# Patient Record
Sex: FEMALE | Race: WHITE | NOT HISPANIC OR LATINO | Employment: OTHER | URBAN - METROPOLITAN AREA
[De-identification: names, ages, dates, MRNs, and addresses within clinical notes are randomized per-mention and may not be internally consistent; named-entity substitution may affect disease eponyms.]

---

## 2017-01-13 DIAGNOSIS — M25.562 ARTHRALGIA OF BOTH KNEES: ICD-10-CM

## 2017-01-13 DIAGNOSIS — E54 SCURVY: ICD-10-CM

## 2017-01-13 DIAGNOSIS — M19.91 PRIMARY OSTEOARTHRITIS, UNSPECIFIED SITE: ICD-10-CM

## 2017-01-13 DIAGNOSIS — E55.9 VITAMIN D DEFICIENCY: ICD-10-CM

## 2017-01-13 DIAGNOSIS — E53.8 VITAMIN B12 DEFICIENCY (NON ANEMIC): ICD-10-CM

## 2017-01-13 DIAGNOSIS — M25.561 ARTHRALGIA OF BOTH KNEES: ICD-10-CM

## 2017-01-13 DIAGNOSIS — I10 BENIGN ESSENTIAL HYPERTENSION: ICD-10-CM

## 2017-01-13 LAB
DEPRECATED CALCIDIOL+CALCIFEROL SERPL-MC: 63 UG/L (ref 20–75)
ERYTHROCYTE [DISTWIDTH] IN BLOOD BY AUTOMATED COUNT: 13.5 % (ref 10–15)
FERRITIN SERPL-MCNC: 96 NG/ML (ref 8–252)
FOLATE SERPL-MCNC: 79.1 NG/ML
HCT VFR BLD AUTO: 45.9 % (ref 35–47)
HGB BLD-MCNC: 14.7 G/DL (ref 11.7–15.7)
IRON SATN MFR SERPL: 17 % (ref 15–46)
IRON SERPL-MCNC: 70 UG/DL (ref 35–180)
MCH RBC QN AUTO: 30.2 PG (ref 26.5–33)
MCHC RBC AUTO-ENTMCNC: 32 G/DL (ref 31.5–36.5)
MCV RBC AUTO: 94 FL (ref 78–100)
PLATELET # BLD AUTO: 299 10E9/L (ref 150–450)
RBC # BLD AUTO: 4.87 10E12/L (ref 3.8–5.2)
T4 FREE SERPL-MCNC: 1.23 NG/DL (ref 0.76–1.46)
TIBC SERPL-MCNC: 402 UG/DL (ref 240–430)
TSH SERPL DL<=0.05 MIU/L-ACNC: 0.59 MU/L (ref 0.4–4)
VIT B12 SERPL-MCNC: 3351 PG/ML (ref 193–986)
WBC # BLD AUTO: 6.9 10E9/L (ref 4–11)

## 2017-01-13 PROCEDURE — 82728 ASSAY OF FERRITIN: CPT | Performed by: INTERNAL MEDICINE

## 2017-01-13 PROCEDURE — 83550 IRON BINDING TEST: CPT | Performed by: INTERNAL MEDICINE

## 2017-01-13 PROCEDURE — 99000 SPECIMEN HANDLING OFFICE-LAB: CPT | Performed by: INTERNAL MEDICINE

## 2017-01-13 PROCEDURE — 82306 VITAMIN D 25 HYDROXY: CPT | Performed by: INTERNAL MEDICINE

## 2017-01-13 PROCEDURE — 82607 VITAMIN B-12: CPT | Performed by: INTERNAL MEDICINE

## 2017-01-13 PROCEDURE — 85027 COMPLETE CBC AUTOMATED: CPT | Performed by: INTERNAL MEDICINE

## 2017-01-13 PROCEDURE — 82746 ASSAY OF FOLIC ACID SERUM: CPT | Performed by: INTERNAL MEDICINE

## 2017-01-13 PROCEDURE — 84439 ASSAY OF FREE THYROXINE: CPT | Performed by: INTERNAL MEDICINE

## 2017-01-13 PROCEDURE — 82180 ASSAY OF ASCORBIC ACID: CPT | Mod: 90 | Performed by: INTERNAL MEDICINE

## 2017-01-13 PROCEDURE — 84443 ASSAY THYROID STIM HORMONE: CPT | Performed by: INTERNAL MEDICINE

## 2017-01-13 PROCEDURE — 36415 COLL VENOUS BLD VENIPUNCTURE: CPT | Performed by: INTERNAL MEDICINE

## 2017-01-13 PROCEDURE — 83540 ASSAY OF IRON: CPT | Performed by: INTERNAL MEDICINE

## 2017-01-15 LAB — VIT C SERPL-MCNC: 133 MG/DL

## 2017-01-18 ENCOUNTER — PRE VISIT (OUTPATIENT)
Dept: CARDIOLOGY | Facility: CLINIC | Age: 67
End: 2017-01-18

## 2017-01-20 ENCOUNTER — OFFICE VISIT (OUTPATIENT)
Dept: INTERNAL MEDICINE | Facility: CLINIC | Age: 67
End: 2017-01-20
Payer: COMMERCIAL

## 2017-01-20 VITALS
TEMPERATURE: 98.8 F | OXYGEN SATURATION: 97 % | DIASTOLIC BLOOD PRESSURE: 76 MMHG | HEIGHT: 65 IN | WEIGHT: 168.9 LBS | BODY MASS INDEX: 28.14 KG/M2 | HEART RATE: 59 BPM | SYSTOLIC BLOOD PRESSURE: 126 MMHG

## 2017-01-20 DIAGNOSIS — L40.50 PSORIASIS WITH ARTHROPATHY (H): ICD-10-CM

## 2017-01-20 DIAGNOSIS — E54 SCURVY: ICD-10-CM

## 2017-01-20 DIAGNOSIS — M85.80 OSTEOPENIA: ICD-10-CM

## 2017-01-20 DIAGNOSIS — M25.50 PAIN IN JOINT, MULTIPLE SITES: ICD-10-CM

## 2017-01-20 DIAGNOSIS — E55.9 VITAMIN D DEFICIENCY: ICD-10-CM

## 2017-01-20 DIAGNOSIS — I10 BENIGN ESSENTIAL HYPERTENSION: Primary | ICD-10-CM

## 2017-01-20 DIAGNOSIS — E53.8 VITAMIN B12 DEFICIENCY (NON ANEMIC): ICD-10-CM

## 2017-01-20 PROCEDURE — 99214 OFFICE O/P EST MOD 30 MIN: CPT | Performed by: INTERNAL MEDICINE

## 2017-01-20 RX ORDER — FOLIC ACID 1 MG/1
1 TABLET ORAL DAILY
Qty: 100 TABLET | Refills: 3 | Status: SHIPPED | OUTPATIENT
Start: 2017-01-20 | End: 2017-10-30

## 2017-01-20 RX ORDER — SPIRONOLACTONE AND HYDROCHLOROTHIAZIDE 25; 25 MG/1; MG/1
1 TABLET ORAL EVERY MORNING
Qty: 90 TABLET | Refills: 3 | Status: SHIPPED | OUTPATIENT
Start: 2017-01-20 | End: 2017-08-10

## 2017-01-20 NOTE — PATIENT INSTRUCTIONS
** FOLLOW UP PLAN**:    OFFICE VISIT SCHEDULED FOR 03/21/17 FOR PREOP, OTHER MEDICAL ISSUES, AND TO REVIEW TEST RESULTS     BE SURE TO SCHEDULE YOUR LAB DRAW APPOINTMENT FOR AT LEAST 1 WEEK BEFORE YOUR NEXT VISIT      YOU MAY CONTACT THE CLINIC IF ANY QUESTIONS OR CONCERNS -[phone] OR VIA [employer]

## 2017-01-20 NOTE — MR AVS SNAPSHOT
After Visit Summary   1/20/2017    Marva Angela    MRN: [id number]           Patient Information     Date Of Birth          1950        Visit Information        Provider Department      1/20/2017 11:00 AM Leif Fair MD [facility]        Today's Diagnoses     Benign essential hypertension    -  1     Psoriasis with arthropathy (H)         Vitamin B12 deficiency (non anemic)         Scurvy         Vitamin D deficiency         Pain in joint, multiple sites         Osteopenia           Care Instructions    ** FOLLOW UP PLAN**:    OFFICE VISIT SCHEDULED FOR 03/21/17 FOR PREOP, OTHER MEDICAL ISSUES, AND TO REVIEW TEST RESULTS     BE SURE TO SCHEDULE YOUR LAB DRAW APPOINTMENT FOR AT LEAST 1 WEEK BEFORE YOUR NEXT VISIT      YOU MAY CONTACT THE CLINIC IF ANY QUESTIONS OR CONCERNS -[phone] OR VIA [employer]                   Follow-ups after your visit        Your next 10 appointments already scheduled     Feb 01, 2017  8:10 AM   LAB with [facility]   [facility] PHYSICIANS HEART AT [place] ([facility])    [address] [place] [address] [address]  [facility] [id number] [phone]           Patient must bring picture ID.  Patient should be prepared to give a urine specimen  Please do not eat 10-12 hours before your appointment if you are coming in fasting for labs on lipids, cholesterol, or glucose (sugar).  Pregnant women should follow their Care Team instructions. Water with medications is okay. Do not drink coffee or other fluids.   If you have concerns about taking  your medications, please ask at office or if scheduling via [employer], send a message by clicking on Secure Messaging, Message Your Care Team.            Feb 01, 2017  8:30 AM   Ech Complete with [id number]   [facility] CV Echocardiography (Cardiovascular Imaging at [facility])    [address] [place] [address]  [address]  [facility] [id number] [phone]           1.   Please bring or wear a comfortable two-piece outfit. 2.  You may eat, drink and take your normal medicines. 3.  For any questions that cannot be answered, please contact the ordering physician            Feb 07, 2017  8:45 AM   Return Visit with Aren Fountain MD   [facility] PHYSICIANS [facility] AT [place] ([facility])    [address] [place], [place] [address]  [facility] [address]   [phone]            Mar 21, 2017  8:30 AM   Pre-Op physical with Leif Fair MD   [facility] ([facility]    [address] [address] [phone]              Future tests that were ordered for you today     Open Future Orders        Priority Expected Expires Ordered    Vitamin D Deficiency Routine 3/20/2017 7/20/2017 1/20/2017    Vitamin B12 Routine 3/20/2017 7/20/2017 1/20/2017    Comprehensive metabolic panel Routine 3/20/2017 7/20/2017 1/20/2017    Vitamin C Routine 3/20/2017 7/20/2017 1/20/2017            Who to contact     If you have questions or need follow up information about today's clinic visit or your schedule please contact [facility] directly at [phone].  Normal or non-critical lab and imaging results will be communicated to you by [employer], letter or phone within 4 business days after the clinic has received the results. If you do not hear from us within 7 days, please contact the clinic through MyChart or phone. If you have a critical or abnormal lab result, we will notify you by phone as soon as possible.  Submit refill requests through [employer] or call your pharmacy and they will forward the refill request to us. Please allow 3 business days for your refill to be completed.          Additional Information About Your Visit        [employer] Information     [employer] gives you secure access to your electronic health record. If you see a primary care provider, you can also send messages to  "your care team and make appointments. If you have questions, please call your primary care clinic.  If you do not have a primary care provider, please call [phone] and they will assist you.        Care EveryWhere ID     This is your Care EveryWhere ID. This could be used by other organizations to access your [place] medical records  [id number]        Your Vitals Were     Pulse Temperature Height BMI (Body Mass Index) Pulse Oximetry       59 98.8  F (37.1  C) (Oral) 5' 5\" (1.651 m) 28.11 kg/m2 97%        Blood Pressure from Last 3 Encounters:   01/20/17 126/76   12/19/16 142/84   12/05/16 154/72    Weight from Last 3 Encounters:   01/20/17 168 lb 14.4 oz (76.613 kg)   12/05/16 174 lb (78.926 kg)   10/14/16 169 lb 11.2 oz (76.975 kg)                 Today's Medication Changes          These changes are accurate as of: 1/20/17 12:23 PM.  If you have any questions, ask your nurse or doctor.               These medicines have changed or have updated prescriptions.        Dose/Directions    cholecalciferol 51525 UNITS capsule   Commonly known as:  VITAMIN D3   This may have changed:  additional instructions   Used for:  Osteopenia, Psoriasis with arthropathy (H)   Changed by:  Leif Fair MD        Dose:  1 capsule   Take 1 capsule (50,000 Units) by mouth every 14 days SIG: TAKE ONE CAP EVERY OTHER WEEK, INDICATION: TO TREAT VITAMIN D DEFICIENCY (1ST AND 15TH OF EACH MONTH), MUST TAKE WITH FAT CONTAINING MEAL, TAKE 1 EXTRA CAPSULE THIS WEEK ONLY   Quantity:  40 capsule   Refills:  0            Where to get your medicines      These [id number] - [place], [place] - [address] [place] AT [facility] [address]  [address] [facility] [address], [place] [address]     Phone:  [phone]    - Calcium Carb-Cholecalciferol 1000-800 MG-UNIT Tabs  - Cyanocobalamin 5000 MCG/ML Liqd  - folic acid 1 MG tablet  - spironolactone-hydrochlorothiazide 25-25 MG per " tablet  - vitamin C-electrolytes 1000mg vitamin C super orange drink mix      Some of these will need a paper prescription and others can be bought over the counter.  Ask your nurse if you have questions.     You don't need a prescription for these medications    - cholecalciferol 68188 UNITS capsule             Primary Care Provider Office Phone # Fax #    Ivonnelam LAM Fair [phone]       [facility] [address]  [facility] [address]        Thank you!     Thank you for choosing [facility]  for your care. Our goal is always to provide you with excellent care. Hearing back from our patients is one way we can continue to improve our services. Please take a few minutes to complete the written survey that you may receive in the mail after your visit with us. Thank you!             Your Updated Medication List - Protect others around you: Learn how to safely use, store and throw away your medicines at www.disposemymeds.org.          This list is accurate as of: 1/20/17 12:23 PM.  Always use your most recent med list.                   Brand Name Dispense Instructions for use    acetaminophen 500 MG tablet    TYLENOL    100 tablet    Take 2 tablets (1,000 mg) by mouth every 8 hours as needed for mild pain       albuterol 108 (90 BASE) MCG/ACT Inhaler    PROAIR HFA/PROVENTIL HFA/VENTOLIN HFA    3 Inhaler    Inhale 2 puffs into the lungs every 6 hours as needed for shortness of breath / dyspnea or wheezing       aspirin EC 81 MG EC tablet     30 tablet    Take 1 tablet (81 mg) by mouth daily       atorvastatin 10 MG tablet    LIPITOR    90 tablet    Take 1 tablet (10 mg) by mouth daily       betamethasone dipropionate 0.05 % cream    DIPROSONE    45 g    Apply topically 2 times daily       Calcium Carb-Cholecalciferol 1000-800 MG-UNIT Tabs    CALCIUM 1000 + D    100 tablet    Take 1 tablet by mouth daily TAKE WITH FOOD, FOR BONE HEALTH AND FOR VITAMIN D  SUPPLEMENTATION       celecoxib 200 MG capsule    celeBREX    60 capsule    Take 1 capsule (200 mg) by mouth 2 times daily as needed for moderate pain       cholecalciferol 45881 UNITS capsule    VITAMIN D3    40 capsule    Take 1 capsule (50,000 Units) by mouth every 14 days SIG: TAKE ONE CAP EVERY OTHER WEEK, INDICATION: TO TREAT VITAMIN D DEFICIENCY (1ST AND 15TH OF EACH MONTH), MUST TAKE WITH FAT CONTAINING MEAL, TAKE 1 EXTRA CAPSULE THIS WEEK ONLY       Cyanocobalamin 5000 MCG/ML Liqd    B-12 SUPER STRENGTH    2 Bottle    Place 1 mL under the tongue every morning FOR VITAMIN B12 SUPPLEMENTATION, PLEASE PLACE UNDER THE TONGUE       folic acid 1 MG tablet    FOLVITE    100 tablet    Take 1 tablet (1 mg) by mouth daily INDICATION: FOLIC ACID SUPPLEMENT       order for [facility]     1 Device    Equipment being ordered: blood pressure cuff       ranitidine 150 MG tablet    ZANTAC     Take 150 mg by mouth as needed for heartburn       spironolactone-hydrochlorothiazide 25-25 MG per tablet    ALDACTAZIDE    90 tablet    Take 1 tablet by mouth every morning INDICATION:TO LOWER BLOOD PRESSURE       triamcinolone 0.1 % ointment    KENALOG    30 g    Apply topically 2 times daily       vitamin C-electrolytes 1000mg vitamin C super orange drink mix    EMERGEN-C    90 packet    Mix 1 packet in 4-6oz water and take once daily, INDICATION: VITAMIN C SUPPLEMENTION

## 2017-01-20 NOTE — NURSING NOTE
"Chief Complaint   Patient presents with     Hypertension       Initial /76 mmHg  Pulse 59  Temp(Src) 98.8  F (37.1  C) (Oral)  Ht 5' 5\" (1.651 m)  Wt 168 lb 14.4 oz (76.613 kg)  BMI 28.11 kg/m2  SpO2 97% Estimated body mass index is 28.11 kg/(m^2) as calculated from the following:    Height as of this encounter: 5' 5\" (1.651 m).    Weight as of this encounter: 168 lb 14.4 oz (76.613 kg).  BP completed using cuff size: regular    "

## 2017-01-20 NOTE — PROGRESS NOTES
"  SUBJECTIVE:                                                    Marva Angela is a 66 year old female who presents to clinic today for the following health issues:      Hypertension Follow-up      Outpatient blood pressures are being checked at home.  Results are 125-135/70's.    Low Salt Diet: low salt       Amount of exercise or physical activity: None    Problems taking medications regularly: No    Medication side effects: none    Diet: regular (no restrictions)    Hyperlipidemia Follow-Up      Rate your low fat/cholesterol diet?: good    Taking statin?  Yes, no muscle aches from statin    Other lipid medications/supplements?:  none     Other significant issues as outlined and addressed in the plan section of this note.      Marva reports feeling really good. She reports feeling better since she started taking vitamin supplements and has noted significant improvement with regards to fatigue and muscle aches and pains. Psoriatic arthritis sxs have improved significantly.    She is also scheduled for R ROBLES in April 2017. She also needs bilateral TKAs.  Problem list and histories reviewed & adjusted, as indicated.  Additional history: as documented    Labs reviewed in EPIC  Problem list, Medication list, Allergies, and Medical/Social/Surgical histories reviewed in [facility] and updated as appropriate.    ROS:  14 point ROS reviewed in detail and negative     OBJECTIVE:                                                    /76 mmHg  Pulse 59  Temp(Src) 98.8  F (37.1  C) (Oral)  Ht 5' 5\" (1.651 m)  Wt 168 lb 14.4 oz (76.613 kg)  BMI 28.11 kg/m2  SpO2 97%  Body mass index is 28.11 kg/(m^2).  GENERAL: healthy, alert and no distress  EYES: Eyes grossly normal to inspection, PERRL and conjunctivae and sclerae normal  NECK: no adenopathy, no asymmetry, masses, or scars and thyroid normal to palpation  RESP: lungs clear to auscultation - no rales, rhonchi or wheezes  CV: regular rate and rhythm, normal S1 " S2, no S3 or S4, no murmur, click or rub, no peripheral edema and peripheral pulses strong  ABDOMEN: soft, nontender, no hepatosplenomegaly, no masses and bowel sounds normal  MS: no gross musculoskeletal defects noted, no edema    Diagnostic Test Results:  Results for orders placed or performed in visit on 01/13/17   Vitamin D Deficiency   Result Value Ref Range    Vitamin D Deficiency screening 63 20 - 75 ug/L   CBC with platelets   Result Value Ref Range    WBC 6.9 4.0 - 11.0 10e9/L    RBC Count 4.87 3.8 - 5.2 10e12/L    Hemoglobin 14.7 11.7 - 15.7 g/dL    Hematocrit 45.9 35.0 - 47.0 %    MCV 94 78 - 100 fl    MCH 30.2 26.5 - 33.0 pg    MCHC 32.0 31.5 - 36.5 g/dL    RDW 13.5 10.0 - 15.0 %    Platelet Count 299 150 - 450 10e9/L   Ferritin   Result Value Ref Range    Ferritin 96 8 - 252 ng/mL   Vitamin B12   Result Value Ref Range    Vitamin B12 3351 (H) 193 - 986 pg/mL   Vitamin C   Result Value Ref Range    Vitamin C 133 (H)    TSH   Result Value Ref Range    TSH 0.59 0.40 - 4.00 mU/L   T4 free   Result Value Ref Range    T4 Free 1.23 0.76 - 1.46 ng/dL   Folate   Result Value Ref Range    Folate 79.1 >5.4 ng/mL   Iron and iron binding capacity   Result Value Ref Range    Iron 70 35 - 180 ug/dL    Iron Binding Cap 402 240 - 430 ug/dL    Iron Saturation Index 17 15 - 46 %        ASSESSMENT/PLAN:                                                    Hyperlipidemia; controlled   Plan:  No changes in the patient's current treatment plan    Hypertension; controlled   Associated with the following complications:    Heart Disease   Plan:  No changes in the patient's current treatment plan      DIAGNOSIS/PLAN:     ICD-10-CM    1. Benign essential hypertension I10 spironolactone-hydrochlorothiazide (ALDACTAZIDE) 25-25 MG per tablet     Comprehensive metabolic panel   2. Psoriasis with arthropathy (H) L40.50 cholecalciferol (VITAMIN D3) 58425 UNITS capsule   3. Vitamin B12 deficiency (non anemic) E53.8 Cyanocobalamin (B-12  SUPER STRENGTH) 5000 MCG/ML LIQD     folic acid (FOLVITE) 1 MG tablet     Vitamin B12    SEVERE   4. Scurvy E54 vitamin C-electrolytes (EMERGEN-C) 1000mg vitamin C super orange drink mix     Vitamin C    SEVERE   5. Vitamin D deficiency E55.9 Vitamin D Deficiency   6. Pain in joint, multiple sites M25.50 Calcium Carb-Cholecalciferol (CALCIUM 1000 + D) 1000-800 MG-UNIT TABS   7. Osteopenia M85.80 cholecalciferol (VITAMIN D3) 52550 UNITS capsule     Calcium Carb-Cholecalciferol (CALCIUM 1000 + D) 1000-800 MG-UNIT TABS       SIGNIFICANT ISSUES RE The primary encounter diagnosis was Benign essential hypertension. Diagnoses of Psoriasis with arthropathy (H), Vitamin B12 deficiency (non anemic), Scurvy, Vitamin D deficiency, Pain in joint, multiple sites, and Osteopenia were also pertinent to this visit. AS NOTED AND ADDRESSED ABOVE   MEDS AND AND LABS AS ORDERED TO ADDRESS PREVIOUS AND CURRENT ABNORMAL INDICES    SEE PATIENT INSTRUCTION SECTION FOR FOLLOW UP PLAN    Marva IS TO CONTINUE OTHER TREATMENT REGIMEN/PLANS EXCEPT AS INDICATED    COMPLIANCE WITH MEDICATIONS DIET AND EXERCISE PLANS ENCOURAGED    DISCONTINUED MEDS:  Medications Discontinued During This Encounter   Medication Reason     cholecalciferol (VITAMIN D3) 76931 UNITS capsule Reorder     Cyanocobalamin (B-12 SUPER STRENGTH) 5000 MCG/ML LIQD Reorder     spironolactone-hydrochlorothiazide (ALDACTAZIDE) 25-25 MG per tablet Reorder     folic acid (FOLVITE) 1 MG tablet Reorder     vitamin C-electrolytes (EMERGEN-C) 1000mg vitamin C super orange drink mix Reorder     Calcium Carb-Cholecalciferol (CALCIUM 1000 + D) 1000-800 MG-UNIT TABS Reorder       CURRENT MED LIST WITH CHANGES AS NOTED BELOW:  Current Outpatient Prescriptions   Medication Sig Dispense Refill     cholecalciferol (VITAMIN D3) 85882 UNITS capsule Take 1 capsule (50,000 Units) by mouth every 14 days SIG: TAKE ONE CAP EVERY OTHER WEEK, INDICATION: TO TREAT VITAMIN D DEFICIENCY (1ST AND 15TH OF  EACH MONTH), MUST TAKE WITH FAT CONTAINING MEAL, TAKE 1 EXTRA CAPSULE THIS WEEK ONLY 40 capsule 0     Cyanocobalamin (B-12 SUPER STRENGTH) 5000 MCG/ML LIQD Place 1 mL under the tongue every morning FOR VITAMIN B12 SUPPLEMENTATION, PLEASE PLACE UNDER THE TONGUE 2 Bottle PRN     spironolactone-hydrochlorothiazide (ALDACTAZIDE) 25-25 MG per tablet Take 1 tablet by mouth every morning INDICATION:TO LOWER BLOOD PRESSURE 90 tablet 3     folic acid (FOLVITE) 1 MG tablet Take 1 tablet (1 mg) by mouth daily INDICATION: FOLIC ACID SUPPLEMENT 100 tablet 3     vitamin C-electrolytes (EMERGEN-C) 1000mg vitamin C super orange drink mix Mix 1 packet in 4-6oz water and take once daily, INDICATION: VITAMIN C SUPPLEMENTION 90 packet PRN     Calcium Carb-Cholecalciferol (CALCIUM 1000 + D) 1000-800 MG-UNIT TABS Take 1 tablet by mouth daily TAKE WITH FOOD, FOR BONE HEALTH AND FOR VITAMIN D SUPPLEMENTATION 100 tablet PRN     celecoxib (CELEBREX) 200 MG capsule Take 1 capsule (200 mg) by mouth 2 times daily as needed for moderate pain 60 capsule 3     acetaminophen (TYLENOL) 500 MG tablet Take 2 tablets (1,000 mg) by mouth every 8 hours as needed for mild pain 100 tablet 0     betamethasone dipropionate (DIPROSONE) 0.05 % cream Apply topically 2 times daily 45 g 1     atorvastatin (LIPITOR) 10 MG tablet Take 1 tablet (10 mg) by mouth daily 90 tablet 3     ranitidine (ZANTAC) 150 MG tablet Take 150 mg by mouth as needed for heartburn       albuterol (PROAIR HFA, PROVENTIL HFA, VENTOLIN HFA) 108 (90 BASE) MCG/ACT inhaler Inhale 2 puffs into the lungs every 6 hours as needed for shortness of breath / dyspnea or wheezing 3 Inhaler 1     aspirin EC 81 MG tablet Take 1 tablet (81 mg) by mouth daily 30 tablet 11     order for DME Equipment being ordered: blood pressure cuff 1 Device 0     triamcinolone (KENALOG) 0.1 % ointment Apply topically 2 times daily 30 g 1     [DISCONTINUED] spironolactone-hydrochlorothiazide (ALDACTAZIDE) 25-25 MG per  tablet Take 1 tablet by mouth every morning INDICATION:TO LOWER BLOOD PRESSURE 30 tablet 3         Patient Instructions   ** FOLLOW UP PLAN**:    OFFICE VISIT SCHEDULED FOR 03/21/17 FOR PREOP, OTHER MEDICAL ISSUES, AND TO REVIEW TEST RESULTS     BE SURE TO SCHEDULE YOUR LAB DRAW APPOINTMENT FOR AT LEAST 1 WEEK BEFORE YOUR NEXT VISIT      YOU MAY CONTACT THE CLINIC IF ANY QUESTIONS OR CONCERNS -[phone] OR VIA WALTER Fair MD  [facility]

## 2017-02-01 ENCOUNTER — HOSPITAL ENCOUNTER (OUTPATIENT)
Dept: CARDIOLOGY | Facility: CLINIC | Age: 67
Discharge: HOME | End: 2017-02-01
Attending: INTERNAL MEDICINE | Admitting: INTERNAL MEDICINE
Payer: MEDICARE

## 2017-02-01 DIAGNOSIS — I10 BENIGN ESSENTIAL HYPERTENSION: ICD-10-CM

## 2017-02-01 DIAGNOSIS — I25.10 CORONARY ARTERY DISEASE INVOLVING NATIVE CORONARY ARTERY OF NATIVE HEART WITHOUT ANGINA PECTORIS: ICD-10-CM

## 2017-02-01 DIAGNOSIS — J44.9 CHRONIC OBSTRUCTIVE PULMONARY DISEASE, UNSPECIFIED COPD TYPE (H): ICD-10-CM

## 2017-02-01 DIAGNOSIS — M81.0 OSTEOPOROSIS: ICD-10-CM

## 2017-02-01 DIAGNOSIS — I25.10 CORONARY ARTERY DISEASE INVOLVING NATIVE HEART WITHOUT ANGINA PECTORIS, UNSPECIFIED VESSEL OR LESION TYPE: ICD-10-CM

## 2017-02-01 DIAGNOSIS — E78.5 HYPERLIPIDEMIA LDL GOAL <130: ICD-10-CM

## 2017-02-01 LAB
ALT SERPL W P-5'-P-CCNC: <5 U/L (ref 5–30)
ANION GAP SERPL CALCULATED.3IONS-SCNC: 12 MMOL/L (ref 6–17)
BUN SERPL-MCNC: 21 MG/DL (ref 7–30)
CALCIUM SERPL-MCNC: 9.8 MG/DL (ref 8.5–10.5)
CHLORIDE SERPL-SCNC: 99 MMOL/L (ref 98–107)
CHOLEST SERPL-MCNC: 177 MG/DL
CO2 SERPL-SCNC: 32 MMOL/L (ref 23–29)
CREAT SERPL-MCNC: 0.83 MG/DL (ref 0.7–1.3)
GFR SERPL CREATININE-BSD FRML MDRD: 68 ML/MIN/1.7M2
GLUCOSE SERPL-MCNC: 117 MG/DL (ref 70–105)
HDLC SERPL-MCNC: 56 MG/DL
LDLC SERPL CALC-MCNC: 103 MG/DL
NONHDLC SERPL-MCNC: 121 MG/DL
POTASSIUM SERPL-SCNC: 4 MMOL/L (ref 3.5–5.1)
SODIUM SERPL-SCNC: 139 MMOL/L (ref 136–145)
TRIGL SERPL-MCNC: 88 MG/DL

## 2017-02-01 PROCEDURE — 80048 BASIC METABOLIC PNL TOTAL CA: CPT | Performed by: INTERNAL MEDICINE

## 2017-02-01 PROCEDURE — 84460 ALANINE AMINO (ALT) (SGPT): CPT | Performed by: INTERNAL MEDICINE

## 2017-02-01 PROCEDURE — 36415 COLL VENOUS BLD VENIPUNCTURE: CPT | Performed by: INTERNAL MEDICINE

## 2017-02-01 PROCEDURE — 93306 TTE W/DOPPLER COMPLETE: CPT | Mod: 26 | Performed by: INTERNAL MEDICINE

## 2017-02-01 PROCEDURE — 80061 LIPID PANEL: CPT | Performed by: INTERNAL MEDICINE

## 2017-02-01 PROCEDURE — 93306 TTE W/DOPPLER COMPLETE: CPT

## 2017-02-07 ENCOUNTER — OFFICE VISIT (OUTPATIENT)
Dept: CARDIOLOGY | Facility: CLINIC | Age: 67
End: 2017-02-07
Attending: INTERNAL MEDICINE
Payer: COMMERCIAL

## 2017-02-07 VITALS
HEART RATE: 72 BPM | BODY MASS INDEX: 27 KG/M2 | SYSTOLIC BLOOD PRESSURE: 136 MMHG | DIASTOLIC BLOOD PRESSURE: 82 MMHG | WEIGHT: 168 LBS | HEIGHT: 66 IN

## 2017-02-07 DIAGNOSIS — J44.9 CHRONIC OBSTRUCTIVE PULMONARY DISEASE, UNSPECIFIED COPD TYPE (H): ICD-10-CM

## 2017-02-07 DIAGNOSIS — I10 BENIGN ESSENTIAL HYPERTENSION: ICD-10-CM

## 2017-02-07 DIAGNOSIS — I25.10 CORONARY ARTERY DISEASE INVOLVING NATIVE HEART WITHOUT ANGINA PECTORIS, UNSPECIFIED VESSEL OR LESION TYPE: ICD-10-CM

## 2017-02-07 DIAGNOSIS — I25.10 CORONARY ARTERY DISEASE INVOLVING NATIVE CORONARY ARTERY OF NATIVE HEART WITHOUT ANGINA PECTORIS: ICD-10-CM

## 2017-02-07 DIAGNOSIS — E78.5 HYPERLIPIDEMIA LDL GOAL <130: ICD-10-CM

## 2017-02-07 DIAGNOSIS — M81.0 OSTEOPOROSIS: ICD-10-CM

## 2017-02-07 PROCEDURE — 99214 OFFICE O/P EST MOD 30 MIN: CPT | Performed by: INTERNAL MEDICINE

## 2017-02-07 RX ORDER — AMLODIPINE BESYLATE 2.5 MG/1
2.5 TABLET ORAL DAILY
Qty: 30 TABLET | Refills: 11 | Status: SHIPPED | OUTPATIENT
Start: 2017-02-07 | End: 2017-07-14

## 2017-02-07 NOTE — MR AVS SNAPSHOT
After Visit Summary   2/7/2017    Marva Angela    MRN: [id number]           Patient Information     Date Of Birth          1950        Visit Information        Provider Department      2/7/2017 8:45 AM Aren Fountain MD [facility] [place]        Today's Diagnoses     Hyperlipidemia LDL goal <130         Benign essential hypertension         Osteoporosis         Coronary artery disease involving native coronary artery of native heart without angina pectoris         Chronic obstructive pulmonary disease, unspecified COPD type (H)         Coronary artery disease involving native heart without angina pectoris, unspecified vessel or lesion type            Follow-ups after your visit        Additional Services     Follow-Up with Cardiac Advanced Practice Provider           Follow-Up with Cardiologist                 Your next 10 appointments already scheduled     Mar 14, 2017  9:30 AM   LAB with [facility]   [facility] ([facility]    [address] [address] [phone]           Patient must bring picture ID.  Patient should be prepared to give a urine specimen  Please do not eat 10-12 hours before your appointment if you are coming in fasting for labs on lipids, cholesterol, or glucose (sugar).  Pregnant women should follow their Care Team instructions. Water with medications is okay. Do not drink coffee or other fluids.   If you have concerns about taking  your medications, please ask at office or if scheduling via [employer], send a message by clicking on Secure Messaging, Message Your Care Team.            Mar 21, 2017  8:30 AM   Pre-Op physical with Leif Fair MD   [facility]    [address] [address] [phone]              Future tests that were ordered for you today      "Open Future Orders        Priority Expected Expires Ordered    NM Lexiscan stress test (nuc card) Routine 3/9/2017 2/7/2018 2/7/2017    Basic metabolic panel Routine 6/7/2017 2/7/2018 2/7/2017    Lipid Profile Routine 6/7/2017 2/7/2018 2/7/2017    ALT Routine 6/7/2017 2/7/2018 2/7/2017    Follow-Up with Cardiologist Routine 6/7/2017 2/7/2018 2/7/2017    Basic metabolic panel Routine 3/9/2017 2/7/2018 2/7/2017    Follow-Up with Cardiac Advanced Practice Provider Routine 3/9/2017 2/7/2018 2/7/2017            Who to contact     If you have questions or need follow up information about today's clinic visit or your schedule please contact [facility] AT [place] directly at [phone].  Normal or non-critical lab and imaging results will be communicated to you by MyChart, letter or phone within 4 business days after the clinic has received the results. If you do not hear from us within 7 days, please contact the clinic through [facility] [employer] or phone. If you have a critical or abnormal lab result, we will notify you by phone as soon as possible.  Submit refill requests through [employer] or call your pharmacy and they will forward the refill request to us. Please allow 3 business days for your refill to be completed.          Additional Information About Your Visit        [facility] [employer] Information     [employer] gives you secure access to your electronic health record. If you see a primary care provider, you can also send messages to your care team and make appointments. If you have questions, please call your primary care clinic.  If you do not have a primary care provider, please call [phone] and they will assist you.        Care EveryWhere ID     This is your Care EveryWhere ID. This could be used by other organizations to access your [place] medical records  [id number]        Your Vitals Were     Pulse Height BMI (Body Mass Index)             72 1.664 m (5' 5.5\") 27.52 kg/m2          Blood " Pressure from Last 3 Encounters:   02/07/17 136/82   01/20/17 126/76   12/19/16 142/84    Weight from Last 3 Encounters:   02/07/17 76.204 kg (168 lb)   01/20/17 76.613 kg (168 lb 14.4 oz)   12/05/16 78.926 kg (174 lb)              We Performed the Following     Follow-Up with Cardiologist          Today's Medication Changes          These changes are accurate as of: 2/7/17  9:59 AM.  If you have any questions, ask your nurse or doctor.               Start taking these medicines.        Dose/Directions    amLODIPine 2.5 MG tablet   Commonly known as:  NORVASC   Used for:  Benign essential hypertension   Started by:  Aren Fountain MD        Dose:  2.5 mg   Take 1 tablet (2.5 mg) by mouth daily   Quantity:  30 tablet   Refills:  11            Where to get your medicines      These medications were sent to [id number] - [facility] [address] AT [facility] [address] & [facility]  [address] [facility], [facility] [address]     Phone:  [phone]    - amLODIPine 2.5 MG tablet             Primary Care Provider Office Phone # Fax #    Leif Fair [phone]       [facility] [address] [facility] [address]        Thank you!     Thank you for choosing [facility] AT [place]  for your care. Our goal is always to provide you with excellent care. Hearing back from our patients is one way we can continue to improve our services. Please take a few minutes to complete the written survey that you may receive in the mail after your visit with us. Thank you!             Your Updated Medication List - Protect others around you: Learn how to safely use, store and throw away your medicines at www.disposemymeds.org.          This list is accurate as of: 2/7/17  9:59 AM.  Always use your most recent med list.                   Brand Name Dispense Instructions for use    acetaminophen 500 MG tablet    TYLENOL    100  tablet    Take 2 tablets (1,000 mg) by mouth every 8 hours as needed for mild pain       albuterol 108 (90 BASE) MCG/ACT Inhaler    PROAIR HFA/PROVENTIL HFA/VENTOLIN HFA    3 Inhaler    Inhale 2 puffs into the lungs every 6 hours as needed for shortness of breath / dyspnea or wheezing       amLODIPine 2.5 MG tablet    NORVASC    30 tablet    Take 1 tablet (2.5 mg) by mouth daily       aspirin EC 81 MG EC tablet     30 tablet    Take 1 tablet (81 mg) by mouth daily       atorvastatin 10 MG tablet    LIPITOR    90 tablet    Take 1 tablet (10 mg) by mouth daily       betamethasone dipropionate 0.05 % cream    DIPROSONE    45 g    Apply topically 2 times daily       Calcium Carb-Cholecalciferol 1000-800 MG-UNIT Tabs    CALCIUM 1000 + D    100 tablet    Take 1 tablet by mouth daily TAKE WITH FOOD, FOR BONE HEALTH AND FOR VITAMIN D SUPPLEMENTATION       celecoxib 200 MG capsule    celeBREX    60 capsule    Take 1 capsule (200 mg) by mouth 2 times daily as needed for moderate pain       cholecalciferol 47120 UNITS capsule    VITAMIN D3    40 capsule    Take 1 capsule (50,000 Units) by mouth every 14 days SIG: TAKE ONE CAP EVERY OTHER WEEK, INDICATION: TO TREAT VITAMIN D DEFICIENCY (1ST AND 15TH OF EACH MONTH), MUST TAKE WITH FAT CONTAINING MEAL, TAKE 1 EXTRA CAPSULE THIS WEEK ONLY       Cyanocobalamin 5000 MCG/ML Liqd    B-12 SUPER STRENGTH    2 Bottle    Place 1 mL under the tongue every morning FOR VITAMIN B12 SUPPLEMENTATION, PLEASE PLACE UNDER THE TONGUE       folic acid 1 MG tablet    FOLVITE    100 tablet    Take 1 tablet (1 mg) by mouth daily INDICATION: FOLIC ACID SUPPLEMENT       order for DME     1 Device    Equipment being ordered: blood pressure cuff       ranitidine 150 MG tablet    ZANTAC     Take 150 mg by mouth as needed for heartburn       spironolactone-hydrochlorothiazide 25-25 MG per tablet    ALDACTAZIDE    90 tablet    Take 1 tablet by mouth every morning INDICATION:TO LOWER BLOOD PRESSURE        triamcinolone 0.1 % ointment    KENALOG    30 g    Apply topically 2 times daily       vitamin C-electrolytes 1000mg vitamin C super orange drink mix    EMERGEN-C    90 packet    Mix 1 packet in 4-6oz water and take once daily, INDICATION: VITAMIN C SUPPLEMENTION

## 2017-02-07 NOTE — LETTER
2/7/2017    Leif Fair MD  [facility]   [address] [facility] [address]    RE: Marva Angela       Dear Colleague,    I had the pleasure of seeing Marva Angela in the [facility].    The patient is a 66-year-old slender white female who was referred to Cardiology Clinic for further assessment of CT scan of the chest for cancer evaluation showing calcification of the ascending thoracic aorta as well as coronary arteries.  The patient related no significant symptoms of chest discomfort, shortness of breath at rest, dizziness, palpitations, nausea, vomiting, diaphoresis or syncope.  There is no history of PND, orthopnea, fevers, chills or sweats.  There is no documented history of myocardial infarction, congestive heart failure, rheumatic heart disease, congenital heart disease or heart murmur.        History for coronary disease risk factors are positive for cigarette smoking, discontinued in 2015 after 50 pack years, positive for hypertension treated for 2015 with somewhat problematic control, negative for diabetes mellitus, positive for hypercholesterolemia with attempted treatments in 2016 and a positive family history of myocardial infarction with stroke and 58.  She has a rare alcoholic beverage and approximately 1 caffeinated beverage a day.  She does have some sleeping difficulties.        She had a CT scan of her chest that described emphysema with underlying symptoms of COPD.  She has been attempted on metoprolol and did not tolerate the medication has also not tolerated lisinopril or losartan for diarrhea primarily.  She denies any symptoms of chest discomfort, shortness of breath, dizziness, palpitations or syncope.      MEDICATIONS:   1.  Vitamin D3 50,000 units every 14 days.   2.  Cyanocobalamin B12 every morning.   3.  Spironolactone/hydrochlorothiazide 25/25 mg once a day.   4.  Folic acid 1 mg a day.   5.  Vitamin C 1000 mg a day.    6.  Calcium carbonate vitamin D 1 tablet a day.   7.  Celebrex 200 mg twice daily as needed.   8.  Acetaminophen 1000 mg every 8 hours as needed.   9.  Atorvastatin 10 mg a day.   10.  Ranitidine 150 mg a day.   11.  Albuterol inhaler as needed.   12.  Aspirin 81 mg a day.      LABORATORY DATA:  Demonstrated cholesterol 177, HDL 56, , triglycerides 88.  Sodium 139, potassium 4.0, BUN 21, creatinine 0.83.  Hemoglobin 14.7, platelet count 299,000.  ALT less than 5.      The patient had an echocardiogram this month to follow left ventricular function that showed normal-sized left ventricle with intact systolic function with ejection fraction 60%-65% with evidence of pseudonormalization with no significant valvular insufficiency or stenosis.      The patient presents to Cardiology Clinic for follow up of her cardiovascular and vascular disease and also in preparation for possible hip replacement and knee replacement surgery during the course of this year.  In fact, she is scheduled for hip replacement in early April.      PHYSICAL EXAMINATION:   VITAL SIGNS:  Blood pressure 136/82 with a heart rate of 72 and regular.  Weight was 168 pounds, which is stable.   NECK:  Without jugular venous distention, carotid bruit or palpable thyroid.   CHEST:  Essentially clear to percussion and auscultation with prolonged expiratory phase and decreased breath sounds at the bases.   CARDIAC:  Regular rhythm, soft S4 gallop, 1/6 systolic murmur at the left sternal border with minimal radiation.  No diastolic murmur, rub or S3.   EXTREMITIES:  Without cyanosis or edema.      CLINICAL IMPRESSION:   1.  Relatively stable cardiac condition.   2.  History of ischemic cardiovascular disease with calcification of the ascending thoracic aorta and coronary arteries.   3.  Hypertension with slight elevation of systolic blood pressure.   4.  Chronic obstructive pulmonary disease from a longstanding history of cigarette use, which was  discontinued in 2015.   5.  Hyperlipidemia, not at goal.   6.  Positive family history of coronary disease.   7.  Osteoarthritis affecting hips and knees scheduled for hip and knee surgery replacement in 2017.      DISCUSSION:  The patient has done reasonably well, but still has slight elevation of systolic blood pressure and serum lipids.  Her serum lipids were controlled when she is stricter with her diet and she will try this before considering increasing the atorvastatin to 20 mg a day.  She has not tolerated lisinopril, losartan, or metoprolol, but it might be reasonable to try a small dose of amlodipine together with her diuretic for blood pressure control in treatment of her atherosclerotic heart disease.  She does not show evidence of significant ischemia in 2016 and she will have a followup Lexiscan Cardiolite stress test this coming month as a prelude to her having hip replacement surgery.        Her primary risk for hip replacement surgery may be respiratory considering longstanding history of cigarette smoking and the documentation of COPD/emphysema.  She will need close followup of her blood pressure, heart rate, rhythm and fluid administration in the perioperative period.        RECOMMENDATIONS:   1.  Continue present medications except to add amlodipine 2.5 mg a day.   2.  Close followup of serum lipids, basic metabolic panel and blood pressure with followup with Rufina Palacios in 1 month.   3.  Lexiscan Cardiolite stress test to evaluate ischemic status of the myocardium in 1 month.   4.  Continue to avoid cigarettes.   5.  Aggressive pulmonary therapy.   6.  Orthopedic followup.   7.  Routine medical followup.   8.  Cardiology followup in 4-6 months.     Again, thank you for allowing me to participate in the care of your patient.      Sincerely,    Aren Fountain MD     [facility]

## 2017-02-08 NOTE — PROGRESS NOTES
HISTORY OF PRESENT ILLNESS:  The patient is a 66-year-old slender white female who was referred to Cardiology Clinic for further assessment of CT scan of the chest for cancer evaluation showing calcification of the ascending thoracic aorta as well as coronary arteries.  The patient related no significant symptoms of chest discomfort, shortness of breath at rest, dizziness, palpitations, nausea, vomiting, diaphoresis or syncope.  There is no history of PND, orthopnea, fevers, chills or sweats.  There is no documented history of myocardial infarction, congestive heart failure, rheumatic heart disease, congenital heart disease or heart murmur.        History for coronary disease risk factors are positive for cigarette smoking, discontinued in 2015 after 50 pack years, positive for hypertension treated for 2015 with somewhat problematic control, negative for diabetes mellitus, positive for hypercholesterolemia with attempted treatments in 2016 and a positive family history of myocardial infarction with stroke and 60.  She has a rare alcoholic beverage and approximately 1 caffeinated beverage a day.  She does have some sleeping difficulties.        She had a CT scan of her chest that described emphysema with underlying symptoms of COPD.  She has been attempted on metoprolol and did not tolerate the medication has also not tolerated lisinopril or losartan for diarrhea primarily.  She denies any symptoms of chest discomfort, shortness of breath, dizziness, palpitations or syncope.      MEDICATIONS:   1.  Vitamin D3 50,000 units every 14 days.   2.  Cyanocobalamin B12 every morning.   3.  Spironolactone/hydrochlorothiazide 25/25 mg once a day.   4.  Folic acid 1 mg a day.   5.  Vitamin C 1000 mg a day.   6.  Calcium carbonate vitamin D 1 tablet a day.   7.  Celebrex 200 mg twice daily as needed.   8.  Acetaminophen 1000 mg every 8 hours as needed.   9.  Atorvastatin 10 mg a day.   10.  Ranitidine 150 mg a day.   11.   Albuterol inhaler as needed.   12.  Aspirin 81 mg a day.      LABORATORY DATA:  Demonstrated cholesterol 177, HDL 56, , triglycerides 88.  Sodium 139, potassium 4.0, BUN 21, creatinine 0.83.  Hemoglobin 14.7, platelet count 299,000.  ALT less than 5.      The patient had an echocardiogram this month to follow left ventricular function that showed normal-sized left ventricle with intact systolic function with ejection fraction 60%-65% with evidence of pseudonormalization with no significant valvular insufficiency or stenosis.      The patient presents to Cardiology Clinic for follow up of her cardiovascular and vascular disease and also in preparation for possible hip replacement and knee replacement surgery during the course of this year.  In fact, she is scheduled for hip replacement in early April.      PHYSICAL EXAMINATION:   VITAL SIGNS:  Blood pressure 136/82 with a heart rate of 72 and regular.  Weight was 168 pounds, which is stable.   NECK:  Without jugular venous distention, carotid bruit or palpable thyroid.   CHEST:  Essentially clear to percussion and auscultation with prolonged expiratory phase and decreased breath sounds at the bases.   CARDIAC:  Regular rhythm, soft S4 gallop, 1/6 systolic murmur at the left sternal border with minimal radiation.  No diastolic murmur, rub or S3.   EXTREMITIES:  Without cyanosis or edema.      CLINICAL IMPRESSION:   1.  Relatively stable cardiac condition.   2.  History of ischemic cardiovascular disease with calcification of the ascending thoracic aorta and coronary arteries.   3.  Hypertension with slight elevation of systolic blood pressure.   4.  Chronic obstructive pulmonary disease from a longstanding history of cigarette use, which was discontinued in 2015.   5.  Hyperlipidemia, not at goal.   6.  Positive family history of coronary disease.   7.  Osteoarthritis affecting hips and knees scheduled for hip and knee surgery replacement in 2017.       DISCUSSION:  The patient has done reasonably well, but still has slight elevation of systolic blood pressure and serum lipids.  Her serum lipids were controlled when she is stricter with her diet and she will try this before considering increasing the atorvastatin to 20 mg a day.  She has not tolerated lisinopril, losartan, or metoprolol, but it might be reasonable to try a small dose of amlodipine together with her diuretic for blood pressure control in treatment of her atherosclerotic heart disease.  She does not show evidence of significant ischemia in 2016 and she will have a followup Lexiscan Cardiolite stress test this coming month as a prelude to her having hip replacement surgery.        Her primary risk for hip replacement surgery may be respiratory considering longstanding history of cigarette smoking and the documentation of COPD/emphysema.  She will need close followup of her blood pressure, heart rate, rhythm and fluid administration in the perioperative period.      RECOMMENDATIONS:   1.  Continue present medications except to add amlodipine 2.5 mg a day.   2.  Close followup of serum lipids, basic metabolic panel and blood pressure with followup with Rufina Palacios in 1 month.   3.  Lexiscan Cardiolite stress test to evaluate ischemic status of the myocardium in 1 month.   4.  Continue to avoid cigarettes.   5.  Aggressive pulmonary therapy.   6.  Orthopedic followup.   7.  Routine medical followup.   8.  Cardiology followup in 4-6 months.      cc:      Leif Fair MD    [facility]   [address] [place] [address]         SUSANA POWELL MD, [facility]             D: 2017 17:54   T: 2017 18:50   MT: DIANA      Name:     NORBERTO IBRAHIM   MRN:      [id number]        Account:      [id number]   :      1950           Service Date: 2017      Document: [id number]

## 2017-02-27 ENCOUNTER — HOSPITAL ENCOUNTER (OUTPATIENT)
Dept: CARDIOLOGY | Facility: CLINIC | Age: 67
Discharge: HOME | End: 2017-02-27
Attending: INTERNAL MEDICINE | Admitting: INTERNAL MEDICINE
Payer: MEDICARE

## 2017-02-27 VITALS — DIASTOLIC BLOOD PRESSURE: 66 MMHG | HEART RATE: 90 BPM | SYSTOLIC BLOOD PRESSURE: 120 MMHG

## 2017-02-27 DIAGNOSIS — M81.0 OSTEOPOROSIS: ICD-10-CM

## 2017-02-27 DIAGNOSIS — I10 BENIGN ESSENTIAL HYPERTENSION: ICD-10-CM

## 2017-02-27 DIAGNOSIS — J44.9 CHRONIC OBSTRUCTIVE PULMONARY DISEASE, UNSPECIFIED COPD TYPE (H): ICD-10-CM

## 2017-02-27 DIAGNOSIS — E78.5 HYPERLIPIDEMIA LDL GOAL <130: ICD-10-CM

## 2017-02-27 DIAGNOSIS — I25.10 CORONARY ARTERY DISEASE INVOLVING NATIVE CORONARY ARTERY OF NATIVE HEART WITHOUT ANGINA PECTORIS: ICD-10-CM

## 2017-02-27 DIAGNOSIS — I25.10 CORONARY ARTERY DISEASE INVOLVING NATIVE HEART WITHOUT ANGINA PECTORIS, UNSPECIFIED VESSEL OR LESION TYPE: ICD-10-CM

## 2017-02-27 PROCEDURE — A9502 TC99M TETROFOSMIN: HCPCS | Performed by: INTERNAL MEDICINE

## 2017-02-27 PROCEDURE — 25000128 H RX IP 250 OP 636: Performed by: INTERNAL MEDICINE

## 2017-02-27 PROCEDURE — 78452 HT MUSCLE IMAGE SPECT MULT: CPT | Mod: 26 | Performed by: INTERNAL MEDICINE

## 2017-02-27 PROCEDURE — 93017 CV STRESS TEST TRACING ONLY: CPT

## 2017-02-27 PROCEDURE — 34300033 ZZH RX 343: Performed by: INTERNAL MEDICINE

## 2017-02-27 PROCEDURE — 93018 CV STRESS TEST I&R ONLY: CPT | Performed by: INTERNAL MEDICINE

## 2017-02-27 PROCEDURE — 93016 CV STRESS TEST SUPVJ ONLY: CPT | Performed by: INTERNAL MEDICINE

## 2017-02-27 RX ORDER — AMINOPHYLLINE 25 MG/ML
50-100 INJECTION, SOLUTION INTRAVENOUS
Status: DISCONTINUED | OUTPATIENT
Start: 2017-02-27 | End: 2017-02-28 | Stop reason: HOSPADM

## 2017-02-27 RX ORDER — ALBUTEROL SULFATE 90 UG/1
2 AEROSOL, METERED RESPIRATORY (INHALATION) EVERY 5 MIN PRN
Status: DISCONTINUED | OUTPATIENT
Start: 2017-02-27 | End: 2017-02-28 | Stop reason: HOSPADM

## 2017-02-27 RX ORDER — ACYCLOVIR 200 MG/1
0-1 CAPSULE ORAL
Status: DISCONTINUED | OUTPATIENT
Start: 2017-02-27 | End: 2017-02-28 | Stop reason: HOSPADM

## 2017-02-27 RX ORDER — REGADENOSON 0.08 MG/ML
0.4 INJECTION, SOLUTION INTRAVENOUS ONCE
Status: COMPLETED | OUTPATIENT
Start: 2017-02-27 | End: 2017-02-27

## 2017-02-27 RX ADMIN — REGADENOSON 0.4 MG: 0.08 INJECTION, SOLUTION INTRAVENOUS at 09:53

## 2017-02-27 RX ADMIN — TETROFOSMIN 3.6 MCI.: 0.23 INJECTION, POWDER, LYOPHILIZED, FOR SOLUTION INTRAVENOUS at 08:19

## 2017-02-27 RX ADMIN — TETROFOSMIN 10.9 MCI.: 0.23 INJECTION, POWDER, LYOPHILIZED, FOR SOLUTION INTRAVENOUS at 10:01

## 2017-03-08 ENCOUNTER — OFFICE VISIT (OUTPATIENT)
Dept: CARDIOLOGY | Facility: CLINIC | Age: 67
End: 2017-03-08
Attending: INTERNAL MEDICINE
Payer: COMMERCIAL

## 2017-03-08 VITALS
DIASTOLIC BLOOD PRESSURE: 70 MMHG | WEIGHT: 167 LBS | HEART RATE: 64 BPM | BODY MASS INDEX: 26.84 KG/M2 | SYSTOLIC BLOOD PRESSURE: 118 MMHG | HEIGHT: 66 IN

## 2017-03-08 DIAGNOSIS — I25.10 CORONARY ARTERY DISEASE INVOLVING NATIVE HEART WITHOUT ANGINA PECTORIS, UNSPECIFIED VESSEL OR LESION TYPE: ICD-10-CM

## 2017-03-08 DIAGNOSIS — I25.10 CORONARY ARTERY DISEASE INVOLVING NATIVE CORONARY ARTERY OF NATIVE HEART WITHOUT ANGINA PECTORIS: ICD-10-CM

## 2017-03-08 DIAGNOSIS — M81.0 OSTEOPOROSIS: ICD-10-CM

## 2017-03-08 DIAGNOSIS — E78.5 HYPERLIPIDEMIA LDL GOAL <130: ICD-10-CM

## 2017-03-08 DIAGNOSIS — I10 BENIGN ESSENTIAL HYPERTENSION: ICD-10-CM

## 2017-03-08 DIAGNOSIS — J44.9 CHRONIC OBSTRUCTIVE PULMONARY DISEASE, UNSPECIFIED COPD TYPE (H): ICD-10-CM

## 2017-03-08 LAB
ALT SERPL W P-5'-P-CCNC: <5 U/L (ref 5–30)
ANION GAP SERPL CALCULATED.3IONS-SCNC: 11 MMOL/L (ref 6–17)
BUN SERPL-MCNC: 22 MG/DL (ref 7–30)
CALCIUM SERPL-MCNC: 10.1 MG/DL (ref 8.5–10.5)
CHLORIDE SERPL-SCNC: 97 MMOL/L (ref 98–107)
CHOLEST SERPL-MCNC: 173 MG/DL
CO2 SERPL-SCNC: 35 MMOL/L (ref 23–29)
CREAT SERPL-MCNC: 0.96 MG/DL (ref 0.7–1.3)
GFR SERPL CREATININE-BSD FRML MDRD: 58 ML/MIN/1.7M2
GLUCOSE SERPL-MCNC: 128 MG/DL (ref 70–105)
HDLC SERPL-MCNC: 53 MG/DL
LDLC SERPL CALC-MCNC: 104 MG/DL
NONHDLC SERPL-MCNC: 120 MG/DL
POTASSIUM SERPL-SCNC: 4 MMOL/L (ref 3.5–5.1)
SODIUM SERPL-SCNC: 139 MMOL/L (ref 136–145)
TRIGL SERPL-MCNC: 82 MG/DL

## 2017-03-08 PROCEDURE — 99214 OFFICE O/P EST MOD 30 MIN: CPT | Performed by: PHYSICIAN ASSISTANT

## 2017-03-08 PROCEDURE — 80048 BASIC METABOLIC PNL TOTAL CA: CPT | Performed by: INTERNAL MEDICINE

## 2017-03-08 PROCEDURE — 84460 ALANINE AMINO (ALT) (SGPT): CPT | Performed by: PHYSICIAN ASSISTANT

## 2017-03-08 PROCEDURE — 36415 COLL VENOUS BLD VENIPUNCTURE: CPT | Performed by: INTERNAL MEDICINE

## 2017-03-08 PROCEDURE — 80061 LIPID PANEL: CPT | Performed by: PHYSICIAN ASSISTANT

## 2017-03-08 RX ORDER — ATORVASTATIN CALCIUM 20 MG/1
20 TABLET, FILM COATED ORAL DAILY
Qty: 90 TABLET | Refills: 3 | Status: SHIPPED | OUTPATIENT
Start: 2017-03-08 | End: 2017-03-21

## 2017-03-08 NOTE — PROGRESS NOTES
[id number]  HPI and Plan:   See dictation    Orders this Visit:  Orders Placed This Encounter   Procedures     Lipid Profile     ALT     Lipid Profile     ALT     Basic metabolic panel     Follow-Up with Cardiologist     No orders of the defined types were placed in this encounter.    There are no discontinued medications.      Encounter Diagnoses   Name Primary?     Hyperlipidemia LDL goal <130      Benign essential hypertension      Osteoporosis      Coronary artery disease involving native coronary artery of native heart without angina pectoris      Chronic obstructive pulmonary disease, unspecified COPD type (H)      Coronary artery disease involving native heart without angina pectoris, unspecified vessel or lesion type        CURRENT MEDICATIONS:  Current Outpatient Prescriptions   Medication Sig Dispense Refill     amLODIPine (NORVASC) 2.5 MG tablet Take 1 tablet (2.5 mg) by mouth daily 30 tablet 11     cholecalciferol (VITAMIN D3) 36656 UNITS capsule Take 1 capsule (50,000 Units) by mouth every 14 days SIG: TAKE ONE CAP EVERY OTHER WEEK, INDICATION: TO TREAT VITAMIN D DEFICIENCY (1ST AND 15TH OF EACH MONTH), MUST TAKE WITH FAT CONTAINING MEAL, TAKE 1 EXTRA CAPSULE THIS WEEK ONLY 40 capsule 0     Cyanocobalamin (B-12 SUPER STRENGTH) 5000 MCG/ML LIQD Place 1 mL under the tongue every morning FOR VITAMIN B12 SUPPLEMENTATION, PLEASE PLACE UNDER THE TONGUE 2 Bottle PRN     spironolactone-hydrochlorothiazide (ALDACTAZIDE) 25-25 MG per tablet Take 1 tablet by mouth every morning INDICATION:TO LOWER BLOOD PRESSURE 90 tablet 3     folic acid (FOLVITE) 1 MG tablet Take 1 tablet (1 mg) by mouth daily INDICATION: FOLIC ACID SUPPLEMENT 100 tablet 3     vitamin C-electrolytes (EMERGEN-C) 1000mg vitamin C super orange drink mix Mix 1 packet in 4-6oz water and take once daily, INDICATION: VITAMIN C SUPPLEMENTION 90 packet PRN     Calcium Carb-Cholecalciferol (CALCIUM 1000 + D) 1000-800 MG-UNIT TABS Take 1 tablet by mouth daily  "TAKE WITH FOOD, FOR BONE HEALTH AND FOR VITAMIN D SUPPLEMENTATION 100 tablet PRN     celecoxib (CELEBREX) 200 MG capsule Take 1 capsule (200 mg) by mouth 2 times daily as needed for moderate pain 60 capsule 3     acetaminophen (TYLENOL) 500 MG tablet Take 2 tablets (1,000 mg) by mouth every 8 hours as needed for mild pain 100 tablet 0     betamethasone dipropionate (DIPROSONE) 0.05 % cream Apply topically 2 times daily 45 g 1     atorvastatin (LIPITOR) 10 MG tablet Take 1 tablet (10 mg) by mouth daily 90 tablet 3     ranitidine (ZANTAC) 150 MG tablet Take 150 mg by mouth as needed for heartburn       albuterol (PROAIR HFA, PROVENTIL HFA, VENTOLIN HFA) 108 (90 BASE) MCG/ACT inhaler Inhale 2 puffs into the lungs every 6 hours as needed for shortness of breath / dyspnea or wheezing 3 Inhaler 1     aspirin EC 81 MG tablet Take 1 tablet (81 mg) by mouth daily 30 tablet 11     triamcinolone (KENALOG) 0.1 % ointment Apply topically 2 times daily 30 g 1     order for DME Equipment being ordered: blood pressure cuff 1 Device 0       ALLERGIES     Allergies   Allergen Reactions     Penicillins      rash     Spiriva Handihaler Other (See Comments)     Felt like the med \"paralyzed\" her diaphragm.  Had increased difficulty taking deep breath     Sulfa Drugs      rash       PAST MEDICAL HISTORY:  Past Medical History   Diagnosis Date     Benign essential hypertension      was on amlodipine/ then metoprolol / add lisinopril      Contact dermatitis and other eczema, due to unspecified cause      Hyperlipidemia LDL goal <130 11/7/2012     Nephritis       as a child (post strep)      Osteoporosis, unspecified      Phlebitis and thrombophlebitis of other deep vessels of lower extremities 1972, 1975     Both with pregnancies     Sinusitis      Tobacco use disorder        PAST SURGICAL HISTORY:  Past Surgical History   Procedure Laterality Date     C vaginal hysterectomy  1978     Hysterectomy, Vaginal     Hc aspiration &/or injection " ganglion cyst, any  1994     Hysterectomy, pap no longer indicated       C dexa, bone density, axial skel  6/2008     T score lumbar -0.6, femoral neck -2.4/-2.0(all stable)     Herniorrhaphy inguinal Right 3/24/2015     Procedure: HERNIORRHAPHY INGUINAL;  Surgeon: Sam Erazo MD;  Location: [facility]     Biopsy of skin lesion         FAMILY HISTORY:  Family History   Problem Relation Age of Onset     C.A.D. Father      DIABETES Father      type 2     Myocardial Infarction Father      Heart Surgery Father      CABGx4     Hyperlipidemia Father      Breast Cancer Mother 70     OSTEOPOROSIS Mother      Thyroid Disease Mother      Hyperlipidemia Mother      CANCER Brother      Bladder     Hyperlipidemia Brother      DIABETES Brother      type 2     Colon Cancer Brother      Neurologic Disorder Paternal Grandfather      OSTEOPOROSIS Sister      Arrhythmia Sister      Heart Surgery Sister      cardioversion, ablation     OSTEOPOROSIS Maternal Grandmother      OSTEOPOROSIS Paternal Grandmother      Other - See Comments Maternal Grandfather      pneumonia     Family History Negative Son      Thyroid Disease Daughter      NODULES     Family History Negative Daughter      Cancer - colorectal No family hx of      Prostate Cancer No family hx of      Alzheimer Disease No family hx of      Anesthesia Reaction No family hx of      Blood Disease No family hx of      Eye Disorder No family hx of        SOCIAL HISTORY:  Social History     Social History     Marital status: Single     Spouse name: N/A     Number of children: 2     Years of education: 18     Occupational History     Mental health therapist [employer]     Social History Main Topics     Smoking status: Former Smoker     Packs/day: 1.00     Years: 10.00     Types: Cigarettes     Start date: 2/7/1964     Quit date: 9/7/2015     Smokeless tobacco: Never Used      Comment: 5-10 daily     Alcohol use 0.0 oz/week     0 Standard drinks or equivalent per  "week      Comment: 1 a month     Drug use: No     Sexual activity: No     Other Topics Concern      Service No     Blood Transfusions No     Caffeine Concern No     1 cup coffee a day, 1-2 can's of pop a wk in the summer      Occupational Exposure No     Hobby Hazards No     Sleep Concern No     Stress Concern No     Weight Concern No     Special Diet No     Back Care No     Exercise No     not due to knee's and hip     Bike Helmet No     n/a     Seat Belt Yes     Self-Exams Yes     sometimes     Parent/Sibling W/ Cabg, Mi Or Angioplasty Before 65f 55m? No     Social History Narrative    Eats fruits and vegetables every day. She takes a calcium supplement twice daily.    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            Review of Systems:  Skin:  Positive for hair changes;itching   Eyes:  Positive for glasses  ENT:  Negative    Respiratory:  Negative    Cardiovascular:  Negative  (1 episode while walking pt felt a squeezing feeling in chest)  Gastroenterology: Negative    Genitourinary:  Positive for urinary frequency  Musculoskeletal:  Positive for joint pain  Neurologic:  Negative    Psychiatric:  Negative    Heme/Lymph/Imm:  Negative    Endocrine:  Negative      Physical Exam:  Vitals: /70 (BP Location: Left arm, Patient Position: Chair, Cuff Size: Adult Regular)  Pulse 64  Ht 1.664 m (5' 5.51\")  Wt 75.8 kg (167 lb)  BMI 27.36 kg/m2   Please refer to dictation for physical exam    Recent Lab Results:  LIPID RESULTS:  Lab Results   Component Value Date    CHOL 177 02/01/2017    HDL 56 02/01/2017     (H) 02/01/2017    TRIG 88 02/01/2017    CHOLHDLRATIO 4.2 09/18/2015       LIVER ENZYME RESULTS:  Lab Results   Component Value Date    AST 19 10/14/2016    ALT <5 (L) 02/01/2017       CBC RESULTS:  Lab Results   Component Value Date    WBC 6.9 01/13/2017    RBC 4.87 01/13/2017    HGB 14.7 01/13/2017    HCT 45.9 01/13/2017    MCV 94 01/13/2017    MCH 30.2 01/13/2017    MCHC 32.0 01/13/2017 "    RDW 13.5 01/13/2017     01/13/2017       BMP RESULTS:  Lab Results   Component Value Date     03/08/2017    POTASSIUM 4.0 03/08/2017    CHLORIDE 97 (L) 03/08/2017    CO2 35 (H) 03/08/2017    ANIONGAP 11 03/08/2017     (H) 03/08/2017    BUN 22 03/08/2017    CR 0.96 03/08/2017    GFRESTIMATED 58 (L) 03/08/2017    GFRESTBLACK 70 03/08/2017    SANGEETA 10.1 03/08/2017        A1C RESULTS:  No results found for: A1C    INR RESULTS:  No results found for: INR        CC  Aren Fountain MD   [facility]  [address]  [place] [id number]

## 2017-03-08 NOTE — MR AVS SNAPSHOT
After Visit Summary   3/8/2017    Marva Angela    MRN: [id number]           Patient Information     Date Of Birth          1950        Visit Information        Provider Department      3/8/2017 1:10 PM More, Rufina Gr PA-C [facility] [place]        Today's Diagnoses     Hyperlipidemia LDL goal <130        Benign essential hypertension        Osteoporosis        Coronary artery disease involving native coronary artery of native heart without angina pectoris        Chronic obstructive pulmonary disease, unspecified COPD type (H)        Coronary artery disease involving native heart without angina pectoris, unspecified vessel or lesion type          Care Instructions    Thanks for coming into [facility] clinic today.    We discussed: stress test and echocardiogram are normal!!  Good news!    Medication changes:  Continue current medications.     Results: We'll call with your cholesterol results.  Kidney labs are fine, talk to Dr. Fair about a diabetes test.    Component      Latest Ref Rng & Units 3/8/2017   Sodium      136 - 145 mmol/L 139   Potassium      3.5 - 5.1 mmol/L 4.0   Chloride      98 - 107 mmol/L 97 (L)   Carbon Dioxide      23 - 29 mmol/L 35 (H)   Anion Gap      6 - 17 mmol/L 11   Glucose      70 - 105 mg/dL 128 (H)   Urea Nitrogen      7 - 30 mg/dL 22   Creatinine      0.70 - 1.30 mg/dL 0.96   GFR Estimate      >60 mL/min/1.7m2 58 (L)   GFR Estimate If Black      >60 mL/min/1.7m2 70   Calcium      8.5 - 10.5 mg/dL 10.1     Follow up: with Dr. Fountain 4 months.        Please call my nurse at [phone] with any questions or concerns.    Scheduling phone number: [phone]  Reminder: Please bring in all current medications, over the counter supplements and vitamin bottles to your next appointment.        Follow-ups after your visit        Additional Services     Follow-Up with Cardiologist                 Your  next 10 appointments already scheduled     Mar 14, 2017  9:30 AM CDT   [facility]    [address] [id number] [phone]           Patient must bring picture ID.  Patient should be prepared to give a urine specimen  Please do not eat 10-12 hours before your appointment if you are coming in fasting for labs on lipids, cholesterol, or glucose (sugar).  Pregnant women should follow their Care Team instructions. Water with medications is okay. Do not drink coffee or other fluids.   If you have concerns about taking  your medications, please ask at office or if scheduling via [employer], send a message by clicking on Secure Messaging, Message Your Care Team.            Mar 21, 2017  8:30 AM CDT   [facility]    [address] [id number] [phone]              Future tests that were ordered for you today     Open Future Orders        Priority Expected Expires Ordered    Lipid Profile Routine 7/6/2017 3/8/2018 3/8/2017    ALT Routine 7/6/2017 3/8/2018 3/8/2017    Basic metabolic panel Routine 7/6/2017 3/8/2018 3/8/2017    Follow-Up with Cardiologist Routine 7/6/2017 3/8/2018 3/8/2017            Who to contact     If you have questions or need follow up information about today's clinic visit or your schedule please contact [facility] [place] directly at [phone].  Normal or non-critical lab and imaging results will be communicated to you by MyChart, letter or phone within 4 business days after the clinic has received the results. If you do not hear from us within 7 days, please contact the clinic through MyChart or phone. If you have a critical or abnormal lab result, we will notify you by phone as soon as possible.  Submit refill requests through  "MyChart or call your pharmacy and they will forward the refill request to us. Please allow 3 business days for your refill to be completed.          Additional Information About Your Visit        MyChart Information     "[employer] gives you secure access to your electronic health record. If you see a primary care provider, you can also send messages to your care team and make appointments. If you have questions, please call your primary care clinic.  If you do not have a primary care provider, please call [phone] and they will assist you.        Care EveryWhere ID     This is your Care EveryWhere ID. This could be used by other organizations to access your [place] medical records  [id number]        Your Vitals Were     Pulse Height BMI (Body Mass Index)             64 1.664 m (5' 5.51\") 27.36 kg/m2          Blood Pressure from Last 3 Encounters:   03/08/17 118/70   02/27/17 120/66   02/07/17 136/82    Weight from Last 3 Encounters:   03/08/17 75.8 kg (167 lb)   02/07/17 76.2 kg (168 lb)   01/20/17 76.6 kg (168 lb 14.4 oz)              We Performed the Following     Follow-Up with Cardiac Advanced Practice Provider        Primary Care Provider Office Phone # Fax #    Leif Fair [phone]       [facility] [address] [facility] [address]        Thank you!     Thank you for choosing [facility] [place]  for your care. Our goal is always to provide you with excellent care. Hearing back from our patients is one way we can continue to improve our services. Please take a few minutes to complete the written survey that you may receive in the mail after your visit with us. Thank you!             Your Updated Medication List - Protect others around you: Learn how to safely use, store and throw away your medicines at www.disposemymeds.org.          This list is accurate as of: 3/8/17  1:35 PM.  Always use your most recent med list.                "    Brand Name Dispense Instructions for use    acetaminophen 500 MG tablet    TYLENOL    100 tablet    Take 2 tablets (1,000 mg) by mouth every 8 hours as needed for mild pain       albuterol 108 (90 BASE) MCG/ACT Inhaler    PROAIR HFA/PROVENTIL HFA/VENTOLIN HFA    3 Inhaler    Inhale 2 puffs into the lungs every 6 hours as needed for shortness of breath / dyspnea or wheezing       amLODIPine 2.5 MG tablet    NORVASC    30 tablet    Take 1 tablet (2.5 mg) by mouth daily       aspirin EC 81 MG EC tablet     30 tablet    Take 1 tablet (81 mg) by mouth daily       atorvastatin 10 MG tablet    LIPITOR    90 tablet    Take 1 tablet (10 mg) by mouth daily       betamethasone dipropionate 0.05 % cream    DIPROSONE    45 g    Apply topically 2 times daily       Calcium Carb-Cholecalciferol 1000-800 MG-UNIT Tabs    CALCIUM 1000 + D    100 tablet    Take 1 tablet by mouth daily TAKE WITH FOOD, FOR BONE HEALTH AND FOR VITAMIN D SUPPLEMENTATION       celecoxib 200 MG capsule    celeBREX    60 capsule    Take 1 capsule (200 mg) by mouth 2 times daily as needed for moderate pain       cholecalciferol 80551 UNITS capsule    VITAMIN D3    40 capsule    Take 1 capsule (50,000 Units) by mouth every 14 days SIG: TAKE ONE CAP EVERY OTHER WEEK, INDICATION: TO TREAT VITAMIN D DEFICIENCY (1ST AND 15TH OF EACH MONTH), MUST TAKE WITH FAT CONTAINING MEAL, TAKE 1 EXTRA CAPSULE THIS WEEK ONLY       Cyanocobalamin 5000 MCG/ML Liqd    B-12 SUPER STRENGTH    2 Bottle    Place 1 mL under the tongue every morning FOR VITAMIN B12 SUPPLEMENTATION, PLEASE PLACE UNDER THE TONGUE       folic acid 1 MG tablet    FOLVITE    100 tablet    Take 1 tablet (1 mg) by mouth daily INDICATION: FOLIC ACID SUPPLEMENT       [place] [facility]     [place]    Equipment being ordered: blood pressure cuff       ranitidine 150 MG tablet    ZANTAC     Take 150 mg by mouth as needed for heartburn       spironolactone-hydrochlorothiazide 25-25 MG per tablet    ALDACTAZIDE    90  tablet    Take 1 tablet by mouth every morning INDICATION:TO LOWER BLOOD PRESSURE       triamcinolone 0.1 % ointment    KENALOG    30 g    Apply topically 2 times daily       vitamin C-electrolytes 1000mg vitamin C super orange drink mix    EMERGEN-C    90 packet    Mix 1 packet in 4-6oz water and take once daily, INDICATION: VITAMIN C SUPPLEMENTION

## 2017-03-08 NOTE — LETTER
3/8/2017    Leif Fair MD  [facility]   [address]  [facility] [address]    RE: Marva Angela       Dear Colleague,    I had the pleasure of seeing Marva Angela in the [facility].    PRIMARY CARDIOLOGIST:  Dr. Fountain.      REASON FOR VISIT:  Hypertension and stress test, echocardiogram followup.      Ms. Angela is a delightful 66-year-old woman with past cardiac history significant for the followin.  Hypertension with intolerance to several medications.   2.  History of tobacco abuse, quit.   3.  Dyslipidemia.   4.  Calcification of the coronary arteries and thoracic aorta noted on CT of the chest.  We have been managing her dyslipidemia and hypertension.  She is planning on getting a right hip replaced on  and saw Dr. Fountain in preop clearance, and he sent her for a stress test and echocardiogram.  Stress test was completely normal with no evidence of ischemia or infarct.  Her EF was normal at 60%.  Her echocardiogram showed again normal EF with some degree of diastolic dysfunction and mild tricuspid regurgitation but no significant issues.      She comes in today for followup.  She was restarted on amlodipine 2.5 mg daily, and this has made her blood pressure lower at home.  It previously was maintained in the 125s, and now it is mostly in the 110s.  She denies chest pain, shortness of breath, orthopnea or PND.  She is more limited by her hip and knee pain than anything else.  She overall feels well.  She has been trying to eat healthier.      SOCIAL HISTORY:  She is a retired , former smoker, about half pack a day, quit about .  Occasional alcohol use.  Previously a big pickle ball player.  This is limited by her joint pain.  Walks several times a week.      PHYSICAL EXAMINATION:   GENERAL:  Well-developed, well-nourished woman in no acute distress.   HEENT:  Normocephalic, atraumatic.   HEART:  Regular in rate and  rhythm, no murmur, rub or gallop.   LUNGS:  Clear bilaterally.  Carotids are without bruits.   EXTREMITIES:  Without peripheral edema.   SKIN:  Warm and dry.     Outpatient Encounter Prescriptions as of 3/8/2017   Medication Sig Dispense Refill     amLODIPine (NORVASC) 2.5 MG tablet Take 1 tablet (2.5 mg) by mouth daily 30 tablet 11     cholecalciferol (VITAMIN D3) 83209 UNITS capsule Take 1 capsule (50,000 Units) by mouth every 14 days SIG: TAKE ONE CAP EVERY OTHER WEEK, INDICATION: TO TREAT VITAMIN D DEFICIENCY (1ST AND 15TH OF EACH MONTH), MUST TAKE WITH FAT CONTAINING MEAL, TAKE 1 EXTRA CAPSULE THIS WEEK ONLY 40 capsule 0     Cyanocobalamin (B-12 SUPER STRENGTH) 5000 MCG/ML LIQD Place 1 mL under the tongue every morning FOR VITAMIN B12 SUPPLEMENTATION, PLEASE PLACE UNDER THE TONGUE 2 Bottle PRN     spironolactone-hydrochlorothiazide (ALDACTAZIDE) 25-25 MG per tablet Take 1 tablet by mouth every morning INDICATION:TO LOWER BLOOD PRESSURE 90 tablet 3     folic acid (FOLVITE) 1 MG tablet Take 1 tablet (1 mg) by mouth daily INDICATION: FOLIC ACID SUPPLEMENT 100 tablet 3     Calcium Carb-Cholecalciferol (CALCIUM 1000 + D) 1000-800 MG-UNIT TABS Take 1 tablet by mouth daily TAKE WITH FOOD, FOR BONE HEALTH AND FOR VITAMIN D SUPPLEMENTATION 100 tablet PRN     [DISCONTINUED] vitamin C-electrolytes (EMERGEN-C) 1000mg vitamin C super orange drink mix Mix 1 packet in 4-6oz water and take once daily, INDICATION: VITAMIN C SUPPLEMENTION 90 packet PRN     [DISCONTINUED] celecoxib (CELEBREX) 200 MG capsule Take 1 capsule (200 mg) by mouth 2 times daily as needed for moderate pain 60 capsule 3     [DISCONTINUED] acetaminophen (TYLENOL) 500 MG tablet Take 2 tablets (1,000 mg) by mouth every 8 hours as needed for mild pain 100 tablet 0     [DISCONTINUED] betamethasone dipropionate (DIPROSONE) 0.05 % cream Apply topically 2 times daily 45 g 1     [DISCONTINUED] atorvastatin (LIPITOR) 10 MG tablet Take 1 tablet (10 mg) by mouth daily  90 tablet 3     ranitidine (ZANTAC) 150 MG tablet Take 150 mg by mouth daily as needed for heartburn        albuterol (PROAIR HFA, PROVENTIL HFA, VENTOLIN HFA) 108 (90 BASE) MCG/ACT inhaler Inhale 2 puffs into the lungs every 6 hours as needed for shortness of breath / dyspnea or wheezing 3 Inhaler 1     aspirin EC 81 MG tablet Take 1 tablet (81 mg) by mouth daily 30 tablet 11     [DISCONTINUED] triamcinolone (KENALOG) 0.1 % ointment Apply topically 2 times daily 30 g 1     order for DME Equipment being ordered: blood pressure cuff 1 Device 0     No facility-administered encounter medications on file as of 3/8/2017.       ASSESSMENT AND PLAN:   1.  Hypertension, well-controlled.  Appropriately on 2.5 of amlodipine and spironolactone/hydrochlorothiazide combined 25/25 daily.  Her blood pressure has excellent control.   2.  Dyslipidemia, had been elevated previously to 103, where we would like her LDL below 100 for sure.  Repeat lipid panel is pending.  If it remains elevated, will increase her Lipitor to 20, otherwise will continue at 10.   3.  Calcification of the coronary arteries and thoracic aorta on CT, appropriately on statin and 81 mg aspirin.  She did have a completely normal nuclear stress test as well as echocardiogram.  No other workup required for preoperative clearance.      This patient is overall doing well.  We will have her follow up in about 4 months with Dr. Fountain as previously discussed.     Again, thank you for allowing me to participate in the care of your patient.      Sincerely,    Rufina Palacios PA-C     [facility]

## 2017-03-08 NOTE — PATIENT INSTRUCTIONS
Thanks for coming into [facility] today.    We discussed: stress test and echocardiogram are normal!!  Good news!    Medication changes:  Continue current medications.     Results: We'll call with your cholesterol results.  Kidney labs are fine, talk to Dr. Fair about a diabetes test.    Component      Latest Ref Rng & Units 3/8/2017   Sodium      136 - 145 mmol/L 139   Potassium      3.5 - 5.1 mmol/L 4.0   Chloride      98 - 107 mmol/L 97 (L)   Carbon Dioxide      23 - 29 mmol/L 35 (H)   Anion Gap      6 - 17 mmol/L 11   Glucose      70 - 105 mg/dL 128 (H)   Urea Nitrogen      7 - 30 mg/dL 22   Creatinine      0.70 - 1.30 mg/dL 0.96   GFR Estimate      >60 mL/min/1.7m2 58 (L)   GFR Estimate If Black      >60 mL/min/1.7m2 70   Calcium      8.5 - 10.5 mg/dL 10.1     Follow up: with Dr. Fountain 4 months.        Please call my nurse at [phone] with any questions or concerns.    Scheduling phone number: [phone]  Reminder: Please bring in all current medications, over the counter supplements and vitamin bottles to your next appointment.

## 2017-03-09 NOTE — PROGRESS NOTES
PRIMARY CARDIOLOGIST:  Dr. Fountain.      REASON FOR VISIT:  Hypertension and stress test, echocardiogram followup.      HISTORY OF PRESENT ILLNESS:  Ms. Angela is a delightful 66-year-old woman with past cardiac history significant for the followin.  Hypertension with intolerance to several medications.   2.  History of tobacco abuse, quit.   3.  Dyslipidemia.   4.  Calcification of the coronary arteries and thoracic aorta noted on CT of the chest.  We have been managing her dyslipidemia and hypertension.  She is planning on getting a right hip replaced on  and saw Dr. Fountain in preop clearance, and he sent her for a stress test and echocardiogram.  Stress test was completely normal with no evidence of ischemia or infarct.  Her EF was normal at 60%.  Her echocardiogram showed again normal EF with some degree of diastolic dysfunction and mild tricuspid regurgitation but no significant issues.      She comes in today for followup.  She was restarted on amlodipine 2.5 mg daily, and this has made her blood pressure lower at home.  It previously was maintained in the 125s, and now it is mostly in the 110s.  She denies chest pain, shortness of breath, orthopnea or PND.  She is more limited by her hip and knee pain than anything else.  She overall feels well.  She has been trying to eat healthier.      SOCIAL HISTORY:  She is a retired , former smoker, about half pack a day, quit about .  Occasional alcohol use.  Previously a big pickle ball player.  This is limited by her joint pain.  Walks several times a week.      PHYSICAL EXAMINATION:   GENERAL:  Well-developed, well-nourished woman in no acute distress.   HEENT:  Normocephalic, atraumatic.   HEART:  Regular in rate and rhythm, no murmur, rub or gallop.   LUNGS:  Clear bilaterally.  Carotids are without bruits.   EXTREMITIES:  Without peripheral edema.   SKIN:  Warm and dry.      ASSESSMENT AND PLAN:   1.  Hypertension, well-controlled.   Appropriately on 2.5 of amlodipine and spironolactone/hydrochlorothiazide combined 25/25 daily.  Her blood pressure has excellent control.   2.  Dyslipidemia, had been elevated previously to 103, where we would like her LDL below 100 for sure.  Repeat lipid panel is pending.  If it remains elevated, will increase her Lipitor to 20, otherwise will continue at 10.   3.  Calcification of the coronary arteries and thoracic aorta on CT, appropriately on statin and 81 mg aspirin.  She did have a completely normal nuclear stress test as well as echocardiogram.  No other workup required for preoperative clearance.      This patient is overall doing well.  We will have her follow up in about 4 months with Dr. Fountain as previously discussed.      Rufina Brown PA-C       cc:   [place] [address]         RUFINA BROWN PA-C             D: 2017 13:50   T: 2017 22:22   MT: JHONNY      Name:     NORBERTO IBRAHIM   MRN:      [id number]        Account:      [id number]   :      1950           Service Date: 2017      Document: [id number]

## 2017-03-14 DIAGNOSIS — E54 SCURVY: ICD-10-CM

## 2017-03-14 DIAGNOSIS — E53.8 VITAMIN B12 DEFICIENCY (NON ANEMIC): ICD-10-CM

## 2017-03-14 DIAGNOSIS — E55.9 VITAMIN D DEFICIENCY: ICD-10-CM

## 2017-03-14 DIAGNOSIS — I10 BENIGN ESSENTIAL HYPERTENSION: ICD-10-CM

## 2017-03-14 LAB
ALBUMIN SERPL-MCNC: 4.3 G/DL (ref 3.4–5)
ALP SERPL-CCNC: 91 U/L (ref 40–150)
ALT SERPL W P-5'-P-CCNC: 29 U/L (ref 0–50)
ANION GAP SERPL CALCULATED.3IONS-SCNC: 6 MMOL/L (ref 3–14)
AST SERPL W P-5'-P-CCNC: 21 U/L (ref 0–45)
BILIRUB SERPL-MCNC: 0.7 MG/DL (ref 0.2–1.3)
BUN SERPL-MCNC: 23 MG/DL (ref 7–30)
CALCIUM SERPL-MCNC: 10.3 MG/DL (ref 8.5–10.1)
CHLORIDE SERPL-SCNC: 99 MMOL/L (ref 94–109)
CO2 SERPL-SCNC: 34 MMOL/L (ref 20–32)
CREAT SERPL-MCNC: 0.77 MG/DL (ref 0.52–1.04)
DEPRECATED CALCIDIOL+CALCIFEROL SERPL-MC: 75 UG/L (ref 20–75)
GFR SERPL CREATININE-BSD FRML MDRD: 74 ML/MIN/1.7M2
GLUCOSE SERPL-MCNC: 113 MG/DL (ref 70–99)
POTASSIUM SERPL-SCNC: 4.2 MMOL/L (ref 3.4–5.3)
PROT SERPL-MCNC: 7.6 G/DL (ref 6.8–8.8)
SODIUM SERPL-SCNC: 139 MMOL/L (ref 133–144)
VIT B12 SERPL-MCNC: 3152 PG/ML (ref 193–986)

## 2017-03-14 PROCEDURE — 36415 COLL VENOUS BLD VENIPUNCTURE: CPT | Performed by: INTERNAL MEDICINE

## 2017-03-14 PROCEDURE — 82306 VITAMIN D 25 HYDROXY: CPT | Performed by: INTERNAL MEDICINE

## 2017-03-14 PROCEDURE — 80053 COMPREHEN METABOLIC PANEL: CPT | Performed by: INTERNAL MEDICINE

## 2017-03-14 PROCEDURE — 82180 ASSAY OF ASCORBIC ACID: CPT | Mod: 90 | Performed by: INTERNAL MEDICINE

## 2017-03-14 PROCEDURE — 82607 VITAMIN B-12: CPT | Performed by: INTERNAL MEDICINE

## 2017-03-14 PROCEDURE — 99000 SPECIMEN HANDLING OFFICE-LAB: CPT | Performed by: INTERNAL MEDICINE

## 2017-03-16 LAB — VIT C SERPL-MCNC: 79 MG/DL

## 2017-03-20 ENCOUNTER — MEDICAL CORRESPONDENCE (OUTPATIENT)
Dept: HEALTH INFORMATION MANAGEMENT | Facility: CLINIC | Age: 67
End: 2017-03-20

## 2017-03-20 DIAGNOSIS — Z13.0 SCREENING FOR DISORDER OF BLOOD AND BLOOD-FORMING ORGANS: Primary | ICD-10-CM

## 2017-03-21 ENCOUNTER — OFFICE VISIT (OUTPATIENT)
Dept: INTERNAL MEDICINE | Facility: CLINIC | Age: 67
End: 2017-03-21
Payer: COMMERCIAL

## 2017-03-21 VITALS
RESPIRATION RATE: 16 BRPM | OXYGEN SATURATION: 95 % | HEART RATE: 72 BPM | BODY MASS INDEX: 27.36 KG/M2 | TEMPERATURE: 98.2 F | SYSTOLIC BLOOD PRESSURE: 100 MMHG | DIASTOLIC BLOOD PRESSURE: 62 MMHG | WEIGHT: 167 LBS

## 2017-03-21 DIAGNOSIS — Z12.11 SPECIAL SCREENING FOR MALIGNANT NEOPLASMS, COLON: ICD-10-CM

## 2017-03-21 DIAGNOSIS — E83.52 HYPERCALCEMIA: ICD-10-CM

## 2017-03-21 DIAGNOSIS — I10 BENIGN ESSENTIAL HYPERTENSION: ICD-10-CM

## 2017-03-21 DIAGNOSIS — B35.3 TINEA PEDIS OF LEFT FOOT: ICD-10-CM

## 2017-03-21 DIAGNOSIS — E55.9 VITAMIN D DEFICIENCY: ICD-10-CM

## 2017-03-21 DIAGNOSIS — E78.5 HYPERLIPIDEMIA LDL GOAL <130: ICD-10-CM

## 2017-03-21 DIAGNOSIS — Z01.818 PREOP GENERAL PHYSICAL EXAM: Primary | ICD-10-CM

## 2017-03-21 DIAGNOSIS — R73.01 IMPAIRED FASTING GLUCOSE: ICD-10-CM

## 2017-03-21 LAB
ANION GAP SERPL CALCULATED.3IONS-SCNC: 2 MMOL/L (ref 3–14)
BUN SERPL-MCNC: 15 MG/DL (ref 7–30)
CALCIUM SERPL-MCNC: 10.2 MG/DL (ref 8.5–10.1)
CHLORIDE SERPL-SCNC: 100 MMOL/L (ref 94–109)
CO2 SERPL-SCNC: 36 MMOL/L (ref 20–32)
CREAT SERPL-MCNC: 0.77 MG/DL (ref 0.52–1.04)
ERYTHROCYTE [DISTWIDTH] IN BLOOD BY AUTOMATED COUNT: 13.1 % (ref 10–15)
GFR SERPL CREATININE-BSD FRML MDRD: 74 ML/MIN/1.7M2
GLUCOSE SERPL-MCNC: 120 MG/DL (ref 70–99)
HBA1C MFR BLD: 6.4 % (ref 4.3–6)
HCT VFR BLD AUTO: 46.2 % (ref 35–47)
HGB BLD-MCNC: 15.2 G/DL (ref 11.7–15.7)
MAGNESIUM SERPL-MCNC: 2.1 MG/DL (ref 1.6–2.3)
MCH RBC QN AUTO: 30.4 PG (ref 26.5–33)
MCHC RBC AUTO-ENTMCNC: 32.9 G/DL (ref 31.5–36.5)
MCV RBC AUTO: 92 FL (ref 78–100)
PHOSPHATE SERPL-MCNC: 3.1 MG/DL (ref 2.5–4.5)
PLATELET # BLD AUTO: 273 10E9/L (ref 150–450)
POTASSIUM SERPL-SCNC: 4.4 MMOL/L (ref 3.4–5.3)
RBC # BLD AUTO: 5 10E12/L (ref 3.8–5.2)
SODIUM SERPL-SCNC: 138 MMOL/L (ref 133–144)
WBC # BLD AUTO: 8.1 10E9/L (ref 4–11)

## 2017-03-21 PROCEDURE — 84100 ASSAY OF PHOSPHORUS: CPT | Performed by: INTERNAL MEDICINE

## 2017-03-21 PROCEDURE — 99214 OFFICE O/P EST MOD 30 MIN: CPT | Performed by: INTERNAL MEDICINE

## 2017-03-21 PROCEDURE — 83036 HEMOGLOBIN GLYCOSYLATED A1C: CPT | Performed by: INTERNAL MEDICINE

## 2017-03-21 PROCEDURE — 80048 BASIC METABOLIC PNL TOTAL CA: CPT | Performed by: INTERNAL MEDICINE

## 2017-03-21 PROCEDURE — 83735 ASSAY OF MAGNESIUM: CPT | Performed by: INTERNAL MEDICINE

## 2017-03-21 PROCEDURE — 85027 COMPLETE CBC AUTOMATED: CPT | Performed by: INTERNAL MEDICINE

## 2017-03-21 PROCEDURE — 36415 COLL VENOUS BLD VENIPUNCTURE: CPT | Performed by: INTERNAL MEDICINE

## 2017-03-21 RX ORDER — CLOTRIMAZOLE AND BETAMETHASONE DIPROPIONATE 10; .64 MG/G; MG/G
CREAM TOPICAL 2 TIMES DAILY
Qty: 45 G | Refills: 1 | Status: SHIPPED | OUTPATIENT
Start: 2017-03-21 | End: 2017-03-21

## 2017-03-21 RX ORDER — ATORVASTATIN CALCIUM 20 MG/1
20 TABLET, FILM COATED ORAL DAILY
Qty: 90 TABLET | Refills: 3 | Status: ON HOLD | OUTPATIENT
Start: 2017-03-21 | End: 2017-04-03

## 2017-03-21 RX ORDER — CLOTRIMAZOLE AND BETAMETHASONE DIPROPIONATE 10; .64 MG/G; MG/G
CREAM TOPICAL 2 TIMES DAILY
Qty: 45 G | Refills: 1 | Status: SHIPPED | OUTPATIENT
Start: 2017-03-21 | End: 2017-06-02

## 2017-03-21 NOTE — NURSING NOTE
"Chief Complaint   Patient presents with     Pre-Op Exam       Initial /62 (BP Location: Left arm, Patient Position: Chair, Cuff Size: Adult Regular)  Pulse 72  Temp 98.2  F (36.8  C) (Oral)  Resp 16  Wt 167 lb (75.8 kg)  SpO2 95%  BMI 27.36 kg/m2 Estimated body mass index is 27.36 kg/(m^2) as calculated from the following:    Height as of 3/8/17: 5' 5.51\" (1.664 m).    Weight as of this encounter: 167 lb (75.8 kg).  Medication Reconciliation: complete   Jennifer Lee MA      "

## 2017-03-21 NOTE — MR AVS SNAPSHOT
After Visit Summary   3/21/2017    Marva Angela    MRN: [id number]           Patient Information     Date Of Birth          1950        Visit Information        Provider Department      3/21/2017 8:30 AM Leif Fair MD [facility]        Today's Diagnoses     Preop general physical exam    -  1    Tinea pedis of left foot        Benign essential hypertension        Hyperlipidemia LDL goal <130        Impaired fasting glucose        Hypercalcemia        Special screening for malignant neoplasms, colon        Vitamin D deficiency          Care Instructions      ** FOLLOW UP PLAN**:    PLEASE SCHEDULE LABS WITH OFFICE VISIT 3 MONTHS FROM TODAY TO FOLLOW UP AFTER HIP SURGERY, FOR PREOP, OTHER MEDICAL ISSUES, AND TO REVIEW TEST RESULTS        BE SURE TO SCHEDULE YOUR LAB DRAW APPOINTMENT FOR AT LEAST 1 WEEK BEFORE YOUR NEXT VISIT      YOU MAY CONTACT THE CLINIC IF ANY QUESTIONS OR CONCERNS -[phone] OR VIA [employer]           Before Your Surgery      Call your surgeon if there is any change in your health. This includes signs of a cold or flu (such as a sore throat, runny nose, cough, rash or fever).    Do not smoke, drink alcohol or take over the counter medicine (unless your surgeon or primary care doctor tells you to) for the 24 hours before and after surgery.    If you take prescribed drugs: Follow your doctor s orders about which medicines to take and which to stop until after surgery.    Eating and drinking prior to surgery: follow the instructions from your surgeon    Take a shower or bath the night before surgery. Use the soap your surgeon gave you to gently clean your skin. If you do not have soap from your surgeon, use your regular soap. Do not shave or scrub the surgery site.  Wear clean pajamas and have clean sheets on your bed.         Follow-ups after your visit        Your next 10 appointments already scheduled     Apr 03, 2017   Procedure with  Francisco J Alston MD   [facility] (--)    [address]  [place] [address] [phone]              Future tests that were ordered for you today     Open Future Orders        Priority Expected Expires Ordered    CBC with platelets Routine 3/21/2017 9/18/2017 3/21/2017    Basic metabolic panel Routine 3/21/2017 9/18/2017 3/21/2017    Comprehensive metabolic panel Routine 3/21/2017 9/18/2017 3/21/2017    Vitamin D Deficiency Routine 3/21/2017 9/18/2017 3/21/2017    CBC with platelets Routine 3/21/2017 9/18/2017 3/21/2017    Fecal colorectal cancer screen FIT Routine 4/11/2017 6/13/2017 3/21/2017    Hemoglobin Routine  3/20/2018 3/20/2017            Who to contact     If you have questions or need follow up information about today's clinic visit or your schedule please contact [facility] directly at [phone].  Normal or non-critical lab and imaging results will be communicated to you by [employer], letter or phone within 4 business days after the clinic has received the results. If you do not hear from us within 7 days, please contact the clinic through [employer] or phone. If you have a critical or abnormal lab result, we will notify you by phone as soon as possible.  Submit refill requests through [employer] or call your pharmacy and they will forward the refill request to us. Please allow 3 business days for your refill to be completed.          Additional Information About Your Visit        [employer]art Information     [employer] gives you secure access to your electronic health record. If you see a primary care provider, you can also send messages to your care team and make appointments. If you have questions, please call your primary care clinic.  If you do not have a primary care provider, please call [phone] and they will assist you.        Care EveryWhere ID     This is your Care EveryWhere ID. This could be used by other organizations to  access your [facility] medical records  [id number]        Your Vitals Were     Pulse Temperature Respirations Pulse Oximetry BMI (Body Mass Index)       72 98.2  F (36.8  C) (Oral) 16 95% 27.36 kg/m2        Blood Pressure from Last 3 Encounters:   03/21/17 100/62   03/08/17 118/70   02/27/17 120/66    Weight from Last 3 Encounters:   03/21/17 167 lb (75.8 kg)   03/08/17 167 lb (75.8 kg)   02/07/17 168 lb (76.2 kg)              We Performed the Following     Basic metabolic panel     CBC with platelets     Hemoglobin A1c     Magnesium     Phosphorus          Today's Medication Changes          These changes are accurate as of: 3/21/17  9:33 AM.  If you have any questions, ask your nurse or doctor.               Start taking these medicines.        Dose/Directions    clotrimazole-betamethasone cream   Commonly known as:  LOTRISONE   Used for:  Tinea pedis of left foot   Started by:  Leif Fair MD        Apply topically 2 times daily   Quantity:  45 g   Refills:  1         These medicines have changed or have updated prescriptions.        Dose/Directions    atorvastatin 20 MG tablet   Commonly known as:  LIPITOR   This may have changed:  additional instructions   Used for:  Benign essential hypertension, Hyperlipidemia LDL goal <130   Changed by:  Leif Fair MD        Dose:  20 mg   Take 1 tablet (20 mg) by mouth daily INDICATION: TO LOWER CHOLESTEROL AND TO HELP REDUCE RISK OF HEART ATTACK AND STROKE   Quantity:  90 tablet   Refills:  3            Where to get your medicines      [id number] - [place], [place] - [address] AT [facility] OF  [address]  [address] [facility] [address]     Phone:  [phone]     atorvastatin 20 MG tablet    clotrimazole-betamethasone cream                Primary Care Provider Office Phone # Fax #    Leif Fair [phone]       [facility] [address]  [facility] [address]        Thank you!     Thank you for choosing [facility]  for your care. Our goal is always to provide you with excellent care. Hearing back from our patients is one way we can continue to improve our services. Please take a few minutes to complete the written survey that you may receive in the mail after your visit with us. Thank you!             Your Updated Medication List - Protect others around you: Learn how to safely use, store and throw away your medicines at www.disposemymeds.org.          This list is accurate as of: 3/21/17  9:33 AM.  Always use your most recent med list.                   Brand Name Dispense Instructions for use    acetaminophen 500 MG tablet    TYLENOL    100 tablet    Take 2 tablets (1,000 mg) by mouth every 8 hours as needed for mild pain       albuterol 108 (90 BASE) MCG/ACT Inhaler    PROAIR HFA/PROVENTIL HFA/VENTOLIN HFA    3 Inhaler    Inhale 2 puffs into the lungs every 6 hours as needed for shortness of breath / dyspnea or wheezing       amLODIPine 2.5 MG tablet    NORVASC    30 tablet    Take 1 tablet (2.5 mg) by mouth daily       aspirin EC 81 MG EC tablet     30 tablet    Take 1 tablet (81 mg) by mouth daily       atorvastatin 20 MG tablet    LIPITOR    90 tablet    Take 1 tablet (20 mg) by mouth daily INDICATION: TO LOWER CHOLESTEROL AND TO HELP REDUCE RISK OF HEART ATTACK AND STROKE       betamethasone dipropionate 0.05 % cream    DIPROSONE    45 g    Apply topically 2 times daily       Calcium Carb-Cholecalciferol 1000-800 MG-UNIT Tabs    CALCIUM 1000 + D    100 tablet    Take 1 tablet by mouth daily TAKE WITH FOOD, FOR BONE HEALTH AND FOR VITAMIN D SUPPLEMENTATION       celecoxib 200 MG capsule    celeBREX    60 capsule    Take 1 capsule (200 mg) by mouth 2 times daily as needed for moderate pain       cholecalciferol 29806 UNITS capsule    VITAMIN D3    40 capsule    Take 1 capsule (50,000 Units) by mouth every 14 days  SIG: TAKE ONE CAP EVERY OTHER WEEK, INDICATION: TO TREAT VITAMIN D DEFICIENCY (1ST AND 15TH OF EACH MONTH), MUST TAKE WITH FAT CONTAINING MEAL, TAKE 1 EXTRA CAPSULE THIS WEEK ONLY       clotrimazole-betamethasone cream    LOTRISONE    45 g    Apply topically 2 times daily       Cyanocobalamin 5000 MCG/ML Liqd    B-12 SUPER STRENGTH    2 Bottle    Place 1 mL under the tongue every morning FOR VITAMIN B12 SUPPLEMENTATION, PLEASE PLACE UNDER THE TONGUE       folic acid 1 MG tablet    FOLVITE    100 tablet    Take 1 tablet (1 mg) by mouth daily INDICATION: FOLIC ACID SUPPLEMENT       order for [facility]     1 Device    Equipment being ordered: blood pressure cuff       ranitidine 150 MG tablet    ZANTAC     Take 150 mg by mouth as needed for heartburn       spironolactone-hydrochlorothiazide 25-25 MG per tablet    ALDACTAZIDE    90 tablet    Take 1 tablet by mouth every morning INDICATION:TO LOWER BLOOD PRESSURE       triamcinolone 0.1 % ointment    KENALOG    30 g    Apply topically 2 times daily       vitamin C-electrolytes 1000mg vitamin C super orange drink mix    EMERGEN-C    90 packet    Mix 1 packet in 4-6oz water and take once daily, INDICATION: VITAMIN C SUPPLEMENTION

## 2017-03-21 NOTE — PATIENT INSTRUCTIONS
** FOLLOW UP PLAN**:    PLEASE SCHEDULE LABS WITH OFFICE VISIT 3 MONTHS FROM TODAY TO FOLLOW UP AFTER HIP SURGERY, FOR PREOP, OTHER MEDICAL ISSUES, AND TO REVIEW TEST RESULTS        BE SURE TO SCHEDULE YOUR LAB DRAW APPOINTMENT FOR AT LEAST 1 WEEK BEFORE YOUR NEXT VISIT      YOU MAY CONTACT THE CLINIC IF ANY QUESTIONS OR CONCERNS -[phone] OR VIA [employer]           Before Your Surgery      Call your surgeon if there is any change in your health. This includes signs of a cold or flu (such as a sore throat, runny nose, cough, rash or fever).    Do not smoke, drink alcohol or take over the counter medicine (unless your surgeon or primary care doctor tells you to) for the 24 hours before and after surgery.    If you take prescribed drugs: Follow your doctor s orders about which medicines to take and which to stop until after surgery.    Eating and drinking prior to surgery: follow the instructions from your surgeon    Take a shower or bath the night before surgery. Use the soap your surgeon gave you to gently clean your skin. If you do not have soap from your surgeon, use your regular soap. Do not shave or scrub the surgery site.  Wear clean pajamas and have clean sheets on your bed.

## 2017-03-21 NOTE — PROGRESS NOTES
[address] [address]  [phone]  Dept: [phone]    PRE-OP EVALUATION:  Today's date: 3/21/2017    Marva Angela (: 1950) presents for pre-operative evaluation assessment as requested by Dr. Alston.  She requires evaluation and anesthesia risk assessment prior to undergoing surgery/procedure for treatment of right hip replacement .  Proposed procedure: Right hip total arthroplasty    Date of Surgery/ Procedure: 4/3/2017  Time of Surgery/ Procedure: 7:30  Hospital/Surgical Facility: [facility]  Primary Physician: Leif Fair  Type of Anesthesia Anticipated: General    Patient has a Health Care Directive or Living Will:  YES     1. NO - Do you have a history of heart attack, stroke, stent, bypass or surgery on an artery in the head, neck, heart or legs?  2. NO - Do you ever have any pain or discomfort in your chest?  3. NO - Do you have a history of  Heart Failure?  4. NO - Are you troubled by shortness of breath when: walking on the level, up a slight hill or at night?  5. NO - Do you currently have a cold, bronchitis or other respiratory infection?  6. NO - Do you have a cough, shortness of breath or wheezing?  7. NO - Do you sometimes get pains in the calves of your legs when you walk?  8. YES - Do you or anyone in your family have previous history of blood clots?  9. NO - Do you or does anyone in your family have a serious bleeding problem such as prolonged bleeding following surgeries or cuts?  10. NO - Have you ever had problems with anemia or been told to take iron pills?  11. NO - Have you had any abnormal blood loss such as black, tarry or bloody stools, or abnormal vaginal bleeding?  12. NO - Have you ever had a blood transfusion?  13. NO - Have you or any of your relatives ever had problems with anesthesia?  14. NO - Do you have sleep apnea, excessive snoring or daytime drowsiness?  15. NO - Do you have any  prosthetic heart valves?  16. NO - Do you have prosthetic joints?  17. NO - Is there any chance that you may be pregnant?      HPI:                                                      Brief HPI related to upcoming procedure: R hip arthroplasty, due to longstanding arthritis ? Psoriatic arthritis.    Calcification of the coronary arteries and thoracic aorta noted on CT of the chest. She is being managed for dyslipidemia and hypertension by cardiology. She is scheduled for right hip replacement on 04/03. She saw Dr. Fountain in preop clearance, and he sent her for a stress test and echocardiogram. Stress test was completely normal with no evidence of ischemia or infarct. Her EF was normal at 60%. Her echocardiogram showed again normal EF with some degree of diastolic dysfunction and mild tricuspid regurgitation but no significant issues.           HYPERTENSION - Patient has longstanding history of mod-severe HTN , currently denies any symptoms referable to elevated blood pressure. Specifically denies chest pain, palpitations, dyspnea, orthopnea, PND or peripheral edema. Blood pressure readings have been in normal range. Current medication regimen is as listed below. Patient denies any side effects of medication.                                                                                                                                                                                          .  HYPERLIPIDEMIA - Patient has a long history of significant Hyperlipidemia requiring medication for treatment with recent good control. Patient reports no problems or side effects with the medication.                                                                                                                                                       .    MEDICAL HISTORY:                                                      Patient Active Problem List    Diagnosis Date Noted     Hyperlipidemia LDL goal <130 11/07/2012      Priority: High     Osteopenia 11/01/2005     Priority: High     Problem list name updated by automated process. Provider to review       Personal history of tobacco use, presenting hazards to health 11/01/2005     Priority: High     Tinea pedis of left foot 03/21/2017     Priority: Medium     Impaired fasting glucose 03/21/2017     Priority: Medium     Hypercalcemia 03/21/2017     Priority: Medium     Vitamin B12 deficiency (non anemic) 12/05/2016     Priority: Medium     SEVERE       Scurvy 12/05/2016     Priority: Medium     SEVERE       Vitamin D deficiency 12/05/2016     Priority: Medium     Hematuria 12/05/2016     Priority: Medium     Primary osteoarthritis, unspecified site 12/05/2016     Priority: Medium     Arthralgia of both knees 12/05/2016     Priority: Medium     Hip pain, right 10/14/2016     Priority: Medium     Family history of thyroid disease 10/14/2016     Priority: Medium     Fatigue, unspecified type 10/14/2016     Priority: Medium     Hair loss 10/14/2016     Priority: Medium     Pain in joint, multiple sites 10/14/2016     Priority: Medium     Seborrheic keratosis 10/14/2016     Priority: Medium     Psoriasis with arthropathy (H) 10/14/2016     Priority: Medium     Urine abnormality 10/14/2016     Priority: Medium     BROWNISH STAIN ON UNDERWEAR       Benign essential hypertension      Priority: Medium     Eczema 08/31/2009     Priority: Medium     Advance Care Planning 03/12/2012     Priority: Low     Advance Care Planning 3/31/2016: Receipt of ACP document:  Received: Health Care Directive which was witnessed or notarized on 8-3-08.  Document previously scanned on 3-29-16.  Validation form completed and sent to be scanned.  Code Status needs to be updated to reflect choices in most recent ACP document.  Confirmed/documented designated decision maker(s).  Added by Rosa Maria Gutierrez RN, System Director ACP-[facility] Choices   Advance Care Planning 3/12/12 Discussed advance care planning with  patient; information given to patient to review. Mary Doherty        Past Medical History   Diagnosis Date     Benign essential hypertension      was on amlodipine/ then metoprolol / add lisinopril      Contact dermatitis and other eczema, due to unspecified cause      Hyperlipidemia LDL goal <130 11/7/2012     Nephritis       as a child (post strep)      Osteoporosis, unspecified      Phlebitis and thrombophlebitis of other deep vessels of lower extremities 1972, 1975     Both with pregnancies     Sinusitis      Tobacco use disorder      Past Surgical History   Procedure Laterality Date     C vaginal hysterectomy  1978     Hysterectomy, Vaginal     Hc aspiration &/or injection ganglion cyst, any  1994     Hysterectomy, pap no longer indicated       C dexa, bone density, axial skel  6/2008     T score lumbar -0.6, femoral neck -2.4/-2.0(all stable)     Herniorrhaphy inguinal Right 3/24/2015     Procedure: HERNIORRHAPHY INGUINAL;  Surgeon: Sam Erazo MD;  Location:  [facility]     Biopsy of skin lesion       Current Outpatient Prescriptions   Medication Sig Dispense Refill     clotrimazole-betamethasone (LOTRISONE) cream Apply topically 2 times daily 45 g 1     atorvastatin (LIPITOR) 20 MG tablet Take 1 tablet (20 mg) by mouth daily INDICATION: TO LOWER CHOLESTEROL AND TO HELP REDUCE RISK OF HEART ATTACK AND STROKE 90 tablet 3     amLODIPine (NORVASC) 2.5 MG tablet Take 1 tablet (2.5 mg) by mouth daily 30 tablet 11     cholecalciferol (VITAMIN D3) 22932 UNITS capsule Take 1 capsule (50,000 Units) by mouth every 14 days SIG: TAKE ONE CAP EVERY OTHER WEEK, INDICATION: TO TREAT VITAMIN D DEFICIENCY (1ST AND 15TH OF EACH MONTH), MUST TAKE WITH FAT CONTAINING MEAL, TAKE 1 EXTRA CAPSULE THIS WEEK ONLY 40 capsule 0     Cyanocobalamin (B-12 SUPER STRENGTH) 5000 MCG/ML LIQD Place 1 mL under the tongue every morning FOR VITAMIN B12 SUPPLEMENTATION, PLEASE PLACE UNDER THE TONGUE 2 Bottle PRN      "spironolactone-hydrochlorothiazide (ALDACTAZIDE) 25-25 MG per tablet Take 1 tablet by mouth every morning INDICATION:TO LOWER BLOOD PRESSURE 90 tablet 3     folic acid (FOLVITE) 1 MG tablet Take 1 tablet (1 mg) by mouth daily INDICATION: FOLIC ACID SUPPLEMENT 100 tablet 3     vitamin C-electrolytes (EMERGEN-C) 1000mg vitamin C super orange drink mix Mix 1 packet in 4-6oz water and take once daily, INDICATION: VITAMIN C SUPPLEMENTION 90 packet PRN     Calcium Carb-Cholecalciferol (CALCIUM 1000 + D) 1000-800 MG-UNIT TABS Take 1 tablet by mouth daily TAKE WITH FOOD, FOR BONE HEALTH AND FOR VITAMIN D SUPPLEMENTATION 100 tablet PRN     celecoxib (CELEBREX) 200 MG capsule Take 1 capsule (200 mg) by mouth 2 times daily as needed for moderate pain 60 capsule 3     acetaminophen (TYLENOL) 500 MG tablet Take 2 tablets (1,000 mg) by mouth every 8 hours as needed for mild pain 100 tablet 0     betamethasone dipropionate (DIPROSONE) 0.05 % cream Apply topically 2 times daily 45 g 1     ranitidine (ZANTAC) 150 MG tablet Take 150 mg by mouth as needed for heartburn       albuterol (PROAIR HFA, PROVENTIL HFA, VENTOLIN HFA) 108 (90 BASE) MCG/ACT inhaler Inhale 2 puffs into the lungs every 6 hours as needed for shortness of breath / dyspnea or wheezing 3 Inhaler 1     aspirin EC 81 MG tablet Take 1 tablet (81 mg) by mouth daily 30 tablet 11     order for DME Equipment being ordered: blood pressure cuff 1 Device 0     triamcinolone (KENALOG) 0.1 % ointment Apply topically 2 times daily 30 g 1     [DISCONTINUED] atorvastatin (LIPITOR) 20 MG tablet Take 1 tablet (20 mg) by mouth daily 90 tablet 3     OTC products: None, except as noted above    Allergies   Allergen Reactions     Penicillins      rash     Spiriva Handihaler Other (See Comments)     Felt like the med \"paralyzed\" her diaphragm.  Had increased difficulty taking deep breath     Sulfa Drugs      rash      Latex Allergy: NO    Social History   Substance Use Topics     Smoking " status: Former Smoker     Packs/day: 1.00     Years: 10.00     Types: Cigarettes     Start date: 2/7/1964     Quit date: 9/7/2015     Smokeless tobacco: Never Used      Comment: 5-10 daily     Alcohol use 0.0 oz/week     0 Standard drinks or equivalent per week      Comment: 1 a month     History   Drug Use No       REVIEW OF SYSTEMS:                                                    14 point ROS reviewed in detail and negative     EXAM:                                                    /62 (BP Location: Left arm, Patient Position: Chair, Cuff Size: Adult Regular)  Pulse 72  Temp 98.2  F (36.8  C) (Oral)  Resp 16  Wt 167 lb (75.8 kg)  SpO2 95%  BMI 27.36 kg/m2    GENERAL APPEARANCE: healthy, alert and no distress     EYES: EOMI, PERRL     HENT: ear canals and TM's normal and nose and mouth without ulcers or lesions     NECK: no adenopathy, no asymmetry, masses, or scars and thyroid normal to palpation     RESP: lungs clear to auscultation - no rales, rhonchi or wheezes     CV: regular rates and rhythm, normal S1 S2, no S3 or S4 and no murmur, click or rub     ABDOMEN:  soft, nontender, no HSM or masses and bowel sounds normal     MS: extremities normal- no gross deformities noted, no evidence of inflammation in joints, FROM in all extremities.     SKIN: no suspicious lesions or rashes, ? Tinea Pedis L foot     NEURO: Normal strength and tone, sensory exam grossly normal, mentation intact and speech normal     PSYCH: mentation appears normal. and affect normal/bright     LYMPHATICS: No axillary, cervical, or supraclavicular nodes    DIAGNOSTICS:                                                    EKG: Not indicated due to non-vascular surgery and last ekg on - recent stress test and echo WNL as noted (within 30 days for CAD history or last year for cardiac risk factors)    Recent Labs   Lab Test  03/14/17   0937  03/08/17   1210   01/13/17   0938   10/14/16   1501   HGB   --    --    --   14.7   --   13.7    PLT   --    --    --   299   --   241   NA  139  139   < >   --    < >  140   POTASSIUM  4.2  4.0   < >   --    < >  3.8   CR  0.77  0.96   < >   --    < >  0.76    < > = values in this interval not displayed.        IMPRESSION:                                                    Reason for surgery/procedure: L HIP ARTHROPLASTY  Diagnosis/reason for consult: OBTAIN PREOPERATIVE MEDICAL CLEARANCE AND TO ASSESS CARDIAC RISK    The proposed surgical procedure is considered INTERMEDIATE risk.    REVISED CARDIAC RISK INDEX  The patient has the following serious cardiovascular risks for perioperative complications such as (MI, PE, VFib and 3  AV Block):  No serious cardiac risks  INTERPRETATION: 1 risks: Class II (low risk - 0.9% complication rate)    The patient has the following additional risks for perioperative complications:  No identified additional risks      ICD-10-CM    1. Preop general physical exam Z01.818 CBC with platelets     Basic metabolic panel   2. Tinea pedis of left foot B35.3 clotrimazole-betamethasone (LOTRISONE) cream   3. Benign essential hypertension I10 atorvastatin (LIPITOR) 20 MG tablet   4. Hyperlipidemia LDL goal <130 E78.5 atorvastatin (LIPITOR) 20 MG tablet   5. Impaired fasting glucose R73.01 Hemoglobin A1c   6. Hypercalcemia E83.52 Basic metabolic panel     Phosphorus     Magnesium       RECOMMENDATIONS:                                                      New diagnosis of Type 2 diabetes, no deterrent to surgery but will need to be seen for this.      Cardiovascular Risk  Performs 4 METs exercise without symptoms (Light housework (dusting, washing dishes) and Climb a flight of stairs) .       --Patient is to take all scheduled medications on the day of surgery EXCEPT for modifications listed below.  Take BP meds only    APPROVAL GIVEN to proceed with proposed procedure, without further diagnostic evaluation       Signed Electronically by: Leif Fair MD    Copy of this  evaluation report is provided to requesting physician.    [place] Preop Guidelines

## 2017-03-22 ENCOUNTER — TRANSFERRED RECORDS (OUTPATIENT)
Dept: HEALTH INFORMATION MANAGEMENT | Facility: CLINIC | Age: 67
End: 2017-03-22

## 2017-03-22 RX ORDER — BETAMETHASONE DIPROPIONATE 0.5 MG/G
CREAM TOPICAL 2 TIMES DAILY PRN
COMMUNITY
End: 2017-07-17

## 2017-03-30 NOTE — H&P (VIEW-ONLY)
[address] [address]  [phone]  Dept: [phone]    PRE-OP EVALUATION:  Today's date: 3/21/2017    Marva Angela (: 1950) presents for pre-operative evaluation assessment as requested by Dr. Alston.  She requires evaluation and anesthesia risk assessment prior to undergoing surgery/procedure for treatment of right hip replacement .  Proposed procedure: Right hip total arthroplasty    Date of Surgery/ Procedure: 4/3/2017  Time of Surgery/ Procedure: 7:30  Hospital/Surgical Facility: [facility]  Primary Physician: Leif Fair  Type of Anesthesia Anticipated: General    Patient has a Health Care Directive or Living Will:  YES     1. NO - Do you have a history of heart attack, stroke, stent, bypass or surgery on an artery in the head, neck, heart or legs?  2. NO - Do you ever have any pain or discomfort in your chest?  3. NO - Do you have a history of  Heart Failure?  4. NO - Are you troubled by shortness of breath when: walking on the level, up a slight hill or at night?  5. NO - Do you currently have a cold, bronchitis or other respiratory infection?  6. NO - Do you have a cough, shortness of breath or wheezing?  7. NO - Do you sometimes get pains in the calves of your legs when you walk?  8. YES - Do you or anyone in your family have previous history of blood clots?  9. NO - Do you or does anyone in your family have a serious bleeding problem such as prolonged bleeding following surgeries or cuts?  10. NO - Have you ever had problems with anemia or been told to take iron pills?  11. NO - Have you had any abnormal blood loss such as black, tarry or bloody stools, or abnormal vaginal bleeding?  12. NO - Have you ever had a blood transfusion?  13. NO - Have you or any of your relatives ever had problems with anesthesia?  14. NO - Do you have sleep apnea, excessive snoring or daytime drowsiness?  15. NO - Do you have any  prosthetic heart valves?  16. NO - Do you have prosthetic joints?  17. NO - Is there any chance that you may be pregnant?      HPI:                                                      Brief HPI related to upcoming procedure: R hip arthroplasty, due to longstanding arthritis ? Psoriatic arthritis.    Calcification of the coronary arteries and thoracic aorta noted on CT of the chest. She is being managed for dyslipidemia and hypertension by cardiology. She is scheduled for right hip replacement on 04/03. She saw Dr. Fountain in preop clearance, and he sent her for a stress test and echocardiogram. Stress test was completely normal with no evidence of ischemia or infarct. Her EF was normal at 60%. Her echocardiogram showed again normal EF with some degree of diastolic dysfunction and mild tricuspid regurgitation but no significant issues.           HYPERTENSION - Patient has longstanding history of mod-severe HTN , currently denies any symptoms referable to elevated blood pressure. Specifically denies chest pain, palpitations, dyspnea, orthopnea, PND or peripheral edema. Blood pressure readings have been in normal range. Current medication regimen is as listed below. Patient denies any side effects of medication.                                                                                                                                                                                          .  HYPERLIPIDEMIA - Patient has a long history of significant Hyperlipidemia requiring medication for treatment with recent good control. Patient reports no problems or side effects with the medication.                                                                                                                                                       .    MEDICAL HISTORY:                                                      Patient Active Problem List    Diagnosis Date Noted     Hyperlipidemia LDL goal <130 11/07/2012      Priority: High     Osteopenia 11/01/2005     Priority: High     Problem list name updated by automated process. Provider to review       Personal history of tobacco use, presenting hazards to health 11/01/2005     Priority: High     Tinea pedis of left foot 03/21/2017     Priority: Medium     Impaired fasting glucose 03/21/2017     Priority: Medium     Hypercalcemia 03/21/2017     Priority: Medium     Vitamin B12 deficiency (non anemic) 12/05/2016     Priority: Medium     SEVERE       Scurvy 12/05/2016     Priority: Medium     SEVERE       Vitamin D deficiency 12/05/2016     Priority: Medium     Hematuria 12/05/2016     Priority: Medium     Primary osteoarthritis, unspecified site 12/05/2016     Priority: Medium     Arthralgia of both knees 12/05/2016     Priority: Medium     Hip pain, right 10/14/2016     Priority: Medium     Family history of thyroid disease 10/14/2016     Priority: Medium     Fatigue, unspecified type 10/14/2016     Priority: Medium     Hair loss 10/14/2016     Priority: Medium     Pain in joint, multiple sites 10/14/2016     Priority: Medium     Seborrheic keratosis 10/14/2016     Priority: Medium     Psoriasis with arthropathy (H) 10/14/2016     Priority: Medium     Urine abnormality 10/14/2016     Priority: Medium     BROWNISH STAIN ON UNDERWEAR       Benign essential hypertension      Priority: Medium     Eczema 08/31/2009     Priority: Medium     Advance Care Planning 03/12/2012     Priority: Low     Advance Care Planning 3/31/2016: Receipt of ACP document:  Received: Health Care Directive which was witnessed or notarized on 8-3-08.  Document previously scanned on 3-29-16.  Validation form completed and sent to be scanned.  Code Status needs to be updated to reflect choices in most recent ACP document.  Confirmed/documented designated decision maker(s).  Added by Rosa Maria Gutierrez RN, System Director ACP-Honoring Choices   Advance Care Planning 3/12/12 Discussed advance care planning with  patient; information given to patient to review. Mary Doherty        Past Medical History   Diagnosis Date     Benign essential hypertension      was on amlodipine/ then metoprolol / add lisinopril      Contact dermatitis and other eczema, due to unspecified cause      Hyperlipidemia LDL goal <130 11/7/2012     Nephritis       as a child (post strep)      Osteoporosis, unspecified      Phlebitis and thrombophlebitis of other deep vessels of lower extremities 1972, 1975     Both with pregnancies     Sinusitis      Tobacco use disorder      Past Surgical History   Procedure Laterality Date     C vaginal hysterectomy  1978     Hysterectomy, Vaginal     Hc aspiration &/or injection ganglion cyst, any  1994     Hysterectomy, pap no longer indicated       C dexa, bone density, axial skel  6/2008     T score lumbar -0.6, femoral neck -2.4/-2.0(all stable)     Herniorrhaphy inguinal Right 3/24/2015     Procedure: HERNIORRHAPHY INGUINAL;  Surgeon: Sam Erazo MD;  Location:  [facility]     Biopsy of skin lesion       Current Outpatient Prescriptions   Medication Sig Dispense Refill     clotrimazole-betamethasone (LOTRISONE) cream Apply topically 2 times daily 45 g 1     atorvastatin (LIPITOR) 20 MG tablet Take 1 tablet (20 mg) by mouth daily INDICATION: TO LOWER CHOLESTEROL AND TO HELP REDUCE RISK OF HEART ATTACK AND STROKE 90 tablet 3     amLODIPine (NORVASC) 2.5 MG tablet Take 1 tablet (2.5 mg) by mouth daily 30 tablet 11     cholecalciferol (VITAMIN D3) 65445 UNITS capsule Take 1 capsule (50,000 Units) by mouth every 14 days SIG: TAKE ONE CAP EVERY OTHER WEEK, INDICATION: TO TREAT VITAMIN D DEFICIENCY (1ST AND 15TH OF EACH MONTH), MUST TAKE WITH FAT CONTAINING MEAL, TAKE 1 EXTRA CAPSULE THIS WEEK ONLY 40 capsule 0     Cyanocobalamin (B-12 SUPER STRENGTH) 5000 MCG/ML LIQD Place 1 mL under the tongue every morning FOR VITAMIN B12 SUPPLEMENTATION, PLEASE PLACE UNDER THE TONGUE 2 Bottle PRN      "spironolactone-hydrochlorothiazide (ALDACTAZIDE) 25-25 MG per tablet Take 1 tablet by mouth every morning INDICATION:TO LOWER BLOOD PRESSURE 90 tablet 3     folic acid (FOLVITE) 1 MG tablet Take 1 tablet (1 mg) by mouth daily INDICATION: FOLIC ACID SUPPLEMENT 100 tablet 3     vitamin C-electrolytes (EMERGEN-C) 1000mg vitamin C super orange drink mix Mix 1 packet in 4-6oz water and take once daily, INDICATION: VITAMIN C SUPPLEMENTION 90 packet PRN     Calcium Carb-Cholecalciferol (CALCIUM 1000 + D) 1000-800 MG-UNIT TABS Take 1 tablet by mouth daily TAKE WITH FOOD, FOR BONE HEALTH AND FOR VITAMIN D SUPPLEMENTATION 100 tablet PRN     celecoxib (CELEBREX) 200 MG capsule Take 1 capsule (200 mg) by mouth 2 times daily as needed for moderate pain 60 capsule 3     acetaminophen (TYLENOL) 500 MG tablet Take 2 tablets (1,000 mg) by mouth every 8 hours as needed for mild pain 100 tablet 0     betamethasone dipropionate (DIPROSONE) 0.05 % cream Apply topically 2 times daily 45 g 1     ranitidine (ZANTAC) 150 MG tablet Take 150 mg by mouth as needed for heartburn       albuterol (PROAIR HFA, PROVENTIL HFA, VENTOLIN HFA) 108 (90 BASE) MCG/ACT inhaler Inhale 2 puffs into the lungs every 6 hours as needed for shortness of breath / dyspnea or wheezing 3 Inhaler 1     aspirin EC 81 MG tablet Take 1 tablet (81 mg) by mouth daily 30 tablet 11     order for DME Equipment being ordered: blood pressure cuff 1 Device 0     triamcinolone (KENALOG) 0.1 % ointment Apply topically 2 times daily 30 g 1     [DISCONTINUED] atorvastatin (LIPITOR) 20 MG tablet Take 1 tablet (20 mg) by mouth daily 90 tablet 3     OTC products: None, except as noted above    Allergies   Allergen Reactions     Penicillins      rash     Spiriva Handihaler Other (See Comments)     Felt like the med \"paralyzed\" her diaphragm.  Had increased difficulty taking deep breath     Sulfa Drugs      rash      Latex Allergy: NO    Social History   Substance Use Topics     Smoking " status: Former Smoker     Packs/day: 1.00     Years: 10.00     Types: Cigarettes     Start date: 2/7/1964     Quit date: 9/7/2015     Smokeless tobacco: Never Used      Comment: 5-10 daily     Alcohol use 0.0 oz/week     0 Standard drinks or equivalent per week      Comment: 1 a month     History   Drug Use No       REVIEW OF SYSTEMS:                                                    14 point ROS reviewed in detail and negative     EXAM:                                                    /62 (BP Location: Left arm, Patient Position: Chair, Cuff Size: Adult Regular)  Pulse 72  Temp 98.2  F (36.8  C) (Oral)  Resp 16  Wt 167 lb (75.8 kg)  SpO2 95%  BMI 27.36 kg/m2    GENERAL APPEARANCE: healthy, alert and no distress     EYES: EOMI, PERRL     HENT: ear canals and TM's normal and nose and mouth without ulcers or lesions     NECK: no adenopathy, no asymmetry, masses, or scars and thyroid normal to palpation     RESP: lungs clear to auscultation - no rales, rhonchi or wheezes     CV: regular rates and rhythm, normal S1 S2, no S3 or S4 and no murmur, click or rub     ABDOMEN:  soft, nontender, no HSM or masses and bowel sounds normal     MS: extremities normal- no gross deformities noted, no evidence of inflammation in joints, FROM in all extremities.     SKIN: no suspicious lesions or rashes, ? Tinea Pedis L foot     NEURO: Normal strength and tone, sensory exam grossly normal, mentation intact and speech normal     PSYCH: mentation appears normal. and affect normal/bright     LYMPHATICS: No axillary, cervical, or supraclavicular nodes    DIAGNOSTICS:                                                    EKG: Not indicated due to non-vascular surgery and last ekg on - recent stress test and echo WNL as noted (within 30 days for CAD history or last year for cardiac risk factors)    Recent Labs   Lab Test  03/14/17   0937  03/08/17   1210   01/13/17   0938   10/14/16   1501   HGB   --    --    --   14.7   --   13.7    PLT   --    --    --   299   --   241   NA  139  139   < >   --    < >  140   POTASSIUM  4.2  4.0   < >   --    < >  3.8   CR  0.77  0.96   < >   --    < >  0.76    < > = values in this interval not displayed.        IMPRESSION:                                                    Reason for surgery/procedure: L HIP ARTHROPLASTY  Diagnosis/reason for consult: OBTAIN PREOPERATIVE MEDICAL CLEARANCE AND TO ASSESS CARDIAC RISK    The proposed surgical procedure is considered INTERMEDIATE risk.    REVISED CARDIAC RISK INDEX  The patient has the following serious cardiovascular risks for perioperative complications such as (MI, PE, VFib and 3  AV Block):  No serious cardiac risks  INTERPRETATION: 1 risks: Class II (low risk - 0.9% complication rate)    The patient has the following additional risks for perioperative complications:  No identified additional risks      ICD-10-CM    1. Preop general physical exam Z01.818 CBC with platelets     Basic metabolic panel   2. Tinea pedis of left foot B35.3 clotrimazole-betamethasone (LOTRISONE) cream   3. Benign essential hypertension I10 atorvastatin (LIPITOR) 20 MG tablet   4. Hyperlipidemia LDL goal <130 E78.5 atorvastatin (LIPITOR) 20 MG tablet   5. Impaired fasting glucose R73.01 Hemoglobin A1c   6. Hypercalcemia E83.52 Basic metabolic panel     Phosphorus     Magnesium       RECOMMENDATIONS:                                                      New diagnosis of Type 2 diabetes, no deterrent to surgery but will need to be seen for this.      Cardiovascular Risk  Performs 4 METs exercise without symptoms (Light housework (dusting, washing dishes) and Climb a flight of stairs) .       --Patient is to take all scheduled medications on the day of surgery EXCEPT for modifications listed below.  Take BP meds only    APPROVAL GIVEN to proceed with proposed procedure, without further diagnostic evaluation       Signed Electronically by: Leif Fair MD    Copy of this  evaluation report is provided to requesting physician.    [place] Preop Guidelines

## 2017-03-31 ENCOUNTER — MYC MEDICAL ADVICE (OUTPATIENT)
Dept: CARDIOLOGY | Facility: CLINIC | Age: 67
End: 2017-03-31

## 2017-04-03 ENCOUNTER — SURGERY (OUTPATIENT)
Age: 67
End: 2017-04-03

## 2017-04-03 ENCOUNTER — ANESTHESIA EVENT (OUTPATIENT)
Dept: SURGERY | Facility: CLINIC | Age: 67
DRG: 470 | End: 2017-04-03
Payer: MEDICARE

## 2017-04-03 ENCOUNTER — ANESTHESIA (OUTPATIENT)
Dept: SURGERY | Facility: CLINIC | Age: 67
DRG: 470 | End: 2017-04-03
Payer: MEDICARE

## 2017-04-03 ENCOUNTER — APPOINTMENT (OUTPATIENT)
Dept: GENERAL RADIOLOGY | Facility: CLINIC | Age: 67
DRG: 470 | End: 2017-04-03
Attending: ORTHOPAEDIC SURGERY
Payer: MEDICARE

## 2017-04-03 ENCOUNTER — HOSPITAL ENCOUNTER (INPATIENT)
Facility: CLINIC | Age: 67
LOS: 2 days | Discharge: HOME | DRG: 470 | End: 2017-04-05
Attending: ORTHOPAEDIC SURGERY | Admitting: ORTHOPAEDIC SURGERY
Payer: MEDICARE

## 2017-04-03 ENCOUNTER — APPOINTMENT (OUTPATIENT)
Dept: PHYSICAL THERAPY | Facility: CLINIC | Age: 67
DRG: 470 | End: 2017-04-03
Attending: ORTHOPAEDIC SURGERY
Payer: MEDICARE

## 2017-04-03 DIAGNOSIS — M85.80 OSTEOPENIA: Primary | ICD-10-CM

## 2017-04-03 DIAGNOSIS — Z96.649 HIP JOINT REPLACEMENT STATUS: ICD-10-CM

## 2017-04-03 LAB
ABO + RH BLD: NORMAL
ABO + RH BLD: NORMAL
BLD GP AB SCN SERPL QL: NORMAL
BLOOD BANK CMNT PATIENT-IMP: NORMAL
CREAT SERPL-MCNC: 0.8 MG/DL (ref 0.52–1.04)
GFR SERPL CREATININE-BSD FRML MDRD: 72 ML/MIN/1.7M2
GLUCOSE BLDC GLUCOMTR-MCNC: 148 MG/DL (ref 70–99)
GLUCOSE BLDC GLUCOMTR-MCNC: 154 MG/DL (ref 70–99)
GLUCOSE BLDC GLUCOMTR-MCNC: 213 MG/DL (ref 70–99)
INR PPP: 1.03 (ref 0.86–1.14)
PLATELET # BLD AUTO: 258 10E9/L (ref 150–450)
POTASSIUM SERPL-SCNC: 3.4 MMOL/L (ref 3.4–5.3)
SPECIMEN EXP DATE BLD: NORMAL

## 2017-04-03 PROCEDURE — 25800025 ZZH RX 258: Performed by: ANESTHESIOLOGY

## 2017-04-03 PROCEDURE — 85049 AUTOMATED PLATELET COUNT: CPT | Performed by: ORTHOPAEDIC SURGERY

## 2017-04-03 PROCEDURE — 25000128 H RX IP 250 OP 636: Performed by: ORTHOPAEDIC SURGERY

## 2017-04-03 PROCEDURE — 00000146 ZZHCL STATISTIC GLUCOSE BY METER IP

## 2017-04-03 PROCEDURE — 71000012 ZZH RECOVERY PHASE 1 LEVEL 1 FIRST HR: Performed by: ORTHOPAEDIC SURGERY

## 2017-04-03 PROCEDURE — 40000169 ZZH STATISTIC PRE-PROCEDURE ASSESSMENT I: Performed by: ORTHOPAEDIC SURGERY

## 2017-04-03 PROCEDURE — S0077 INJECTION, CLINDAMYCIN PHOSP: HCPCS | Performed by: ORTHOPAEDIC SURGERY

## 2017-04-03 PROCEDURE — 97116 GAIT TRAINING THERAPY: CPT | Mod: GP | Performed by: PHYSICAL THERAPIST

## 2017-04-03 PROCEDURE — 27210995 ZZH RX 272: Performed by: ORTHOPAEDIC SURGERY

## 2017-04-03 PROCEDURE — 25000128 H RX IP 250 OP 636: Performed by: ANESTHESIOLOGY

## 2017-04-03 PROCEDURE — 82565 ASSAY OF CREATININE: CPT | Performed by: ORTHOPAEDIC SURGERY

## 2017-04-03 PROCEDURE — 36415 COLL VENOUS BLD VENIPUNCTURE: CPT | Performed by: ANESTHESIOLOGY

## 2017-04-03 PROCEDURE — 25800025 ZZH RX 258: Performed by: ORTHOPAEDIC SURGERY

## 2017-04-03 PROCEDURE — 97530 THERAPEUTIC ACTIVITIES: CPT | Mod: GP | Performed by: PHYSICAL THERAPIST

## 2017-04-03 PROCEDURE — 97161 PT EVAL LOW COMPLEX 20 MIN: CPT | Mod: GP | Performed by: PHYSICAL THERAPIST

## 2017-04-03 PROCEDURE — 25000125 ZZHC RX 250: Performed by: ANESTHESIOLOGY

## 2017-04-03 PROCEDURE — 25000128 H RX IP 250 OP 636: Performed by: PHYSICIAN ASSISTANT

## 2017-04-03 PROCEDURE — C1776 JOINT DEVICE (IMPLANTABLE): HCPCS | Performed by: ORTHOPAEDIC SURGERY

## 2017-04-03 PROCEDURE — 97110 THERAPEUTIC EXERCISES: CPT | Mod: GP | Performed by: PHYSICAL THERAPIST

## 2017-04-03 PROCEDURE — A9270 NON-COVERED ITEM OR SERVICE: HCPCS | Mod: GY | Performed by: ORTHOPAEDIC SURGERY

## 2017-04-03 PROCEDURE — 36000093 ZZH SURGERY LEVEL 4 1ST 30 MIN: Performed by: ORTHOPAEDIC SURGERY

## 2017-04-03 PROCEDURE — 86850 RBC ANTIBODY SCREEN: CPT | Performed by: ANESTHESIOLOGY

## 2017-04-03 PROCEDURE — 25000125 ZZHC RX 250: Performed by: ORTHOPAEDIC SURGERY

## 2017-04-03 PROCEDURE — 25000128 H RX IP 250 OP 636: Performed by: NURSE ANESTHETIST, CERTIFIED REGISTERED

## 2017-04-03 PROCEDURE — 37000008 ZZH ANESTHESIA TECHNICAL FEE, 1ST 30 MIN: Performed by: ORTHOPAEDIC SURGERY

## 2017-04-03 PROCEDURE — 85610 PROTHROMBIN TIME: CPT | Performed by: ORTHOPAEDIC SURGERY

## 2017-04-03 PROCEDURE — 25000125 ZZHC RX 250: Performed by: NURSE ANESTHETIST, CERTIFIED REGISTERED

## 2017-04-03 PROCEDURE — 86900 BLOOD TYPING SEROLOGIC ABO: CPT | Performed by: ANESTHESIOLOGY

## 2017-04-03 PROCEDURE — 40000940 XR PELVIS AD HIP PORTABLE RIGHT 1 VIEW

## 2017-04-03 PROCEDURE — 37000009 ZZH ANESTHESIA TECHNICAL FEE, EACH ADDTL 15 MIN: Performed by: ORTHOPAEDIC SURGERY

## 2017-04-03 PROCEDURE — 27210794 ZZH OR GENERAL SUPPLY STERILE: Performed by: ORTHOPAEDIC SURGERY

## 2017-04-03 PROCEDURE — 0SR901A REPLACEMENT OF RIGHT HIP JOINT WITH METAL SYNTHETIC SUBSTITUTE, UNCEMENTED, OPEN APPROACH: ICD-10-PCS | Performed by: ORTHOPAEDIC SURGERY

## 2017-04-03 PROCEDURE — 36000063 ZZH SURGERY LEVEL 4 EA 15 ADDTL MIN: Performed by: ORTHOPAEDIC SURGERY

## 2017-04-03 PROCEDURE — 12000007 ZZH R&B INTERMEDIATE

## 2017-04-03 PROCEDURE — 84132 ASSAY OF SERUM POTASSIUM: CPT | Performed by: ANESTHESIOLOGY

## 2017-04-03 PROCEDURE — 99207 ZZC CONSULT E&M CHANGED TO INITIAL LEVEL: CPT | Performed by: PHYSICIAN ASSISTANT

## 2017-04-03 PROCEDURE — 99223 1ST HOSP IP/OBS HIGH 75: CPT | Performed by: PHYSICIAN ASSISTANT

## 2017-04-03 PROCEDURE — 40000193 ZZH STATISTIC PT WARD VISIT: Performed by: PHYSICAL THERAPIST

## 2017-04-03 PROCEDURE — 25000566 ZZH SEVOFLURANE, EA 15 MIN: Performed by: ORTHOPAEDIC SURGERY

## 2017-04-03 PROCEDURE — 36415 COLL VENOUS BLD VENIPUNCTURE: CPT | Performed by: ORTHOPAEDIC SURGERY

## 2017-04-03 PROCEDURE — C1713 ANCHOR/SCREW BN/BN,TIS/BN: HCPCS | Performed by: ORTHOPAEDIC SURGERY

## 2017-04-03 PROCEDURE — 25000132 ZZH RX MED GY IP 250 OP 250 PS 637: Mod: GY | Performed by: ORTHOPAEDIC SURGERY

## 2017-04-03 PROCEDURE — 86901 BLOOD TYPING SEROLOGIC RH(D): CPT | Performed by: ANESTHESIOLOGY

## 2017-04-03 DEVICE — IMPLANTABLE DEVICE: Type: IMPLANTABLE DEVICE | Site: HIP | Status: FUNCTIONAL

## 2017-04-03 DEVICE — IMP SCR BIOM G7 ACETABULAR DOME 6.5X25MM LOW PRO 010000998: Type: IMPLANTABLE DEVICE | Site: HIP | Status: FUNCTIONAL

## 2017-04-03 DEVICE — IMP SCR BIOM G7 ACETABULAR DOME 6.5X30MM LOW PRO 010000999: Type: IMPLANTABLE DEVICE | Site: HIP | Status: FUNCTIONAL

## 2017-04-03 DEVICE — IMP HEAD FEM MOD BIOMET TYPE 1 TPR 38MM+6MM NECK 11-363664: Type: IMPLANTABLE DEVICE | Site: HIP | Status: FUNCTIONAL

## 2017-04-03 RX ORDER — PROPOFOL 10 MG/ML
INJECTION, EMULSION INTRAVENOUS PRN
Status: DISCONTINUED | OUTPATIENT
Start: 2017-04-03 | End: 2017-04-03

## 2017-04-03 RX ORDER — DEXAMETHASONE SODIUM PHOSPHATE 4 MG/ML
INJECTION, SOLUTION INTRA-ARTICULAR; INTRALESIONAL; INTRAMUSCULAR; INTRAVENOUS; SOFT TISSUE PRN
Status: DISCONTINUED | OUTPATIENT
Start: 2017-04-03 | End: 2017-04-03

## 2017-04-03 RX ORDER — SPIRONOLACTONE AND HYDROCHLOROTHIAZIDE 25; 25 MG/1; MG/1
1 TABLET ORAL EVERY MORNING
Status: DISCONTINUED | OUTPATIENT
Start: 2017-04-04 | End: 2017-04-03

## 2017-04-03 RX ORDER — FENTANYL CITRATE 50 UG/ML
INJECTION, SOLUTION INTRAMUSCULAR; INTRAVENOUS PRN
Status: DISCONTINUED | OUTPATIENT
Start: 2017-04-03 | End: 2017-04-03

## 2017-04-03 RX ORDER — HYDROMORPHONE HYDROCHLORIDE 1 MG/ML
.3-.5 INJECTION, SOLUTION INTRAMUSCULAR; INTRAVENOUS; SUBCUTANEOUS
Status: DISCONTINUED | OUTPATIENT
Start: 2017-04-03 | End: 2017-04-05 | Stop reason: HOSPADM

## 2017-04-03 RX ORDER — ACETAMINOPHEN 500 MG
1000 TABLET ORAL ONCE
Status: COMPLETED | OUTPATIENT
Start: 2017-04-03 | End: 2017-04-03

## 2017-04-03 RX ORDER — ONDANSETRON 4 MG/1
4 TABLET, ORALLY DISINTEGRATING ORAL EVERY 6 HOURS PRN
Status: DISCONTINUED | OUTPATIENT
Start: 2017-04-03 | End: 2017-04-05 | Stop reason: HOSPADM

## 2017-04-03 RX ORDER — ONDANSETRON 4 MG/1
4 TABLET, ORALLY DISINTEGRATING ORAL EVERY 30 MIN PRN
Status: DISCONTINUED | OUTPATIENT
Start: 2017-04-03 | End: 2017-04-03 | Stop reason: HOSPADM

## 2017-04-03 RX ORDER — ALBUTEROL SULFATE 90 UG/1
2 AEROSOL, METERED RESPIRATORY (INHALATION) EVERY 6 HOURS PRN
Status: DISCONTINUED | OUTPATIENT
Start: 2017-04-03 | End: 2017-04-05 | Stop reason: HOSPADM

## 2017-04-03 RX ORDER — SODIUM CHLORIDE, SODIUM LACTATE, POTASSIUM CHLORIDE, CALCIUM CHLORIDE 600; 310; 30; 20 MG/100ML; MG/100ML; MG/100ML; MG/100ML
INJECTION, SOLUTION INTRAVENOUS CONTINUOUS
Status: DISCONTINUED | OUTPATIENT
Start: 2017-04-03 | End: 2017-04-03 | Stop reason: HOSPADM

## 2017-04-03 RX ORDER — PROCHLORPERAZINE MALEATE 5 MG
5 TABLET ORAL EVERY 6 HOURS PRN
Status: DISCONTINUED | OUTPATIENT
Start: 2017-04-03 | End: 2017-04-05 | Stop reason: HOSPADM

## 2017-04-03 RX ORDER — METOCLOPRAMIDE HYDROCHLORIDE 5 MG/ML
5 INJECTION INTRAMUSCULAR; INTRAVENOUS EVERY 6 HOURS PRN
Status: DISCONTINUED | OUTPATIENT
Start: 2017-04-03 | End: 2017-04-05 | Stop reason: HOSPADM

## 2017-04-03 RX ORDER — OXYCODONE HCL 10 MG/1
10 TABLET, FILM COATED, EXTENDED RELEASE ORAL ONCE
Status: COMPLETED | OUTPATIENT
Start: 2017-04-03 | End: 2017-04-03

## 2017-04-03 RX ORDER — NEOSTIGMINE METHYLSULFATE 1 MG/ML
VIAL (ML) INJECTION PRN
Status: DISCONTINUED | OUTPATIENT
Start: 2017-04-03 | End: 2017-04-03

## 2017-04-03 RX ORDER — LIDOCAINE 40 MG/G
CREAM TOPICAL
Status: DISCONTINUED | OUTPATIENT
Start: 2017-04-03 | End: 2017-04-05 | Stop reason: HOSPADM

## 2017-04-03 RX ORDER — SODIUM CHLORIDE 9 MG/ML
INJECTION, SOLUTION INTRAVENOUS CONTINUOUS
Status: DISCONTINUED | OUTPATIENT
Start: 2017-04-03 | End: 2017-04-04

## 2017-04-03 RX ORDER — LIDOCAINE HYDROCHLORIDE 20 MG/ML
INJECTION, SOLUTION INFILTRATION; PERINEURAL PRN
Status: DISCONTINUED | OUTPATIENT
Start: 2017-04-03 | End: 2017-04-03

## 2017-04-03 RX ORDER — METOCLOPRAMIDE 5 MG/1
5 TABLET ORAL EVERY 6 HOURS PRN
Status: DISCONTINUED | OUTPATIENT
Start: 2017-04-03 | End: 2017-04-05 | Stop reason: HOSPADM

## 2017-04-03 RX ORDER — ONDANSETRON 2 MG/ML
4 INJECTION INTRAMUSCULAR; INTRAVENOUS EVERY 30 MIN PRN
Status: DISCONTINUED | OUTPATIENT
Start: 2017-04-03 | End: 2017-04-03 | Stop reason: HOSPADM

## 2017-04-03 RX ORDER — DIPHENHYDRAMINE HCL 12.5MG/5ML
12.5 LIQUID (ML) ORAL EVERY 6 HOURS PRN
Status: DISCONTINUED | OUTPATIENT
Start: 2017-04-03 | End: 2017-04-05 | Stop reason: HOSPADM

## 2017-04-03 RX ORDER — ACETAMINOPHEN 325 MG/1
975 TABLET ORAL EVERY 8 HOURS
Status: DISCONTINUED | OUTPATIENT
Start: 2017-04-03 | End: 2017-04-05 | Stop reason: HOSPADM

## 2017-04-03 RX ORDER — WARFARIN SODIUM 5 MG/1
5 TABLET ORAL
Status: COMPLETED | OUTPATIENT
Start: 2017-04-03 | End: 2017-04-03

## 2017-04-03 RX ORDER — ATORVASTATIN CALCIUM 10 MG/1
10 TABLET, FILM COATED ORAL EVERY MORNING
Status: DISCONTINUED | OUTPATIENT
Start: 2017-04-03 | End: 2017-04-05 | Stop reason: HOSPADM

## 2017-04-03 RX ORDER — OXYCODONE HYDROCHLORIDE 5 MG/1
5-10 TABLET ORAL
Status: DISCONTINUED | OUTPATIENT
Start: 2017-04-03 | End: 2017-04-05 | Stop reason: HOSPADM

## 2017-04-03 RX ORDER — DEXTROSE MONOHYDRATE, SODIUM CHLORIDE, AND POTASSIUM CHLORIDE 50; 1.49; 4.5 G/1000ML; G/1000ML; G/1000ML
INJECTION, SOLUTION INTRAVENOUS CONTINUOUS
Status: DISCONTINUED | OUTPATIENT
Start: 2017-04-03 | End: 2017-04-03

## 2017-04-03 RX ORDER — CLOTRIMAZOLE AND BETAMETHASONE DIPROPIONATE 10; .64 MG/G; MG/G
CREAM TOPICAL 2 TIMES DAILY
Status: DISCONTINUED | OUTPATIENT
Start: 2017-04-03 | End: 2017-04-05 | Stop reason: HOSPADM

## 2017-04-03 RX ORDER — DEXTROSE MONOHYDRATE 25 G/50ML
25-50 INJECTION, SOLUTION INTRAVENOUS
Status: DISCONTINUED | OUTPATIENT
Start: 2017-04-03 | End: 2017-04-05 | Stop reason: HOSPADM

## 2017-04-03 RX ORDER — CLINDAMYCIN PHOSPHATE 900 MG/50ML
900 INJECTION, SOLUTION INTRAVENOUS EVERY 8 HOURS
Status: COMPLETED | OUTPATIENT
Start: 2017-04-03 | End: 2017-04-03

## 2017-04-03 RX ORDER — ONDANSETRON 2 MG/ML
INJECTION INTRAMUSCULAR; INTRAVENOUS PRN
Status: DISCONTINUED | OUTPATIENT
Start: 2017-04-03 | End: 2017-04-03

## 2017-04-03 RX ORDER — AMLODIPINE BESYLATE 2.5 MG/1
2.5 TABLET ORAL DAILY
Status: DISCONTINUED | OUTPATIENT
Start: 2017-04-04 | End: 2017-04-05 | Stop reason: HOSPADM

## 2017-04-03 RX ORDER — ACETAMINOPHEN 325 MG/1
650 TABLET ORAL EVERY 4 HOURS PRN
Status: DISCONTINUED | OUTPATIENT
Start: 2017-04-06 | End: 2017-04-05 | Stop reason: HOSPADM

## 2017-04-03 RX ORDER — ASPIRIN 81 MG/1
81 TABLET ORAL DAILY
Status: DISCONTINUED | OUTPATIENT
Start: 2017-04-03 | End: 2017-04-05 | Stop reason: HOSPADM

## 2017-04-03 RX ORDER — EPHEDRINE SULFATE 50 MG/ML
INJECTION, SOLUTION INTRAMUSCULAR; INTRAVENOUS; SUBCUTANEOUS PRN
Status: DISCONTINUED | OUTPATIENT
Start: 2017-04-03 | End: 2017-04-03

## 2017-04-03 RX ORDER — GLYCOPYRROLATE 0.2 MG/ML
INJECTION, SOLUTION INTRAMUSCULAR; INTRAVENOUS PRN
Status: DISCONTINUED | OUTPATIENT
Start: 2017-04-03 | End: 2017-04-03

## 2017-04-03 RX ORDER — AMOXICILLIN 250 MG
1-2 CAPSULE ORAL 2 TIMES DAILY
Status: DISCONTINUED | OUTPATIENT
Start: 2017-04-03 | End: 2017-04-05 | Stop reason: HOSPADM

## 2017-04-03 RX ORDER — HYDROCHLOROTHIAZIDE 25 MG/1
25 TABLET ORAL EVERY MORNING
Status: DISCONTINUED | OUTPATIENT
Start: 2017-04-04 | End: 2017-04-03

## 2017-04-03 RX ORDER — SPIRONOLACTONE 25 MG/1
25 TABLET ORAL EVERY MORNING
Status: DISCONTINUED | OUTPATIENT
Start: 2017-04-04 | End: 2017-04-03

## 2017-04-03 RX ORDER — CLINDAMYCIN PHOSPHATE 900 MG/50ML
900 INJECTION, SOLUTION INTRAVENOUS SEE ADMIN INSTRUCTIONS
Status: DISCONTINUED | OUTPATIENT
Start: 2017-04-03 | End: 2017-04-03 | Stop reason: HOSPADM

## 2017-04-03 RX ORDER — CLINDAMYCIN PHOSPHATE 900 MG/50ML
900 INJECTION, SOLUTION INTRAVENOUS
Status: COMPLETED | OUTPATIENT
Start: 2017-04-03 | End: 2017-04-03

## 2017-04-03 RX ORDER — FENTANYL CITRATE 0.05 MG/ML
25-50 INJECTION, SOLUTION INTRAMUSCULAR; INTRAVENOUS
Status: DISCONTINUED | OUTPATIENT
Start: 2017-04-03 | End: 2017-04-03 | Stop reason: HOSPADM

## 2017-04-03 RX ORDER — TRIAMCINOLONE ACETONIDE 1 MG/G
CREAM TOPICAL 2 TIMES DAILY PRN
COMMUNITY

## 2017-04-03 RX ORDER — NALOXONE HYDROCHLORIDE 0.4 MG/ML
.1-.4 INJECTION, SOLUTION INTRAMUSCULAR; INTRAVENOUS; SUBCUTANEOUS
Status: DISCONTINUED | OUTPATIENT
Start: 2017-04-03 | End: 2017-04-05 | Stop reason: HOSPADM

## 2017-04-03 RX ORDER — ONDANSETRON 2 MG/ML
4 INJECTION INTRAMUSCULAR; INTRAVENOUS EVERY 6 HOURS PRN
Status: DISCONTINUED | OUTPATIENT
Start: 2017-04-03 | End: 2017-04-05 | Stop reason: HOSPADM

## 2017-04-03 RX ORDER — HYDRALAZINE HYDROCHLORIDE 20 MG/ML
10 INJECTION INTRAMUSCULAR; INTRAVENOUS EVERY 4 HOURS PRN
Status: DISCONTINUED | OUTPATIENT
Start: 2017-04-03 | End: 2017-04-05 | Stop reason: HOSPADM

## 2017-04-03 RX ORDER — DIPHENHYDRAMINE HYDROCHLORIDE 50 MG/ML
12.5 INJECTION INTRAMUSCULAR; INTRAVENOUS EVERY 6 HOURS PRN
Status: DISCONTINUED | OUTPATIENT
Start: 2017-04-03 | End: 2017-04-05 | Stop reason: HOSPADM

## 2017-04-03 RX ORDER — NICOTINE POLACRILEX 4 MG
15-30 LOZENGE BUCCAL
Status: DISCONTINUED | OUTPATIENT
Start: 2017-04-03 | End: 2017-04-05 | Stop reason: HOSPADM

## 2017-04-03 RX ORDER — BETAMETHASONE DIPROPIONATE 0.5 MG/G
CREAM TOPICAL 2 TIMES DAILY PRN
Status: DISCONTINUED | OUTPATIENT
Start: 2017-04-03 | End: 2017-04-05 | Stop reason: HOSPADM

## 2017-04-03 RX ADMIN — LIDOCAINE HYDROCHLORIDE 1 ML: 10 INJECTION, SOLUTION EPIDURAL; INFILTRATION; INTRACAUDAL; PERINEURAL at 06:39

## 2017-04-03 RX ADMIN — ACETAMINOPHEN 1000 MG: 500 TABLET ORAL at 06:26

## 2017-04-03 RX ADMIN — DEXAMETHASONE SODIUM PHOSPHATE 4 MG: 4 INJECTION, SOLUTION INTRAMUSCULAR; INTRAVENOUS at 07:46

## 2017-04-03 RX ADMIN — ROCURONIUM BROMIDE 50 MG: 10 INJECTION INTRAVENOUS at 07:25

## 2017-04-03 RX ADMIN — FENTANYL CITRATE 50 MCG: 50 INJECTION, SOLUTION INTRAMUSCULAR; INTRAVENOUS at 09:04

## 2017-04-03 RX ADMIN — Medication 5 MG: at 08:02

## 2017-04-03 RX ADMIN — CLINDAMYCIN PHOSPHATE 900 MG: 18 INJECTION, SOLUTION INTRAVENOUS at 14:12

## 2017-04-03 RX ADMIN — WARFARIN SODIUM 5 MG: 5 TABLET ORAL at 17:31

## 2017-04-03 RX ADMIN — SODIUM CHLORIDE, POTASSIUM CHLORIDE, SODIUM LACTATE AND CALCIUM CHLORIDE: 600; 310; 30; 20 INJECTION, SOLUTION INTRAVENOUS at 06:26

## 2017-04-03 RX ADMIN — CLINDAMYCIN PHOSPHATE 900 MG: 18 INJECTION, SOLUTION INTRAVENOUS at 22:47

## 2017-04-03 RX ADMIN — GLYCOPYRROLATE 0.4 MG: 0.2 INJECTION, SOLUTION INTRAMUSCULAR; INTRAVENOUS at 08:22

## 2017-04-03 RX ADMIN — PROPOFOL 200 MG: 10 INJECTION, EMULSION INTRAVENOUS at 07:25

## 2017-04-03 RX ADMIN — SENNOSIDES AND DOCUSATE SODIUM 1 TABLET: 8.6; 5 TABLET ORAL at 22:47

## 2017-04-03 RX ADMIN — ACETAMINOPHEN 975 MG: 325 TABLET, FILM COATED ORAL at 14:11

## 2017-04-03 RX ADMIN — HYDROMORPHONE HYDROCHLORIDE 0.5 MG: 1 INJECTION, SOLUTION INTRAMUSCULAR; INTRAVENOUS; SUBCUTANEOUS at 08:27

## 2017-04-03 RX ADMIN — FENTANYL CITRATE 50 MCG: 50 INJECTION, SOLUTION INTRAMUSCULAR; INTRAVENOUS at 08:59

## 2017-04-03 RX ADMIN — FENTANYL CITRATE 50 MCG: 50 INJECTION, SOLUTION INTRAMUSCULAR; INTRAVENOUS at 08:23

## 2017-04-03 RX ADMIN — CLINDAMYCIN PHOSPHATE 900 MG: 18 INJECTION, SOLUTION INTRAVENOUS at 07:00

## 2017-04-03 RX ADMIN — SODIUM CHLORIDE: 9 INJECTION, SOLUTION INTRAVENOUS at 19:56

## 2017-04-03 RX ADMIN — POTASSIUM CHLORIDE, DEXTROSE MONOHYDRATE AND SODIUM CHLORIDE: 150; 5; 450 INJECTION, SOLUTION INTRAVENOUS at 10:21

## 2017-04-03 RX ADMIN — HYDROMORPHONE HYDROCHLORIDE 0.5 MG: 1 INJECTION, SOLUTION INTRAMUSCULAR; INTRAVENOUS; SUBCUTANEOUS at 15:02

## 2017-04-03 RX ADMIN — HYDROMORPHONE HYDROCHLORIDE 0.5 MG: 1 INJECTION, SOLUTION INTRAMUSCULAR; INTRAVENOUS; SUBCUTANEOUS at 09:20

## 2017-04-03 RX ADMIN — OXYCODONE HYDROCHLORIDE 10 MG: 5 TABLET ORAL at 20:44

## 2017-04-03 RX ADMIN — ASPIRIN 81 MG: 81 TABLET, COATED ORAL at 17:33

## 2017-04-03 RX ADMIN — FENTANYL CITRATE 50 MCG: 50 INJECTION, SOLUTION INTRAMUSCULAR; INTRAVENOUS at 07:25

## 2017-04-03 RX ADMIN — OXYCODONE HYDROCHLORIDE 10 MG: 10 TABLET, FILM COATED, EXTENDED RELEASE ORAL at 06:26

## 2017-04-03 RX ADMIN — ONDANSETRON 4 MG: 2 INJECTION INTRAMUSCULAR; INTRAVENOUS at 08:23

## 2017-04-03 RX ADMIN — HYDROMORPHONE HYDROCHLORIDE 0.5 MG: 1 INJECTION, SOLUTION INTRAMUSCULAR; INTRAVENOUS; SUBCUTANEOUS at 07:40

## 2017-04-03 RX ADMIN — SODIUM CHLORIDE, POTASSIUM CHLORIDE, SODIUM LACTATE AND CALCIUM CHLORIDE: 600; 310; 30; 20 INJECTION, SOLUTION INTRAVENOUS at 08:05

## 2017-04-03 RX ADMIN — MIDAZOLAM HYDROCHLORIDE 2 MG: 1 INJECTION, SOLUTION INTRAMUSCULAR; INTRAVENOUS at 07:25

## 2017-04-03 RX ADMIN — HYDROMORPHONE HYDROCHLORIDE 0.5 MG: 1 INJECTION, SOLUTION INTRAMUSCULAR; INTRAVENOUS; SUBCUTANEOUS at 12:43

## 2017-04-03 RX ADMIN — GENTAMICIN SULFATE 1000 ML: 40 INJECTION, SOLUTION INTRAMUSCULAR; INTRAVENOUS at 07:58

## 2017-04-03 RX ADMIN — NEOSTIGMINE METHYLSULFATE 3 MG: 1 INJECTION INTRAMUSCULAR; INTRAVENOUS; SUBCUTANEOUS at 08:22

## 2017-04-03 RX ADMIN — ACETAMINOPHEN 975 MG: 325 TABLET, FILM COATED ORAL at 22:47

## 2017-04-03 RX ADMIN — LIDOCAINE HYDROCHLORIDE 80 MG: 20 INJECTION, SOLUTION INFILTRATION; PERINEURAL at 07:25

## 2017-04-03 ASSESSMENT — LIFESTYLE VARIABLES: TOBACCO_USE: 1

## 2017-04-03 NOTE — PROGRESS NOTES
Admission medication history interview status for the 4/3/2017  admission is complete. See EPIC admission navigator for prior to admission medications     Medication history source reliability:Good    Medication history interview source(s):Patient    Medication history resources (including written lists, pill bottles, clinic record):Patient mailed in a med list prior to day of procedure.    Primary pharmacy.[facility], [place] ([address])    Additional medication history information not noted on PTA med list :None    Time spent in this activity: 30 minutes    Prior to Admission medications    Medication Sig Last Dose Taking? Auth Provider   Atorvastatin Calcium (LIPITOR PO) Take 10 mg by mouth every morning 4/1/2017 at am Yes Reported, Patient   triamcinolone (KENALOG) 0.1 % cream Apply topically 2 times daily as needed for irritation (sores on scalp.) Ove 1 year ago at prn Yes Reported, Patient   vitamin C-electrolytes (EMERGEN-C) 1000mg vitamin C super orange drink mix Take 1 packet by mouth daily Mix 1 packet in 4-6oz water and take once or twice daily or as directed. 3/29/2017 at am Yes Reported, Patient   Acetaminophen (TYLENOL PO) Take 1,000 mg by mouth 2 times daily as needed for mild pain or fever 3/30/2017 at prn Yes Reported, Patient   betamethasone dipropionate (DIPROSONE) 0.05 % cream Apply topically 2 times daily as needed (For foot)  4/1/2017 at prn Yes Reported, Patient   clotrimazole-betamethasone (LOTRISONE) cream Apply topically 2 times daily INDICATION: FUNGAL FOOT INFECTION 4/1/2017 at pm Yes Leif Fair MD   amLODIPine (NORVASC) 2.5 MG tablet Take 1 tablet (2.5 mg) by mouth daily 4/3/2017 at 0330 Yes Aren Fountain MD   cholecalciferol (VITAMIN D3) 48736 UNITS capsule Take 1 capsule (50,000 Units) by mouth every 14 days SIG: TAKE ONE CAP EVERY OTHER WEEK, INDICATION: TO TREAT VITAMIN D DEFICIENCY (1ST AND 15TH OF EACH MONTH), MUST TAKE WITH FAT CONTAINING MEAL, TAKE  1 EXTRA CAPSULE THIS WEEK ONLY 3/28/2017 at am Yes Leif Fair MD   Cyanocobalamin (B-12 SUPER STRENGTH) 5000 MCG/ML LIQD Place 1 mL under the tongue every morning FOR VITAMIN B12 SUPPLEMENTATION, PLEASE PLACE UNDER THE TONGUE 3/29/2017 at am Yes Leif Fair MD   spironolactone-hydrochlorothiazide (ALDACTAZIDE) 25-25 MG per tablet Take 1 tablet by mouth every morning INDICATION:TO LOWER BLOOD PRESSURE 4/3/2017 at 0330 Yes Leif Fair MD   folic acid (FOLVITE) 1 MG tablet Take 1 tablet (1 mg) by mouth daily INDICATION: FOLIC ACID SUPPLEMENT 3/29/2017 at am Yes Leif Fair MD   Calcium Carb-Cholecalciferol (CALCIUM 1000 + D) 1000-800 MG-UNIT TABS Take 1 tablet by mouth daily TAKE WITH FOOD, FOR BONE HEALTH AND FOR VITAMIN D SUPPLEMENTATION 3/29/2017 at qd Yes Leif Fair MD   celecoxib (CELEBREX) 200 MG capsule Take 1 capsule (200 mg) by mouth 2 times daily as needed for moderate pain Over 2 weeks ago at prn Yes Leif Fair MD   ranitidine (ZANTAC) 150 MG tablet Take 150 mg by mouth daily as needed for heartburn  3/29/2017 at am Yes Reported, Patient   albuterol (PROAIR HFA, PROVENTIL HFA, VENTOLIN HFA) 108 (90 BASE) MCG/ACT inhaler Inhale 2 puffs into the lungs every 6 hours as needed for shortness of breath / dyspnea or wheezing )carol 6 months ago at prn Yes Sarah Melgar MD   aspirin EC 81 MG tablet Take 1 tablet (81 mg) by mouth daily 3/25/2017 at am Yes Aren Fountain MD   order for DME Equipment being ordered: blood pressure cuff   Sarah Melgar MD

## 2017-04-03 NOTE — ANESTHESIA PREPROCEDURE EVALUATION
Anesthesia Evaluation     . Pt has had prior anesthetic.     No history of anesthetic complications          ROS/MED HX    ENT/Pulmonary:     (+)tobacco use, Past use , . .   (-) sleep apnea   Neurologic:       Cardiovascular:     (+) Dyslipidemia, hypertension----. : . . . :. . Previous cardiac testing date:results:Stress Testdate: results:EF 60%, no ischemia date: results: date: results:          METS/Exercise Tolerance:     Hematologic:         Musculoskeletal:         GI/Hepatic:        (-) GERD   Renal/Genitourinary:         Endo:         Psychiatric:     (+) psychiatric history anxiety and depression      Infectious Disease:         Malignancy:         Other:                     Physical Exam  Normal systems: cardiovascular and pulmonary    Airway   Mallampati: II  TM distance: >3 FB  Neck ROM: full    Dental   Comment: native    Cardiovascular       Pulmonary                     Anesthesia Plan      History & Physical Review  History and physical reviewed and following examination; no interval change.    ASA Status:  2 .    NPO Status:  > 8 hours    Plan for General with Propofol induction. Maintenance will be Balanced.    PONV prophylaxis:  Ondansetron (or other 5HT-3) and Dexamethasone or Solumedrol       Postoperative Care  Postoperative pain management:  IV analgesics.      Consents  Anesthetic plan, risks, benefits and alternatives discussed with:  Patient..                          .

## 2017-04-03 NOTE — PROGRESS NOTES
04/03/17 1615   Quick Adds   Type of Visit Initial PT Evaluation       Present no   Living Environment   Lives With alone   Living Arrangements house   Home Accessibility stairs within home;stairs to enter home;tub/shower is not walk in;other (see comments)  (also has a walk-in shower, bed is not on main level)   Number of Stairs to Enter Home 1   Number of Stairs Within Home 10   Stair Railings at Home inside, present on left side;inside, present on right side   Transportation Available car;family or friend will provide   Living Environment Comment walk-in shower and the tub/shower combo as well as the bedroom are not on the main level. Pt. states she usually sleeps in her recliner chair.    Self-Care   Dominant Hand right   Usual Activity Tolerance good   Current Activity Tolerance fair   Regular Exercise yes   Activity/Exercise Type walking   Exercise Amount/Frequency (shovels, uses snowblower, rakes, walks a mile daily weather )   Equipment Currently Used at Home bath bench  (pt. owns crutches)   Activity/Exercise/Self-Care Comment Indep. with all ADLS and IADLs   Functional Level Prior   Ambulation 0-->independent   Transferring 0-->independent   Toileting 0-->independent   Bathing 0-->independent   Dressing 0-->independent   Eating 0-->independent   Communication 0-->understands/communicates without difficulty   Swallowing 0-->swallows foods/liquids without difficulty   Cognition 0 - no cognition issues reported   Fall history within last six months no   Which of the above functional risks had a recent onset or change? ambulation;transferring;toileting;dressing;bathing   Prior Functional Level Comment Able to amb. up to a mile daily til Oct. 2016 weather permitting. 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    General Information   Onset of Illness/Injury or Date of Surgery - Date 04/03/17   Referring Physician Francisco J Alston   Patient/Family Goals Statement Return home with assist  of daughter and sister who will be with pt. tiffanie Brar.   Pertinent History of Current Problem (include personal factors and/or comorbidities that impact the POC) Pt. admitted for elective right ROBLES. PMHX: HPTN, HLC, hypercalemia, tinea pedis left.   Precautions/Limitations right hip precautions;fall precautions  (post. to post./lat. approach)   Weight-Bearing Status - RLE weight-bearing as tolerated   Heart Disease Risk Factors High blood pressure;Dislipidemia   General Observations Pt. sitting in bed with HOB fully elevated, very awake and talking with NA.   General Info Comments Amb. with assist   Cognitive Status Examination   Orientation orientation to person, place and time   Level of Consciousness alert   Follows Commands and Answers Questions 100% of the time   Personal Safety and Judgment intact   Memory intact   Pain Assessment   Patient Currently in Pain Yes, see Vital Sign flowsheet   Integumentary/Edema   Integumentary/Edema no deficits were identifed   Integumentary/Edema Comments right hip incision covered with gauze and tape   Posture    Posture Forward head position;Protracted shoulders;Kyphosis   Range of Motion (ROM)   ROM Comment Left LE WFLs. Right LE: hip flex. to approx. 30 degrees, remainder of knee and ankle motions WFLs.   Strength   Strength Comments Left LE WFLs. Right LE: assist for heelslides flex. and indep. SAQs   Bed Mobility   Bed Mobility Comments Supine>sit with MIN A 1. Sit<>supine with MIN A 1. Pt. indep. to scoot up in bed.   Transfer Skills   Transfer Comments Sit<>stand with MIN A 2, FWW, WBAT right.   Gait   Gait Comments Pt. amb. 3 steps away from and back to her bed using FWW, WBAT right, JAIMIE 1 and SBA of another for equipment.   Balance   Balance no deficits were identified   Sensory Examination   Sensory Perception no deficits were identified   Coordination   Coordination no deficits were identified   Muscle Tone   Muscle Tone no deficits were identified  "  Modality Interventions   Planned Modality Interventions Cryotherapy   General Therapy Interventions   Planned Therapy Interventions bed mobility training;ROM;strengthening;home program guidelines;gait training;transfer training   Clinical Impression   Criteria for Skilled Therapeutic Intervention yes, treatment indicated   PT Diagnosis impaired mobility, painful mobility, decreased strength and ROM right hip   Influenced by the following impairments ROBLES this date, pt. well motivated   Functional limitations due to impairments Assist for all mobility, assist for ROBLES ex., falls risk   Clinical Presentation Stable/Uncomplicated   Clinical Presentation Rationale Pt. indep. with mobility, ADLs, and IADLs prior to surgery. Pt. lives alone at baseline. Pt. has steps to enter her home and within her home. Family will assist pt. for 2 weeks once home. MIN A 1-2 for mobility day of surgery.   Clinical Decision Making (Complexity) Low complexity   Therapy Frequency` 2 times/day   Predicted Duration of Therapy Intervention (days/wks) 3 days   Anticipated Equipment Needs at Discharge front wheeled walker   Anticipated Discharge Disposition Home;Home with Assist   Risk & Benefits of therapy have been explained Yes   Patient, Family & other staff in agreement with plan of care Yes   Clinical Impression Comments Pt. will benefit from skilled PT to promote indep. with moblity.    [facility] [employer] TM \"6 Clicks\"   2016, Trustees of [facility], under license to [employer].  All rights reserved.   6 Clicks Short Forms Basic Mobility Inpatient Short Form   [facility] [employer]  \"6 Clicks\" V.2 Basic Mobility Inpatient Short Form   1. Turning from your back to your side while in a flat bed without using bedrails? 3 - A Little   2. Moving from lying on your back to sitting on the side of a flat bed without using bedrails? 2 - A Lot   3. Moving to and from a bed to a chair (including a wheelchair)? 3 - A Little   4. " Standing up from a chair using your arms (e.g., wheelchair, or bedside chair)? 3 - A Little   5. To walk in hospital room? 3 - A Little   6. Climbing 3-5 steps with a railing? 2 - A Lot   Basic Mobility Raw Score (Score out of 24.Lower scores equate to lower levels of function) 16   Total Evaluation Time   Total Evaluation Time (Minutes) 15

## 2017-04-03 NOTE — BRIEF OP NOTE
[facility] Brief Operative Note    Pre-operative diagnosis: DJD RIGHT HIP   Post-operative diagnosis osteoarthritis right hip     Procedure: Procedure(s):  RIGHT TOTAL HIP ARTHROPLASTY  - Wound Class: I-Clean   Surgeon(s): Surgeon(s) and Role:     * Francisco J Alston MD - Primary     * Santhosh Mendieta Jr. - Assisting   Estimated blood loss: 200 mL    Specimens: * No specimens in log *   Findings: Please see the full operative report.

## 2017-04-03 NOTE — ANESTHESIA POSTPROCEDURE EVALUATION
Patient: Marva Angela    Procedure(s):  RIGHT TOTAL HIP ARTHROPLASTY  - Wound Class: I-Clean    Diagnosis:DJD RIGHT HIP  Diagnosis Additional Information: No value filed.    Anesthesia Type:  General    Note:  Anesthesia Post Evaluation    Patient location during evaluation: PACU  Patient participation: Able to fully participate in evaluation  Level of consciousness: awake and alert  Pain management: adequate  Cardiovascular status: acceptable  Respiratory status: acceptable  Hydration status: acceptable  PONV: none     Anesthetic complications: None          Last vitals:  Vitals:    04/03/17 1215 04/03/17 1315 04/03/17 1610   BP: 120/65 126/73 118/71   Resp: 12 12 14   Temp:   36.6  C (97.9  F)   SpO2: 98% 98% 95%         Electronically Signed By: MARYAN GAYTAN  April 3, 2017  4:40 PM

## 2017-04-03 NOTE — IP AVS SNAPSHOT
[address] [facility]    [address] [place] [address]    Phone:  [phone]                                       After Visit Summary   4/3/2017    Marva Angela    MRN: [id number]           After Visit Summary Signature Page     I have received my discharge instructions, and my questions have been answered. I have discussed any challenges I see with this plan with the nurse or doctor.    ..........................................................................................................................................  Patient/Patient Representative Signature      ..........................................................................................................................................  Patient Representative Print Name and Relationship to Patient    ..................................................               ................................................  Date                                            Time    ..........................................................................................................................................  Reviewed by Signature/Title    ...................................................              ..............................................  Date                                                            Time

## 2017-04-03 NOTE — PLAN OF CARE
Problem: Goal Outcome Summary  Goal: Goal Outcome Summary  Outcome: No Change  Pt on moderate carb diet, IV infusing and VSS on 2L O2. Hemovac is patent, dressing is C/D/I, pulses +1. Mcclain is patent. . IV dilaudid and scheduled tylenol for pain. First therapy at 1530, pt has not dangled yet. Pt is progressing per plan of care.

## 2017-04-03 NOTE — IP AVS SNAPSHOT
MRN:[id number]                      After Visit Summary   4/3/2017    Marva Angela    MRN: [id number]           Thank you!     Thank you for choosing [facility] for your care. Our goal is always to provide you with excellent care. Hearing back from our patients is one way we can continue to improve our services. Please take a few minutes to complete the written survey that you may receive in the mail after you visit with us. Thank you!        Patient Information     Date Of Birth          1950        About your hospital stay     You were admitted on:  April 3, 2017 You last received care in the:  [facility] [address] [facility]    You were discharged on:  April 5, 2017        Reason for your hospital stay       Hip replacement                  Who to Call     For medical emergencies, please call 911.  For non-urgent questions about your medical care, please call your primary care provider or clinic, [phone]  For questions related to your surgery, please call your surgery clinic        Attending Provider     Provider Specialty    Francisco J Alston MD Orthopedics       Primary Care Provider Office Phone # Fax #    Leif Fair [phone]       [facility] [address] [place] [id number]        After Care Instructions     Activity       Your activity upon discharge: activity as tolerated--use a walker            Diet       Follow this diet upon discharge: Orders Placed This Encounter      Moderate Consistent CHO Diet            Wound care and dressings       Instructions to care for your wound at home: daily dressing changes.                  Follow-up Appointments     Follow-up and recommended labs and tests        Follow up with me,  Francisco J Alston, within 2 weeks. to evaluate after surgery.  The following labs/tests are recommended: INR draws per [facility] anticoagulation clinic.                  Your next 10  appointments already scheduled     Apr 07, 2017 11:45 AM CDT   [facility]    [address] [id number] [phone]            Jul 10, 2017  9:30 AM CDT   LAB with [facility]   [facility]    [address] [address]   [phone]           Patient must bring picture ID.  Patient should be prepared to give a urine specimen  Please do not eat 10-12 hours before your appointment if you are coming in fasting for labs on lipids, cholesterol, or glucose (sugar).  Pregnant women should follow their Care Team instructions. Water with medications is okay. Do not drink coffee or other fluids.   If you have concerns about taking  your medications, please ask at office or if scheduling via [employer], send a message by clicking on Secure Messaging, Message Your Care Team.            Jul 17, 2017  8:30 AM CDT   Pre-Op physical with Leif Fair MD   [facility]    [address] [place] [id number] [phone]              Warfarin Instruction     You have started taking a medicine called warfarin. This is a blood-thinning medicine (anticoagulant). It helps prevent and treat blood clots.      Before leaving the hospital, make sure you know how much to take and how long to take it.      You will need regular blood tests to make sure your blood is clotting safely. It is very important to see your doctor for regular blood tests.    Talk to your doctor before taking any new medicine (this includes over-the-counter drugs and herbal products). Many medicines can interact with warfarin. This may cause more bleeding or too much clotting.     Eating a lot of vitamin K--found in green, leafy vegetables--can change the way warfarin  "works in your body. Do NOT avoid these foods. Instead, try to eat the same amount each day.     Bleeding is the most common side-effect of warfarin. You may notice bleeding gums, a bloody nose, bruises and bleeding longer when you cut yourself. See a doctor at once if:   o You cough up blood  o You find blood in your stool (poop)  o You have a deep cut, or a cut that bleeds longer than 10 minutes   o You have a bad cut, hard fall, accident or hit your head (go to urgent care or the emergency room).    For women who can get pregnant: This medicine can harm an unborn baby. Be very careful not to get pregnant while taking this medicine. If you think you might be pregnant, call your doctor right away.    For more information, read \"Guide to Warfarin Therapy,  the booklet you received in the hospital.        Pending Results     No orders found from 4/1/2017 to 4/4/2017.            Statement of Approval     Ordered          04/05/17 1003  I have reviewed and agree with all the recommendations and orders detailed in this document.  EFFECTIVE NOW     Approved and electronically signed by:  Francisco J Alston MD             Admission Information     Date & Time Provider Department Dept. Phone    4/3/2017 Francisco J Alston MD [place] [address] [facility] [phone]      Your Vitals Were     Blood Pressure Pulse Temperature Respirations Height Weight    118/57 (BP Location: Right arm) 77 99.3  F (37.4  C) (Oral) 16 1.651 m (5' 5\") 71.7 kg (158 lb)    Pulse Oximetry BMI (Body Mass Index)                94% 26.29 kg/m2          [employer] Information     [employer] gives you secure access to your electronic health record. If you see a primary care provider, you can also send messages to your care team and make appointments. If you have questions, please call your primary care clinic.  If you do not have a primary care provider, please call [phone] and they will assist you.        Care EveryWhere " ID     This is your Care EveryWhere ID. This could be used by other organizations to access your [facility] medical records  [id number]           Review of your medicines      START taking        Dose / Directions    enoxaparin 40 MG/0.4ML injection   Commonly known as:  LOVENOX        Dose:  40 mg   Inject 0.4 mLs (40 mg) Subcutaneous every 24 hours for 3 days   Quantity:  1.2 mL   Refills:  0       order for DME   Used for:  Osteopenia        Equipment being ordered: Walker Wheels () and Walker () Treatment Diagnosis: impaired gait   Quantity:  1 each   Refills:  0       oxyCODONE 5 MG IR tablet   Commonly known as:  ROXICODONE        Dose:  5-10 mg   Take 1-2 tablets (5-10 mg) by mouth every 3 hours as needed for moderate to severe pain   Quantity:  50 tablet   Refills:  0       warfarin 7.5 MG tablet   Commonly known as:  COUMADIN        Dose:  7.5 mg   Take 1 tablet (7.5 mg) by mouth daily   Quantity:  30 tablet   Refills:  1         CONTINUE these medicines which have NOT CHANGED        Dose / Directions    albuterol 108 (90 BASE) MCG/ACT Inhaler   Commonly known as:  PROAIR HFA/PROVENTIL HFA/VENTOLIN HFA   Used for:  Chronic obstructive pulmonary disease, unspecified COPD type (H)        Dose:  2 puff   Inhale 2 puffs into the lungs every 6 hours as needed for shortness of breath / dyspnea or wheezing   Quantity:  3 Inhaler   Refills:  1       amLODIPine 2.5 MG tablet   Commonly known as:  NORVASC   Used for:  Benign essential hypertension        Dose:  2.5 mg   Take 1 tablet (2.5 mg) by mouth daily   Quantity:  30 tablet   Refills:  11       aspirin EC 81 MG EC tablet   Used for:  Coronary artery disease involving native coronary artery of native heart without angina pectoris        Dose:  81 mg   Take 1 tablet (81 mg) by mouth daily   Quantity:  30 tablet   Refills:  11       betamethasone dipropionate 0.05 % cream   Commonly known as:  DIPROSONE        Apply topically 2 times daily as needed (For  foot)   Refills:  0       Calcium Carb-Cholecalciferol 1000-800 MG-UNIT Tabs   Commonly known as:  CALCIUM 1000 + D   Used for:  Osteopenia, Pain in joint, multiple sites        Dose:  1 tablet   Take 1 tablet by mouth daily TAKE WITH FOOD, FOR BONE HEALTH AND FOR VITAMIN D SUPPLEMENTATION   Quantity:  100 tablet   Refills:  PRN       cholecalciferol 42952 UNITS capsule   Commonly known as:  VITAMIN D3   Used for:  Osteopenia, Psoriasis with arthropathy (H)        Dose:  1 capsule   Take 1 capsule (50,000 Units) by mouth every 14 days SIG: TAKE ONE CAP EVERY OTHER WEEK, INDICATION: TO TREAT VITAMIN D DEFICIENCY (1ST AND 15TH OF EACH MONTH), MUST TAKE WITH FAT CONTAINING MEAL, TAKE 1 EXTRA CAPSULE THIS WEEK ONLY   Quantity:  40 capsule   Refills:  0       clotrimazole-betamethasone cream   Commonly known as:  LOTRISONE   Used for:  Tinea pedis of left foot        Apply topically 2 times daily INDICATION: FUNGAL FOOT INFECTION   Quantity:  45 g   Refills:  1       Cyanocobalamin 5000 MCG/ML Liqd   Commonly known as:  B-12 SUPER STRENGTH   Used for:  Vitamin B12 deficiency (non anemic)        Dose:  1 mL   Place 1 mL under the tongue every morning FOR VITAMIN B12 SUPPLEMENTATION, PLEASE PLACE UNDER THE TONGUE   Quantity:  2 Bottle   Refills:  PRN       folic acid 1 MG tablet   Commonly known as:  FOLVITE   Used for:  Vitamin B12 deficiency (non anemic)        Dose:  1 mg   Take 1 tablet (1 mg) by mouth daily INDICATION: FOLIC ACID SUPPLEMENT   Quantity:  100 tablet   Refills:  3       LIPITOR PO        Dose:  10 mg   Take 10 mg by mouth every morning   Refills:  0       order for DME   Used for:  Elevated blood pressure        Equipment being ordered: blood pressure cuff   Quantity:  1 Device   Refills:  0       ranitidine 150 MG tablet   Commonly known as:  ZANTAC        Dose:  150 mg   Take 150 mg by mouth daily as needed for heartburn   Refills:  0       spironolactone-hydrochlorothiazide 25-25 MG per tablet    Commonly known as:  ALDACTAZIDE   Used for:  Benign essential hypertension        Dose:  1 tablet   Take 1 tablet by mouth every morning INDICATION:TO LOWER BLOOD PRESSURE   Quantity:  90 tablet   Refills:  3       triamcinolone 0.1 % cream   Commonly known as:  KENALOG        Apply topically 2 times daily as needed for irritation (sores on scalp.)   Refills:  0       TYLENOL PO        Dose:  1000 mg   Take 1,000 mg by mouth 2 times daily as needed for mild pain or fever   Refills:  0       vitamin C-electrolytes 1000mg vitamin C super orange drink mix   Commonly known as:  EMERGEN-C        Dose:  1 packet   Take 1 packet by mouth daily Mix 1 packet in 4-6oz water and take once or twice daily or as directed.   Refills:  0         STOP taking     celecoxib 200 MG capsule   Commonly known as:  celeBREX                Where to get your medicines      These medications were sent to [facility] [place], [place] - [address]  [address], [place] [address]     Phone:  [phone]     enoxaparin 40 MG/0.4ML injection    warfarin 7.5 MG tablet         Some of these will need a paper prescription and others can be bought over the counter. Ask your nurse if you have questions.     Bring a paper prescription for each of these medications     order for DME    oxyCODONE 5 MG IR tablet                Protect others around you: Learn how to safely use, store and throw away your medicines at www.disposemymeds.org.             Medication List: This is a list of all your medications and when to take them. Check marks below indicate your daily home schedule. Keep this list as a reference.      Medications           Morning Afternoon Evening Bedtime As Needed    albuterol 108 (90 BASE) MCG/ACT Inhaler   Commonly known as:  PROAIR HFA/PROVENTIL HFA/VENTOLIN HFA   Inhale 2 puffs into the lungs every 6 hours as needed for shortness of breath / dyspnea or wheezing   Next Dose Due:  Resume as directed                                    amLODIPine 2.5 MG tablet   Commonly known as:  NORVASC   Take 1 tablet (2.5 mg) by mouth daily   Last time this was given:  2.5 mg on 4/5/2017  8:46 AM   Next Dose Due:  4/6/17                                   aspirin EC 81 MG EC tablet   Take 1 tablet (81 mg) by mouth daily   Last time this was given:  81 mg on 4/5/2017  8:46 AM   Next Dose Due:  4/6/17                                   betamethasone dipropionate 0.05 % cream   Commonly known as:  DIPROSONE   Apply topically 2 times daily as needed (For foot)   Next Dose Due:  Resume as directed                                   Calcium Carb-Cholecalciferol 1000-800 MG-UNIT Tabs   Commonly known as:  CALCIUM 1000 + D   Take 1 tablet by mouth daily TAKE WITH FOOD, FOR BONE HEALTH AND FOR VITAMIN D SUPPLEMENTATION   Next Dose Due:  Resume as directed                                cholecalciferol 86470 UNITS capsule   Commonly known as:  VITAMIN D3   Take 1 capsule (50,000 Units) by mouth every 14 days SIG: TAKE ONE CAP EVERY OTHER WEEK, INDICATION: TO TREAT VITAMIN D DEFICIENCY (1ST AND 15TH OF EACH MONTH), MUST TAKE WITH FAT CONTAINING MEAL, TAKE 1 EXTRA CAPSULE THIS WEEK ONLY   Next Dose Due:  Resume as directed                                clotrimazole-betamethasone cream   Commonly known as:  LOTRISONE   Apply topically 2 times daily INDICATION: FUNGAL FOOT INFECTION   Next Dose Due:  Resume as directed                                Cyanocobalamin 5000 MCG/ML Liqd   Commonly known as:  B-12 SUPER STRENGTH   Place 1 mL under the tongue every morning FOR VITAMIN B12 SUPPLEMENTATION, PLEASE PLACE UNDER THE TONGUE   Next Dose Due:  Resume as directed                                enoxaparin 40 MG/0.4ML injection   Commonly known as:  LOVENOX   Inject 0.4 mLs (40 mg) Subcutaneous every 24 hours for 3 days   Last time this was given:  40 mg on 4/5/2017  6:25 AM   Next Dose Due:  4/6/17                                   folic acid 1 MG  tablet   Commonly known as:  FOLVITE   Take 1 tablet (1 mg) by mouth daily INDICATION: FOLIC ACID SUPPLEMENT   Next Dose Due:  Resume as directed                                LIPITOR PO   Take 10 mg by mouth every morning   Last time this was given:  10 mg on 4/5/2017  8:46 AM   Next Dose Due:  4/6/17                                   order for DME   Equipment being ordered: blood pressure cuff                                order for DME   Equipment being ordered: Walker Wheels () and Walker () Treatment Diagnosis: impaired gait                                oxyCODONE 5 MG IR tablet   Commonly known as:  ROXICODONE   Take 1-2 tablets (5-10 mg) by mouth every 3 hours as needed for moderate to severe pain   Last time this was given:  5 mg on 4/5/2017  9:32 AM   Next Dose Due:  Resume as directed                                   ranitidine 150 MG tablet   Commonly known as:  ZANTAC   Take 150 mg by mouth daily as needed for heartburn   Next Dose Due:  Resume as directed                                spironolactone-hydrochlorothiazide 25-25 MG per tablet   Commonly known as:  ALDACTAZIDE   Take 1 tablet by mouth every morning INDICATION:TO LOWER BLOOD PRESSURE   Next Dose Due:  Resume as directed                                triamcinolone 0.1 % cream   Commonly known as:  KENALOG   Apply topically 2 times daily as needed for irritation (sores on scalp.)   Next Dose Due:  Resume as directed                                TYLENOL PO   Take 1,000 mg by mouth 2 times daily as needed for mild pain or fever   Last time this was given:  975 mg on 4/5/2017  6:24 AM   Next Dose Due:  Resume as directed                                   vitamin C-electrolytes 1000mg vitamin C super orange drink mix   Commonly known as:  EMERGEN-C   Take 1 packet by mouth daily Mix 1 packet in 4-6oz water and take once or twice daily or as directed.   Next Dose Due:  Resume as directed                                warfarin 7.5  MG tablet   Commonly known as:  COUMADIN   Take 1 tablet (7.5 mg) by mouth daily   Last time this was given:  5 mg on 4/4/2017  6:34 PM   Next Dose Due:  4/5/17

## 2017-04-03 NOTE — PHARMACY-ANTICOAGULATION SERVICE
Clinical Pharmacy - Warfarin Dosing Consult     Pharmacy has been consulted to manage this patient s warfarin therapy.  Indication: DVT/ PE Treatment  Therapy Goal: INR 2-3  Warfarin Prior to Admission: No  Recent documented change in oral intake/nutrition: Unknown    No results found for: INR, [id number], F2    INR pending. Pharmacy will monitor Marva A Julio César daily and order warfarin doses to achieve specified goal.      Please contact pharmacy as soon as possible if the warfarin needs to be held for a procedure or if the warfarin goals change.

## 2017-04-03 NOTE — PLAN OF CARE
Problem: Goal Outcome Summary  Goal: Goal Outcome Summary  Outcome: Therapy, progress toward functional goals is gradual  Surgeon Discharge Plan: None noted in chart     Current Functional Status: Performs bed mobility with ROCIO 1-2, transfers with MIN A 1 using FWW, WBAT, amb. 3 steps forward and back to bed twice using FWW, WBAT right. MIN A 1 for ROBLES ex. Pt. Is very motivated.     Barriers to Plan/Home: Pt. Lives alone. Pt. Has 1 step into her house, full flight to bedroom and bathing facilities.

## 2017-04-03 NOTE — OP NOTE
Marva NDIAYE [facility]    4/3/2017    PRE-OP DIAGNOSIS: end stage arthritis right hip    POST-OP DIAGNOSIS:  Same    PROCEDURE:  right total hip replacement    ANESTHESIA:  General    SURGEON: Karel Alston Jr., M.D.    ASSISTANT: HORTENCIA Mendieta    DDESCRIPTION OF PROCEDURE:  The patient was brought to the operating room. Prophylactic IV antibiotics were started. General anesthesia was induced. A bro catheter was placed. The patient was placed in the left lateral decubitus position. A pelvic positioner and axillary roll were utilized. The down extremities were carefully padded. The right lower extremity, buttock, groin, and flank were prepped and draped in customary fashion.    A posterior approach to the hip was chosen, and the standard skin incision was made. Dissection was carried down to the fascia, which was divided in line with the skin incision. The gluteus carole was divided in line with its fibers. The bursa was divided. The Charnley retractor was placed. The short external rotators were divided from bone, and the posterior capsule was excised. The femoral head was dislocated and amputated at an appropriate level and inclination. Anterior capsulotomy was performed, and an anterior cobra retractor was placed. The acetabular notch and labrum were debrided.    Serial spherical concentric reaming was performed up to 53 mm. A size 54 mm Sera Biomet G7 acetabular shell was impacted into place in appropriate orientation. Two 25 mm by 6.5mm screws was placed. A trial liner was placed.     Attention was turned to the femur. The superior lateral femoral neck was removed with the box chisel. Serial reaming with the Biomet echo-bimetric reamers was performed up to size 11. Broaching were performed up to size 11. An excellent fit was obtained. Based on trial reductions, the standard offset, full profile, size 11 echo-bimetric stem was impacted into place.    Based on trial reductions, a +6 femoral 36 mm diameter head  and standard size F liner with a 36 mm internal diameter were chosen.  After antibiotic irrigation, these were impacted  into place. Final reduction was performed. At 90 degrees flexion, the hip would internally rotate 85 degrees. It was stable in extension and external rotation. Leg lengths appeared equal.    The wound was copiously irrigated with antibiotic solution. Two medium hemovacs were brought out via separate stab incisions. The fascia was closed with #0 vicryl. The subcutaneous tissues were closed with #2-0 vicryl. The skin was re-approximated with skin clips. A bulky sterile dressing and abduction pillow were applied. The patient was taken to the PACU in satisfactory position. Final counts were correct.    All CMS PQRI guidelines will be followed. Antibiotics will be discontinued within 24 hours. Lovenox transitioning to warfarin will be utilized for anticoagulation.

## 2017-04-04 ENCOUNTER — APPOINTMENT (OUTPATIENT)
Dept: PHYSICAL THERAPY | Facility: CLINIC | Age: 67
DRG: 470 | End: 2017-04-04
Attending: ORTHOPAEDIC SURGERY
Payer: MEDICARE

## 2017-04-04 LAB
GLUCOSE BLDC GLUCOMTR-MCNC: 118 MG/DL (ref 70–99)
GLUCOSE BLDC GLUCOMTR-MCNC: 124 MG/DL (ref 70–99)
GLUCOSE BLDC GLUCOMTR-MCNC: 131 MG/DL (ref 70–99)
GLUCOSE BLDC GLUCOMTR-MCNC: 133 MG/DL (ref 70–99)
GLUCOSE SERPL-MCNC: 118 MG/DL (ref 70–99)
HGB BLD-MCNC: 11.3 G/DL (ref 11.7–15.7)
INR PPP: 1.16 (ref 0.86–1.14)

## 2017-04-04 PROCEDURE — 99207 ZZC CDG-MDM COMPONENT: MEETS HIGH - UP CODED: CPT | Performed by: PHYSICIAN ASSISTANT

## 2017-04-04 PROCEDURE — 12000007 ZZH R&B INTERMEDIATE

## 2017-04-04 PROCEDURE — 99233 SBSQ HOSP IP/OBS HIGH 50: CPT | Performed by: PHYSICIAN ASSISTANT

## 2017-04-04 PROCEDURE — 85610 PROTHROMBIN TIME: CPT | Performed by: ORTHOPAEDIC SURGERY

## 2017-04-04 PROCEDURE — 25000128 H RX IP 250 OP 636: Performed by: ORTHOPAEDIC SURGERY

## 2017-04-04 PROCEDURE — 97110 THERAPEUTIC EXERCISES: CPT | Mod: GP | Performed by: PHYSICAL THERAPIST

## 2017-04-04 PROCEDURE — 25000128 H RX IP 250 OP 636: Performed by: PHYSICIAN ASSISTANT

## 2017-04-04 PROCEDURE — 97530 THERAPEUTIC ACTIVITIES: CPT | Mod: GP | Performed by: PHYSICAL THERAPIST

## 2017-04-04 PROCEDURE — 00000146 ZZHCL STATISTIC GLUCOSE BY METER IP

## 2017-04-04 PROCEDURE — 25000132 ZZH RX MED GY IP 250 OP 250 PS 637: Mod: GY | Performed by: ORTHOPAEDIC SURGERY

## 2017-04-04 PROCEDURE — 97116 GAIT TRAINING THERAPY: CPT | Mod: GP

## 2017-04-04 PROCEDURE — 82947 ASSAY GLUCOSE BLOOD QUANT: CPT | Performed by: ORTHOPAEDIC SURGERY

## 2017-04-04 PROCEDURE — 85018 HEMOGLOBIN: CPT | Performed by: ORTHOPAEDIC SURGERY

## 2017-04-04 PROCEDURE — A9270 NON-COVERED ITEM OR SERVICE: HCPCS | Mod: GY | Performed by: ORTHOPAEDIC SURGERY

## 2017-04-04 PROCEDURE — A9270 NON-COVERED ITEM OR SERVICE: HCPCS | Mod: GY | Performed by: PHYSICIAN ASSISTANT

## 2017-04-04 PROCEDURE — 36415 COLL VENOUS BLD VENIPUNCTURE: CPT | Performed by: ORTHOPAEDIC SURGERY

## 2017-04-04 PROCEDURE — 97116 GAIT TRAINING THERAPY: CPT | Mod: GP | Performed by: PHYSICAL THERAPIST

## 2017-04-04 PROCEDURE — 97530 THERAPEUTIC ACTIVITIES: CPT | Mod: GP

## 2017-04-04 PROCEDURE — 40000193 ZZH STATISTIC PT WARD VISIT

## 2017-04-04 PROCEDURE — 40000193 ZZH STATISTIC PT WARD VISIT: Performed by: PHYSICAL THERAPIST

## 2017-04-04 PROCEDURE — 97110 THERAPEUTIC EXERCISES: CPT | Mod: GP

## 2017-04-04 PROCEDURE — 25000132 ZZH RX MED GY IP 250 OP 250 PS 637: Mod: GY | Performed by: PHYSICIAN ASSISTANT

## 2017-04-04 RX ORDER — WARFARIN SODIUM 5 MG/1
5 TABLET ORAL
Status: COMPLETED | OUTPATIENT
Start: 2017-04-04 | End: 2017-04-04

## 2017-04-04 RX ADMIN — OXYCODONE HYDROCHLORIDE 10 MG: 5 TABLET ORAL at 10:36

## 2017-04-04 RX ADMIN — ACETAMINOPHEN 975 MG: 325 TABLET, FILM COATED ORAL at 14:39

## 2017-04-04 RX ADMIN — WARFARIN SODIUM 5 MG: 5 TABLET ORAL at 18:34

## 2017-04-04 RX ADMIN — ATORVASTATIN CALCIUM 10 MG: 10 TABLET, FILM COATED ORAL at 08:53

## 2017-04-04 RX ADMIN — OXYCODONE HYDROCHLORIDE 10 MG: 5 TABLET ORAL at 14:35

## 2017-04-04 RX ADMIN — AMLODIPINE BESYLATE 2.5 MG: 2.5 TABLET ORAL at 08:53

## 2017-04-04 RX ADMIN — ASPIRIN 81 MG: 81 TABLET, COATED ORAL at 08:53

## 2017-04-04 RX ADMIN — ENOXAPARIN SODIUM 40 MG: 40 INJECTION SUBCUTANEOUS at 07:30

## 2017-04-04 RX ADMIN — SENNOSIDES AND DOCUSATE SODIUM 2 TABLET: 8.6; 5 TABLET ORAL at 21:17

## 2017-04-04 RX ADMIN — OXYCODONE HYDROCHLORIDE 10 MG: 5 TABLET ORAL at 04:21

## 2017-04-04 RX ADMIN — SENNOSIDES AND DOCUSATE SODIUM 1 TABLET: 8.6; 5 TABLET ORAL at 08:53

## 2017-04-04 RX ADMIN — ACETAMINOPHEN 975 MG: 325 TABLET, FILM COATED ORAL at 05:31

## 2017-04-04 RX ADMIN — SODIUM CHLORIDE: 9 INJECTION, SOLUTION INTRAVENOUS at 04:26

## 2017-04-04 RX ADMIN — ACETAMINOPHEN 975 MG: 325 TABLET, FILM COATED ORAL at 21:17

## 2017-04-04 NOTE — PLAN OF CARE
Problem: Hip Replacement, Total (Adult)  Goal: Signs and Symptoms of Listed Potential Problems Will be Absent or Manageable (Hip Replacement, Total)  Signs and symptoms of listed potential problems will be absent or manageable by discharge/transition of care (reference Hip Replacement, Total (Adult) CPG).   Outcome: No Change  A&O x 4, VSS on 1L:, pain controlled with oral analgesics, drsg CDI, CMS intact, up with assist of 1 walker, voiding adequately in patent bro, IV infusing, continue to monitor.

## 2017-04-04 NOTE — CONSULTS
"DATE OF SERVICE:  04/03/2017.        REQUESTING PHYSICIAN:  Dr. Gamaliel MD.      PRIMARY CARE PROVIDER:  Leif Fair MD.      REASON FOR CONSULTATION:  Newly diagnosed type 2 diabetes mellitus.      HISTORY OF PRESENT ILLNESS:  Marva Angela is a 66-year-old female with a past medical history significant for tobacco use, osteoporosis, hypertension, hyperlipidemia, prior DVT and impaired fasting glucose who is postoperative day 0 status post right total hip arthroplasty for osteoarthritis of the right hip.  The procedure was performed under general anesthesia with an estimated blood loss of 200 mL by Dr. Alston of Orthopedics.  The patient tolerated the procedure well without complication.  Hospitalist Service was requested for assistance with blood sugar management in light of recently diagnosed diabetes.  The patient is presently evaluated in her room on the orthopedic floor.  She reports she is feeling very well after surgery.  Her pain is currently well controlled.  She denies any chest pain, shortness of breath, nausea, vomiting, visual changes.  She has a Mcclain catheter in place and is tolerating a clear liquid diet.  The patient is quite concerned about blood sugar management as at her recent preoperative physical she had an elevated blood sugar of 120, prompting an A1c, which was found to be elevated at 6.4.  She states that this \"all happened so fast.\"  There is not a plan in place for elevated blood sugars quite yet which is worrying her.  In addition, the patient has a history of hypertension and dyslipidemia for which she follows with Cardiology, specifically Dr. Fountain.  Patient was evaluated preoperatively by Rufina Grant of Cardiology.  Stress test and echocardiogram were performed prior to her surgery, which were within normal limits with an ejection fraction of 60%.  There was some degree of diastolic dysfunction noted.  She has a history of calcified coronary arteries and thoracic " "aorta seen on prior CT.  She denies any chest pain or shortness of breath and is able to perform daily activities without dyspnea or chest pain.  She reports that her medication was changed in December and she was started on spironolactone and hydrochlorothiazide as she has not tolerated a number of antihypertensives in the past due to GI disturbance.  She is worried that this new medication may have caused her elevated blood sugars, as she read that this is a side effect of the medication.  She otherwise denies any specific complaints at this time.      REVIEW OF SYSTEMS:  A 10-point review of systems was conducted and is negative aside from the information in the HPI.      PAST MEDICAL HISTORY:   1.  Tobacco use.   2.  Osteoporosis.   3.  Hypertension.   4.  Hyperlipidemia.   5.  History of DVT, patient reports that she developed a DVT 24 hours following the delivery of both of her 2 children.  During both of those episodes she was on warfarin for a brief period of time.  She has not had any recurrence.   6.  Impaired fasting glucose, hemoglobin A1c in her preop Clinic visit on 03/21/2017, was found to be 6.4.   7.  Possible COPD?  I do not see formal diagnosis of this on her chart; however, patient states she was started on albuterol at some point for a \"maybe a little COPD.\"  She uses her inhaler very rarely and denies any respiratory issues.      PAST SURGICAL HISTORY:   1.  Hysterectomy.   2.  Inguinal hernia repair.   3.  Ganglion cyst aspiration.   4.  DEXA scan.   5.  Biopsy of a skin lesion.      ALLERGIES:   1.  Penicillin.   2.  Spiriva.   3.  Sulfa drugs.      PRIOR TO ADMISSION MEDICATIONS:   1.  Acetaminophen 1000 mg by mouth b.i.d. p.r.n.   2.  Albuterol 2 puffs into lungs q.6 h. p.r.n.   3.  Amlodipine 2.5 mg by mouth daily.   4.  Aspirin 81 mg daily.   5.  Atorvastatin 10 mg by mouth every morning.   6.  Diprosone 0.05% cream, apply topically 2 times daily as needed.   7.  Calcium carbonate 1 tab " by mouth daily.   8.  Celebrex 200 mg by mouth b.i.d. for moderate pain.   9.  Vitamin D3 50,000 units by mouth q.14 days.   10.  Lotrisone cream, apply topically 2 times daily.   11.  Cyanocobalamin 5000 mcg per mL, 1 mL at a time in the morning.   12.  Folic acid 1 tablet by mouth daily.   13.  Zantac 150 mg by mouth daily p.r.n.   14.  Aldactazide 25/25 mg 1 tablet by mouth every morning.   15.  Kenalog 0.1% cream applied topically b.i.d. p.r.n.   16.  Emergen-C 1 packet by mouth daily.      SOCIAL HISTORY:  The patient reports rare alcohol use, denies drug use.  She is a former smoker who quit 3 years ago after smoking at least a pack a day for 50 years.      FAMILY HISTORY:  Father has history of heart disease.  Her father, sister and brother are all type 2 diabetics.      LABORATORY DATA:  Blood sugar today has ranged from 148-213, platelets 258,000.  INR 1.03.  Preoperative creatinine 0.80.  Preoperative hemoglobin was 15.2.      PHYSICAL EXAMINATION:   VITAL SIGNS:  Temperature 97.9, heart rate 68, blood pressure 118/71, respiratory rate 14, oxygen saturation is 95% on 2 liters nasal cannula.   GENERAL:  Alert and oriented female sitting up in bed, appears comfortable and is very pleasantly conversant.   EYES:  Pupils are equal and reactive to light, EOMI.   ENT:  Mucous membranes are moist.   CARDIOVASCULAR:  Regular rate and rhythm, no murmurs appreciated.   RESPIRATORY:  Lungs are clear to auscultation bilaterally, no increased work of breathing or wheezing.   GASTROINTESTINAL:  Positive bowel sounds.  Abdomen is soft, nontender to palpation.   SKIN:  Warm and dry.   MUSCULOSKELETAL:  The patient moves all 4 extremities.   NEURO:  Cranial nerves II-XII are grossly intact.  No focal deficits.      ASSESSMENT AND PLAN:  Marva Angela is a 66-year-old female with a past medical history significant for tobacco use, osteoporosis, hypertension, hyperlipidemia, prior DVT and impaired fasting glucose, who is  postoperative day 0 status post right total hip arthroplasty.  Hospitalist Service is consulted for postoperative medical comanagement of newly diagnosed diabetes.   1.  Postoperative day 0 status post right total hip arthroplasty:  The patient tolerated the procedure well and continues to do well postoperatively.  Estimated blood loss was 200 mL.  General anesthesia was used in the procedure.   -- Primary management per Orthopedic Service including DVT prophylaxis and pain control.   -- Aspirin and warfarin initiated by primary service on 04/03/2017.   -- Clindamycin IV perioperatively.   -- Will change D5 1/2 normal saline plus KCl to normal saline at 125 an hour as patient has impaired fasting glucose.   -- Hemoglobin in the a.m.   2.  Impaired fasting glucose:  The patient's recent hemoglobin A1c of 6.4 puts her just on the border of being a type 2 diabetic.  She has not been on any medications for this, and is quite concerned about her blood sugars.  We discussed that typically we would start by trying diet modification and general lifestyle changes.  It would not be unreasonable to initiate metformin; however, the patient is very hesitant as she has just undergone joint replacement surgery and may not tolerate this medication well at this time.  I recommended to her that she follow up with Dr. Fair seen after discharging to discuss the plan for following her blood sugars.  It is probably a good idea for her to meet with the diabetic educator to discuss how she can modify her diet and lifestyle.     -- While in the hospital, we will manage her blood sugars with sliding scale insulin and regular blood sugar checks.   -- Follow up with PCP after discharge.   3.  Hypertension:  The patient has been on a variety of antihypertensives in the past.  As she has trouble tolerating many medications due to GI disturbance, her current regimen has been effective for her.  This includes spironolactone, hydrochlorothiazide  and amlodipine.  Blood pressure is well controlled postoperatively.   -- Continue prior to admission amlodipine with hold parameters.   -- Hold prior to admission hydrochlorothiazide and spironolactone while on IV fluids.   -- PRN hydralazine is available for systolic blood pressures greater than 180.   4.  Hyperlipidemia:  We will continue her prior to admission atorvastatin.      Hospitalist Service will continue to follow along.  We appreciate the consultation.  The patient was seen and examined with Dr. Rama Antony who agreed with the above plan.         RAMA ANTONY DO       As dictated by ISSAC RAMIREZ PA-C            D: 2017 20:29   T: 2017 22:05   MT:       Name:     NORBERTO IBRAHIM   MRN:      [id number]        Account:       [id number]   :      1950           Consult Date:  2017      Document: [id number]       cc: Leif Alston Jr, MD

## 2017-04-04 NOTE — PLAN OF CARE
Problem: Goal Outcome Summary  Goal: Goal Outcome Summary  Surgeon Discharge Plan: Home Tomorrow     Current Functional Status: SBA supine > sit with HOB slightly elevated; SBA for sit <> Stand with FWW, amb with FWW x160' with CGA.  Reports tolerable pain     Barriers to Plan/Home: Stairs- will initiate in PM session if able

## 2017-04-04 NOTE — PROGRESS NOTES
Marva NDIAYE Julio César  2017  POD #1    Doing well.  Pain well-controlled.  Tolerating physical therapy and rehabilitation well.  Temperatures:  Current - Temp: 97.8  F (36.6  C); Max - Temp  Av.7  F (36.5  C)  Min: 96.7  F (35.9  C)  Max: 98.3  F (36.8  C)  Pulse range: No Data Recorded  Blood pressure range: Systolic (24hrs), Av , Min:108 , Max:146   ; Diastolic (24hrs), Av, Min:50, Max:105    CMS: intact  Labs:  Hemoglobin   Date Value Ref Range Status   2017 11.3 (L) 11.7 - 15.7 g/dL Final   ]  X-rays look great.    PLAN:  Continue physical therapy  Discharge plan: Home tomorrow

## 2017-04-04 NOTE — PLAN OF CARE
Problem: Goal Outcome Summary  Goal: Goal Outcome Summary  Outcome: No Change  A&O, oxycodone for pain. HV patent.  DTV.

## 2017-04-04 NOTE — PLAN OF CARE
Problem: Goal Outcome Summary  Goal: Goal Outcome Summary  Outcome: Improving  VSS, R/A, Lung sounds clear. Bowel sounds active, passing gas. Right hip dressing shadowed- unchanged from this am. Denies numbness or tingling. Pain controlled with PRN Oxycodone and scheduled tylenol. Ambulated the hallway with assist of 1- walker and gait belt. Sat up in the chair. Tolerating diet. Hemovac 100ml drainage. Adequate urine output since bro was removed.

## 2017-04-04 NOTE — PROGRESS NOTES
[facility]    Hospitalist Progress Note      Assessment & Plan   Marva Angela is a 66-year-old female with a past medical history significant for tobacco use, osteoporosis, hypertension, hyperlipidemia, prior DVT and impaired fasting glucose status post right total hip arthroplasty. Hospitalist Service is consulted for postoperative medical comanagement of newly diagnosed diabetes.     Status post right total hip arthroplasty:  - POD 1  - Routine post op cares and pain control per Ortho \      ABL Anemia  - Post op Hgb 11.3  - Monitor    Impaired fasting glucose, A1C 6.4:     Recent Labs  Lab 04/04/17  0630 04/04/17  0244 04/03/17  2154 04/03/17  1748 04/03/17  0917   *  --   --   --   --    BGM  --  124* 154* 213* 148*     - SSI  - Routine follow up with PCP      Essential Hypertension:  - PTA regimen includes Norvasc 2.5 mg/d and spironolactone-HCTZ 25-25mg/d  - Continue PTA Norvasc  - Will plan to resume diuretic tomorrow  - BP well controlled.       Hyperlipidemia:   -Continue PTA statin     DVT Prophylaxis: Defer to primary service  Code Status: Full Code    Disposition: Per Ortho, patient reports she is hoping to d/c home tomorrow    Gale Sol    Interval History   Doing well today. BG improved after d/c of dextrose IVF. Denies CP or SOB. Working on IS. Tolerating diet. Would prefer to wait to start PTA diuretic until d/c due to urinary frequency.    -Data reviewed today: I reviewed all new labs and imaging results over the last 24 hours. I personally reviewed no images or EKG's today.    Physical Exam   Temp: 98  F (36.7  C) Temp src: Oral BP: 128/63   Heart Rate: 70 Resp: 16 SpO2: 100 % O2 Device: Nasal cannula Oxygen Delivery: 2 LPM  Vitals:    04/03/17 0900   Weight: 71.7 kg (158 lb)     Vital Signs with Ranges  Temp:  [97.3  F (36.3  C)-98.3  F (36.8  C)] 98  F (36.7  C)  Heart Rate:  [37-79] 70  Resp:  [12-16] 16  BP: (108-128)/(50-73) 128/63  SpO2:  [95 %-100 %]  100 %  I/O last 3 completed shifts:  In: 3353.33 [P.O.:370; I.V.:2983.33]  Out: 2595 [Urine:1970; Drains:425; Blood:200]    Constitutional: Alert, resting comfortably in NAD  Respiratory: Normal effort, symmetric expansion, no crackles or wheezing. Diminished at bases  Cardiovascular: RRR no murmurs   GI: Non distended, normal bowels sounds, no tenderness or guarding  MSK: LE without edema. Dorsalis pedis pulse palpated bilaterally.   Skin/Integumen: Clear  Neuro: CN II-XII grossly intact  Psych:  Alert and oriented x 3. Normal affect      Medications     Warfarin Therapy Reminder       NaCl 125 mL/hr at 04/04/17 0426       amLODIPine  2.5 mg Oral Daily     aspirin EC  81 mg Oral Daily     atorvastatin (LIPITOR) tablet 10 mg  10 mg Oral QAM     clotrimazole-betamethasone   Topical BID     sodium chloride (PF)  3 mL Intracatheter Q8H     enoxaparin  40 mg Subcutaneous Q24H     acetaminophen  975 mg Oral Q8H     senna-docusate  1-2 tablet Oral BID     insulin aspart  1-3 Units Subcutaneous TID AC     insulin aspart  1-3 Units Subcutaneous At Bedtime       Data     Recent Labs  Lab 04/04/17  0630 04/03/17  1438 04/03/17  1236 04/03/17  0645   HGB 11.3*  --   --   --    PLT  --   --  258  --    INR 1.16* 1.03  --   --    POTASSIUM  --   --   --  3.4   CR  --   --  0.80  --    *  --   --   --        Recent Results (from the past 24 hour(s))   XR Pelvis w Hip Port Right 1 View    Narrative    XR PELVIS AD HIP PORTABLE RIGHT 1 VIEW 4/3/2017 9:43 AM    HISTORY: Postop.    COMPARISON: None.      Impression    IMPRESSION: Status post right total hip arthroplasty. Hardware is  intact. Alignment is anatomic. There is a surgical drain within the  hip joint.    GRAYSON FONTENOT MD

## 2017-04-05 ENCOUNTER — APPOINTMENT (OUTPATIENT)
Dept: PHYSICAL THERAPY | Facility: CLINIC | Age: 67
DRG: 470 | End: 2017-04-05
Attending: ORTHOPAEDIC SURGERY
Payer: MEDICARE

## 2017-04-05 ENCOUNTER — APPOINTMENT (OUTPATIENT)
Dept: OCCUPATIONAL THERAPY | Facility: CLINIC | Age: 67
DRG: 470 | End: 2017-04-05
Attending: ORTHOPAEDIC SURGERY
Payer: MEDICARE

## 2017-04-05 VITALS
DIASTOLIC BLOOD PRESSURE: 57 MMHG | HEIGHT: 65 IN | BODY MASS INDEX: 26.33 KG/M2 | TEMPERATURE: 99.3 F | RESPIRATION RATE: 16 BRPM | WEIGHT: 158 LBS | OXYGEN SATURATION: 94 % | HEART RATE: 77 BPM | SYSTOLIC BLOOD PRESSURE: 118 MMHG

## 2017-04-05 LAB
ANION GAP SERPL CALCULATED.3IONS-SCNC: 5 MMOL/L (ref 3–14)
BUN SERPL-MCNC: 12 MG/DL (ref 7–30)
CALCIUM SERPL-MCNC: 8.5 MG/DL (ref 8.5–10.1)
CHLORIDE SERPL-SCNC: 101 MMOL/L (ref 94–109)
CO2 SERPL-SCNC: 32 MMOL/L (ref 20–32)
CREAT SERPL-MCNC: 0.62 MG/DL (ref 0.52–1.04)
GFR SERPL CREATININE-BSD FRML MDRD: ABNORMAL ML/MIN/1.7M2
GLUCOSE SERPL-MCNC: 103 MG/DL (ref 70–99)
HGB BLD-MCNC: 10.6 G/DL (ref 11.7–15.7)
INR PPP: 1.2 (ref 0.86–1.14)
POTASSIUM SERPL-SCNC: 3.3 MMOL/L (ref 3.4–5.3)
SODIUM SERPL-SCNC: 138 MMOL/L (ref 133–144)

## 2017-04-05 PROCEDURE — 97535 SELF CARE MNGMENT TRAINING: CPT | Mod: GO

## 2017-04-05 PROCEDURE — 40000133 ZZH STATISTIC OT WARD VISIT

## 2017-04-05 PROCEDURE — 97110 THERAPEUTIC EXERCISES: CPT | Mod: GP

## 2017-04-05 PROCEDURE — A9270 NON-COVERED ITEM OR SERVICE: HCPCS | Mod: GY | Performed by: PHYSICIAN ASSISTANT

## 2017-04-05 PROCEDURE — A9270 NON-COVERED ITEM OR SERVICE: HCPCS | Mod: GY | Performed by: ORTHOPAEDIC SURGERY

## 2017-04-05 PROCEDURE — 97116 GAIT TRAINING THERAPY: CPT | Mod: GP

## 2017-04-05 PROCEDURE — 40000193 ZZH STATISTIC PT WARD VISIT

## 2017-04-05 PROCEDURE — 80048 BASIC METABOLIC PNL TOTAL CA: CPT | Performed by: ORTHOPAEDIC SURGERY

## 2017-04-05 PROCEDURE — 97165 OT EVAL LOW COMPLEX 30 MIN: CPT | Mod: GO

## 2017-04-05 PROCEDURE — 25000132 ZZH RX MED GY IP 250 OP 250 PS 637: Mod: GY | Performed by: PHYSICIAN ASSISTANT

## 2017-04-05 PROCEDURE — 25000128 H RX IP 250 OP 636: Performed by: ORTHOPAEDIC SURGERY

## 2017-04-05 PROCEDURE — 36415 COLL VENOUS BLD VENIPUNCTURE: CPT | Performed by: ORTHOPAEDIC SURGERY

## 2017-04-05 PROCEDURE — A9270 NON-COVERED ITEM OR SERVICE: HCPCS | Mod: GY | Performed by: INTERNAL MEDICINE

## 2017-04-05 PROCEDURE — 99232 SBSQ HOSP IP/OBS MODERATE 35: CPT | Performed by: PHYSICIAN ASSISTANT

## 2017-04-05 PROCEDURE — 25000132 ZZH RX MED GY IP 250 OP 250 PS 637: Mod: GY | Performed by: INTERNAL MEDICINE

## 2017-04-05 PROCEDURE — 85610 PROTHROMBIN TIME: CPT | Performed by: ORTHOPAEDIC SURGERY

## 2017-04-05 PROCEDURE — 25000132 ZZH RX MED GY IP 250 OP 250 PS 637: Mod: GY | Performed by: ORTHOPAEDIC SURGERY

## 2017-04-05 PROCEDURE — 85018 HEMOGLOBIN: CPT | Performed by: ORTHOPAEDIC SURGERY

## 2017-04-05 RX ORDER — POTASSIUM CHLORIDE 1500 MG/1
40 TABLET, EXTENDED RELEASE ORAL ONCE
Status: DISCONTINUED | OUTPATIENT
Start: 2017-04-05 | End: 2017-04-05

## 2017-04-05 RX ORDER — WARFARIN SODIUM 7.5 MG/1
7.5 TABLET ORAL DAILY
Qty: 30 TABLET | Refills: 1 | Status: SHIPPED | OUTPATIENT
Start: 2017-04-05 | End: 2017-04-27

## 2017-04-05 RX ORDER — WARFARIN SODIUM 7.5 MG/1
7.5 TABLET ORAL
Status: DISCONTINUED | OUTPATIENT
Start: 2017-04-05 | End: 2017-04-05 | Stop reason: HOSPADM

## 2017-04-05 RX ORDER — POTASSIUM CHLORIDE 1500 MG/1
60 TABLET, EXTENDED RELEASE ORAL ONCE
Status: COMPLETED | OUTPATIENT
Start: 2017-04-05 | End: 2017-04-05

## 2017-04-05 RX ORDER — OXYCODONE HYDROCHLORIDE 5 MG/1
5-10 TABLET ORAL
Qty: 50 TABLET | Refills: 0 | Status: SHIPPED | OUTPATIENT
Start: 2017-04-05 | End: 2017-06-02

## 2017-04-05 RX ADMIN — ASPIRIN 81 MG: 81 TABLET, COATED ORAL at 08:46

## 2017-04-05 RX ADMIN — ENOXAPARIN SODIUM 40 MG: 40 INJECTION SUBCUTANEOUS at 06:25

## 2017-04-05 RX ADMIN — ACETAMINOPHEN 975 MG: 325 TABLET, FILM COATED ORAL at 06:24

## 2017-04-05 RX ADMIN — POTASSIUM CHLORIDE 60 MEQ: 1500 TABLET, EXTENDED RELEASE ORAL at 09:32

## 2017-04-05 RX ADMIN — SENNOSIDES AND DOCUSATE SODIUM 2 TABLET: 8.6; 5 TABLET ORAL at 09:32

## 2017-04-05 RX ADMIN — OXYCODONE HYDROCHLORIDE 10 MG: 5 TABLET ORAL at 01:01

## 2017-04-05 RX ADMIN — ATORVASTATIN CALCIUM 10 MG: 10 TABLET, FILM COATED ORAL at 08:46

## 2017-04-05 RX ADMIN — OXYCODONE HYDROCHLORIDE 5 MG: 5 TABLET ORAL at 09:32

## 2017-04-05 RX ADMIN — OXYCODONE HYDROCHLORIDE 10 MG: 5 TABLET ORAL at 12:25

## 2017-04-05 RX ADMIN — AMLODIPINE BESYLATE 2.5 MG: 2.5 TABLET ORAL at 08:46

## 2017-04-05 NOTE — PLAN OF CARE
Problem: Goal Outcome Summary  Goal: Goal Outcome Summary  Outcome: Therapy, progress toward functional goals as expected  Surgeon Discharge Plan: Home Today     Current Functional Status: CGA/SBA sit to/from supine. Sit to/from stand with FWW and SBA. Amb 10 ft x 2 with FWW and SBA. Up/down 3 steps x 4 with use of 1 rail and SEC or crutch. Tolerates ROBLES exs well. Pt returned to room via w/c.     Barriers to Plan/Home: None noted at this time    Pt discharged home today.    PT goals not met.

## 2017-04-05 NOTE — PLAN OF CARE
Problem: Goal Outcome Summary  Goal: Goal Outcome Summary  Outcome: Improving  Progressing toward plan of care - [place] home today.

## 2017-04-05 NOTE — PLAN OF CARE
Problem: Goal Outcome Summary  Goal: Goal Outcome Summary  Outcome: Improving  Patient up with assist of 1 and walker.  Pain managed with Tylenol.  VSS.  A/Ox4.  Dressing CDI.  CMS intact.  Blood sugars 118 and 131, no sliding scale coverage needed.

## 2017-04-05 NOTE — PLAN OF CARE
Problem: Goal Outcome Summary  Goal: Goal Outcome Summary  Outcome: Completed Date Met:  04/05/17  Discussed w/ pt discharge instructions. All goals met. Pt's sister to transport her to home.

## 2017-04-05 NOTE — DISCHARGE SUMMARY
Discharge Summary    Marva Angela MRN# [id number]   YOB: 1950 Age: 66 year old     Date of Admission:  4/3/2017  Date of Discharge:  4/5/2017  Admitting Physician:  Francisco J Alston MD  Discharge Physician:  Francisco J Alston MD     Primary Provider: Leif Fair O11          Admission Diagnoses:   Osteoarthritis right hip          Discharge Diagnosis:   Same           Surgical Procedure:   right hip replacement           Secondary Diagnosis:     Patient Active Problem List   Diagnosis     Osteopenia     Personal history of tobacco use, presenting hazards to health     Eczema     Advance Care Planning     Hyperlipidemia LDL goal <130     Benign essential hypertension     Hip pain, right     Family history of thyroid disease     Fatigue, unspecified type     Hair loss     Pain in joint, multiple sites     Seborrheic keratosis     Psoriasis with arthropathy (H)     Urine abnormality     Vitamin B12 deficiency (non anemic)     Scurvy     Vitamin D deficiency     Hematuria     Primary osteoarthritis, unspecified site     Arthralgia of both knees     Tinea pedis of left foot     Impaired fasting glucose     Hypercalcemia     Hip joint replacement status              Discharge Disposition:   Discharged to home           Medications Prior to Admission:     Prescriptions Prior to Admission   Medication Sig Dispense Refill Last Dose     Atorvastatin Calcium (LIPITOR PO) Take 10 mg by mouth every morning   4/1/2017 at am     triamcinolone (KENALOG) 0.1 % cream Apply topically 2 times daily as needed for irritation (sores on scalp.)   Ove 1 year ago at prn     vitamin C-electrolytes (EMERGEN-C) 1000mg vitamin C super orange drink mix Take 1 packet by mouth daily Mix 1 packet in 4-6oz water and take once or twice daily or as directed.   3/29/2017 at am     Acetaminophen (TYLENOL PO) Take 1,000 mg by mouth 2 times daily as needed for mild pain or fever   3/30/2017 at prn      betamethasone dipropionate (DIPROSONE) 0.05 % cream Apply topically 2 times daily as needed (For foot)    4/1/2017 at prn     clotrimazole-betamethasone (LOTRISONE) cream Apply topically 2 times daily INDICATION: FUNGAL FOOT INFECTION 45 g 1 4/1/2017 at pm     amLODIPine (NORVASC) 2.5 MG tablet Take 1 tablet (2.5 mg) by mouth daily 30 tablet 11 4/3/2017 at 0330     cholecalciferol (VITAMIN D3) 13278 UNITS capsule Take 1 capsule (50,000 Units) by mouth every 14 days SIG: TAKE ONE CAP EVERY OTHER WEEK, INDICATION: TO TREAT VITAMIN D DEFICIENCY (1ST AND 15TH OF EACH MONTH), MUST TAKE WITH FAT CONTAINING MEAL, TAKE 1 EXTRA CAPSULE THIS WEEK ONLY 40 capsule 0 3/28/2017 at am     Cyanocobalamin (B-12 SUPER STRENGTH) 5000 MCG/ML LIQD Place 1 mL under the tongue every morning FOR VITAMIN B12 SUPPLEMENTATION, PLEASE PLACE UNDER THE TONGUE 2 Bottle PRN 3/29/2017 at am     spironolactone-hydrochlorothiazide (ALDACTAZIDE) 25-25 MG per tablet Take 1 tablet by mouth every morning INDICATION:TO LOWER BLOOD PRESSURE 90 tablet 3 4/3/2017 at 0330     folic acid (FOLVITE) 1 MG tablet Take 1 tablet (1 mg) by mouth daily INDICATION: FOLIC ACID SUPPLEMENT 100 tablet 3 3/29/2017 at am     Calcium Carb-Cholecalciferol (CALCIUM 1000 + D) 1000-800 MG-UNIT TABS Take 1 tablet by mouth daily TAKE WITH FOOD, FOR BONE HEALTH AND FOR VITAMIN D SUPPLEMENTATION 100 tablet PRN 3/29/2017 at qd     ranitidine (ZANTAC) 150 MG tablet Take 150 mg by mouth daily as needed for heartburn    3/29/2017 at am     albuterol (PROAIR HFA, PROVENTIL HFA, VENTOLIN HFA) 108 (90 BASE) MCG/ACT inhaler Inhale 2 puffs into the lungs every 6 hours as needed for shortness of breath / dyspnea or wheezing 3 Inhaler 1 )carol 6 months ago at prn     aspirin EC 81 MG tablet Take 1 tablet (81 mg) by mouth daily 30 tablet 11 3/25/2017 at am     [DISCONTINUED] celecoxib (CELEBREX) 200 MG capsule Take 1 capsule (200 mg) by mouth 2 times daily as needed for moderate pain 60 capsule 3  Over 2 weeks ago at [facility]     order for DME Equipment being ordered: blood pressure cuff 1 Device 0 Taking             Discharge Medications:     Current Discharge Medication List      START taking these medications    Details   oxyCODONE (ROXICODONE) 5 MG IR tablet Take 1-2 tablets (5-10 mg) by mouth every 3 hours as needed for moderate to severe pain  Qty: 50 tablet, Refills: 0    Associated Diagnoses: Hip joint replacement status      enoxaparin (LOVENOX) 40 MG/0.4ML injection Inject 0.4 mLs (40 mg) Subcutaneous every 24 hours for 3 days  Qty: 1.2 mL, Refills: 0    Associated Diagnoses: Hip joint replacement status      warfarin (COUMADIN) 7.5 MG tablet Take 1 tablet (7.5 mg) by mouth daily  Qty: 30 tablet, Refills: 1    Comments: Dose to be regulated by anticoagulation clinic at [facility].  Associated Diagnoses: Hip joint replacement status      !! order for DME Equipment being ordered: Walker Wheels () and Walker ()  Treatment Diagnosis: impaired gait  Qty: 1 each, Refills: 0    Associated Diagnoses: Osteopenia       !! - Potential duplicate medications found. Please discuss with provider.      CONTINUE these medications which have NOT CHANGED    Details   Atorvastatin Calcium (LIPITOR PO) Take 10 mg by mouth every morning      triamcinolone (KENALOG) 0.1 % cream Apply topically 2 times daily as needed for irritation (sores on scalp.)      vitamin C-electrolytes (EMERGEN-C) 1000mg vitamin C super orange drink mix Take 1 packet by mouth daily Mix 1 packet in 4-6oz water and take once or twice daily or as directed.      Acetaminophen (TYLENOL PO) Take 1,000 mg by mouth 2 times daily as needed for mild pain or fever      betamethasone dipropionate (DIPROSONE) 0.05 % cream Apply topically 2 times daily as needed (For foot)       clotrimazole-betamethasone (LOTRISONE) cream Apply topically 2 times daily INDICATION: FUNGAL FOOT INFECTION  Qty: 45 g, Refills: 1    Associated Diagnoses: Tinea pedis of left foot       amLODIPine (NORVASC) 2.5 MG tablet Take 1 tablet (2.5 mg) by mouth daily  Qty: 30 tablet, Refills: 11    Associated Diagnoses: Benign essential hypertension      cholecalciferol (VITAMIN D3) 95513 UNITS capsule Take 1 capsule (50,000 Units) by mouth every 14 days SIG: TAKE ONE CAP EVERY OTHER WEEK, INDICATION: TO TREAT VITAMIN D DEFICIENCY (1ST AND 15TH OF EACH MONTH), MUST TAKE WITH FAT CONTAINING MEAL, TAKE 1 EXTRA CAPSULE THIS WEEK ONLY  Qty: 40 capsule, Refills: 0    Associated Diagnoses: Osteopenia; Psoriasis with arthropathy (H)      Cyanocobalamin (B-12 SUPER STRENGTH) 5000 MCG/ML LIQD Place 1 mL under the tongue every morning FOR VITAMIN B12 SUPPLEMENTATION, PLEASE PLACE UNDER THE TONGUE  Qty: 2 Bottle, Refills: PRN    Associated Diagnoses: Vitamin B12 deficiency (non anemic)      spironolactone-hydrochlorothiazide (ALDACTAZIDE) 25-25 MG per tablet Take 1 tablet by mouth every morning INDICATION:TO LOWER BLOOD PRESSURE  Qty: 90 tablet, Refills: 3    Associated Diagnoses: Benign essential hypertension      folic acid (FOLVITE) 1 MG tablet Take 1 tablet (1 mg) by mouth daily INDICATION: FOLIC ACID SUPPLEMENT  Qty: 100 tablet, Refills: 3    Associated Diagnoses: Vitamin B12 deficiency (non anemic)      Calcium Carb-Cholecalciferol (CALCIUM 1000 + D) 1000-800 MG-UNIT TABS Take 1 tablet by mouth daily TAKE WITH FOOD, FOR BONE HEALTH AND FOR VITAMIN D SUPPLEMENTATION  Qty: 100 tablet, Refills: PRN    Associated Diagnoses: Osteopenia; Pain in joint, multiple sites      ranitidine (ZANTAC) 150 MG tablet Take 150 mg by mouth daily as needed for heartburn       albuterol (PROAIR HFA, PROVENTIL HFA, VENTOLIN HFA) 108 (90 BASE) MCG/ACT inhaler Inhale 2 puffs into the lungs every 6 hours as needed for shortness of breath / dyspnea or wheezing  Qty: 3 Inhaler, Refills: 1    Associated Diagnoses: Chronic obstructive pulmonary disease, unspecified COPD type (H)      aspirin EC 81 MG tablet Take 1 tablet (81 mg) by mouth  daily  Qty: 30 tablet, Refills: 11    Associated Diagnoses: Coronary artery disease involving native coronary artery of native heart without angina pectoris      !! order for DME Equipment being ordered: blood pressure cuff  Qty: 1 Device, Refills: 0    Associated Diagnoses: Elevated blood pressure       !! - Potential duplicate medications found. Please discuss with provider.      STOP taking these medications       celecoxib (CELEBREX) 200 MG capsule Comments:   Reason for Stopping:                     Consultations:   Consultation was obtained from the hospitalist service.           Hospital Course:   The patient was admitted after the surgical procedure. The hospitalist service was consulted to monitor her blood sugars. The patient underwent an uneventfulright hip replacement. Postoperatively, anticoagulation  with Lovenox was started. As she has a remote history of DVT, this is being transitioned to warfarin. No transfusion was required. The patient will be discharged to home. Home medications have been reconciled. oxycodone 5 mg. was prescribed for pain. Coumadin  will be prescribed.             Pending Results:   None           Discharge Instructions and Follow-Up:        Discharge activity: use a walker   Discharge follow-up: Follow up with me in 2 weeks. Follow up with the anticoagulation clinic.   Outpatient therapy: None    Home Care agency: None    Supplies and equipment: walker        Wound care: dry dressing daily and as needed   Other instructions: None

## 2017-04-05 NOTE — PROGRESS NOTES
[facility]    Hospitalist Progress Note      Assessment & Plan   Marva Angela is a 66-year-old female with a past medical history significant for tobacco use, osteoporosis, hypertension, hyperlipidemia, prior DVT and impaired fasting glucose status post right total hip arthroplasty. Hospitalist Service is consulted for postoperative medical comanagement of newly diagnosed diabetes.     Status post right total hip arthroplasty:  - POD 2  - Routine post op cares and pain control per Ortho   - Home today      ABL Anemia  - Post op Hgb 11.3-10.6  - Monitor    Hypokalemia,  - 3.3  - Oral replacement x 1 prior to d/c    Impaired fasting glucose, A1C 6.4:     Recent Labs  Lab 04/05/17  0645 04/04/17  2113 04/04/17  1723 04/04/17  1135 04/04/17  0630 04/04/17  0244 04/03/17  2154 04/03/17  1748   *  --   --   --  118*  --   --   --    BGM  --  131* 118* 133*  --  124* 154* 213*     - SSI  - Routine follow up with PCP      Essential Hypertension:  - PTA regimen includes Norvasc 2.5 mg/d and spironolactone-HCTZ 25-25mg/d  - Continue PTA Norvasc  - BP well controlled  - Can resume diuretic at d/c      Hyperlipidemia:   -Continue PTA statin     DVT Prophylaxis: Defer to primary service  Code Status: Full Code    Disposition: D/c home today per Ortho    Gale Sol    Interval History   No complaints. Good appetite. Pain controlled. Not requiring SSI. D/c home today    -Data reviewed today: I reviewed all new labs and imaging results over the last 24 hours. I personally reviewed no images or EKG's today.    Physical Exam   Temp: 99.3  F (37.4  C) Temp src: Oral BP: 118/57 Pulse: 77 Heart Rate: 80 Resp: 16 SpO2: 94 % O2 Device: None (Room air)    Vitals:    04/03/17 0900   Weight: 71.7 kg (158 lb)     Vital Signs with Ranges  Temp:  [98  F (36.7  C)-100.1  F (37.8  C)] 99.3  F (37.4  C)  Pulse:  [75-88] 77  Heart Rate:  [79-87] 80  Resp:  [16] 16  BP: (113-127)/(50-77) 118/57  SpO2:  [92 %-100  %] 94 %  I/O last 3 completed shifts:  In: 1263 [P.O.:730; I.V.:533]  Out: 225 [Urine:125; Drains:100]    Constitutional: Alert, resting comfortably in NAD  Respiratory: Normal effort, symmetric expansion, no crackles or wheezing. Diminished at bases  Cardiovascular: RRR no murmurs   GI: Non distended, normal bowels sounds, no tenderness or guarding  MSK: LE without edema. Dorsalis pedis pulse palpated bilaterally.   Skin/Integumen: Clear  Neuro: CN II-XII grossly intact  Psych:  Alert and oriented x 3. Normal affect      Medications     Warfarin Therapy Reminder         potassium chloride  60 mEq Oral Once     amLODIPine  2.5 mg Oral Daily     aspirin EC  81 mg Oral Daily     atorvastatin (LIPITOR) tablet 10 mg  10 mg Oral QAM     clotrimazole-betamethasone   Topical BID     enoxaparin  40 mg Subcutaneous Q24H     acetaminophen  975 mg Oral Q8H     senna-docusate  1-2 tablet Oral BID     insulin aspart  1-3 Units Subcutaneous TID AC     insulin aspart  1-3 Units Subcutaneous At Bedtime       Data     Recent Labs  Lab 04/05/17  0645 04/04/17  0630 04/03/17  1438 04/03/17  1236 04/03/17  0645   HGB 10.6* 11.3*  --   --   --    PLT  --   --   --  258  --    INR 1.20* 1.16* 1.03  --   --      --   --   --   --    POTASSIUM 3.3*  --   --   --  3.4   CHLORIDE 101  --   --   --   --    CO2 32  --   --   --   --    BUN 12  --   --   --   --    CR 0.62  --   --  0.80  --    ANIONGAP 5  --   --   --   --    SANGEETA 8.5  --   --   --   --    * 118*  --   --   --        No results found for this or any previous visit (from the past 24 hour(s)).

## 2017-04-05 NOTE — PROGRESS NOTES
Marva Angela  2017  POD #2    Doing well.  Pain well-controlled.  Tolerating physical therapy and rehabilitation well.  Temperatures:  Current - Temp: 99.3  F (37.4  C); Max - Temp  Av.7  F (37.1  C)  Min: 98  F (36.7  C)  Max: 100.1  F (37.8  C)  Pulse range: Pulse  Av.6  Min: 75  Max: 88  Blood pressure range: Systolic (24hrs), Av , Min:113 , Max:127   ; Diastolic (24hrs), Av, Min:50, Max:77    CMS: intact  Labs:  Hemoglobin   Date Value Ref Range Status   2017 10.6 (L) 11.7 - 15.7 g/dL Final   ]      PLAN:  Continue physical therapy  Discharge plan: home today

## 2017-04-05 NOTE — PROGRESS NOTES
Pt. Discharged to home with sister transporting. Pt. Has all discharge instructions and medications.

## 2017-04-05 NOTE — PLAN OF CARE
Problem: Goal Outcome Summary  Goal: Goal Outcome Summary  OT: Eval and treatment complete.      Surgeon Discharge Plan: Home           Current Functional Status: MOD I with lower body dressing, up with walker and MOD I in room.      Barriers to Plan/Home: None.

## 2017-04-05 NOTE — PROGRESS NOTES
04/05/17 1118   Quick Adds   Type of Visit Initial Occupational Therapy Evaluation   Living Environment   Lives With alone   Living Arrangements house   Home Accessibility stairs within home;stairs to enter home;tub/shower is not walk in;other (see comments)  (also has a walk-in shower, bed is not on main level)   Number of Stairs to Enter Home 1   Number of Stairs Within Home 10   Stair Railings at Home inside, present on left side;inside, present on right side   Transportation Available car;family or friend will provide   Self-Care   Dominant Hand right   Usual Activity Tolerance good   Regular Exercise yes   Activity/Exercise Type walking   Exercise Amount/Frequency (shovels, uses snowblower, rakes, walks a mile daily weather )   Equipment Currently Used at Home bath bench  (pt. owns crutches)   Functional Level Prior   Ambulation 0-->independent   Transferring 0-->independent   Toileting 0-->independent   Bathing 0-->independent   Dressing 0-->independent   Eating 0-->independent   Communication 0-->understands/communicates without difficulty   Swallowing 0-->swallows foods/liquids without difficulty   Cognition 0 - no cognition issues reported   Fall history within last six months no   Which of the above functional risks had a recent onset or change? ambulation;transferring;toileting;dressing;bathing   Prior Functional Level Comment Able to amb. up to a mile daily til Oct. 2016 weather permitting. Plays in a pickleball league in the summers.    General Information   Onset of Illness/Injury or Date of Surgery - Date 04/03/17   Referring Physician Gamaliel   Patient/Family Goals Statement Go home.   Additional Occupational Profile Info/Pertinent History of Current Problem Admitted for R ROBLES, posterior approach.    Precautions/Limitations fall precautions;right hip precautions   Weight-Bearing Status - RLE weight-bearing as tolerated   Cognitive Status Examination   Orientation orientation to person, place and  time   Level of Consciousness alert   Able to Follow Commands WNL/WFL   Cognitive Comment No cognitive deficits noted.    Pain Assessment   Patient Currently in Pain No   Range of Motion (ROM)   ROM Quick Adds No deficits were identified   Transfer Skill: Sit to Stand   Level of [place]: Sit/Stand independent   Transfer Skill: Toilet Transfer   Level of [place]: Toilet (declines practice)   Balance   Balance Comments MOD I with walker in room.   Lower Body Dressing   Level of [place]: Dress Lower Body dependent (less than 25% patients effort)   Toileting   Level of [place]: Toilet (decreased knowledge of hip precautions during)   Activities of Daily Living Analysis   Impairments Contributing to Impaired Activities of Daily Living post surgical precautions   General Therapy Interventions   Planned Therapy Interventions ADL retraining   Clinical Impression   Criteria for Skilled Therapeutic Interventions Met yes, treatment indicated   OT Diagnosis Decreased ADL   Influenced by the following impairments hip precautions   Assessment of Occupational Performance 1-3 Performance Deficits   Identified Performance Deficits dressing   Clinical Decision Making (Complexity) Low complexity   Predicted Duration of Therapy Intervention (days/wks) one visit   Anticipated Equipment Needs at Discharge sock aide   Anticipated Discharge Disposition Home   Risks and Benefits of Treatment have been explained. Yes   Patient, Family & other staff in agreement with plan of care Yes   Total Evaluation Time   Total Evaluation Time (Minutes) 10

## 2017-04-06 ENCOUNTER — TELEPHONE (OUTPATIENT)
Dept: INTERNAL MEDICINE | Facility: CLINIC | Age: 67
End: 2017-04-06

## 2017-04-06 NOTE — TELEPHONE ENCOUNTER
"Hospital/TCU/ED for chronic condition Discharge Protocol    \"Hi, my name is Misty Alex, a registered nurse, and I am calling from [facility].  I am calling to follow up and see how things are going for you after your recent emergency visit/hospital/TCU stay.\"    Tell me how you are doing now that you are home?\" Patient stated, \"I'm doing just fine.\"       Discharge Instructions    \"Let's review your discharge instructions.  What is/are the follow-up recommendations?  Pt. Response: Follow up with the surgeon.     \"Has an appointment with your primary care provider been scheduled?\"   Yes. (confirm)  Patient has scheduled appointment with PCP 7/17/17.  Patient stated to be following up with surgeon in 2 weeks.      \"When you see the provider, I would recommend that you bring your medications with you.\"    Medications    \"Tell me what changed about your medicines when you discharged?\"    Changes to chronic meds?    No     \"What questions do you have about your medications?\"    None     New diagnoses of heart failure, COPD, diabetes, or MI?    No           On warfarin: \"Were you given any recommendations for follow-up with the anticoagulation clinic?\" Yes - Anticoagulation clinic appointment is already scheduled at appropriate interval  Appointment on 4/7/17    Medication reconciliation completed? Yes  Was MTM referral placed (*Make sure to put transitions as reason for referral)?   No    Call Summary    \"What questions or concerns do you have about your recent visit and your follow-up care?\"     none    \"If you have questions or things don't continue to improve, we encourage you contact us through the main clinic number (give number).  Even if the clinic is not open, triage nurses are available 24/7 to help you.     We would like you to know that our clinic has extended hours (provide information).  We also have urgent care (provide details on closest location and hours/contact info)\"      \"Thank you for " "your time and take care!\"             "

## 2017-04-07 ENCOUNTER — ANTICOAGULATION THERAPY VISIT (OUTPATIENT)
Dept: ANTICOAGULATION | Facility: CLINIC | Age: 67
End: 2017-04-07
Payer: COMMERCIAL

## 2017-04-07 DIAGNOSIS — I82.409 DVT (DEEP VENOUS THROMBOSIS) (H): Primary | ICD-10-CM

## 2017-04-07 PROBLEM — Z79.01 LONG-TERM (CURRENT) USE OF ANTICOAGULANTS: Status: ACTIVE | Noted: 2017-04-07

## 2017-04-07 LAB — INR POINT OF CARE: 2.2 (ref 0.86–1.14)

## 2017-04-07 PROCEDURE — 85610 PROTHROMBIN TIME: CPT | Mod: QW

## 2017-04-07 PROCEDURE — 36416 COLLJ CAPILLARY BLOOD SPEC: CPT

## 2017-04-07 NOTE — PROGRESS NOTES
ANTICOAGULATION FOLLOW-UP CLINIC VISIT    Patient Name:  Marva Angela  Date:  4/7/2017  Contact Type:  Face to Face    SUBJECTIVE:     Patient Findings     Positives Hospital admission (total hip relacement 4/3/17, out of hospital 2 days. Warfarin therapy for 6 weeks through 5/16/17)           OBJECTIVE    INR Protime   Date Value Ref Range Status   04/07/2017 2.2 (A) 0.86 - 1.14 Final       ASSESSMENT / PLAN  INR assessment THER    Recheck INR In: 3 DAYS Pt refusing anticoagulation education, she has booklet and does not plan to be on med long term. Pt does not have greens regularily in her diet.   INR Location Clinic      Anticoagulation Summary as of 4/7/2017     INR goal 2.0-3.0   Today's INR 2.2   Maintenance plan No maintenance plan   Full instructions 4/7: 3.75 mg; 4/8: 3.75 mg; 4/9: 7.5 mg; Otherwise No maintenance plan   Next INR check 4/10/2017   Target end date     Indications   Long-term (current) use of anticoagulants [Z79.01] [Z79.01]  Deep vein thrombosis (DVT) (H) [I82.409] [I82.409]         Anticoagulation Episode Summary     INR check location [facility]    Preferred lab     Send INR reminders to  [facility]    Comments             See the Encounter Report to view Anticoagulation Flowsheet and Dosing Calendar (Go to Encounters tab in chart review, and find the Anticoagulation Therapy Visit)        Shasha Ramos RN

## 2017-04-07 NOTE — MR AVS SNAPSHOT
Marva NDIAYE Julio César   4/7/2017 11:45 AM   Anticoagulation Therapy Visit    Description:  66 year old female   Provider:   ANTICOAGULATION CLINIC   Department:   Anti Coagulation           INR as of 4/7/2017     Today's INR 2.2      Anticoagulation Summary as of 4/7/2017     INR goal 2.0-3.0   Today's INR 2.2   Full instructions 4/7: 3.75 mg; 4/8: 3.75 mg; 4/9: 7.5 mg; Otherwise No maintenance plan   Next INR check 4/10/2017    Indications   Long-term (current) use of anticoagulants [Z79.01] [Z79.01]  Deep vein thrombosis (DVT) (H) [I82.409] [I82.409]         Your next Anticoagulation Clinic appointment(s)     Apr 10, 2017 11:15 AM CDT   [facility]    [address] [id number] [phone]            Apr 13, 2017  9:45 AM CDT   Anticoagulation Visit with  [facility] CLINIC   [facility]    [address] [phone]              Contact Numbers     [facility]  Please call  [phone] to cancel and/or reschedule your appointment   Please call  [phone] with any problems or questions regarding your therapy.        April 2017 Details    Sun Mon Tue Wed Thu Fri Sat           1                 2               3               4               5               6               7      3.75 mg   See details      8      3.75 mg           9      7.5 mg         10            11               12               13               14               15                 16               17               18               19               20               21               22                 23               24               25               26               27               28               29                 30                      Date Details   04/07 This INR check       Date of  next INR:  4/10/2017         How to take your warfarin dose     To take:  3.75 mg Take 0.5 of a 7.5 mg tablet.    To take:  7.5 mg Take 1 of the 7.5 mg tablets.

## 2017-04-10 ENCOUNTER — ANTICOAGULATION THERAPY VISIT (OUTPATIENT)
Dept: ANTICOAGULATION | Facility: CLINIC | Age: 67
End: 2017-04-10
Payer: COMMERCIAL

## 2017-04-10 DIAGNOSIS — Z79.01 LONG-TERM (CURRENT) USE OF ANTICOAGULANTS: ICD-10-CM

## 2017-04-10 LAB — INR POINT OF CARE: 2.2 (ref 0.86–1.14)

## 2017-04-10 PROCEDURE — 99207 ZZC NO CHARGE NURSE ONLY: CPT

## 2017-04-10 PROCEDURE — 85610 PROTHROMBIN TIME: CPT | Mod: QW

## 2017-04-10 PROCEDURE — 36416 COLLJ CAPILLARY BLOOD SPEC: CPT

## 2017-04-10 NOTE — MR AVS SNAPSHOT
Marva NDIAYE Julio César   4/10/2017 11:15 AM   Anticoagulation Therapy Visit    Description:  66 year old female   Provider:   ANTICOAGULATION CLINIC   Department:   Anti Coagulation           INR as of 4/10/2017     Today's INR 2.2      Anticoagulation Summary as of 4/10/2017     INR goal 2.0-3.0   Today's INR 2.2   Full instructions 4/10: 7.5 mg; 4/11: 3.75 mg; 4/12: 7.5 mg; Otherwise No maintenance plan   Next INR check 4/13/2017    Indications   Long-term (current) use of anticoagulants [Z79.01] [Z79.01]  Deep vein thrombosis (DVT) (H) [I82.409] [I82.409]         Your next Anticoagulation Clinic appointment(s)     Apr 13, 2017  9:45 AM CDT   Anticoagulation Visit with  ANTICOAGULATION CLINIC   [facility] ([facility])    [address] [address] [phone]            Apr 17, 2017  9:45 AM CDT   Anticoagulation Visit with  ANTICOAGULATION CLINIC   [facility] ([facility])    [address] [id number] [phone]              Contact Numbers     [facility]  Please call  [phone] to cancel and/or reschedule your appointment   Please call  [phone] with any problems or questions regarding your therapy.        April 2017 Details    Sun Mon Tue Wed Thu Fri Sat           1                 2               3               4               5               6               7               8                 9               10      7.5 mg   See details      11      3.75 mg         12      7.5 mg         13            14               15                 16               17               18               19               20               21               22                 23               24               25               26               27               28               29                 30                      Date Details   04/10 This INR check       Date  of next INR:  4/13/2017         How to take your warfarin dose     To take:  3.75 mg Take 0.5 of a 7.5 mg tablet.    To take:  7.5 mg Take 1 of the 7.5 mg tablets.

## 2017-04-10 NOTE — PROGRESS NOTES
ANTICOAGULATION FOLLOW-UP CLINIC VISIT    Patient Name:  Marva Angela  Date:  4/10/2017  Contact Type:  Face to Face    SUBJECTIVE:     Patient Findings     Positives Initiation of therapy           OBJECTIVE    INR Protime   Date Value Ref Range Status   04/10/2017 2.2 (A) 0.86 - 1.14 Final       ASSESSMENT / PLAN  INR assessment THER    Recheck INR In: 3 DAYS    INR Location Clinic      Anticoagulation Summary as of 4/10/2017     INR goal 2.0-3.0   Today's INR 2.2   Maintenance plan No maintenance plan   Full instructions 4/10: 7.5 mg; 4/11: 3.75 mg; 4/12: 7.5 mg; Otherwise No maintenance plan   Next INR check 4/13/2017   Target end date     Indications   Long-term (current) use of anticoagulants [Z79.01] [Z79.01]  Deep vein thrombosis (DVT) (H) [I82.409] [I82.409]         Anticoagulation Episode Summary     [facility]    Preferred lab     Send INR reminders to  [facility]    Comments             See the Encounter Report to view Anticoagulation Flowsheet and Dosing Calendar (Go to Encounters tab in chart review, and find the Anticoagulation Therapy Visit)    Dosage adjustment made based on physician directed care plan.    Gale Jones RN

## 2017-04-13 ENCOUNTER — ANTICOAGULATION THERAPY VISIT (OUTPATIENT)
Dept: ANTICOAGULATION | Facility: CLINIC | Age: 67
End: 2017-04-13
Payer: COMMERCIAL

## 2017-04-13 DIAGNOSIS — Z79.01 LONG-TERM (CURRENT) USE OF ANTICOAGULANTS: ICD-10-CM

## 2017-04-13 LAB — INR POINT OF CARE: 2.4 (ref 0.86–1.14)

## 2017-04-13 PROCEDURE — 85610 PROTHROMBIN TIME: CPT | Mod: QW

## 2017-04-13 PROCEDURE — 36416 COLLJ CAPILLARY BLOOD SPEC: CPT

## 2017-04-13 PROCEDURE — 99207 ZZC NO CHARGE NURSE ONLY: CPT

## 2017-04-13 NOTE — PROGRESS NOTES
ANTICOAGULATION FOLLOW-UP CLINIC VISIT    Patient Name:  Marva Angela  Date:  4/13/2017  Contact Type:  Face to Face    SUBJECTIVE:        OBJECTIVE    INR Protime   Date Value Ref Range Status   04/13/2017 2.4 (A) 0.86 - 1.14 Final       ASSESSMENT / PLAN  INR assessment THER    Recheck INR In: 1 WEEK    INR Location Clinic      Anticoagulation Summary as of 4/13/2017     INR goal 2.0-3.0   Today's INR 2.4   Maintenance plan 3.75 mg (7.5 mg x 0.5) on Tue, Thu, Sat; 7.5 mg (7.5 mg x 1) all other days   Full instructions 3.75 mg on Tue, Thu, Sat; 7.5 mg all other days   Weekly total 41.25 mg   Plan last modified Gale Jones RN (4/13/2017)   Next INR check 4/21/2017   Target end date     Indications   Long-term (current) use of anticoagulants [Z79.01] [Z79.01]  Deep vein thrombosis (DVT) (H) [I82.409] [I82.409]         Anticoagulation Episode Summary     [facility]    Preferred lab     Send INR reminders to [facility]    Comments             See the Encounter Report to view Anticoagulation Flowsheet and Dosing Calendar (Go to Encounters tab in chart review, and find the Anticoagulation Therapy Visit)    Dosage adjustment made based on physician directed care plan.    Gale Jones RN

## 2017-04-13 NOTE — MR AVS SNAPSHOT
Marva Angela   4/13/2017 9:45 AM   Anticoagulation Therapy Visit    Description:  66 year old female   Provider:   ANTICOAGULATION CLINIC   Department:   Anti Coagulation           INR as of 4/13/2017     Today's INR 2.4      Anticoagulation Summary as of 4/13/2017     INR goal 2.0-3.0   Today's INR 2.4   Full instructions 3.75 mg on Tue, Thu, Sat; 7.5 mg all other days   Next INR check 4/21/2017    Indications   Long-term (current) use of anticoagulants [Z79.01] [Z79.01]  Deep vein thrombosis (DVT) (H) [I82.409] [I82.409]         Your next Anticoagulation Clinic appointment(s)     Apr 20, 2017 11:15 AM CDT   Anticoagulation Visit with  ANTICOAGULATION CLINIC   [facility] ([facility])    [address] [address] [phone]              Contact Numbers     [facility]  Please call  [phone] to cancel and/or reschedule your appointment   Please call  [phone] with any problems or questions regarding your therapy.        April 2017 Details    Sun Mon Tue Wed Thu Fri Sat           1                 2               3               4               5               6               7               8                 9               10               11               12               13      3.75 mg   See details      14      7.5 mg         15      3.75 mg           16      7.5 mg         17      7.5 mg         18      3.75 mg         19      7.5 mg         20      3.75 mg         21            22                 23               24               25               26               27               28               29                 30                      Date Details   04/13 This INR check       Date of next INR:  4/21/2017         How to take your warfarin dose     To take:  3.75 mg Take 0.5 of a 7.5 mg tablet.    To take:  7.5 mg Take 1 of the 7.5 mg tablets.

## 2017-04-17 ENCOUNTER — MYC MEDICAL ADVICE (OUTPATIENT)
Dept: CARDIOLOGY | Facility: CLINIC | Age: 67
End: 2017-04-17

## 2017-04-17 DIAGNOSIS — E78.5 DYSLIPIDEMIA, GOAL LDL BELOW 70: Primary | ICD-10-CM

## 2017-04-17 RX ORDER — ATORVASTATIN CALCIUM 10 MG/1
15 TABLET, FILM COATED ORAL DAILY
Qty: 45 TABLET | Refills: 3 | Status: SHIPPED | OUTPATIENT
Start: 2017-04-17 | End: 2017-07-14

## 2017-04-20 ENCOUNTER — ANTICOAGULATION THERAPY VISIT (OUTPATIENT)
Dept: ANTICOAGULATION | Facility: CLINIC | Age: 67
End: 2017-04-20
Payer: COMMERCIAL

## 2017-04-20 DIAGNOSIS — Z79.01 LONG-TERM (CURRENT) USE OF ANTICOAGULANTS: ICD-10-CM

## 2017-04-20 LAB — INR POINT OF CARE: 1.7 (ref 0.86–1.14)

## 2017-04-20 PROCEDURE — 85610 PROTHROMBIN TIME: CPT | Mod: QW

## 2017-04-20 PROCEDURE — 99207 ZZC NO CHARGE NURSE ONLY: CPT

## 2017-04-20 PROCEDURE — 36416 COLLJ CAPILLARY BLOOD SPEC: CPT

## 2017-04-20 NOTE — MR AVS SNAPSHOT
Marva Angela   4/20/2017 11:15 AM   Anticoagulation Therapy Visit    Description:  66 year old female   Provider:   ANTICOAGULATION CLINIC   Department:   Anti Coagulation           INR as of 4/20/2017     Today's INR 1.7!      Anticoagulation Summary as of 4/20/2017     INR goal 2.0-3.0   Today's INR 1.7!   Full instructions 4/20: 7.5 mg; 4/25: 7.5 mg; Otherwise 3.75 mg on Tue, Thu, Sat; 7.5 mg all other days   Next INR check 4/27/2017    Indications   Long-term (current) use of anticoagulants [Z79.01] [Z79.01]  Deep vein thrombosis (DVT) (H) [I82.409] [I82.409]         Your next Anticoagulation Clinic appointment(s)     Apr 27, 2017  9:00 AM CDT   Anticoagulation Visit with  ANTICOAGULATION CLINIC   [facility] ([facility])    [address] [address] [phone]              Contact Numbers     [facility]  Please call  [phone] to cancel and/or reschedule your appointment   Please call  [phone] with any problems or questions regarding your therapy.        April 2017 Details    Sun Mon Tue Wed Thu Fri Sat           1                 2               3               4               5               6               7               8                 9               10               11               12               13               14               15                 16               17               18               19               20      7.5 mg   See details      21      7.5 mg         22      3.75 mg           23      7.5 mg         24      7.5 mg         25      7.5 mg         26      7.5 mg         27            28               29                 30                      Date Details   04/20 This INR check       Date of next INR:  4/27/2017         How to take your warfarin dose     To take:  3.75 mg Take 0.5 of a 7.5 mg tablet.    To take:  7.5 mg Take 1 of the 7.5 mg tablets.

## 2017-04-20 NOTE — PROGRESS NOTES
ANTICOAGULATION FOLLOW-UP CLINIC VISIT    Patient Name:  Marva Angela  Date:  4/20/2017  Contact Type:  Face to Face    SUBJECTIVE:     Patient Findings     Positives Activity level change           OBJECTIVE    INR Protime   Date Value Ref Range Status   04/20/2017 1.7 (A) 0.86 - 1.14 Final       ASSESSMENT / PLAN  INR assessment THER    Recheck INR In: 1 WEEK    INR Location Clinic      Anticoagulation Summary as of 4/20/2017     INR goal 2.0-3.0   Today's INR 1.7!   Maintenance plan 3.75 mg (7.5 mg x 0.5) on Tue, Thu, Sat; 7.5 mg (7.5 mg x 1) all other days   Full instructions 4/20: 7.5 mg; 4/25: 7.5 mg; Otherwise 3.75 mg on Tue, Thu, Sat; 7.5 mg all other days   Weekly total 41.25 mg   Plan last modified Gale Jones RN (4/13/2017)   Next INR check 4/27/2017   Target end date     Indications   Long-term (current) use of anticoagulants [Z79.01] [Z79.01]  Deep vein thrombosis (DVT) (H) [I82.409] [I82.409]         Anticoagulation Episode Summary     [facility]    Preferred lab     Send INR reminders to [facility]    Comments             See the Encounter Report to view Anticoagulation Flowsheet and Dosing Calendar (Go to Encounters tab in chart review, and find the Anticoagulation Therapy Visit)    Dosage adjustment made based on physician directed care plan.    Gale Jones RN

## 2017-04-26 PROCEDURE — 82274 ASSAY TEST FOR BLOOD FECAL: CPT | Performed by: INTERNAL MEDICINE

## 2017-04-27 ENCOUNTER — ANTICOAGULATION THERAPY VISIT (OUTPATIENT)
Dept: ANTICOAGULATION | Facility: CLINIC | Age: 67
End: 2017-04-27
Payer: COMMERCIAL

## 2017-04-27 DIAGNOSIS — I82.409 DEEP VEIN THROMBOSIS (DVT) (H): ICD-10-CM

## 2017-04-27 DIAGNOSIS — Z96.649 HIP JOINT REPLACEMENT STATUS: ICD-10-CM

## 2017-04-27 DIAGNOSIS — Z79.01 LONG-TERM (CURRENT) USE OF ANTICOAGULANTS: ICD-10-CM

## 2017-04-27 LAB — INR POINT OF CARE: 2 (ref 0.86–1.14)

## 2017-04-27 PROCEDURE — 99207 ZZC NO CHARGE NURSE ONLY: CPT

## 2017-04-27 PROCEDURE — 36416 COLLJ CAPILLARY BLOOD SPEC: CPT

## 2017-04-27 PROCEDURE — 85610 PROTHROMBIN TIME: CPT | Mod: QW

## 2017-04-27 RX ORDER — WARFARIN SODIUM 7.5 MG/1
7.5 TABLET ORAL DAILY
Qty: 10 TABLET | Refills: 0 | Status: SHIPPED | OUTPATIENT
Start: 2017-04-27 | End: 2017-06-02

## 2017-04-27 NOTE — PROGRESS NOTES
ANTICOAGULATION FOLLOW-UP CLINIC VISIT    Patient Name:  Marva Angela  Date:  4/27/2017  Contact Type:  Face to Face    SUBJECTIVE:        OBJECTIVE    INR Protime   Date Value Ref Range Status   04/27/2017 2.0 (A) 0.86 - 1.14 Final       ASSESSMENT / PLAN  INR assessment THER    Recheck INR In: 2 WEEKS    INR Location Clinic      Anticoagulation Summary as of 4/27/2017     INR goal 2.0-3.0   Today's INR 2.0   Maintenance plan 7.5 mg (7.5 mg x 1) every day   Full instructions 7.5 mg every day   Weekly total 52.5 mg   Plan last modified Gale Jones RN (4/27/2017)   Next INR check 5/11/2017   Target end date     Indications   Long-term (current) use of anticoagulants [Z79.01] [Z79.01]  Deep vein thrombosis (DVT) (H) [I82.409] [I82.409]         Anticoagulation Episode Summary     [facility]    Preferred lab     Send INR reminders to  [facility]    Comments             See the Encounter Report to view Anticoagulation Flowsheet and Dosing Calendar (Go to Encounters tab in chart review, and find the Anticoagulation Therapy Visit)      Pt was instructed by Ortho that can stop warfarin at 6 week riley.  Pt to stop on May 15th.   Dosage adjustment made based on physician directed care plan.    Gale Jones, MICHEL

## 2017-04-27 NOTE — MR AVS SNAPSHOT
Marva NDIAYE Julio César   4/27/2017 9:00 AM   Anticoagulation Therapy Visit    Description:  66 year old female   Provider:   ANTICOAGULATION CLINIC   Department:   Anti Coagulation           INR as of 4/27/2017     Today's INR 2.0      Anticoagulation Summary as of 4/27/2017     INR goal 2.0-3.0   Today's INR 2.0   Full instructions 7.5 mg every day   Next INR check 5/11/2017    Indications   Long-term (current) use of anticoagulants [Z79.01] [Z79.01]  Deep vein thrombosis (DVT) (H) [I82.409] [I82.409]         Your next Anticoagulation Clinic appointment(s)     May 11, 2017  9:30 AM CDT   Anticoagulation Visit with  ANTICOAGULATION CLINIC   [facility]    [address] [place] [address] [phone]              Contact Numbers     [facility]  Please call  [phone] to cancel and/or reschedule your appointment   Please call  [phone] with any problems or questions regarding your therapy.        April 2017 Details    Sun Mon Tue Wed Thu Fri Sat           1                 2               3               4               5               6               7               8                 9               10               11               12               13               14               15                 16               17               18               19               20               21               22                 23               24               25               26               27      7.5 mg   See details      28      7.5 mg         29      7.5 mg           30      7.5 mg                Date Details   04/27 This INR check               How to take your warfarin dose     To take:  7.5 mg Take 1 of the 7.5 mg tablets.           May 2017 Details    Sun Mon Tue Wed Thu Fri Sat      1      7.5 mg         2      7.5 mg         3      7.5 mg         4      7.5 mg         5      7.5 mg         6      7.5 mg            7      7.5 mg         8      7.5 mg         9      7.5 mg         10      7.5 mg         11            12               13                 14               15               16               17               18               19               20                 21               22               23               24               25               26               27                 28               29               30               31                   Date Details   No additional details    Date of next INR:  5/11/2017         How to take your warfarin dose     To take:  7.5 mg Take 1 of the 7.5 mg tablets.

## 2017-04-28 DIAGNOSIS — Z12.11 SPECIAL SCREENING FOR MALIGNANT NEOPLASMS, COLON: ICD-10-CM

## 2017-04-28 LAB — HEMOCCULT STL QL IA: NEGATIVE

## 2017-05-02 ENCOUNTER — TRANSFERRED RECORDS (OUTPATIENT)
Dept: HEALTH INFORMATION MANAGEMENT | Facility: CLINIC | Age: 67
End: 2017-05-02

## 2017-05-07 ENCOUNTER — MYC MEDICAL ADVICE (OUTPATIENT)
Dept: INTERNAL MEDICINE | Facility: CLINIC | Age: 67
End: 2017-05-07

## 2017-05-08 NOTE — TELEPHONE ENCOUNTER
Please initiate PA as requested, and also check for alternatives if medication is not covered thanks.

## 2017-05-10 NOTE — PROGRESS NOTES
Dear Marva,   Your recent test results (stool FIT test), which screens for presence of blood in the stool and colon cancer, were reviewed and are within acceptable limits. Please contact the office with any questions or concerns.      Stay healthy!  Dr. JAIMIE Sevilla [facility]  Internal Medicine

## 2017-05-11 ENCOUNTER — ANTICOAGULATION THERAPY VISIT (OUTPATIENT)
Dept: ANTICOAGULATION | Facility: CLINIC | Age: 67
End: 2017-05-11
Payer: COMMERCIAL

## 2017-05-11 DIAGNOSIS — I82.409 DEEP VEIN THROMBOSIS (DVT) (H): ICD-10-CM

## 2017-05-11 DIAGNOSIS — Z79.01 LONG-TERM (CURRENT) USE OF ANTICOAGULANTS: ICD-10-CM

## 2017-05-11 LAB — INR POINT OF CARE: 2.3 (ref 0.86–1.14)

## 2017-05-11 PROCEDURE — 85610 PROTHROMBIN TIME: CPT | Mod: QW

## 2017-05-11 PROCEDURE — 36416 COLLJ CAPILLARY BLOOD SPEC: CPT

## 2017-05-11 PROCEDURE — 99207 ZZC NO CHARGE NURSE ONLY: CPT

## 2017-05-11 NOTE — MR AVS SNAPSHOT
Marva NDIAYE Julio César   5/11/2017 9:30 AM   Anticoagulation Therapy Visit    Description:  66 year old female   Provider:   ANTICOAGULATION CLINIC   Department:   Anti Coagulation           INR as of 5/11/2017     Today's INR 2.3      Anticoagulation Summary as of 5/11/2017     INR goal 2.0-3.0   Today's INR 2.3   Full instructions 7.5 mg every day   Next INR check 5/25/2017    Indications   Long-term (current) use of anticoagulants [Z79.01] [Z79.01]  Deep vein thrombosis (DVT) (H) [I82.409] [I82.409]         Description     Pt was told could stop warfarin after 6 weeks per ortho.  Pt plans to stop it in 7 days when done with RX.        Contact Numbers     [facility]  Please call  [phone] to cancel and/or reschedule your appointment   Please call  [phone] with any problems or questions regarding your therapy.        May 2017 Details    Sun Mon Tue Wed Thu Fri Sat      1               2               3               4               5               6                 7               8               9               10               11      7.5 mg   See details      12      7.5 mg         13      7.5 mg           14      7.5 mg         15      7.5 mg         16      7.5 mg         17      7.5 mg         18      7.5 mg         19      7.5 mg         20      7.5 mg           21      7.5 mg         22      7.5 mg         23      7.5 mg         24      7.5 mg         25            26               27                 28               29               30               31                   Date Details   05/11 This INR check       Date of next INR:  5/25/2017         How to take your warfarin dose     To take:  7.5 mg Take 1 of the 7.5 mg tablets.

## 2017-05-11 NOTE — PROGRESS NOTES
ANTICOAGULATION FOLLOW-UP CLINIC VISIT    Patient Name:  Marva Angela  Date:  5/11/2017  Contact Type:  Face to Face    SUBJECTIVE:        OBJECTIVE    INR Protime   Date Value Ref Range Status   05/11/2017 2.3 (A) 0.86 - 1.14 Final       ASSESSMENT / PLAN  INR assessment THER    Recheck INR In: 2 WEEKS    INR Location Clinic      Anticoagulation Summary as of 5/11/2017     INR goal 2.0-3.0   Today's INR 2.3   Maintenance plan 7.5 mg (7.5 mg x 1) every day   Full instructions 7.5 mg every day   Weekly total 52.5 mg   No change documented Gale Jones RN   Plan last modified Gale Jones RN (4/27/2017)   Next INR check 5/25/2017   Target end date     Indications   Long-term (current) use of anticoagulants [Z79.01] [Z79.01]  Deep vein thrombosis (DVT) (H) [I82.409] [I82.409]         Anticoagulation Episode Summary     [facility]    Preferred lab     Send INR reminders to [facility]    Comments             See the Encounter Report to view Anticoagulation Flowsheet and Dosing Calendar (Go to Encounters tab in chart review, and find the Anticoagulation Therapy Visit)    Dosage adjustment made based on physician directed care plan.    Gale Jones RN

## 2017-05-15 ENCOUNTER — MYC MEDICAL ADVICE (OUTPATIENT)
Dept: INTERNAL MEDICINE | Facility: CLINIC | Age: 67
End: 2017-05-15

## 2017-05-25 NOTE — TELEPHONE ENCOUNTER
Prior authorization    Medication name aldactazide  Insurance med[employer]  Insurance ID number [id number]  Prior authorization faxed through cover my meds

## 2017-05-31 ENCOUNTER — MYC MEDICAL ADVICE (OUTPATIENT)
Dept: INTERNAL MEDICINE | Facility: CLINIC | Age: 67
End: 2017-05-31

## 2017-06-02 ENCOUNTER — OFFICE VISIT (OUTPATIENT)
Dept: INTERNAL MEDICINE | Facility: CLINIC | Age: 67
End: 2017-06-02
Payer: COMMERCIAL

## 2017-06-02 ENCOUNTER — MYC MEDICAL ADVICE (OUTPATIENT)
Dept: INTERNAL MEDICINE | Facility: CLINIC | Age: 67
End: 2017-06-02

## 2017-06-02 VITALS
WEIGHT: 164.7 LBS | HEART RATE: 70 BPM | BODY MASS INDEX: 26.47 KG/M2 | TEMPERATURE: 98.4 F | HEIGHT: 66 IN | SYSTOLIC BLOOD PRESSURE: 134 MMHG | DIASTOLIC BLOOD PRESSURE: 82 MMHG | OXYGEN SATURATION: 98 %

## 2017-06-02 DIAGNOSIS — Z96.649 HIP JOINT REPLACEMENT STATUS: ICD-10-CM

## 2017-06-02 DIAGNOSIS — J01.90 ACUTE SINUSITIS WITH COEXISTING CONDITION REQUIRING PROPHYLACTIC TREATMENT: Primary | ICD-10-CM

## 2017-06-02 PROBLEM — I82.409 DEEP VEIN THROMBOSIS (DVT) (H): Status: RESOLVED | Noted: 2017-04-07 | Resolved: 2017-06-02

## 2017-06-02 PROBLEM — Z79.01 LONG-TERM (CURRENT) USE OF ANTICOAGULANTS: Status: RESOLVED | Noted: 2017-04-07 | Resolved: 2017-06-02

## 2017-06-02 PROCEDURE — 99213 OFFICE O/P EST LOW 20 MIN: CPT | Performed by: PHYSICIAN ASSISTANT

## 2017-06-02 RX ORDER — AZITHROMYCIN 250 MG/1
TABLET, FILM COATED ORAL
Qty: 6 TABLET | Refills: 0 | Status: SHIPPED | OUTPATIENT
Start: 2017-06-02 | End: 2017-07-14

## 2017-06-02 NOTE — PROGRESS NOTES
"  SUBJECTIVE:                                                    Marva Angela is a 66 year old female who presents to clinic today for the following health issues:      Acute Illness   Acute illness concerns: Cough/Headache  Onset: x1 wk     Fever: no     Chills/Sweats: YES- both     Headache (location?): YES    Sinus Pressure:YES    Conjunctivitis:  no    Ear Pain: YES- minor     Rhinorrhea: no     Congestion: YES- in chest a little    Sore Throat: no      Cough: YES with yellow green phlegm    Wheeze: no     Decreased Appetite: YES- a little     Nausea: no     Vomiting: no     Diarrhea:  no     Dysuria/Freq.: no     Fatigue/Achiness: YES- fatigue     Sick/Strep Exposure: no      Therapies Tried and outcome: nyquil cold and flu pill   Sinus irrigation.   Has been over 4 years since last sinus infection per patient.   Recently with hip replacment.   States started with cold symptoms last week and has not improved. Sinus headache and cough with yellow green phlegm in the am.     -------------------------------------    Problem list and histories reviewed & adjusted, as indicated.  Additional history: as documented    Labs reviewed in EPIC    Reviewed and updated as needed this visit by clinical staff  Tobacco  Allergies       Reviewed and updated as needed this visit by Provider         ROS:  Constitutional, HEENT, cardiovascular, pulmonary, gi and gu systems are negative, except as otherwise noted.    OBJECTIVE:                                                    /82 (BP Location: Left arm, Patient Position: Chair, Cuff Size: Adult Regular)  Pulse 70  Temp 98.4  F (36.9  C) (Oral)  Ht 5' 5.51\" (1.664 m)  Wt 164 lb 11.2 oz (74.7 kg)  SpO2 98%  BMI 26.98 kg/m2  Body mass index is 26.98 kg/(m^2).  GENERAL: healthy, alert and no distress  HENT: normal cephalic/atraumatic, both ears: clear effusion, nose and mouth without ulcers or lesions, nasal mucosa edematous , rhinorrhea yellow, green and thick, " oropharynx clear, oral mucous membranes moist and sinuses: maxillary, frontal tenderness on bilateral  NECK: no adenopathy, no asymmetry, masses, or scars and thyroid normal to palpation  RESP: lungs clear to auscultation - no rales, rhonchi or wheezes  CV: regular rates and rhythm and normal S1 S2, no S3 or S4  MS: no gross musculoskeletal defects noted, no edema  SKIN: no suspicious lesions or rashes    Diagnostic Test Results:  none      ASSESSMENT/PLAN:                                                            1. Acute sinusitis with coexisting condition requiring prophylactic treatment      2. Hip joint replacement status        Continue with saline irrigation,   Steaming, warm compresses.  Recheck prn not improving.         Radha Martin PA-C  [facility]

## 2017-06-02 NOTE — NURSING NOTE
"No chief complaint on file.      Initial /82 (BP Location: Left arm, Patient Position: Chair, Cuff Size: Adult Regular)  Pulse 70  Temp 98.4  F (36.9  C) (Oral)  Ht 5' 5.51\" (1.664 m)  Wt 164 lb 11.2 oz (74.7 kg)  SpO2 98%  BMI 26.98 kg/m2 Estimated body mass index is 26.98 kg/(m^2) as calculated from the following:    Height as of this encounter: 5' 5.51\" (1.664 m).    Weight as of this encounter: 164 lb 11.2 oz (74.7 kg).  Medication Reconciliation: complete    "

## 2017-06-02 NOTE — MR AVS SNAPSHOT
After Visit Summary   6/2/2017    Marva Angela    MRN: [id number]           Patient Information     Date Of Birth          1950        Visit Information        Provider Department      6/2/2017 9:40 AM Radha Martin PA-C [facility]        Today's Diagnoses     Acute sinusitis with coexisting condition requiring prophylactic treatment    -  1    Hip joint replacement status           Follow-ups after your visit        Your next 10 appointments already scheduled     Jul 10, 2017  9:30 AM CDT   LAB with [facility]   [facility] ([facility]    [address] [address]   [phone]           Patient must bring picture ID.  Patient should be prepared to give a urine specimen  Please do not eat 10-12 hours before your appointment if you are coming in fasting for labs on lipids, cholesterol, or glucose (sugar).  Pregnant women should follow their Care Team instructions. Water with medications is okay. Do not drink coffee or other fluids.   If you have concerns about taking  your medications, please ask at office or if scheduling via Gl[employer], send a message by clicking on Secure Messaging, Message Your Care Team.            Jul 14, 2017 10:00 AM CDT   LAB with [facility]   [facility] [place] ([facility])    [address] [place] [id number] [phone]           Patient must bring picture ID.  Patient should be prepared to give a urine specimen  Please do not eat 10-12 hours before your appointment if you are coming in fasting for labs on lipids, cholesterol, or glucose (sugar).  Pregnant women should follow their Care Team instructions. Water with medications is okay. Do not drink coffee or other fluids.   If you have concerns about taking  your medications, please ask at office or if scheduling via [employer], send a message by  "clicking on Secure Messaging, Message Your Care Team.            Jul 14, 2017 10:45 AM CDT   Return Visit with Aren Fountain MD   [facility] [place] ([facility])    [address] [place] [id number] [phone]            Jul 17, 2017  8:30 AM CDT   Pre-Op physical with Leif Fair MD   [facility] ([facility])    [address] [address] [phone]              Who to contact     If you have questions or need follow up information about today's clinic visit or your schedule please contact [facility] directly at [phone].  Normal or non-critical lab and imaging results will be communicated to you by [employer], letter or phone within 4 business days after the clinic has received the results. If you do not hear from us within 7 days, please contact the clinic through [employer] or phone. If you have a critical or abnormal lab result, we will notify you by phone as soon as possible.  Submit refill requests through [employer] or call your pharmacy and they will forward the refill request to us. Please allow 3 business days for your refill to be completed.          Additional Information About Your Visit        MyChart Information     [employer] gives you secure access to your electronic health record. If you see a primary care provider, you can also send messages to your care team and make appointments. If you have questions, please call your primary care clinic.  If you do not have a primary care provider, please call [phone] and they will assist you.        Care EveryWhere ID     This is your Care EveryWhere ID. This could be used by other organizations to access your [place] medical records  [id number]        Your Vitals Were     Pulse Temperature Height Pulse Oximetry BMI (Body Mass Index)       70 98.4  F (36.9  C) (Oral) 5' 5.51\" " (1.664 m) 98% 26.98 kg/m2        Blood Pressure from Last 3 Encounters:   06/02/17 134/82   04/05/17 118/57   03/21/17 100/62    Weight from Last 3 Encounters:   06/02/17 164 lb 11.2 oz (74.7 kg)   04/03/17 158 lb (71.7 kg)   03/21/17 167 lb (75.8 kg)              Today, you had the following     No orders found for display       Primary Care Provider Office Phone # Fax #    Leif Fair [phone]       [facility] [address]  [facility] [address]        Thank you!     Thank you for choosing [facility]  for your care. Our goal is always to provide you with excellent care. Hearing back from our patients is one way we can continue to improve our services. Please take a few minutes to complete the written survey that you may receive in the mail after your visit with us. Thank you!             Your Updated Medication List - Protect others around you: Learn how to safely use, store and throw away your medicines at www.disposemymeds.org.          This list is accurate as of: 6/2/17  9:50 AM.  Always use your most recent med list.                   Brand Name Dispense Instructions for use    albuterol 108 (90 BASE) MCG/ACT Inhaler    PROAIR HFA/PROVENTIL HFA/VENTOLIN HFA    3 Inhaler    Inhale 2 puffs into the lungs every 6 hours as needed for shortness of breath / dyspnea or wheezing       amLODIPine 2.5 MG tablet    NORVASC    30 tablet    Take 1 tablet (2.5 mg) by mouth daily       aspirin EC 81 MG EC tablet     30 tablet    Take 1 tablet (81 mg) by mouth daily       atorvastatin 10 MG tablet    LIPITOR    45 tablet    Take 1.5 tablets (15 mg) by mouth daily       betamethasone dipropionate 0.05 % cream    DIPROSONE     Apply topically 2 times daily as needed (For foot)       Calcium Carb-Cholecalciferol 1000-800 MG-UNIT Tabs    CALCIUM 1000 + D    100 tablet    Take 1 tablet by mouth daily TAKE WITH FOOD, FOR BONE HEALTH AND FOR VITAMIN D SUPPLEMENTATION        cholecalciferol 46402 UNITS capsule    VITAMIN D3    40 capsule    Take 1 capsule (50,000 Units) by mouth every 14 days SIG: TAKE ONE CAP EVERY OTHER WEEK, INDICATION: TO TREAT VITAMIN D DEFICIENCY (1ST AND 15TH OF EACH MONTH), MUST TAKE WITH FAT CONTAINING MEAL, TAKE 1 EXTRA CAPSULE THIS WEEK ONLY       Cyanocobalamin 5000 MCG/ML Liqd    B-12 SUPER STRENGTH    2 Bottle    Place 1 mL under the tongue every morning FOR VITAMIN B12 SUPPLEMENTATION, PLEASE PLACE UNDER THE TONGUE       folic acid 1 MG tablet    FOLVITE    100 tablet    Take 1 tablet (1 mg) by mouth daily INDICATION: FOLIC ACID SUPPLEMENT       ranitidine 150 MG tablet    ZANTAC     Take 150 mg by mouth daily as needed for heartburn       spironolactone-hydrochlorothiazide 25-25 MG per tablet    ALDACTAZIDE    90 tablet    Take 1 tablet by mouth every morning INDICATION:TO LOWER BLOOD PRESSURE       triamcinolone 0.1 % cream    KENALOG     Apply topically 2 times daily as needed for irritation (sores on scalp.)       TYLENOL PO      Take 1,000 mg by mouth 2 times daily as needed for mild pain or fever       vitamin C-electrolytes 1000mg vitamin C super orange drink mix    EMERGEN-C     Take 1 packet by mouth daily Mix 1 packet in 4-6oz water and take once or twice daily or as directed.

## 2017-06-14 ENCOUNTER — PRE VISIT (OUTPATIENT)
Dept: CARDIOLOGY | Facility: CLINIC | Age: 67
End: 2017-06-14

## 2017-06-29 DIAGNOSIS — Z13.0 SCREENING FOR DISORDER OF BLOOD AND BLOOD-FORMING ORGANS: Primary | ICD-10-CM

## 2017-07-10 DIAGNOSIS — E55.9 VITAMIN D DEFICIENCY: ICD-10-CM

## 2017-07-10 DIAGNOSIS — M81.0 OSTEOPOROSIS: ICD-10-CM

## 2017-07-10 DIAGNOSIS — I25.10 CORONARY ARTERY DISEASE INVOLVING NATIVE CORONARY ARTERY OF NATIVE HEART WITHOUT ANGINA PECTORIS: ICD-10-CM

## 2017-07-10 DIAGNOSIS — Z01.818 PREOP GENERAL PHYSICAL EXAM: ICD-10-CM

## 2017-07-10 DIAGNOSIS — I10 BENIGN ESSENTIAL HYPERTENSION: ICD-10-CM

## 2017-07-10 DIAGNOSIS — I25.10 CORONARY ARTERY DISEASE INVOLVING NATIVE HEART WITHOUT ANGINA PECTORIS, UNSPECIFIED VESSEL OR LESION TYPE: ICD-10-CM

## 2017-07-10 DIAGNOSIS — Z13.0 SCREENING FOR DISORDER OF BLOOD AND BLOOD-FORMING ORGANS: ICD-10-CM

## 2017-07-10 DIAGNOSIS — E78.5 HYPERLIPIDEMIA LDL GOAL <130: ICD-10-CM

## 2017-07-10 DIAGNOSIS — J44.9 CHRONIC OBSTRUCTIVE PULMONARY DISEASE, UNSPECIFIED COPD TYPE (H): ICD-10-CM

## 2017-07-10 DIAGNOSIS — E83.52 HYPERCALCEMIA: ICD-10-CM

## 2017-07-10 LAB
ERYTHROCYTE [DISTWIDTH] IN BLOOD BY AUTOMATED COUNT: 14 % (ref 10–15)
HCT VFR BLD AUTO: 45.5 % (ref 35–47)
HGB BLD-MCNC: 14.5 G/DL (ref 11.7–15.7)
MCH RBC QN AUTO: 28.9 PG (ref 26.5–33)
MCHC RBC AUTO-ENTMCNC: 31.9 G/DL (ref 31.5–36.5)
MCV RBC AUTO: 91 FL (ref 78–100)
PLATELET # BLD AUTO: 262 10E9/L (ref 150–450)
RBC # BLD AUTO: 5.01 10E12/L (ref 3.8–5.2)
WBC # BLD AUTO: 7.8 10E9/L (ref 4–11)

## 2017-07-10 PROCEDURE — 80053 COMPREHEN METABOLIC PANEL: CPT | Performed by: INTERNAL MEDICINE

## 2017-07-10 PROCEDURE — 82306 VITAMIN D 25 HYDROXY: CPT | Performed by: INTERNAL MEDICINE

## 2017-07-10 PROCEDURE — 80061 LIPID PANEL: CPT | Performed by: INTERNAL MEDICINE

## 2017-07-10 PROCEDURE — 85027 COMPLETE CBC AUTOMATED: CPT | Performed by: INTERNAL MEDICINE

## 2017-07-10 PROCEDURE — 36415 COLL VENOUS BLD VENIPUNCTURE: CPT | Performed by: INTERNAL MEDICINE

## 2017-07-11 ENCOUNTER — TRANSFERRED RECORDS (OUTPATIENT)
Dept: HEALTH INFORMATION MANAGEMENT | Facility: CLINIC | Age: 67
End: 2017-07-11

## 2017-07-11 LAB
ALBUMIN SERPL-MCNC: 4.6 G/DL (ref 3.4–5)
ALP SERPL-CCNC: 96 U/L (ref 40–150)
ALT SERPL W P-5'-P-CCNC: 25 U/L (ref 0–50)
ANION GAP SERPL CALCULATED.3IONS-SCNC: 9 MMOL/L (ref 3–14)
AST SERPL W P-5'-P-CCNC: 19 U/L (ref 0–45)
BILIRUB SERPL-MCNC: 0.6 MG/DL (ref 0.2–1.3)
BUN SERPL-MCNC: 18 MG/DL (ref 7–30)
CALCIUM SERPL-MCNC: 9.6 MG/DL (ref 8.5–10.1)
CHLORIDE SERPL-SCNC: 102 MMOL/L (ref 94–109)
CHOLEST SERPL-MCNC: 180 MG/DL
CO2 SERPL-SCNC: 30 MMOL/L (ref 20–32)
CREAT SERPL-MCNC: 0.79 MG/DL (ref 0.52–1.04)
DEPRECATED CALCIDIOL+CALCIFEROL SERPL-MC: 65 UG/L (ref 20–75)
GFR SERPL CREATININE-BSD FRML MDRD: 73 ML/MIN/1.7M2
GLUCOSE SERPL-MCNC: 99 MG/DL (ref 70–99)
HDLC SERPL-MCNC: 64 MG/DL
LDLC SERPL CALC-MCNC: 97 MG/DL
NONHDLC SERPL-MCNC: 116 MG/DL
POTASSIUM SERPL-SCNC: 4.7 MMOL/L (ref 3.4–5.3)
PROT SERPL-MCNC: 7.7 G/DL (ref 6.8–8.8)
SODIUM SERPL-SCNC: 141 MMOL/L (ref 133–144)
TRIGL SERPL-MCNC: 93 MG/DL

## 2017-07-14 ENCOUNTER — OFFICE VISIT (OUTPATIENT)
Dept: CARDIOLOGY | Facility: CLINIC | Age: 67
End: 2017-07-14
Attending: PHYSICIAN ASSISTANT
Payer: COMMERCIAL

## 2017-07-14 VITALS
SYSTOLIC BLOOD PRESSURE: 128 MMHG | DIASTOLIC BLOOD PRESSURE: 66 MMHG | HEART RATE: 60 BPM | BODY MASS INDEX: 26.33 KG/M2 | WEIGHT: 163.8 LBS | HEIGHT: 66 IN

## 2017-07-14 DIAGNOSIS — I10 BENIGN ESSENTIAL HYPERTENSION: Primary | ICD-10-CM

## 2017-07-14 DIAGNOSIS — E78.5 DYSLIPIDEMIA, GOAL LDL BELOW 70: ICD-10-CM

## 2017-07-14 DIAGNOSIS — I25.10 CORONARY ARTERY DISEASE INVOLVING NATIVE CORONARY ARTERY OF NATIVE HEART WITHOUT ANGINA PECTORIS: ICD-10-CM

## 2017-07-14 PROCEDURE — 99214 OFFICE O/P EST MOD 30 MIN: CPT | Performed by: INTERNAL MEDICINE

## 2017-07-14 RX ORDER — UREA 200 MG/G
CREAM TOPICAL PRN
COMMUNITY

## 2017-07-14 RX ORDER — ERYTHROMYCIN 500 MG/1
500 TABLET, COATED ORAL PRN
COMMUNITY
End: 2017-07-17

## 2017-07-14 RX ORDER — ATORVASTATIN CALCIUM 10 MG/1
20 TABLET, FILM COATED ORAL DAILY
Qty: 180 TABLET | Refills: 3 | Status: SHIPPED | OUTPATIENT
Start: 2017-07-14 | End: 2017-07-26

## 2017-07-14 RX ORDER — AMLODIPINE BESYLATE 2.5 MG/1
2.5 TABLET ORAL DAILY
Qty: 90 TABLET | Refills: 3 | Status: SHIPPED | OUTPATIENT
Start: 2017-07-14 | End: 2017-10-30

## 2017-07-14 NOTE — MR AVS SNAPSHOT
After Visit Summary   7/14/2017    Marva Angela    MRN: [id number]           Patient Information     Date Of Birth          1950        Visit Information        Provider Department      7/14/2017 10:45 AM Aren Fountain MD [facility] [place]        Today's Diagnoses     Benign essential hypertension    -  1    Coronary artery disease involving native coronary artery of native heart without angina pectoris        Dyslipidemia, goal LDL below 70           Follow-ups after your visit        Additional Services     Follow-Up with Cardiac Advanced Practice Provider           Follow-Up with Cardiologist                 Your next 10 appointments already scheduled     Jul 17, 2017  8:30 AM CDT   Pre-Op physical with Leif Fair MD   [facility] ([facility])    [address] [address]   [phone]            Jul 31, 2017   Procedure with Francisco J Alston MD   [facility] (--)    [address]  [facility] [address]   [phone]            Aug 04, 2017  9:15 AM CDT   Anticoagulation Visit with [facility]   [facility] ([facility])    [address] [address]   [phone]              Future tests that were ordered for you today     Open Future Orders        Priority Expected Expires Ordered    Basic metabolic panel Routine 1/10/2018 7/14/2018 7/14/2017    Lipid Profile Routine 1/10/2018 7/14/2018 7/14/2017    ALT Routine 1/10/2018 7/14/2018 7/14/2017    Echocardiogram Routine 1/10/2018 7/14/2018 7/14/2017    Follow-Up with Cardiologist Routine 1/10/2018 7/14/2018 7/14/2017    Basic metabolic panel Routine 10/12/2017 7/14/2018 7/14/2017    Lipid Profile Routine 10/12/2017 7/14/2018 7/14/2017    ALT Routine 10/12/2017 7/14/2018 7/14/2017     "Follow-Up with Cardiac Advanced Practice Provider Routine 10/12/2017 7/14/2018 7/14/2017            Who to contact     If you have questions or need follow up information about today's clinic visit or your schedule please contact [facility] [place] directly at [phone].  Normal or non-critical lab and imaging results will be communicated to you by MyChart, letter or phone within 4 business days after the clinic has received the results. If you do not hear from us within 7 days, please contact the clinic through [employer] or phone. If you have a critical or abnormal lab result, we will notify you by phone as soon as possible.  Submit refill requests through [employer] or call your pharmacy and they will forward the refill request to us. Please allow 3 business days for your refill to be completed.          Additional Information About Your Visit        MyChart Information     [employer] gives you secure access to your electronic health record. If you see a primary care provider, you can also send messages to your care team and make appointments. If you have questions, please call your primary care clinic.  If you do not have a primary care provider, please call [phone] and they will assist you.        Care EveryWhere ID     This is your Care EveryWhere ID. This could be used by other organizations to access your [place] medical records  [id number]        Your Vitals Were     Pulse Height BMI (Body Mass Index)             60 1.664 m (5' 5.51\") 26.83 kg/m2          Blood Pressure from Last 3 Encounters:   07/14/17 128/66   06/02/17 134/82   04/05/17 118/57    Weight from Last 3 Encounters:   07/14/17 74.3 kg (163 lb 12.8 oz)   06/02/17 74.7 kg (164 lb 11.2 oz)   04/03/17 71.7 kg (158 lb)              We Performed the Following     Follow-Up with Cardiologist          Today's Medication Changes          These changes are accurate as of: 7/14/17 11:47 AM.  If you have any questions, " ask your nurse or doctor.               These medicines have changed or have updated prescriptions.        Dose/Directions    atorvastatin 10 MG tablet   Commonly known as:  LIPITOR   This may have changed:  how much to take   Used for:  Dyslipidemia, goal LDL below 70   Changed by:  Aren Fountain MD        Dose:  20 mg   Take 2 tablets (20 mg) by mouth daily   Quantity:  180 tablet   Refills:  3            Where to get your medicines      These medications were sent to [id number] - [place], [place] - [address] AT [facility] [address]  [address] [facility] [address]     Phone:  [phone]     amLODIPine 2.5 MG tablet    atorvastatin 10 MG tablet                Primary Care Provider Office Phone # Fax #    Leif Fair [phone]       [facility] [address] [facility] [address]        Equal Access to Services     KHUSHBU FRIAS AH: Hadii aad ku hadasho Soomaali, waaxda luqadaha, qaybta kaalmada adeegyada, waxay idiin hayaan salvadoreg kharash elina . So [place] [phone].    ATENCIÓN: Si cooperla español, tiene a [place]field disposición servicios gratuitos de asistencia lingüística. [facility] [phone].    We comply with applicable federal civil rights laws and [place] laws. We do not discriminate on the basis of race, color, national origin, age, disability sex, sexual orientation or gender identity.            Thank you!     Thank you for choosing [facility] [place]  for your care. Our goal is always to provide you with excellent care. Hearing back from our patients is one way we can continue to improve our services. Please take a few minutes to complete the written survey that you may receive in the mail after your visit with us. Thank you!             Your Updated Medication List - Protect others around you: Learn how to safely use, store and throw away your medicines at  www.disposemymeds.org.          This list is accurate as of: 7/14/17 11:47 AM.  Always use your most recent med list.                   Brand Name Dispense Instructions for use Diagnosis    albuterol 108 (90 BASE) MCG/ACT Inhaler    PROAIR HFA/PROVENTIL HFA/VENTOLIN HFA    3 Inhaler    Inhale 2 puffs into the lungs every 6 hours as needed for shortness of breath / dyspnea or wheezing    Chronic obstructive pulmonary disease, unspecified COPD type (H)       amLODIPine 2.5 MG tablet    NORVASC    90 tablet    Take 1 tablet (2.5 mg) by mouth daily    Benign essential hypertension       aspirin EC 81 MG EC tablet     30 tablet    Take 1 tablet (81 mg) by mouth daily    Coronary artery disease involving native coronary artery of native heart without angina pectoris       atorvastatin 10 MG tablet    LIPITOR    180 tablet    Take 2 tablets (20 mg) by mouth daily    Dyslipidemia, goal LDL below 70       betamethasone dipropionate 0.05 % cream    DIPROSONE     Apply topically 2 times daily as needed (For foot)        CALCIUM 500+D3 500-400 MG-UNIT Tabs   Generic drug:  Calcium Carb-Cholecalciferol           cholecalciferol 98700 UNITS capsule    VITAMIN D3    40 capsule    Take 1 capsule (50,000 Units) by mouth every 14 days SIG: TAKE ONE CAP EVERY OTHER WEEK, INDICATION: TO TREAT VITAMIN D DEFICIENCY (1ST AND 15TH OF EACH MONTH), MUST TAKE WITH FAT CONTAINING MEAL, TAKE 1 EXTRA CAPSULE THIS WEEK ONLY    Osteopenia, Psoriasis with arthropathy (H)       Cyanocobalamin 5000 MCG/ML Liqd    B-12 SUPER STRENGTH    2 Bottle    Place 1 mL under the tongue every morning FOR VITAMIN B12 SUPPLEMENTATION, PLEASE PLACE UNDER THE TONGUE    Vitamin B12 deficiency (non anemic)       erythromycin base 500 MG Tabs      Take 500 mg by mouth as needed        folic acid 1 MG tablet    FOLVITE    100 tablet    Take 1 tablet (1 mg) by mouth daily INDICATION: FOLIC ACID SUPPLEMENT    Vitamin B12 deficiency (non anemic)       ranitidine 150 MG  tablet    ZANTAC     Take 150 mg by mouth daily as needed for heartburn        spironolactone-hydrochlorothiazide 25-25 MG per tablet    ALDACTAZIDE    90 tablet    Take 1 tablet by mouth every morning INDICATION:TO LOWER BLOOD PRESSURE    Benign essential hypertension       triamcinolone 0.1 % cream    KENALOG     Apply topically 2 times daily as needed for irritation (sores on scalp.)        TYLENOL PO      Take 1,000 mg by mouth 2 times daily as needed for mild pain or fever        Urea 20 % Crea cream      Apply topically as needed        vitamin C-electrolytes 1000mg vitamin C super orange drink mix    EMERGEN-C     Take 1 packet by mouth daily Mix 1 packet in 4-6oz water and take once or twice daily or as directed.

## 2017-07-14 NOTE — PROGRESS NOTES
Office Visit     7/14/2017  [facility] [place]    Aren Fountain MD   Cardiology    Hyperlipidemia LDL goal <130 +5 more   Dx    Heart Problem , FU Cardiac testing , Results ; Referred by Aren Fountain MD   Reason for visit    Progress Notes      HISTORY OF PRESENT ILLNESS:  The patient is a 66-year-old slender white female who was referred to Cardiology Clinic for further assessment of CT scan of the chest for cancer evaluation showing calcification of the ascending thoracic aorta as well as coronary arteries.  The patient related no significant symptoms of chest discomfort, shortness of breath at rest, dizziness, palpitations, nausea, vomiting, diaphoresis or syncope.  There is no history of PND, orthopnea, fevers, chills or sweats.  There is no documented history of myocardial infarction, congestive heart failure, rheumatic heart disease, congenital heart disease or heart murmur.         History for coronary disease risk factors are positive for cigarette smoking, discontinued in 2015 after 50 pack years, positive for hypertension treated for 2015 with somewhat problematic control, negative for diabetes mellitus, positive for hypercholesterolemia with attempted treatments in 2016 and a positive family history of myocardial infarction with stroke and 58.  She has a rare alcoholic beverage and approximately 1 caffeinated beverage a day.  She does have some sleeping difficulties.         She had a CT scan of her chest that described emphysema with underlying symptoms of COPD.  She has been attempted on metoprolol and did not tolerate the medication has also not tolerated lisinopril or losartan for diarrhea primarily.  She denies any symptoms of chest discomfort, shortness of breath, dizziness, palpitations or syncope.       MEDICATIONS:   1.  Vitamin D3 50,000 units every 14 days.   2.  Cyanocobalamin B12 every morning.   3.  Spironolactone/hydrochlorothiazide  25/25 mg once a day.   4.  Folic acid 1 mg a day.   5.  Vitamin C 1000 mg a day.   6.  Calcium carbonate vitamin D 1 tablet a day.   7.  Celebrex 200 mg twice daily as needed.   8.  Acetaminophen 1000 mg every 8 hours as needed.   9.  Atorvastatin 15 mg a day.   10.  Ranitidine 150 mg a day.   11.  Albuterol inhaler as needed.   12.  Aspirin 81 mg a day.       LABORATORY DATA:  Demonstrated cholesterol 180, HDL 64, LDL 97, triglycerides 93.  Sodium 141, potassium 4.7, BUN 18, creatinine 0.79.  Hemoglobin 14.7, platelet count 299,000.  ALT 25.       The patient had an echocardiogram this month to follow left ventricular function that showed normal-sized left ventricle with intact systolic function with ejection fraction 60%-65% with evidence of pseudonormalization with no significant valvular insufficiency or stenosis.       The patient presents to Cardiology Clinic for follow up of her cardiovascular and vascular disease and also in preparation for possible hip replacement and knee replacement surgery during the course of this year.  In fact, she is scheduled for hip replacement in early April. Lexiscan cardiolite stress test was performed with no evidence of significant ischemia or infarct.    Patient has done well since her hip surgery with good recovery and no chest discomfort, SOB, or dizziness, tolerating her medications well.      PHYSICAL EXAMINATION:   VITAL SIGNS:  Blood pressure 128/66 with a heart rate of 60 and regular.  Weight was 163 pounds, which is stable.   NECK:  Without jugular venous distention, carotid bruit or palpable thyroid.   CHEST:  Essentially clear to percussion and auscultation with prolonged expiratory phase and decreased breath sounds at the bases.   CARDIAC:  Regular rhythm, soft S4 gallop, 1/6 systolic murmur at the left sternal border with minimal radiation.  No diastolic murmur, rub or S3.   EXTREMITIES:  Without cyanosis or edema.       CLINICAL IMPRESSION:   1.  Relatively  stable cardiac condition.   2.  History of ischemic cardiovascular disease with calcification of the ascending thoracic aorta and coronary arteries.   3.  Hypertension improved.   4.  Chronic obstructive pulmonary disease from a longstanding history of cigarette use, which was discontinued in 2015.   5.  Hyperlipidemia, not at goal.   6.  Positive family history of coronary disease.   7.  Osteoarthritis affecting hips and knees scheduled for hip and knee surgery replacement in 2017.       DISCUSSION:  The patient has done reasonably well with improved BP control, nut lipids not quite at goal..  Her serum lipids were controlled when she is stricter with her diet and she will try this but will consider increasing the atorvastatin to 20 mg a day.  She has not tolerated lisinopril, losartan, or metoprolol, but will continue small dose of amlodipine together with her diuretic for blood pressure control in treatment of her atherosclerotic heart disease.  She does not show evidence of significant ischemia in 2017.       Her primary risk for her upcoming knee replacement surgery may be respiratory considering longstanding history of cigarette smoking and the documentation of COPD/emphysema.  She will need close followup of her blood pressure, heart rate, rhythm and fluid administration in the perioperative period.       RECOMMENDATIONS:   1.  Continue present medications except to increase atorvastatin to 20 mg q day..   2.  Close followup of serum lipids, basic metabolic panel and blood pressure with followup with Rufina Palacios in 3 month.   3.  ECHO in 3 months to check LV function.   4.  Continue to avoid cigarettes.   5.  Aggressive pulmonary therapy.   6.  Orthopedic followup.   7.  Routine medical followup.   8.  Cardiology followup in 6 months.       cc:      Leif Fair MD    [facility]   [address] [place] [address]           SUSANA POWELL MD, [facility]

## 2017-07-14 NOTE — LETTER
7/14/2017    Leif Fair MD  [facility]   [address]  [facility] [address]    RE: Marva Angela       Dear Colleague,    I had the pleasure of seeing Marva Angela in the [facility].    Office Visit     7/14/2017  [facility] [place]    Aren Fountain MD   Cardiology    Hyperlipidemia LDL goal <130 +5 more   Dx    Heart Problem , FU Cardiac testing , Results ; Referred by Aren Fountain MD   Reason for visit    Progress Notes      HISTORY OF PRESENT ILLNESS:  The patient is a 66-year-old slender white female who was referred to Cardiology Clinic for further assessment of CT scan of the chest for cancer evaluation showing calcification of the ascending thoracic aorta as well as coronary arteries.  The patient related no significant symptoms of chest discomfort, shortness of breath at rest, dizziness, palpitations, nausea, vomiting, diaphoresis or syncope.  There is no history of PND, orthopnea, fevers, chills or sweats.  There is no documented history of myocardial infarction, congestive heart failure, rheumatic heart disease, congenital heart disease or heart murmur.         History for coronary disease risk factors are positive for cigarette smoking, discontinued in 2015 after 50 pack years, positive for hypertension treated for 2015 with somewhat problematic control, negative for diabetes mellitus, positive for hypercholesterolemia with attempted treatments in 2016 and a positive family history of myocardial infarction with stroke and 58.  She has a rare alcoholic beverage and approximately 1 caffeinated beverage a day.  She does have some sleeping difficulties.         She had a CT scan of her chest that described emphysema with underlying symptoms of COPD.  She has been attempted on metoprolol and did not tolerate the medication has also not tolerated lisinopril or losartan for diarrhea  primarily.  She denies any symptoms of chest discomfort, shortness of breath, dizziness, palpitations or syncope.       MEDICATIONS:   1.  Vitamin D3 50,000 units every 14 days.   2.  Cyanocobalamin B12 every morning.   3.  Spironolactone/hydrochlorothiazide 25/25 mg once a day.   4.  Folic acid 1 mg a day.   5.  Vitamin C 1000 mg a day.   6.  Calcium carbonate vitamin D 1 tablet a day.   7.  Celebrex 200 mg twice daily as needed.   8.  Acetaminophen 1000 mg every 8 hours as needed.   9.  Atorvastatin 15 mg a day.   10.  Ranitidine 150 mg a day.   11.  Albuterol inhaler as needed.   12.  Aspirin 81 mg a day.       LABORATORY DATA:  Demonstrated cholesterol 180, HDL 64, LDL 97, triglycerides 93.  Sodium 141, potassium 4.7, BUN 18, creatinine 0.79.  Hemoglobin 14.7, platelet count 299,000.  ALT  25.       The patient had an echocardiogram this month to follow left ventricular function that showed normal-sized left ventricle with intact systolic function with ejection fraction 60%-65% with evidence of pseudonormalization with no significant valvular insufficiency or stenosis.       The patient presents to Cardiology Clinic for follow up of her cardiovascular and vascular disease and also in preparation for possible hip replacement and knee replacement surgery during the course of this year.  In fact, she is scheduled for hip replacement in early April. Lexiscan cardiolite stress test was performed with no evidence of significant ischemia or infarct.    Patient has done well since her hip surgery with good recovery and no chest discomfort, SOB, or dizziness, tolerating her medications well.      PHYSICAL EXAMINATION:   VITAL SIGNS:  Blood pressure 128/66 with a heart rate of 60 and regular.  Weight was 163 pounds, which is stable.   NECK:  Without jugular venous distention, carotid bruit or palpable thyroid.   CHEST:  Essentially clear to percussion and auscultation with prolonged expiratory phase and decreased breath  sounds at the bases.   CARDIAC:  Regular rhythm, soft S4 gallop, 1/6 systolic murmur at the left sternal border with minimal radiation.  No diastolic murmur, rub or S3.   EXTREMITIES:  Without cyanosis or edema.       CLINICAL IMPRESSION:   1.  Relatively stable cardiac condition.   2.  History of ischemic cardiovascular disease with calcification of the ascending thoracic aorta and coronary arteries.   3.  Hypertension  improved.   4.  Chronic obstructive pulmonary disease from a longstanding history of cigarette use, which was discontinued in 2015.   5.  Hyperlipidemia, not at goal.   6.  Positive family history of coronary disease.   7.  Osteoarthritis affecting hips and knees scheduled for hip and knee surgery replacement in 2017.       DISCUSSION:  The patient has done reasonably well with improved BP control, nut lipids not quite at goal..  Her serum lipids were controlled when she is stricter with her diet and she will try this but will consider increasing the atorvastatin to 20 mg a day.  She has not tolerated lisinopril, losartan, or metoprolol, but will continue small dose of amlodipine together with her diuretic for blood pressure control in treatment of her atherosclerotic heart disease.  She does not show evidence of significant ischemia in 2017.       Her primary risk for her upcoming knee replacement surgery may be respiratory considering longstanding history of cigarette smoking and the documentation of COPD/emphysema.  She will need close followup of her blood pressure, heart rate, rhythm and fluid administration in the perioperative period.       RECOMMENDATIONS:   1.  Continue present medications except to increase atorvastatin to 20 mg q day..   2.  Close followup of serum lipids, basic metabolic panel and blood pressure with followup with Rufina Palacios in 3 month.   3.   ECHO in 3 months to check LV function.   4.  Continue to avoid cigarettes.   5.  Aggressive pulmonary therapy.   6.  Orthopedic  followup.   7.  Routine medical followup.   8.  Cardiology followup in 6 months.       cc:      Leif Fair MD    [facility]   [address] [place] [address]           AREN FOUNTAIN MD, FACC          Thank you for allowing me to participate in the care of your patient.    Sincerely,     Aren Fountain MD     [facility]

## 2017-07-17 ENCOUNTER — OFFICE VISIT (OUTPATIENT)
Dept: INTERNAL MEDICINE | Facility: CLINIC | Age: 67
End: 2017-07-17
Payer: COMMERCIAL

## 2017-07-17 VITALS
WEIGHT: 162 LBS | HEART RATE: 58 BPM | HEIGHT: 65 IN | SYSTOLIC BLOOD PRESSURE: 120 MMHG | RESPIRATION RATE: 16 BRPM | TEMPERATURE: 98.3 F | DIASTOLIC BLOOD PRESSURE: 60 MMHG | BODY MASS INDEX: 26.99 KG/M2

## 2017-07-17 DIAGNOSIS — Z01.818 PREOP GENERAL PHYSICAL EXAM: Primary | ICD-10-CM

## 2017-07-17 DIAGNOSIS — Z23 NEED FOR VACCINATION: ICD-10-CM

## 2017-07-17 DIAGNOSIS — R73.03 PREDIABETES: ICD-10-CM

## 2017-07-17 DIAGNOSIS — Z86.718 PERSONAL HISTORY OF DVT (DEEP VEIN THROMBOSIS): ICD-10-CM

## 2017-07-17 DIAGNOSIS — L40.9 PSORIASIS: ICD-10-CM

## 2017-07-17 PROCEDURE — 99215 OFFICE O/P EST HI 40 MIN: CPT | Mod: 25 | Performed by: INTERNAL MEDICINE

## 2017-07-17 PROCEDURE — 90732 PPSV23 VACC 2 YRS+ SUBQ/IM: CPT | Performed by: INTERNAL MEDICINE

## 2017-07-17 PROCEDURE — G0009 ADMIN PNEUMOCOCCAL VACCINE: HCPCS | Performed by: INTERNAL MEDICINE

## 2017-07-17 RX ORDER — BETAMETHASONE DIPROPIONATE 0.5 MG/G
CREAM TOPICAL 2 TIMES DAILY PRN
Qty: 45 G | Status: SHIPPED | OUTPATIENT
Start: 2017-07-17 | End: 2019-12-20

## 2017-07-17 NOTE — PROGRESS NOTES
[address] [address]  [phone]  Dept: [phone]    PRE-OP EVALUATION:  Today's date: 2017    Marva Angela (: 1950) presents for pre-operative evaluation assessment as requested by Gamal  She requires evaluation and anesthesia risk assessment prior to undergoing surgery/procedure for treatment of Osteoarthritis .  Proposed procedure: Lt total Knee    Date of Surgery/ Procedure: 17  Time of Surgery/ Procedure: 7:30 am  Hospital/Surgical Facility: [facility]  Fax number for surgical facility:   Primary Physician: Leif Fair  Type of Anesthesia Anticipated: General    Patient has a Health Care Directive or Living Will:  YES     1. NO - Do you have a history of heart attack, stroke, stent, bypass or surgery on an artery in the head, neck, heart or legs?  2. NO - Do you ever have any pain or discomfort in your chest?  3. NO - Do you have a history of  Heart Failure?  4. NO - Are you troubled by shortness of breath when: walking on the level, up a slight hill or at night?  5. NO - Do you currently have a cold, bronchitis or other respiratory infection?  6. NO - Do you have a cough, shortness of breath or wheezing?  7. NO - Do you sometimes get pains in the calves of your legs when you walk?  8. Yes - Do you or anyone in your family have previous history of blood clots?  9. NO - Do you or does anyone in your family have a serious bleeding problem such as prolonged bleeding following surgeries or cuts?  10. NO - Have you ever had problems with anemia or been told to take iron pills?  11. NO - Have you had any abnormal blood loss such as black, tarry or bloody stools, or abnormal vaginal bleeding?  12. NO - Have you ever had a blood transfusion?  13. Yes - Have you or any of your relatives ever had problems with anesthesia?  14. NO - Do you have sleep apnea, excessive snoring or daytime drowsiness?  15. NO - Do you have  any prosthetic heart valves?  16. Yes - Do you have prosthetic joints?  17. NO - Is there any chance that you may be pregnant?      HPI:                                                      Brief HPI related to upcoming procedure: Marva has a long-standing history of generalized arthritis now requiring left knee replacement. She has had right hip replacement in the past.      HYPERTENSION - Patient has longstanding history of mod-severe HTN , currently denies any symptoms referable to elevated blood pressure. Specifically denies chest pain, palpitations, dyspnea, orthopnea, PND or peripheral edema. Blood pressure readings have been in normal range. Current medication regimen is as listed below. Patient denies any side effects of medication.                                                                                                                                                                                          .    MEDICAL HISTORY:                                                      Patient Active Problem List    Diagnosis Date Noted     Mixed hyperlipidemia 11/07/2012     Priority: High     Osteopenia 11/01/2005     Priority: High     Problem list name updated by automated process. Provider to review       Personal history of tobacco use, presenting hazards to health 11/01/2005     Priority: High     Hip joint replacement status 04/03/2017     Priority: Medium     Tinea pedis of left foot 03/21/2017     Priority: Medium     Impaired fasting glucose 03/21/2017     Priority: Medium     Hypercalcemia 03/21/2017     Priority: Medium     Vitamin B12 deficiency (non anemic) 12/05/2016     Priority: Medium     SEVERE       Scurvy 12/05/2016     Priority: Medium     SEVERE       Vitamin D deficiency 12/05/2016     Priority: Medium     Hematuria 12/05/2016     Priority: Medium     Primary osteoarthritis, unspecified site 12/05/2016     Priority: Medium     Arthralgia of both knees 12/05/2016     Priority:  Medium     Hip pain, right 10/14/2016     Priority: Medium     Family history of thyroid disease 10/14/2016     Priority: Medium     Fatigue, unspecified type 10/14/2016     Priority: Medium     Hair loss 10/14/2016     Priority: Medium     Pain in joint, multiple sites 10/14/2016     Priority: Medium     Seborrheic keratosis 10/14/2016     Priority: Medium     Psoriasis with arthropathy (H) 10/14/2016     Priority: Medium     Urine abnormality 10/14/2016     Priority: Medium     BROWNISH STAIN ON UNDERWEAR       Benign essential hypertension      Priority: Medium     Eczema 08/31/2009     Priority: Medium     Advance Care Planning 03/12/2012     Priority: Low     Advance Care Planning 3/31/2016: Receipt of ACP document:  Received: Health Care Directive which was witnessed or notarized on 8-3-08.  Document previously scanned on 3-29-16.  Validation form completed and sent to be scanned.  Code Status needs to be updated to reflect choices in most recent ACP document.  Confirmed/documented designated decision maker(s).  Added by Rosa Maria Gutierrez RN, System Director ACP-Honoring Choices   Advance Care Planning 3/12/12 Discussed advance care planning with patient; information given to patient to review. Mary Doherty        Past Medical History:   Diagnosis Date     Benign essential hypertension     was on amlodipine/ then metoprolol / add lisinopril      Contact dermatitis and other eczema, due to unspecified cause      Hyperlipidemia LDL goal <130 11/7/2012     Nephritis      as a child (post strep)      Osteoporosis, unspecified      Phlebitis and thrombophlebitis of other deep vessels of lower extremities 1972, 1975    Both with pregnancies     Sinusitis      Tobacco use disorder      Past Surgical History:   Procedure Laterality Date     ARTHROPLASTY HIP Right 4/3/2017    Procedure: ARTHROPLASTY HIP;  Surgeon: Francisco J Alston MD;  Location: [facility] OR     BIOPSY OF SKIN LESION       C DEXA, BONE DENSITY, AXIAL SKEL   6/2008    T score lumbar -0.6, femoral neck -2.4/-2.0(all stable)     C VAGINAL HYSTERECTOMY  1978    Hysterectomy, Vaginal     HC ASPIRATION &/OR INJECTION GANGLION CYST, ANY  1994     HERNIORRHAPHY INGUINAL Right 3/24/2015    Procedure: HERNIORRHAPHY INGUINAL;  Surgeon: Sam Erazo MD;  Location: [facility]     HYSTERECTOMY, PAP NO LONGER INDICATED       Current Outpatient Prescriptions   Medication Sig Dispense Refill     Urea 20 % CREA cream Apply topically as needed       Calcium Carb-Cholecalciferol (CALCIUM 500+D3) 500-400 MG-UNIT TABS        amLODIPine (NORVASC) 2.5 MG tablet Take 1 tablet (2.5 mg) by mouth daily 90 tablet 3     atorvastatin (LIPITOR) 10 MG tablet Take 2 tablets (20 mg) by mouth daily 180 tablet 3     triamcinolone (KENALOG) 0.1 % cream Apply topically 2 times daily as needed for irritation (sores on scalp.)       vitamin C-electrolytes (EMERGEN-C) 1000mg vitamin C super orange drink mix Take 1 packet by mouth daily Mix 1 packet in 4-6oz water and take once or twice daily or as directed.       Acetaminophen (TYLENOL PO) Take 1,000 mg by mouth 2 times daily as needed for mild pain or fever       betamethasone dipropionate (DIPROSONE) 0.05 % cream Apply topically 2 times daily as needed (For foot)        cholecalciferol (VITAMIN D3) 53257 UNITS capsule Take 1 capsule (50,000 Units) by mouth every 14 days SIG: TAKE ONE CAP EVERY OTHER WEEK, INDICATION: TO TREAT VITAMIN D DEFICIENCY (1ST AND 15TH OF EACH MONTH), MUST TAKE WITH FAT CONTAINING MEAL, TAKE 1 EXTRA CAPSULE THIS WEEK ONLY 40 capsule 0     Cyanocobalamin (B-12 SUPER STRENGTH) 5000 MCG/ML LIQD Place 1 mL under the tongue every morning FOR VITAMIN B12 SUPPLEMENTATION, PLEASE PLACE UNDER THE TONGUE 2 Bottle PRN     spironolactone-hydrochlorothiazide (ALDACTAZIDE) 25-25 MG per tablet Take 1 tablet by mouth every morning INDICATION:TO LOWER BLOOD PRESSURE 90 tablet 3     folic acid (FOLVITE) 1 MG tablet Take 1 tablet (1 mg) by mouth  "daily INDICATION: FOLIC ACID SUPPLEMENT 100 tablet 3     ranitidine (ZANTAC) 150 MG tablet Take 150 mg by mouth daily as needed for heartburn        albuterol (PROAIR HFA, PROVENTIL HFA, VENTOLIN HFA) 108 (90 BASE) MCG/ACT inhaler Inhale 2 puffs into the lungs every 6 hours as needed for shortness of breath / dyspnea or wheezing 3 Inhaler 1     aspirin EC 81 MG tablet Take 1 tablet (81 mg) by mouth daily 30 tablet 11     erythromycin base 500 MG TABS Take 500 mg by mouth as needed       OTC products: None, except as noted above    Allergies   Allergen Reactions     Penicillins      rash     Spiriva Handihaler Other (See Comments)     Felt like the med \"paralyzed\" her diaphragm.  Had increased difficulty taking deep breath     Sulfa Drugs      rash      Latex Allergy: NO    Social History   Substance Use Topics     Smoking status: Former Smoker     Packs/day: 1.00     Years: 10.00     Types: Cigarettes     Start date: 2/7/1964     Quit date: 9/7/2015     Smokeless tobacco: Never Used      Comment: 5-10 daily     Alcohol use 0.0 oz/week     0 Standard drinks or equivalent per week      Comment: 1 a month     History   Drug Use No       REVIEW OF SYSTEMS:                                                    C: NEGATIVE for fever, chills, change in weight  I: NEGATIVE for worrisome rashes, moles or lesions  E: NEGATIVE for vision changes or irritation  E/M: NEGATIVE for ear, mouth and throat problems  R: NEGATIVE for significant cough or SOB  B: NEGATIVE for masses, tenderness or discharge  CV: NEGATIVE for chest pain, palpitations or peripheral edema  GI: NEGATIVE for nausea, abdominal pain, heartburn, or change in bowel habits  : NEGATIVE for frequency, dysuria, or hematuria  M: NEGATIVE for significant arthralgias or myalgia  N: NEGATIVE for weakness, dizziness or paresthesias  E: NEGATIVE for temperature intolerance, skin/hair changes  H: NEGATIVE for bleeding problems  P: NEGATIVE for changes in mood or " "affect    EXAM:                                                    /60  Pulse 58  Temp 98.3  F (36.8  C) (Oral)  Resp 16  Ht 5' 5\" (1.651 m)  Wt 162 lb (73.5 kg)  PF 95 L/min  BMI 26.96 kg/m2    GENERAL APPEARANCE: healthy, alert and no distress     EYES: EOMI, PERRL     NECK: no adenopathy, no asymmetry, masses, or scars and thyroid normal to palpation     RESP: lungs clear to auscultation - no rales, rhonchi or wheezes     CV: regular rates and rhythm, normal S1 S2, no S3 or S4 and no murmur, click or rub     ABDOMEN:  soft, nontender, no HSM or masses and bowel sounds normal     MS: extremities normal- no gross deformities noted, no evidence of inflammation in joints, FROM in all extremities.     SKIN: bilateral varicose veins     NEURO: Normal strength and tone, sensory exam grossly normal, mentation intact and speech normal     PSYCH: mentation appears normal. and affect normal/bright     LYMPHATICS: No axillary, cervical, or supraclavicular nodes    DIAGNOSTICS:                                                    Marva had recent EKG, echocardiogram and stress done in February 2017 with good results and no evidence of ischemia. EKG tracing results as noted below:    EKG Findings  The resting EKG demonstrated sinus rhythm. The stress EKG demonstrated  no EKG changes suggestive ischemia. Occasional premature atrial  complexes were noted.      Recent Labs   Lab Test  07/10/17   0921 05/11/17 04/27/17 04/05/17   0645   04/03/17   1236   03/21/17   0944   HGB  14.5   --    --    --   10.6*   < >   --    --   15.2   PLT  262   --    --    --    --    --   258   --   273   INR   --   2.3*  2.0*   < >  1.20*   < >   --    --    --    NA  141   --    --    --   138   --    --    --   138   POTASSIUM  4.7   --    --    --   3.3*   --    --    < >  4.4   CR  0.79   --    --    --   0.62   --   0.80   --   0.77   A1C   --    --    --    --    --    --    --    --   6.4*    < > = values in this interval " not displayed.        IMPRESSION:                                                    Reason for surgery/procedure: Left knee replacement  Diagnosis/reason for consult: OBTAIN PREOPERATIVE MEDICAL CLEARANCE AND TO ASSESS CARDIAC RISK      The proposed surgical procedure is considered INTERMEDIATE risk.    REVISED CARDIAC RISK INDEX  The patient has the following serious cardiovascular risks for perioperative complications such as (MI, PE, VFib and 3  AV Block):  No serious cardiac risks  INTERPRETATION: 1 risks: Class II (low risk - 0.9% complication rate)    The patient has the following additional risks for perioperative complications:  No identified additional risks    No diagnosis found.    RECOMMENDATIONS:                                                      --Because of DVT or PE history, patient should be on low molecular weight heparin and wear compression hose before & after surgery    --Patient is to take all scheduled medications on the day of surgery EXCEPT for modifications listed below.    ACE Inhibitor or Angiotensin Receptor Blocker (ARB) Use  Ace inhibitor or Angiotensin Receptor Blocker (ARB) and should HOLD this medication for the 24 hours prior to surgery.      APPROVAL GIVEN to proceed with proposed procedure, without further diagnostic evaluation       Signed Electronically by: Leif Fair MD    Copy of this evaluation report is provided to requesting physician.    [facility] Preop Guidelines

## 2017-07-17 NOTE — MR AVS SNAPSHOT
After Visit Summary   7/17/2017    Marva Angela    MRN: [id number]           Patient Information     Date Of Birth          1950        Visit Information        Provider Department      7/17/2017 8:30 AM Leif Fair MD [facility]        Today's Diagnoses     Preop general physical exam    -  1    Psoriasis        Prediabetes        Personal history of DVT (deep vein thrombosis)          Care Instructions      i WISH YOU ALL THE BEST WITH SURGERY, PLEASE MAKE AN APPOINTMENT TO FOLLOW UP WITH ME POSTOPERATIVELY    Before Your Surgery      Call your surgeon if there is any change in your health. This includes signs of a cold or flu (such as a sore throat, runny nose, cough, rash or fever).    Do not smoke, drink alcohol or take over the counter medicine (unless your surgeon or primary care doctor tells you to) for the 24 hours before and after surgery.    If you take prescribed drugs: Follow your doctor s orders about which medicines to take and which to stop until after surgery.    Eating and drinking prior to surgery: follow the instructions from your surgeon    Take a shower or bath the night before surgery. Use the soap your surgeon gave you to gently clean your skin. If you do not have soap from your surgeon, use your regular soap. Do not shave or scrub the surgery site.  Wear clean pajamas and have clean sheets on your bed.           Follow-ups after your visit        Your next 10 appointments already scheduled     Jul 31, 2017   Procedure with Francisco J Alston MD   [facility] (--)    [address]  [facility] [address]   [phone]            Aug 04, 2017 11:45 AM CDT   Anticoagulation Visit with [facility]   [facility] ([facility])    [address] [id number] [phone]              Who to contact     If you have  "questions or need follow up information about today's clinic visit or your schedule please contact [facility] directly at [phone].  Normal or non-critical lab and imaging results will be communicated to you by MyChart, letter or phone within 4 business days after the clinic has received the results. If you do not hear from us within 7 days, please contact the clinic through [email or web address] or phone. If you have a critical or abnormal lab result, we will notify you by phone as soon as possible.  Submit refill requests through [email or web address] or call your pharmacy and they will forward the refill request to us. Please allow 3 business days for your refill to be completed.          Additional Information About Your Visit        [email or web address] Information     [email or web address] gives you secure access to your electronic health record. If you see a primary care provider, you can also send messages to your care team and make appointments. If you have questions, please call your primary care clinic.  If you do not have a primary care provider, please call [phone] and they will assist you.        Care EveryWhere ID     This is your Care EveryWhere ID. This could be used by other organizations to access your [place] medical records  [id number]        Your Vitals Were     Pulse Temperature Respirations Height Peak Flow BMI (Body Mass Index)    58 98.3  F (36.8  C) (Oral) 16 5' 5\" (1.651 m) 95 L/min 26.96 kg/m2       Blood Pressure from Last 3 Encounters:   07/17/17 120/60   07/14/17 128/66   06/02/17 134/82    Weight from Last 3 Encounters:   07/17/17 162 lb (73.5 kg)   07/14/17 163 lb 12.8 oz (74.3 kg)   06/02/17 164 lb 11.2 oz (74.7 kg)              Today, you had the following     No orders found for display         Today's Medication Changes          These changes are accurate as of: 7/17/17  9:24 AM.  If you have any questions, ask your nurse or doctor.               Stop taking these medicines if you haven't " already. Please contact your care team if you have questions.     erythromycin base 500 MG Tabs   Stopped by:  Leif Fair MD                Where to get your medicines      These medications were sent to [id number] - [place], [place] - [address] AT [facility] [address]  [address]  [address], [place] [address]     Phone:  [phone]     betamethasone dipropionate 0.05 % cream                Primary Care Provider Office Phone # Fax #    Leif Fair [phone]       [facility] [address] [facility] [address]        Equal Access to Services     KHUSHBU FRIAS : Hadii jaya ku hadasho Soomaali, waaxda luqadaha, qaybta kaalmada adeegyada, waxay luciain haysonja campos. So [facility] [phone].    ATENCIÓN: Si habla español, tiene a barfield disposición servicios gratuitos de asistencia lingüística. [facility] [phone].    We comply with applicable federal civil rights laws and [place] laws. We do not discriminate on the basis of race, color, national origin, age, disability sex, sexual orientation or gender identity.            Thank you!     Thank you for choosing [facility]  for your care. Our goal is always to provide you with excellent care. Hearing back from our patients is one way we can continue to improve our services. Please take a few minutes to complete the written survey that you may receive in the mail after your visit with us. Thank you!             Your Updated Medication List - Protect others around you: Learn how to safely use, store and throw away your medicines at www.disposemymeds.org.          This list is accurate as of: 7/17/17  9:24 AM.  Always use your most recent med list.                   Brand Name Dispense Instructions for use Diagnosis    albuterol 108 (90 BASE) MCG/ACT Inhaler    PROAIR HFA/PROVENTIL HFA/VENTOLIN HFA    3 Inhaler    Inhale 2 puffs into the lungs  every 6 hours as needed for shortness of breath / dyspnea or wheezing    Chronic obstructive pulmonary disease, unspecified COPD type (H)       amLODIPine 2.5 MG tablet    NORVASC    90 tablet    Take 1 tablet (2.5 mg) by mouth daily    Benign essential hypertension       aspirin EC 81 MG EC tablet     30 tablet    Take 1 tablet (81 mg) by mouth daily    Coronary artery disease involving native coronary artery of native heart without angina pectoris       atorvastatin 10 MG tablet    LIPITOR    180 tablet    Take 2 tablets (20 mg) by mouth daily    Dyslipidemia, goal LDL below 70       betamethasone dipropionate 0.05 % cream    DIPROSONE    45 g    Apply topically 2 times daily as needed (For foot)    Psoriasis       CALCIUM 500+D3 500-400 MG-UNIT Tabs   Generic drug:  Calcium Carb-Cholecalciferol           cholecalciferol 16167 UNITS capsule    VITAMIN D3    40 capsule    Take 1 capsule (50,000 Units) by mouth every 14 days SIG: TAKE ONE CAP EVERY OTHER WEEK, INDICATION: TO TREAT VITAMIN D DEFICIENCY (1ST AND 15TH OF EACH MONTH), MUST TAKE WITH FAT CONTAINING MEAL, TAKE 1 EXTRA CAPSULE THIS WEEK ONLY    Osteopenia, Psoriasis with arthropathy (H)       Cyanocobalamin 5000 MCG/ML Liqd    B-12 SUPER STRENGTH    2 Bottle    Place 1 mL under the tongue every morning FOR VITAMIN B12 SUPPLEMENTATION, PLEASE PLACE UNDER THE TONGUE    Vitamin B12 deficiency (non anemic)       folic acid 1 MG tablet    FOLVITE    100 tablet    Take 1 tablet (1 mg) by mouth daily INDICATION: FOLIC ACID SUPPLEMENT    Vitamin B12 deficiency (non anemic)       ranitidine 150 MG tablet    ZANTAC     Take 150 mg by mouth daily as needed for heartburn        spironolactone-hydrochlorothiazide 25-25 MG per tablet    ALDACTAZIDE    90 tablet    Take 1 tablet by mouth every morning INDICATION:TO LOWER BLOOD PRESSURE    Benign essential hypertension       triamcinolone 0.1 % cream    KENALOG     Apply topically 2 times daily as needed for irritation  (sores on scalp.)        TYLENOL PO      Take 1,000 mg by mouth 2 times daily as needed for mild pain or fever        Urea 20 % Crea cream      Apply topically as needed        vitamin C-electrolytes 1000mg vitamin C super orange drink mix    EMERGEN-C     Take 1 packet by mouth daily Mix 1 packet in 4-6oz water and take once or twice daily or as directed.

## 2017-07-17 NOTE — NURSING NOTE
Screening Questionnaire for Adult Immunization    Are you sick today?   No   Do you have allergies to medications, food, a vaccine component or latex?   Yes   Have you ever had a serious reaction after receiving a vaccination?   No   Do you have a long-term health problem with heart disease, lung disease, asthma, kidney disease, metabolic disease (e.g. diabetes), anemia, or other blood disorder?   No   Do you have cancer, leukemia, HIV/AIDS, or any other immune system problem?   No   In the past 3 months, have you taken medications that affect  your immune system, such as prednisone, other steroids, or anticancer drugs; drugs for the treatment of rheumatoid arthritis, Crohn s disease, or psoriasis; or have you had radiation treatments?   No   Have you had a seizure, or a brain or other nervous system problem?   No   During the past year, have you received a transfusion of blood or blood     products, or been given immune (gamma) globulin or antiviral drug?   No   For women: Are you pregnant or is there a chance you could become        pregnant during the next month?   No   Have you received any vaccinations in the past 4 weeks?   No     Immunization questionnaire answers  One positive MD notified  [facility]    Per orders of Dr. Fair , injection of Pneumovax 23  given by Talisha Hopson. Patient instructed to remain in clinic for 15 minutes afterwards, and to report any adverse reaction to me immediately.       Screening performed by Talisha Hopson on 7/17/2017 at 9:32 AM.

## 2017-07-17 NOTE — PATIENT INSTRUCTIONS
i WISH YOU ALL THE BEST WITH SURGERY, PLEASE MAKE AN APPOINTMENT TO FOLLOW UP WITH ME POSTOPERATIVELY    Before Your Surgery      Call your surgeon if there is any change in your health. This includes signs of a cold or flu (such as a sore throat, runny nose, cough, rash or fever).    Do not smoke, drink alcohol or take over the counter medicine (unless your surgeon or primary care doctor tells you to) for the 24 hours before and after surgery.    If you take prescribed drugs: Follow your doctor s orders about which medicines to take and which to stop until after surgery.    Eating and drinking prior to surgery: follow the instructions from your surgeon    Take a shower or bath the night before surgery. Use the soap your surgeon gave you to gently clean your skin. If you do not have soap from your surgeon, use your regular soap. Do not shave or scrub the surgery site.  Wear clean pajamas and have clean sheets on your bed.

## 2017-07-17 NOTE — NURSING NOTE
"Chief Complaint   Patient presents with     Pre-Op Exam     07/31/17 [facility]  TCO       Initial /60  Pulse 58  Temp 98.3  F (36.8  C) (Oral)  Resp 16  Ht 5' 5\" (1.651 m)  Wt 162 lb (73.5 kg)  PF 95 L/min  BMI 26.96 kg/m2 Estimated body mass index is 26.96 kg/(m^2) as calculated from the following:    Height as of this encounter: 5' 5\" (1.651 m).    Weight as of this encounter: 162 lb (73.5 kg).  Blood pressure completed using cuff size: regular    "

## 2017-07-27 ENCOUNTER — MYC MEDICAL ADVICE (OUTPATIENT)
Dept: INTERNAL MEDICINE | Facility: CLINIC | Age: 67
End: 2017-07-27

## 2017-07-31 ENCOUNTER — APPOINTMENT (OUTPATIENT)
Dept: PHYSICAL THERAPY | Facility: CLINIC | Age: 67
DRG: 470 | End: 2017-07-31
Attending: ORTHOPAEDIC SURGERY
Payer: MEDICARE

## 2017-07-31 ENCOUNTER — ANESTHESIA (OUTPATIENT)
Dept: SURGERY | Facility: CLINIC | Age: 67
DRG: 470 | End: 2017-07-31
Payer: MEDICARE

## 2017-07-31 ENCOUNTER — HOSPITAL ENCOUNTER (INPATIENT)
Facility: CLINIC | Age: 67
LOS: 3 days | Discharge: HOME | DRG: 470 | End: 2017-08-03
Attending: ORTHOPAEDIC SURGERY | Admitting: ORTHOPAEDIC SURGERY
Payer: MEDICARE

## 2017-07-31 ENCOUNTER — ANESTHESIA EVENT (OUTPATIENT)
Dept: SURGERY | Facility: CLINIC | Age: 67
DRG: 470 | End: 2017-07-31
Payer: MEDICARE

## 2017-07-31 ENCOUNTER — APPOINTMENT (OUTPATIENT)
Dept: GENERAL RADIOLOGY | Facility: CLINIC | Age: 67
DRG: 470 | End: 2017-07-31
Attending: ORTHOPAEDIC SURGERY
Payer: MEDICARE

## 2017-07-31 DIAGNOSIS — Z96.652 KNEE JOINT REPLACEMENT STATUS, LEFT: Primary | ICD-10-CM

## 2017-07-31 LAB
CREAT SERPL-MCNC: 0.81 MG/DL (ref 0.52–1.04)
GFR SERPL CREATININE-BSD FRML MDRD: 70 ML/MIN/1.7M2
GLUCOSE BLDC GLUCOMTR-MCNC: 119 MG/DL (ref 70–99)
PLATELET # BLD AUTO: 245 10E9/L (ref 150–450)
POTASSIUM SERPL-SCNC: 3.4 MMOL/L (ref 3.4–5.3)

## 2017-07-31 PROCEDURE — 25000132 ZZH RX MED GY IP 250 OP 250 PS 637: Mod: GY | Performed by: ORTHOPAEDIC SURGERY

## 2017-07-31 PROCEDURE — 40000986 XR KNEE PORT LT 1/2 VW: Mod: LT

## 2017-07-31 PROCEDURE — 27810169 ZZH OR IMPLANT GENERAL: Performed by: ORTHOPAEDIC SURGERY

## 2017-07-31 PROCEDURE — 27210995 ZZH RX 272: Performed by: ORTHOPAEDIC SURGERY

## 2017-07-31 PROCEDURE — 36000063 ZZH SURGERY LEVEL 4 EA 15 ADDTL MIN: Performed by: ORTHOPAEDIC SURGERY

## 2017-07-31 PROCEDURE — C1776 JOINT DEVICE (IMPLANTABLE): HCPCS | Performed by: ORTHOPAEDIC SURGERY

## 2017-07-31 PROCEDURE — 27110028 ZZH OR GENERAL SUPPLY NON-STERILE: Performed by: ORTHOPAEDIC SURGERY

## 2017-07-31 PROCEDURE — 97161 PT EVAL LOW COMPLEX 20 MIN: CPT | Mod: GP

## 2017-07-31 PROCEDURE — 25000125 ZZHC RX 250: Performed by: NURSE ANESTHETIST, CERTIFIED REGISTERED

## 2017-07-31 PROCEDURE — 82565 ASSAY OF CREATININE: CPT | Performed by: ANESTHESIOLOGY

## 2017-07-31 PROCEDURE — S0077 INJECTION, CLINDAMYCIN PHOSP: HCPCS | Performed by: ORTHOPAEDIC SURGERY

## 2017-07-31 PROCEDURE — 82565 ASSAY OF CREATININE: CPT | Performed by: ORTHOPAEDIC SURGERY

## 2017-07-31 PROCEDURE — 36415 COLL VENOUS BLD VENIPUNCTURE: CPT | Performed by: ANESTHESIOLOGY

## 2017-07-31 PROCEDURE — 25000125 ZZHC RX 250: Performed by: ANESTHESIOLOGY

## 2017-07-31 PROCEDURE — 93005 ELECTROCARDIOGRAM TRACING: CPT

## 2017-07-31 PROCEDURE — 25000128 H RX IP 250 OP 636: Performed by: ANESTHESIOLOGY

## 2017-07-31 PROCEDURE — 25800025 ZZH RX 258: Performed by: ORTHOPAEDIC SURGERY

## 2017-07-31 PROCEDURE — 85049 AUTOMATED PLATELET COUNT: CPT | Performed by: ORTHOPAEDIC SURGERY

## 2017-07-31 PROCEDURE — 71000012 ZZH RECOVERY PHASE 1 LEVEL 1 FIRST HR: Performed by: ORTHOPAEDIC SURGERY

## 2017-07-31 PROCEDURE — 0SRD0J9 REPLACEMENT OF LEFT KNEE JOINT WITH SYNTHETIC SUBSTITUTE, CEMENTED, OPEN APPROACH: ICD-10-PCS | Performed by: ORTHOPAEDIC SURGERY

## 2017-07-31 PROCEDURE — 36415 COLL VENOUS BLD VENIPUNCTURE: CPT | Performed by: ORTHOPAEDIC SURGERY

## 2017-07-31 PROCEDURE — 25000128 H RX IP 250 OP 636: Performed by: SURGERY

## 2017-07-31 PROCEDURE — 12000007 ZZH R&B INTERMEDIATE

## 2017-07-31 PROCEDURE — A9270 NON-COVERED ITEM OR SERVICE: HCPCS | Mod: GY | Performed by: ORTHOPAEDIC SURGERY

## 2017-07-31 PROCEDURE — 37000009 ZZH ANESTHESIA TECHNICAL FEE, EACH ADDTL 15 MIN: Performed by: ORTHOPAEDIC SURGERY

## 2017-07-31 PROCEDURE — 27110038 ZZH RX 271: Performed by: SURGERY

## 2017-07-31 PROCEDURE — 36000093 ZZH SURGERY LEVEL 4 1ST 30 MIN: Performed by: ORTHOPAEDIC SURGERY

## 2017-07-31 PROCEDURE — 27210794 ZZH OR GENERAL SUPPLY STERILE: Performed by: ORTHOPAEDIC SURGERY

## 2017-07-31 PROCEDURE — 25000128 H RX IP 250 OP 636: Performed by: ORTHOPAEDIC SURGERY

## 2017-07-31 PROCEDURE — 00000146 ZZHCL STATISTIC GLUCOSE BY METER IP

## 2017-07-31 PROCEDURE — 37000008 ZZH ANESTHESIA TECHNICAL FEE, 1ST 30 MIN: Performed by: ORTHOPAEDIC SURGERY

## 2017-07-31 PROCEDURE — 25000128 H RX IP 250 OP 636: Performed by: NURSE ANESTHETIST, CERTIFIED REGISTERED

## 2017-07-31 PROCEDURE — 25000125 ZZHC RX 250: Performed by: ORTHOPAEDIC SURGERY

## 2017-07-31 PROCEDURE — 40000171 ZZH STATISTIC PRE-PROCEDURE ASSESSMENT III: Performed by: ORTHOPAEDIC SURGERY

## 2017-07-31 PROCEDURE — 84132 ASSAY OF SERUM POTASSIUM: CPT | Performed by: ANESTHESIOLOGY

## 2017-07-31 PROCEDURE — 97110 THERAPEUTIC EXERCISES: CPT | Mod: GP

## 2017-07-31 PROCEDURE — 93010 ELECTROCARDIOGRAM REPORT: CPT | Performed by: INTERNAL MEDICINE

## 2017-07-31 PROCEDURE — 97116 GAIT TRAINING THERAPY: CPT | Mod: GP

## 2017-07-31 PROCEDURE — 40000193 ZZH STATISTIC PT WARD VISIT

## 2017-07-31 PROCEDURE — 25000566 ZZH SEVOFLURANE, EA 15 MIN: Performed by: ORTHOPAEDIC SURGERY

## 2017-07-31 PROCEDURE — 25000125 ZZHC RX 250: Performed by: SURGERY

## 2017-07-31 DEVICE — IMP COMP TIBIAL KNEE ASCENT 71MM 141223: Type: IMPLANTABLE DEVICE | Site: KNEE | Status: FUNCTIONAL

## 2017-07-31 DEVICE — IMPLANTABLE DEVICE: Type: IMPLANTABLE DEVICE | Site: KNEE | Status: FUNCTIONAL

## 2017-07-31 DEVICE — BONE CEMENT RADIOPAQUE SIMPLEX HV FULL DOSE 6194-1-001: Type: IMPLANTABLE DEVICE | Site: KNEE | Status: FUNCTIONAL

## 2017-07-31 DEVICE — IMP COMP PATELLA BIOM ARCM 34MM: Type: IMPLANTABLE DEVICE | Site: KNEE | Status: FUNCTIONAL

## 2017-07-31 RX ORDER — FENTANYL CITRATE 50 UG/ML
INJECTION, SOLUTION INTRAMUSCULAR; INTRAVENOUS PRN
Status: DISCONTINUED | OUTPATIENT
Start: 2017-07-31 | End: 2017-07-31

## 2017-07-31 RX ORDER — ALBUTEROL SULFATE 90 UG/1
2 AEROSOL, METERED RESPIRATORY (INHALATION) EVERY 6 HOURS PRN
Status: DISCONTINUED | OUTPATIENT
Start: 2017-07-31 | End: 2017-08-03 | Stop reason: HOSPADM

## 2017-07-31 RX ORDER — SPIRONOLACTONE AND HYDROCHLOROTHIAZIDE 25; 25 MG/1; MG/1
1 TABLET ORAL EVERY MORNING
Status: DISCONTINUED | OUTPATIENT
Start: 2017-07-31 | End: 2017-07-31

## 2017-07-31 RX ORDER — HYDROMORPHONE HYDROCHLORIDE 1 MG/ML
.3-.5 INJECTION, SOLUTION INTRAMUSCULAR; INTRAVENOUS; SUBCUTANEOUS EVERY 5 MIN PRN
Status: DISCONTINUED | OUTPATIENT
Start: 2017-07-31 | End: 2017-07-31 | Stop reason: HOSPADM

## 2017-07-31 RX ORDER — SODIUM CHLORIDE AND POTASSIUM CHLORIDE 150; 450 MG/100ML; MG/100ML
INJECTION, SOLUTION INTRAVENOUS CONTINUOUS
Status: DISCONTINUED | OUTPATIENT
Start: 2017-07-31 | End: 2017-07-31

## 2017-07-31 RX ORDER — ONDANSETRON 2 MG/ML
4 INJECTION INTRAMUSCULAR; INTRAVENOUS EVERY 6 HOURS PRN
Status: DISCONTINUED | OUTPATIENT
Start: 2017-07-31 | End: 2017-08-03 | Stop reason: HOSPADM

## 2017-07-31 RX ORDER — ONDANSETRON 4 MG/1
4 TABLET, ORALLY DISINTEGRATING ORAL EVERY 6 HOURS PRN
Status: DISCONTINUED | OUTPATIENT
Start: 2017-07-31 | End: 2017-08-03 | Stop reason: HOSPADM

## 2017-07-31 RX ORDER — HYDROMORPHONE HYDROCHLORIDE 1 MG/ML
.3-.5 INJECTION, SOLUTION INTRAMUSCULAR; INTRAVENOUS; SUBCUTANEOUS
Status: DISCONTINUED | OUTPATIENT
Start: 2017-07-31 | End: 2017-08-03 | Stop reason: HOSPADM

## 2017-07-31 RX ORDER — ACETAMINOPHEN 500 MG
1000 TABLET ORAL ONCE
Status: COMPLETED | OUTPATIENT
Start: 2017-07-31 | End: 2017-07-31

## 2017-07-31 RX ORDER — BETAMETHASONE DIPROPIONATE 0.5 MG/G
CREAM TOPICAL 2 TIMES DAILY PRN
Status: DISCONTINUED | OUTPATIENT
Start: 2017-07-31 | End: 2017-08-03 | Stop reason: HOSPADM

## 2017-07-31 RX ORDER — MEPERIDINE HYDROCHLORIDE 25 MG/ML
12.5 INJECTION INTRAMUSCULAR; INTRAVENOUS; SUBCUTANEOUS EVERY 5 MIN PRN
Status: DISCONTINUED | OUTPATIENT
Start: 2017-07-31 | End: 2017-07-31 | Stop reason: HOSPADM

## 2017-07-31 RX ORDER — NALOXONE HYDROCHLORIDE 0.4 MG/ML
.1-.4 INJECTION, SOLUTION INTRAMUSCULAR; INTRAVENOUS; SUBCUTANEOUS
Status: DISCONTINUED | OUTPATIENT
Start: 2017-07-31 | End: 2017-07-31

## 2017-07-31 RX ORDER — PROCHLORPERAZINE MALEATE 5 MG
5 TABLET ORAL EVERY 6 HOURS PRN
Status: DISCONTINUED | OUTPATIENT
Start: 2017-07-31 | End: 2017-08-03 | Stop reason: HOSPADM

## 2017-07-31 RX ORDER — LIDOCAINE HYDROCHLORIDE 20 MG/ML
INJECTION, SOLUTION INFILTRATION; PERINEURAL PRN
Status: DISCONTINUED | OUTPATIENT
Start: 2017-07-31 | End: 2017-07-31

## 2017-07-31 RX ORDER — ONDANSETRON 4 MG/1
4 TABLET, ORALLY DISINTEGRATING ORAL EVERY 30 MIN PRN
Status: DISCONTINUED | OUTPATIENT
Start: 2017-07-31 | End: 2017-07-31 | Stop reason: HOSPADM

## 2017-07-31 RX ORDER — OXYCODONE HCL 10 MG/1
10 TABLET, FILM COATED, EXTENDED RELEASE ORAL ONCE
Status: COMPLETED | OUTPATIENT
Start: 2017-07-31 | End: 2017-07-31

## 2017-07-31 RX ORDER — LIDOCAINE 40 MG/G
CREAM TOPICAL
Status: DISCONTINUED | OUTPATIENT
Start: 2017-07-31 | End: 2017-08-03 | Stop reason: HOSPADM

## 2017-07-31 RX ORDER — METOCLOPRAMIDE HYDROCHLORIDE 5 MG/ML
5 INJECTION INTRAMUSCULAR; INTRAVENOUS EVERY 6 HOURS PRN
Status: DISCONTINUED | OUTPATIENT
Start: 2017-07-31 | End: 2017-08-03 | Stop reason: HOSPADM

## 2017-07-31 RX ORDER — ACETAMINOPHEN 325 MG/1
975 TABLET ORAL EVERY 8 HOURS
Status: DISCONTINUED | OUTPATIENT
Start: 2017-07-31 | End: 2017-08-03 | Stop reason: HOSPADM

## 2017-07-31 RX ORDER — PROPOFOL 10 MG/ML
INJECTION, EMULSION INTRAVENOUS PRN
Status: DISCONTINUED | OUTPATIENT
Start: 2017-07-31 | End: 2017-07-31

## 2017-07-31 RX ORDER — ASPIRIN 81 MG/1
81 TABLET ORAL DAILY
Status: DISCONTINUED | OUTPATIENT
Start: 2017-07-31 | End: 2017-08-03 | Stop reason: HOSPADM

## 2017-07-31 RX ORDER — METOCLOPRAMIDE 5 MG/1
5 TABLET ORAL EVERY 6 HOURS PRN
Status: DISCONTINUED | OUTPATIENT
Start: 2017-07-31 | End: 2017-08-03 | Stop reason: HOSPADM

## 2017-07-31 RX ORDER — HYDROCHLOROTHIAZIDE 25 MG/1
25 TABLET ORAL DAILY
Status: DISCONTINUED | OUTPATIENT
Start: 2017-07-31 | End: 2017-08-03 | Stop reason: HOSPADM

## 2017-07-31 RX ORDER — AMLODIPINE BESYLATE 2.5 MG/1
2.5 TABLET ORAL DAILY
Status: DISCONTINUED | OUTPATIENT
Start: 2017-08-01 | End: 2017-08-03 | Stop reason: HOSPADM

## 2017-07-31 RX ORDER — FENTANYL CITRATE 50 UG/ML
25-50 INJECTION, SOLUTION INTRAMUSCULAR; INTRAVENOUS
Status: COMPLETED | OUTPATIENT
Start: 2017-07-31 | End: 2017-07-31

## 2017-07-31 RX ORDER — SODIUM CHLORIDE, SODIUM LACTATE, POTASSIUM CHLORIDE, CALCIUM CHLORIDE 600; 310; 30; 20 MG/100ML; MG/100ML; MG/100ML; MG/100ML
INJECTION, SOLUTION INTRAVENOUS CONTINUOUS
Status: DISCONTINUED | OUTPATIENT
Start: 2017-07-31 | End: 2017-07-31 | Stop reason: HOSPADM

## 2017-07-31 RX ORDER — NALOXONE HYDROCHLORIDE 0.4 MG/ML
.1-.4 INJECTION, SOLUTION INTRAMUSCULAR; INTRAVENOUS; SUBCUTANEOUS
Status: DISCONTINUED | OUTPATIENT
Start: 2017-07-31 | End: 2017-08-03 | Stop reason: HOSPADM

## 2017-07-31 RX ORDER — CLINDAMYCIN PHOSPHATE 900 MG/50ML
900 INJECTION, SOLUTION INTRAVENOUS
Status: COMPLETED | OUTPATIENT
Start: 2017-07-31 | End: 2017-07-31

## 2017-07-31 RX ORDER — AMOXICILLIN 250 MG
1-2 CAPSULE ORAL 2 TIMES DAILY
Status: DISCONTINUED | OUTPATIENT
Start: 2017-07-31 | End: 2017-08-03 | Stop reason: HOSPADM

## 2017-07-31 RX ORDER — FENTANYL CITRATE 50 UG/ML
25-50 INJECTION, SOLUTION INTRAMUSCULAR; INTRAVENOUS
Status: DISCONTINUED | OUTPATIENT
Start: 2017-07-31 | End: 2017-07-31 | Stop reason: HOSPADM

## 2017-07-31 RX ORDER — TRIAMCINOLONE ACETONIDE 1 MG/G
CREAM TOPICAL 2 TIMES DAILY PRN
Status: DISCONTINUED | OUTPATIENT
Start: 2017-07-31 | End: 2017-08-03 | Stop reason: HOSPADM

## 2017-07-31 RX ORDER — ONDANSETRON 2 MG/ML
4 INJECTION INTRAMUSCULAR; INTRAVENOUS EVERY 30 MIN PRN
Status: DISCONTINUED | OUTPATIENT
Start: 2017-07-31 | End: 2017-07-31 | Stop reason: HOSPADM

## 2017-07-31 RX ORDER — DEXAMETHASONE SODIUM PHOSPHATE 4 MG/ML
INJECTION, SOLUTION INTRA-ARTICULAR; INTRALESIONAL; INTRAMUSCULAR; INTRAVENOUS; SOFT TISSUE PRN
Status: DISCONTINUED | OUTPATIENT
Start: 2017-07-31 | End: 2017-07-31

## 2017-07-31 RX ORDER — NEOSTIGMINE METHYLSULFATE 1 MG/ML
VIAL (ML) INJECTION PRN
Status: DISCONTINUED | OUTPATIENT
Start: 2017-07-31 | End: 2017-07-31

## 2017-07-31 RX ORDER — GLYCOPYRROLATE 0.2 MG/ML
INJECTION, SOLUTION INTRAMUSCULAR; INTRAVENOUS PRN
Status: DISCONTINUED | OUTPATIENT
Start: 2017-07-31 | End: 2017-07-31

## 2017-07-31 RX ORDER — OXYCODONE HYDROCHLORIDE 5 MG/1
5-10 TABLET ORAL EVERY 4 HOURS PRN
Status: DISCONTINUED | OUTPATIENT
Start: 2017-07-31 | End: 2017-08-03 | Stop reason: HOSPADM

## 2017-07-31 RX ORDER — CLINDAMYCIN PHOSPHATE 900 MG/50ML
900 INJECTION, SOLUTION INTRAVENOUS EVERY 8 HOURS
Status: COMPLETED | OUTPATIENT
Start: 2017-07-31 | End: 2017-08-01

## 2017-07-31 RX ORDER — ACETAMINOPHEN 325 MG/1
650 TABLET ORAL EVERY 4 HOURS PRN
Status: DISCONTINUED | OUTPATIENT
Start: 2017-08-03 | End: 2017-08-03 | Stop reason: HOSPADM

## 2017-07-31 RX ORDER — SODIUM CHLORIDE 450 MG/100ML
INJECTION, SOLUTION INTRAVENOUS CONTINUOUS
Status: DISCONTINUED | OUTPATIENT
Start: 2017-07-31 | End: 2017-08-03 | Stop reason: HOSPADM

## 2017-07-31 RX ORDER — CLINDAMYCIN PHOSPHATE 900 MG/50ML
900 INJECTION, SOLUTION INTRAVENOUS SEE ADMIN INSTRUCTIONS
Status: DISCONTINUED | OUTPATIENT
Start: 2017-07-31 | End: 2017-07-31 | Stop reason: HOSPADM

## 2017-07-31 RX ORDER — SPIRONOLACTONE 25 MG/1
25 TABLET ORAL DAILY
Status: DISCONTINUED | OUTPATIENT
Start: 2017-07-31 | End: 2017-08-03 | Stop reason: HOSPADM

## 2017-07-31 RX ORDER — ONDANSETRON 2 MG/ML
INJECTION INTRAMUSCULAR; INTRAVENOUS PRN
Status: DISCONTINUED | OUTPATIENT
Start: 2017-07-31 | End: 2017-07-31

## 2017-07-31 RX ORDER — DEXTROSE MONOHYDRATE, SODIUM CHLORIDE, AND POTASSIUM CHLORIDE 50; 1.49; 4.5 G/1000ML; G/1000ML; G/1000ML
INJECTION, SOLUTION INTRAVENOUS CONTINUOUS
Status: DISCONTINUED | OUTPATIENT
Start: 2017-07-31 | End: 2017-07-31

## 2017-07-31 RX ADMIN — CLINDAMYCIN PHOSPHATE 900 MG: 18 INJECTION, SOLUTION INTRAVENOUS at 08:27

## 2017-07-31 RX ADMIN — FENTANYL CITRATE 50 MCG: 50 INJECTION, SOLUTION INTRAMUSCULAR; INTRAVENOUS at 10:04

## 2017-07-31 RX ADMIN — OXYCODONE HYDROCHLORIDE 10 MG: 10 TABLET, FILM COATED, EXTENDED RELEASE ORAL at 07:11

## 2017-07-31 RX ADMIN — FENTANYL CITRATE 50 MCG: 50 INJECTION, SOLUTION INTRAMUSCULAR; INTRAVENOUS at 07:36

## 2017-07-31 RX ADMIN — SODIUM CHLORIDE: 4.5 INJECTION, SOLUTION INTRAVENOUS at 23:33

## 2017-07-31 RX ADMIN — LIDOCAINE HYDROCHLORIDE 1 ML: 10 INJECTION, SOLUTION EPIDURAL; INFILTRATION; INTRACAUDAL; PERINEURAL at 07:13

## 2017-07-31 RX ADMIN — MIDAZOLAM HYDROCHLORIDE 0.5 MG: 1 INJECTION, SOLUTION INTRAMUSCULAR; INTRAVENOUS at 08:11

## 2017-07-31 RX ADMIN — PROPOFOL 150 MG: 10 INJECTION, EMULSION INTRAVENOUS at 08:15

## 2017-07-31 RX ADMIN — DEXAMETHASONE SODIUM PHOSPHATE 4 MG: 4 INJECTION, SOLUTION INTRA-ARTICULAR; INTRALESIONAL; INTRAMUSCULAR; INTRAVENOUS; SOFT TISSUE at 08:38

## 2017-07-31 RX ADMIN — MIDAZOLAM HYDROCHLORIDE 1 MG: 1 INJECTION, SOLUTION INTRAMUSCULAR; INTRAVENOUS at 07:35

## 2017-07-31 RX ADMIN — CLINDAMYCIN PHOSPHATE 900 MG: 18 INJECTION, SOLUTION INTRAVENOUS at 16:37

## 2017-07-31 RX ADMIN — SODIUM CHLORIDE, POTASSIUM CHLORIDE, SODIUM LACTATE AND CALCIUM CHLORIDE: 600; 310; 30; 20 INJECTION, SOLUTION INTRAVENOUS at 08:56

## 2017-07-31 RX ADMIN — ACETAMINOPHEN 975 MG: 325 TABLET, FILM COATED ORAL at 14:48

## 2017-07-31 RX ADMIN — LIDOCAINE HYDROCHLORIDE 80 MG: 20 INJECTION, SOLUTION INFILTRATION; PERINEURAL at 08:15

## 2017-07-31 RX ADMIN — ONDANSETRON 4 MG: 2 INJECTION INTRAMUSCULAR; INTRAVENOUS at 09:14

## 2017-07-31 RX ADMIN — PHENYLEPHRINE HYDROCHLORIDE 100 MCG: 10 INJECTION, SOLUTION INTRAMUSCULAR; INTRAVENOUS; SUBCUTANEOUS at 09:03

## 2017-07-31 RX ADMIN — FENTANYL CITRATE 100 MCG: 50 INJECTION, SOLUTION INTRAMUSCULAR; INTRAVENOUS at 08:15

## 2017-07-31 RX ADMIN — SODIUM CHLORIDE: 4.5 INJECTION, SOLUTION INTRAVENOUS at 14:49

## 2017-07-31 RX ADMIN — HYDROMORPHONE HYDROCHLORIDE 0.5 MG: 1 INJECTION, SOLUTION INTRAMUSCULAR; INTRAVENOUS; SUBCUTANEOUS at 10:15

## 2017-07-31 RX ADMIN — SODIUM CHLORIDE, POTASSIUM CHLORIDE, SODIUM LACTATE AND CALCIUM CHLORIDE: 600; 310; 30; 20 INJECTION, SOLUTION INTRAVENOUS at 07:37

## 2017-07-31 RX ADMIN — FENTANYL CITRATE 50 MCG: 50 INJECTION, SOLUTION INTRAMUSCULAR; INTRAVENOUS at 09:56

## 2017-07-31 RX ADMIN — ASPIRIN 81 MG: 81 TABLET, COATED ORAL at 20:55

## 2017-07-31 RX ADMIN — MIDAZOLAM HYDROCHLORIDE 0.5 MG: 1 INJECTION, SOLUTION INTRAMUSCULAR; INTRAVENOUS at 08:14

## 2017-07-31 RX ADMIN — Medication: at 10:30

## 2017-07-31 RX ADMIN — HYDROMORPHONE HYDROCHLORIDE 0.5 MG: 1 INJECTION, SOLUTION INTRAMUSCULAR; INTRAVENOUS; SUBCUTANEOUS at 08:53

## 2017-07-31 RX ADMIN — ROCURONIUM BROMIDE 50 MG: 10 INJECTION INTRAVENOUS at 08:15

## 2017-07-31 RX ADMIN — SENNOSIDES AND DOCUSATE SODIUM 2 TABLET: 8.6; 5 TABLET ORAL at 20:55

## 2017-07-31 RX ADMIN — GLYCOPYRROLATE 0.5 MG: 0.2 INJECTION, SOLUTION INTRAMUSCULAR; INTRAVENOUS at 09:25

## 2017-07-31 RX ADMIN — ACETAMINOPHEN 975 MG: 325 TABLET, FILM COATED ORAL at 21:57

## 2017-07-31 RX ADMIN — MIDAZOLAM HYDROCHLORIDE 1 MG: 1 INJECTION, SOLUTION INTRAMUSCULAR; INTRAVENOUS at 07:39

## 2017-07-31 RX ADMIN — ROPIVACAINE HYDROCHLORIDE 30 ML GIVEN: 5 INJECTION, SOLUTION EPIDURAL; INFILTRATION; PERINEURAL at 07:47

## 2017-07-31 RX ADMIN — ACETAMINOPHEN 1000 MG: 500 TABLET, FILM COATED ORAL at 07:10

## 2017-07-31 RX ADMIN — HYDROMORPHONE HYDROCHLORIDE 0.5 MG: 1 INJECTION, SOLUTION INTRAMUSCULAR; INTRAVENOUS; SUBCUTANEOUS at 08:42

## 2017-07-31 RX ADMIN — NEOSTIGMINE METHYLSULFATE 3.5 MG: 1 INJECTION INTRAMUSCULAR; INTRAVENOUS; SUBCUTANEOUS at 09:25

## 2017-07-31 ASSESSMENT — LIFESTYLE VARIABLES: TOBACCO_USE: 1

## 2017-07-31 ASSESSMENT — ENCOUNTER SYMPTOMS: DYSRHYTHMIAS: 0

## 2017-07-31 NOTE — ANESTHESIA PROCEDURE NOTES
Peripheral nerve/Neuraxial procedure note : Femoral  Pre-Procedure  Performed by BARBARA VARGAS  Location: pre-op      Pre-Anesthestic Checklist: patient identified, IV checked, site marked, risks and benefits discussed, informed consent, monitors and equipment checked, pre-op evaluation, at physician/surgeon's request and post-op pain management    Timeout  Correct Patient: Yes   Correct Procedure: Yes   Correct Site: Yes   Correct Laterality: Yes   Correct Position: Yes   Site Marked: Yes   .   Procedure Documentation    .    Procedure:    Femoral.  Local skin infiltrated with 3 mL of 1% lidocaine.     Ultrasound used to identify targeted nerve, plexus, or vascular marker and placed a needle adjacent to it., Ultrasound was used to visualize the spread of the anesthetic in close proximity to the above stated nerve. A permanent image is entered into the patient's record.  Patient Prep;mask, sterile gloves, chlorhexidine gluconate and isopropyl alcohol, patient draped.  .  Needle: Touhy needle, insulated Needle Gauge: 18.    Needle Length (Inches) 2  Insertion Method: Single Shot and Continuous Infusion.   Catheter: 20 G . .  Catheter threaded easily  .  .   .    Assessment/Narrative  Paresthesias: No.  .  The placement was negative for: blood aspirated, painful injection and site bleeding.  Bolus given via. No blood aspirated via catheter.   Secured via Tegaderm.   Complications: none. Comments:  Single shot femoral block 30cc of 0.5% Ropivacaine with 1:400K epinephrine injected before catheter threaded, dose given over 5cc incremental doses, no complications.

## 2017-07-31 NOTE — ANESTHESIA CARE TRANSFER NOTE
Patient: Marva Angela    Procedure(s):  LEFT TOTAL KNEE ARTHROPLASTY  - Wound Class: I-Clean    Diagnosis: LEFT KNEE DJD  Diagnosis Additional Information: No value filed.    Anesthesia Type:   Spinal, Periph. Nerve Block for postop pain     Note:  Airway :Face Mask  Patient transferred to:PACU  Comments: Awake, alert, VSS, report to RN, monitors and alarms on, IV patent.[email or web address]      Vitals: (Last set prior to Anesthesia Care Transfer)    CRNA VITALS  7/31/2017 0916 - 7/31/2017 0951      7/31/2017             Resp Rate (observed): (!)  1    Resp Rate (set): 10                Electronically Signed By: ANN Dale CRNA  July 31, 2017  9:51 AM

## 2017-07-31 NOTE — IP AVS SNAPSHOT
MRN:[id number]                      After Visit Summary   7/31/2017    Marva Angela    MRN: [id number]           Thank you!     Thank you for choosing [facility] for your care. Our goal is always to provide you with excellent care. Hearing back from our patients is one way we can continue to improve our services. Please take a few minutes to complete the written survey that you may receive in the mail after you visit with us. Thank you!        Patient Information     Date Of Birth          1950        Designated Caregiver       Most Recent Value    Caregiver    Will someone help with your care after discharge? yes    Name of designated caregiver Emi    Phone number of caregiver     Caregiver address [place]      About your hospital stay     You were admitted on:  July 31, 2017 You last received care in the:  [place] [facility] [address] [facility]    You were discharged on:  August 3, 2017        Reason for your hospital stay       Left knee replacement                  Who to Call     For medical emergencies, please call 911.  For non-urgent questions about your medical care, please call your primary care provider or clinic, [phone]  For questions related to your surgery, please call your surgery clinic        Attending Provider     Provider Francisco J Fernandez MD Orthopedics       Primary Care Provider Office Phone # Fax #    Leif Fair [phone]      After Care Instructions     Activity       Your activity upon discharge: activity as tolerated--use a walker            Diet       Follow this diet upon discharge: Orders Placed This Encounter      Advance Diet as Tolerated: Regular Diet Adult            Wound care and dressings       Instructions to care for your wound at home: daily dressing changes.                  Follow-up Appointments     Follow-up and recommended labs and tests        Follow up with me,   "Francisco J Alston, within 2 weeks. to evaluate after surgery.  The following labs/tests are recommended: INR at anticoagulation clinic Friday.                  Your next 10 appointments already scheduled     Aug 04, 2017 11:45 AM CDT   Anticoagulation Visit with  ANTICOAGULATION CLINIC   [facility]    [address] [address]   [phone]            Oct 30, 2017  8:30 AM CDT   Office Visit with Leif Fair MD   [facility]    [address] [address]   [phone]           Bring a current list of meds and any records pertaining to this visit. For Physicals, please bring immunization records and any forms needing to be filled out. Please arrive 10 minutes early to complete paperwork.              Further instructions from your care team       .TOTAL KNEE REPLACEMENT TAKE HOME INSTRUCTIONS  Your surgeon will answer any questions about your progress. General guidelines for your care are listed below. Your surgeon may give additional instructions for your care at home. Please follow them carefully    Activity Level  1. Physical activity may be resumed gradually according to your comfort level and your surgeon s instructions. Follow your exercise program as instructed by your therapist. Do exercises at least twice a day. you may ice your knee after exercising.  2. Complete exercises two hours before bedtime to minimize the effect pain may have on sleep.  Refer to pages 19-22 of you \"Total Knee Replacement\" booklet for details  3. Do not wear your knee immobilizer unless your doctor has specifically told you to continue it.    Good Health Practices  1. Maintain an adequate fluid intake and eat a well balanced diet.  2. Be sure to include the basic food groups, such as dairy products, meat/fish, vegetables, and fruit. Each of these " foods contribute to your wound healing and increasing your strength.  3. Surgery, decreased activity and pain medication all contribute to a decrease in bowel activity that can result in constipation. It is recommended that you increase your liquid intake, add fiber to your diet, increase activity, and decrease pain medication use. If you have any problems, notify your physician.  4. Wear your anti-embolism stockings day and night until seen by your surgeon if you were issued these at discharge.  (Not all patients will have anti-embolism stockings) Remove twice a day for one hour at a time. You may hand wash and air dry your stockings.  5. Notify your dentist of your total knee surgery and call your dentist one week before a dental appointment for antibiotics, if your dentist will not prescribe antibiotics then call your surgeon to ask for next steps.      Incision/Dressing Care  1. Keep incision clean and dry per surgeons instructions  2. Cover incision if you are still having drainage.  3.  If you have a waterproof dressing __________________________   Shower.    Things to Watch For  1. Check incision daily for increased redness, tenderness, swelling, or drainage along the incision line. If these occur, please notify your doctor. Also, call if you develop a fever above 101 .  2. Please notify your doctor if you experience any calf pain and/or if you have surgical pain not relieved by the pain medication prescribed by your doctor.  Follow up appointment:______________________________  Nurse signature _________________________________ Date ________ Time _____  Patient signature _____________________________ Date _____________________        Revised 05/8/17      Warfarin Instruction     You have started taking a medicine called warfarin. This is a blood-thinning medicine (anticoagulant). It helps prevent and treat blood clots.      Before leaving the hospital, make sure you know how much to take and how long to take  "it.      You will need regular blood tests to make sure your blood is clotting safely. It is very important to see your doctor for regular blood tests.    Talk to your doctor before taking any new medicine (this includes over-the-counter drugs and herbal products). Many medicines can interact with warfarin. This may cause more bleeding or too much clotting.     Eating a lot of vitamin K--found in green, leafy vegetables--can change the way warfarin works in your body. Do NOT avoid these foods. Instead, try to eat the same amount each day.     Bleeding is the most common side-effect of warfarin. You may notice bleeding gums, a bloody nose, bruises and bleeding longer when you cut yourself. See a doctor at once if:   o You cough up blood  o You find blood in your stool (poop)  o You have a deep cut, or a cut that bleeds longer than 10 minutes   o You have a bad cut, hard fall, accident or hit your head (go to urgent care or the emergency room).    For women who can get pregnant: This medicine can harm an unborn baby. Be very careful not to get pregnant while taking this medicine. If you think you might be pregnant, call your doctor right away.    For more information, read \"Guide to Warfarin Therapy,  the booklet you received in the hospital.        Pending Results     No orders found from 7/29/2017 to 8/1/2017.            Statement of Approval     Ordered          08/02/17 0730  I have reviewed and agree with all the recommendations and orders detailed in this document.  EFFECTIVE NOW     Approved and electronically signed by:  Francisco J Alston MD             Admission Information     Date & Time Provider Department Dept. Phone    7/31/2017 Francisco J Alston MD [place] [address] [facility] [phone]      Your Vitals Were     Blood Pressure Pulse Temperature Respirations Height Weight    139/57 (BP Location: Left arm) 67 98.6  F (37  C) (Oral) 16 1.651 m (5' 5\") 72.6 kg (160 lb)    " Pulse Oximetry BMI (Body Mass Index)                94% 26.63 kg/m2          [employer] Information     [employer] gives you secure access to your electronic health record. If you see a primary care provider, you can also send messages to your care team and make appointments. If you have questions, please call your primary care clinic.  If you do not have a primary care provider, please call [phone] and they will assist you.        Care EveryWhere ID     This is your Care EveryWhere ID. This could be used by other organizations to access your [facility] medical records  [id number]        Equal Access to Services     [facility]: Hadmaggy Meier, wayong santacruz, jose laughlin, jenniffer antoine . So [facility] [phone].    ATENCIÓN: Si habla español, tiene a barfield disposición servicios gratuitos de asistencia lingüística. Rojas al [phone].    We comply with applicable federal civil rights laws and [place] laws. We do not discriminate on the basis of race, color, national origin, age, disability sex, sexual orientation or gender identity.               Review of your medicines      START taking        Dose / Directions    oxyCODONE 5 MG IR tablet   Commonly known as:  ROXICODONE        Dose:  5 mg   Take 1 tablet (5 mg) by mouth every 4 hours as needed for moderate to severe pain   Quantity:  40 tablet   Refills:  0       senna-docusate 8.6-50 MG per tablet   Commonly known as:  SENOKOT-S;PERICOLACE        Dose:  1-2 tablet   Take 1-2 tablets by mouth 2 times daily   Quantity:  100 tablet   Refills:  0       * Warfarin Therapy Reminder        5 mg tabs   Quantity:  40 each   Refills:  0       * warfarin 5 MG tablet   Commonly known as:  COUMADIN        Dose:  5 mg   Take 1 tablet (5 mg) by mouth daily   Quantity:  40 tablet   Refills:  0       * Notice:  This list has 2 medication(s) that are the same as other medications prescribed for you. Read the directions  carefully, and ask your doctor or other care provider to review them with you.      CONTINUE these medicines which have NOT CHANGED        Dose / Directions    albuterol 108 (90 BASE) MCG/ACT Inhaler   Commonly known as:  PROAIR HFA/PROVENTIL HFA/VENTOLIN HFA   Used for:  Chronic obstructive pulmonary disease, unspecified COPD type (H)        Dose:  2 puff   Inhale 2 puffs into the lungs every 6 hours as needed for shortness of breath / dyspnea or wheezing   Quantity:  3 Inhaler   Refills:  1       amLODIPine 2.5 MG tablet   Commonly known as:  NORVASC   Used for:  Benign essential hypertension        Dose:  2.5 mg   Take 1 tablet (2.5 mg) by mouth daily   Quantity:  90 tablet   Refills:  3       aspirin EC 81 MG EC tablet   Used for:  Coronary artery disease involving native coronary artery of native heart without angina pectoris        Dose:  81 mg   Take 1 tablet (81 mg) by mouth daily   Quantity:  30 tablet   Refills:  11       betamethasone dipropionate 0.05 % cream   Commonly known as:  DIPROSONE   Used for:  Psoriasis        Apply topically 2 times daily as needed (For foot)   Quantity:  45 g   Refills:  prn       cholecalciferol 41186 UNITS capsule   Commonly known as:  VITAMIN D3   Used for:  Osteopenia, Psoriasis with arthropathy (H)        Dose:  1 capsule   Take 1 capsule (50,000 Units) by mouth every 14 days SIG: TAKE ONE CAP EVERY OTHER WEEK, INDICATION: TO TREAT VITAMIN D DEFICIENCY (1ST AND 15TH OF EACH MONTH), MUST TAKE WITH FAT CONTAINING MEAL, TAKE 1 EXTRA CAPSULE THIS WEEK ONLY   Quantity:  40 capsule   Refills:  0       Cyanocobalamin 5000 MCG/ML Liqd   Commonly known as:  B-12 SUPER STRENGTH   Used for:  Vitamin B12 deficiency (non anemic)        Dose:  1 mL   Place 1 mL under the tongue every morning FOR VITAMIN B12 SUPPLEMENTATION, PLEASE PLACE UNDER THE TONGUE   Quantity:  2 Bottle   Refills:  PRN       ERYTHROMYCIN BASE PO        Dose:  2000 mg   Take 2,000 mg by mouth daily as needed  (prior to dental procedure)   Refills:  0       folic acid 1 MG tablet   Commonly known as:  FOLVITE   Used for:  Vitamin B12 deficiency (non anemic)        Dose:  1 mg   Take 1 tablet (1 mg) by mouth daily INDICATION: FOLIC ACID SUPPLEMENT   Quantity:  100 tablet   Refills:  3       LIPITOR PO        Dose:  15 mg   Take 15 mg by mouth daily (with lunch) (1.5 x 10mg = 15mg)   Refills:  0       OYSTER CALCIUM + D 250-125 MG-UNIT per tablet   Generic drug:  calcium-vitamin D        Dose:  2 tablet   Take 2 tablets by mouth daily   Refills:  0       ranitidine 150 MG tablet   Commonly known as:  ZANTAC        Dose:  150 mg   Take 150 mg by mouth daily as needed for heartburn   Refills:  0       spironolactone-hydrochlorothiazide 25-25 MG per tablet   Commonly known as:  ALDACTAZIDE   Used for:  Benign essential hypertension        Dose:  1 tablet   Take 1 tablet by mouth every morning INDICATION:TO LOWER BLOOD PRESSURE   Quantity:  90 tablet   Refills:  3       triamcinolone 0.1 % cream   Commonly known as:  KENALOG        Apply topically 2 times daily as needed for irritation (sores on scalp.)   Refills:  0       TYLENOL PO        Dose:  1000 mg   Take 1,000 mg by mouth 2 times daily as needed for mild pain or fever   Refills:  0       Urea 20 % Crea cream        Apply topically daily   Refills:  0       vitamin C-electrolytes 1000mg vitamin C super orange drink mix   Commonly known as:  EMERGEN-C        Dose:  1 packet   Take 1 packet by mouth daily Mix 1 packet in 4-6oz water.   Refills:  0            Where to get your medicines      These medications were sent to [facility] - [address]  [address] [place] [address] [address]     Phone:  [phone]     senna-docusate 8.6-50 MG per tablet    warfarin 5 MG tablet         Some of these will need a paper prescription and others can be bought over the counter. Ask your nurse if you have questions.     Bring a paper prescription  for each of these medications     oxyCODONE 5 MG IR tablet    Warfarin Therapy Reminder                Protect others around you: Learn how to safely use, store and throw away your medicines at www.disposemymeds.org.             Medication List: This is a list of all your medications and when to take them. Check marks below indicate your daily home schedule. Keep this list as a reference.      Medications           Morning Afternoon Evening Bedtime As Needed    albuterol 108 (90 BASE) MCG/ACT Inhaler   Commonly known as:  PROAIR HFA/PROVENTIL HFA/VENTOLIN HFA   Inhale 2 puffs into the lungs every 6 hours as needed for shortness of breath / dyspnea or wheezing   Next Dose Due:  Resume as needed                                amLODIPine 2.5 MG tablet   Commonly known as:  NORVASC   Take 1 tablet (2.5 mg) by mouth daily   Last time this was given:  2.5 mg on 8/3/2017  9:28 AM   Next Dose Due:  8/4/17                                   aspirin EC 81 MG EC tablet   Take 1 tablet (81 mg) by mouth daily   Last time this was given:  81 mg on 8/3/2017  9:28 AM   Next Dose Due:  8/4/17                                   betamethasone dipropionate 0.05 % cream   Commonly known as:  DIPROSONE   Apply topically 2 times daily as needed (For foot)   Next Dose Due:  Resume as ordered                                cholecalciferol 09366 UNITS capsule   Commonly known as:  VITAMIN D3   Take 1 capsule (50,000 Units) by mouth every 14 days SIG: TAKE ONE CAP EVERY OTHER WEEK, INDICATION: TO TREAT VITAMIN D DEFICIENCY (1ST AND 15TH OF EACH MONTH), MUST TAKE WITH FAT CONTAINING MEAL, TAKE 1 EXTRA CAPSULE THIS WEEK ONLY   Next Dose Due:  Resume as ordered                                Cyanocobalamin 5000 MCG/ML Liqd   Commonly known as:  B-12 SUPER STRENGTH   Place 1 mL under the tongue every morning FOR VITAMIN B12 SUPPLEMENTATION, PLEASE PLACE UNDER THE TONGUE   Next Dose Due:  Resume as ordered                                ERYTHROMYCIN  BASE PO   Take 2,000 mg by mouth daily as needed (prior to dental procedure)   Next Dose Due:  Resume as ordered                                folic acid 1 MG tablet   Commonly known as:  FOLVITE   Take 1 tablet (1 mg) by mouth daily INDICATION: FOLIC ACID SUPPLEMENT   Next Dose Due:  Resume as ordered                                LIPITOR PO   Take 15 mg by mouth daily (with lunch) (1.5 x 10mg = 15mg)   Last time this was given:  15 mg on 8/2/2017 12:14 PM   Next Dose Due:  8/3/17                                   oxyCODONE 5 MG IR tablet   Commonly known as:  ROXICODONE   Take 1 tablet (5 mg) by mouth every 4 hours as needed for moderate to severe pain   Last time this was given:  5 mg on 8/3/2017  6:43 AM   Next Dose Due:  After 11 am                                   OYSTER CALCIUM + D 250-125 MG-UNIT per tablet   Take 2 tablets by mouth daily   Generic drug:  calcium-vitamin D   Next Dose Due:  Resume as ordered                                ranitidine 150 MG tablet   Commonly known as:  ZANTAC   Take 150 mg by mouth daily as needed for heartburn   Next Dose Due:  Resume as ordered                                senna-docusate 8.6-50 MG per tablet   Commonly known as:  SENOKOT-S;PERICOLACE   Take 1-2 tablets by mouth 2 times daily   Last time this was given:  1 tablet on 8/3/2017  9:28 AM   Next Dose Due:  8/3/17                                   spironolactone-hydrochlorothiazide 25-25 MG per tablet   Commonly known as:  ALDACTAZIDE   Take 1 tablet by mouth every morning INDICATION:TO LOWER BLOOD PRESSURE   Next Dose Due:  8/4/17                                   triamcinolone 0.1 % cream   Commonly known as:  KENALOG   Apply topically 2 times daily as needed for irritation (sores on scalp.)   Next Dose Due:  Resume as ordered                                TYLENOL PO   Take 1,000 mg by mouth 2 times daily as needed for mild pain or fever   Last time this was given:  975 mg on 8/3/2017  5:55 AM                                    Urea 20 % Crea cream   Apply topically daily   Next Dose Due:  Resume as ordered                                vitamin C-electrolytes 1000mg vitamin C super orange drink mix   Commonly known as:  EMERGEN-C   Take 1 packet by mouth daily Mix 1 packet in 4-6oz water.   Next Dose Due:  Resume as ordered                                * Warfarin Therapy Reminder   5 mg tabs   Last time this was given:  30 ml given on 7/31/2017  7:47 AM   Next Dose Due:  8/3/17                                   * warfarin 5 MG tablet   Commonly known as:  COUMADIN   Take 1 tablet (5 mg) by mouth daily   Last time this was given:  5 mg on 8/2/2017  5:15 PM                                * Notice:  This list has 2 medication(s) that are the same as other medications prescribed for you. Read the directions carefully, and ask your doctor or other care provider to review them with you.

## 2017-07-31 NOTE — PLAN OF CARE
Problem: Goal Outcome Summary  Goal: Goal Outcome Summary  Outcome: Improving  Alert and oriented x4 . Denies pain . Tolerating clear liquid diet . Vital signs stable . CMS good . Dressing C/D/I.

## 2017-07-31 NOTE — BRIEF OP NOTE
[facility] Brief Operative Note    Pre-operative diagnosis: LEFT KNEE DJD   Post-operative diagnosis osteoarthritis left knee      Procedure: Procedure(s):  LEFT TOTAL KNEE ARTHROPLASTY  - Wound Class: I-Clean   Surgeon(s): Surgeon(s) and Role:     * Francisco J Alston MD - Primary     * Santhosh Mendieta Jr. - Assisting   Estimated blood loss: 25 mL    Specimens: * No specimens in log *   Findings: Please see the full operative report.

## 2017-07-31 NOTE — IP AVS SNAPSHOT
[address] [facility]    [address] [place] [address]    Phone:  [phone]                                       After Visit Summary   7/31/2017    Marva Angela    MRN: [id number]           After Visit Summary Signature Page     I have received my discharge instructions, and my questions have been answered. I have discussed any challenges I see with this plan with the nurse or doctor.    ..........................................................................................................................................  Patient/Patient Representative Signature      ..........................................................................................................................................  Patient Representative Print Name and Relationship to Patient    ..................................................               ................................................  Date                                            Time    ..........................................................................................................................................  Reviewed by Signature/Title    ...................................................              ..............................................  Date                                                            Time

## 2017-07-31 NOTE — OP NOTE
Marva Angela    7/31/2017    PRE-OP DX:  End stage arthritis left knee    POST-OP DX:  Same    PROCEDURE:  left total knee replacement    SURGEON: Gamaliel               ASSISTANT:  HORTENCIA Mendieta    ANESTHESIA: General, femoral block      DESCRIPTION OF PROCEDURE:  The patient received a left femoral nerve catheter in the pre-op area. IV Clendamycin was started. On arrival to the room General anesthesia was induced. A tourniquet was placed on the left thigh. The limb was prepped and draped in customary fashion. The limb was exsanguinated by elevation, and the tourniquet was inflated to 350 mm Hg pressure.    A midline longitudianal incision was made, and carried down to the extensor mechanism. Full thickness flaps were raised only to the extent necessary. The was entered via a medial parapatellar arthrotomy. The lateral patella femoral ligaments were divided, and the knee was positioned hyperflexed with the patella everted.    There was exposed bone over the medial femoral condyle and medial tibial plateau. There was bone loss at medial tibial plateau. The ACL was Present. The medial meniscus was torn.    Subperiosteal dissection was performed over the proximal medial tibial metaphysis. Meniscectomies were performed. The ACL was divided. The fat pad was excised.    Attention is turned first to the femur. A drill hole was placed above the femoral notch, and the intramedullary guide was placed. This was used to position the distal femoral cutting block, which was pinned in place After medial and lateral condylar drill holes were made, the intramedullary guide was removed. The distal femur was cut. Based on intra-operative measurements, we elected to use the size 70 mm Biomet AGC femoral component. The jig was used to make anterior, posterior, and chamfer cuts. The trial component fit well.    Attention was then turned to the tibia. The extramedullary guide  Was used to position the proximal tibia cutting guide.  As this was a varus knee, the cut was somewhat thicker on the lateral side. Care was taken to preserve the PCL as the proximal tibia was resected. The Biomet maxim size 71 mm trial fit best.  The jigs were used to create the intramedullary hole. The best combination of motion and stability was obtained with the 10 mm tibial insert.     Finally, attention was turned to the patella. The articular surface was resected, and a central drill hole was made. The biomet arcom size 34 mm trial patella fit best. Based on patella tracking, lateral retinacular release was not indicated.    While cement was mixed on the back table, all trial components were removed. The bony interstices were cleansed with poly anti-microbial solution and pulsatile lavage. The bones were dried with suctioning and sponging.    Cementing was begun. First the biomet maxim size 71 mm tibial component was cemented. Next, the Biomet AGC size 70 mm left femoral component was cemented. Excess cement was removed, a trial insert was placed, and the knee was placed in full extension. Lastly, the Biomet arcom size 34 mm patella was cemented and held with a patella clamp. Again excess cement was removed, and the knee was maintained in full extension until the cement was thoroughly hardened.    The trial tibial insert was removed. Final inspection for extraneous cement was performed, and final antibiotic irrigation was performed. The real 10 mm tibial insert was placed and held with the locking clip. Femoral tibial motion, stability and rollback were excellent. Patella femoral tracking was quite satisfactory.        We elected to close. Two medium hemovac drains were brought out via stab incisions. The capsule was closed with #1 vicryl interrupted figure eight sutures, two layers above the patella, and one layer from the patella down. The subcutaneous tissues were closed with 2-0 vicryl, and the skin was re-approximated with skin clips. A bulky sterile  dressing, ace wrap, and knee immobilizer were applied. The patient was taken to the PACU in satisfactory condition. Final counts were correct.    All CMS PQRI guidelines will be followed. Clindamycin will be discontinued in less than 24 hours. Anticoagulation with lovenox will commence.

## 2017-07-31 NOTE — PROGRESS NOTES
07/31/17 1517   Quick Adds   Type of Visit Initial PT Evaluation   Living Environment   Lives With alone   Living Arrangements house   Home Accessibility stairs to enter home;stairs within home   Number of Stairs to Enter Home 1   Number of Stairs Within Home (one flight)   Transportation Available car;family or friend will provide   Living Environment Comment Pt bedroom on 2nd floor, however has one able to stay on main level.  Walk in shower.   Self-Care   Usual Activity Tolerance good   Current Activity Tolerance fair   Equipment Currently Used at Home walker, rolling   Activity/Exercise/Self-Care Comment Previously enjoyed walking 3 miles daily, has been limited due to joint pain   Functional Level Prior   Ambulation 0-->independent   Transferring 0-->independent   Toileting 0-->independent   Bathing 0-->independent   General Information   Onset of Illness/Injury or Date of Surgery - Date 07/31/17   Referring Physician Gamaliel   Patient/Family Goals Statement Home with sister assist first few days (staying with patient) and then daughter flying in from [place] to stay with patient.  Has OP PT appointments set up   Pertinent History of Current Problem (include personal factors and/or comorbidities that impact the POC) Pt is a 66 y/o fe POD 0 s/p L TKA.  Pt planning R TKA within next year, had R ROBLES  April 2017.   Precautions/Limitations fall precautions;oxygen therapy device and L/min  (KI at night, KI with cblock)   Weight-Bearing Status - LLE weight-bearing as tolerated   Cognitive Status Examination   Orientation orientation to person, place and time   Level of Consciousness alert   Follows Commands and Answers Questions 100% of the time;able to follow multistep instructions   Personal Safety and Judgment intact   Pain Assessment   Patient Currently in Pain Yes, see Vital Sign flowsheet  (no at rest, yes with ex)   Range of Motion (ROM)   ROM Comment Decreased L knee ROM into flexion and extension observed with  supine LE ex, consistent s/p TKA.  Noninvolved joints WFL   Strength   Strength Comments Less than antigravity LLE, mod A for SLR.  Good functional strength observed with mobility in noninvolved joints   Bed Mobility Skill: Sit to Supine   Level of [place]: Sit/Supine minimum assist (75% patients effort)   Physical Assist/Nonphysical Assist: Sit/Supine verbal cues;nonverbal cues (demo/gestures);1 person assist   Bed Mobility Skill: Supine to Sit   Level of [place]: Supine/Sit minimum assist (75% patients effort)   Physical Assist/Nonphysical Assist: Supine/Sit verbal cues;nonverbal cues (demo/gestures);1 person assist   Transfer Skill:  Sit to Stand   Level of [place]: Sit/Stand minimum assist (75% patients effort)   Physical Assist/Nonphysical Assist: Sit/Stand verbal cues;nonverbal cues (demo/gestures);1 person assist   Weightbearing Restrictions: Sit/Stand weight-bearing as tolerated   Assistive Device for Transfer: Sit/Stand rolling walker   Transfer Skill: Stand to Sit   Level of [place]: Stand/Sit minimum assist (75% patients effort)   Physical Assist/Nonphysical Assist: Stand/Sit verbal cues;nonverbal cues (demo/gestures);1 person assist   Weight-Bearing Restrictions: Stand/Sit weight-bearing as tolerated   Assistive Device: Stand to Sit rolling walker   Gait Skills   Level of [place]: Gait minimum assist (75% patients effort)   Physical Assist/Nonphysical Assist: Gait verbal cues;nonverbal cues (demo/gestures);1 person assist   Weight-Bearing Restrictions: Gait weight-bearing as tolerated   Assistive Device for Transfer: Gait rolling walker   Gait Distance (5' x 2)   Balance   Balance other (describe)   Balance Comments Good with radha UE support of walker   Muscle Tone   Muscle Tone no deficits were identified   Modality Interventions   Planned Modality Interventions Cryotherapy   General Therapy Interventions   Planned Therapy Interventions bed mobility training;gait  "training;ROM;strengthening;stretching;transfer training;home program guidelines;progressive activity/exercise   Clinical Impression   Criteria for Skilled Therapeutic Intervention yes, treatment indicated   PT Diagnosis difficulty in gait s/p TKA   Influenced by the following impairments decreased L knee ROM, decreased LLE strength   Functional limitations due to impairments decreased I with transfers and gait for home and community mobility   Clinical Presentation Stable/Uncomplicated   Clinical Presentation Rationale progressing as expected POD 0, no significant comorobidities   Clinical Decision Making (Complexity) Low complexity   Therapy Frequency` 2 times/day   Predicted Duration of Therapy Intervention (days/wks) 3 days   Anticipated Discharge Disposition Home with Assist;Home with Outpatient Therapy   Risk & Benefits of therapy have been explained Yes   Patient, Family & other staff in agreement with plan of care Yes   [facility] AM-PAC  \"6 Clicks\" V.2 Basic Mobility Inpatient Short Form   1. Turning from your back to your side while in a flat bed without using bedrails? 4 - None   2. Moving from lying on your back to sitting on the side of a flat bed without using bedrails? 3 - A Little   3. Moving to and from a bed to a chair (including a wheelchair)? 3 - A Little   4. Standing up from a chair using your arms (e.g., wheelchair, or bedside chair)? 3 - A Little   5. To walk in hospital room? 3 - A Little   6. Climbing 3-5 steps with a railing? 2 - A Lot   Basic Mobility Raw Score (Score out of 24.Lower scores equate to lower levels of function) 18   Total Evaluation Time   Total Evaluation Time (Minutes) 20     "

## 2017-07-31 NOTE — ANESTHESIA PREPROCEDURE EVALUATION
"Procedure: Procedure(s):  ARTHROPLASTY KNEE  Preop diagnosis: LEFT KNEE DJD    Allergies   Allergen Reactions     Penicillins      rash     Spiriva Handihaler Other (See Comments)     Felt like the med \"paralyzed\" her diaphragm.  Had increased difficulty taking deep breath     Sulfa Drugs      rash     Past Medical History:   Diagnosis Date     Benign essential hypertension     was on amlodipine/ then metoprolol / add lisinopril      Contact dermatitis and other eczema, due to unspecified cause      Hyperlipidemia LDL goal <130 11/7/2012     Nephritis      as a child (post strep)      Osteoporosis, unspecified      Phlebitis and thrombophlebitis of other deep vessels of lower extremities 1972, 1975    Both with pregnancies     Sinusitis      Tobacco use disorder      Past Surgical History:   Procedure Laterality Date     ARTHROPLASTY HIP Right 4/3/2017    Procedure: ARTHROPLASTY HIP;  Surgeon: Francisco J Alston MD;  Location: [facility] OR     BIOPSY OF SKIN LESION       C DEXA, BONE DENSITY, AXIAL SKEL  6/2008    T score lumbar -0.6, femoral neck -2.4/-2.0(all stable)     C VAGINAL HYSTERECTOMY  1978    Hysterectomy, Vaginal     HC ASPIRATION &/OR INJECTION GANGLION CYST, ANY  1994     HERNIORRHAPHY INGUINAL Right 3/24/2015    Procedure: HERNIORRHAPHY INGUINAL;  Surgeon: Sam Erazo MD;  Location:  [facility]     HYSTERECTOMY, PAP NO LONGER INDICATED       Prior to Admission medications    Medication Sig Start Date End Date Taking? Authorizing Provider   calcium-vitamin D (OYSTER CALCIUM + D) 250-125 MG-UNIT per tablet Take 2 tablets by mouth daily   Yes Reported, Patient   Atorvastatin Calcium (LIPITOR PO) Take 15 mg by mouth daily (with lunch) (1.5 x 10mg = 15mg)   Yes Reported, Patient   ERYTHROMYCIN BASE PO Take 2,000 mg by mouth daily as needed (prior to dental procedure)   Yes Reported, Patient   betamethasone dipropionate (DIPROSONE) 0.05 % cream Apply topically 2 times daily as needed (For foot) " 7/17/17  Yes Leif Fair MD   Urea 20 % CREA cream Apply topically daily    Yes Reported, Patient   amLODIPine (NORVASC) 2.5 MG tablet Take 1 tablet (2.5 mg) by mouth daily 7/14/17  Yes Aren Fountain MD   triamcinolone (KENALOG) 0.1 % cream Apply topically 2 times daily as needed for irritation (sores on scalp.)   Yes Reported, Patient   vitamin C-electrolytes (EMERGEN-C) 1000mg vitamin C super orange drink mix Take 1 packet by mouth daily Mix 1 packet in 4-6oz water.   Yes Reported, Patient   Acetaminophen (TYLENOL PO) Take 1,000 mg by mouth 2 times daily as needed for mild pain or fever   Yes Reported, Patient   cholecalciferol (VITAMIN D3) 89931 UNITS capsule Take 1 capsule (50,000 Units) by mouth every 14 days SIG: TAKE ONE CAP EVERY OTHER WEEK, INDICATION: TO TREAT VITAMIN D DEFICIENCY (1ST AND 15TH OF EACH MONTH), MUST TAKE WITH FAT CONTAINING MEAL, TAKE 1 EXTRA CAPSULE THIS WEEK ONLY 1/20/17  Yes Leif Fair MD   Cyanocobalamin (B-12 SUPER STRENGTH) 5000 MCG/ML LIQD Place 1 mL under the tongue every morning FOR VITAMIN B12 SUPPLEMENTATION, PLEASE PLACE UNDER THE TONGUE 1/20/17  Yes Leif Fair MD   spironolactone-hydrochlorothiazide (ALDACTAZIDE) 25-25 MG per tablet Take 1 tablet by mouth every morning INDICATION:TO LOWER BLOOD PRESSURE 1/20/17  Yes Leif Fair MD   folic acid (FOLVITE) 1 MG tablet Take 1 tablet (1 mg) by mouth daily INDICATION: FOLIC ACID SUPPLEMENT 1/20/17  Yes Leif Fair MD   ranitidine (ZANTAC) 150 MG tablet Take 150 mg by mouth daily as needed for heartburn    Yes Reported, Patient   albuterol (PROAIR HFA, PROVENTIL HFA, VENTOLIN HFA) 108 (90 BASE) MCG/ACT inhaler Inhale 2 puffs into the lungs every 6 hours as needed for shortness of breath / dyspnea or wheezing 4/21/16  Yes Sarah Melgar MD   aspirin EC 81 MG tablet Take 1 tablet (81 mg) by mouth daily 3/29/16  Yes Aren Fountain MD     Current Facility-Administered  Medications Ordered in Epic   Medication Dose Route Frequency Last Rate Last Dose     clindamycin (CLEOCIN) infusion 900 mg  900 mg Intravenous Pre-Op/Pre-procedure x 1 dose         clindamycin (CLEOCIN) infusion 900 mg  900 mg Intravenous See Admin Instructions         acetaminophen (TYLENOL) tablet 1,000 mg  1,000 mg Oral Once         oxyCODONE (OxyCONTIN) 12 hr tablet 10 mg  10 mg Oral Once         lidocaine 1 % 1 mL  1 mL Other Q1H PRN         lactated ringers infusion   Intravenous Continuous         No current [facility]-ordered outpatient prescriptions on file.     Wt Readings from Last 1 Encounters:   07/17/17 73.5 kg (162 lb)     Temp Readings from Last 1 Encounters:   07/17/17 36.8  C (98.3  F) (Oral)     BP Readings from Last 6 Encounters:   07/17/17 120/60   07/14/17 128/66   06/02/17 134/82   04/05/17 118/57   03/21/17 100/62   03/08/17 118/70     Pulse Readings from Last 4 Encounters:   07/17/17 58   07/14/17 60   06/02/17 70   04/05/17 77     Resp Readings from Last 1 Encounters:   07/17/17 16     SpO2 Readings from Last 1 Encounters:   06/02/17 98%     Recent Labs   Lab Test  07/10/17   0921  04/05/17   0645   NA  141  138   POTASSIUM  4.7  3.3*   CHLORIDE  102  101   CO2  30  32   ANIONGAP  9  5   GLC  99  103*   BUN  18  12   CR  0.79  0.62   SANGEETA  9.6  8.5     Recent Labs   Lab Test  07/10/17   0921  04/05/17   0645   04/03/17   1236  03/21/17   0944   WBC  7.8   --    --    --   8.1   HGB  14.5  10.6*   < >   --   15.2   PLT  262   --    --   258  273    < > = values in this interval not displayed.     Recent Labs   Lab Test 05/11/17 04/27/17   INR  2.3*  2.0*      RECENT LABS:   ECG:   ECHO:   CXR:      Anesthesia Evaluation     . Pt has had prior anesthetic.     No history of anesthetic complications          ROS/MED HX    ENT/Pulmonary:     (+)tobacco use, Past use , . .   (-) sleep apnea   Neurologic:  - neg neurologic ROS     Cardiovascular:     (+) Dyslipidemia, hypertension----. : . . . :. .  Previous cardiac testing date:results:Stress Testdate: results:EF 60%, no ischemia date: results: date: results:         (-) CHF and arrhythmias   METS/Exercise Tolerance:     Hematologic:  - neg hematologic  ROS       Musculoskeletal:   (+) arthritis, , , -       GI/Hepatic:        (-) GERD   Renal/Genitourinary:         Endo:         Psychiatric:     (+) psychiatric history anxiety and depression      Infectious Disease:         Malignancy:         Other:                     Physical Exam  Normal systems: cardiovascular, pulmonary and dental    Airway   Mallampati: II  TM distance: >3 FB  Neck ROM: full    Dental   Comment: native    Cardiovascular       Pulmonary                         Anesthesia Plan      History & Physical Review  History and physical reviewed and following examination; no interval change.    ASA Status:  2 .    NPO Status:  > 8 hours    Plan for Periph. Nerve Block for postop pain, General and ETT with Intravenous and Propofol induction. Maintenance will be Balanced.    PONV prophylaxis:  Ondansetron (or other 5HT-3) and Dexamethasone or Solumedrol       Postoperative Care  Postoperative pain management:  IV analgesics and Peripheral nerve block (Continuous).      Consents  Anesthetic plan, risks, benefits and alternatives discussed with:  Patient..                          .

## 2017-07-31 NOTE — ANESTHESIA POSTPROCEDURE EVALUATION
Patient: Marva Angela    Procedure(s):  LEFT TOTAL KNEE ARTHROPLASTY  - Wound Class: I-Clean    Diagnosis:LEFT KNEE DJD  Diagnosis Additional Information: No value filed.    Anesthesia Type:  Spinal, Periph. Nerve Block for postop pain    Note:  Anesthesia Post Evaluation    Patient location during evaluation: PACU  Patient participation: Able to fully participate in evaluation  Level of consciousness: sleepy but conscious and responsive to verbal stimuli  Pain management: adequate  Airway patency: patent  Cardiovascular status: acceptable and hemodynamically stable  Respiratory status: acceptable and unassisted  Hydration status: acceptable  PONV: none     Anesthetic complications: None          Last vitals:  Vitals:    07/31/17 1004 07/31/17 1010 07/31/17 1015   BP:  121/72    Pulse:      Resp:  12    Temp:      SpO2: 99%  99%         Electronically Signed By: Jose Berry MD  July 31, 2017  10:25 AM

## 2017-07-31 NOTE — PROGRESS NOTES
Admission medication history interview status for the 7/31/2017  admission is complete. See EPIC admission navigator for prior to admission medications     Medication history source reliability:Good    Medication history interview source(s):Patient    Medication history resources (including written lists, pill bottles, clinic record):Patient mailed in her medication list prior to surgery    [email or web address]    Additional medication history information not noted on PTA med list :None    Time spent in this activity: 45 minutes    Prior to Admission medications    Medication Sig Last Dose Taking? Auth Provider   calcium-vitamin D (OYSTER CALCIUM + D) 250-125 MG-UNIT per tablet Take 2 tablets by mouth daily 7/26/2017 at AM Yes Reported, Patient   Atorvastatin Calcium (LIPITOR PO) Take 15 mg by mouth daily (with lunch) (1.5 x 10mg = 15mg) 7/30/2017 at Noon Yes Reported, Patient   ERYTHROMYCIN BASE PO Take 2,000 mg by mouth daily as needed (prior to dental procedure) June 2017 at PRN Yes Reported, Patient   betamethasone dipropionate (DIPROSONE) 0.05 % cream Apply topically 2 times daily as needed (For foot) More than a Month at PRN Yes Leif Fair MD   Urea 20 % CREA cream Apply topically daily  7/30/2017 at AM Yes Reported, Patient   amLODIPine (NORVASC) 2.5 MG tablet Take 1 tablet (2.5 mg) by mouth daily 7/31/2017 at 0330 Yes Aren Fountain MD   triamcinolone (KENALOG) 0.1 % cream Apply topically 2 times daily as needed for irritation (sores on scalp.) More than a Month at PRN Yes Reported, Patient   vitamin C-electrolytes (EMERGEN-C) 1000mg vitamin C super orange drink mix Take 1 packet by mouth daily Mix 1 packet in 4-6oz water. 7/26/2017 at AM Yes Reported, Patient   Acetaminophen (TYLENOL PO) Take 1,000 mg by mouth 2 times daily as needed for mild pain or fever 7/30/2017 at Afternoon Yes Reported, Patient   cholecalciferol (VITAMIN D3) 13453 UNITS capsule Take 1 capsule (50,000 Units) by  mouth every 14 days SIG: TAKE ONE CAP EVERY OTHER WEEK, INDICATION: TO TREAT VITAMIN D DEFICIENCY (1ST AND 15TH OF EACH MONTH), MUST TAKE WITH FAT CONTAINING MEAL, TAKE 1 EXTRA CAPSULE THIS WEEK ONLY 7/15/2017 at AM Yes Leif Fair MD   Cyanocobalamin (B-12 SUPER STRENGTH) 5000 MCG/ML LIQD Place 1 mL under the tongue every morning FOR VITAMIN B12 SUPPLEMENTATION, PLEASE PLACE UNDER THE TONGUE 7/26/2017 at AM Yes Leif Fair MD   spironolactone-hydrochlorothiazide (ALDACTAZIDE) 25-25 MG per tablet Take 1 tablet by mouth every morning INDICATION:TO LOWER BLOOD PRESSURE 7/30/2017 at AM Yes Leif Fair MD   folic acid (FOLVITE) 1 MG tablet Take 1 tablet (1 mg) by mouth daily INDICATION: FOLIC ACID SUPPLEMENT 7/26/2017 at AM Yes Leif Fair MD   ranitidine (ZANTAC) 150 MG tablet Take 150 mg by mouth daily as needed for heartburn  More than a Month at PRN Yes Reported, Patient   albuterol (PROAIR HFA, PROVENTIL HFA, VENTOLIN HFA) 108 (90 BASE) MCG/ACT inhaler Inhale 2 puffs into the lungs every 6 hours as needed for shortness of breath / dyspnea or wheezing More than a Month at PRN Yes Sarah Melgar MD   aspirin EC 81 MG tablet Take 1 tablet (81 mg) by mouth daily 7/24/2017 at AM Yes Aren Fountain MD

## 2017-08-01 ENCOUNTER — APPOINTMENT (OUTPATIENT)
Dept: PHYSICAL THERAPY | Facility: CLINIC | Age: 67
DRG: 470 | End: 2017-08-01
Attending: ORTHOPAEDIC SURGERY
Payer: MEDICARE

## 2017-08-01 LAB
GLUCOSE BLDC GLUCOMTR-MCNC: 106 MG/DL (ref 70–99)
GLUCOSE BLDC GLUCOMTR-MCNC: 111 MG/DL (ref 70–99)
HGB BLD-MCNC: 11.8 G/DL (ref 11.7–15.7)

## 2017-08-01 PROCEDURE — 97530 THERAPEUTIC ACTIVITIES: CPT | Mod: GP | Performed by: PHYSICAL THERAPIST

## 2017-08-01 PROCEDURE — 25000125 ZZHC RX 250: Performed by: ORTHOPAEDIC SURGERY

## 2017-08-01 PROCEDURE — S0077 INJECTION, CLINDAMYCIN PHOSP: HCPCS | Performed by: ORTHOPAEDIC SURGERY

## 2017-08-01 PROCEDURE — 00000146 ZZHCL STATISTIC GLUCOSE BY METER IP

## 2017-08-01 PROCEDURE — 97116 GAIT TRAINING THERAPY: CPT | Mod: GP | Performed by: PHYSICAL THERAPIST

## 2017-08-01 PROCEDURE — 85018 HEMOGLOBIN: CPT | Performed by: ORTHOPAEDIC SURGERY

## 2017-08-01 PROCEDURE — 25000128 H RX IP 250 OP 636: Performed by: ORTHOPAEDIC SURGERY

## 2017-08-01 PROCEDURE — 97110 THERAPEUTIC EXERCISES: CPT | Mod: GP | Performed by: PHYSICAL THERAPIST

## 2017-08-01 PROCEDURE — A9270 NON-COVERED ITEM OR SERVICE: HCPCS | Mod: GY | Performed by: ORTHOPAEDIC SURGERY

## 2017-08-01 PROCEDURE — 25000132 ZZH RX MED GY IP 250 OP 250 PS 637: Mod: GY | Performed by: ORTHOPAEDIC SURGERY

## 2017-08-01 PROCEDURE — 36415 COLL VENOUS BLD VENIPUNCTURE: CPT | Performed by: ORTHOPAEDIC SURGERY

## 2017-08-01 PROCEDURE — 40000193 ZZH STATISTIC PT WARD VISIT: Performed by: PHYSICAL THERAPIST

## 2017-08-01 PROCEDURE — 12000007 ZZH R&B INTERMEDIATE

## 2017-08-01 RX ADMIN — OXYCODONE HYDROCHLORIDE 5 MG: 5 TABLET ORAL at 11:21

## 2017-08-01 RX ADMIN — OXYCODONE HYDROCHLORIDE 5 MG: 5 TABLET ORAL at 10:18

## 2017-08-01 RX ADMIN — SENNOSIDES AND DOCUSATE SODIUM 2 TABLET: 8.6; 5 TABLET ORAL at 09:55

## 2017-08-01 RX ADMIN — ACETAMINOPHEN 975 MG: 325 TABLET, FILM COATED ORAL at 21:06

## 2017-08-01 RX ADMIN — ASPIRIN 81 MG: 81 TABLET, COATED ORAL at 09:55

## 2017-08-01 RX ADMIN — ENOXAPARIN SODIUM 40 MG: 40 INJECTION SUBCUTANEOUS at 09:54

## 2017-08-01 RX ADMIN — CLINDAMYCIN PHOSPHATE 900 MG: 18 INJECTION, SOLUTION INTRAVENOUS at 00:44

## 2017-08-01 RX ADMIN — Medication: at 09:55

## 2017-08-01 RX ADMIN — Medication 15 MG: at 13:53

## 2017-08-01 RX ADMIN — AMLODIPINE BESYLATE 2.5 MG: 2.5 TABLET ORAL at 09:55

## 2017-08-01 RX ADMIN — HYDROCHLOROTHIAZIDE 25 MG: 25 TABLET ORAL at 09:57

## 2017-08-01 RX ADMIN — ACETAMINOPHEN 975 MG: 325 TABLET, FILM COATED ORAL at 13:54

## 2017-08-01 RX ADMIN — OXYCODONE HYDROCHLORIDE 5 MG: 5 TABLET ORAL at 21:06

## 2017-08-01 RX ADMIN — ACETAMINOPHEN 975 MG: 325 TABLET, FILM COATED ORAL at 05:58

## 2017-08-01 RX ADMIN — SENNOSIDES AND DOCUSATE SODIUM 1 TABLET: 8.6; 5 TABLET ORAL at 21:06

## 2017-08-01 RX ADMIN — SPIRONOLACTONE 25 MG: 25 TABLET ORAL at 09:57

## 2017-08-01 NOTE — PLAN OF CARE
Problem: Goal Outcome Summary  Goal: Goal Outcome Summary  Outcome: Improving  Orthostatic blood pressure remains wnl . Standing 107/51 , Hr 80 .

## 2017-08-01 NOTE — PLAN OF CARE
Problem: Goal Outcome Summary  Goal: Goal Outcome Summary  Outcome: Improving  Alert and oriented x4 up with assist of 2 and walker , impulsive at times . Patient c/o dizzy ,sweating and pass out during PT this morning . Vital signs stable . MD aware of incident .

## 2017-08-01 NOTE — PROVIDER NOTIFICATION
.MD Notification    Notified Person:  MD    Notified Persons Name: Gamaliel    Notification Date/Time: 8/1/17 1pm    Notification Interaction:  Talked with Physician yes     Purpose of Notification: weakness and pass out during PT    Orders Received: yes     Comments:

## 2017-08-01 NOTE — PLAN OF CARE
Problem: Goal Outcome Summary  Goal: Goal Outcome Summary  Outcome: No Change  A&O.  VSS, 2L O2.  CMS intact.  Dressing CDI.  WBAT.  Hemovac patent and intact.  Mcclain intact, out put of 1250 ml this shift.  C bloc set @ 10.  Denies pain.  IV infusing @ 125 ml/hr.  Knee immobilizer on for night.

## 2017-08-01 NOTE — PLAN OF CARE
Problem: Goal Outcome Summary  Goal: Goal Outcome Summary  Outcome: No Change  Progressing per POC.  CMS intact.  C bloc set @ 10.  Hemovac patent and intact.  Mcclain d/c @ 6:30 am.  Up x 1 and walker.  Dressing CDI.  IV infusing @ 125 ml/hr.  Will continue to monitor.

## 2017-08-01 NOTE — PLAN OF CARE
Problem: Goal Outcome Summary  Goal: Goal Outcome Summary  Discharge Planner PT   Patient plan for discharge: home Thurs per chart  Current status: Pt with episode during ther ex where she felt she was going to pass out, leaned forward in bed, would not lean back to supine, req bed pan, A with pan, pt missed with most of urine and had a wet bed, pt episode appeared to clear fairly quickly, Pt stating she was fine.  RN in to clear for mob, once up pt with 2nd episode within 15 min, again though she would pass out, needing to sit after amb 3', chair brought up behind her, pt did have LOC for possibly 20 secs, staff A button pushed and RN's in to A.  Pt incontinent of urine in chair, despite having just used bed pan and covered bed in urine.  Pt A back to bed with RN and PT and pt again c/o of mild dizziness and sweating, min A x 2 to move to supine, no further episode, left in nursing care.  Pt somewhat impulsive in movements, leans forward during episodes despite instructions to recline.    Barriers to return to prior living situation: not amb safely, fall risk with episodes of light headedness, possibly related to meds per pt and RN, limited functional mob  Recommendations for discharge: defer until POD #2  Rationale for recommendations: defer until POD #2       Entered by: ANDREA AL 08/01/2017 12:53 PM

## 2017-08-01 NOTE — PROGRESS NOTES
Marvaluis felipe Angela  2017  POD #1    Doing well.  No excessive bleeding  Pain well-controlled.  Tolerating physical therapy and rehabilitation well.  Temperatures:  Current - Temp: 98.9  F (37.2  C); Max - Temp  Av.7  F (36.5  C)  Min: 96.6  F (35.9  C)  Max: 98.9  F (37.2  C)  Pulse range: No Data Recorded  Blood pressure range: Systolic (24hrs), Av , Min:103 , Max:137   ; Diastolic (24hrs), Av, Min:54, Max:85    CMS: intact  Labs:  Hemoglobin   Date Value Ref Range Status   2017 11.8 11.7 - 15.7 g/dL Final   ]  X-rays look fine    PLAN:  Continue physical therapy  Discharge plan: Home Thursday

## 2017-08-02 ENCOUNTER — APPOINTMENT (OUTPATIENT)
Dept: PHYSICAL THERAPY | Facility: CLINIC | Age: 67
DRG: 470 | End: 2017-08-02
Attending: ORTHOPAEDIC SURGERY
Payer: MEDICARE

## 2017-08-02 ENCOUNTER — APPOINTMENT (OUTPATIENT)
Dept: OCCUPATIONAL THERAPY | Facility: CLINIC | Age: 67
DRG: 470 | End: 2017-08-02
Attending: ORTHOPAEDIC SURGERY
Payer: MEDICARE

## 2017-08-02 LAB
GLUCOSE BLDC GLUCOMTR-MCNC: 122 MG/DL (ref 70–99)
GLUCOSE BLDC GLUCOMTR-MCNC: 150 MG/DL (ref 70–99)
HGB BLD-MCNC: 11.5 G/DL (ref 11.7–15.7)

## 2017-08-02 PROCEDURE — 97535 SELF CARE MNGMENT TRAINING: CPT | Mod: GO

## 2017-08-02 PROCEDURE — 85018 HEMOGLOBIN: CPT | Performed by: ORTHOPAEDIC SURGERY

## 2017-08-02 PROCEDURE — 97165 OT EVAL LOW COMPLEX 30 MIN: CPT | Mod: GO

## 2017-08-02 PROCEDURE — 25000128 H RX IP 250 OP 636: Performed by: ORTHOPAEDIC SURGERY

## 2017-08-02 PROCEDURE — 12000007 ZZH R&B INTERMEDIATE

## 2017-08-02 PROCEDURE — A9270 NON-COVERED ITEM OR SERVICE: HCPCS | Mod: GY | Performed by: ORTHOPAEDIC SURGERY

## 2017-08-02 PROCEDURE — 40000133 ZZH STATISTIC OT WARD VISIT

## 2017-08-02 PROCEDURE — 97110 THERAPEUTIC EXERCISES: CPT | Mod: GP

## 2017-08-02 PROCEDURE — A9270 NON-COVERED ITEM OR SERVICE: HCPCS | Mod: GY

## 2017-08-02 PROCEDURE — 00000146 ZZHCL STATISTIC GLUCOSE BY METER IP

## 2017-08-02 PROCEDURE — 36415 COLL VENOUS BLD VENIPUNCTURE: CPT | Performed by: ORTHOPAEDIC SURGERY

## 2017-08-02 PROCEDURE — 40000193 ZZH STATISTIC PT WARD VISIT

## 2017-08-02 PROCEDURE — 25000132 ZZH RX MED GY IP 250 OP 250 PS 637: Mod: GY

## 2017-08-02 PROCEDURE — 97116 GAIT TRAINING THERAPY: CPT | Mod: GP

## 2017-08-02 PROCEDURE — 25000132 ZZH RX MED GY IP 250 OP 250 PS 637: Mod: GY | Performed by: ORTHOPAEDIC SURGERY

## 2017-08-02 RX ORDER — WARFARIN SODIUM 5 MG/1
5 TABLET ORAL
Status: COMPLETED | OUTPATIENT
Start: 2017-08-02 | End: 2017-08-02

## 2017-08-02 RX ORDER — OXYCODONE HYDROCHLORIDE 5 MG/1
5 TABLET ORAL EVERY 4 HOURS PRN
Qty: 40 TABLET | Refills: 0 | Status: SHIPPED | OUTPATIENT
Start: 2017-08-02 | End: 2017-10-30

## 2017-08-02 RX ORDER — AMOXICILLIN 250 MG
1-2 CAPSULE ORAL 2 TIMES DAILY
Qty: 100 TABLET | Refills: 0 | Status: SHIPPED | OUTPATIENT
Start: 2017-08-02 | End: 2017-11-01

## 2017-08-02 RX ADMIN — WARFARIN SODIUM 5 MG: 5 TABLET ORAL at 17:15

## 2017-08-02 RX ADMIN — ENOXAPARIN SODIUM 40 MG: 40 INJECTION SUBCUTANEOUS at 09:24

## 2017-08-02 RX ADMIN — HYDROCHLOROTHIAZIDE 25 MG: 25 TABLET ORAL at 09:26

## 2017-08-02 RX ADMIN — AMLODIPINE BESYLATE 2.5 MG: 2.5 TABLET ORAL at 09:24

## 2017-08-02 RX ADMIN — ACETAMINOPHEN 975 MG: 325 TABLET, FILM COATED ORAL at 21:09

## 2017-08-02 RX ADMIN — SENNOSIDES AND DOCUSATE SODIUM 2 TABLET: 8.6; 5 TABLET ORAL at 09:24

## 2017-08-02 RX ADMIN — SENNOSIDES AND DOCUSATE SODIUM 2 TABLET: 8.6; 5 TABLET ORAL at 21:09

## 2017-08-02 RX ADMIN — Medication 15 MG: at 12:14

## 2017-08-02 RX ADMIN — OXYCODONE HYDROCHLORIDE 5 MG: 5 TABLET ORAL at 13:43

## 2017-08-02 RX ADMIN — ACETAMINOPHEN 975 MG: 325 TABLET, FILM COATED ORAL at 05:35

## 2017-08-02 RX ADMIN — ASPIRIN 81 MG: 81 TABLET, COATED ORAL at 09:27

## 2017-08-02 RX ADMIN — SPIRONOLACTONE 25 MG: 25 TABLET ORAL at 09:27

## 2017-08-02 RX ADMIN — OXYCODONE HYDROCHLORIDE 5 MG: 5 TABLET ORAL at 09:24

## 2017-08-02 ASSESSMENT — ACTIVITIES OF DAILY LIVING (ADL): PREVIOUS_RESPONSIBILITIES: MEAL PREP;LAUNDRY;SHOPPING;MEDICATION MANAGEMENT;FINANCES;DRIVING

## 2017-08-02 NOTE — PLAN OF CARE
Problem: Goal Outcome Summary  Goal: Goal Outcome Summary  Outcome: Therapy, progress toward functional goals as expected  Discharge Planner PT   Patient plan for discharge: home with assist and OPPT  Current status: Sit to/from stand with FWW and KI donned with SBA. Sit to/from supine with min assist for L LE. Amb 100 ft x 1 with FWW and KI donned with CGA/SBA, no knee buckling noted. Up/down 3 steps x 1 with B rail and CGA with KI donned. Tolerates TKA exs well with minimal pain. L knee AAROM 12-56 degrees.  Barriers to return to prior living situation: Pt lives alone, but will have assist from sister and daughter, stairs to enter/exit house  Recommendations for discharge: home with assist and OPPT per the plan established by the Physical Therapist   Rationale for recommendations: Pt progressing well, no instances of light headedness. Will have assist as needed. OPPT to progress strengthening, ROM and gait mechanics.       Entered by: Hayley Peterson 08/02/2017 8:58 AM

## 2017-08-02 NOTE — PLAN OF CARE
Problem: Goal Outcome Summary  Goal: Goal Outcome Summary  OT: Evaluation and treatment initiated.   Discharge Planner OT   Patient plan for discharge: Home  Current status: Pt ambulated to the bathroom with SBA/CGA and transferred to the toilet with SBA/CGA. Educated on walker safety and safe functional transfers. Pt transferred to the chair with CGA. While seated/standing pt completed lower body dressing with AE. Pt required minimum assist for shoes. Pt reportred plans to have assist from family for I/ADLs. Discussed further areas to work on during OT including walker safety, lower body dressing. Pt reported wanting to discontinue Occupational therapy reporting confidence in returning home and completing I/ADLs with assistance. Will discharge and  complete orders at this time  Barriers to return to prior living situation: assistance for I/ADLS, walker safety and functional transfers   Recommendations for discharge: Home with assist for I/ADLs  Rationale for recommendations: Pt will have family assist upon discharge      Occupational Therapy Discharge Summary     Reason for therapy discharge:    Patient/family request discontinuation of services.     Progress towards therapy goal(s). See goals on Care Plan [facility] electronic health record for goal details.  Goals not met.  Barriers to achieving goals:   Pt request to complete servcies .     Therapy recommendation(s):    assist as needed for I/ADLs             Entered by: Sher Calles 08/02/2017 4:22 PM

## 2017-08-02 NOTE — DISCHARGE SUMMARY
Discharge Summary    Marva Angela MRN# [id number]   YOB: 1950 Age: 67 year old     Date of Admission:  7/31/2017  Date of Discharge:  8/3/2017  Admitting Physician:  Francisco J Alston MD  Discharge Physician:  Francisco J Alston MD     Primary Provider: Leif Fair O11          Admission Diagnoses:   Osteoarthritis left knee          Discharge Diagnosis:   Same           Surgical Procedure:   left knee replacement           Secondary Diagnosis:     Patient Active Problem List   Diagnosis     Osteopenia     Personal history of tobacco use, presenting hazards to [facility]     Eczema     Advance Care Planning     Mixed hyperlipidemia     Benign essential hypertension     Hip pain, right     Family history of thyroid disease     Fatigue, unspecified type     Hair loss     Pain in joint, multiple sites     Seborrheic keratosis     Psoriasis with arthropathy (H)     Urine abnormality     Vitamin B12 deficiency (non anemic)     Scurvy     Vitamin D deficiency     Hematuria     Primary osteoarthritis, unspecified site     Arthralgia of both knees     Tinea pedis of left foot     Impaired fasting glucose     Hypercalcemia     Hip joint replacement status     Psoriasis     Prediabetes     Personal history of DVT (deep vein thrombosis)     Knee joint replacement status              Discharge Disposition:   Discharged to home           Medications Prior to Admission:     Prescriptions Prior to Admission   Medication Sig Dispense Refill Last Dose     calcium-vitamin D (OYSTER CALCIUM + D) 250-125 MG-UNIT per tablet Take 2 tablets by mouth daily   7/26/2017 at AM     Atorvastatin Calcium (LIPITOR PO) Take 15 mg by mouth daily (with lunch) (1.5 x 10mg = 15mg)   7/30/2017 at Noon     ERYTHROMYCIN BASE PO Take 2,000 mg by mouth daily as needed (prior to dental procedure)   June 2017 at PRN     betamethasone dipropionate (DIPROSONE) 0.05 % cream Apply topically 2 times daily as needed (For  foot) 45 g prn More than a Month at PRN     Urea 20 % CREA cream Apply topically daily    7/30/2017 at AM     amLODIPine (NORVASC) 2.5 MG tablet Take 1 tablet (2.5 mg) by mouth daily 90 tablet 3 7/31/2017 at 0330     triamcinolone (KENALOG) 0.1 % cream Apply topically 2 times daily as needed for irritation (sores on scalp.)   More than a Month at PRN     vitamin C-electrolytes (EMERGEN-C) 1000mg vitamin C super orange drink mix Take 1 packet by mouth daily Mix 1 packet in 4-6oz water.   7/26/2017 at AM     Acetaminophen (TYLENOL PO) Take 1,000 mg by mouth 2 times daily as needed for mild pain or fever   7/30/2017 at Afternoon     cholecalciferol (VITAMIN D3) 77478 UNITS capsule Take 1 capsule (50,000 Units) by mouth every 14 days SIG: TAKE ONE CAP EVERY OTHER WEEK, INDICATION: TO TREAT VITAMIN D DEFICIENCY (1ST AND 15TH OF EACH MONTH), MUST TAKE WITH FAT CONTAINING MEAL, TAKE 1 EXTRA CAPSULE THIS WEEK ONLY 40 capsule 0 7/15/2017 at AM     Cyanocobalamin (B-12 SUPER STRENGTH) 5000 MCG/ML LIQD Place 1 mL under the tongue every morning FOR VITAMIN B12 SUPPLEMENTATION, PLEASE PLACE UNDER THE TONGUE 2 Bottle PRN 7/26/2017 at AM     spironolactone-hydrochlorothiazide (ALDACTAZIDE) 25-25 MG per tablet Take 1 tablet by mouth every morning INDICATION:TO LOWER BLOOD PRESSURE 90 tablet 3 7/30/2017 at AM     folic acid (FOLVITE) 1 MG tablet Take 1 tablet (1 mg) by mouth daily INDICATION: FOLIC ACID SUPPLEMENT 100 tablet 3 7/26/2017 at AM     ranitidine (ZANTAC) 150 MG tablet Take 150 mg by mouth daily as needed for heartburn    More than a Month at PRN     albuterol (PROAIR HFA, PROVENTIL HFA, VENTOLIN HFA) 108 (90 BASE) MCG/ACT inhaler Inhale 2 puffs into the lungs every 6 hours as needed for shortness of breath / dyspnea or wheezing 3 Inhaler 1 More than a Month at PRN     aspirin EC 81 MG tablet Take 1 tablet (81 mg) by mouth daily 30 tablet 11 7/24/2017 at AM             Discharge Medications:     Current Discharge  Medication List      START taking these medications    Details   oxyCODONE (ROXICODONE) 5 MG IR tablet Take 1 tablet (5 mg) by mouth every 4 hours as needed for moderate to severe pain  Qty: 40 tablet, Refills: 0    Associated Diagnoses: Knee joint replacement status, left      senna-docusate (SENOKOT-S;PERICOLACE) 8.6-50 MG per tablet Take 1-2 tablets by mouth 2 times daily  Qty: 100 tablet, Refills: 0    Associated Diagnoses: Knee joint replacement status, left         CONTINUE these medications which have NOT CHANGED    Details   calcium-vitamin D (OYSTER CALCIUM + D) 250-125 MG-UNIT per tablet Take 2 tablets by mouth daily      Atorvastatin Calcium (LIPITOR PO) Take 15 mg by mouth daily (with lunch) (1.5 x 10mg = 15mg)      ERYTHROMYCIN BASE PO Take 2,000 mg by mouth daily as needed (prior to dental procedure)      betamethasone dipropionate (DIPROSONE) 0.05 % cream Apply topically 2 times daily as needed (For foot)  Qty: 45 g, Refills: prn    Associated Diagnoses: Psoriasis      Urea 20 % CREA cream Apply topically daily       amLODIPine (NORVASC) 2.5 MG tablet Take 1 tablet (2.5 mg) by mouth daily  Qty: 90 tablet, Refills: 3    Associated Diagnoses: Benign essential hypertension      triamcinolone (KENALOG) 0.1 % cream Apply topically 2 times daily as needed for irritation (sores on scalp.)      vitamin C-electrolytes (EMERGEN-C) 1000mg vitamin C super orange drink mix Take 1 packet by mouth daily Mix 1 packet in 4-6oz water.      Acetaminophen (TYLENOL PO) Take 1,000 mg by mouth 2 times daily as needed for mild pain or fever      cholecalciferol (VITAMIN D3) 92886 UNITS capsule Take 1 capsule (50,000 Units) by mouth every 14 days SIG: TAKE ONE CAP EVERY OTHER WEEK, INDICATION: TO TREAT VITAMIN D DEFICIENCY (1ST AND 15TH OF EACH MONTH), MUST TAKE WITH FAT CONTAINING MEAL, TAKE 1 EXTRA CAPSULE THIS WEEK ONLY  Qty: 40 capsule, Refills: 0    Associated Diagnoses: Osteopenia; Psoriasis with arthropathy (H)       Cyanocobalamin (B-12 SUPER STRENGTH) 5000 MCG/ML LIQD Place 1 mL under the tongue every morning FOR VITAMIN B12 SUPPLEMENTATION, PLEASE PLACE UNDER THE TONGUE  Qty: 2 Bottle, Refills: PRN    Associated Diagnoses: Vitamin B12 deficiency (non anemic)      spironolactone-hydrochlorothiazide (ALDACTAZIDE) 25-25 MG per tablet Take 1 tablet by mouth every morning INDICATION:TO LOWER BLOOD PRESSURE  Qty: 90 tablet, Refills: 3    Associated Diagnoses: Benign essential hypertension      folic acid (FOLVITE) 1 MG tablet Take 1 tablet (1 mg) by mouth daily INDICATION: FOLIC ACID SUPPLEMENT  Qty: 100 tablet, Refills: 3    Associated Diagnoses: Vitamin B12 deficiency (non anemic)      ranitidine (ZANTAC) 150 MG tablet Take 150 mg by mouth daily as needed for heartburn       albuterol (PROAIR HFA, PROVENTIL HFA, VENTOLIN HFA) 108 (90 BASE) MCG/ACT inhaler Inhale 2 puffs into the lungs every 6 hours as needed for shortness of breath / dyspnea or wheezing  Qty: 3 Inhaler, Refills: 1    Associated Diagnoses: Chronic obstructive pulmonary disease, unspecified COPD type (H)      aspirin EC 81 MG tablet Take 1 tablet (81 mg) by mouth daily  Qty: 30 tablet, Refills: 11    Associated Diagnoses: Coronary artery disease involving native coronary artery of native heart without angina pectoris                   Consultations:   No consultations were requested during this admission           Hospital Course:   The patient was admitted after the surgical procedure. The patient underwent an uneventful left knee replacement. Postoperatively, anticoagulation  with Lovenox was started, and transitioned to warfarin. No transfusion was required. The patient will be discharged to home. Home medications have been reconciled. oxycodone 5 mg. was prescribed for pain. Coumadin  will be prescribed.             Pending Results:   INR           Discharge Instructions and Follow-Up:        Discharge activity: use a walker   Discharge follow-up: Follow up  with me in 2 weeks   Outpatient therapy: [facility]   Home Care agency: None    Supplies and equipment: None        Wound care: dry dressing daily and as needed   Other instructions: Follow up with coagulation clinic at [facility]

## 2017-08-02 NOTE — PLAN OF CARE
Problem: Goal Outcome Summary  Goal: Goal Outcome Summary  Dressing cdi, cms intact. VSS. Up with assist of 1 to bsc. C block at 10. IV removed, tip intact. Takes only one oxycodone.

## 2017-08-02 NOTE — PROGRESS NOTES
Marva Angela  2017  POD #2    Doing well.  Pain well-controlled.10 mg of oxycodone was too much.  Tolerating physical therapy and rehabilitation well.  Temperatures:  Current - Temp: 98.6  F (37  C); Max - Temp  Av.6  F (37  C)  Min: 98.3  F (36.8  C)  Max: 98.9  F (37.2  C)  Pulse range: Pulse  Av  Min: 66  Max: 76  Blood pressure range: Systolic (24hrs), Av , Min:97 , Max:130   ; Diastolic (24hrs), Av, Min:51, Max:59    CMS: intact  Labs:  Hemoglobin   Date Value Ref Range Status   2017 11.5 (L) 11.7 - 15.7 g/dL Final   ]      PLAN:  Continue physical therapy  Discharge plan: Home tomorrow

## 2017-08-02 NOTE — PHARMACY-ANTICOAGULATION SERVICE
Clinical Pharmacy - Warfarin Dosing Consult     Pharmacy has been consulted to manage this patient s warfarin therapy.  Indication: DVT/PE Prophylaxis  Therapy Goal: INR 2-3  Warfarin Prior to Admission: No  Recent documented change in oral intake/nutrition: Unknown    INR Protime   Date Value Ref Range Status   05/11/2017 2.3 (A) 0.86 - 1.14 Final   04/27/2017 2.0 (A) 0.86 - 1.14 Final       Recommend warfarin 5 mg today.  Pharmacy will monitor Marva NDIAYE Julio César daily and order warfarin doses to achieve specified goal.      Please contact pharmacy as soon as possible if the warfarin needs to be held for a procedure or if the warfarin goals change.

## 2017-08-02 NOTE — PHARMACY-ANTICOAGULATION SERVICE
Clinical Pharmacy - Warfarin Dosing Consult     Pharmacy has been consulted to manage this patient s warfarin therapy.  Indication: DVT/PE Prophylaxis  Therapy Goal: INR 2-3  Warfarin Prior to Admission: No  Drug interactions: ASA 81 mg   Recent documented change in oral intake/nutrition: Unknown    INR Protime   Date Value Ref Range Status   05/11/2017 2.3 (A) 0.86 - 1.14 Final   04/27/2017 2.0 (A) 0.86 - 1.14 Final       Recommend warfarin 5 mg today.  Pharmacy will monitor Marva Angela daily and order warfarin doses to achieve specified goal.      Please contact pharmacy as soon as possible if the warfarin needs to be held for a procedure or if the warfarin goals change.

## 2017-08-02 NOTE — PROGRESS NOTES
08/02/17 1530   Quick Adds   Type of Visit Initial Occupational Therapy Evaluation   Living Environment   Lives With alone   Living Arrangements house  (2 story )   Home Accessibility stairs to enter home;stairs within home;tub/shower is not walk in   Number of Stairs to Enter Home 1   Transportation Available car;family or friend will provide   Living Environment Comment Sister and daughter will assist and stay with patient at discharge    Self-Care   Usual Activity Tolerance good   Current Activity Tolerance moderate   Regular Exercise yes   Activity/Exercise Type walking   Exercise Amount/Frequency 3-5 times/wk   Equipment Currently Used at Home (shower chair, reacher, sock aid, shoe horn)   Functional Level Prior   Ambulation 0-->independent   Transferring 0-->independent   Toileting 0-->independent   Bathing 0-->independent   Dressing 0-->independent   Fall history within last six months no   Which of the above functional risks had a recent onset or change? ambulation;transferring;toileting;bathing;dressing   General Information   Onset of Illness/Injury or Date of Surgery - Date 07/31/17   Referring Physician Gamaliel   Patient/Family Goals Statement Home   Additional Occupational Profile Info/Pertinent History of Current Problem s/p L TKA   Precautions/Limitations fall precautions   Weight-Bearing Status - LLE weight-bearing as tolerated   Cognitive Status Examination   Orientation orientation to person, place and time   Level of Consciousness alert   Visual Perception   Visual Perception Wears glasses   Sensory Examination   Sensory Quick Adds No deficits were identified   Pain Assessment   Patient Currently in Pain Yes, see Vital Sign flowsheet   Range of Motion (ROM)   ROM Quick Adds No deficits were identified   Mobility   Bed Mobility Bed mobility skill: Sit to supine;Bed mobility skill: Supine to sit   Bed Mobility Skill: Sit to Supine   Level of [place]: Sit/Supine contact guard   Bed Mobility  "Skill: Supine to Sit   Level of [place]: Supine/Sit contact guard   Transfer Skills   Transfer Transfer Safety Analysis Bed/Chair;Transfer Skill: Stand to Sit;Transfer Safety Analysis Sit/Stand   Transfer Skill: Bed to Chair/Chair to Bed   Level of [place]: Bed to Chair contact guard   Transfer Skill: Sit to Stand   Level of [place]: Sit/Stand contact guard   Toilet Transfer   Toilet Transfer Toilet Transfer Skill;Toilet Transfer Safety Analysis   Transfer Skill: Toilet Transfer   Level of [place]: Toilet contact guard   Lower Body Dressing   Level of [place]: Dress Lower Body minimum assist (75% patients effort)  (for shoes)   Instrumental Activities of Daily Living (IADL)   Previous Responsibilities meal prep;laundry;shopping;medication management;finances;driving   General Therapy Interventions   Planned Therapy Interventions ADL retraining;IADL retraining;transfer training   Clinical Impression   Criteria for Skilled Therapeutic Interventions Met yes, treatment indicated   OT Diagnosis Decreased ADLs and IADLs, functional transfers   Influenced by the following impairments pain   Assessment of Occupational Performance 1-3 Performance Deficits   Identified Performance Deficits Decreased ADLs and IADLs (bathing, dressing, toileting), functional transfers   Clinical Decision Making (Complexity) Low complexity   Therapy Frequency daily   Predicted Duration of Therapy Intervention (days/wks) 3 days   Anticipated Discharge Disposition Home with Assist   Risks and Benefits of Treatment have been explained. Yes   Patient, Family & other staff in agreement with plan of care Yes   [facility] TM \"6 Clicks\"   2016, Trustees of [facility], under license to [employer].  All rights reserved.   6 Clicks Short Forms Daily Activity Inpatient Short Form   [facility]  \"6 Clicks\" Daily Activity Inpatient Short Form   1. Putting on and taking off regular lower body " clothing? 3 - A Little   2. Bathing (including washing, rinsing, drying)? 3 - A Little   3. Toileting, which includes using toilet, bedpan or urinal? 3 - A Little   4. Putting on and taking off regular upper body clothing? 4 - None   5. Taking care of personal grooming such as brushing teeth? 3 - A Little   6. Eating meals? 4 - None   Daily Activity Raw Score (Score out of 24.Lower scores equate to lower levels of function) 20   Total Evaluation Time   Total Evaluation Time (Minutes) 8

## 2017-08-02 NOTE — PLAN OF CARE
Problem: Goal Outcome Summary  Goal: Goal Outcome Summary  Outcome: No Change  A&Ox4. VSS, CMS intact. Dressing CDI. Up with assist of 1, voids using bedside commode. Regular diet. Pain managed with PRN oxycodone and scheduled tylenol.

## 2017-08-03 ENCOUNTER — APPOINTMENT (OUTPATIENT)
Dept: PHYSICAL THERAPY | Facility: CLINIC | Age: 67
DRG: 470 | End: 2017-08-03
Attending: ORTHOPAEDIC SURGERY
Payer: MEDICARE

## 2017-08-03 VITALS
BODY MASS INDEX: 26.66 KG/M2 | RESPIRATION RATE: 16 BRPM | SYSTOLIC BLOOD PRESSURE: 139 MMHG | HEART RATE: 67 BPM | HEIGHT: 65 IN | WEIGHT: 160 LBS | TEMPERATURE: 98.6 F | DIASTOLIC BLOOD PRESSURE: 57 MMHG | OXYGEN SATURATION: 94 %

## 2017-08-03 LAB
CREAT SERPL-MCNC: 0.65 MG/DL (ref 0.52–1.04)
GFR SERPL CREATININE-BSD FRML MDRD: NORMAL ML/MIN/1.7M2
INR PPP: 1.07 (ref 0.86–1.14)
INTERPRETATION ECG - MUSE: NORMAL
PLATELET # BLD AUTO: 245 10E9/L (ref 150–450)

## 2017-08-03 PROCEDURE — 97116 GAIT TRAINING THERAPY: CPT | Mod: GP

## 2017-08-03 PROCEDURE — 82565 ASSAY OF CREATININE: CPT | Performed by: ORTHOPAEDIC SURGERY

## 2017-08-03 PROCEDURE — 85610 PROTHROMBIN TIME: CPT | Performed by: ORTHOPAEDIC SURGERY

## 2017-08-03 PROCEDURE — A9270 NON-COVERED ITEM OR SERVICE: HCPCS | Mod: GY | Performed by: ORTHOPAEDIC SURGERY

## 2017-08-03 PROCEDURE — 25000128 H RX IP 250 OP 636: Performed by: ORTHOPAEDIC SURGERY

## 2017-08-03 PROCEDURE — 85049 AUTOMATED PLATELET COUNT: CPT | Performed by: ORTHOPAEDIC SURGERY

## 2017-08-03 PROCEDURE — 40000193 ZZH STATISTIC PT WARD VISIT

## 2017-08-03 PROCEDURE — 97110 THERAPEUTIC EXERCISES: CPT | Mod: GP

## 2017-08-03 PROCEDURE — 25000132 ZZH RX MED GY IP 250 OP 250 PS 637: Mod: GY | Performed by: ORTHOPAEDIC SURGERY

## 2017-08-03 PROCEDURE — 36415 COLL VENOUS BLD VENIPUNCTURE: CPT | Performed by: ORTHOPAEDIC SURGERY

## 2017-08-03 RX ORDER — WARFARIN SODIUM 5 MG/1
5 TABLET ORAL DAILY
Qty: 40 TABLET | Refills: 0 | Status: SHIPPED | OUTPATIENT
Start: 2017-08-03 | End: 2017-08-22

## 2017-08-03 RX ORDER — WARFARIN SODIUM 5 MG/1
5 TABLET ORAL
Status: DISCONTINUED | OUTPATIENT
Start: 2017-08-03 | End: 2017-08-03 | Stop reason: HOSPADM

## 2017-08-03 RX ADMIN — SENNOSIDES AND DOCUSATE SODIUM 1 TABLET: 8.6; 5 TABLET ORAL at 09:28

## 2017-08-03 RX ADMIN — HYDROCHLOROTHIAZIDE 25 MG: 25 TABLET ORAL at 09:28

## 2017-08-03 RX ADMIN — ACETAMINOPHEN 975 MG: 325 TABLET, FILM COATED ORAL at 05:55

## 2017-08-03 RX ADMIN — SPIRONOLACTONE 25 MG: 25 TABLET ORAL at 09:28

## 2017-08-03 RX ADMIN — ASPIRIN 81 MG: 81 TABLET, COATED ORAL at 09:28

## 2017-08-03 RX ADMIN — ENOXAPARIN SODIUM 40 MG: 40 INJECTION SUBCUTANEOUS at 09:28

## 2017-08-03 RX ADMIN — AMLODIPINE BESYLATE 2.5 MG: 2.5 TABLET ORAL at 09:28

## 2017-08-03 RX ADMIN — OXYCODONE HYDROCHLORIDE 5 MG: 5 TABLET ORAL at 06:43

## 2017-08-03 NOTE — PLAN OF CARE
Problem: Goal Outcome Summary  Goal: Goal Outcome Summary  Outcome: Improving  Aox4, voiding in BR, ambulating well SBA c walker. Scheduled tyl for pain, uses oxy sparingly, refused this shift. Appears comfortable. Dressing dried drainage, intact. Plan DC home 8/3.

## 2017-08-03 NOTE — PROGRESS NOTES
Marva Angela  8/3/2017  POD #3    Doing well.  Pain well-controlled.  Tolerating physical therapy and rehabilitation well.  Temperatures:  Current - Temp: 98.7  F (37.1  C); Max - Temp  Av.7  F (37.1  C)  Min: 97.9  F (36.6  C)  Max: 99.2  F (37.3  C)  Pulse range: Pulse  Av  Min: 75  Max: 75  Blood pressure range: Systolic (24hrs), Av , Min:107 , Max:134   ; Diastolic (24hrs), Av, Min:51, Max:85    CMS: intact  Labs:INR    PLAN:  Continue physical therapy  Discharge plan: home today

## 2017-08-03 NOTE — PLAN OF CARE
Problem: Goal Outcome Summary  Goal: Goal Outcome Summary  Outcome: Improving  VSS, A&Ox4. CMS intact. Up A1 with walker to BR. Denies any pain. Pt plans to discharge home today. Progressing per plan, will continue to monitor.

## 2017-08-03 NOTE — PLAN OF CARE
Problem: Knee Replacement, Total (Adult)  Goal: Signs and Symptoms of Listed Potential Problems Will be Absent or Manageable (Knee Replacement, Total)  Signs and symptoms of listed potential problems will be absent or manageable by discharge/transition of care (reference Knee Replacement, Total (Adult) CPG).   Pt. A&o, vss, up with sba, denied any pain, dressing changed, d/c instructions reviewed, d/c medication given and pt. D/c to home with family 1115.

## 2017-08-03 NOTE — DISCHARGE INSTRUCTIONS
".TOTAL KNEE REPLACEMENT TAKE HOME INSTRUCTIONS  Your surgeon will answer any questions about your progress. General guidelines for your care are listed below. Your surgeon may give additional instructions for your care at home. Please follow them carefully    Activity Level  1. Physical activity may be resumed gradually according to your comfort level and your surgeon s instructions. Follow your exercise program as instructed by your therapist. Do exercises at least twice a day. you may ice your knee after exercising.  2. Complete exercises two hours before bedtime to minimize the effect pain may have on sleep.  Refer to pages 19-22 of you \"Total Knee Replacement\" booklet for details  3. Do not wear your knee immobilizer unless your doctor has specifically told you to continue it.    Good Health Practices  1. Maintain an adequate fluid intake and eat a well balanced diet.  2. Be sure to include the basic food groups, such as dairy products, meat/fish, vegetables, and fruit. Each of these foods contribute to your wound healing and increasing your strength.  3. Surgery, decreased activity and pain medication all contribute to a decrease in bowel activity that can result in constipation. It is recommended that you increase your liquid intake, add fiber to your diet, increase activity, and decrease pain medication use. If you have any problems, notify your physician.  4. Wear your anti-embolism stockings day and night until seen by your surgeon if you were issued these at discharge.  (Not all patients will have anti-embolism stockings) Remove twice a day for one hour at a time. You may hand wash and air dry your stockings.  5. Notify your dentist of your total knee surgery and call your dentist one week before a dental appointment for antibiotics, if your dentist will not prescribe antibiotics then call your surgeon to ask for next steps.      Incision/Dressing Care  1. Keep incision clean and dry per surgeons " instructions  2. Cover incision if you are still having drainage.  3.  If you have a waterproof dressing __________________________   Shower.    Things to Watch For  1. Check incision daily for increased redness, tenderness, swelling, or drainage along the incision line. If these occur, please notify your doctor. Also, call if you develop a fever above 101 .  2. Please notify your doctor if you experience any calf pain and/or if you have surgical pain not relieved by the pain medication prescribed by your doctor.  Follow up appointment:______________________________  Nurse signature _________________________________ Date ________ Time _____  Patient signature _____________________________ Date _____________________        Revised 05/8/17

## 2017-08-03 NOTE — PLAN OF CARE
Problem: Goal Outcome Summary  Goal: Goal Outcome Summary  Discharge Planner PT   Patient plan for discharge: home with assist and OPPT  Current status: Sit to/from stand with FWW and SBA. Sit to/from supine with min assist for L LE. Amb 150 ft x 1 with FWW and SBA, no KI donned and no knee buckling noted. Up/down 3 steps x 1 with 1 rail and SEC with SBA. Tolerates TKA exs moderately due to increased pain; pt hesitant to allow increased flexion. L knee AAROM 7-67 degrees.  Barriers to return to prior living situation: none noted at this time  Recommendations for discharge: home with assist and OPPT per the plan established by the Physical Therapist   Rationale for recommendations: Pt progressing, will have assist from sister and daughter for 2 weeks. OPPT to progress ROM, strengthening and gait mechanics.       Entered by: Hayley Peterson 08/03/2017 9:28 AM      Pt discharging home with OPPT today.     PT goals partially met.

## 2017-08-04 ENCOUNTER — ANTICOAGULATION THERAPY VISIT (OUTPATIENT)
Dept: ANTICOAGULATION | Facility: CLINIC | Age: 67
End: 2017-08-04
Payer: COMMERCIAL

## 2017-08-04 ENCOUNTER — TELEPHONE (OUTPATIENT)
Dept: INTERNAL MEDICINE | Facility: CLINIC | Age: 67
End: 2017-08-04

## 2017-08-04 PROBLEM — Z47.1 AFTERCARE FOLLOWING JOINT REPLACEMENT: Status: ACTIVE | Noted: 2017-08-04

## 2017-08-04 LAB — INR POINT OF CARE: 1.2 (ref 0.86–1.14)

## 2017-08-04 PROCEDURE — 36416 COLLJ CAPILLARY BLOOD SPEC: CPT

## 2017-08-04 PROCEDURE — 85610 PROTHROMBIN TIME: CPT | Mod: QW

## 2017-08-04 NOTE — PROGRESS NOTES
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 Pt lovenox in hospital, took first dose.)           OBJECTIVE    INR Protime   Date Value Ref Range Status   08/04/2017 1.2 (A) 0.86 - 1.14 Final       ASSESSMENT / PLAN  INR assessment SUB    Recheck INR In: 3 DAYS    INR Location Clinic      Anticoagulation Summary as of 8/4/2017     INR goal 2.0-3.0   Today's INR 1.2!   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

## 2017-08-04 NOTE — MR AVS SNAPSHOT
Marva NDIAYE Julio César   8/4/2017 11:45 AM   Anticoagulation Therapy Visit    Description:  67 year old female   Provider:   ANTICOAGULATION CLINIC   Department:   Anti Coagulation           INR as of 8/4/2017     Today's INR 1.2!      Anticoagulation Summary as of 8/4/2017     INR goal 2.0-3.0   Today's INR 1.2!   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] [Z47.1]  Long-term (current) use of anticoagulants [Z79.01] (Resolved) [Z79.01]         Your next Anticoagulation Clinic appointment(s)     Aug 07, 2017 10:15 AM CDT   Anticoagulation Visit with  ANTICOAGULATION CLINIC   [facility]    [address] [address] [phone]            Aug 10, 2017  9:45 AM CDT   Anticoagulation Visit with  ANTICOAGULATION CLINIC   [facility] ([facility])    [address] [address] [phone]              Contact Numbers     [facility]  Please call  [phone] to cancel and/or reschedule your appointment   Please call  [phone] with any problems or questions regarding your therapy.        August 2017 Details    Sun Mon Tue Wed Thu Fri Sat       1               2               3               4      7.5 mg   See details      5      7.5 mg           6      5 mg         7            8               9               10               11               12                 13               14               15               16               17               18               19                 20               21               22               23               24               25               26                 27               28               29               30               31                  Date  Details   08/04 This INR check       Date of next INR:  8/7/2017         How to take your warfarin dose     To take:  5 mg Take 1 of the 5 mg tablets.    To take:  7.5 mg Take 1 of the 7.5 mg tablets.

## 2017-08-04 NOTE — TELEPHONE ENCOUNTER
"Hospital/TCU/ED for chronic condition Discharge Protocol    \"Hi, my name is Misty Alex, a registered nurse, and I am calling from [facility].  I am calling to follow up and see how things are going for you after your recent emergency visit/hospital/TCU stay.\"    Tell me how you are doing now that you are home?\" Patient reported to be feeling OK. No complaints or concerns at this time.       Discharge Instructions    \"Let's review your discharge instructions.  What is/are the follow-up recommendations?  Pt. Response: Follow up with [facility], and anticoagulation clinic    \"Has an appointment with your primary care provider been scheduled?\"   Has appointment scheduled with PCP on 10/30/17.  Offered earlier appointment but patient declined stating she has too many appointments in the next few weeks between PT, [facility], and her surgeon she didnt want to move up her appointment with PCP.      \"When you see the provider, I would recommend that you bring your medications with you.\"    Medications    \"Tell me what changed about your medicines when you discharged?\"    Changes to chronic meds?    0-1    \"What questions do you have about your medications?\"    None     New diagnoses of heart failure, COPD, diabetes, or MI?    No             On warfarin: \"Were you given any recommendations for follow-up with the anticoagulation clinic?\" Yes - Anticoagulation clinic appointment is already scheduled at appropriate interval    Medication reconciliation completed? Yes  Was MTM referral placed (*Make sure to put transitions as reason for referral)?   No    Call Summary    \"What questions or concerns do you have about your recent visit and your follow-up care?\"     none    \"If you have questions or things don't continue to improve, we encourage you contact us through the main clinic number (give number).  Even if the clinic is not open, triage nurses are available 24/7 to help you.     We would like you to know that our " "clinic has extended hours (provide information).  We also have urgent care (provide details on closest location and hours/contact info)\"      \"Thank you for your time and take care!\"           "

## 2017-08-07 ENCOUNTER — ANTICOAGULATION THERAPY VISIT (OUTPATIENT)
Dept: ANTICOAGULATION | Facility: CLINIC | Age: 67
End: 2017-08-07
Payer: COMMERCIAL

## 2017-08-07 LAB — INR POINT OF CARE: 1.6 (ref 0.86–1.14)

## 2017-08-07 PROCEDURE — 85610 PROTHROMBIN TIME: CPT | Mod: QW

## 2017-08-07 PROCEDURE — 36416 COLLJ CAPILLARY BLOOD SPEC: CPT

## 2017-08-07 NOTE — MR AVS SNAPSHOT
Marva NDIAYE Julio César   8/7/2017 10:15 AM   Anticoagulation Therapy Visit    Description:  67 year old female   Provider:   ANTICOAGULATION CLINIC   Department:   Anti Coagulation           INR as of 8/7/2017     Today's INR 1.6!      Anticoagulation Summary as of 8/7/2017     INR goal 2.0-3.0   Today's INR 1.6!   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.1] [Z47.1]  Long-term (current) use of anticoagulants [Z79.01] (Resolved) [Z79.01]         Your next Anticoagulation Clinic appointment(s)     Aug 10, 2017  9:45 AM CDT   Anticoagulation Visit with  ANTICOAGULATION CLINIC   [facility]    [address] [address] [phone]            Aug 14, 2017 11:15 AM CDT   Anticoagulation Visit with  ANTICOAGULATION CLINIC   [facility] ([facility])    [address] [address] [phone]              Contact Numbers     [facility]  Please call  [phone] to cancel and/or reschedule your appointment   Please call  [phone] with any problems or questions regarding your therapy.        August 2017 Details    Sun Mon Tue Wed Thu Fri Sat       1               2               3               4               5                 6               7      7.5 mg   See details      8      7.5 mg         9      5 mg         10            11               12                 13               14               15               16               17               18               19                 20               21               22               23               24               25               26                 27               28               29               30               31                  Date  Details   08/07 This INR check       Date of next INR:  8/10/2017         How to take your warfarin dose     To take:  5 mg Take 1 of the 5 mg tablets.    To take:  7.5 mg Take 1.5 of the 5 mg tablets.

## 2017-08-07 NOTE — PROGRESS NOTES
ANTICOAGULATION FOLLOW-UP CLINIC VISIT    Patient Name:  Marva Angela  Date:  8/7/2017  Contact Type:  Face to Face    SUBJECTIVE:     Patient Findings     Positives No Problem Findings           OBJECTIVE    INR Protime   Date Value Ref Range Status   08/07/2017 1.6 (A) 0.86 - 1.14 Final       ASSESSMENT / PLAN  INR assessment SUB    Recheck INR In: 3 DAYS    INR Location Clinic      Anticoagulation Summary as of 8/7/2017     INR goal 2.0-3.0   Today's INR 1.6!   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

## 2017-08-10 ENCOUNTER — ANTICOAGULATION THERAPY VISIT (OUTPATIENT)
Dept: ANTICOAGULATION | Facility: CLINIC | Age: 67
End: 2017-08-10
Payer: COMMERCIAL

## 2017-08-10 DIAGNOSIS — Z47.1 AFTERCARE FOLLOWING JOINT REPLACEMENT: ICD-10-CM

## 2017-08-10 DIAGNOSIS — Z86.718 PERSONAL HISTORY OF DVT (DEEP VEIN THROMBOSIS): ICD-10-CM

## 2017-08-10 LAB — INR POINT OF CARE: 2.1 (ref 0.86–1.14)

## 2017-08-10 PROCEDURE — 85610 PROTHROMBIN TIME: CPT | Mod: QW

## 2017-08-10 PROCEDURE — 36416 COLLJ CAPILLARY BLOOD SPEC: CPT

## 2017-08-10 PROCEDURE — 99207 ZZC NO CHARGE NURSE ONLY: CPT

## 2017-08-10 NOTE — PROGRESS NOTES
ANTICOAGULATION FOLLOW-UP CLINIC VISIT    Patient Name:  Marva Angela  Date:  8/10/2017  Contact Type:  Face to Face    SUBJECTIVE:     Patient Findings     Positives No Problem Findings           OBJECTIVE    INR Protime   Date Value Ref Range Status   08/10/2017 2.1 (A) 0.86 - 1.14 Final       ASSESSMENT / PLAN  INR assessment THER    Recheck INR In: 4 DAYS    INR Location Clinic      Anticoagulation Summary as of 8/10/2017     INR goal 2.0-3.0   Today's INR 2.1   Maintenance plan 37.5 mg (5 mg x 7.5) on Sat; 5 mg (5 mg x 1) on Mon, Wed, Fri; 7.5 mg (5 mg x 1.5) all other days   Full instructions 8/12: 7.5 mg; Otherwise 37.5 mg on Sat; 5 mg on Mon, Wed, Fri; 7.5 mg all other days   Weekly total 75 mg   Plan last modified Diana Preston RN (8/10/2017)   Next INR check 8/14/2017   Target end date     Indications   Personal history of DVT (deep vein thrombosis) [Z86.718]  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

## 2017-08-10 NOTE — MR AVS SNAPSHOT
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 ([facility])    [address] [place] [address] [phone]              Contact Numbers     [facility]  Please call  [phone] to cancel and/or reschedule your appointment   Please call  [phone] with any problems or questions regarding your therapy.        August 2017 Details    Sun Mon Tue Wed Thu Fri Sat       1               2               3               4               5                 6               7               8               9               10      7.5 mg   See details      11      5 mg         12      7.5 mg           13      7.5 mg         14            15               16               17               18               19                 20               21               22               23               24               25               26                 27               28               29               30               31                  Date Details   08/10 This INR check       Date of next INR:  8/14/2017         How to take your warfarin dose     To take:  5 mg Take 1 of the 5 mg tablets.    To take:  7.5 mg Take 1.5  of the 5 mg tablets.

## 2017-08-14 ENCOUNTER — ANTICOAGULATION THERAPY VISIT (OUTPATIENT)
Dept: ANTICOAGULATION | Facility: CLINIC | Age: 67
End: 2017-08-14
Payer: COMMERCIAL

## 2017-08-14 DIAGNOSIS — Z86.718 PERSONAL HISTORY OF DVT (DEEP VEIN THROMBOSIS): ICD-10-CM

## 2017-08-14 DIAGNOSIS — Z47.1 AFTERCARE FOLLOWING JOINT REPLACEMENT: ICD-10-CM

## 2017-08-14 LAB — INR POINT OF CARE: 2.1 (ref 0.86–1.14)

## 2017-08-14 PROCEDURE — 36416 COLLJ CAPILLARY BLOOD SPEC: CPT

## 2017-08-14 PROCEDURE — 99207 ZZC NO CHARGE NURSE ONLY: CPT

## 2017-08-14 PROCEDURE — 85610 PROTHROMBIN TIME: CPT | Mod: QW

## 2017-08-14 NOTE — MR AVS SNAPSHOT
Marva Angela   8/14/2017 1:45 PM   Anticoagulation Therapy Visit    Description:  67 year old female   Provider:   ANTICOAGULATION CLINIC   Department:   Anti Coagulation           INR as of 8/14/2017     Today's INR 2.1      Anticoagulation Summary as of 8/14/2017     INR goal 2.0-3.0   Today's INR 2.1   Full instructions 5 mg on Mon, Fri; 7.5 mg all other days   Next INR check 8/22/2017    Indications   Personal history of DVT (deep vein thrombosis) [Z86.718]  Aftercare following joint replacement [Z47.1] [Z47.1]  Long-term (current) use of anticoagulants [Z79.01] (Resolved) [Z79.01]         Your next Anticoagulation Clinic appointment(s)     Aug 22, 2017  9:30 AM CDT   Anticoagulation Visit with  ANTICOAGULATION CLINIC   [facility] ([facility])    [address] [address] [phone]              Contact Numbers     [facility]  Please call  [phone] to cancel and/or reschedule your appointment   Please call  [phone] with any problems or questions regarding your therapy.        August 2017 Details    Sun Mon Tue Wed Thu Fri Sat       1               2               3               4               5                 6               7               8               9               10               11               12                 13               14      5 mg   See details      15      7.5 mg         16      7.5 mg         17      7.5 mg         18      5 mg         19      7.5 mg           20      7.5 mg         21      5 mg         22            23               24               25               26                 27               28               29               30               31                  Date Details   08/14 This INR check       Date of next INR:  8/22/2017         How to take your warfarin dose     To take:  5 mg Take 1 of the 5 mg tablets.    To take:  7.5 mg Take 1.5 of the 5 mg tablets.

## 2017-08-14 NOTE — PROGRESS NOTES
ANTICOAGULATION FOLLOW-UP CLINIC VISIT    Patient Name:  Marva Angela  Date:  8/14/2017  Contact Type:  Face to Face    SUBJECTIVE:     Patient Findings     Positives No Problem Findings           OBJECTIVE    INR Protime   Date Value Ref Range Status   08/14/2017 2.1 (A) 0.86 - 1.14 Final       ASSESSMENT / PLAN  INR assessment THER    Recheck INR In: 1 WEEK    INR Location Clinic      Anticoagulation Summary as of 8/14/2017     INR goal 2.0-3.0   Today's INR 2.1   Maintenance plan 5 mg (5 mg x 1) on Mon, Fri; 7.5 mg (5 mg x 1.5) all other days   Full instructions 5 mg on Mon, Fri; 7.5 mg all other days   Weekly total 47.5 mg   Plan last modified Diana Preston RN (8/14/2017)   Next INR check 8/22/2017   Target end date     Indications   Personal history of DVT (deep vein thrombosis) [Z86.718]  Aftercare following joint replacement [Z47.1] [Z47.1]  Long-term (current) use of anticoagulants [Z79.01] (Resolved) [Z79.01]         Anticoagulation Episode Summary     [facility]    Preferred lab     Send INR reminders to  [facility]    Comments             See the Encounter Report to view Anticoagulation Flowsheet and Dosing Calendar (Go to Encounters tab in chart review, and find the Anticoagulation Therapy Visit)        Diana Preston RN

## 2017-08-22 ENCOUNTER — ANTICOAGULATION THERAPY VISIT (OUTPATIENT)
Dept: ANTICOAGULATION | Facility: CLINIC | Age: 67
End: 2017-08-22
Payer: COMMERCIAL

## 2017-08-22 DIAGNOSIS — I82.409 DVT (DEEP VENOUS THROMBOSIS) (H): Primary | ICD-10-CM

## 2017-08-22 LAB — INR POINT OF CARE: 2.8 (ref 0.86–1.14)

## 2017-08-22 PROCEDURE — 36416 COLLJ CAPILLARY BLOOD SPEC: CPT

## 2017-08-22 PROCEDURE — 85610 PROTHROMBIN TIME: CPT | Mod: QW

## 2017-08-22 RX ORDER — WARFARIN SODIUM 5 MG/1
TABLET ORAL
Qty: 25 TABLET | Refills: 0 | Status: SHIPPED | OUTPATIENT
Start: 2017-08-22 | End: 2017-10-30

## 2017-08-22 NOTE — MR AVS SNAPSHOT
Marva Angela   8/22/2017 9:30 AM   Anticoagulation Therapy Visit    Description:  67 year old female   Provider:   ANTICOAGULATION CLINIC   Department:   Anti Coagulation           INR as of 8/22/2017     Today's INR 2.8      Anticoagulation Summary as of 8/22/2017     INR goal 2.0-3.0   Today's INR 2.8   Full instructions 5 mg on Mon, Wed, Fri; 7.5 mg all other days   Next INR check 8/31/2017    Indications   Personal history of DVT (deep vein thrombosis) [Z86.718]  Aftercare following joint replacement [Z47.1] [Z47.1]  Long-term (current) use of anticoagulants [Z79.01] (Resolved) [Z79.01]         Your next Anticoagulation Clinic appointment(s)     Aug 31, 2017  9:45 AM CDT   Anticoagulation Visit with  ANTICOAGULATION CLINIC   [facility] ([facility])    [address] [place] [address] [phone]              Contact Numbers     [facility]  Please call  [phone] to cancel and/or reschedule your appointment   Please call  [phone] with any problems or questions regarding your therapy.        August 2017 Details    Sun Mon Tue Wed Thu Fri Sat       1               2               3               4               5                 6               7               8               9               10               11               12                 13               14               15               16               17               18               19                 20               21               22      7.5 mg   See details      23      5 mg         24      7.5 mg         25      5 mg         26      7.5 mg           27      7.5 mg         28      5 mg         29      7.5 mg         30      5 mg         31               Date Details   08/22 This INR check       Date of next INR:  8/31/2017         How to take your warfarin dose     To take:  5 mg Take 1 of the 5 mg tablets.    To take:  7.5 mg Take 1.5 of the 5 mg  tablets.

## 2017-08-22 NOTE — PROGRESS NOTES
ANTICOAGULATION FOLLOW-UP CLINIC VISIT    Patient Name:  Marva Angela  Date:  8/22/2017  Contact Type:  Face to Face    SUBJECTIVE:     Patient Findings     Positives No Problem Findings (Pt on warfarin prophylactic, had DVT 50 yrs ago)           OBJECTIVE    INR Protime   Date Value Ref Range Status   08/22/2017 2.8 (A) 0.86 - 1.14 Final       ASSESSMENT / PLAN  INR assessment THER    Recheck INR In: 10 DAYS    INR Location Clinic Pt is on warfarin 6 weeks per ortho MD, prophylactic for DVT of many yrs ago, the date pt to stop warfarin will be 9/13/17     Anticoagulation Summary as of 8/22/2017     INR goal 2.0-3.0   Today's INR 2.8   Maintenance plan 5 mg (5 mg x 1) on Mon, Wed, Fri; 7.5 mg (5 mg x 1.5) all other days   Full instructions 5 mg on Mon, Wed, Fri; 7.5 mg all other days   Weekly total 45 mg   Plan last modified Shasha Ramos RN (8/22/2017)   Next INR check 8/31/2017   Target end date     Indications   Personal history of DVT (deep vein thrombosis) [Z86.718]  Aftercare following joint replacement [Z47.1] [Z47.1]  Long-term (current) use of anticoagulants [Z79.01] (Resolved) [Z79.01]         Anticoagulation Episode Summary     [facility]    Preferred lab     Send INR reminders to [facility]    Comments             See the Encounter Report to view Anticoagulation Flowsheet and Dosing Calendar (Go to Encounters tab in chart review, and find the Anticoagulation Therapy Visit)        Shasha Ramos RN

## 2017-08-31 ENCOUNTER — ANTICOAGULATION THERAPY VISIT (OUTPATIENT)
Dept: ANTICOAGULATION | Facility: CLINIC | Age: 67
End: 2017-08-31
Payer: COMMERCIAL

## 2017-08-31 DIAGNOSIS — Z86.718 PERSONAL HISTORY OF DVT (DEEP VEIN THROMBOSIS): ICD-10-CM

## 2017-08-31 DIAGNOSIS — Z47.1 AFTERCARE FOLLOWING JOINT REPLACEMENT: ICD-10-CM

## 2017-08-31 LAB — INR POINT OF CARE: 2.1 (ref 0.86–1.14)

## 2017-08-31 PROCEDURE — 36416 COLLJ CAPILLARY BLOOD SPEC: CPT

## 2017-08-31 PROCEDURE — 99207 ZZC NO CHARGE NURSE ONLY: CPT

## 2017-08-31 PROCEDURE — 85610 PROTHROMBIN TIME: CPT | Mod: QW

## 2017-08-31 NOTE — MR AVS SNAPSHOT
Marva NDIAYE Julio César   8/31/2017 9:45 AM   Anticoagulation Therapy Visit    Description:  67 year old female   Provider:   ANTICOAGULATION CLINIC   Department:   Anti Coagulation           INR as of 8/31/2017     Today's INR 2.1      Anticoagulation Summary as of 8/31/2017     INR goal 2.0-3.0   Today's INR 2.1   Full instructions 5 mg on Mon, Wed, Fri; 7.5 mg all other days   Next INR check 9/14/2017    Indications   Personal history of DVT (deep vein thrombosis) [Z86.718]  Aftercare following joint replacement [Z47.1] [Z47.1]  Long-term (current) use of anticoagulants [Z79.01] (Resolved) [Z79.01]         Your next Anticoagulation Clinic appointment(s)     Aug 31, 2017  9:45 AM CDT   Anticoagulation Visit with  ANTICOAGULATION CLINIC   [facility]    [address] [place] [address] [phone]              Contact Numbers     [facility]  Please call  [phone] to cancel and/or reschedule your appointment   Please call  [phone] with any problems or questions regarding your therapy.        August 2017 Details    Sun Mon Tue Wed Thu Fri Sat       1               2               3               4               5                 6               7               8               9               10               11               12                 13               14               15               16               17               18               19                 20               21               22               23               24               25               26                 27               28               29               30               31      7.5 mg   See details         Date Details   08/31 This INR check               How to take your warfarin dose     To take:  7.5 mg Take 1.5 of the 5 mg tablets.           September 2017 Details    Sun Mon Tue Wed Thu Fri Sat          1      5 mg         2      7.5 mg            3      7.5 mg         4      5 mg         5      7.5 mg         6      5 mg         7      7.5 mg         8      5 mg         9      7.5 mg           10      7.5 mg         11      5 mg         12      7.5 mg         13      5 mg         14            15               16                 17               18               19               20               21               22               23                 24               25               26               27               28               29               30                Date Details   No additional details    Date of next INR:  9/14/2017         How to take your warfarin dose     To take:  5 mg Take 1 of the 5 mg tablets.    To take:  7.5 mg Take 1.5 of the 5 mg tablets.

## 2017-08-31 NOTE — PROGRESS NOTES
ANTICOAGULATION FOLLOW-UP CLINIC VISIT    Patient Name:  Marva Angela  Date:  8/31/2017  Contact Type:  Face to Face    SUBJECTIVE:     Patient Findings     Positives No Problem Findings    Comments Pt done with warfarin in 2 weeks for knee replacement             OBJECTIVE    INR Protime   Date Value Ref Range Status   08/31/2017 2.1 (A) 0.86 - 1.14 Final       ASSESSMENT / PLAN  INR assessment THER    Recheck INR In: 2 WEEKS will be resolved in 2 weeks   INR Location Clinic      Anticoagulation Summary as of 8/31/2017     INR goal 2.0-3.0   Today's INR 2.1   Maintenance plan 5 mg (5 mg x 1) on Mon, Wed, Fri; 7.5 mg (5 mg x 1.5) all other days   Full instructions 5 mg on Mon, Wed, Fri; 7.5 mg all other days   Weekly total 45 mg   No change documented Diana Preston, RN   Plan last modified Shasha Ramos RN (8/22/2017)   Next INR check 9/14/2017   Target end date     Indications   Personal history of DVT (deep vein thrombosis) [Z86.718]  Aftercare following joint replacement [Z47.1] [Z47.1]  Long-term (current) use of anticoagulants [Z79.01] (Resolved) [Z79.01]         Anticoagulation Episode Summary     [facility]    Preferred lab     Send [facility] to [facility]    Comments             See the Encounter Report to view Anticoagulation Flowsheet and Dosing Calendar (Go to Encounters tab in chart review, and find the Anticoagulation Therapy Visit)        Diana Preston RN

## 2017-09-21 ENCOUNTER — ANTICOAGULATION THERAPY VISIT (OUTPATIENT)
Dept: ANTICOAGULATION | Facility: CLINIC | Age: 67
End: 2017-09-21

## 2017-09-21 DIAGNOSIS — Z86.718 PERSONAL HISTORY OF DVT (DEEP VEIN THROMBOSIS): ICD-10-CM

## 2017-09-21 NOTE — MR AVS SNAPSHOT
Marva Angela   9/21/2017   Anticoagulation Therapy Visit    Description:  67 year old female   Provider:  Leif Fair MD   Department:   Anti Coagulation           INR as of 9/21/2017     Today's INR       Anticoagulation Summary as of 9/21/2017     INR goal 2.0-3.0   Today's INR    Full instructions 5 mg on Mon, Wed, Fri; 7.5 mg all other days   Next INR check     Indications   Personal history of DVT (deep vein thrombosis) [Z86.718]  Aftercare following joint replacement [Z47.1] [Z47.1]  Long-term (current) use of anticoagulants [Z79.01] (Resolved) [Z79.01]         Anticoagulation Episode Summary     Resolved date 9/21/2017    Resolved reason Therapy  Complete      Contact Numbers     [facility]  Please call  [phone] to cancel and/or reschedule your appointment   Please call  [phone] with any problems or questions regarding your therapy.

## 2017-10-27 ENCOUNTER — TELEPHONE (OUTPATIENT)
Dept: INTERNAL MEDICINE | Facility: CLINIC | Age: 67
End: 2017-10-27

## 2017-10-27 NOTE — TELEPHONE ENCOUNTER
Patient has an upcoming lab appointment and the orders were placed by an outside provider Dr. Solano, but she patient staed that she normally has the comprehensive metabolic panel done, but the basic was ordered. She wanted to know if you wanted to change the order to the comprehensive or leave it how it is?

## 2017-10-30 ENCOUNTER — RADIANT APPOINTMENT (OUTPATIENT)
Dept: MAMMOGRAPHY | Facility: CLINIC | Age: 67
End: 2017-10-30
Attending: INTERNAL MEDICINE
Payer: COMMERCIAL

## 2017-10-30 ENCOUNTER — OFFICE VISIT (OUTPATIENT)
Dept: INTERNAL MEDICINE | Facility: CLINIC | Age: 67
End: 2017-10-30
Payer: COMMERCIAL

## 2017-10-30 VITALS
DIASTOLIC BLOOD PRESSURE: 64 MMHG | OXYGEN SATURATION: 95 % | BODY MASS INDEX: 28.14 KG/M2 | TEMPERATURE: 97.8 F | HEART RATE: 89 BPM | WEIGHT: 169.1 LBS | RESPIRATION RATE: 18 BRPM | SYSTOLIC BLOOD PRESSURE: 132 MMHG

## 2017-10-30 DIAGNOSIS — I25.10 CORONARY ARTERY DISEASE INVOLVING NATIVE CORONARY ARTERY OF NATIVE HEART WITHOUT ANGINA PECTORIS: ICD-10-CM

## 2017-10-30 DIAGNOSIS — E78.5 DYSLIPIDEMIA, GOAL LDL BELOW 70: ICD-10-CM

## 2017-10-30 DIAGNOSIS — E53.8 VITAMIN B12 DEFICIENCY (NON ANEMIC): ICD-10-CM

## 2017-10-30 DIAGNOSIS — R09.89 OTHER SPECIFIED SYMPTOMS AND SIGNS INVOLVING THE CIRCULATORY AND RESPIRATORY SYSTEMS: ICD-10-CM

## 2017-10-30 DIAGNOSIS — E78.5 HYPERLIPIDEMIA LDL GOAL <130: Primary | ICD-10-CM

## 2017-10-30 DIAGNOSIS — Z82.49 FAMILY HISTORY OF ABDOMINAL AORTIC ANEURYSM: ICD-10-CM

## 2017-10-30 DIAGNOSIS — M19.90 OSTEOARTHRITIS, UNSPECIFIED OSTEOARTHRITIS TYPE, UNSPECIFIED SITE: ICD-10-CM

## 2017-10-30 DIAGNOSIS — L40.50 PSORIASIS WITH ARTHROPATHY (H): ICD-10-CM

## 2017-10-30 DIAGNOSIS — Z12.31 VISIT FOR SCREENING MAMMOGRAM: ICD-10-CM

## 2017-10-30 DIAGNOSIS — I10 BENIGN ESSENTIAL HYPERTENSION: ICD-10-CM

## 2017-10-30 DIAGNOSIS — Z87.891 HISTORY OF TOBACCO USE: ICD-10-CM

## 2017-10-30 DIAGNOSIS — M85.80 OSTEOPENIA, UNSPECIFIED LOCATION: ICD-10-CM

## 2017-10-30 LAB
ALT SERPL W P-5'-P-CCNC: 46 U/L (ref 0–50)
ANION GAP SERPL CALCULATED.3IONS-SCNC: 6 MMOL/L (ref 3–14)
BUN SERPL-MCNC: 25 MG/DL (ref 7–30)
CALCIUM SERPL-MCNC: 9.6 MG/DL (ref 8.5–10.1)
CHLORIDE SERPL-SCNC: 101 MMOL/L (ref 94–109)
CHOLEST SERPL-MCNC: 175 MG/DL
CO2 SERPL-SCNC: 32 MMOL/L (ref 20–32)
CREAT SERPL-MCNC: 0.79 MG/DL (ref 0.52–1.04)
GFR SERPL CREATININE-BSD FRML MDRD: 72 ML/MIN/1.7M2
GLUCOSE SERPL-MCNC: 123 MG/DL (ref 70–99)
HDLC SERPL-MCNC: 69 MG/DL
LDLC SERPL CALC-MCNC: 87 MG/DL
NONHDLC SERPL-MCNC: 106 MG/DL
POTASSIUM SERPL-SCNC: 3.7 MMOL/L (ref 3.4–5.3)
SODIUM SERPL-SCNC: 139 MMOL/L (ref 133–144)
TRIGL SERPL-MCNC: 95 MG/DL

## 2017-10-30 PROCEDURE — G0202 SCR MAMMO BI INCL CAD: HCPCS | Mod: TC

## 2017-10-30 PROCEDURE — 99215 OFFICE O/P EST HI 40 MIN: CPT | Performed by: INTERNAL MEDICINE

## 2017-10-30 PROCEDURE — 36415 COLL VENOUS BLD VENIPUNCTURE: CPT | Performed by: INTERNAL MEDICINE

## 2017-10-30 PROCEDURE — G0296 VISIT TO DETERM LDCT ELIG: HCPCS | Performed by: INTERNAL MEDICINE

## 2017-10-30 PROCEDURE — 84460 ALANINE AMINO (ALT) (SGPT): CPT | Performed by: INTERNAL MEDICINE

## 2017-10-30 PROCEDURE — 80061 LIPID PANEL: CPT | Performed by: INTERNAL MEDICINE

## 2017-10-30 PROCEDURE — 80048 BASIC METABOLIC PNL TOTAL CA: CPT | Performed by: INTERNAL MEDICINE

## 2017-10-30 RX ORDER — SPIRONOLACTONE AND HYDROCHLOROTHIAZIDE 25; 25 MG/1; MG/1
1 TABLET ORAL EVERY MORNING
Qty: 90 TABLET | Refills: 3 | Status: SHIPPED | OUTPATIENT
Start: 2017-10-30 | End: 2018-03-13 | Stop reason: ALTCHOICE

## 2017-10-30 RX ORDER — CELECOXIB 200 MG/1
200 CAPSULE ORAL 2 TIMES DAILY
COMMUNITY
End: 2017-10-30

## 2017-10-30 RX ORDER — AMLODIPINE BESYLATE 2.5 MG/1
2.5 TABLET ORAL DAILY
Qty: 90 TABLET | Refills: 3 | Status: SHIPPED | OUTPATIENT
Start: 2017-10-30 | End: 2018-05-23

## 2017-10-30 RX ORDER — ATORVASTATIN CALCIUM 20 MG/1
20 TABLET, FILM COATED ORAL DAILY
Qty: 90 TABLET | Refills: 3 | Status: SHIPPED | OUTPATIENT
Start: 2017-10-30 | End: 2018-05-23

## 2017-10-30 RX ORDER — FOLIC ACID 1 MG/1
1 TABLET ORAL DAILY
Qty: 100 TABLET | Refills: 3 | Status: SHIPPED | OUTPATIENT
Start: 2017-10-30 | End: 2022-06-25

## 2017-10-30 RX ORDER — CELECOXIB 200 MG/1
200 CAPSULE ORAL 2 TIMES DAILY
Qty: 180 CAPSULE | Refills: 3 | Status: SHIPPED | OUTPATIENT
Start: 2017-10-30 | End: 2018-11-05

## 2017-10-30 NOTE — MR AVS SNAPSHOT
After Visit Summary   10/30/2017    Marva Angela    MRN: [id number]           Patient Information     Date Of Birth          1950        Visit Information        Provider Department      10/30/2017 8:30 AM Leif Fair MD [facility]        Today's Diagnoses     Hyperlipidemia LDL goal <130    -  1    Benign essential hypertension        Osteopenia, unspecified location        Psoriasis with arthropathy (H)        Vitamin B12 deficiency (non anemic)        Osteoarthritis, unspecified osteoarthritis type, unspecified site        History of tobacco use        Family history of abdominal aortic aneurysm        Other specified symptoms and signs involving the circulatory and respiratory systems           Care Instructions    ** FOLLOW UP PLAN**:    PLEASE SCHEDULE FASTING LABS WITH OFFICE VISIT 6 MONTHS FROM TODAY TO FOLLOW UP ON CHOLESTEROL AND BLOOD PRESSURE AND ALSO TO RECHECK YOUR VITAMIN LEVELS ESPECIALLY VITAMIN D       I WILL NO LONGER BE PRACTICING HERE AT THE [facility] AS OF NOV 7TH 2017, SO YOU WILL NEED TO ESTABLISH CARE WITH DR. ESPINOSA    YOU HAVE BEEN REFERRED TO RHEUMATOLOGY, FOR CT SCAN OF THE LUNGS AND FOR AN ULTRASOUND OF THE AORTA TO ADDRESS ISSUES RAISED TODAY   PLEASE  MAKE SURE TO SCHEDULE YOUR APPOINTMENT(S) AT THE   OR BY CALLING THE NUMBER BELOW, THE RADIOLOGY SCHEDULING DEPARTMENT WILL CALL YOU TO SCHEDULE THE ULTRASOUND AND THE CT SCAN OF THE LUNGS    YOU MAY CONTACT THE CLINIC IF ANY QUESTIONS OR CONCERNS -[phone] OR VIA CIRQY             Lung Cancer Screening   Frequently Asked Questions  If you are at high-risk for lung cancer, getting screened with low-dose computed tomography (LDCT) every year can help save your life. This handout offers answers to some of the most common questions about lung cancer screening. If you have other questions, please call [phone] ([phone]).     What is it?  Lung  cancer screening uses special X-ray technology to create an image of your lung tissue. The exam is quick and easy and takes less than 10 seconds. We don t give you any medicine or use any needles. You can eat before and after the exam. You don t need to change your clothes as long as the clothing on your chest doesn t contain metal. But, you do need to be able to hold your breath for at least 6 seconds during the exam.    What is the goal of lung cancer screening?  The goal of lung cancer screening is to save lives. Many times, lung cancer is not found until a person starts having physical symptoms. Lung cancer screening can help detect lung cancer in the earliest stages when it may be easier to treat.    Who should be screened for lung cancer?  We suggest lung cancer screening for anyone who is at high-risk for lung cancer. You are in the high-risk group if you:      are between the ages of 55 and 79, and    have smoked at least 1 pack of cigarettes a day for 30 or more years, and    still smoke or have quit within the past 15 years.    However, if you have a new cough or shortness of breath, you should talk to your doctor before being screened.    Some national lung health advocacy groups also recommend screening for people ages 50 to 79 who have smoked an average of 1 pack of cigarettes a day for 20 years. They must also have at least 1 other risk factor for lung cancer, not including exposure to secondhand smoke. Other risk factors are having had cancer in the past, emphysema, pulmonary fibrosis, COPD, a family history of lung cancer, or exposure to certain materials such as arsenic, asbestos, beryllium, cadmium, chromium, diesel fumes, nickel, radon or silica. Your care team can help you know if you have one of these risk factors.     Why does it matter if I have symptoms?  Certain symptoms can be a sign that you have a condition in your lungs that should be checked and treated by your doctor. These symptoms  include fever, chest pain, a new or changing cough, shortness of breath that you have never felt before, coughing up blood or unexplained weight loss. Having any of these symptoms can greatly affect the results of lung cancer screening.       Should all smokers get an LDCT lung cancer screening exam?  It depends. Lung cancer screening is for a very specific group of men and women who have a history of heavy smoking over a long period of time (see  Who should be screened for lung cancer  above).  I am in the high-risk group, but have been diagnosed with cancer in the past. Is LDCT lung cancer screening right for me?  In some cases, you should not have LDCT lung screening, such as when your doctor is already following your cancer with CT scan studies. Your doctor will help you decide if LDCT lung screening is right for you.  Do I need to have a screening exam every year?  Yes. If you are in the high-risk group described earlier, you should get an LDCT lung cancer screening exam every year until you are 79, or are no longer willing or able to undergo screening and possible procedures to diagnose and treat lung cancer.  How effective is LDCT at preventing death from lung cancer?  Studies have shown that LDCT lung cancer screening can lower the risk of death from lung cancer by 20 percent in people who are at high-risk.  What are the risks?  There are some risks and limitations of LDCT lung cancer screening. We want to make sure you understand the risks and benefits, so please let us know if you have any questions. Your doctor may want to talk with you more about these risks.    Radiation exposure: As with any exam that uses radiation, there is a very small increased risk of cancer. The amount of radiation in LDCT is small--about the same amount a person would get from a mammogram. Your doctor orders the exam when he or she feels the potential benefits outweigh the risks.    False negatives: No test is perfect, including  LDCT. It is possible that you may have a medical condition, including lung cancer, that is not found during your exam. This is called a false negative result.    False positives and more testing: LDCT very often finds something in the lung that could be cancer, but in fact is not. This is called a false positive result. False positive tests often cause anxiety. To make sure these findings are not cancer, you may need to have more tests. These tests will be done only if you give us permission. Sometimes patients need a treatment that can have side effects, such as a biopsy. For more information on false positives, see  What can I expect from the results?     Findings not related to lung cancer: Your LDCT exam also takes pictures of areas of your body next to your lungs. In a very small number of cases, the CT scan will show an abnormal finding in one of these areas, such as your kidneys, adrenal glands, liver or thyroid. This finding may not be serious, but you may need more tests. Your doctor can help you decide what other tests you may need, if any.  What can I expect from the results?  About 1 out of 4 LDCT exams will find something that may need more tests. Most of the time, these findings are lung nodules. Lung nodules are very small collections of tissue in the lung. These nodules are very common, and the vast majority--more than 97 percent--are not cancer (benign). Most are normal lymph nodes or small areas of scarring from past infections.  But, if a small lung nodule is found to be cancer, the cancer can be cured more than 90 percent of the time. To know if the nodule is cancer, we may need to get more images before your next yearly screening exam. If the nodule has suspicious features (for example, it is large, has an odd shape or grows over time), we will refer you to a specialist for further testing.  Will my doctor also get the results?  Yes. Your doctor will get a copy of your results.  Is it okay to  keep smoking now that there s a cancer screening exam?  No. Tobacco is one of the strongest cancer-causing agents. It causes not only lung cancer, but other cancers and cardiovascular (heart) diseases as well. The damage caused by smoking builds over time. This means that the longer you smoke, the higher your risk of disease. While it is never too late to quit, the sooner you quit, the better.  Where can I find help to quit smoking?  The best way to prevent lung cancer is to stop smoking. If you have already quit smoking, congratulations and keep it up! For help on quitting smoking, please call [employer] at 5-865-705-HXNI ([phone]) or the American Cancer Society at 1-927.322.5105 to find local resources near you.  One-on-one health coaching:  If you d prefer to work individually with a health care provider on tobacco cessation, we offer:      Medication Therapy Management:  Our specially trained pharmacists work closely with you and your doctor to help you quit smoking.  Call [phone] or [phone] (toll free).     Can Do: Health coaching offered by [facility].  [email or web address]            Follow-ups after your visit        Additional Services     RHEUMATOLOGY REFERRAL       Your provider has referred you to: [facility]:  [facility]  [phone] [email or web address]      Please be aware that coverage of these services is subject to the terms and limitations of your health insurance plan.  Call member services at your health plan with any benefit or coverage questions.      Please bring the following with you to your appointment:    (1) Any X-Rays, CTs or MRIs which have been performed.  Contact the facility where they were done to arrange for  prior to your scheduled appointment.    (2) List of current medications   (3) This referral request   (4) Any documents/labs given to you for this referral                  Your next 10 appointments  already scheduled     Nov 01, 2017  8:50 AM CDT   Return Visit with Rufina Palacios PA-C   [facility] [place] ([facility])    [address]  [place] [address] [phone]              Future tests that were ordered for you today     Open Future Orders        Priority Expected Expires Ordered    US abdominal aorta limited STAT  10/30/2018 10/30/2017    CT Chest Lung Cancer Scrn Low Dose wo Routine  10/30/2018 10/30/2017            Who to contact     If you have questions or need follow up information about today's clinic visit or your schedule please contact [facility] directly at [phone].  Normal or non-critical lab and imaging results will be communicated to you by [employer], letter or phone within 4 business days after the clinic has received the results. If you do not hear from us within 7 days, please contact the clinic through [employer] or phone. If you have a critical or abnormal lab result, we will notify you by phone as soon as possible.  Submit refill requests through [employer] or call your pharmacy and they will forward the refill request to us. Please allow 3 business days for your refill to be completed.          Additional Information About Your Visit        [employer] Information     [employer] gives you secure access to your electronic health record. If you see a primary care provider, you can also send messages to your care team and make appointments. If you have questions, please call your primary care clinic.  If you do not have a primary care provider, please call [phone] and they will assist you.        Care EveryWhere ID     This is your Care EveryWhere ID. This could be used by other organizations to access your [facility] medical records  [id number]        Your Vitals Were     Pulse Temperature Respirations Pulse Oximetry BMI (Body Mass Index)       89 97.8  F (36.6  C) (Oral) 18 95% 28.14 kg/m2         Blood Pressure from Last 3 Encounters:   10/30/17 132/64   08/03/17 139/57   07/17/17 120/60    Weight from Last 3 Encounters:   10/30/17 169 lb 1.6 oz (76.7 kg)   07/31/17 160 lb (72.6 kg)   07/17/17 162 lb (73.5 kg)              We Performed the Following     Prof fee: Shared Decisionmaking for Lung Cancer Screening     RHEUMATOLOGY REFERRAL          Today's Medication Changes          These changes are accurate as of: 10/30/17  9:54 AM.  If you have any questions, ask your nurse or doctor.               Start taking these medicines.        Dose/Directions    cholecalciferol 20688 UNITS capsule   Commonly known as:  VITAMIN D3   Used for:  Osteopenia, unspecified location, Psoriasis with arthropathy (H)   Started by:  Leif Fair MD        Dose:  1 capsule   Take 1 capsule (50,000 Units) by mouth every 14 days SIG: TAKE INDICATION: 1ST AND 15TH OF EACH MONTH, TO TREAT VITAMIN D DEFICIENCY   Quantity:  40 capsule   Refills:  0       spironolactone-hydrochlorothiazide 25-25 MG per tablet   Commonly known as:  ALDACTAZIDE   Used for:  Benign essential hypertension   Started by:  Leif Fair MD        Dose:  1 tablet   Take 1 tablet by mouth every morning INDICATION:TO LOWER BLOOD PRESSURE   Quantity:  90 tablet   Refills:  3         These medicines have changed or have updated prescriptions.        Dose/Directions    atorvastatin 20 MG tablet   Commonly known as:  LIPITOR   This may have changed:    - medication strength  - when to take this  - additional instructions   Used for:  Hyperlipidemia LDL goal <130   Changed by:  Leif Fair MD        Dose:  20 mg   Take 1 tablet (20 mg) by mouth daily INDICATION: TO LOWER CHOLESTEROL AND TO HELP REDUCE RISK OF HEART ATTACK AND STROKE   Quantity:  90 tablet   Refills:  3       Warfarin Therapy Reminder   This may have changed:  Another medication with the same name was removed. Continue taking this medication, and follow the directions you see  here.   Used for:  Knee joint replacement status, left        5 mg tabs   Quantity:  40 each   Refills:  0            Where to get your medicines      These medications were sent to [facility] [address]  [address] [place] [address]     Phone:  [phone]     cholecalciferol 09724 UNITS capsule         These medications were sent to [facility] - [place], [place] - [address] AT [facility] OF [facility] [address] [place]  [address] [address], [facility] [address]     Phone:  [phone]     amLODIPine 2.5 MG tablet    atorvastatin 20 MG tablet    Cyanocobalamin 5000 MCG/ML Liqd    spironolactone-hydrochlorothiazide 25-25 MG per tablet         Some of these will need a paper prescription and others can be bought over the counter.  Ask your nurse if you have questions.     Bring a paper prescription for each of these medications     celecoxib 200 MG capsule    folic acid 1 MG tablet                Primary Care Provider Office Phone # Fax #    Leif Fair [phone]       [address] [address]        Equal Access to Services     KHUSHBU FRIAS AH: Hadii jaya fallon hadasho Soomaali, waaxda luqadaha, qaybta kaalmada adeegyada, jenniffer campos. So [facility] [phone].    ATENCIÓN: Si habla español, tiene a barfield disposición servicios gratuitos de asistencia lingüística. [place] al [phone].    We comply with applicable federal civil rights laws and [place] laws. We do not discriminate on the basis of race, color, national origin, age, disability, sex, sexual orientation, or gender identity.            Thank you!     Thank you for choosing [facility]  for your care. Our goal is always to provide you with excellent care. Hearing back from our patients is one way we can continue to improve our services. Please take a few minutes to complete the written survey that  you may receive in the mail after your visit with us. Thank you!             Your Updated Medication List - Protect others around you: Learn how to safely use, store and throw away your medicines at [email or web address].          This list is accurate as of: 10/30/17  9:54 AM.  Always use your most recent med list.                   Brand Name Dispense Instructions for use Diagnosis    albuterol 108 (90 BASE) MCG/ACT Inhaler    PROAIR HFA/PROVENTIL HFA/VENTOLIN HFA    3 Inhaler    Inhale 2 puffs into the lungs every 6 hours as needed for shortness of breath / dyspnea or wheezing    Chronic obstructive pulmonary disease, unspecified COPD type (H)       amLODIPine 2.5 MG tablet    NORVASC    90 tablet    Take 1 tablet (2.5 mg) by mouth daily    Benign essential hypertension       aspirin EC 81 MG EC tablet     30 tablet    Take 1 tablet (81 mg) by mouth daily    Coronary artery disease involving native coronary artery of native heart without angina pectoris       atorvastatin 20 MG tablet    LIPITOR    90 tablet    Take 1 tablet (20 mg) by mouth daily INDICATION: TO LOWER CHOLESTEROL AND TO HELP REDUCE RISK OF HEART ATTACK AND STROKE    Hyperlipidemia LDL goal <130       betamethasone dipropionate 0.05 % cream    DIPROSONE    45 g    Apply topically 2 times daily as needed (For foot)    Psoriasis       celecoxib 200 MG capsule    celeBREX    180 capsule    Take 1 capsule (200 mg) by mouth 2 times daily    Osteoarthritis, unspecified osteoarthritis type, unspecified site       cholecalciferol 36103 UNITS capsule    VITAMIN D3    40 capsule    Take 1 capsule (50,000 Units) by mouth every 14 days SIG: TAKE INDICATION: 1ST AND 15TH OF EACH MONTH, TO TREAT VITAMIN D DEFICIENCY    Osteopenia, unspecified location, Psoriasis with arthropathy (H)       Cyanocobalamin 5000 MCG/ML Liqd    B-12 SUPER STRENGTH    2 Bottle    Place 1 mL under the tongue every morning FOR VITAMIN B12 SUPPLEMENTATION, PLEASE PLACE UNDER THE TONGUE     Vitamin B12 deficiency (non anemic)       folic acid 1 MG tablet    FOLVITE    100 tablet    Take 1 tablet (1 mg) by mouth daily INDICATION: FOLIC ACID SUPPLEMENT    Vitamin B12 deficiency (non anemic)       OYSTER CALCIUM + D 250-125 MG-UNIT per tablet   Generic drug:  calcium-vitamin D      Take 2 tablets by mouth daily        ranitidine 150 MG tablet    ZANTAC     Take 150 mg by mouth daily as needed for heartburn        senna-docusate 8.6-50 MG per tablet    SENOKOT-S;PERICOLACE    100 tablet    Take 1-2 tablets by mouth 2 times daily    Knee joint replacement status, left       SPIRONOLACTONE PO           spironolactone-hydrochlorothiazide 25-25 MG per tablet    ALDACTAZIDE    90 tablet    Take 1 tablet by mouth every morning INDICATION:TO LOWER BLOOD PRESSURE    Benign essential hypertension       triamcinolone 0.1 % cream    KENALOG     Apply topically 2 times daily as needed for irritation (sores on scalp.)        TYLENOL PO      Take 1,000 mg by mouth 2 times daily as needed for mild pain or fever        Urea 20 % Crea cream      Apply topically daily        vitamin C-electrolytes 1000mg vitamin C super orange drink mix    EMERGEN-C     Take 1 packet by mouth daily Mix 1 packet in 4-6oz water.        Warfarin Therapy Reminder     40 each    5 mg tabs    Knee joint replacement status, left

## 2017-10-30 NOTE — PROGRESS NOTES
SUBJECTIVE:   Marva Angela is a 67 year old female who presents to clinic today for the following health issues:        Hypertension Follow-up      Outpatient blood pressures are being checked at home.  Results are 130/65-75.    Low Salt Diet: no added salt    Amount of exercise or physical activity: None    Problems taking medications regularly: No    Medication side effects: none    Diet: regular (no restrictions)      Hyperlipidemia Follow-Up      Rate your low fat/cholesterol diet?: good    Taking statin?  Yes, no muscle aches from statin    Other lipid medications/supplements?:  none      She is feeling very well post right hip and left knee replacement. She went through surgery like a charm. She continues to have arthritis? Related to psoriasis but is otherwise doing very well. Blood pressure has improved considerably and she denies any side effects at this time.      Problem list and histories reviewed & adjusted, as indicated.  Additional history: as documented    Labs reviewed in EPIC, results pending    Reviewed and updated as needed this visit by clinical staffTobacco  Allergies  Meds  Med Hx  Surg Hx  Fam Hx  Soc Hx      Reviewed and updated as needed this visit by Provider         ROS:  14 point ROS negative except as above      OBJECTIVE:     /64  Pulse 89  Temp 97.8  F (36.6  C) (Oral)  Resp 18  Wt 169 lb 1.6 oz (76.7 kg)  SpO2 95%  BMI 28.14 kg/m2  Body mass index is 28.14 kg/(m^2).  GENERAL: healthy, alert and no distress  NECK: no adenopathy, no asymmetry, masses, or scars and thyroid normal to palpation  RESP: lungs clear to auscultation - no rales, rhonchi or wheezes  CV: regular rate and rhythm, normal S1 S2, no S3 or S4, no murmur, click or rub, no peripheral edema and peripheral pulses strong  MS: no gross musculoskeletal defects noted, no edema    Diagnostic Test Results:  none     ASSESSMENT/PLAN:     Hyperlipidemia; controlled   Plan:  No changes in the patient's  current treatment plan    Hypertension; controlled   Associated with the following complications:    None   Plan:  No changes in the patient's current treatment plan          DIAGNOSIS/PLAN:     ICD-10-CM    1. Hyperlipidemia LDL goal <130 E78.5 atorvastatin (LIPITOR) 20 MG tablet   2. Benign essential hypertension I10 spironolactone-hydrochlorothiazide (ALDACTAZIDE) 25-25 MG per tablet     amLODIPine (NORVASC) 2.5 MG tablet   3. Osteopenia, unspecified location M85.80 cholecalciferol (VITAMIN D3) 53736 UNITS capsule     DISCONTINUED: cholecalciferol (VITAMIN D3) 34469 UNITS capsule   4. Psoriasis with arthropathy (H) L40.50 cholecalciferol (VITAMIN D3) 91037 UNITS capsule     RHEUMATOLOGY REFERRAL     DISCONTINUED: cholecalciferol (VITAMIN D3) 96452 UNITS capsule   5. Vitamin B12 deficiency (non anemic) E53.8 Cyanocobalamin (B-12 SUPER STRENGTH) 5000 MCG/ML LIQD     folic acid (FOLVITE) 1 MG tablet    SEVERE   6. Osteoarthritis, unspecified osteoarthritis type, unspecified site M19.90 celecoxib (CELEBREX) 200 MG capsule   7. History of tobacco use Z87.891 Prof fee: Shared Decisionmaking for Lung Cancer Screening     CT Chest Lung Cancer Scrn Low Dose wo     US abdominal aorta limited   8. Family history of abdominal aortic aneurysm Z82.49 US abdominal aorta limited    2 FIRST-DEGREE RELATIVES - MOM AND MATERNAL UNCLE, NORBERTO ALSO HAS A HISTORY OF TOBACCO USE WHICH RAISES HER RISK   9. Other specified symptoms and signs involving the circulatory and respiratory systems  R09.89 US abdominal aorta limited       SIGNIFICANT ISSUES RE The primary encounter diagnosis was Hyperlipidemia LDL goal <130. Diagnoses of Benign essential hypertension, Osteopenia, unspecified location, Psoriasis with arthropathy (H), Vitamin B12 deficiency (non anemic), Osteoarthritis, unspecified osteoarthritis type, unspecified site, History of tobacco use, Family history of abdominal aortic aneurysm, and Other specified symptoms and signs  involving the circulatory and respiratory systems  were also pertinent to this visit. AS NOTED AND ADDRESSED ABOVE   MEDS AND AND LABS AS ORDERED TO ADDRESS PREVIOUS AND CURRENT ABNORMAL INDICES    SEE PATIENT INSTRUCTION SECTION FOR FOLLOW UP PLAN    Marva IS TO CONTINUE OTHER TREATMENT REGIMEN/PLANS EXCEPT AS INDICATED    COMPLIANCE WITH MEDICATIONS DIET AND EXERCISE PLANS ENCOURAGED    DISCONTINUED MEDS:  Medications Discontinued During This Encounter   Medication Reason     ERYTHROMYCIN BASE PO Therapy completed     oxyCODONE (ROXICODONE) 5 MG IR tablet Therapy completed     warfarin (COUMADIN) 5 MG tablet      cholecalciferol (VITAMIN D3) 03493 UNITS capsule Reorder     cholecalciferol (VITAMIN D3) 30425 UNITS capsule Reorder     celecoxib (CELEBREX) 200 MG capsule Reorder     Atorvastatin Calcium (LIPITOR PO) Reorder     amLODIPine (NORVASC) 2.5 MG tablet Reorder     Cyanocobalamin (B-12 SUPER STRENGTH) 5000 MCG/ML LIQD Reorder     folic acid (FOLVITE) 1 MG tablet Reorder       CURRENT MED LIST WITH CHANGES AS NOTED BELOW:  Current Outpatient Prescriptions   Medication Sig Dispense Refill     SPIRONOLACTONE PO        cholecalciferol (VITAMIN D3) 92281 UNITS capsule Take 1 capsule (50,000 Units) by mouth every 14 days SIG: TAKE INDICATION: 1ST AND 15TH OF EACH MONTH, TO TREAT VITAMIN D DEFICIENCY 40 capsule 0     spironolactone-hydrochlorothiazide (ALDACTAZIDE) 25-25 MG per tablet Take 1 tablet by mouth every morning INDICATION:TO LOWER BLOOD PRESSURE 90 tablet 3     celecoxib (CELEBREX) 200 MG capsule Take 1 capsule (200 mg) by mouth 2 times daily 180 capsule 3     atorvastatin (LIPITOR) 20 MG tablet Take 1 tablet (20 mg) by mouth daily INDICATION: TO LOWER CHOLESTEROL AND TO HELP REDUCE RISK OF HEART ATTACK AND STROKE 90 tablet 3     amLODIPine (NORVASC) 2.5 MG tablet Take 1 tablet (2.5 mg) by mouth daily 90 tablet 3     Cyanocobalamin (B-12 SUPER STRENGTH) 5000 MCG/ML LIQD Place 1 mL under the tongue  every morning FOR VITAMIN B12 SUPPLEMENTATION, PLEASE PLACE UNDER THE TONGUE 2 Bottle PRN     folic acid (FOLVITE) 1 MG tablet Take 1 tablet (1 mg) by mouth daily INDICATION: FOLIC ACID SUPPLEMENT 100 tablet 3     calcium-vitamin D (OYSTER CALCIUM + D) 250-125 MG-UNIT per tablet Take 2 tablets by mouth daily       betamethasone dipropionate (DIPROSONE) 0.05 % cream Apply topically 2 times daily as needed (For foot) 45 g prn     Urea 20 % CREA cream Apply topically daily        triamcinolone (KENALOG) 0.1 % cream Apply topically 2 times daily as needed for irritation (sores on scalp.)       vitamin C-electrolytes (EMERGEN-C) 1000mg vitamin C super orange drink mix Take 1 packet by mouth daily Mix 1 packet in 4-6oz water.       Acetaminophen (TYLENOL PO) Take 1,000 mg by mouth 2 times daily as needed for mild pain or fever       ranitidine (ZANTAC) 150 MG tablet Take 150 mg by mouth daily as needed for heartburn        albuterol (PROAIR HFA, PROVENTIL HFA, VENTOLIN HFA) 108 (90 BASE) MCG/ACT inhaler Inhale 2 puffs into the lungs every 6 hours as needed for shortness of breath / dyspnea or wheezing 3 Inhaler 1     aspirin EC 81 MG tablet Take 1 tablet (81 mg) by mouth daily 30 tablet 11     [DISCONTINUED] celecoxib (CELEBREX) 200 MG capsule Take 200 mg by mouth 2 times daily       [DISCONTINUED] warfarin (COUMADIN) 5 MG tablet 1 tablet MWF, 1.5 tablets TRISHAT SUN, until 19/14/17 (Patient not taking: Reported on 10/30/2017) 25 tablet 0     Warfarin Therapy Reminder 5 mg tabs (Patient not taking: Reported on 10/30/2017) 40 each 0     senna-docusate (SENOKOT-S;PERICOLACE) 8.6-50 MG per tablet Take 1-2 tablets by mouth 2 times daily (Patient not taking: Reported on 10/30/2017) 100 tablet 0     [DISCONTINUED] Atorvastatin Calcium (LIPITOR PO) Take 20 mg by mouth daily (with lunch) (1.5 x 10mg = 15mg)       [DISCONTINUED] amLODIPine (NORVASC) 2.5 MG tablet Take 1 tablet (2.5 mg) by mouth daily 90 tablet 3         Office  visit time > 40 mins, greater than 50% of which was spent obtaining history, reviewing medications, counseling re compliance with treatment plan, discussion of treatment, follow up plans, and coordination of care.       Patient Instructions   ** FOLLOW UP PLAN**:    PLEASE SCHEDULE FASTING LABS WITH OFFICE VISIT 6 MONTHS FROM TODAY TO FOLLOW UP ON CHOLESTEROL AND BLOOD PRESSURE AND ALSO TO RECHECK YOUR VITAMIN LEVELS ESPECIALLY VITAMIN D       I WILL NO LONGER BE PRACTICING HERE AT THE [facility] AS OF NOV 7TH 2017, SO YOU WILL NEED TO ESTABLISH CARE WITH DR. ESPINOSA    YOU HAVE BEEN REFERRED TO RHEUMATOLOGY, FOR CT SCAN OF THE LUNGS AND FOR AN ULTRASOUND OF THE AORTA TO ADDRESS ISSUES RAISED TODAY   PLEASE  MAKE SURE TO SCHEDULE YOUR APPOINTMENT(S) AT THE   OR BY CALLING THE NUMBER BELOW, THE RADIOLOGY SCHEDULING DEPARTMENT WILL CALL YOU TO SCHEDULE THE ULTRASOUND AND THE CT SCAN OF THE LUNGS    YOU MAY CONTACT THE CLINIC IF ANY QUESTIONS OR CONCERNS -[phone] OR VIA [employer]             Lung Cancer Screening   Frequently Asked Questions  If you are at high-risk for lung cancer, getting screened with low-dose computed tomography (LDCT) every year can help save your life. This handout offers answers to some of the most common questions about lung cancer screening. If you have other questions, please call [phone] ([phone]).     What is it?  Lung cancer screening uses special X-ray technology to create an image of your lung tissue. The exam is quick and easy and takes less than 10 seconds. We don t give you any medicine or use any needles. You can eat before and after the exam. You don t need to change your clothes as long as the clothing on your chest doesn t contain metal. But, you do need to be able to hold your breath for at least 6 seconds during the exam.    What is the goal of lung cancer screening?  The goal of lung cancer screening is to save lives. Many times, lung cancer is  not found until a person starts having physical symptoms. Lung cancer screening can help detect lung cancer in the earliest stages when it may be easier to treat.    Who should be screened for lung cancer?  We suggest lung cancer screening for anyone who is at high-risk for lung cancer. You are in the high-risk group if you:      are between the ages of 55 and 79, and    have smoked at least 1 pack of cigarettes a day for 30 or more years, and    still smoke or have quit within the past 15 years.    However, if you have a new cough or shortness of breath, you should talk to your doctor before being screened.    Some national lung health advocacy groups also recommend screening for people ages 50 to 79 who have smoked an average of 1 pack of cigarettes a day for 20 years. They must also have at least 1 other risk factor for lung cancer, not including exposure to secondhand smoke. Other risk factors are having had cancer in the past, emphysema, pulmonary fibrosis, COPD, a family history of lung cancer, or exposure to certain materials such as arsenic, asbestos, beryllium, cadmium, chromium, diesel fumes, nickel, radon or silica. Your care team can help you know if you have one of these risk factors.     Why does it matter if I have symptoms?  Certain symptoms can be a sign that you have a condition in your lungs that should be checked and treated by your doctor. These symptoms include fever, chest pain, a new or changing cough, shortness of breath that you have never felt before, coughing up blood or unexplained weight loss. Having any of these symptoms can greatly affect the results of lung cancer screening.       Should all smokers get an LDCT lung cancer screening exam?  It depends. Lung cancer screening is for a very specific group of men and women who have a history of heavy smoking over a long period of time (see  Who should be screened for lung cancer  above).  I am in the high-risk group, but have been  diagnosed with cancer in the past. Is LDCT lung cancer screening right for me?  In some cases, you should not have LDCT lung screening, such as when your doctor is already following your cancer with CT scan studies. Your doctor will help you decide if LDCT lung screening is right for you.  Do I need to have a screening exam every year?  Yes. If you are in the high-risk group described earlier, you should get an LDCT lung cancer screening exam every year until you are 79, or are no longer willing or able to undergo screening and possible procedures to diagnose and treat lung cancer.  How effective is LDCT at preventing death from lung cancer?  Studies have shown that LDCT lung cancer screening can lower the risk of death from lung cancer by 20 percent in people who are at high-risk.  What are the risks?  There are some risks and limitations of LDCT lung cancer screening. We want to make sure you understand the risks and benefits, so please let us know if you have any questions. Your doctor may want to talk with you more about these risks.    Radiation exposure: As with any exam that uses radiation, there is a very small increased risk of cancer. The amount of radiation in LDCT is small--about the same amount a person would get from a mammogram. Your doctor orders the exam when he or she feels the potential benefits outweigh the risks.    False negatives: No test is perfect, including LDCT. It is possible that you may have a medical condition, including lung cancer, that is not found during your exam. This is called a false negative result.    False positives and more testing: LDCT very often finds something in the lung that could be cancer, but in fact is not. This is called a false positive result. False positive tests often cause anxiety. To make sure these findings are not cancer, you may need to have more tests. These tests will be done only if you give us permission. Sometimes patients need a treatment that can  have side effects, such as a biopsy. For more information on false positives, see  What can I expect from the results?     Findings not related to lung cancer: Your LDCT exam also takes pictures of areas of your body next to your lungs. In a very small number of cases, the CT scan will show an abnormal finding in one of these areas, such as your kidneys, adrenal glands, liver or thyroid. This finding may not be serious, but you may need more tests. Your doctor can help you decide what other tests you may need, if any.  What can I expect from the results?  About 1 out of 4 LDCT exams will find something that may need more tests. Most of the time, these findings are lung nodules. Lung nodules are very small collections of tissue in the lung. These nodules are very common, and the vast majority--more than 97 percent--are not cancer (benign). Most are normal lymph nodes or small areas of scarring from past infections.  But, if a small lung nodule is found to be cancer, the cancer can be cured more than 90 percent of the time. To know if the nodule is cancer, we may need to get more images before your next yearly screening exam. If the nodule has suspicious features (for example, it is large, has an odd shape or grows over time), we will refer you to a specialist for further testing.  Will my doctor also get the results?  Yes. Your doctor will get a copy of your results.  Is it okay to keep smoking now that there s a cancer screening exam?  No. Tobacco is one of the strongest cancer-causing agents. It causes not only lung cancer, but other cancers and cardiovascular (heart) diseases as well. The damage caused by smoking builds over time. This means that the longer you smoke, the higher your risk of disease. While it is never too late to quit, the sooner you quit, the better.  Where can I find help to quit smoking?  The best way to prevent lung cancer is to stop smoking. If you have already quit smoking, congratulations  and keep it up! For help on quitting smoking, please call [employer] at 8-027-597-RCQH ([phone]) or the [employer] at 1-456.147.4826 to find local resources near you.  One-on-one health coaching:  If you d prefer to work individually with a health care provider on tobacco cessation, we offer:      Medication Therapy Management:  Our specially trained pharmacists work closely with you and your doctor to help you quit smoking.  Call [phone] or [phone] (toll free).     Can Do: Health coaching offered by [facility].  [email or web address]        Leif Fair MD  [facility]      Lung Cancer Screening Shared Decision Making Visit     Marva Angela is eligible for lung cancer screening on the basis of the information provided in my signed lung cancer screening order.     I have discussed with patient the risks and benefits of screening for lung cancer with low-dose CT.     The risks include:   radiation exposure    false positives     over-diagnosis    The benefit of early detection of lung cancer is contingent upon adherence to annual screening or more frequent follow up if indicated.     Furthermore, reaping the benefits of screening requires Marva Angela to be willing and physically able to undergo diagnostic procedures, if indicated. Although no specific guide is available for determining severity of comorbidities, it is reasonable to withhold screening in patients who have greater mortality risk from other diseases.     We did discuss that the only way to prevent lung cancer is to not smoke. Smoking cessation assistance was not offered.    I did not offer risk estimation using a calculator such as this one:    ShouldIScreen

## 2017-10-30 NOTE — PATIENT INSTRUCTIONS
** FOLLOW UP PLAN**:    PLEASE SCHEDULE FASTING LABS WITH OFFICE VISIT 6 MONTHS FROM TODAY TO FOLLOW UP ON CHOLESTEROL AND BLOOD PRESSURE AND ALSO TO RECHECK YOUR VITAMIN LEVELS ESPECIALLY VITAMIN D       I WILL NO LONGER BE PRACTICING HERE AT THE [facility] AS OF NOV 7TH 2017, SO YOU WILL NEED TO ESTABLISH CARE WITH DR. ESPINOSA    YOU HAVE BEEN REFERRED TO RHEUMATOLOGY, FOR CT SCAN OF THE LUNGS AND FOR AN ULTRASOUND OF THE AORTA TO ADDRESS ISSUES RAISED TODAY   PLEASE  MAKE SURE TO SCHEDULE YOUR APPOINTMENT(S) AT THE   OR BY CALLING THE NUMBER BELOW, THE RADIOLOGY SCHEDULING DEPARTMENT WILL CALL YOU TO SCHEDULE THE ULTRASOUND AND THE CT SCAN OF THE LUNGS    YOU MAY CONTACT THE CLINIC IF ANY QUESTIONS OR CONCERNS -[phone] OR VIA [employer]             Lung Cancer Screening   Frequently Asked Questions  If you are at high-risk for lung cancer, getting screened with low-dose computed tomography (LDCT) every year can help save your life. This handout offers answers to some of the most common questions about lung cancer screening. If you have other questions, please call [phone] ([phone]).     What is it?  Lung cancer screening uses special X-ray technology to create an image of your lung tissue. The exam is quick and easy and takes less than 10 seconds. We don t give you any medicine or use any needles. You can eat before and after the exam. You don t need to change your clothes as long as the clothing on your chest doesn t contain metal. But, you do need to be able to hold your breath for at least 6 seconds during the exam.    What is the goal of lung cancer screening?  The goal of lung cancer screening is to save lives. Many times, lung cancer is not found until a person starts having physical symptoms. Lung cancer screening can help detect lung cancer in the earliest stages when it may be easier to treat.    Who should be screened for lung cancer?  We suggest lung cancer screening for  anyone who is at high-risk for lung cancer. You are in the high-risk group if you:      are between the ages of 55 and 79, and    have smoked at least 1 pack of cigarettes a day for 30 or more years, and    still smoke or have quit within the past 15 years.    However, if you have a new cough or shortness of breath, you should talk to your doctor before being screened.    Some national lung health advocacy groups also recommend screening for people ages 50 to 79 who have smoked an average of 1 pack of cigarettes a day for 20 years. They must also have at least 1 other risk factor for lung cancer, not including exposure to secondhand smoke. Other risk factors are having had cancer in the past, emphysema, pulmonary fibrosis, COPD, a family history of lung cancer, or exposure to certain materials such as arsenic, asbestos, beryllium, cadmium, chromium, diesel fumes, nickel, radon or silica. Your care team can help you know if you have one of these risk factors.     Why does it matter if I have symptoms?  Certain symptoms can be a sign that you have a condition in your lungs that should be checked and treated by your doctor. These symptoms include fever, chest pain, a new or changing cough, shortness of breath that you have never felt before, coughing up blood or unexplained weight loss. Having any of these symptoms can greatly affect the results of lung cancer screening.       Should all smokers get an LDCT lung cancer screening exam?  It depends. Lung cancer screening is for a very specific group of men and women who have a history of heavy smoking over a long period of time (see  Who should be screened for lung cancer  above).  I am in the high-risk group, but have been diagnosed with cancer in the past. Is LDCT lung cancer screening right for me?  In some cases, you should not have LDCT lung screening, such as when your doctor is already following your cancer with CT scan studies. Your doctor will help you decide if  LDCT lung screening is right for you.  Do I need to have a screening exam every year?  Yes. If you are in the high-risk group described earlier, you should get an LDCT lung cancer screening exam every year until you are 79, or are no longer willing or able to undergo screening and possible procedures to diagnose and treat lung cancer.  How effective is LDCT at preventing death from lung cancer?  Studies have shown that LDCT lung cancer screening can lower the risk of death from lung cancer by 20 percent in people who are at high-risk.  What are the risks?  There are some risks and limitations of LDCT lung cancer screening. We want to make sure you understand the risks and benefits, so please let us know if you have any questions. Your doctor may want to talk with you more about these risks.    Radiation exposure: As with any exam that uses radiation, there is a very small increased risk of cancer. The amount of radiation in LDCT is small--about the same amount a person would get from a mammogram. Your doctor orders the exam when he or she feels the potential benefits outweigh the risks.    False negatives: No test is perfect, including LDCT. It is possible that you may have a medical condition, including lung cancer, that is not found during your exam. This is called a false negative result.    False positives and more testing: LDCT very often finds something in the lung that could be cancer, but in fact is not. This is called a false positive result. False positive tests often cause anxiety. To make sure these findings are not cancer, you may need to have more tests. These tests will be done only if you give us permission. Sometimes patients need a treatment that can have side effects, such as a biopsy. For more information on false positives, see  What can I expect from the results?     Findings not related to lung cancer: Your LDCT exam also takes pictures of areas of your body next to your lungs. In a very small  number of cases, the CT scan will show an abnormal finding in one of these areas, such as your kidneys, adrenal glands, liver or thyroid. This finding may not be serious, but you may need more tests. Your doctor can help you decide what other tests you may need, if any.  What can I expect from the results?  About 1 out of 4 LDCT exams will find something that may need more tests. Most of the time, these findings are lung nodules. Lung nodules are very small collections of tissue in the lung. These nodules are very common, and the vast majority--more than 97 percent--are not cancer (benign). Most are normal lymph nodes or small areas of scarring from past infections.  But, if a small lung nodule is found to be cancer, the cancer can be cured more than 90 percent of the time. To know if the nodule is cancer, we may need to get more images before your next yearly screening exam. If the nodule has suspicious features (for example, it is large, has an odd shape or grows over time), we will refer you to a specialist for further testing.  Will my doctor also get the results?  Yes. Your doctor will get a copy of your results.  Is it okay to keep smoking now that there s a cancer screening exam?  No. Tobacco is one of the strongest cancer-causing agents. It causes not only lung cancer, but other cancers and cardiovascular (heart) diseases as well. The damage caused by smoking builds over time. This means that the longer you smoke, the higher your risk of disease. While it is never too late to quit, the sooner you quit, the better.  Where can I find help to quit smoking?  The best way to prevent lung cancer is to stop smoking. If you have already quit smoking, congratulations and keep it up! For help on quitting smoking, please call [employer] at 2-028-373-DMVS ([phone]) or the American Cancer Society at 1-293.135.1693 to find local resources near you.  One-on-one health coaching:  If you d prefer to work individually with a  health care provider on tobacco cessation, we offer:      Medication Therapy Management:  Our specially trained pharmacists work closely with you and your doctor to help you quit smoking.  Call [phone] or [phone] (toll free).     Can Do: Health coaching offered by [facility].  [email or web address]

## 2017-10-30 NOTE — NURSING NOTE
"Chief Complaint   Patient presents with     Recheck Medication     Hypertension       Initial /70  Pulse 89  Temp 97.8  F (36.6  C) (Oral)  Resp 18  Wt 169 lb 1.6 oz (76.7 kg)  SpO2 95%  BMI 28.14 kg/m2 Estimated body mass index is 28.14 kg/(m^2) as calculated from the following:    Height as of 7/31/17: 5' 5\" (1.651 m).    Weight as of this encounter: 169 lb 1.6 oz (76.7 kg).  Blood pressure completed using cuff size: regular    "

## 2017-11-01 ENCOUNTER — OFFICE VISIT (OUTPATIENT)
Dept: CARDIOLOGY | Facility: CLINIC | Age: 67
End: 2017-11-01
Attending: INTERNAL MEDICINE
Payer: COMMERCIAL

## 2017-11-01 VITALS
HEART RATE: 75 BPM | SYSTOLIC BLOOD PRESSURE: 129 MMHG | BODY MASS INDEX: 28.02 KG/M2 | WEIGHT: 168.2 LBS | DIASTOLIC BLOOD PRESSURE: 77 MMHG | HEIGHT: 65 IN

## 2017-11-01 DIAGNOSIS — I25.10 CORONARY ARTERY DISEASE INVOLVING NATIVE CORONARY ARTERY OF NATIVE HEART WITHOUT ANGINA PECTORIS: ICD-10-CM

## 2017-11-01 DIAGNOSIS — E78.5 DYSLIPIDEMIA, GOAL LDL BELOW 70: ICD-10-CM

## 2017-11-01 DIAGNOSIS — I10 BENIGN ESSENTIAL HYPERTENSION: ICD-10-CM

## 2017-11-01 PROCEDURE — 99214 OFFICE O/P EST MOD 30 MIN: CPT | Performed by: PHYSICIAN ASSISTANT

## 2017-11-01 NOTE — LETTER
2017    Leif Fair MD  [facility] [address]      RE: Marva Angela       Dear Colleague,    I had the pleasure of seeing Marva Angela in the [facility].    PRIMARY CARDIOLOGIST:  Dr. Fountain.      REASON FOR VISIT:  Hypertension and hyperlipidemia followup.      HISTORY OF PRESENT ILLNESS:  Ms. Angela is a delightful 67-year-old woman with a past medical history significant for the followin.  Hypertension with intolerance to several medications.   2.  History of tobacco abuse, quit.   3.  Dyslipidemia.   4.  Calcification of the coronary arteries and transthoracic aorta noted on CT of chest.      We have been managing her dyslipidemia and hypertension.  She has had a negative stress test and a normal echocardiogram.  This year has undergone a right total hip replacement in April and a left total knee replacement in July.  She continues to recover from her knee replacement.  She tells me she overall feels well.  She denies chest pain, shortness of breath, orthopnea or PND.  She did not initially tolerate the Lipitor 20, but she went up on very slow steps and she has now been able to tolerate it.  She is not anxious, though, to increase it further.      Unfortunately, her sister was recently diagnosed with arrhythmogenic right ventricular dysplasia and apparently she has had 2 cousins diagnosed with IHSS, which I presume is hypertrophic cardiomyopathy.  The patient herself has not had any syncope or near syncope.  No history of palpitations or AFib.  Apparently her sister has had genetic testing for this and is confirmed.      SOCIAL HISTORY:  She is a retired .  Former smoker, half pack a day, quit in .  Occasional alcohol use.  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  She walks several times a week.      PHYSICAL EXAMINATION:   GENERAL:  Well-developed, well-nourished woman in no acute distress.   HEENT:   Normocephalic, atraumatic.   HEART:  Regular in rate and rhythm.  No murmur, rub or gallop.   LUNGS:  Clear bilaterally.   EXTREMITIES:  Left leg with trace edema, right leg without edema.   SKIN:  Warm and dry.     Outpatient Encounter Prescriptions as of 11/1/2017   Medication Sig Dispense Refill     cholecalciferol (VITAMIN D3) 64940 UNITS capsule Take 1 capsule (50,000 Units) by mouth every 14 days SIG: TAKE INDICATION: 1ST AND 15TH OF EACH MONTH, TO TREAT VITAMIN D DEFICIENCY 40 capsule 0     spironolactone-hydrochlorothiazide (ALDACTAZIDE) 25-25 MG per tablet Take 1 tablet by mouth every morning INDICATION:TO LOWER BLOOD PRESSURE 90 tablet 3     celecoxib (CELEBREX) 200 MG capsule Take 1 capsule (200 mg) by mouth 2 times daily 180 capsule 3     atorvastatin (LIPITOR) 20 MG tablet Take 1 tablet (20 mg) by mouth daily INDICATION: TO LOWER CHOLESTEROL AND TO HELP REDUCE RISK OF HEART ATTACK AND STROKE 90 tablet 3     amLODIPine (NORVASC) 2.5 MG tablet Take 1 tablet (2.5 mg) by mouth daily 90 tablet 3     Cyanocobalamin (B-12 SUPER STRENGTH) 5000 MCG/ML LIQD Place 1 mL under the tongue every morning FOR VITAMIN B12 SUPPLEMENTATION, PLEASE PLACE UNDER THE TONGUE 2 Bottle PRN     folic acid (FOLVITE) 1 MG tablet Take 1 tablet (1 mg) by mouth daily INDICATION: FOLIC ACID SUPPLEMENT 100 tablet 3     calcium-vitamin D (OYSTER CALCIUM + D) 250-125 MG-UNIT per tablet Take 2 tablets by mouth daily       betamethasone dipropionate (DIPROSONE) 0.05 % cream Apply topically 2 times daily as needed (For foot) 45 g prn     Urea 20 % CREA cream Apply topically daily        triamcinolone (KENALOG) 0.1 % cream Apply topically 2 times daily as needed for irritation (sores on scalp.)       vitamin C-electrolytes (EMERGEN-C) 1000mg vitamin C super orange drink mix Take 1 packet by mouth daily Mix 1 packet in 4-6oz water.       Acetaminophen (TYLENOL PO) Take 1,000 mg by mouth 2 times daily as needed for mild pain or fever        ranitidine (ZANTAC) 150 MG tablet Take 150 mg by mouth daily as needed for heartburn        albuterol (PROAIR HFA, PROVENTIL HFA, VENTOLIN HFA) 108 (90 BASE) MCG/ACT inhaler Inhale 2 puffs into the lungs every 6 hours as needed for shortness of breath / dyspnea or wheezing 3 Inhaler 1     aspirin EC 81 MG tablet Take 1 tablet (81 mg) by mouth daily 30 tablet 11     [DISCONTINUED] SPIRONOLACTONE PO        [DISCONTINUED] Warfarin Therapy Reminder 5 mg tabs (Patient not taking: Reported on 10/30/2017) 40 each 0     [DISCONTINUED] senna-docusate (SENOKOT-S;PERICOLACE) 8.6-50 MG per tablet Take 1-2 tablets by mouth 2 times daily (Patient not taking: Reported on 10/30/2017) 100 tablet 0     No facility-administered encounter medications on file as of 11/1/2017.       ASSESSMENT AND PLAN:   1.  Hypertension with reasonable control and intolerance to multiple medications.  We will continue her on spironolactone, hydrochlorothiazide 25/25 as well as amlodipine 2.5 mg.  We will continue with current medications.   2.  Dyslipidemia, improving with LDL today 87, HDL 69, total cholesterol 175.  I would prefer her LDL closer to 87, but it has already dropped from over 104.  Control is reasonable.   3.  Calcification of the coronaries and thoracic aorta on CT.  She is appropriately on statin and aspirin.  She is due for a repeat ultrasound and this is arranged.   4.  Recent diagnosis of a sister with arrhythmogenic right ventricular dysplasia and cousins x2 diagnosed with IHSS.  I will discuss this with Electrophysiology to see if an MRI is warranted given this family history.  The patient has absolutely no symptoms concerning for ARVD - no palpitations, syncope or near-syncope.  We did talk about these warning signs.  We will call her with further recommendations.      Thank you for allowing me to participate in this delightful patient's care.     Sincerely,    Rufina Palacios PA-C     [facility]

## 2017-11-01 NOTE — PROGRESS NOTES
[id number]  HPI and Plan:   See dictation    Orders this Visit:  Orders Placed This Encounter   Procedures     Basic metabolic panel     Lipid Profile     ALT     Follow-Up with Cardiologist     No orders of the defined types were placed in this encounter.    Medications Discontinued During This Encounter   Medication Reason     senna-docusate (SENOKOT-S;PERICOLACE) 8.6-50 MG per tablet Stopped by Patient     SPIRONOLACTONE PO Medication Reconciliation Clean Up     Warfarin Therapy Reminder Therapy completed         Encounter Diagnoses   Name Primary?     Coronary artery disease involving native coronary artery of native heart without angina pectoris      Benign essential hypertension      Dyslipidemia, goal LDL below 70        CURRENT MEDICATIONS:  Current Outpatient Prescriptions   Medication Sig Dispense Refill     cholecalciferol (VITAMIN D3) 47945 UNITS capsule Take 1 capsule (50,000 Units) by mouth every 14 days SIG: TAKE INDICATION: 1ST AND 15TH OF EACH MONTH, TO TREAT VITAMIN D DEFICIENCY 40 capsule 0     spironolactone-hydrochlorothiazide (ALDACTAZIDE) 25-25 MG per tablet Take 1 tablet by mouth every morning INDICATION:TO LOWER BLOOD PRESSURE 90 tablet 3     celecoxib (CELEBREX) 200 MG capsule Take 1 capsule (200 mg) by mouth 2 times daily 180 capsule 3     atorvastatin (LIPITOR) 20 MG tablet Take 1 tablet (20 mg) by mouth daily INDICATION: TO LOWER CHOLESTEROL AND TO HELP REDUCE RISK OF HEART ATTACK AND STROKE 90 tablet 3     amLODIPine (NORVASC) 2.5 MG tablet Take 1 tablet (2.5 mg) by mouth daily 90 tablet 3     Cyanocobalamin (B-12 SUPER STRENGTH) 5000 MCG/ML LIQD Place 1 mL under the tongue every morning FOR VITAMIN B12 SUPPLEMENTATION, PLEASE PLACE UNDER THE TONGUE 2 Bottle PRN     folic acid (FOLVITE) 1 MG tablet Take 1 tablet (1 mg) by mouth daily INDICATION: FOLIC ACID SUPPLEMENT 100 tablet 3     calcium-vitamin D (OYSTER CALCIUM + D) 250-125 MG-UNIT per tablet Take 2 tablets by mouth daily        "betamethasone dipropionate (DIPROSONE) 0.05 % cream Apply topically 2 times daily as needed (For foot) 45 g prn     Urea 20 % CREA cream Apply topically daily        triamcinolone (KENALOG) 0.1 % cream Apply topically 2 times daily as needed for irritation (sores on scalp.)       vitamin C-electrolytes (EMERGEN-C) 1000mg vitamin C super orange drink mix Take 1 packet by mouth daily Mix 1 packet in 4-6oz water.       Acetaminophen (TYLENOL PO) Take 1,000 mg by mouth 2 times daily as needed for mild pain or fever       ranitidine (ZANTAC) 150 MG tablet Take 150 mg by mouth daily as needed for heartburn        albuterol (PROAIR HFA, PROVENTIL HFA, VENTOLIN HFA) 108 (90 BASE) MCG/ACT inhaler Inhale 2 puffs into the lungs every 6 hours as needed for shortness of breath / dyspnea or wheezing 3 Inhaler 1     aspirin EC 81 MG tablet Take 1 tablet (81 mg) by mouth daily 30 tablet 11       ALLERGIES     Allergies   Allergen Reactions     Penicillins      rash     Spiriva Handihaler Other (See Comments)     Felt like the med \"paralyzed\" her diaphragm.  Had increased difficulty taking deep breath     Sulfa Drugs      rash       PAST MEDICAL HISTORY:  Past Medical History:   Diagnosis Date     Arthritis     knees and hips     Benign essential hypertension     was on amlodipine/ then metoprolol / add lisinopril      Contact dermatitis and other eczema, due to unspecified cause      COPD (chronic obstructive pulmonary disease) (H)      Diabetes (H)     pre diabetic     Hyperlipidemia LDL goal <130 11/7/2012     Nephritis      as a child (post strep)      Osteoporosis, unspecified      Phlebitis and thrombophlebitis of other deep vessels of lower extremities 1972, 1975    Both with pregnancies     Sinusitis      Tobacco use disorder        PAST SURGICAL HISTORY:  Past Surgical History:   Procedure Laterality Date     ARTHROPLASTY HIP Right 4/3/2017    Procedure: ARTHROPLASTY HIP;  Surgeon: Francisco J Alston MD;  Location: " [facility] OR     ARTHROPLASTY KNEE Left 7/31/2017    Procedure: ARTHROPLASTY KNEE;  LEFT TOTAL KNEE ARTHROPLASTY ;  Surgeon: Francisco J Alston MD;  Location: [facility] OR     BIOPSY OF SKIN LESION       C DEXA, BONE DENSITY, AXIAL SKEL  6/2008    T score lumbar -0.6, femoral neck -2.4/-2.0(all stable)     C VAGINAL HYSTERECTOMY  1978    Hysterectomy, Vaginal     HC ASPIRATION &/OR INJECTION GANGLION CYST, ANY  1994     HERNIORRHAPHY INGUINAL Right 3/24/2015    Procedure: HERNIORRHAPHY INGUINAL;  Surgeon: Sam Erazo MD;  Location: [facility]     HYSTERECTOMY, PAP NO LONGER INDICATED       ORTHOPEDIC SURGERY      bilat meniscus repair       FAMILY HISTORY:  Family History   Problem Relation Age of Onset     C.A.D. Father      DIABETES Father      type 2     Myocardial Infarction Father      Heart Surgery Father      CABGx4     Hyperlipidemia Father      Breast Cancer Mother 70     OSTEOPOROSIS Mother      Thyroid Disease Mother      Hyperlipidemia Mother      CANCER Brother      Bladder     Hyperlipidemia Brother      DIABETES Brother      type 2     Colon Cancer Brother      Neurologic Disorder Paternal Grandfather      OSTEOPOROSIS Sister      Arrhythmia Sister      Heart Surgery Sister      cardioversion, ablation     Other - See Comments Sister 55     arhythmogenic right ventricular dysplasia     OSTEOPOROSIS Maternal Grandmother      OSTEOPOROSIS Paternal Grandmother      Other - See Comments Maternal Grandfather      pneumonia     Family History Negative Son      Thyroid Disease Daughter      NODULES     Family History Negative Daughter      Arrhythmia Cousin      a-fib     Cancer - colorectal No family hx of      Prostate Cancer No family hx of      Alzheimer Disease No family hx of      Anesthesia Reaction No family hx of      Blood Disease No family hx of      Eye Disorder No family hx of        SOCIAL HISTORY:  Social History     Social History     Marital status: Single     Spouse name: N/A     Number of  "children: 2     Years of education: 18     Occupational History     Mental health therapist [employer]     Social History Main Topics     Smoking status: Former Smoker     Packs/day: 1.00     Years: 10.00     Types: Cigarettes     Start date: 2/7/1964     Quit date: 9/7/2015     Smokeless tobacco: Never Used      Comment: 5-10 daily     Alcohol use 0.0 oz/week     0 Standard drinks or equivalent per week      Comment: 1 a month     Drug use: No     Sexual activity: No     Other Topics Concern      Service No     Blood Transfusions No     Caffeine Concern No     1 cup coffee a day, 1-2 can's of pop a wk in the summer      Occupational Exposure No     Hobby Hazards No     Sleep Concern No     Stress Concern No     Weight Concern No     Special Diet No     Back Care No     Exercise No     not due to knee's and hip     Bike Helmet No     n/a     Seat Belt Yes     Self-Exams Yes     sometimes     Parent/Sibling W/ Cabg, Mi Or Angioplasty Before 65f 55m? No     Social History Narrative    Eats fruits and vegetables every day. She takes a calcium supplement twice daily.    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ina            Review of Systems:  Skin:  Negative     Eyes:  Positive for glasses  ENT:  Negative    Respiratory:  Negative    Cardiovascular:  Negative;palpitations;chest pain;lightheadedness;dizziness;exercise intolerance;cyanosis;edema;fatigue    Gastroenterology: Negative    Genitourinary:  Negative    Musculoskeletal:  Positive for    Neurologic:  Negative    Psychiatric:  Positive for sleep disturbances  Heme/Lymph/Imm:  Negative    Endocrine:  Negative      Physical Exam:  Vitals: /77 (BP Location: Right arm, Cuff Size: Adult Regular)  Pulse 75  Ht 1.651 m (5' 5\")  Wt 76.3 kg (168 lb 3.2 oz)  BMI 27.99 kg/m2   Please refer to dictation for physical exam    Recent Lab Results:  LIPID RESULTS:  Lab Results   Component Value Date    CHOL 175 10/30/2017    HDL 69 10/30/2017    " LDL 87 10/30/2017    TRIG 95 10/30/2017    CHOLHDLRATIO 4.2 09/18/2015       LIVER ENZYME RESULTS:  Lab Results   Component Value Date    AST 19 07/10/2017    ALT 46 10/30/2017       CBC RESULTS:  Lab Results   Component Value Date    WBC 7.8 07/10/2017    RBC 5.01 07/10/2017    HGB 11.5 (L) 08/02/2017    HCT 45.5 07/10/2017    MCV 91 07/10/2017    MCH 28.9 07/10/2017    MCHC 31.9 07/10/2017    RDW 14.0 07/10/2017     08/03/2017       BMP RESULTS:  Lab Results   Component Value Date     10/30/2017    POTASSIUM 3.7 10/30/2017    CHLORIDE 101 10/30/2017    CO2 32 10/30/2017    ANIONGAP 6 10/30/2017     (H) 10/30/2017    BUN 25 10/30/2017    CR 0.79 10/30/2017    GFRESTIMATED 72 10/30/2017    GFRESTBLACK 87 10/30/2017    SANGEETA 9.6 10/30/2017        A1C RESULTS:  Lab Results   Component Value Date    A1C 6.4 (H) 03/21/2017       INR RESULTS:  Lab Results   Component Value Date    INR 2.1 (A) 08/31/2017    INR 2.8 (A) 08/22/2017    INR 1.07 08/03/2017    INR 1.20 (H) 04/05/2017           CC  Aren Fountain MD  [id number]  [place] [id number]

## 2017-11-01 NOTE — MR AVS SNAPSHOT
After Visit Summary   11/1/2017    Marva Angela    MRN: [id number]           Patient Information     Date Of Birth          1950        Visit Information        Provider Department      11/1/2017 8:50 AM More, Rufina Gr PA-C [facility]        Today's Diagnoses     Coronary artery disease involving native coronary artery of native heart without angina pectoris        Benign essential hypertension        Dyslipidemia, goal LDL below 70          Care Instructions    Thanks for coming into [facility] today.    We discussed: I'll look into [facility] and if we need to do additional testing.  Please watch for palpitations, heart racing, passing out or almost passing out.  If any of these happen please let me know.      Work hard on exercise.      Medication changes:  Continue current medications.    Results: labs overall look good.  Cholesterol is still a bit high.      Follow up: with Dr. Fountain in 3 months- I'll call sooner with concerns.        Please call my nurse at [phone] with any questions or concerns.    Scheduling phone number: [phone]  Reminder: Please bring in all current medications, over the counter supplements and vitamin bottles to your next appointment.                Follow-ups after your visit        Additional Services     Follow-Up with Cardiologist                 Your next 10 appointments already scheduled     Nov 03, 2017  7:10 AM CDT   US ABDOMINAL AORTIC IMAGING with SHUS1   [facility] ([facility])    [address] [place] [id number] [phone]           Please bring a list of your medicines (including vitamins, minerals and over-the-counter drugs). Also, tell your doctor about any allergies you may have. Wear comfortable clothes and leave your valuables at home.  Adults: No eating or drinking for 8 hours before the exam. You may take  medicine with a small sip of water.  Children: - Children 6+ years: No food or drink for 6 hours before exam. - Children 1-5 years: No food or drink for 4 hours before exam. - Infants, breast-fed: may have breast milk up to 2 hours before exam. - Infants, formula: may have bottle until 4 hours before exam.  Please call the Imaging Department at your exam site with any questions.            Nov 03, 2017  7:45 AM CDT   CT LUNG RESEARCH MARTINES with SHCT1   [facility] CT ([facility])    [address] [facility] [id number] [phone]           Please bring any scans or X-rays taken at other hospitals, if similar tests were done. Also bring a list of your medicines, including vitamins, minerals and over-the-counter drugs. It is safest to leave personal items at home.  Be sure to tell your doctor:   If you have any allergies.   If there s any chance you are pregnant.   If you are breastfeeding.   If you have any special needs.  You do not need to do anything special to prepare.  Please wear loose clothing, such as a sweat suit or jogging clothes. Avoid snaps, zippers and other metal. We may ask you to undress and put on a hospital gown.              Future tests that were ordered for you today     Open Future Orders        Priority Expected Expires Ordered    Basic metabolic panel Routine 3/1/2018 11/1/2018 11/1/2017    Lipid Profile Routine 3/1/2018 11/1/2018 11/1/2017    ALT Routine 3/1/2018 11/1/2018 11/1/2017    Follow-Up with Cardiologist Routine 3/1/2018 11/1/2018 11/1/2017            Who to contact     If you have questions or need follow up information about today's clinic visit or your schedule please contact [facility] directly at [phone].  Normal or non-critical lab and imaging results will be communicated to you by MyChart, letter or phone within 4 business days after the clinic has received the results. If you do not hear from us  "within 7 days, please contact the clinic through [employer] or phone. If you have a critical or abnormal lab result, we will notify you by phone as soon as possible.  Submit refill requests through [employer] or call your pharmacy and they will forward the refill request to us. Please allow 3 business days for your refill to be completed.          Additional Information About Your Visit        [employer] Information     [employer] gives you secure access to your electronic health record. If you see a primary care provider, you can also send messages to your care team and make appointments. If you have questions, please call your primary care clinic.  If you do not have a primary care provider, please call [phone] and they will assist you.        Care EveryWhere ID     This is your Care EveryWhere ID. This could be used by other organizations to access your [place] medical records  [id number]        Your Vitals Were     Pulse Height BMI (Body Mass Index)             75 1.651 m (5' 5\") 27.99 kg/m2          Blood Pressure from Last 3 Encounters:   11/01/17 129/77   10/30/17 132/64   08/03/17 139/57    Weight from Last 3 Encounters:   11/01/17 76.3 kg (168 lb 3.2 oz)   10/30/17 76.7 kg (169 lb 1.6 oz)   07/31/17 72.6 kg (160 lb)              We Performed the Following     Follow-Up with Cardiac Advanced Practice Provider        Primary Care Provider Office Phone # Fax #    Leif Fair [phone]       [address] [facility] [address]        Equal Access to Services     NITIN FRIAS : Hadii aad ku hadasho Soomaali, waaxda luqadaha, qaybta kaalmada adeegyada, jenniffer campos. So [facility] [phone].    ATENCIÓN: Si habla español, tiene a barfield disposición servicios gratuitos de asistencia lingüística. Llame al [phone].    We comply with applicable federal civil rights laws and [place] laws. We do not discriminate on the basis of race, color, national origin, age, " disability, sex, sexual orientation, or gender identity.            Thank you!     Thank you for choosing [facility]  for your care. Our goal is always to provide you with excellent care. Hearing back from our patients is one way we can continue to improve our services. Please take a few minutes to complete the written survey that you may receive in the mail after your visit with us. Thank you!             Your Updated Medication List - Protect others around you: Learn how to safely use, store and throw away your medicines at www.disposemymeds.org.          This list is accurate as of: 11/1/17  9:22 AM.  Always use your most recent med list.                   Brand Name Dispense Instructions for use Diagnosis    albuterol 108 (90 BASE) MCG/ACT Inhaler    PROAIR HFA/PROVENTIL HFA/VENTOLIN HFA    3 Inhaler    Inhale 2 puffs into the lungs every 6 hours as needed for shortness of breath / dyspnea or wheezing    Chronic obstructive pulmonary disease, unspecified COPD type (H)       amLODIPine 2.5 MG tablet    NORVASC    90 tablet    Take 1 tablet (2.5 mg) by mouth daily    Benign essential hypertension       aspirin EC 81 MG EC tablet     30 tablet    Take 1 tablet (81 mg) by mouth daily    Coronary artery disease involving native coronary artery of native heart without angina pectoris       atorvastatin 20 MG tablet    LIPITOR    90 tablet    Take 1 tablet (20 mg) by mouth daily INDICATION: TO LOWER CHOLESTEROL AND TO HELP REDUCE RISK OF HEART ATTACK AND STROKE    Hyperlipidemia LDL goal <130       betamethasone dipropionate 0.05 % cream    DIPROSONE    45 g    Apply topically 2 times daily as needed (For foot)    Psoriasis       celecoxib 200 MG capsule    celeBREX    180 capsule    Take 1 capsule (200 mg) by mouth 2 times daily    Osteoarthritis, unspecified osteoarthritis type, unspecified site       cholecalciferol 28557 UNITS capsule    VITAMIN D3    40 capsule    Take 1  capsule (50,000 Units) by mouth every 14 days SIG: TAKE INDICATION: 1ST AND 15TH OF EACH MONTH, TO TREAT VITAMIN D DEFICIENCY    Osteopenia, unspecified location, Psoriasis with arthropathy (H)       Cyanocobalamin 5000 MCG/ML Liqd    B-12 SUPER STRENGTH    2 Bottle    Place 1 mL under the tongue every morning FOR VITAMIN B12 SUPPLEMENTATION, PLEASE PLACE UNDER THE TONGUE    Vitamin B12 deficiency (non anemic)       folic acid 1 MG tablet    FOLVITE    100 tablet    Take 1 tablet (1 mg) by mouth daily INDICATION: FOLIC ACID SUPPLEMENT    Vitamin B12 deficiency (non anemic)       OYSTER CALCIUM + D 250-125 MG-UNIT per tablet   Generic drug:  calcium-vitamin D      Take 2 tablets by mouth daily        ranitidine 150 MG tablet    ZANTAC     Take 150 mg by mouth daily as needed for heartburn        spironolactone-hydrochlorothiazide 25-25 MG per tablet    ALDACTAZIDE    90 tablet    Take 1 tablet by mouth every morning INDICATION:TO LOWER BLOOD PRESSURE    Benign essential hypertension       triamcinolone 0.1 % cream    KENALOG     Apply topically 2 times daily as needed for irritation (sores on scalp.)        TYLENOL PO      Take 1,000 mg by mouth 2 times daily as needed for mild pain or fever        Urea 20 % Crea cream      Apply topically daily        vitamin C-electrolytes 1000mg vitamin C super orange drink mix    EMERGEN-C     Take 1 packet by mouth daily Mix 1 packet in 4-6oz water.

## 2017-11-01 NOTE — PROGRESS NOTES
PRIMARY CARDIOLOGIST:  Dr. Fountain.      REASON FOR VISIT:  Hypertension and hyperlipidemia followup.      HISTORY OF PRESENT ILLNESS:  Ms. Angela is a delightful 67-year-old woman with a past medical history significant for the followin.  Hypertension with intolerance to several medications.   2.  History of tobacco abuse, quit.   3.  Dyslipidemia.   4.  Calcification of the coronary arteries and transthoracic aorta noted on CT of chest.      We have been managing her dyslipidemia and hypertension.  She has had a negative stress test and a normal echocardiogram.  This year has undergone a right total hip replacement in April and a left total knee replacement in July.  She continues to recover from her knee replacement.  She tells me she overall feels well.  She denies chest pain, shortness of breath, orthopnea or PND.  She did not initially tolerate the Lipitor 20, but she went up on very slow steps and she has now been able to tolerate it.  She is not anxious, though, to increase it further.      Unfortunately, her sister was recently diagnosed with arrhythmogenic right ventricular dysplasia and apparently she has had 2 cousins diagnosed with IHSS, which I presume is hypertrophic cardiomyopathy.  The patient herself has not had any syncope or near syncope.  No history of palpitations or AFib.  Apparently her sister has had genetic testing for this and is confirmed.      SOCIAL HISTORY:  She is a retired .  Former smoker, half pack a day, quit in .  Occasional alcohol use.  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  She walks several times a week.      PHYSICAL EXAMINATION:   GENERAL:  Well-developed, well-nourished woman in no acute distress.   HEENT:  Normocephalic, atraumatic.   HEART:  Regular in rate and rhythm.  No murmur, rub or gallop.   LUNGS:  Clear bilaterally.   EXTREMITIES:  Left leg with trace edema, right leg without edema.   SKIN:  Warm and dry.       ASSESSMENT AND PLAN:   1.  Hypertension with reasonable control and intolerance to multiple medications.  We will continue her on spironolactone, hydrochlorothiazide 25/25 as well as amlodipine 2.5 mg.  We will continue with current medications.   2.  Dyslipidemia, improving with LDL today 87, HDL 69, total cholesterol 175.  I would prefer her LDL closer to 87, but it has already dropped from over 104.  Control is reasonable.   3.  Calcification of the coronaries and thoracic aorta on CT.  She is appropriately on statin and aspirin.  She is due for a repeat ultrasound and this is arranged.   4.  Recent diagnosis of a sister with arrhythmogenic right ventricular dysplasia and cousins x2 diagnosed with IHSS.  I will discuss this with Electrophysiology to see if an MRI is warranted given this family history.  The patient has absolutely no symptoms concerning for ARVD - no palpitations, syncope or near-syncope.  We did talk about these warning signs.  We will call her with further recommendations.      Thank you for allowing me to participate in this delightful patient's care.      ZIGGY Herrera PA-C             D: 2017 09:33   T: 2017 11:53   MT: al      Name:     NORBERTO IBRAHIM   MRN:      [id number]        Account:      [id number]   :      1950           Service Date: 2017      Document: [id number]

## 2017-11-03 ENCOUNTER — HOSPITAL ENCOUNTER (OUTPATIENT)
Dept: CT IMAGING | Facility: CLINIC | Age: 67
End: 2017-11-03
Attending: INTERNAL MEDICINE
Payer: MEDICARE

## 2017-11-03 ENCOUNTER — HOSPITAL ENCOUNTER (OUTPATIENT)
Dept: ULTRASOUND IMAGING | Facility: CLINIC | Age: 67
Discharge: HOME | End: 2017-11-03
Attending: INTERNAL MEDICINE | Admitting: INTERNAL MEDICINE
Payer: MEDICARE

## 2017-11-03 DIAGNOSIS — Z87.891 HISTORY OF TOBACCO USE: ICD-10-CM

## 2017-11-03 DIAGNOSIS — Z82.49 FAMILY HISTORY OF ABDOMINAL AORTIC ANEURYSM: ICD-10-CM

## 2017-11-03 DIAGNOSIS — R09.89 OTHER SPECIFIED SYMPTOMS AND SIGNS INVOLVING THE CIRCULATORY AND RESPIRATORY SYSTEMS: ICD-10-CM

## 2017-11-03 PROCEDURE — G0297 LDCT FOR LUNG CA SCREEN: HCPCS

## 2017-11-03 PROCEDURE — 76775 US EXAM ABDO BACK WALL LIM: CPT

## 2017-11-06 NOTE — PROGRESS NOTES
Dear Marva,   Your recent CT scan results were reviewed and are within acceptable limits. Please continue with other treatment, and follow up plans as discussed and do not hesitate to contact me with any questions or concerns.  Stay healthy!    Regards,  Dr. Marv Cano

## 2017-12-01 ENCOUNTER — MYC MEDICAL ADVICE (OUTPATIENT)
Dept: INTERNAL MEDICINE | Facility: CLINIC | Age: 67
End: 2017-12-01

## 2017-12-01 ENCOUNTER — TELEPHONE (OUTPATIENT)
Dept: INTERNAL MEDICINE | Facility: CLINIC | Age: 67
End: 2017-12-01

## 2017-12-04 ENCOUNTER — OFFICE VISIT (OUTPATIENT)
Dept: INTERNAL MEDICINE | Facility: CLINIC | Age: 67
End: 2017-12-04
Payer: COMMERCIAL

## 2017-12-04 VITALS
WEIGHT: 168.5 LBS | BODY MASS INDEX: 28.04 KG/M2 | DIASTOLIC BLOOD PRESSURE: 80 MMHG | HEART RATE: 75 BPM | OXYGEN SATURATION: 96 % | SYSTOLIC BLOOD PRESSURE: 130 MMHG | TEMPERATURE: 98.1 F

## 2017-12-04 DIAGNOSIS — M85.80 OSTEOPENIA, UNSPECIFIED LOCATION: ICD-10-CM

## 2017-12-04 DIAGNOSIS — M19.90 OSTEOARTHRITIS, UNSPECIFIED OSTEOARTHRITIS TYPE, UNSPECIFIED SITE: ICD-10-CM

## 2017-12-04 DIAGNOSIS — E78.5 HYPERLIPIDEMIA LDL GOAL <130: ICD-10-CM

## 2017-12-04 DIAGNOSIS — R73.03 PREDIABETES: ICD-10-CM

## 2017-12-04 DIAGNOSIS — I10 BENIGN ESSENTIAL HYPERTENSION: Primary | ICD-10-CM

## 2017-12-04 PROCEDURE — 99214 OFFICE O/P EST MOD 30 MIN: CPT | Performed by: INTERNAL MEDICINE

## 2017-12-04 NOTE — NURSING NOTE
"Chief Complaint   Patient presents with     [facility]     Prior Authorization     need new PA for Adalactazide       Initial /80  Pulse 75  Temp 98.1  F (36.7  C) (Oral)  Wt 168 lb 8 oz (76.4 kg)  SpO2 96%  BMI 28.04 kg/m2 Estimated body mass index is 28.04 kg/(m^2) as calculated from the following:    Height as of 11/1/17: 5' 5\" (1.651 m).    Weight as of this encounter: 168 lb 8 oz (76.4 kg).  Medication Reconciliation: complete   Re Jacobo CMA      "

## 2017-12-04 NOTE — MR AVS SNAPSHOT
After Visit Summary   12/4/2017    Marva Angela    MRN: [id number]           Patient Information     Date Of Birth          1950        Visit Information        Provider Department      12/4/2017 8:40 AM Kendall Mendez MD [facility]        Today's Diagnoses     Benign essential hypertension    -  1    Prediabetes        Hyperlipidemia LDL goal <130        Osteoarthritis, unspecified osteoarthritis type, unspecified site        Osteopenia, unspecified location          Care Instructions    Continue all medications at the same doses.  Contact your usual pharmacy if you need refills.           Follow-ups after your visit        Who to contact     If you have questions or need follow up information about today's clinic visit or your schedule please contact [facility] directly at [phone].  Normal or non-critical lab and imaging results will be communicated to you by MyChart, letter or phone within 4 business days after the clinic has received the results. If you do not hear from us within 7 days, please contact the clinic through MyChart or phone. If you have a critical or abnormal lab result, we will notify you by phone as soon as possible.  Submit refill requests through [employer] or call your pharmacy and they will forward the refill request to us. Please allow 3 business days for your refill to be completed.          Additional Information About Your Visit        MyChart Information     [employer] gives you secure access to your electronic health record. If you see a primary care provider, you can also send messages to your care team and make appointments. If you have questions, please call your primary care clinic.  If you do not have a primary care provider, please call [phone] and they will assist you.        Care EveryWhere ID     This is your Care EveryWhere ID. This could be used by other organizations to access your  [place] medical records  [id number]        Your Vitals Were     Pulse Temperature Pulse Oximetry BMI (Body Mass Index)          75 98.1  F (36.7  C) (Oral) 96% 28.04 kg/m2         Blood Pressure from Last 3 Encounters:   12/04/17 130/80   11/01/17 129/77   10/30/17 132/64    Weight from Last 3 Encounters:   12/04/17 168 lb 8 oz (76.4 kg)   11/01/17 168 lb 3.2 oz (76.3 kg)   10/30/17 169 lb 1.6 oz (76.7 kg)              Today, you had the following     No orders found for display       Primary Care Provider Office Phone # Fax #    Leif Fair [phone]       XXX RESIGNED XXX  [facility] [address]        Equal Access to Services     KHUSHBU FRIAS : Hadmaggy Meier, waaxswapna luqadaha, qaybta kaalmada truman, jenniffer antoine . So [facility] [phone].    ATENCIÓN: Si habla español, tiene a barfield disposición servicios gratuitos de asistencia lingüística. [facility] [phone].    We comply with applicable federal civil rights laws and [place] laws. We do not discriminate on the basis of race, color, national origin, age, disability, sex, sexual orientation, or gender identity.            Thank you!     Thank you for choosing [facility]  for your care. Our goal is always to provide you with excellent care. Hearing back from our patients is one way we can continue to improve our services. Please take a few minutes to complete the written survey that you may receive in the mail after your visit with us. Thank you!             Your Updated Medication List - Protect others around you: Learn how to safely use, store and throw away your medicines at www.disposemymeds.org.          This list is accurate as of: 12/4/17 11:59 PM.  Always use your most recent med list.                   Brand Name Dispense Instructions for use Diagnosis    albuterol 108 (90 BASE) MCG/ACT Inhaler    PROAIR HFA/PROVENTIL HFA/VENTOLIN HFA    3 Inhaler    Inhale 2  puffs into the lungs every 6 hours as needed for shortness of breath / dyspnea or wheezing    Chronic obstructive pulmonary disease, unspecified COPD type (H)       amLODIPine 2.5 MG tablet    NORVASC    90 tablet    Take 1 tablet (2.5 mg) by mouth daily    Benign essential hypertension       aspirin EC 81 MG EC tablet     30 tablet    Take 1 tablet (81 mg) by mouth daily    Coronary artery disease involving native coronary artery of native heart without angina pectoris       atorvastatin 20 MG tablet    LIPITOR    90 tablet    Take 1 tablet (20 mg) by mouth daily INDICATION: TO LOWER CHOLESTEROL AND TO HELP REDUCE RISK OF HEART ATTACK AND STROKE    Hyperlipidemia LDL goal <130       betamethasone dipropionate 0.05 % cream    DIPROSONE    45 g    Apply topically 2 times daily as needed (For foot)    Psoriasis       celecoxib 200 MG capsule    celeBREX    180 capsule    Take 1 capsule (200 mg) by mouth 2 times daily    Osteoarthritis, unspecified osteoarthritis type, unspecified site       cholecalciferol 18112 UNITS capsule    VITAMIN D3    40 capsule    Take 1 capsule (50,000 Units) by mouth every 14 days SIG: TAKE INDICATION: 1ST AND 15TH OF EACH MONTH, TO TREAT VITAMIN D DEFICIENCY    Osteopenia, unspecified location, Psoriasis with arthropathy (H)       Cyanocobalamin 5000 MCG/ML Liqd    B-12 SUPER STRENGTH    2 Bottle    Place 1 mL under the tongue every morning FOR VITAMIN B12 SUPPLEMENTATION, PLEASE PLACE UNDER THE TONGUE    Vitamin B12 deficiency (non anemic)       folic acid 1 MG tablet    FOLVITE    100 tablet    Take 1 tablet (1 mg) by mouth daily INDICATION: FOLIC ACID SUPPLEMENT    Vitamin B12 deficiency (non anemic)       OYSTER CALCIUM + D 250-125 MG-UNIT per tablet   Generic drug:  calcium-vitamin D      Take 2 tablets by mouth daily        ranitidine 150 MG tablet    ZANTAC     Take 150 mg by mouth daily as needed for heartburn        spironolactone-hydrochlorothiazide 25-25 MG per tablet     ALDACTAZIDE    90 tablet    Take 1 tablet by mouth every morning INDICATION:TO LOWER BLOOD PRESSURE    Benign essential hypertension       triamcinolone 0.1 % cream    KENALOG     Apply topically 2 times daily as needed for irritation (sores on scalp.)        TYLENOL PO      Take 1,000 mg by mouth 2 times daily as needed for mild pain or fever        Urea 20 % Crea cream      Apply topically daily        vitamin C-electrolytes 1000mg vitamin C super orange drink mix    EMERGEN-C     Take 1 packet by mouth daily Mix 1 packet in 4-6oz water.

## 2017-12-04 NOTE — PROGRESS NOTES
SUBJECTIVE:   Marva Angela is a 67 year old female who presents to clinic today for the following health issues:    Needs PA renewed for Adalactazide    Gets [facility]    New Patient/Transfer of Care  Hypertension Follow-up      Outpatient blood pressures are being checked at home.  Results are 120-130/60-70.    Low Salt Diet: no added salt    Blood presure remains well controlled at home  Readings outside clinic are within normal limits.  Reviewed last 6 BP readings in chart:  BP Readings from Last 6 Encounters:   12/04/17 130/80   11/01/17 129/77   10/30/17 132/64   08/03/17 139/57   07/17/17 120/60   07/14/17 128/66     He has not experienced any significant side effects from medicaiotns for hypertension.    NO active cardiac complaints or symptoms with exercise.       Amount of exercise or physical activity: 6-7 days/week for an average of 15-30 minutes walks one mile per day    Problems taking medications regularly: No    Medication side effects: none    Diet: low salt    2.  Has history of hyperlipidemia.  On statin for this, denies any significant side effects of this medication.      Latest labs reviewed:    Recent Labs   Lab Test  10/30/17   0726  07/10/17   0921   09/18/15   0738  01/18/10   0840   CHOL  175  180   < >  221*  235*   HDL  69  64   < >  53  53   LDL  87  97   < >  144*  160*   TRIG  95  93   < >  122  113   CHOLHDLRATIO   --    --    --   4.2  4.5    < > = values in this interval not displayed.        Lab Results   Component Value Date    AST 19 07/10/2017         3.  Has prior diagnosis of osteopenia.  Reviewed her last DEXA scan and her current therapy.  She is on calcium and Vit D without reported side effects.     4.  GERD stable.  Taking meds as ordered, no reported side effects from medicines.  Eating properly to avoid sx.   No melena, no hematochezia, no diarrhea.  No unintended weigth loss.         Problem list and histories reviewed & adjusted, as  indicated.  Additional history: as documented        Reviewed and updated as needed this visit by clinical staffTobacco  Allergies       Reviewed and updated as needed this visit by Provider           Past Medical History:  ---------------------------  Past Medical History:   Diagnosis Date     Arthritis     knees and hips     Benign essential hypertension     was on amlodipine/ then metoprolol / add lisinopril      Contact dermatitis and other eczema, due to unspecified cause      COPD (chronic obstructive pulmonary disease) (H)      Diabetes (H)     pre diabetic     Hyperlipidemia LDL goal <130 11/7/2012     Nephritis      as a child (post strep)      Osteoporosis, unspecified      Phlebitis and thrombophlebitis of other deep vessels of lower extremities 1972, 1975    Both with pregnancies     Sinusitis      Tobacco use disorder        Past Surgical History:  ---------------------------  Past Surgical History:   Procedure Laterality Date     ARTHROPLASTY HIP Right 4/3/2017    Procedure: ARTHROPLASTY HIP;  Surgeon: Francisco J Alston MD;  Location:  OR     ARTHROPLASTY KNEE Left 7/31/2017    Procedure: ARTHROPLASTY KNEE;  LEFT TOTAL KNEE ARTHROPLASTY ;  Surgeon: Francisco J Alston MD;  Location:  OR     BIOPSY OF SKIN LESION       C DEXA, BONE DENSITY, AXIAL SKEL  6/2008    T score lumbar -0.6, femoral neck -2.4/-2.0(all stable)     C VAGINAL HYSTERECTOMY  1978    Hysterectomy, Vaginal     HC ASPIRATION &/OR INJECTION GANGLION CYST, ANY  1994     HERNIORRHAPHY INGUINAL Right 3/24/2015    Procedure: HERNIORRHAPHY INGUINAL;  Surgeon: Sam Erazo MD;  Location:  [facility]     HYSTERECTOMY, PAP NO LONGER INDICATED       ORTHOPEDIC SURGERY      bilat meniscus repair       Current Medications:  ---------------------------  Current Outpatient Prescriptions   Medication Sig Dispense Refill     cholecalciferol (VITAMIN D3) 45569 UNITS capsule Take 1 capsule (50,000 Units) by mouth every 14 days  SIG: TAKE INDICATION: 1ST AND 15TH OF EACH MONTH, TO TREAT VITAMIN D DEFICIENCY 40 capsule 0     spironolactone-hydrochlorothiazide (ALDACTAZIDE) 25-25 MG per tablet Take 1 tablet by mouth every morning INDICATION:TO LOWER BLOOD PRESSURE 90 tablet 3     celecoxib (CELEBREX) 200 MG capsule Take 1 capsule (200 mg) by mouth 2 times daily 180 capsule 3     atorvastatin (LIPITOR) 20 MG tablet Take 1 tablet (20 mg) by mouth daily INDICATION: TO LOWER CHOLESTEROL AND TO HELP REDUCE RISK OF HEART ATTACK AND STROKE 90 tablet 3     amLODIPine (NORVASC) 2.5 MG tablet Take 1 tablet (2.5 mg) by mouth daily 90 tablet 3     Cyanocobalamin (B-12 SUPER STRENGTH) 5000 MCG/ML LIQD Place 1 mL under the tongue every morning FOR VITAMIN B12 SUPPLEMENTATION, PLEASE PLACE UNDER THE TONGUE 2 Bottle PRN     folic acid (FOLVITE) 1 MG tablet Take 1 tablet (1 mg) by mouth daily INDICATION: FOLIC ACID SUPPLEMENT 100 tablet 3     calcium-vitamin D (OYSTER CALCIUM + D) 250-125 MG-UNIT per tablet Take 2 tablets by mouth daily       betamethasone dipropionate (DIPROSONE) 0.05 % cream Apply topically 2 times daily as needed (For foot) 45 g prn     Urea 20 % CREA cream Apply topically daily        triamcinolone (KENALOG) 0.1 % cream Apply topically 2 times daily as needed for irritation (sores on scalp.)       vitamin C-electrolytes (EMERGEN-C) 1000mg vitamin C super orange drink mix Take 1 packet by mouth daily Mix 1 packet in 4-6oz water.       Acetaminophen (TYLENOL PO) Take 1,000 mg by mouth 2 times daily as needed for mild pain or fever       ranitidine (ZANTAC) 150 MG tablet Take 150 mg by mouth daily as needed for heartburn        albuterol (PROAIR HFA, PROVENTIL HFA, VENTOLIN HFA) 108 (90 BASE) MCG/ACT inhaler Inhale 2 puffs into the lungs every 6 hours as needed for shortness of breath / dyspnea or wheezing 3 Inhaler 1     aspirin EC 81 MG tablet Take 1 tablet (81 mg) by mouth daily 30 tablet 11       Allergies:  -------------  Allergies  "  Allergen Reactions     Penicillins      rash     Spiriva Handihaler Other (See Comments)     Felt like the med \"paralyzed\" her diaphragm.  Had increased difficulty taking deep breath     Sulfa Drugs      rash       Social History:  -------------------  Social History     Social History     Marital status: Single     Spouse name: N/A     Number of children: 2     Years of education: 18     Occupational History     Mental health therapist [employer]     Social History Main Topics     Smoking status: Former Smoker     Packs/day: 1.00     Years: 10.00     Types: Cigarettes     Start date: 2/7/1964     Quit date: 9/7/2015     Smokeless tobacco: Never Used      Comment: 5-10 daily     Alcohol use 0.0 oz/week     0 Standard drinks or equivalent per week      Comment: 1 a month     Drug use: No     Sexual activity: No     Other Topics Concern      Service No     Blood Transfusions No     Caffeine Concern No     1 cup coffee a day, 1-2 can's of pop a wk in the summer      Occupational Exposure No     Hobby Hazards No     Sleep Concern No     Stress Concern No     Weight Concern No     Special Diet No     Back Care No     Exercise No     not due to knee's and hip     Bike Helmet No     n/a     Seat Belt Yes     Self-Exams Yes     sometimes     Parent/Sibling W/ Cabg, Mi Or Angioplasty Before 65f 55m? No     Social History Narrative    Eats fruits and vegetables every day. She takes a calcium supplement twice daily.    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            Family Medical History:  ------------------------------  Family History   Problem Relation Age of Onset     C.A.D. Father      DIABETES Father      type 2     Myocardial Infarction Father      Heart Surgery Father      CABGx4     Hyperlipidemia Father      Breast Cancer Mother 70     OSTEOPOROSIS Mother      Thyroid Disease Mother      Hyperlipidemia Mother      CANCER Brother      Bladder     Hyperlipidemia Brother      DIABETES " Brother      type 2     Colon Cancer Brother      Neurologic Disorder Paternal Grandfather      OSTEOPOROSIS Sister      Arrhythmia Sister      Heart Surgery Sister      cardioversion, ablation     Other - See Comments Sister 55     arhythmogenic right ventricular dysplasia     OSTEOPOROSIS Maternal Grandmother      OSTEOPOROSIS Paternal Grandmother      Other - See Comments Maternal Grandfather      pneumonia     Family History Negative Son      Thyroid Disease Daughter      NODULES     Family History Negative Daughter      Arrhythmia Cousin      a-fib     Cancer - colorectal No family hx of      Prostate Cancer No family hx of      Alzheimer Disease No family hx of      Anesthesia Reaction No family hx of      Blood Disease No family hx of      Eye Disorder No family hx of          ROS:  REVIEW OF SYSTEMS:    RESP: negative for cough, dyspnea, wheezing, hemoptysis  CV: negative for chest pain, palpitations, PND, POSADAS, orthopnea; reports no changes in their ability to perform physical activity (from cardiovascular standpoint)  GI: negative for dysphagia, N/V, pain, melena, diarrhea and constipation  NEURO: negative for numbness/tingling, paralysis, incoordination, or focal weakness     OBJECTIVE:                                                    /80  Pulse 75  Temp 98.1  F (36.7  C) (Oral)  Wt 168 lb 8 oz (76.4 kg)  SpO2 96%  BMI 28.04 kg/m2     GENERAL alert and no distress  EYES:  Normal sclera,conjunctiva, EOMI  HENT: oral and posterior pharynx without lesions or erythema, facies symmetric  NECK: Neck supple. No LAD, without thyroidmegaly or JVD., Carotids without bruits.  RESP: Clear to ausculation bilaterally without wheezes or crackles. Normal BS in all fields.  CV: RRR normal S1S2 without murmurs, rubs or gallops. PMI normal  LYMPH: no cervical lymph adenopathy appreciated  MS: extremities- no gross deformities of the visible extremities noted, no edema  PSYCH: Alert and oriented times 3; speech-  "coherent  SKIN:  No obvious significant skin lesions on visible portions of face          ASSESSMENT/PLAN:                                                       (I10) Benign essential hypertension  (primary encounter diagnosis)  Comment: This condition is currently controlled on the current medical regimen.  Continue current therapy.   Discussed hypertension in detail including JNC VIII guidelines for blood pressure goals.  Discussed indication for treatment and treatment options.  Discussed the importance for aggressive management of HTN to prevent vascular complications later.  Recommended lower fat, lower carbohydrate, and lower sodium (<2000 mg)diet.  Discussed required intervals for follow up on HTN, lab studies, and the need to aggresive management of other cardiac disease risk factors.  Recommened pt. follow their blood pressures outside the clinic to ensure that BPs are remaining within guidelines, and to contact me if the readings are not within guidelines so we can adjust treatment as needed.   Plan:     (R73.03) Prediabetes  Comment: Reviewed the labs showing elevated glucose levels.    Discussed \"pre-diabetes\", impaired glucose tolerance, and its part in the dysmetabolic syndrome.    Discussed the inevitable progression of impaired glucose tolerance toward worsening diabetes mellitus and the need for agressive interventions now to delay and prevent this inevitable progression.  Discussed the overall risks that dysmetabolic syndromes/impaired glucose tolerance/syndrome X pose toward increased risks of vascular disease as the main reason for agressive intervention now.  Will add medications for glucose control (i.e. metformin, glitazones, etc), lipid (e.g. statins), and for blood pressure (preferably ARBs and ACE) as indicated.  Will start these as early as needed based other proven ability to delay and modify these risk factors.   Discussed the need for aggressive diet control as the cornerstone of " "pre-diabetes and diabetes management, emphasizing the reduction of \"simple carbohydrates\" (e.g. Any kind of wheat products (e.g. any bread, any pasta), white rice, noodles, potatoes, snack foods, regular soda, juices (except fresh squeezed), cakes, cookies, candy, etc.) along with regular exercise.       Plan:     (E78.5) Hyperlipidemia LDL goal <130  Comment: Discussed current lipid results, previous results (if available) current guidelines (NCEP) for treatment and goals for lipids.  Discussed lifestyle modification, dietary changes (low fat, low simple carb) and regular aerobic exercise.  Discussed the link between dysmetabolic syndrome and impaired glucose tolerance seen in certain patterns of lipids.  Briefly discussed medication used for lipid lowering, including the statins are their possible side effects of myalgias, rhabdomyolysis, and liver toxicity.   Plan:     (M19.90) Osteoarthritis, unspecified osteoarthritis type, unspecified site  Comment:   Plan:     (M85.80) Osteopenia, unspecified location  Comment: Recommend conservative treatments for osteopenia/osteoporosis including regular exercise and stretching, calcium and Vit D replacement (aiming for at least 5824-3769 mg Ca, 400 IU Vit D each day), and not smoking.  Reviewed medical treatments for osteopenia/osteoporosis including bisphosphanates (Fosamax/Actonel), SERMs (Evista), and HRT.  Check DEXA not more often than every 2-3 years, depending on insurance coverage.   Plan:        See Patient Instructions    YADIRA ESPINOSA M.D., MD  [facility]   "

## 2017-12-07 ENCOUNTER — TELEPHONE (OUTPATIENT)
Dept: CARDIOLOGY | Facility: CLINIC | Age: 67
End: 2017-12-07

## 2017-12-07 NOTE — TELEPHONE ENCOUNTER
Discussed sister's recent diagnosis of ARVD with Dr. Fountain.  We discussed that if pt is concerned we should do a 48 hour holter monitor and look for ectopy.  If this shows significant ectopy would then move on the a cardiac mri.  IF she is very concerned would simply proceed with MRI.      Asked pt to call back team 3 RNs to arrange per her preference.   Rufina Palacios PA-C 12/7/2017 10:16 AM

## 2017-12-07 NOTE — TELEPHONE ENCOUNTER
Patient left a detailed V.M and has decided to not pursue any further testing at this time. She is asymptomatic. Her sister is in her 50's and she does not havy any AF  And her echocardiogram is fine.  She will contact Rufina Palacios PA-C if she changes her mind to pursue further testing.

## 2018-02-28 ENCOUNTER — OFFICE VISIT (OUTPATIENT)
Dept: INTERNAL MEDICINE | Facility: CLINIC | Age: 68
End: 2018-02-28
Payer: COMMERCIAL

## 2018-02-28 VITALS
BODY MASS INDEX: 28.39 KG/M2 | TEMPERATURE: 98.1 F | HEART RATE: 81 BPM | OXYGEN SATURATION: 93 % | SYSTOLIC BLOOD PRESSURE: 134 MMHG | WEIGHT: 170.6 LBS | DIASTOLIC BLOOD PRESSURE: 72 MMHG | RESPIRATION RATE: 20 BRPM

## 2018-02-28 DIAGNOSIS — J01.01 ACUTE RECURRENT MAXILLARY SINUSITIS: Primary | ICD-10-CM

## 2018-02-28 DIAGNOSIS — J43.9 PULMONARY EMPHYSEMA, UNSPECIFIED EMPHYSEMA TYPE (H): ICD-10-CM

## 2018-02-28 DIAGNOSIS — J44.9 CHRONIC OBSTRUCTIVE PULMONARY DISEASE, UNSPECIFIED COPD TYPE (H): ICD-10-CM

## 2018-02-28 PROCEDURE — 99214 OFFICE O/P EST MOD 30 MIN: CPT | Performed by: INTERNAL MEDICINE

## 2018-02-28 RX ORDER — ALBUTEROL SULFATE 90 UG/1
2 AEROSOL, METERED RESPIRATORY (INHALATION) EVERY 4 HOURS PRN
Qty: 1 INHALER | Refills: 5 | Status: SHIPPED | OUTPATIENT
Start: 2018-02-28 | End: 2018-04-04

## 2018-02-28 RX ORDER — AZITHROMYCIN 250 MG/1
TABLET, FILM COATED ORAL
Qty: 6 TABLET | Refills: 0 | Status: SHIPPED | OUTPATIENT
Start: 2018-02-28 | End: 2019-02-18

## 2018-02-28 ASSESSMENT — PAIN SCALES - GENERAL: PAINLEVEL: SEVERE PAIN (6)

## 2018-02-28 NOTE — PATIENT INSTRUCTIONS
"SINUSITIS (SINUS INFECTION):       *  An antibiotic is possibly indicated at this time.        --Azithromycin     Please remember that antibiotics only kill bacteria, they do not make you feel better in any other way.  The antibiotic is only a part of what you need to do to feel better.      *  Be sure to drink lots of fluids.  If the appetite and intake of food is way down, then at leat try to eat soup.  Dehydration is the thing that causes people to end up in the hospital with viral infections and the flu.     *  Try lozenges (Cepostat, Cepacol, hard candies, Zinc lozenges, etc)    *  Mucinex extended release twice per day on a regular basis for the next few days, then twice per day as needed.  OK to take the regular Mucinex, you may just have to take it 2-3 times per day.      *  Saline nasal spray as often as needed.     Examples of over the counter saline nasal sprays:            *  Relieve congestion/pressure by rinsing out the sinus cavities with the Sinus Rinse Kit or Netti Pot.  These are available at most drug stores.  Be sure to use distilled water with either.                *  Decongestants as needed.  Be sure to take the lowest dose needed.  Take decongestants from only ONE source.  It is possible to inadvertantly take more than the recommended amounts if you take decongestants from multiple products (i.e. Do NOT take Mucinex-D and Claritin-D together)    * Beware of decongestants or medications preparations that have a \"D\", these contain pseudoephedrine or phenylephrine which may raise your blood pressure. If your blood pressure is well controlled and you are not on multiple medications, then you may be able to take small amounts of decongestant without a large chance of side effects, but please monitor your blood pressure and if >140/90 while on the decongestants, then stop those products.       *  If you cannot tolerate decongestants or have been told not to take decongestants, you can use " "chlortrimeton (chlorpheniramine) if you react poorly to decongestants.  Always try and use the lowest dose needed.  If you have a low of overnight or early morning allergy symptoms, then try taking you favorite nighttime multi symptom cold reliever medication (such as Nyquil, Vicks Formula 44, etc.)    *  Affrin nasal spray as needed for severe nasal congestion (especially before the airplane trip), but limit the use to no more than 5-7 days out of fear of producing chronic nasal drainage.      *  Tylenol as needed for any headaches of low grade temperatures.     *  Ibuprofen as needed for body aches, fevers, headaches    *  Call the clinic if any changes in the symptoms such as worsening fevers, or the amount and quality of the sputum changes, or if you have any major difficulties breathing, or the symptoms fail to clear for a few weeks.    *  Most upper respiratory infection will clear 1-2 weeks, but it is not uncommon for post viral coughs to last for several weeks, even rarely the entire winter.        *  Use steroid nasal spray (available over the counter)   --typical brands include Flonase (fluticasone) or Nasonex (mometasone) or Nasocort (triamcinolone)    Examples of steroid nasal spray:              --Use 2 sprays into each nostril once per day, every day regardless of how you feel for the next 4-8 weeks, depending on the length of the allergy season.  This type of steroid nasal spray will take at least 10 days to reach full effect, please use it for at least 3 full weeks before deciding if it doesn't work for you.        Post infectious Bronchospasm (wheezing):  ===============================================      *  Bronchospasm is irritation of the airways that causes them to spasm and temporarily \"narrow\" which causes coughing (usually minimal sputum production), shortness of breath, dyspnea on exertion, wheezing.  Your airways will be more reactive to things such as temperature changes (too cold, too " hot, too humid, etc.), activity, pollutants such as dander, smoke, air pollution, etc.  This will get better with time, but will produce lingering symptoms as described above for a couple weeks to a couple of months.     *  Antibiotics not indicated for the lungs (one is being used for the sinus)    *  Albuterol inhaler, use 2 puffs, 3-5 times per day as needed for any coughing, wheezing, tightness in the breathing, or shortness of breath.  This inhaled medication helps reduce the inflammation and spasm of the airways which will help the breathing become easier.  This is a similar medication we use to treat asthma, but you do not have asthma.      Consider using this inhaler before any known triggers for our coughing or wheezing (usually these include cold or hot temperatures, humidity, dust, air pollutants, smoke).      *  Expect that your airways may remain more easily irritated for a few weeks after upper respiratory infections.     If you require the albuterol rescue inhaler on a regular basis more than a few times per week, you may need additional medications to help control the breathing better.    These are the available forms of Albuterol, your insurance formulary will determine which one is preferred.  They all work the same.                 *  Cough suppressant Delsym, follow directions on bottle    *  Mucinex extended release, one or two tablets twice per day for the next 5-7 days.  This helps loosen the secretions to they can be passed more easily out of the body.     *  Be sure to drink lots of fluids.  If the appetite and intake of food is way down, then at leat try to eat soup.  Dehydration is the thing that causes people to end up in the hospital with viral infections and the flu.     *  For sore throat:  Try lozenges (Cepostat, Cepacol, hard candies, Zinc lozenges, etc)    *  If you have thick secretions:  Mucinex extended release twice per day on a regular basis for the next few days, then twice  per day as needed.  OK to take the regular Mucinex, you may just have to take it 2-3 times per day.      *  If you have dry nasal passages or frequent bloody noses during the winter time:  Saline nasal spray as often as needed for dry nose.     *  If you have a lot of congestion and/or runny noses:  OK to try decongestants as needed (e.g. pseudoephedrine or phenylephrine).  Be sure to take the lowest dose needed.  Take decongestants from only ONE source.  It is possible to inadvertantly take more than the recommended amounts if you take decongestants from multiple products (i.e. Do NOT take Mucinex-D and Claritin-D together)    *  If you have been told to NOT take decongestants or if you cannot tolerate decongestants due to effect on blood pressure, sleep or heart rate:  Try Coricidin HBP or Chlortrimeton (chlorpheniramine).  These can have a similar effect as some decongestants but will not affect your heart rate or blood pressure.      *  If you have SEVERE nasal congestion:  Affrin nasal spray as needed for severe nasal congestion (especially before the airplane trip), but do not use for more than one week.      *  Tylenol as needed for any headaches of low grade temperatures.     *  Ibuprofen as needed for body aches, fevers, headaches    *  Call the clinic if any changes in the symptoms such as worsening fevers, or the amount and quality of the sputum changes, or if you have any major difficulties breathing, or the symptoms fail to clear for a few weeks.    *  Most upper respiratory infection will clear 1-2 weeks, but it is not uncommon for post viral coughs to last for several weeks, even rarely the entire winter.          BRONCHOSPASM (AIRWAY SPASM):

## 2018-02-28 NOTE — PROGRESS NOTES
"  SUBJECTIVE:   Marva Angela is a 67 year old female who presents to clinic today for the following health issues:    RESPIRATORY SYMPTOMS      Duration: 1 week    Description  cough and fever, sinus pressure, facial pain, pain in teeth    Severity: severe, 6/10    Accompanying signs and symptoms: chest congestion, dizziness, \"teeth did not feel like they were my own\"    History (predisposing factors):  Knee replacement and hip replacement in the past 6 months, \"mild\" COPD    Precipitating or alleviating factors: None    Therapies tried and outcome:  nasal spray/wash - w/ no relief,  Tylenol to help with fever, tried cool mist humidifier with vicks last night and pt felt very dizzy, and tight in chest.           Problem list and histories reviewed & adjusted, as indicated.  Additional history: as documented        Reviewed and updated as needed this visit by clinical staff  Allergies  Meds       Reviewed and updated as needed this visit by Provider           Past Medical History:  ---------------------------  Past Medical History:   Diagnosis Date     Arthritis     knees and hips     Benign essential hypertension     was on amlodipine/ then metoprolol / add lisinopril      Contact dermatitis and other eczema, due to unspecified cause      COPD (chronic obstructive pulmonary disease) (H)      Diabetes (H)     pre diabetic     Hyperlipidemia LDL goal <130 11/7/2012     Nephritis      as a child (post strep)      Osteoporosis, unspecified      Phlebitis and thrombophlebitis of other deep vessels of lower extremities 1972, 1975    Both with pregnancies     Sinusitis      Tobacco use disorder        Past Surgical History:  ---------------------------  Past Surgical History:   Procedure Laterality Date     ARTHROPLASTY HIP Right 4/3/2017    Procedure: ARTHROPLASTY HIP;  Surgeon: Francisco J Alston MD;  Location: [facility]     ARTHROPLASTY KNEE Left 7/31/2017    Procedure: ARTHROPLASTY KNEE;  LEFT TOTAL KNEE " ARTHROPLASTY ;  Surgeon: Francisco J Alston MD;  Location:  OR     BIOPSY OF SKIN LESION       C DEXA, BONE DENSITY, AXIAL SKEL  6/2008    T score lumbar -0.6, femoral neck -2.4/-2.0(all stable)     C VAGINAL HYSTERECTOMY  1978    Hysterectomy, Vaginal     HC ASPIRATION &/OR INJECTION GANGLION CYST, ANY  1994     HERNIORRHAPHY INGUINAL Right 3/24/2015    Procedure: HERNIORRHAPHY INGUINAL;  Surgeon: Sam Erazo MD;  Location:  [facility]     HYSTERECTOMY, PAP NO LONGER INDICATED       ORTHOPEDIC SURGERY      bilat meniscus repair       Current Medications:  ---------------------------  Current Outpatient Prescriptions   Medication Sig Dispense Refill     cholecalciferol (VITAMIN D3) 87134 UNITS capsule Take 1 capsule (50,000 Units) by mouth every 14 days SIG: TAKE INDICATION: 1ST AND 15TH OF EACH MONTH, TO TREAT VITAMIN D DEFICIENCY 40 capsule 0     spironolactone-hydrochlorothiazide (ALDACTAZIDE) 25-25 MG per tablet Take 1 tablet by mouth every morning INDICATION:TO LOWER BLOOD PRESSURE 90 tablet 3     celecoxib (CELEBREX) 200 MG capsule Take 1 capsule (200 mg) by mouth 2 times daily 180 capsule 3     atorvastatin (LIPITOR) 20 MG tablet Take 1 tablet (20 mg) by mouth daily INDICATION: TO LOWER CHOLESTEROL AND TO HELP REDUCE RISK OF HEART ATTACK AND STROKE 90 tablet 3     amLODIPine (NORVASC) 2.5 MG tablet Take 1 tablet (2.5 mg) by mouth daily 90 tablet 3     Cyanocobalamin (B-12 SUPER STRENGTH) 5000 MCG/ML LIQD Place 1 mL under the tongue every morning FOR VITAMIN B12 SUPPLEMENTATION, PLEASE PLACE UNDER THE TONGUE 2 Bottle PRN     folic acid (FOLVITE) 1 MG tablet Take 1 tablet (1 mg) by mouth daily INDICATION: FOLIC ACID SUPPLEMENT 100 tablet 3     calcium-vitamin D (OYSTER CALCIUM + D) 250-125 MG-UNIT per tablet Take 2 tablets by mouth daily       betamethasone dipropionate (DIPROSONE) 0.05 % cream Apply topically 2 times daily as needed (For foot) 45 g prn     Urea 20 % CREA cream Apply topically  "daily        triamcinolone (KENALOG) 0.1 % cream Apply topically 2 times daily as needed for irritation (sores on scalp.)       vitamin C-electrolytes (EMERGEN-C) 1000mg vitamin C super orange drink mix Take 1 packet by mouth daily Mix 1 packet in 4-6oz water.       Acetaminophen (TYLENOL PO) Take 1,000 mg by mouth 2 times daily as needed for mild pain or fever       ranitidine (ZANTAC) 150 MG tablet Take 150 mg by mouth daily as needed for heartburn        albuterol (PROAIR HFA, PROVENTIL HFA, VENTOLIN HFA) 108 (90 BASE) MCG/ACT inhaler Inhale 2 puffs into the lungs every 6 hours as needed for shortness of breath / dyspnea or wheezing 3 Inhaler 1     aspirin EC 81 MG tablet Take 1 tablet (81 mg) by mouth daily 30 tablet 11       Allergies:  -------------  Allergies   Allergen Reactions     Penicillins      rash     Spiriva Handihaler Other (See Comments)     Felt like the med \"paralyzed\" her diaphragm.  Had increased difficulty taking deep breath     Sulfa Drugs      rash       Social History:  -------------------  Social History     Social History     Marital status: Single     Spouse name: N/A     Number of children: 2     Years of education: 18     Occupational History     Mental health therapist [employer]     Social History Main Topics     Smoking status: Former Smoker     Packs/day: 1.00     Years: 10.00     Types: Cigarettes     Start date: 2/7/1964     Quit date: 9/7/2015     Smokeless tobacco: Never Used      Comment: 5-10 daily     Alcohol use 0.0 oz/week     0 Standard drinks or equivalent per week      Comment: 1 a month     Drug use: No     Sexual activity: No     Other Topics Concern      Service No     Blood Transfusions No     Caffeine Concern No     1 cup coffee a day, 1-2 can's of pop a wk in the summer      Occupational Exposure No     Hobby Hazards No     Sleep Concern No     Stress Concern No     Weight Concern No     Special Diet No     Back Care No     Exercise No     not " due to knee's and hip     Bike Helmet No     n/a     Seat Belt Yes     Self-Exams Yes     sometimes     Parent/Sibling W/ Cabg, Mi Or Angioplasty Before 65f 55m? No     Social History Narrative    Eats fruits and vegetables every day. She takes a calcium supplement twice daily.    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            Family Medical History:  ------------------------------  Family History   Problem Relation Age of Onset     C.A.D. Father      DIABETES Father      type 2     Myocardial Infarction Father      Heart Surgery Father      CABGx4     Hyperlipidemia Father      Breast Cancer Mother 70     OSTEOPOROSIS Mother      Thyroid Disease Mother      Hyperlipidemia Mother      CANCER Brother      Bladder     Hyperlipidemia Brother      DIABETES Brother      type 2     Colon Cancer Brother      Neurologic Disorder Paternal Grandfather      OSTEOPOROSIS Sister      Arrhythmia Sister      Heart Surgery Sister      cardioversion, ablation     Other - See Comments Sister 55     arhythmogenic right ventricular dysplasia     OSTEOPOROSIS Maternal Grandmother      OSTEOPOROSIS Paternal Grandmother      Other - See Comments Maternal Grandfather      pneumonia     Family History Negative Son      Thyroid Disease Daughter      NODULES     Family History Negative Daughter      Arrhythmia Cousin      a-fib     Cancer - colorectal No family hx of      Prostate Cancer No family hx of      Alzheimer Disease No family hx of      Anesthesia Reaction No family hx of      Blood Disease No family hx of      Eye Disorder No family hx of          ROS:  REVIEW OF SYSTEMS:    RESP: positive for cough, dyspnea, wheezing; negative for hemoptysis   CV: negative for chest pain, palpitations, PND, POSADAS, orthopnea; reports no changes in their ability to perform physical activity (from cardiovascular standpoint)  GI: negative for dysphagia, N/V, pain, melena, diarrhea and constipation  NEURO: negative for numbness/tingling,  paralysis, incoordination, or focal weakness     OBJECTIVE:                                                    /72  Pulse 81  Temp 98.1  F (36.7  C) (Oral)  Resp 20  Wt 170 lb 9.6 oz (77.4 kg)  SpO2 93%  BMI 28.39 kg/m2     GENERAL alert and no distress  EYES:  Normal sclera,conjunctiva, EOMI  HENT: oral and posterior pharynx without lesions or erythema, facies symmetric  NECK: Neck supple. No LAD, without thyroidmegaly or JVD., Carotids without bruits.  RESP: breath sounds quiet but clear, diminished respiratory excursion, prolonged expiratory phase,  no rhonci, few faint end expiratory wheezes, no rales .  CV: RRR normal S1S2 without murmurs, rubs or gallops. PMI normal  LYMPH: no cervical lymph adenopathy appreciated  MS: extremities- no gross deformities of the visible extremities noted, no edema  PSYCH: Alert and oriented times 3; speech- coherent  SKIN:  No obvious significant skin lesions on visible portions of face          ASSESSMENT/PLAN:                                                      (J01.01) Acute recurrent maxillary sinusitis  (primary encounter diagnosis)  Comment: Recommend rest, fluids, saline nasal spray, OTC decongestants/expectorants as needed (being careful not to double up on the ingredients from multiple sources), and tylenol or OTC NSAIDs for pain and fever relief.  OTC NSAIDs must be taken with food, and may cause stomach/intestinal upset, or even GI bleeding in worse case.  Call if any changes in symptoms or failure to improve.   Plan: azithromycin (ZITHROMAX) 250 MG tablet            (J43.9) Pulmonary emphysema, unspecified emphysema type (H)  Comment: Discussed general issues of COPD, including pathophysiology, ways it will affect the pt., when to seek help, reviewed the typical medications (how they are taken, how they help)   Plan:     (J44.9) Chronic obstructive pulmonary disease, unspecified COPD type (H)  Comment: NOW OFF CIGARETTES   Plan: albuterol (PROAIR  HFA/PROVENTIL HFA/VENTOLIN         HFA) 108 (90 BASE) MCG/ACT Inhaler               See Patient Instructions    YADIRA ESPINOSA M.D., MD  [facility]

## 2018-02-28 NOTE — MR AVS SNAPSHOT
After Visit Summary   2/28/2018    Marva Angela    MRN: [id number]           Patient Information     Date Of Birth          1950        Visit Information        Provider Department      2/28/2018 10:40 AM Kendall Mendez MD [facility]        Today's Diagnoses     Acute recurrent maxillary sinusitis    -  1    Pulmonary emphysema, unspecified emphysema type (H)        Chronic obstructive pulmonary disease, unspecified COPD type (H)          Care Instructions    SINUSITIS (SINUS INFECTION):       *  An antibiotic is possibly indicated at this time.        --Azithromycin     Please remember that antibiotics only kill bacteria, they do not make you feel better in any other way.  The antibiotic is only a part of what you need to do to feel better.      *  Be sure to drink lots of fluids.  If the appetite and intake of food is way down, then at leat try to eat soup.  Dehydration is the thing that causes people to end up in the hospital with viral infections and the flu.     *  Try lozenges (Cepostat, Cepacol, hard candies, Zinc lozenges, etc)    *  Mucinex extended release twice per day on a regular basis for the next few days, then twice per day as needed.  OK to take the regular Mucinex, you may just have to take it 2-3 times per day.      *  Saline nasal spray as often as needed.     Examples of over the counter saline nasal sprays:            *  Relieve congestion/pressure by rinsing out the sinus cavities with the Sinus Rinse Kit or Netti Pot.  These are available at most drug stores.  Be sure to use distilled water with either.                *  Decongestants as needed.  Be sure to take the lowest dose needed.  Take decongestants from only ONE source.  It is possible to inadvertantly take more than the recommended amounts if you take decongestants from multiple products (i.e. Do NOT take Mucinex-D and Claritin-D together)    * Beware of decongestants or  "medications preparations that have a \"D\", these contain pseudoephedrine or phenylephrine which may raise your blood pressure. If your blood pressure is well controlled and you are not on multiple medications, then you may be able to take small amounts of decongestant without a large chance of side effects, but please monitor your blood pressure and if >140/90 while on the decongestants, then stop those products.       *  If you cannot tolerate decongestants or have been told not to take decongestants, you can use chlortrimeton (chlorpheniramine) if you react poorly to decongestants.  Always try and use the lowest dose needed.  If you have a low of overnight or early morning allergy symptoms, then try taking you favorite nighttime multi symptom cold reliever medication (such as Nyquil, Vicks Formula 44, etc.)    *  Affrin nasal spray as needed for severe nasal congestion (especially before the airplane trip), but limit the use to no more than 5-7 days out of fear of producing chronic nasal drainage.      *  Tylenol as needed for any headaches of low grade temperatures.     *  Ibuprofen as needed for body aches, fevers, headaches    *  Call the clinic if any changes in the symptoms such as worsening fevers, or the amount and quality of the sputum changes, or if you have any major difficulties breathing, or the symptoms fail to clear for a few weeks.    *  Most upper respiratory infection will clear 1-2 weeks, but it is not uncommon for post viral coughs to last for several weeks, even rarely the entire winter.        *  Use steroid nasal spray (available over the counter)   --typical brands include Flonase (fluticasone) or Nasonex (mometasone) or Nasocort (triamcinolone)    Examples of steroid nasal spray:              --Use 2 sprays into each nostril once per day, every day regardless of how you feel for the next 4-8 weeks, depending on the length of the allergy season.  This type of steroid nasal spray will take at " "least 10 days to reach full effect, please use it for at least 3 full weeks before deciding if it doesn't work for you.        Post infectious Bronchospasm (wheezing):  ===============================================      *  Bronchospasm is irritation of the airways that causes them to spasm and temporarily \"narrow\" which causes coughing (usually minimal sputum production), shortness of breath, dyspnea on exertion, wheezing.  Your airways will be more reactive to things such as temperature changes (too cold, too hot, too humid, etc.), activity, pollutants such as dander, smoke, air pollution, etc.  This will get better with time, but will produce lingering symptoms as described above for a couple weeks to a couple of months.     *  Antibiotics not indicated for the lungs (one is being used for the sinus)    *  Albuterol inhaler, use 2 puffs, 3-5 times per day as needed for any coughing, wheezing, tightness in the breathing, or shortness of breath.  This inhaled medication helps reduce the inflammation and spasm of the airways which will help the breathing become easier.  This is a similar medication we use to treat asthma, but you do not have asthma.      Consider using this inhaler before any known triggers for our coughing or wheezing (usually these include cold or hot temperatures, humidity, dust, air pollutants, smoke).      *  Expect that your airways may remain more easily irritated for a few weeks after upper respiratory infections.     If you require the albuterol rescue inhaler on a regular basis more than a few times per week, you may need additional medications to help control the breathing better.    These are the available forms of Albuterol, your insurance formulary will determine which one is preferred.  They all work the same.                 *  Cough suppressant Delsym, follow directions on bottle    *  Mucinex extended release, one or two tablets twice per day for the next 5-7 days.  This helps " loosen the secretions to they can be passed more easily out of the body.     *  Be sure to drink lots of fluids.  If the appetite and intake of food is way down, then at leat try to eat soup.  Dehydration is the thing that causes people to end up in the hospital with viral infections and the flu.     *  For sore throat:  Try lozenges (Cepostat, Cepacol, hard candies, Zinc lozenges, etc)    *  If you have thick secretions:  Mucinex extended release twice per day on a regular basis for the next few days, then twice per day as needed.  OK to take the regular Mucinex, you may just have to take it 2-3 times per day.      *  If you have dry nasal passages or frequent bloody noses during the winter time:  Saline nasal spray as often as needed for dry nose.     *  If you have a lot of congestion and/or runny noses:  OK to try decongestants as needed (e.g. pseudoephedrine or phenylephrine).  Be sure to take the lowest dose needed.  Take decongestants from only ONE source.  It is possible to inadvertantly take more than the recommended amounts if you take decongestants from multiple products (i.e. Do NOT take Mucinex-D and Claritin-D together)    *  If you have been told to NOT take decongestants or if you cannot tolerate decongestants due to effect on blood pressure, sleep or heart rate:  Try Coricidin HBP or Chlortrimeton (chlorpheniramine).  These can have a similar effect as some decongestants but will not affect your heart rate or blood pressure.      *  If you have SEVERE nasal congestion:  Affrin nasal spray as needed for severe nasal congestion (especially before the airplane trip), but do not use for more than one week.      *  Tylenol as needed for any headaches of low grade temperatures.     *  Ibuprofen as needed for body aches, fevers, headaches    *  Call the clinic if any changes in the symptoms such as worsening fevers, or the amount and quality of the sputum changes, or if you have any major difficulties  breathing, or the symptoms fail to clear for a few weeks.    *  Most upper respiratory infection will clear 1-2 weeks, but it is not uncommon for post viral coughs to last for several weeks, even rarely the entire winter.          BRONCHOSPASM (AIRWAY SPASM):                 Follow-ups after your visit        Your next 10 appointments already scheduled     Mar 19, 2018  7:30 AM CDT   [facility]   [facility]    [address] [id number] [phone]           Please do not eat 10-12 hours before your appointment if you are coming in fasting for labs on lipids, cholesterol, or glucose (sugar). This does not apply to pregnant women. Water, hot tea and black coffee (with nothing added) are okay. Do not drink other fluids, diet soda or chew gum.            Apr 04, 2018  1:15 PM CDT   New Visit with Robert Stephen MD   [facility] ([facility])    [address] [place] [id number] [phone]              Who to contact     If you have questions or need follow up information about today's clinic visit or your schedule please contact [facility] directly at [phone].  Normal or non-critical lab and imaging results will be communicated to you by MyChart, letter or phone within 4 business days after the clinic has received the results. If you do not hear from us within 7 days, please contact the clinic through MyChart or phone. If you have a critical or abnormal lab result, we will notify you by phone as soon as possible.  Submit refill requests through [employer] or call your pharmacy and they will forward the refill request to us. Please allow 3 business days for your refill to be completed.          Additional Information About Your Visit        [employer] Information     [employer] gives you secure access to your electronic health  record. If you see a primary care provider, you can also send messages to your care team and make appointments. If you have questions, please call your primary care clinic.  If you do not have a primary care provider, please call [phone] and they will assist you.        Care EveryWhere ID     This is your Care EveryWhere ID. This could be used by other organizations to access your [facility] medical records  [id number]        Your Vitals Were     Pulse Temperature Respirations Pulse Oximetry BMI (Body Mass Index)       81 98.1  F (36.7  C) (Oral) 20 93% 28.39 kg/m2        Blood Pressure from Last 3 Encounters:   02/28/18 134/72   12/04/17 130/80   11/01/17 129/77    Weight from Last 3 Encounters:   02/28/18 170 lb 9.6 oz (77.4 kg)   12/04/17 168 lb 8 oz (76.4 kg)   11/01/17 168 lb 3.2 oz (76.3 kg)              Today, you had the following     No orders found for display         Today's Medication Changes          These changes are accurate as of 2/28/18 11:31 AM.  If you have any questions, ask your nurse or doctor.               Start taking these medicines.        Dose/Directions    azithromycin 250 MG tablet   Commonly known as:  ZITHROMAX   Used for:  Acute recurrent maxillary sinusitis   Started by:  Kendall Mendez MD        Two tablets first day, then one tablet daily for four days.   Quantity:  6 tablet   Refills:  0         These medicines have changed or have updated prescriptions.        Dose/Directions    * albuterol 108 (90 BASE) MCG/ACT Inhaler   Commonly known as:  PROAIR HFA/PROVENTIL HFA/VENTOLIN HFA   This may have changed:  Another medication with the same name was added. Make sure you understand how and when to take each.   Used for:  Chronic obstructive pulmonary disease, unspecified COPD type (H)   Changed by:  Kendall Mendez MD        Dose:  2 puff   Inhale 2 puffs into the lungs every 6 hours as needed for shortness of breath / dyspnea or wheezing   Quantity:  3  Inhaler   Refills:  1       * albuterol 108 (90 BASE) MCG/ACT Inhaler   Commonly known as:  PROAIR HFA/PROVENTIL HFA/VENTOLIN HFA   This may have changed:  You were already taking a medication with the same name, and this prescription was added. Make sure you understand how and when to take each.   Used for:  Chronic obstructive pulmonary disease, unspecified COPD type (H)   Changed by:  Kendall Mendez MD        Dose:  2 puff   Inhale 2 puffs into the lungs every 4 hours as needed for shortness of breath / dyspnea or wheezing   Quantity:  1 Inhaler   Refills:  5       * Notice:  This list has 2 medication(s) that are the same as other medications prescribed for you. Read the directions carefully, and ask your doctor or other care provider to review them with you.         Where to get your medicines      These medications were sent to [facility] [address] [place], [place] - [address] AT [facility] [address] [facility] [place]  [address] [facility], [facility] [address]     Phone:  [phone]     albuterol 108 (90 BASE) MCG/ACT Inhaler    azithromycin 250 MG tablet                Primary Care Provider    None Specified       No primary provider on file.        Equal Access to Services     KHUSHBU FRIAS AH: Ruddy Meier, waaylada lulily, qaybta kaalmada adejeffersonda, jenniffer campos. So [facility] [phone].    ATENCIÓN: Si habla español, tiene a barfield disposición servicios gratuitos de asistencia lingüística. Rojas al [phone].    We comply with applicable federal civil rights laws and [place] laws. We do not discriminate on the basis of race, color, national origin, age, disability, sex, sexual orientation, or gender identity.            Thank you!     Thank you for choosing [facility]  for your care. Our goal is always to provide you with excellent care. Hearing back from our patients is one way we can continue to improve  our services. Please take a few minutes to complete the written survey that you may receive in the mail after your visit with us. Thank you!             Your Updated Medication List - Protect others around you: Learn how to safely use, store and throw away your medicines at www.disposemymeds.org.          This list is accurate as of 2/28/18 11:31 AM.  Always use your most recent med list.                   Brand Name Dispense Instructions for use Diagnosis    * albuterol 108 (90 BASE) MCG/ACT Inhaler    PROAIR HFA/PROVENTIL HFA/VENTOLIN HFA    3 Inhaler    Inhale 2 puffs into the lungs every 6 hours as needed for shortness of breath / dyspnea or wheezing    Chronic obstructive pulmonary disease, unspecified COPD type (H)       * albuterol 108 (90 BASE) MCG/ACT Inhaler    PROAIR HFA/PROVENTIL HFA/VENTOLIN HFA    1 Inhaler    Inhale 2 puffs into the lungs every 4 hours as needed for shortness of breath / dyspnea or wheezing    Chronic obstructive pulmonary disease, unspecified COPD type (H)       amLODIPine 2.5 MG tablet    NORVASC    90 tablet    Take 1 tablet (2.5 mg) by mouth daily    Benign essential hypertension       aspirin EC 81 MG EC tablet     30 tablet    Take 1 tablet (81 mg) by mouth daily    Coronary artery disease involving native coronary artery of native heart without angina pectoris       atorvastatin 20 MG tablet    LIPITOR    90 tablet    Take 1 tablet (20 mg) by mouth daily INDICATION: TO LOWER CHOLESTEROL AND TO HELP REDUCE RISK OF HEART ATTACK AND STROKE    Hyperlipidemia LDL goal <130       azithromycin 250 MG tablet    ZITHROMAX    6 tablet    Two tablets first day, then one tablet daily for four days.    Acute recurrent maxillary sinusitis       betamethasone dipropionate 0.05 % cream    DIPROSONE    45 g    Apply topically 2 times daily as needed (For foot)    Psoriasis       celecoxib 200 MG capsule    celeBREX    180 capsule    Take 1 capsule (200 mg) by mouth 2 times daily     Osteoarthritis, unspecified osteoarthritis type, unspecified site       cholecalciferol 10253 UNITS capsule    VITAMIN D3    40 capsule    Take 1 capsule (50,000 Units) by mouth every 14 days SIG: TAKE INDICATION: 1ST AND 15TH OF EACH MONTH, TO TREAT VITAMIN D DEFICIENCY    Osteopenia, unspecified location, Psoriasis with arthropathy (H)       Cyanocobalamin 5000 MCG/ML Liqd    B-12 SUPER STRENGTH    2 Bottle    Place 1 mL under the tongue every morning FOR VITAMIN B12 SUPPLEMENTATION, PLEASE PLACE UNDER THE TONGUE    Vitamin B12 deficiency (non anemic)       folic acid 1 MG tablet    FOLVITE    100 tablet    Take 1 tablet (1 mg) by mouth daily INDICATION: FOLIC ACID SUPPLEMENT    Vitamin B12 deficiency (non anemic)       OYSTER CALCIUM + D 250-125 MG-UNIT per tablet   Generic drug:  calcium-vitamin D      Take 2 tablets by mouth daily        ranitidine 150 MG tablet    ZANTAC     Take 150 mg by mouth daily as needed for heartburn        spironolactone-hydrochlorothiazide 25-25 MG per tablet    ALDACTAZIDE    90 tablet    Take 1 tablet by mouth every morning INDICATION:TO LOWER BLOOD PRESSURE    Benign essential hypertension       triamcinolone 0.1 % cream    KENALOG     Apply topically 2 times daily as needed for irritation (sores on scalp.)        TYLENOL PO      Take 1,000 mg by mouth 2 times daily as needed for mild pain or fever        Urea 20 % Crea cream      Apply topically daily        vitamin C-electrolytes 1000mg vitamin C super orange drink mix    EMERGEN-C     Take 1 packet by mouth daily Mix 1 packet in 4-6oz water.        * Notice:  This list has 2 medication(s) that are the same as other medications prescribed for you. Read the directions carefully, and ask your doctor or other care provider to review them with you.

## 2018-03-02 ENCOUNTER — TELEPHONE (OUTPATIENT)
Dept: INTERNAL MEDICINE | Facility: CLINIC | Age: 68
End: 2018-03-02

## 2018-03-02 DIAGNOSIS — I10 BENIGN ESSENTIAL HYPERTENSION: ICD-10-CM

## 2018-03-02 NOTE — TELEPHONE ENCOUNTER
Copied from [facility] encounter from 02/27 by Gale Jones RN:    Prior Authorization Retail Medication Request  Medication/Dose:   Diagnosis and ICD code: I10  New/Renewal/Insurance Change PA: renewal  Previously Tried and Failed Therapies:      Insurance ID (if provided): Medic  Insurance Phone (if provided):      Any additional info from fax request:      If you received a fax notification from an outside Pharmacy:  Pharmacy Name:[facility]  Pharmacy #:[phone]  Pharmacy Fax:[phone]

## 2018-03-02 NOTE — TELEPHONE ENCOUNTER
Prior Authorization Not Needed per Insurance    Medication: spironolactone-hydrochlorothiazide (ALDACTAZIDE) 25-25 MG per tablet  Insurance Company: [employer] - Phone [phone] Fax [phone]  Expected CoPay:    $78.00  Pharmacy Filling the Rx: [facility] [id number] [place] [address] [address] AT [address]  Pharmacy Notified: Yes  Patient Notified: Yes    Called [facility] at [phone] and per pharmacist this medication is $78.00 and is not preferred by her insurance but the two individual drugs would be at a tier 1 copay and would be cheaper for her. Forwarding back to clinic as he requested new scripts for the individual drugs so it would be cheaper for her.

## 2018-03-12 ENCOUNTER — MYC MEDICAL ADVICE (OUTPATIENT)
Dept: INTERNAL MEDICINE | Facility: CLINIC | Age: 68
End: 2018-03-12

## 2018-03-13 RX ORDER — HYDROCHLOROTHIAZIDE 25 MG/1
25 TABLET ORAL EVERY MORNING
Qty: 90 TABLET | Refills: 3 | Status: SHIPPED | OUTPATIENT
Start: 2018-03-13 | End: 2018-06-19

## 2018-03-13 RX ORDER — SPIRONOLACTONE 25 MG/1
25 TABLET ORAL DAILY
Qty: 90 TABLET | Refills: 3 | Status: SHIPPED | OUTPATIENT
Start: 2018-03-13 | End: 2019-02-16

## 2018-03-13 NOTE — TELEPHONE ENCOUNTER
Dr. Mendez- please see separate encounters and update patient. Would you like to order as 2 separate meds?

## 2018-03-19 DIAGNOSIS — E78.5 DYSLIPIDEMIA, GOAL LDL BELOW 70: ICD-10-CM

## 2018-03-19 DIAGNOSIS — I25.10 CORONARY ARTERY DISEASE INVOLVING NATIVE CORONARY ARTERY OF NATIVE HEART WITHOUT ANGINA PECTORIS: ICD-10-CM

## 2018-03-19 LAB
ALT SERPL W P-5'-P-CCNC: 28 U/L (ref 0–50)
ANION GAP SERPL CALCULATED.3IONS-SCNC: 4 MMOL/L (ref 3–14)
BUN SERPL-MCNC: 23 MG/DL (ref 7–30)
CALCIUM SERPL-MCNC: 9.7 MG/DL (ref 8.5–10.1)
CHLORIDE SERPL-SCNC: 100 MMOL/L (ref 94–109)
CHOLEST SERPL-MCNC: 172 MG/DL
CO2 SERPL-SCNC: 34 MMOL/L (ref 20–32)
CREAT SERPL-MCNC: 0.87 MG/DL (ref 0.52–1.04)
GFR SERPL CREATININE-BSD FRML MDRD: 65 ML/MIN/1.7M2
GLUCOSE SERPL-MCNC: 122 MG/DL (ref 70–99)
HDLC SERPL-MCNC: 56 MG/DL
LDLC SERPL CALC-MCNC: 99 MG/DL
NONHDLC SERPL-MCNC: 116 MG/DL
POTASSIUM SERPL-SCNC: 3.9 MMOL/L (ref 3.4–5.3)
SODIUM SERPL-SCNC: 138 MMOL/L (ref 133–144)
TRIGL SERPL-MCNC: 85 MG/DL

## 2018-03-19 PROCEDURE — 80048 BASIC METABOLIC PNL TOTAL CA: CPT | Performed by: INTERNAL MEDICINE

## 2018-03-19 PROCEDURE — 80061 LIPID PANEL: CPT | Performed by: INTERNAL MEDICINE

## 2018-03-19 PROCEDURE — 36415 COLL VENOUS BLD VENIPUNCTURE: CPT | Performed by: INTERNAL MEDICINE

## 2018-03-19 PROCEDURE — 84460 ALANINE AMINO (ALT) (SGPT): CPT | Performed by: INTERNAL MEDICINE

## 2018-04-04 ENCOUNTER — OFFICE VISIT (OUTPATIENT)
Dept: CARDIOLOGY | Facility: CLINIC | Age: 68
End: 2018-04-04
Attending: PHYSICIAN ASSISTANT
Payer: COMMERCIAL

## 2018-04-04 ENCOUNTER — HOSPITAL ENCOUNTER (EMERGENCY)
Facility: CLINIC | Age: 68
Discharge: HOME | End: 2018-04-04
Attending: EMERGENCY MEDICINE | Admitting: EMERGENCY MEDICINE
Payer: MEDICARE

## 2018-04-04 ENCOUNTER — APPOINTMENT (OUTPATIENT)
Dept: GENERAL RADIOLOGY | Facility: CLINIC | Age: 68
End: 2018-04-04
Attending: EMERGENCY MEDICINE
Payer: MEDICARE

## 2018-04-04 VITALS
HEART RATE: 91 BPM | WEIGHT: 165 LBS | SYSTOLIC BLOOD PRESSURE: 144 MMHG | HEIGHT: 65 IN | OXYGEN SATURATION: 97 % | DIASTOLIC BLOOD PRESSURE: 74 MMHG | BODY MASS INDEX: 27.49 KG/M2

## 2018-04-04 VITALS
OXYGEN SATURATION: 95 % | SYSTOLIC BLOOD PRESSURE: 110 MMHG | RESPIRATION RATE: 15 BRPM | DIASTOLIC BLOOD PRESSURE: 65 MMHG

## 2018-04-04 DIAGNOSIS — R06.09 DOE (DYSPNEA ON EXERTION): Primary | ICD-10-CM

## 2018-04-04 DIAGNOSIS — I25.10 CORONARY ARTERY DISEASE INVOLVING NATIVE CORONARY ARTERY OF NATIVE HEART WITHOUT ANGINA PECTORIS: ICD-10-CM

## 2018-04-04 DIAGNOSIS — R06.00 DYSPNEA, UNSPECIFIED TYPE: ICD-10-CM

## 2018-04-04 LAB
ANION GAP SERPL CALCULATED.3IONS-SCNC: 5 MMOL/L (ref 3–14)
BASOPHILS # BLD AUTO: 0 10E9/L (ref 0–0.2)
BASOPHILS NFR BLD AUTO: 0.2 %
BUN SERPL-MCNC: 20 MG/DL (ref 7–30)
CALCIUM SERPL-MCNC: 9.9 MG/DL (ref 8.5–10.1)
CHLORIDE SERPL-SCNC: 99 MMOL/L (ref 94–109)
CO2 SERPL-SCNC: 33 MMOL/L (ref 20–32)
CREAT SERPL-MCNC: 0.86 MG/DL (ref 0.52–1.04)
D DIMER PPP FEU-MCNC: 0.3 UG/ML FEU (ref 0–0.5)
DIFFERENTIAL METHOD BLD: NORMAL
EOSINOPHIL # BLD AUTO: 0 10E9/L (ref 0–0.7)
EOSINOPHIL NFR BLD AUTO: 0.2 %
ERYTHROCYTE [DISTWIDTH] IN BLOOD BY AUTOMATED COUNT: 12.7 % (ref 10–15)
GFR SERPL CREATININE-BSD FRML MDRD: 65 ML/MIN/1.7M2
GLUCOSE SERPL-MCNC: 125 MG/DL (ref 70–99)
HCT VFR BLD AUTO: 44.2 % (ref 35–47)
HGB BLD-MCNC: 15.2 G/DL (ref 11.7–15.7)
IMM GRANULOCYTES # BLD: 0 10E9/L (ref 0–0.4)
IMM GRANULOCYTES NFR BLD: 0.2 %
LYMPHOCYTES # BLD AUTO: 1.5 10E9/L (ref 0.8–5.3)
LYMPHOCYTES NFR BLD AUTO: 17.2 %
MCH RBC QN AUTO: 30.7 PG (ref 26.5–33)
MCHC RBC AUTO-ENTMCNC: 34.4 G/DL (ref 31.5–36.5)
MCV RBC AUTO: 89 FL (ref 78–100)
MONOCYTES # BLD AUTO: 0.7 10E9/L (ref 0–1.3)
MONOCYTES NFR BLD AUTO: 8.1 %
NEUTROPHILS # BLD AUTO: 6.5 10E9/L (ref 1.6–8.3)
NEUTROPHILS NFR BLD AUTO: 74.1 %
NRBC # BLD AUTO: 0 10*3/UL
NRBC BLD AUTO-RTO: 0 /100
PLATELET # BLD AUTO: 279 10E9/L (ref 150–450)
POTASSIUM SERPL-SCNC: 3.7 MMOL/L (ref 3.4–5.3)
RBC # BLD AUTO: 4.95 10E12/L (ref 3.8–5.2)
SODIUM SERPL-SCNC: 137 MMOL/L (ref 133–144)
TROPONIN I SERPL-MCNC: <0.015 UG/L (ref 0–0.04)
TSH SERPL DL<=0.005 MIU/L-ACNC: 1.07 MU/L (ref 0.4–4)
WBC # BLD AUTO: 8.8 10E9/L (ref 4–11)

## 2018-04-04 PROCEDURE — 85025 COMPLETE CBC W/AUTO DIFF WBC: CPT | Performed by: EMERGENCY MEDICINE

## 2018-04-04 PROCEDURE — 71046 X-RAY EXAM CHEST 2 VIEWS: CPT

## 2018-04-04 PROCEDURE — 99285 EMERGENCY DEPT VISIT HI MDM: CPT | Mod: 25

## 2018-04-04 PROCEDURE — 80048 BASIC METABOLIC PNL TOTAL CA: CPT | Performed by: EMERGENCY MEDICINE

## 2018-04-04 PROCEDURE — 85379 FIBRIN DEGRADATION QUANT: CPT | Performed by: EMERGENCY MEDICINE

## 2018-04-04 PROCEDURE — 99214 OFFICE O/P EST MOD 30 MIN: CPT | Performed by: INTERNAL MEDICINE

## 2018-04-04 PROCEDURE — 84443 ASSAY THYROID STIM HORMONE: CPT | Performed by: EMERGENCY MEDICINE

## 2018-04-04 PROCEDURE — 93005 ELECTROCARDIOGRAM TRACING: CPT

## 2018-04-04 PROCEDURE — 84484 ASSAY OF TROPONIN QUANT: CPT | Performed by: EMERGENCY MEDICINE

## 2018-04-04 ASSESSMENT — ENCOUNTER SYMPTOMS
FEVER: 0
SHORTNESS OF BREATH: 1
COUGH: 0
PALPITATIONS: 1

## 2018-04-04 NOTE — ED AVS SNAPSHOT
Emergency Department    [address] [facility] [address]    Phone:  [phone]    Fax:  [phone]                                       Marva Angela   MRN: [id number]    Department:   Emergency Department   Date of Visit:  4/4/2018           Patient Information     Date Of Birth          1950        Your diagnoses for this visit were:     Dyspnea, unspecified type        You were seen by Radha Ayala MD.      Follow-up Information     Follow up with Robert Stephen MD.    Specialty:  Cardiology    Why:  for your stress test as scheduled    Contact information:    [address]  [place] [address]  [phone]          Discharge Instructions         Shortness of Breath (Dyspnea)  Shortness of breath is the feeling that you can't catch your breath or get enough air. It is also known as dyspnea.  Dyspnea can be caused by many different conditions. They include:    Acute asthma attack.    Worsening of chronic lung diseases such as chronic bronchitis and emphysema.    Heart failure. This is when weak heart muscle allows extra fluid to collect in the lungs.    Panic attacks or anxiety. Fear can cause rapid breathing (hyperventilation).    Pneumonia, or an infection in the lung tissue.    Exposure to toxic substances, fumes, smoke, or certain medicines.    Blood clot in the lung (pulmonary embolism). This is often from a piece of blood clot in a deep vein of the leg (deep vein thrombosis) that breaks off and travels to the lungs.    Heart attack or heart-related chest pain (angina).    Anemia.    Collapsed lung (pneumothorax).    Dehydration.    Pregnancy.  Based on your visit today, the exact cause of your shortness of breath is not certain. Your tests don t show any of the serious causes of dyspnea. You may need other tests to find out if you have a serious problem. It s important to watch for any new symptoms or symptoms that get worse. Follow up with your  healthcare provider as directed.  Home care  Follow these tips to take care of yourself at home:    When your symptoms are better, go back to your usual activities.    If you smoke, you should stop. Join a quit-smoking program or ask your healthcare provider for help.    Eat a healthy diet and get plenty of sleep.    Get regular exercise. Talk with your healthcare provider before starting to exercise, especially if you have other medical problems.    Cut down on the amount of caffeine and stimulants you consume.  Follow-up care  Follow up with your healthcare provider, or as advised.  If tests were done, you will be told if your treatment needs to be changed. You can call as directed for the results.  (Note: If an X-ray was taken, a specialist will review it. You will be notified of any new findings that may affect your care.)  Call 911 or get immediate medical care  Shortness of breath may be a sign of a serious medical problem. For example, it may be a problem with your heart or lungs. Call 911 if you have worsening shortness of breath or trouble breathing, especially with any of the symptoms below:    You are confused or it s difficult to wake you.    You faint or lose consciousness.    You have a fast heartbeat, or your heartbeat is irregular.    You are coughing up blood.    You have pain in your chest, arm, shoulder, neck, or upper back.    You break out in a sweat.  When to seek medical advice  Call your healthcare provider right away if any of these occur:    Slight shortness of breath or wheezing    Redness, pain or swelling in your leg, arm, or other body area    Swelling in both legs or ankles    Fast weight gain    Dizziness or weakness    Fever of 100.4 F (38 C) or higher, or as directed by your healthcare provider  Date Last Reviewed: 9/13/2015 2000-2017 [employer]. [address] [place] [address], [place] [address]. All rights reserved. This information is not intended as a substitute for  professional medical care. Always follow your healthcare professional's instructions.          Your next 10 appointments already scheduled     Apr 10, 2018  3:00 PM CDT   Ech Stress Test with [id number]   [facility] CV Echocardiography (Cardiovascular Imaging at [facility])    [address]  [address]  [facility] [id number] [phone]           1. Please bring or wear a comfortable two-piece outfit and walking shoes. 2. Stop eating 3 hours before the test. You may drink water or juice. 3. Stop all caffeine 12 hours before the test. This includes coffee, tea, soda pop, chocolate and certain medicines (such as Anacin and Excederin). Also avoid decaf coffee and tea, as these contain small amounts of caffeine. 4. No alcohol, smoking or use of other tobacco products for 12 hours before the test. 5. Refer to your provider instructions to see if you need to stop any medications (such as beta-blockers or nitrates) for this test. 6. For patients with diabetes: - If you take insulin, call your diabetes care team. Ask if you should take a   dose the morning of your test. - If you take diabetes medicine by mouth, dont take it on the morning of your test. Bring it with you to take after the test. (If you have questions, call your diabetes care team) 7. When you arrive, please tell us if: - You have diabetes. - You have taken Viagra, Cialis or Levitra in the past 48 hours. 8. For any questions that cannot be answered, please contact the ordering physician            Apr 18, 2018  9:30 AM CDT   Return Visit with Rufina Palacios PA-C   [facility] ([facility])    [address]  [facility] [id number] [phone] OPT 2              24 Hour Appointment Hotline       To make an appointment at any [facility], call [phone] ([phone]). If you don't have a family doctor or clinic, we will help you find one. [facility]  clin[facility] are conveniently located to serve the needs of you and your family.             Review of your medicines      Our records show that you are taking the medicines listed below. If these are incorrect, please call your family doctor or clinic.        Dose / Directions Last dose taken    albuterol 108 (90 BASE) MCG/ACT Inhaler   Commonly known as:  PROAIR HFA/PROVENTIL HFA/VENTOLIN HFA   Dose:  2 puff   Quantity:  3 Inhaler        Inhale 2 puffs into the lungs every 6 hours as needed for shortness of breath / dyspnea or wheezing   Refills:  1        amLODIPine 2.5 MG tablet   Commonly known as:  NORVASC   Dose:  2.5 mg   Quantity:  90 tablet        Take 1 tablet (2.5 mg) by mouth daily   Refills:  3        aspirin EC 81 MG EC tablet   Dose:  81 mg   Quantity:  30 tablet        Take 1 tablet (81 mg) by mouth daily   Refills:  11        atorvastatin 20 MG tablet   Commonly known as:  LIPITOR   Dose:  20 mg   Quantity:  90 tablet        Take 1 tablet (20 mg) by mouth daily INDICATION: TO LOWER CHOLESTEROL AND TO HELP REDUCE RISK OF HEART ATTACK AND STROKE   Refills:  3        betamethasone dipropionate 0.05 % cream   Commonly known as:  DIPROSONE   Quantity:  45 g        Apply topically 2 times daily as needed (For foot)   Refills:  prn        celecoxib 200 MG capsule   Commonly known as:  celeBREX   Dose:  200 mg   Quantity:  180 capsule        Take 1 capsule (200 mg) by mouth 2 times daily   Refills:  3        cholecalciferol 70670 UNITS capsule   Commonly known as:  VITAMIN D3   Dose:  1 capsule   Quantity:  40 capsule        Take 1 capsule (50,000 Units) by mouth every 14 days SIG: TAKE INDICATION: 1ST AND 15TH OF EACH MONTH, TO TREAT VITAMIN D DEFICIENCY   Refills:  0        Cyanocobalamin 5000 MCG/ML Liqd   Commonly known as:  B-12 SUPER STRENGTH   Dose:  1 mL   Quantity:  2 Bottle        Place 1 mL under the tongue every morning FOR VITAMIN B12 SUPPLEMENTATION, PLEASE PLACE UNDER THE TONGUE   Refills:  PRN         folic acid 1 MG tablet   Commonly known as:  FOLVITE   Dose:  1 mg   Quantity:  100 tablet        Take 1 tablet (1 mg) by mouth daily INDICATION: FOLIC ACID SUPPLEMENT   Refills:  3        hydrochlorothiazide 25 MG tablet   Commonly known as:  HYDRODIURIL   Dose:  25 mg   Quantity:  90 tablet        Take 1 tablet (25 mg) by mouth every morning   Refills:  3        OYSTER CALCIUM + D 250-125 MG-UNIT per tablet   Dose:  2 tablet   Generic drug:  calcium-vitamin D        Take 2 tablets by mouth daily   Refills:  0        ranitidine 150 MG tablet   Commonly known as:  ZANTAC   Dose:  150 mg        Take 150 mg by mouth daily as needed for heartburn   Refills:  0        spironolactone 25 MG tablet   Commonly known as:  ALDACTONE   Dose:  25 mg   Quantity:  90 tablet        Take 1 tablet (25 mg) by mouth daily   Refills:  3        triamcinolone 0.1 % cream   Commonly known as:  KENALOG        Apply topically 2 times daily as needed for irritation (sores on scalp.)   Refills:  0        TYLENOL PO   Dose:  1000 mg        Take 1,000 mg by mouth 2 times daily as needed for mild pain or fever   Refills:  0        Urea 20 % Crea cream        Apply topically daily   Refills:  0        vitamin C-electrolytes 1000mg vitamin C super orange drink mix   Commonly known as:  EMERGEN-C   Dose:  1 packet        Take 1 packet by mouth daily Mix 1 packet in 4-6oz water.   Refills:  0                Procedures and tests performed during your visit     Basic metabolic panel    CBC with platelets differential    Chest XR,  PA & LAT    D dimer quantitative    TSH with free T4 reflex    Troponin I      Orders Needing Specimen Collection     None      Pending Results     No orders found from 4/2/2018 to 4/5/2018.            Pending Culture Results     No orders found from 4/2/2018 to 4/5/2018.            Pending Results Instructions     If you had any lab results that were not finalized at the time of your Discharge, you can call the ED  Lab Result RN at [phone]. You will be contacted by this team for any positive Lab results or changes in treatment. The nurses are available 7 days a week from 10A to 6:30P.  You can leave a message 24 hours per day and they will return your call.        Test Results From Your Hospital Stay        4/4/2018  2:52 PM      Component Results     Component Value Ref Range & Units Status    WBC 8.8 4.0 - 11.0 10e9/L Final    RBC Count 4.95 3.8 - 5.2 10e12/L Final    Hemoglobin 15.2 11.7 - 15.7 g/dL Final    Hematocrit 44.2 35.0 - 47.0 % Final    MCV 89 78 - 100 fl Final    MCH 30.7 26.5 - 33.0 pg Final    MCHC 34.4 31.5 - 36.5 g/dL Final    RDW 12.7 10.0 - 15.0 % Final    Platelet Count 279 150 - 450 10e9/L Final    Diff Method Automated Method  Final    % Neutrophils 74.1 % Final    % Lymphocytes 17.2 % Final    % Monocytes 8.1 % Final    % Eosinophils 0.2 % Final    % Basophils 0.2 % Final    % Immature Granulocytes 0.2 % Final    Nucleated RBCs 0 0 /100 Final    Absolute Neutrophil 6.5 1.6 - 8.3 10e9/L Final    Absolute Lymphocytes 1.5 0.8 - 5.3 10e9/L Final    Absolute Monocytes 0.7 0.0 - 1.3 10e9/L Final    Absolute Eosinophils 0.0 0.0 - 0.7 10e9/L Final    Absolute Basophils 0.0 0.0 - 0.2 10e9/L Final    Abs Immature Granulocytes 0.0 0 - 0.4 10e9/L Final    Absolute Nucleated RBC 0.0  Final         4/4/2018  3:06 PM      Component Results     Component Value Ref Range & Units Status    Sodium 137 133 - 144 mmol/L Final    Potassium 3.7 3.4 - 5.3 mmol/L Final    Chloride 99 94 - 109 mmol/L Final    Carbon Dioxide 33 (H) 20 - 32 mmol/L Final    Anion Gap 5 3 - 14 mmol/L Final    Glucose 125 (H) 70 - 99 mg/dL Final    Urea Nitrogen 20 7 - 30 mg/dL Final    Creatinine 0.86 0.52 - 1.04 mg/dL Final    GFR Estimate 65 >60 mL/min/1.7m2 Final    Non  GFR Calc    GFR Estimate If Black 79 >60 mL/min/1.7m2 Final    African American GFR Calc    Calcium 9.9 8.5 - 10.1 mg/dL Final         4/4/2018  3:11 PM       Component Results     Component Value Ref Range & Units Status    Troponin I ES <0.015 0.000 - 0.045 ug/L Final    The 99th percentile for upper reference range is 0.045 ug/L.  Troponin values   in the range of 0.045 - 0.120 ug/L may be associated with risks of adverse   clinical events.           4/4/2018  3:55 PM      Component Results     Component Value Ref Range & Units Status    D Dimer 0.3 0.0 - 0.50 ug/ml FEU Final    This D-dimer assay is intended for use in conjunction with a clinical pretest   probability assessment model to exclude pulmonary embolism (PE) and deep   venous thrombosis (DVT) in outpatients suspected of PE or DVT. The cut-off   value is 0.5 ug/mL FEU.           4/4/2018  3:52 PM      Component Results     Component Value Ref Range & Units Status    TSH 1.07 0.40 - 4.00 mU/L Final         4/4/2018  4:15 PM      Narrative     CHEST TWO VIEWS 4/4/2018 4:03 PM     HISTORY: Shortness of breath.    COMPARISON: 11/3/2017    FINDINGS: The lungs are hyperexpanded, but clear. No pneumothorax or  pleural effusion. Heart size normal.         Impression     IMPRESSION: No radiographic evidence of acute chest abnormality.     KEN SALEH MD                Clinical Quality Measure: Blood Pressure Screening     Your blood pressure was checked while you were in the emergency department today. The last reading we obtained was  BP: 110/65 . Please read the guidelines below about what these numbers mean and what you should do about them.  If your systolic blood pressure (the top number) is less than 120 and your diastolic blood pressure (the bottom number) is less than 80, then your blood pressure is normal. There is nothing more that you need to do about it.  If your systolic blood pressure (the top number) is 120-139 or your diastolic blood pressure (the bottom number) is 80-89, your blood pressure may be higher than it should be. You should have your blood pressure rechecked within a year by a  primary care provider.  If your systolic blood pressure (the top number) is 140 or greater or your diastolic blood pressure (the bottom number) is 90 or greater, you may have high blood pressure. High blood pressure is treatable, but if left untreated over time it can put you at risk for heart attack, stroke, or kidney failure. You should have your blood pressure rechecked by a primary care provider within the next 4 weeks.  If your provider in the emergency department today gave you specific instructions to follow-up with your doctor or provider even sooner than that, you should follow that instruction and not wait for up to 4 weeks for your follow-up visit.        Thank you for choosing [facility]       Thank you for choosing [facility] for your care. Our goal is always to provide you with excellent care. Hearing back from our patients is one way we can continue to improve our services. Please take a few minutes to complete the written survey that you may receive in the mail after you visit with us. Thank you!        [facility] Information     [facility] gives you secure access to your electronic health record. If you see a primary care provider, you can also send messages to your care team and make appointments. If you have questions, please call your primary care clinic.  If you do not have a primary care provider, please call [phone] and they will assist you.        Care EveryWhere ID     This is your Care EveryWhere ID. This could be used by other organizations to access your [facility] medical records  [id number]        Equal Access to Services     KHUSHBU FRIAS : Hadii jaya Meier, waaylada noam, qaybta kaaljenniffer ballard. So [facility] [phone].    ATENCIÓN: Si habla español, tiene a barfield disposición servicios gratuitos de asistencia lingüística. Rojas al [phone].    We comply with applicable federal civil rights laws and [place] laws. We do not  discriminate on the basis of race, color, national origin, age, disability, sex, sexual orientation, or gender identity.            After Visit Summary       This is your record. Keep this with you and show to your community pharmacist(s) and doctor(s) at your next visit.

## 2018-04-04 NOTE — PROGRESS NOTES
"    Cardiology Progress Note          Assessment and Plan:     1. Dyspnea on exertion, new onset without chest discomfort    Historically normal echo and nuclear stress test in February 2017.  Due to the changes in clinical status, repeat testing with stress echocardiogram to assess rhythm, structure and rule out ischemia.    If cannot perform adequately on the treadmill, could repeat nuclear stress test.    Follow-up with Rufina Palacios to review    This note was transcribed using electronic voice recognition software and there may be typographical errors present.                Interval History:   The patient is a very pleasant 67 year old whom I am meeting for the first time.  She previously followed with Dr. Fountain for hypertension and coronary calcification.  She states that she had a cold recently, got a Z-Corby but no improvement.  She is concerned about her shortness of breath with exertion that is more pronounced than she would expect.  She sees herself as active and an athlete, she currently is having problems with shortness of breath with even 1 block of exertion.    She appears euvolemic today.  She is not hypotensive.  She denies any chest discomfort or symptoms at rest besides some nausea.                     Review of Systems:   Review of Systems:  Skin:  Negative hair changes;itching   Eyes:  Positive for glasses  ENT:  Negative    Respiratory:  Positive for dyspnea on exertion  Cardiovascular:  Negative dizziness;lightheadedness;fatigue;Positive for  Gastroenterology: Negative excessive gas or bloating;abdominal pain  Genitourinary:  Negative urinary frequency  Musculoskeletal:  Positive for joint pain  Neurologic:  Negative headaches  Psychiatric:  Positive for sleep disturbances  Heme/Lymph/Imm:  Negative    Endocrine:  Negative                Physical Exam:     Vitals: /74 (BP Location: Right arm, Patient Position: Chair, Cuff Size: Adult Regular)  Pulse 91  Ht 1.651 m (5' 5\")  Wt 74.8 kg (165 " lb)  SpO2 97%  BMI 27.46 kg/m2  Constitutional:  cooperative, alert and oriented, well developed, well nourished, in no acute distress overweight      Skin:  warm and dry to the touch, no apparent skin lesions or masses noted        Head:  normocephalic, no masses or lesions        Eyes:  pupils equal and round, conjunctivae and lids unremarkable, sclera white, no xanthalasma, EOMS intact, no nystagmus        ENT:  no pallor or cyanosis, dentition good        Neck:  JVP normal        Chest:  normal breath sounds, clear to auscultation, normal A-P diameter, normal symmetry, normal respiratory excursion, no use of accessory muscles        Cardiac: regular rhythm;normal S1 and S2   S4   systolic murmur;LUSB;grade 1     Borderline tachycardic    Abdomen:      Benign    Extremities and Back:  no deformities, clubbing, cyanosis, erythema observed;no edema        Neurological:  no gross motor deficits                 Medications:     Current Outpatient Prescriptions   Medication Sig Dispense Refill     spironolactone (ALDACTONE) 25 MG tablet Take 1 tablet (25 mg) by mouth daily 90 tablet 3     hydrochlorothiazide (HYDRODIURIL) 25 MG tablet Take 1 tablet (25 mg) by mouth every morning 90 tablet 3     cholecalciferol (VITAMIN D3) 92670 UNITS capsule Take 1 capsule (50,000 Units) by mouth every 14 days SIG: TAKE INDICATION: 1ST AND 15TH OF EACH MONTH, TO TREAT VITAMIN D DEFICIENCY 40 capsule 0     celecoxib (CELEBREX) 200 MG capsule Take 1 capsule (200 mg) by mouth 2 times daily 180 capsule 3     atorvastatin (LIPITOR) 20 MG tablet Take 1 tablet (20 mg) by mouth daily INDICATION: TO LOWER CHOLESTEROL AND TO HELP REDUCE RISK OF HEART ATTACK AND STROKE 90 tablet 3     amLODIPine (NORVASC) 2.5 MG tablet Take 1 tablet (2.5 mg) by mouth daily 90 tablet 3     Cyanocobalamin (B-12 SUPER STRENGTH) 5000 MCG/ML LIQD Place 1 mL under the tongue every morning FOR VITAMIN B12 SUPPLEMENTATION, PLEASE PLACE UNDER THE TONGUE 2 Bottle PRN      folic acid (FOLVITE) 1 MG tablet Take 1 tablet (1 mg) by mouth daily INDICATION: FOLIC ACID SUPPLEMENT 100 tablet 3     calcium-vitamin D (OYSTER CALCIUM + D) 250-125 MG-UNIT per tablet Take 2 tablets by mouth daily       betamethasone dipropionate (DIPROSONE) 0.05 % cream Apply topically 2 times daily as needed (For foot) 45 g prn     Urea 20 % CREA cream Apply topically daily        triamcinolone (KENALOG) 0.1 % cream Apply topically 2 times daily as needed for irritation (sores on scalp.)       vitamin C-electrolytes (EMERGEN-C) 1000mg vitamin C super orange drink mix Take 1 packet by mouth daily Mix 1 packet in 4-6oz water.       ranitidine (ZANTAC) 150 MG tablet Take 150 mg by mouth daily as needed for heartburn        albuterol (PROAIR HFA, PROVENTIL HFA, VENTOLIN HFA) 108 (90 BASE) MCG/ACT inhaler Inhale 2 puffs into the lungs every 6 hours as needed for shortness of breath / dyspnea or wheezing 3 Inhaler 1     aspirin EC 81 MG tablet Take 1 tablet (81 mg) by mouth daily 30 tablet 11     [DISCONTINUED] albuterol (PROAIR HFA/PROVENTIL HFA/VENTOLIN HFA) 108 (90 BASE) MCG/ACT Inhaler Inhale 2 puffs into the lungs every 4 hours as needed for shortness of breath / dyspnea or wheezing 1 Inhaler 5     Acetaminophen (TYLENOL PO) Take 1,000 mg by mouth 2 times daily as needed for mild pain or fever                  Data:   All laboratory data reviewed  No results found for this or any previous visit (from the past 24 hour(s)).    All laboratory data reviewed  Lab Results   Component Value Date    CHOL 172 03/19/2018     Lab Results   Component Value Date    HDL 56 03/19/2018     Lab Results   Component Value Date    LDL 99 03/19/2018     Lab Results   Component Value Date    TRIG 85 03/19/2018     Lab Results   Component Value Date    CHOLHDLRATIO 4.2 09/18/2015     TSH   Date Value Ref Range Status   01/13/2017 0.59 0.40 - 4.00 mU/L Final     Last Basic Metabolic Panel:  Lab Results   Component Value Date    NA  138 03/19/2018      Lab Results   Component Value Date    POTASSIUM 3.9 03/19/2018     Lab Results   Component Value Date    CHLORIDE 100 03/19/2018     Lab Results   Component Value Date    SANGEETA 9.7 03/19/2018     Lab Results   Component Value Date    CO2 34 03/19/2018     Lab Results   Component Value Date    BUN 23 03/19/2018     Lab Results   Component Value Date    CR 0.87 03/19/2018     Lab Results   Component Value Date     03/19/2018     Lab Results   Component Value Date    WBC 7.8 07/10/2017     Lab Results   Component Value Date    RBC 5.01 07/10/2017     Lab Results   Component Value Date    HGB 11.5 08/02/2017     Lab Results   Component Value Date    HCT 45.5 07/10/2017     Lab Results   Component Value Date    MCV 91 07/10/2017     Lab Results   Component Value Date    MCH 28.9 07/10/2017     Lab Results   Component Value Date    MCHC 31.9 07/10/2017     Lab Results   Component Value Date    RDW 14.0 07/10/2017     Lab Results   Component Value Date     08/03/2017

## 2018-04-04 NOTE — ED AVS SNAPSHOT
Emergency Department    [address] [facility] [address]    Phone:  [phone]    Fax:  [phone]                                       Marva Angela   MRN: [id number]    Department:   Emergency Department   Date of Visit:  4/4/2018           After Visit Summary Signature Page     I have received my discharge instructions, and my questions have been answered. I have discussed any challenges I see with this plan with the nurse or doctor.    ..........................................................................................................................................  Patient/Patient Representative Signature      ..........................................................................................................................................  Patient Representative Print Name and Relationship to Patient    ..................................................               ................................................  Date                                            Time    ..........................................................................................................................................  Reviewed by Signature/Title    ...................................................              ..............................................  Date                                                            Time

## 2018-04-04 NOTE — ED PROVIDER NOTES
History     Chief Complaint:  Palpitations and short of breath    HPI   Marva Angela is a 67 year old female who presents with palpitations and shortness of breath. The patient reports that she has been experiencing 10 days of intermittent heart racing and shortness of breath. These symptoms are only present with exertion. The symptoms totally alleviate when she stops to rest. She denies any shortness of breath when lying down. The patient was being seen in the heart clinic this afternoon for a routine follow up. She mentioned these symptoms and her cardiologist scheduled her for an outpatient stress test next week. She present to the ED, per her choice for further evaluation. Here in the ED she endorses feeling well without symptoms. The patient notes that she did have a viral infection with a cough approximately a month and a half ago. She was placed on a short course of azithromycin and notes that her symptoms improved. She had no symptoms and felt well until 10 days ago when she experienced her first episode of shortness of breath. She denies any recorded fevers, leg swelling, or chest pain. Of note the patient had a cardiac stress test and echo performed one year ago and these were normal.      Cardiac/PE/DVT Risk Factors:  History of hypertension - No  History of hyperlipidemia - Yes  History of diabetes - Yes  History of smoking - Yes, prior  Personal history of PE/DVT - No  Family history of PE/DVT - No  Family history of heart complications - No  Recent travel - No  Recent surgery - No  Other immobilizations - No  Cancer - No     Allergies:  Spiriva  Penicillins  Sulfa drugs    Medications:    Spironolactone  Hydrochlorothiazide  Celebrex  Lipitor  Amlodipine  Albuterol  Triamcinolone 0.01% cream    Past Medical History:    Arthritis  Benign essential hypertension  Contact dermatitis or eczema  COPD  Diabetes  Hyperlipidemia  Nephritis  Osteoporosis  Phlebitis and thrombophlebitis of other deep  vessels of lower extremities  Sinusitis    Past Surgical History:    Arthroplasty hip  Arthroplasty knee  Biopsy of skin lesion  Dexa bone density, axial skeleton  Vaginal hysterectomy  Aspiration or injection ganglion cyst  Hysterectomy   Bilateral meniscus repair    Family History:    CAD   Diabetes  MI  Cancer  Osteoporosis  Thyroid disease  Hyperlipidemia  Diabetes  Colon cancer  Atrial fibrillation     Social History:  Smoking Status: Former  Smokeless Tobacco: No  Alcohol Use: No   Marital Status:  Single [1]     Review of Systems   Constitutional: Negative for fever.   Respiratory: Positive for shortness of breath. Negative for cough.    Cardiovascular: Positive for palpitations. Negative for chest pain and leg swelling.   All other systems reviewed and are negative.      Physical Exam   Vitals:  Patient Vitals for the past 24 hrs:   BP Heart Rate Resp SpO2   04/04/18 1500 110/65 73 15 95 %         Physical Exam    Physical Exam   Constitutional:  Patient is oriented to person, place, and time. They appear well-developed and well-nourished. No distress.   HENT:   Mouth/Throat:   Oropharynx is clear and moist.   Eyes:    Conjunctivae normal and EOM are normal. Pupils are equal, round, and reactive to light.   Neck:    Normal range of motion.   Cardiovascular: Normal rate, regular rhythm and normal heart sounds.  Exam reveals no gallop and no friction rub.  No murmur heard. No pleuritic pain or reproducible pain  Pulmonary/Chest:  Effort normal and breath sounds normal. Patient has no wheezes. Patient has no rales.   Abdominal:   Soft. Bowel sounds are normal. Patient exhibits no mass. There is no tenderness. There is no rebound and no guarding.   Musculoskeletal:  Normal range of motion. Patient exhibits no edema. No calf tenderness to palpation  Neurological:   Patient is alert and oriented to person, place, and time. Patient has normal strength. No cranial nerve deficit or sensory deficit. GCS  15  Skin:   Skin is warm and dry. No rash noted. No erythema.   Psychiatric:   Patient has a normal mood and affect. Patient's behavior is normal. Judgment and thought content normal.      Emergency Department Course     ECG:  ECG taken at 1414, ECG read at 1510  Normal sinus rhythm with sinus arrhythmia. Possible lateral infarct, age undetermined. Inferior infarct, age undetermined. Abnormal ECG. No significant changed as compared to 7/3/17  Rate 90 bpm. WV interval 162. QRS duration 94. QT/QTc 352/430. P-R-T axes 83, -23, 55.     Imaging:  Radiology findings were communicated with the patient who voiced understanding of the findings.  Chest XR, PA & LAT:  IMPRESSION: No radiographic evidence of acute chest abnormality.   Reading per radiology.     Laboratory:  Laboratory findings were communicated with the patient who voiced understanding of the findings.  CBC: AWNL (WBC 8.8, HGB 15.2, )  BMP: Carbon dioxide: 33(H), Glucose: 125(H), o/w WNL (Creatinine 0.86)  Troponin (Collected 1441): <0.015  D Dimer (Collected 1441): 0.3   TSH: 1.07    Emergency Department Course:  Nursing notes and vitals reviewed.  I performed an exam of the patient as documented above.   IV was inserted and blood was drawn for laboratory testing, results above.  The patient was sent for a XR while in the emergency department, results above.      1615 I rechecked with the patient    I personally reviewed the laboratory results with the Patient and answered all related questions prior to discharge.    Impression & Plan      Medical Decision Making:  Marva Angela is a 67 year old female who presents to the emergency department today with shortness of breath. She denies any orthopnea and has no cough or fever. It sounds like this is more related to exertion. She was set up for an outpatient stress, however comes down to the emergency department for further evaluation.    ECG looks unchanged from an ECG performed last year. Her  vital signs do not show any hypertensive emergency, hypoxia, or fever. Diagnostic evaluation was performed. She has no evidence of leukocytosis or acute anemia. She has no acute metabolic derangement. TSH is unremarkable. Cardiac enzyme is within normals. D Dimer was performed and is negative. Given her vital signs and low pre test probability, no further imaging was performed to pursue this diagnosis.    Chest X ray was done that shows no evidence of pneumonia, free air, CHF, masses, abnormal mediastinum, or cardiomegaly.    At this point, I did discuss disposition. I offered obs admission where we would do a second troponin d do the stress test in house but the patient denies this. She really just wanted reassurance and feels safe following up with her stress test as scheduled.    HEART score was performed on her. She is at very low risk for major coronary event and is appropriate for outpatient followed up. She has a primary care doctor and an appropriate plan. All of her questions and concerns were answered.     HEART Score  Criteria   0-2 points for each of 5 items (maximum of 10 points):  Score 0- History slightly suspicious for coronary syndrome  Score 0- EKG Normal  Score 2- Age 65 years or older  Score 1- One to 2 risk factors for atherosclerotic disease  Score 0- Within normal limits for troponin levels  Interpretation  Risk of adverse outcome  Heart Score: 3  Total Score 0-3- Adverse Outcome Risk 2.5% - Supports early discharge with appropriate follow-up    Diagnosis:    ICD-10-CM    1. Dyspnea, unspecified type R06.00         Disposition:   Discharged    CMS Diagnoses: None     Scribe Disclosure:  Tae GUNN, am serving as a scribe at 3:12 PM on 4/4/2018 to document services personally performed by Radha Ayala MD, based on my observations and the provider's statements to me.    EMERGENCY DEPARTMENT       Radha Ayala MD  04/04/18 [id number]

## 2018-04-04 NOTE — LETTER
4/4/2018    Kendall Mendez MD  [address] [facility] [address]    RE: Marva Angela       Dear Colleague,    I had the pleasure of seeing Marva Angela in the [facility].        Cardiology Progress Note          Assessment and Plan:     1. Dyspnea on exertion, new onset without chest discomfort    Historically normal echo and nuclear stress test in February 2017.  Due to the changes in clinical status, repeat testing with stress echocardiogram to assess rhythm, structure and rule out ischemia.    If cannot perform adequately on the treadmill, could repeat nuclear stress test.    Follow-up with Rufina Palacios to review    This note was transcribed using electronic voice recognition software and there may be typographical errors present.                Interval History:   The patient is a very pleasant 67 year old whom I am meeting for the first time.  She previously followed with Dr. Fountain for hypertension and coronary calcification.  She states that she had a cold recently, got a Z-Corby but no improvement.  She is concerned about her shortness of breath with exertion that is more pronounced than she would expect.  She sees herself as active and an athlete, she currently is having problems with shortness of breath with even 1 block of exertion.    She appears euvolemic today.  She is not hypotensive.  She denies any chest discomfort or symptoms at rest besides some nausea.                     Review of Systems:   Review of Systems:  Skin:  Negative hair changes;itching   Eyes:  Positive for glasses  ENT:  Negative    Respiratory:  Positive for dyspnea on exertion  Cardiovascular:  Negative dizziness;lightheadedness;fatigue;Positive for  Gastroenterology: Negative excessive gas or bloating;abdominal pain  Genitourinary:  Negative urinary frequency  Musculoskeletal:  Positive for joint pain  Neurologic:  Negative headaches  Psychiatric:  Positive for sleep  "disturbances  Heme/Lymph/Imm:  Negative    Endocrine:  Negative                Physical Exam:     Vitals: /74 (BP Location: Right arm, Patient Position: Chair, Cuff Size: Adult Regular)  Pulse 91  Ht 1.651 m (5' 5\")  Wt 74.8 kg (165 lb)  SpO2 97%  BMI 27.46 kg/m2  Constitutional:  cooperative, alert and oriented, well developed, well nourished, in no acute distress overweight      Skin:  warm and dry to the touch, no apparent skin lesions or masses noted        Head:  normocephalic, no masses or lesions        Eyes:  pupils equal and round, conjunctivae and lids unremarkable, sclera white, no xanthalasma, EOMS intact, no nystagmus        ENT:  no pallor or cyanosis, dentition good        Neck:  JVP normal        Chest:  normal breath sounds, clear to auscultation, normal A-P diameter, normal symmetry, normal respiratory excursion, no use of accessory muscles        Cardiac: regular rhythm;normal S1 and S2   S4   systolic murmur;LUSB;grade 1     Borderline tachycardic    Abdomen:      Benign    Extremities and Back:  no deformities, clubbing, cyanosis, erythema observed;no edema        Neurological:  no gross motor deficits                 Medications:     Current Outpatient Prescriptions   Medication Sig Dispense Refill     spironolactone (ALDACTONE) 25 MG tablet Take 1 tablet (25 mg) by mouth daily 90 tablet 3     hydrochlorothiazide (HYDRODIURIL) 25 MG tablet Take 1 tablet (25 mg) by mouth every morning 90 tablet 3     cholecalciferol (VITAMIN D3) 31308 UNITS capsule Take 1 capsule (50,000 Units) by mouth every 14 days SIG: TAKE INDICATION: 1ST AND 15TH OF EACH MONTH, TO TREAT VITAMIN D DEFICIENCY 40 capsule 0     celecoxib (CELEBREX) 200 MG capsule Take 1 capsule (200 mg) by mouth 2 times daily 180 capsule 3     atorvastatin (LIPITOR) 20 MG tablet Take 1 tablet (20 mg) by mouth daily INDICATION: TO LOWER CHOLESTEROL AND TO HELP REDUCE RISK OF HEART ATTACK AND STROKE 90 tablet 3     amLODIPine (NORVASC) " 2.5 MG tablet Take 1 tablet (2.5 mg) by mouth daily 90 tablet 3     Cyanocobalamin (B-12 SUPER STRENGTH) 5000 MCG/ML LIQD Place 1 mL under the tongue every morning FOR VITAMIN B12 SUPPLEMENTATION, PLEASE PLACE UNDER THE TONGUE 2 Bottle PRN     folic acid (FOLVITE) 1 MG tablet Take 1 tablet (1 mg) by mouth daily INDICATION: FOLIC ACID SUPPLEMENT 100 tablet 3     calcium-vitamin D (OYSTER CALCIUM + D) 250-125 MG-UNIT per tablet Take 2 tablets by mouth daily       betamethasone dipropionate (DIPROSONE) 0.05 % cream Apply topically 2 times daily as needed (For foot) 45 g prn     Urea 20 % CREA cream Apply topically daily        triamcinolone (KENALOG) 0.1 % cream Apply topically 2 times daily as needed for irritation (sores on scalp.)       vitamin C-electrolytes (EMERGEN-C) 1000mg vitamin C super orange drink mix Take 1 packet by mouth daily Mix 1 packet in 4-6oz water.       ranitidine (ZANTAC) 150 MG tablet Take 150 mg by mouth daily as needed for heartburn        albuterol (PROAIR HFA, PROVENTIL HFA, VENTOLIN HFA) 108 (90 BASE) MCG/ACT inhaler Inhale 2 puffs into the lungs every 6 hours as needed for shortness of breath / dyspnea or wheezing 3 Inhaler 1     aspirin EC 81 MG tablet Take 1 tablet (81 mg) by mouth daily 30 tablet 11     [DISCONTINUED] albuterol (PROAIR HFA/PROVENTIL HFA/VENTOLIN HFA) 108 (90 BASE) MCG/ACT Inhaler Inhale 2 puffs into the lungs every 4 hours as needed for shortness of breath / dyspnea or wheezing 1 Inhaler 5     Acetaminophen (TYLENOL PO) Take 1,000 mg by mouth 2 times daily as needed for mild pain or fever                  Data:   All laboratory data reviewed  No results found for this or any previous visit (from the past 24 hour(s)).    All laboratory data reviewed  Lab Results   Component Value Date    CHOL 172 03/19/2018     Lab Results   Component Value Date    HDL 56 03/19/2018     Lab Results   Component Value Date    LDL 99 03/19/2018     Lab Results   Component Value Date     TRIG 85 03/19/2018     Lab Results   Component Value Date    CHOLHDLRATIO 4.2 09/18/2015     TSH   Date Value Ref Range Status   01/13/2017 0.59 0.40 - 4.00 mU/L Final     Last Basic Metabolic Panel:  Lab Results   Component Value Date     03/19/2018      Lab Results   Component Value Date    POTASSIUM 3.9 03/19/2018     Lab Results   Component Value Date    CHLORIDE 100 03/19/2018     Lab Results   Component Value Date    SANGEETA 9.7 03/19/2018     Lab Results   Component Value Date    CO2 34 03/19/2018     Lab Results   Component Value Date    BUN 23 03/19/2018     Lab Results   Component Value Date    CR 0.87 03/19/2018     Lab Results   Component Value Date     03/19/2018     Lab Results   Component Value Date    WBC 7.8 07/10/2017     Lab Results   Component Value Date    RBC 5.01 07/10/2017     Lab Results   Component Value Date    HGB 11.5 08/02/2017     Lab Results   Component Value Date    HCT 45.5 07/10/2017     Lab Results   Component Value Date    MCV 91 07/10/2017     Lab Results   Component Value Date    MCH 28.9 07/10/2017     Lab Results   Component Value Date    MCHC 31.9 07/10/2017     Lab Results   Component Value Date    RDW 14.0 07/10/2017     Lab Results   Component Value Date     08/03/2017       Thank you for allowing me to participate in the care of your patient.    Sincerely,     Robert Stephen MD     [facility]

## 2018-04-04 NOTE — DISCHARGE INSTRUCTIONS
Shortness of Breath (Dyspnea)  Shortness of breath is the feeling that you can't catch your breath or get enough air. It is also known as dyspnea.  Dyspnea can be caused by many different conditions. They include:    Acute asthma attack.    Worsening of chronic lung diseases such as chronic bronchitis and emphysema.    Heart failure. This is when weak heart muscle allows extra fluid to collect in the lungs.    Panic attacks or anxiety. Fear can cause rapid breathing (hyperventilation).    Pneumonia, or an infection in the lung tissue.    Exposure to toxic substances, fumes, smoke, or certain medicines.    Blood clot in the lung (pulmonary embolism). This is often from a piece of blood clot in a deep vein of the leg (deep vein thrombosis) that breaks off and travels to the lungs.    Heart attack or heart-related chest pain (angina).    Anemia.    Collapsed lung (pneumothorax).    Dehydration.    Pregnancy.  Based on your visit today, the exact cause of your shortness of breath is not certain. Your tests don t show any of the serious causes of dyspnea. You may need other tests to find out if you have a serious problem. It s important to watch for any new symptoms or symptoms that get worse. Follow up with your healthcare provider as directed.  Home care  Follow these tips to take care of yourself at home:    When your symptoms are better, go back to your usual activities.    If you smoke, you should stop. Join a quit-smoking program or ask your healthcare provider for help.    Eat a healthy diet and get plenty of sleep.    Get regular exercise. Talk with your healthcare provider before starting to exercise, especially if you have other medical problems.    Cut down on the amount of caffeine and stimulants you consume.  Follow-up care  Follow up with your healthcare provider, or as advised.  If tests were done, you will be told if your treatment needs to be changed. You can call as directed for the results.  (Note:  If an X-ray was taken, a specialist will review it. You will be notified of any new findings that may affect your care.)  Call 911 or get immediate medical care  Shortness of breath may be a sign of a serious medical problem. For example, it may be a problem with your heart or lungs. Call 911 if you have worsening shortness of breath or trouble breathing, especially with any of the symptoms below:    You are confused or it s difficult to wake you.    You faint or lose consciousness.    You have a fast heartbeat, or your heartbeat is irregular.    You are coughing up blood.    You have pain in your chest, arm, shoulder, neck, or upper back.    You break out in a sweat.  When to seek medical advice  Call your healthcare provider right away if any of these occur:    Slight shortness of breath or wheezing    Redness, pain or swelling in your leg, arm, or other body area    Swelling in both legs or ankles    Fast weight gain    Dizziness or weakness    Fever of 100.4 F (38 C) or higher, or as directed by your healthcare provider  Date Last Reviewed: 9/13/2015 2000-2017 [employer]. [address] [place] [address]. All rights reserved. This information is not intended as a substitute for professional medical care. Always follow your healthcare professional's instructions.

## 2018-04-04 NOTE — ED NOTES
PT states she has had multiple episodes of shortness of breath with a rapid HR since having a sinus infection a few weeks ago. States she becomes so short of breath she has had to sit on the bumper of a truck or simply stop walking. Clinic offered to send her for a stress echo but pt did not feel she could wait safely that long.

## 2018-04-04 NOTE — MR AVS SNAPSHOT
After Visit Summary   4/4/2018    Marva Angela    MRN: [id number]           Patient Information     Date Of Birth          1950        Visit Information        Provider Department      4/4/2018 1:15 PM Robert Stephen MD [facility]        Today's Diagnoses     POSADAS (dyspnea on exertion)    -  1    Coronary artery disease involving native coronary artery of native heart without angina pectoris           Follow-ups after your visit        Additional Services     Follow-Up with Cardiac Advanced Practice Provider                 Your next 10 appointments already scheduled     Apr 10, 2018  3:00 PM CDT   Ech Stress Test with [id number]   [facility] CV Echocardiography ([facility])    [address] [address] [phone]           1. Please bring or wear a comfortable two-piece outfit and walking shoes. 2. Stop eating 3 hours before the test. You may drink water or juice. 3. Stop all caffeine 12 hours before the test. This includes coffee, tea, soda pop, chocolate and certain medicines (such as Anacin and Excederin). Also avoid decaf coffee and tea, as these contain small amounts of caffeine. 4. No alcohol, smoking or use of other tobacco products for 12 hours before the test. 5. Refer to your provider instructions to see if you need to stop any medications (such as beta-blockers or nitrates) for this test. 6. For patients with diabetes: - If you take insulin, call your diabetes care team. Ask if you should take a   dose the morning of your test. - If you take diabetes medicine by mouth, dont take it on the morning of your test. Bring it with you to take after the test. (If you have questions, call your diabetes care team) 7. When you arrive, please tell us if: - You have diabetes. - You have taken Viagra, Cialis or Levitra in the past 48 hours. 8. For any questions that  cannot be answered, please contact the ordering physician            Apr 18, 2018  9:30 AM CDT   Return Visit with Rufina Palacios PA-C   [facility]   [place] ([facility])    [id number]  [place] [address] [phone] OPT 2              Future tests that were ordered for you today     Open Future Orders        Priority Expected Expires Ordered    Follow-Up with Cardiac Advanced Practice Provider Routine 5/4/2018 4/4/2019 4/4/2018    Exercise Stress Echocardiogram Routine 4/11/2018 4/4/2019 4/4/2018            Who to contact     If you have questions or need follow up information about today's clinic visit or your schedule please contact [facility] directly at [phone].  Normal or non-critical lab and imaging results will be communicated to you by [employer], letter or phone within 4 business days after the clinic has received the results. If you do not hear from us within 7 days, please contact the clinic through [employer] or phone. If you have a critical or abnormal lab result, we will notify you by phone as soon as possible.  Submit refill requests through [facility] or call your pharmacy and they will forward the refill request to us. Please allow 3 business days for your refill to be completed.          Additional Information About Your Visit        [employer] Information     [facility] gives you secure access to your electronic health record. If you see a primary care provider, you can also send messages to your care team and make appointments. If you have questions, please call your primary care clinic.  If you do not have a primary care provider, please call [phone] and they will assist you.        Care EveryWhere ID     This is your Care EveryWhere ID. This could be used by other organizations to access your [facility] medical records  [id number]        Your Vitals Were     Pulse Height Pulse Oximetry BMI  "(Body Mass Index)          91 1.651 m (5' 5\") 97% 27.46 kg/m2         Blood Pressure from Last 3 Encounters:   04/04/18 144/74   02/28/18 134/72   12/04/17 130/80    Weight from Last 3 Encounters:   04/04/18 74.8 kg (165 lb)   02/28/18 77.4 kg (170 lb 9.6 oz)   12/04/17 76.4 kg (168 lb 8 oz)              We Performed the Following     Follow-Up with Cardiologist        Primary Care Provider Office Phone # Fax #    Kendall Mendez [phone]       [address] [facility] [address]        Equal Access to Services     KHUSHBU FRIAS : Hadii jaya canaleso Hardeep, waaxda luqadaha, qaybta kaalmada adebrentonyada, jenniffer antoine . So [facility] [phone].    ATENCIÓN: Si habla español, tiene a barfield disposición servicios gratuitos de asistencia lingüística. [facility] [phone].    We comply with applicable federal civil rights laws and [place] laws. We do not discriminate on the basis of race, color, national origin, age, disability, sex, sexual orientation, or gender identity.            Thank you!     Thank you for choosing [facility]  for your care. Our goal is always to provide you with excellent care. Hearing back from our patients is one way we can continue to improve our services. Please take a few minutes to complete the written survey that you may receive in the mail after your visit with us. Thank you!             Your Updated Medication List - Protect others around you: Learn how to safely use, store and throw away your medicines at www.disposemymeds.org.          This list is accurate as of 4/4/18  1:44 PM.  Always use your most recent med list.                   Brand Name Dispense Instructions for use Diagnosis    albuterol 108 (90 BASE) MCG/ACT Inhaler    PROAIR HFA/PROVENTIL HFA/VENTOLIN HFA    3 Inhaler    Inhale 2 puffs into the lungs every 6 hours as needed for shortness of breath / dyspnea or wheezing    Chronic " obstructive pulmonary disease, unspecified COPD type (H)       amLODIPine 2.5 MG tablet    NORVASC    90 tablet    Take 1 tablet (2.5 mg) by mouth daily    Benign essential hypertension       aspirin EC 81 MG EC tablet     30 tablet    Take 1 tablet (81 mg) by mouth daily    Coronary artery disease involving native coronary artery of native heart without angina pectoris       atorvastatin 20 MG tablet    LIPITOR    90 tablet    Take 1 tablet (20 mg) by mouth daily INDICATION: TO LOWER CHOLESTEROL AND TO HELP REDUCE RISK OF HEART ATTACK AND STROKE    Hyperlipidemia LDL goal <130       betamethasone dipropionate 0.05 % cream    DIPROSONE    45 g    Apply topically 2 times daily as needed (For foot)    Psoriasis       celecoxib 200 MG capsule    celeBREX    180 capsule    Take 1 capsule (200 mg) by mouth 2 times daily    Osteoarthritis, unspecified osteoarthritis type, unspecified site       cholecalciferol 87224 UNITS capsule    VITAMIN D3    40 capsule    Take 1 capsule (50,000 Units) by mouth every 14 days SIG: TAKE INDICATION: 1ST AND 15TH OF EACH MONTH, TO TREAT VITAMIN D DEFICIENCY    Osteopenia, unspecified location, Psoriasis with arthropathy (H)       Cyanocobalamin 5000 MCG/ML Liqd    B-12 SUPER STRENGTH    2 Bottle    Place 1 mL under the tongue every morning FOR VITAMIN B12 SUPPLEMENTATION, PLEASE PLACE UNDER THE TONGUE    Vitamin B12 deficiency (non anemic)       folic acid 1 MG tablet    FOLVITE    100 tablet    Take 1 tablet (1 mg) by mouth daily INDICATION: FOLIC ACID SUPPLEMENT    Vitamin B12 deficiency (non anemic)       hydrochlorothiazide 25 MG tablet    HYDRODIURIL    90 tablet    Take 1 tablet (25 mg) by mouth every morning    Benign essential hypertension       OYSTER CALCIUM + D 250-125 MG-UNIT per tablet   Generic drug:  calcium-vitamin D      Take 2 tablets by mouth daily        ranitidine 150 MG tablet    ZANTAC     Take 150 mg by mouth daily as needed for heartburn        spironolactone 25  MG tablet    ALDACTONE    90 tablet    Take 1 tablet (25 mg) by mouth daily    Benign essential hypertension       triamcinolone 0.1 % cream    KENALOG     Apply topically 2 times daily as needed for irritation (sores on scalp.)        TYLENOL PO      Take 1,000 mg by mouth 2 times daily as needed for mild pain or fever        Urea 20 % Crea cream      Apply topically daily        vitamin C-electrolytes 1000mg vitamin C super orange drink mix    EMERGEN-C     Take 1 packet by mouth daily Mix 1 packet in 4-6oz water.

## 2018-04-10 ENCOUNTER — HOSPITAL ENCOUNTER (OUTPATIENT)
Dept: CARDIOLOGY | Facility: CLINIC | Age: 68
Discharge: HOME | End: 2018-04-10
Attending: INTERNAL MEDICINE | Admitting: INTERNAL MEDICINE
Payer: MEDICARE

## 2018-04-10 DIAGNOSIS — R06.09 DOE (DYSPNEA ON EXERTION): ICD-10-CM

## 2018-04-10 DIAGNOSIS — I25.10 CORONARY ARTERY DISEASE INVOLVING NATIVE CORONARY ARTERY OF NATIVE HEART WITHOUT ANGINA PECTORIS: ICD-10-CM

## 2018-04-10 PROCEDURE — 93325 DOPPLER ECHO COLOR FLOW MAPG: CPT | Mod: 26 | Performed by: INTERNAL MEDICINE

## 2018-04-10 PROCEDURE — 93325 DOPPLER ECHO COLOR FLOW MAPG: CPT | Mod: TC

## 2018-04-10 PROCEDURE — 93321 DOPPLER ECHO F-UP/LMTD STD: CPT | Mod: 26 | Performed by: INTERNAL MEDICINE

## 2018-04-10 PROCEDURE — 93016 CV STRESS TEST SUPVJ ONLY: CPT | Performed by: INTERNAL MEDICINE

## 2018-04-10 PROCEDURE — 93350 STRESS TTE ONLY: CPT | Mod: 26 | Performed by: INTERNAL MEDICINE

## 2018-04-10 PROCEDURE — 93018 CV STRESS TEST I&R ONLY: CPT | Performed by: INTERNAL MEDICINE

## 2018-04-18 ENCOUNTER — DOCUMENTATION ONLY (OUTPATIENT)
Dept: CARDIOLOGY | Facility: CLINIC | Age: 68
End: 2018-04-18

## 2018-04-18 ENCOUNTER — OFFICE VISIT (OUTPATIENT)
Dept: CARDIOLOGY | Facility: CLINIC | Age: 68
End: 2018-04-18
Attending: INTERNAL MEDICINE
Payer: COMMERCIAL

## 2018-04-18 VITALS
HEART RATE: 64 BPM | SYSTOLIC BLOOD PRESSURE: 114 MMHG | HEIGHT: 65 IN | DIASTOLIC BLOOD PRESSURE: 70 MMHG | BODY MASS INDEX: 27.77 KG/M2 | WEIGHT: 166.7 LBS

## 2018-04-18 DIAGNOSIS — R06.09 DOE (DYSPNEA ON EXERTION): ICD-10-CM

## 2018-04-18 DIAGNOSIS — I25.10 CORONARY ARTERY DISEASE INVOLVING NATIVE CORONARY ARTERY OF NATIVE HEART WITHOUT ANGINA PECTORIS: ICD-10-CM

## 2018-04-18 DIAGNOSIS — I10 BENIGN ESSENTIAL HYPERTENSION: Primary | ICD-10-CM

## 2018-04-18 DIAGNOSIS — R00.2 PALPITATIONS: ICD-10-CM

## 2018-04-18 PROCEDURE — 99214 OFFICE O/P EST MOD 30 MIN: CPT | Performed by: PHYSICIAN ASSISTANT

## 2018-04-18 NOTE — LETTER
4/18/2018    Kendall Mendez MD  [address] [facility] [address]    RE: Marva ZELDA Angela       Dear Colleague,    I had the pleasure of seeing Marva Angela in the [facility].    [id number]  HPI and Plan:   See dictation    Orders this Visit:  Orders Placed This Encounter   Procedures     Follow-Up with Cardiac Advanced Practice Provider     Cardiac Event Monitor - Peds/Adult     No orders of the defined types were placed in this encounter.    There are no discontinued medications.      Encounter Diagnoses   Name Primary?     Coronary artery disease involving native coronary artery of native heart without angina pectoris      POSADAS (dyspnea on exertion)      Benign essential hypertension Yes     Palpitations        CURRENT MEDICATIONS:  Current Outpatient Prescriptions   Medication Sig Dispense Refill     Acetaminophen (TYLENOL PO) Take 1,000 mg by mouth 2 times daily as needed for mild pain or fever       albuterol (PROAIR HFA, PROVENTIL HFA, VENTOLIN HFA) 108 (90 BASE) MCG/ACT inhaler Inhale 2 puffs into the lungs every 6 hours as needed for shortness of breath / dyspnea or wheezing 3 Inhaler 1     amLODIPine (NORVASC) 2.5 MG tablet Take 1 tablet (2.5 mg) by mouth daily 90 tablet 3     aspirin EC 81 MG tablet Take 1 tablet (81 mg) by mouth daily 30 tablet 11     atorvastatin (LIPITOR) 20 MG tablet Take 1 tablet (20 mg) by mouth daily INDICATION: TO LOWER CHOLESTEROL AND TO HELP REDUCE RISK OF HEART ATTACK AND STROKE 90 tablet 3     betamethasone dipropionate (DIPROSONE) 0.05 % cream Apply topically 2 times daily as needed (For foot) 45 g prn     calcium-vitamin D (OYSTER CALCIUM + D) 250-125 MG-UNIT per tablet Take 2 tablets by mouth daily       celecoxib (CELEBREX) 200 MG capsule Take 1 capsule (200 mg) by mouth 2 times daily (Patient taking differently: Take 200 mg by mouth 2 times daily as needed ) 180 capsule 3     cholecalciferol (VITAMIN D3) 34996 UNITS capsule  "Take 1 capsule (50,000 Units) by mouth every 14 days SIG: TAKE INDICATION: 1ST AND 15TH OF EACH MONTH, TO TREAT VITAMIN D DEFICIENCY 40 capsule 0     Cyanocobalamin (B-12 SUPER STRENGTH) 5000 MCG/ML LIQD Place 1 mL under the tongue every morning FOR VITAMIN B12 SUPPLEMENTATION, PLEASE PLACE UNDER THE TONGUE 2 Bottle PRN     folic acid (FOLVITE) 1 MG tablet Take 1 tablet (1 mg) by mouth daily INDICATION: FOLIC ACID SUPPLEMENT 100 tablet 3     hydrochlorothiazide (HYDRODIURIL) 25 MG tablet Take 1 tablet (25 mg) by mouth every morning 90 tablet 3     ranitidine (ZANTAC) 150 MG tablet Take 150 mg by mouth daily as needed for heartburn        spironolactone (ALDACTONE) 25 MG tablet Take 1 tablet (25 mg) by mouth daily 90 tablet 3     triamcinolone (KENALOG) 0.1 % cream Apply topically 2 times daily as needed for irritation (sores on scalp.)       Urea 20 % CREA cream Apply topically daily        vitamin C-electrolytes (EMERGEN-C) 1000mg vitamin C super orange drink mix Take 1 packet by mouth daily Mix 1 packet in 4-6oz water.         ALLERGIES     Allergies   Allergen Reactions     Spiriva Handihaler Other (See Comments)     Felt like the med \"paralyzed\" her diaphragm.  Had increased difficulty taking deep breath     Penicillins Rash     rash     Sulfa Drugs Rash     rash       PAST MEDICAL HISTORY:  Past Medical History:   Diagnosis Date     Arthritis     knees and hips     Benign essential hypertension     was on amlodipine/ then metoprolol / add lisinopril      Contact dermatitis and other eczema, due to unspecified cause      COPD (chronic obstructive pulmonary disease) (H) 01/01/2012     Diabetes (H)     pre diabetic     Hyperlipidemia LDL goal <130 11/7/2012     Nephritis      as a child (post strep)      Osteoporosis, unspecified      Phlebitis and thrombophlebitis of other deep vessels of lower extremities 1972, 1975    Both with pregnancies     Sinusitis      Tobacco use disorder        PAST SURGICAL " HISTORY:  Past Surgical History:   Procedure Laterality Date     ARTHROPLASTY HIP Right 4/3/2017    Procedure: ARTHROPLASTY HIP;  Surgeon: Francisco J Alston MD;  Location: [facility] OR     ARTHROPLASTY KNEE Left 7/31/2017    Procedure: ARTHROPLASTY KNEE;  LEFT TOTAL KNEE ARTHROPLASTY ;  Surgeon: Francisco J Alston MD;  Location: [facility] OR     BIOPSY OF SKIN LESION       C DEXA, BONE DENSITY, AXIAL SKEL  6/2008    T score lumbar -0.6, femoral neck -2.4/-2.0(all stable)     C VAGINAL HYSTERECTOMY  1978    Hysterectomy, Vaginal     HC ASPIRATION &/OR INJECTION GANGLION CYST, ANY  1994     HERNIORRHAPHY INGUINAL Right 3/24/2015    Procedure: HERNIORRHAPHY INGUINAL;  Surgeon: Sam Erazo MD;  Location: [facility]     HYSTERECTOMY, PAP NO LONGER INDICATED       ORTHOPEDIC SURGERY      bilat meniscus repair       FAMILY HISTORY:  Family History   Problem Relation Age of Onset     C.A.D. Father      DIABETES Father      type 2     Myocardial Infarction Father      Heart Surgery Father      CABGx4     Hyperlipidemia Father      Breast Cancer Mother 70     OSTEOPOROSIS Mother      Thyroid Disease Mother      Hyperlipidemia Mother      CANCER Brother      Bladder     Hyperlipidemia Brother      DIABETES Brother      type 2     Colon Cancer Brother      Neurologic Disorder Paternal Grandfather      OSTEOPOROSIS Sister      Arrhythmia Sister      Heart Surgery Sister      cardioversion, ablation     Other - See Comments Sister 55     arhythmogenic right ventricular dysplasia     OSTEOPOROSIS Maternal Grandmother      OSTEOPOROSIS Paternal Grandmother      Other - See Comments Maternal Grandfather      pneumonia     Family History Negative Son      Thyroid Disease Daughter      NODULES     Family History Negative Daughter      Arrhythmia Cousin      a-fib     Cancer - colorectal No family hx of      Prostate Cancer No family hx of      Alzheimer Disease No family hx of      Anesthesia Reaction No family hx of       Blood Disease No family hx of      Eye Disorder No family hx of        SOCIAL HISTORY:  Social History     Social History     Marital status: Single     Spouse name: N/A     Number of children: 2     Years of education: 18     Occupational History     Mental health therapist [employer]     Social History Main Topics     Smoking status: Former Smoker     Packs/day: 1.00     Years: 10.00     Types: Cigarettes     Start date: 2/7/1964     Quit date: 9/7/2015     Smokeless tobacco: Never Used      Comment: 5-10 daily     Alcohol use 0.0 oz/week     0 Standard drinks or equivalent per week      Comment: 1 a month     Drug use: No     Sexual activity: No     Other Topics Concern      Service No     Blood Transfusions No     Caffeine Concern No     1 cup coffee a day, 1-2 can's of pop a wk in the summer      Occupational Exposure No     Hobby Hazards No     Sleep Concern No     Stress Concern No     Weight Concern No     Special Diet No     Back Care No     Exercise No     not due to knee's and hip     Bike Helmet No     n/a     Seat Belt Yes     Self-Exams Yes     sometimes     Parent/Sibling W/ Cabg, Mi Or Angioplasty Before 65f 55m? No     Social History Narrative    Eats fruits and vegetables every day. She takes a calcium supplement twice daily.    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ina            Review of Systems:  Skin:  Negative     Eyes:  Positive for glasses  ENT:  Negative    Respiratory:  Positive for dyspnea on exertion;shortness of breath  Cardiovascular:  Negative;palpitations;chest pain dizziness;lightheadedness;fatigue;Positive for  Gastroenterology: Negative excessive gas or bloating;abdominal pain  Genitourinary:  Negative urinary frequency  Musculoskeletal:  Positive for joint pain  Neurologic:  Negative headaches  Psychiatric:  Positive for sleep disturbances  Heme/Lymph/Imm:  Negative night sweats  Endocrine:  Negative      Physical Exam:  Vitals: /70  Pulse 64   "Ht 1.651 m (5' 5\")  Wt 75.6 kg (166 lb 11.2 oz)  BMI 27.74 kg/m2   Please refer to dictation for physical exam    Recent Lab Results:  LIPID RESULTS:  Lab Results   Component Value Date    CHOL 172 03/19/2018    HDL 56 03/19/2018    LDL 99 03/19/2018    TRIG 85 03/19/2018    CHOLHDLRATIO 4.2 09/18/2015       LIVER ENZYME RESULTS:  Lab Results   Component Value Date    AST 19 07/10/2017    ALT 28 03/19/2018       CBC RESULTS:  Lab Results   Component Value Date    WBC 8.8 04/04/2018    RBC 4.95 04/04/2018    HGB 15.2 04/04/2018    HCT 44.2 04/04/2018    MCV 89 04/04/2018    MCH 30.7 04/04/2018    MCHC 34.4 04/04/2018    RDW 12.7 04/04/2018     04/04/2018       BMP RESULTS:  Lab Results   Component Value Date     04/04/2018    POTASSIUM 3.7 04/04/2018    CHLORIDE 99 04/04/2018    CO2 33 (H) 04/04/2018    ANIONGAP 5 04/04/2018     (H) 04/04/2018    BUN 20 04/04/2018    CR 0.86 04/04/2018    GFRESTIMATED 65 04/04/2018    GFRESTBLACK 79 04/04/2018    SANGEETA 9.9 04/04/2018        A1C RESULTS:  Lab Results   Component Value Date    A1C 6.4 (H) 03/21/2017       INR RESULTS:  Lab Results   Component Value Date    INR 2.1 (A) 08/31/2017    INR 2.8 (A) 08/22/2017    INR 1.07 08/03/2017    INR 1.20 (H) 04/05/2017           CC  Robert Stephen MD  [address]  [place] [address]        Thank you for allowing me to participate in the care of your patient.      Sincerely,     Rufina Palacios PA-C     [facility]    cc:   Robert Stephen MD  [address]  [place] [address]        "

## 2018-04-18 NOTE — PROGRESS NOTES
[id number]  HPI and Plan:   See dictation    Orders this Visit:  Orders Placed This Encounter   Procedures     Follow-Up with Cardiac Advanced Practice Provider     Cardiac Event Monitor - Peds/Adult     No orders of the defined types were placed in this encounter.    There are no discontinued medications.      Encounter Diagnoses   Name Primary?     Coronary artery disease involving native coronary artery of native heart without angina pectoris      POSADAS (dyspnea on exertion)      Benign essential hypertension Yes     Palpitations        CURRENT MEDICATIONS:  Current Outpatient Prescriptions   Medication Sig Dispense Refill     Acetaminophen (TYLENOL PO) Take 1,000 mg by mouth 2 times daily as needed for mild pain or fever       albuterol (PROAIR HFA, PROVENTIL HFA, VENTOLIN HFA) 108 (90 BASE) MCG/ACT inhaler Inhale 2 puffs into the lungs every 6 hours as needed for shortness of breath / dyspnea or wheezing 3 Inhaler 1     amLODIPine (NORVASC) 2.5 MG tablet Take 1 tablet (2.5 mg) by mouth daily 90 tablet 3     aspirin EC 81 MG tablet Take 1 tablet (81 mg) by mouth daily 30 tablet 11     atorvastatin (LIPITOR) 20 MG tablet Take 1 tablet (20 mg) by mouth daily INDICATION: TO LOWER CHOLESTEROL AND TO HELP REDUCE RISK OF HEART ATTACK AND STROKE 90 tablet 3     betamethasone dipropionate (DIPROSONE) 0.05 % cream Apply topically 2 times daily as needed (For foot) 45 g prn     calcium-vitamin D (OYSTER CALCIUM + D) 250-125 MG-UNIT per tablet Take 2 tablets by mouth daily       celecoxib (CELEBREX) 200 MG capsule Take 1 capsule (200 mg) by mouth 2 times daily (Patient taking differently: Take 200 mg by mouth 2 times daily as needed ) 180 capsule 3     cholecalciferol (VITAMIN D3) 59339 UNITS capsule Take 1 capsule (50,000 Units) by mouth every 14 days SIG: TAKE INDICATION: 1ST AND 15TH OF EACH MONTH, TO TREAT VITAMIN D DEFICIENCY 40 capsule 0     Cyanocobalamin (B-12 SUPER STRENGTH) 5000 MCG/ML LIQD Place 1 mL under the tongue  "every morning FOR VITAMIN B12 SUPPLEMENTATION, PLEASE PLACE UNDER THE TONGUE 2 Bottle PRN     folic acid (FOLVITE) 1 MG tablet Take 1 tablet (1 mg) by mouth daily INDICATION: FOLIC ACID SUPPLEMENT 100 tablet 3     hydrochlorothiazide (HYDRODIURIL) 25 MG tablet Take 1 tablet (25 mg) by mouth every morning 90 tablet 3     ranitidine (ZANTAC) 150 MG tablet Take 150 mg by mouth daily as needed for heartburn        spironolactone (ALDACTONE) 25 MG tablet Take 1 tablet (25 mg) by mouth daily 90 tablet 3     triamcinolone (KENALOG) 0.1 % cream Apply topically 2 times daily as needed for irritation (sores on scalp.)       Urea 20 % CREA cream Apply topically daily        vitamin C-electrolytes (EMERGEN-C) 1000mg vitamin C super orange drink mix Take 1 packet by mouth daily Mix 1 packet in 4-6oz water.         ALLERGIES     Allergies   Allergen Reactions     Spiriva Handihaler Other (See Comments)     Felt like the med \"paralyzed\" her diaphragm.  Had increased difficulty taking deep breath     Penicillins Rash     rash     Sulfa Drugs Rash     rash       PAST MEDICAL HISTORY:  Past Medical History:   Diagnosis Date     Arthritis     knees and hips     Benign essential hypertension     was on amlodipine/ then metoprolol / add lisinopril      Contact dermatitis and other eczema, due to unspecified cause      COPD (chronic obstructive pulmonary disease) (H) 01/01/2012     Diabetes (H)     pre diabetic     Hyperlipidemia LDL goal <130 11/7/2012     Nephritis      as a child (post strep)      Osteoporosis, unspecified      Phlebitis and thrombophlebitis of other deep vessels of lower extremities 1972, 1975    Both with pregnancies     Sinusitis      Tobacco use disorder        PAST SURGICAL HISTORY:  Past Surgical History:   Procedure Laterality Date     ARTHROPLASTY HIP Right 4/3/2017    Procedure: ARTHROPLASTY HIP;  Surgeon: Francisco J Alston MD;  Location:  OR     ARTHROPLASTY KNEE Left 7/31/2017    Procedure: " ARTHROPLASTY KNEE;  LEFT TOTAL KNEE ARTHROPLASTY ;  Surgeon: Francisco J Alston MD;  Location: [facility] OR     BIOPSY OF SKIN LESION       C DEXA, BONE DENSITY, AXIAL SKEL  6/2008    T score lumbar -0.6, femoral neck -2.4/-2.0(all stable)     C VAGINAL HYSTERECTOMY  1978    Hysterectomy, Vaginal     HC ASPIRATION &/OR INJECTION GANGLION CYST, ANY  1994     HERNIORRHAPHY INGUINAL Right 3/24/2015    Procedure: HERNIORRHAPHY INGUINAL;  Surgeon: Sam Erazo MD;  Location: [facility]     HYSTERECTOMY, PAP NO LONGER INDICATED       ORTHOPEDIC SURGERY      bilat meniscus repair       FAMILY HISTORY:  Family History   Problem Relation Age of Onset     C.A.D. Father      DIABETES Father      type 2     Myocardial Infarction Father      Heart Surgery Father      CABGx4     Hyperlipidemia Father      Breast Cancer Mother 70     OSTEOPOROSIS Mother      Thyroid Disease Mother      Hyperlipidemia Mother      CANCER Brother      Bladder     Hyperlipidemia Brother      DIABETES Brother      type 2     Colon Cancer Brother      Neurologic Disorder Paternal Grandfather      OSTEOPOROSIS Sister      Arrhythmia Sister      Heart Surgery Sister      cardioversion, ablation     Other - See Comments Sister 55     arhythmogenic right ventricular dysplasia     OSTEOPOROSIS Maternal Grandmother      OSTEOPOROSIS Paternal Grandmother      Other - See Comments Maternal Grandfather      pneumonia     Family History Negative Son      Thyroid Disease Daughter      NODULES     Family History Negative Daughter      Arrhythmia Cousin      a-fib     Cancer - colorectal No family hx of      Prostate Cancer No family hx of      Alzheimer Disease No family hx of      Anesthesia Reaction No family hx of      Blood Disease No family hx of      Eye Disorder No family hx of        SOCIAL HISTORY:  Social History     Social History     Marital status: Single     Spouse name: N/A     Number of children: 2     Years of education: 18     Occupational  "History     Mental health therapist [employer]     Social History Main Topics     Smoking status: Former Smoker     Packs/day: 1.00     Years: 10.00     Types: Cigarettes     Start date: 2/7/1964     Quit date: 9/7/2015     Smokeless tobacco: Never Used      Comment: 5-10 daily     Alcohol use 0.0 oz/week     0 Standard drinks or equivalent per week      Comment: 1 a month     Drug use: No     Sexual activity: No     Other Topics Concern      Service No     Blood Transfusions No     Caffeine Concern No     1 cup coffee a day, 1-2 can's of pop a wk in the summer      Occupational Exposure No     Hobby Hazards No     Sleep Concern No     Stress Concern No     Weight Concern No     Special Diet No     Back Care No     Exercise No     not due to knee's and hip     Bike Helmet No     n/a     Seat Belt Yes     Self-Exams Yes     sometimes     Parent/Sibling W/ Cabg, Mi Or Angioplasty Before 65f 55m? No     Social History Narrative    Eats fruits and vegetables every day. She takes a calcium supplement twice daily.    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            Review of Systems:  Skin:  Negative     Eyes:  Positive for glasses  ENT:  Negative    Respiratory:  Positive for dyspnea on exertion;shortness of breath  Cardiovascular:  Negative;palpitations;chest pain dizziness;lightheadedness;fatigue;Positive for  Gastroenterology: Negative excessive gas or bloating;abdominal pain  Genitourinary:  Negative urinary frequency  Musculoskeletal:  Positive for joint pain  Neurologic:  Negative headaches  Psychiatric:  Positive for sleep disturbances  Heme/Lymph/Imm:  Negative night sweats  Endocrine:  Negative      Physical Exam:  Vitals: /70  Pulse 64  Ht 1.651 m (5' 5\")  Wt 75.6 kg (166 lb 11.2 oz)  BMI 27.74 kg/m2   Please refer to dictation for physical exam    Recent Lab Results:  LIPID RESULTS:  Lab Results   Component Value Date    CHOL 172 03/19/2018    HDL 56 03/19/2018    LDL 99 " 03/19/2018    TRIG 85 03/19/2018    CHOLHDLRATIO 4.2 09/18/2015       LIVER ENZYME RESULTS:  Lab Results   Component Value Date    AST 19 07/10/2017    ALT 28 03/19/2018       CBC RESULTS:  Lab Results   Component Value Date    WBC 8.8 04/04/2018    RBC 4.95 04/04/2018    HGB 15.2 04/04/2018    HCT 44.2 04/04/2018    MCV 89 04/04/2018    MCH 30.7 04/04/2018    MCHC 34.4 04/04/2018    RDW 12.7 04/04/2018     04/04/2018       BMP RESULTS:  Lab Results   Component Value Date     04/04/2018    POTASSIUM 3.7 04/04/2018    CHLORIDE 99 04/04/2018    CO2 33 (H) 04/04/2018    ANIONGAP 5 04/04/2018     (H) 04/04/2018    BUN 20 04/04/2018    CR 0.86 04/04/2018    GFRESTIMATED 65 04/04/2018    GFRESTBLACK 79 04/04/2018    SANGEETA 9.9 04/04/2018        A1C RESULTS:  Lab Results   Component Value Date    A1C 6.4 (H) 03/21/2017       INR RESULTS:  Lab Results   Component Value Date    INR 2.1 (A) 08/31/2017    INR 2.8 (A) 08/22/2017    INR 1.07 08/03/2017    INR 1.20 (H) 04/05/2017           CC  Robert Stephen MD  [address]  [place] [address]

## 2018-04-18 NOTE — LETTER
[facility] HEART [facility]  [address]  [place] [id number] [phone]          April 18, 2018    RE:  Marva Angela                                                                                                                                                       [address] [id number]            Dear Delta:    Marva Angela is under my professional care for her heart condition.  She's recently developed some significant heart issues and should not travel until we have a better diagnosis and treatment.  Please provide her with refund or credit for her travel to [place] scheduled for May.       Please do not hesitate to call with questions or concerns.          Sincerely,        Rufina Palacios PA-C  [facility]  Phone: [phone]

## 2018-04-18 NOTE — LETTER
2018      Kendall Mendez MD  [address] [facility] [address]      RE: Marva Angela       Dear Colleague,    I had the pleasure of seeing Marva Angela in the [facility].    Service Date: 2018      PRIMARY CARDIOLOGIST:  Dr. Fountain.  Will be seeing Dr. Stephen when Dr. Fountain retires.      REASON FOR VISIT:  Hypertension, dyspnea on exertion, palpitations, followup.      HISTORY OF PRESENT ILLNESS:  Ms. Angela is a delightful 67-year-old woman with past medical history significant for the followin.  Hypertension with intolerance to several medications.   2.  History of tobacco quit.   3.  COPD.   4.  Dyslipidemia.   5.  Calcification of the coronary arteries and thoracic aorta on CT of chest.        We have been following her for her dyslipidemia and hypertension, and she had done well with that.  Unfortunately, she had a rough time this winter.  She developed an upper respiratory infection in late February.  By the beginning of March, she had gone to her primary, had gotten prednisone and antibiotics and things seemed to get better.  Unfortunately, in the weeks after that she developed significant dyspnea on exertion that was profound, so much so that she felt she could not do anything.  She also describes palpitations where her heart is pounding out of her chest.  These last a variety amount of time, from just a few minutes to up to 30 minutes.  She feels nauseous with these and often woozy and cannot catch her breath.  She was seen by Dr. Stephen, and at that point she complained more of chest pain and shortness of breath and he sent her for a stress echocardiogram.  Unfortunately, after leaving that visit, she felt so badly walking just to the skyway from our office that she presented to the ER.  There she had a negative troponin, and her monitor showed just PACs and PVCs, and her symptoms resolved.  They did not do an EKG at that time.  Chest  x-ray was clear.  Other labs were normal.      Since that time, she says she has overall felt better.  It was that week or so leading up to those visits that she felt extremely poorly.  Where last year she could walk quite a ways, now she was unable to walk even 1 block.  She did her stress test.  This was a normal stress echo with good augmentation of her LV with exercise and reasonable blood pressure.  She has overall been feeling well.  She has palpitations at baseline, and she differentiates these from one of the more severe episodes.  She has not had true syncope.  She actually was able to snowblow her driveway this week with 18 inches of snow and did okay doing that.  She cannot figure out anything that brings on these palpitations.      FAMILY HISTORY:  Significant for a sister who was recently diagnosed with arrhythmogenic right ventricular dysplasia and 2 cousins diagnosed with IHSS, which we believe is hypertrophic cardiomyopathy.  The patient does not have a personal history of AFib.      SOCIAL HISTORY:  She is a retired , former smoker, half pack a day, quit in 2015.  Occasional alcohol use.  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  She walks several times a week.      PHYSICAL EXAMINATION:   GENERAL:  Well-developed, well-nourished woman in no acute distress.   HEENT:  Normocephalic, atraumatic.   HEART:  Regular with a few early beats consistent with PACs or PVCs but no long runs.  She has no murmur, rub or gallop.   LUNGS:  Clear.  Good airflow deep bilateral lobes.   EXTREMITIES:  No significant edema.   SKIN:  Warm and dry.      ASSESSMENT AND PLAN:   1.  Palpitations and severe dyspnea on exertion that often go hand in hand.  This sounds like an arrhythmia situation.  It is possible she is going into AFib or A-flutter sporadically that is causing the profound dyspnea on exertion.  She did note at one point while she was taking her  blood pressure when this happened that it was elevated, and her pulse was in the 120s as well.  We are going to have her wear an event monitor to see if we can catch one of these episodes and determine what is going on.  Of course, treatment will be based on the diagnosis.  She will do a 30-day event monitor.  If per chance she has 1 week of bad palpitations, she can send that in sooner and we will see her back sooner.  Otherwise, we will see her back after event monitor.   2.  Hypertension, well-controlled.   3.  Calcification of the coronaries and thoracic aorta on CT but normal nuclear stress test last year as well as a stress echo just recently.   4.  Dyslipidemia, reasonable control, given no significant coronary artery disease and LDL 99, HDL of 56 and total cholesterol of 172.  I am hoping this will improve as she exercises more.      We will follow her up in about 3-4 weeks depending on when her event monitor is done and go from there.  If we are concerned about significant arrhythmia, we may want to consider cardiac MRI given her family history.      Thank you for allowing me to participate in this delightful patient's care.      ZIGGY Herrera PA-C             D: 2018   T: 2018   MT: JHONNY      Name:     NORBERTO IBRAHIM   MRN:      [id number]        Account:      [id number]   :      1950           Service Date: 2018      Document: [id number]         Outpatient Encounter Prescriptions as of 2018   Medication Sig Dispense Refill     Acetaminophen (TYLENOL PO) Take 1,000 mg by mouth 2 times daily as needed for mild pain or fever       albuterol (PROAIR HFA, PROVENTIL HFA, VENTOLIN HFA) 108 (90 BASE) MCG/ACT inhaler Inhale 2 puffs into the lungs every 6 hours as needed for shortness of breath / dyspnea or wheezing 3 Inhaler 1     amLODIPine (NORVASC) 2.5 MG tablet Take 1 tablet (2.5 mg) by mouth daily 90 tablet 3     aspirin EC 81 MG tablet Take  1 tablet (81 mg) by mouth daily 30 tablet 11     atorvastatin (LIPITOR) 20 MG tablet Take 1 tablet (20 mg) by mouth daily INDICATION: TO LOWER CHOLESTEROL AND TO HELP REDUCE RISK OF HEART ATTACK AND STROKE 90 tablet 3     betamethasone dipropionate (DIPROSONE) 0.05 % cream Apply topically 2 times daily as needed (For foot) 45 g prn     calcium-vitamin D (OYSTER CALCIUM + D) 250-125 MG-UNIT per tablet Take 2 tablets by mouth daily       celecoxib (CELEBREX) 200 MG capsule Take 1 capsule (200 mg) by mouth 2 times daily (Patient taking differently: Take 200 mg by mouth 2 times daily as needed ) 180 capsule 3     cholecalciferol (VITAMIN D3) 98804 UNITS capsule Take 1 capsule (50,000 Units) by mouth every 14 days SIG: TAKE INDICATION: 1ST AND 15TH OF EACH MONTH, TO TREAT VITAMIN D DEFICIENCY 40 capsule 0     Cyanocobalamin (B-12 SUPER STRENGTH) 5000 MCG/ML LIQD Place 1 mL under the tongue every morning FOR VITAMIN B12 SUPPLEMENTATION, PLEASE PLACE UNDER THE TONGUE 2 Bottle PRN     folic acid (FOLVITE) 1 MG tablet Take 1 tablet (1 mg) by mouth daily INDICATION: FOLIC ACID SUPPLEMENT 100 tablet 3     hydrochlorothiazide (HYDRODIURIL) 25 MG tablet Take 1 tablet (25 mg) by mouth every morning 90 tablet 3     ranitidine (ZANTAC) 150 MG tablet Take 150 mg by mouth daily as needed for heartburn        spironolactone (ALDACTONE) 25 MG tablet Take 1 tablet (25 mg) by mouth daily 90 tablet 3     triamcinolone (KENALOG) 0.1 % cream Apply topically 2 times daily as needed for irritation (sores on scalp.)       Urea 20 % CREA cream Apply topically daily        vitamin C-electrolytes (EMERGEN-C) 1000mg vitamin C super orange drink mix Take 1 packet by mouth daily Mix 1 packet in 4-6oz water.       No facility-administered encounter medications on file as of 4/18/2018.        Again, thank you for allowing me to participate in the care of your patient.      Sincerely,    Rufina Palacios PA-C     [facility]  Care

## 2018-04-18 NOTE — PROGRESS NOTES
Service Date: 2018      PRIMARY CARDIOLOGIST:  Dr. Fountain.  Will be seeing Dr. Stephen when Dr. Fountain retires.      REASON FOR VISIT:  Hypertension, dyspnea on exertion, palpitations, followup.      HISTORY OF PRESENT ILLNESS:  Ms. Angela is a delightful 67-year-old woman with past medical history significant for the followin.  Hypertension with intolerance to several medications.   2.  History of tobacco quit.   3.  COPD.   4.  Dyslipidemia.   5.  Calcification of the coronary arteries and thoracic aorta on CT of chest.        We have been following her for her dyslipidemia and hypertension, and she had done well with that.  Unfortunately, she had a rough time this winter.  She developed an upper respiratory infection in late February.  By the beginning of March, she had gone to her primary, had gotten prednisone and antibiotics and things seemed to get better.  Unfortunately, in the weeks after that she developed significant dyspnea on exertion that was profound, so much so that she felt she could not do anything.  She also describes palpitations where her heart is pounding out of her chest.  These last a variety amount of time, from just a few minutes to up to 30 minutes.  She feels nauseous with these and often woozy and cannot catch her breath.  She was seen by Dr. Stephen, and at that point she complained more of chest pain and shortness of breath and he sent her for a stress echocardiogram.  Unfortunately, after leaving that visit, she felt so badly walking just to the skyway from our office that she presented to the ER.  There she had a negative troponin, and her monitor showed just PACs and PVCs, and her symptoms resolved.  They did not do an EKG at that time.  Chest x-ray was clear.  Other labs were normal.      Since that time, she says she has overall felt better.  It was that week or so leading up to those visits that she felt extremely poorly.  Where last year she could walk quite a ways, now  she was unable to walk even 1 block.  She did her stress test.  This was a normal stress echo with good augmentation of her LV with exercise and reasonable blood pressure.  She has overall been feeling well.  She has palpitations at baseline, and she differentiates these from one of the more severe episodes.  She has not had true syncope.  She actually was able to snowblow her driveway this week with 18 inches of snow and did okay doing that.  She cannot figure out anything that brings on these palpitations.      FAMILY HISTORY:  Significant for a sister who was recently diagnosed with arrhythmogenic right ventricular dysplasia and 2 cousins diagnosed with IHSS, which we believe is hypertrophic cardiomyopathy.  The patient does not have a personal history of AFib.      SOCIAL HISTORY:  She is a retired , former smoker, half pack a day, quit in 2015.  Occasional alcohol use.  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  She walks several times a week.      PHYSICAL EXAMINATION:   GENERAL:  Well-developed, well-nourished woman in no acute distress.   HEENT:  Normocephalic, atraumatic.   HEART:  Regular with a few early beats consistent with PACs or PVCs but no long runs.  She has no murmur, rub or gallop.   LUNGS:  Clear.  Good airflow deep bilateral lobes.   EXTREMITIES:  No significant edema.   SKIN:  Warm and dry.      ASSESSMENT AND PLAN:   1.  Palpitations and severe dyspnea on exertion that often go hand in hand.  This sounds like an arrhythmia situation.  It is possible she is going into AFib or A-flutter sporadically that is causing the profound dyspnea on exertion.  She did note at one point while she was taking her blood pressure when this happened that it was elevated, and her pulse was in the 120s as well.  We are going to have her wear an event monitor to see if we can catch one of these episodes and determine what is going on.  Of course,  treatment will be based on the diagnosis.  She will do a 30-day event monitor.  If per chance she has 1 week of bad palpitations, she can send that in sooner and we will see her back sooner.  Otherwise, we will see her back after event monitor.   2.  Hypertension, well-controlled.   3.  Calcification of the coronaries and thoracic aorta on CT but normal nuclear stress test last year as well as a stress echo just recently.   4.  Dyslipidemia, reasonable control, given no significant coronary artery disease and LDL 99, HDL of 56 and total cholesterol of 172.  I am hoping this will improve as she exercises more.      We will follow her up in about 3-4 weeks depending on when her event monitor is done and go from there.  If we are concerned about significant arrhythmia, we may want to consider cardiac MRI given her family history.      Thank you for allowing me to participate in this delightful patient's care.      ZIGGY Herrera PA-C             D: 2018   T: 2018   MT: JHONNY      Name:     NORBERTO IBRAHIM   MRN:      [id number]        Account:      [id number]   :      1950           Service Date: 2018      Document: [id number]

## 2018-04-18 NOTE — PROGRESS NOTES
Notified by clinical support staff that patient returned and reported that she was having some palpitations as she was leaving the clinic from her visit with Rufina More this morning. Saw patient and took vital signs with results below. Reviewed plan per Rufina for a 30-day cardiac event monitor. Once this is set up, advised that she push the button on the monitor any time she experiences these symptoms. We will keep her updated if any arrhythmias are noted during the monitoring period and go from there. Her symptoms improved and she felt comfortable with this plan. PEYMAN Alan RN     Blood pressure 123/73, pulse 67, and oxygen saturation of 98% on room air.

## 2018-04-18 NOTE — MR AVS SNAPSHOT
After Visit Summary   4/18/2018    Marva Angela    MRN: [id number]           Patient Information     Date Of Birth          1950        Visit Information        Provider Department      4/18/2018 9:30 AM More, Rufina Gr PA-C [facility]   [place]        Today's Diagnoses     Benign essential hypertension    -  1    Coronary artery disease involving native coronary artery of native heart without angina pectoris        POSADAS (dyspnea on exertion)        Palpitations          Care Instructions    Thanks for coming into [facility] clinic today.    We discussed: I think your episodes are likely due to an irregular heart rhythm.  We'll try to catch them on a monitor.  You can push the button on the monitor if you are short of breath or if you feel the fluttering.  We'll decide what to do based on what we find.    If you have lots of symptoms during the first week of wearing it, you can send it back in.  We'll have enough information.      Medication changes:  Continue current medications, we'll adjust once we know what's going on.      Results: Stress test looks good.      Follow up: in about 4-5 weeks.    Please call my nurse at [phone] with any questions or concerns.    Scheduling phone number: [phone]  Reminder: Please bring in all current medications, over the counter supplements and vitamin bottles to your next appointment.          Follow-ups after your visit        Additional Services     Follow-Up with Cardiac Advanced Practice Provider                 Future tests that were ordered for you today     Open Future Orders        Priority Expected Expires Ordered    Follow-Up with Cardiac Advanced Practice Provider Routine 5/18/2018 4/18/2019 4/18/2018    Cardiac Event Monitor - Peds/Adult Routine 4/25/2018 4/18/2019 4/18/2018            Who to contact     If you have questions or need follow up information about today's clinic  "visit or your schedule please contact [facility] directly at [phone].  Normal or non-critical lab and imaging results will be communicated to you by MyChart, letter or phone within 4 business days after the clinic has received the results. If you do not hear from us within 7 days, please contact the clinic through [employer] or phone. If you have a critical or abnormal lab result, we will notify you by phone as soon as possible.  Submit refill requests through [employer] or call your pharmacy and they will forward the refill request to us. Please allow 3 business days for your refill to be completed.          Additional Information About Your Visit        MyChart Information     [employer] gives you secure access to your electronic health record. If you see a primary care provider, you can also send messages to your care team and make appointments. If you have questions, please call your primary care clinic.  If you do not have a primary care provider, please call [phone] and they will assist you.        Care EveryWhere ID     This is your Care EveryWhere ID. This could be used by other organizations to access your [facility] medical records  [id number]        Your Vitals Were     Pulse Height BMI (Body Mass Index)             64 1.651 m (5' 5\") 27.74 kg/m2          Blood Pressure from Last 3 Encounters:   04/18/18 114/70   04/04/18 110/65   04/04/18 144/74    Weight from Last 3 Encounters:   04/18/18 75.6 kg (166 lb 11.2 oz)   04/04/18 74.8 kg (165 lb)   02/28/18 77.4 kg (170 lb 9.6 oz)              We Performed the Following     Follow-Up with Cardiac Advanced Practice Provider          Today's Medication Changes          These changes are accurate as of 4/18/18 10:14 AM.  If you have any questions, ask your nurse or doctor.               These medicines have changed or have updated prescriptions.        Dose/Directions    celecoxib 200 MG capsule   Commonly known as:  " celeBREX   This may have changed:    - when to take this  - reasons to take this   Used for:  Osteoarthritis, unspecified osteoarthritis type, unspecified site        Dose:  200 mg   Take 1 capsule (200 mg) by mouth 2 times daily   Quantity:  180 capsule   Refills:  3                Primary Care Provider Office Phone # Fax #    Kendall Mendez [phone]       [address] [facility] [address]        Equal Access to Services     KHUSHBU FRIAS : Hadii aad ku hadasho Soomaali, waaxda luqadaha, qaybta kaalmada adeegyada, waxay idiin hayaan chevy batistamelissabita antoine . So [facility] [phone].    ATENCIÓN: Si habla español, tiene a barfield disposición servicios gratuitos de asistencia lingüística. [place] al [phone].    We comply with applicable federal civil rights laws and [place] laws. We do not discriminate on the basis of race, color, national origin, age, disability, sex, sexual orientation, or gender identity.            Thank you!     Thank you for choosing [facility]  for your care. Our goal is always to provide you with excellent care. Hearing back from our patients is one way we can continue to improve our services. Please take a few minutes to complete the written survey that you may receive in the mail after your visit with us. Thank you!             Your Updated Medication List - Protect others around you: Learn how to safely use, store and throw away your medicines at www.disposemymeds.org.          This list is accurate as of 4/18/18 10:14 AM.  Always use your most recent med list.                   Brand Name Dispense Instructions for use Diagnosis    albuterol 108 (90 Base) MCG/ACT Inhaler    PROAIR HFA/PROVENTIL HFA/VENTOLIN HFA    3 Inhaler    Inhale 2 puffs into the lungs every 6 hours as needed for shortness of breath / dyspnea or wheezing    Chronic obstructive pulmonary disease, unspecified COPD type (H)       amLODIPine 2.5 MG tablet     NORVASC    90 tablet    Take 1 tablet (2.5 mg) by mouth daily    Benign essential hypertension       aspirin EC 81 MG EC tablet     30 tablet    Take 1 tablet (81 mg) by mouth daily    Coronary artery disease involving native coronary artery of native heart without angina pectoris       atorvastatin 20 MG tablet    LIPITOR    90 tablet    Take 1 tablet (20 mg) by mouth daily INDICATION: TO LOWER CHOLESTEROL AND TO HELP REDUCE RISK OF HEART ATTACK AND STROKE    Hyperlipidemia LDL goal <130       betamethasone dipropionate 0.05 % cream    DIPROSONE    45 g    Apply topically 2 times daily as needed (For foot)    Psoriasis       celecoxib 200 MG capsule    celeBREX    180 capsule    Take 1 capsule (200 mg) by mouth 2 times daily    Osteoarthritis, unspecified osteoarthritis type, unspecified site       cholecalciferol 60398 units capsule    VITAMIN D3    40 capsule    Take 1 capsule (50,000 Units) by mouth every 14 days SIG: TAKE INDICATION: 1ST AND 15TH OF EACH MONTH, TO TREAT VITAMIN D DEFICIENCY    Osteopenia, unspecified location, Psoriasis with arthropathy (H)       Cyanocobalamin 5000 MCG/ML Liqd    B-12 SUPER STRENGTH    2 Bottle    Place 1 mL under the tongue every morning FOR VITAMIN B12 SUPPLEMENTATION, PLEASE PLACE UNDER THE TONGUE    Vitamin B12 deficiency (non anemic)       folic acid 1 MG tablet    FOLVITE    100 tablet    Take 1 tablet (1 mg) by mouth daily INDICATION: FOLIC ACID SUPPLEMENT    Vitamin B12 deficiency (non anemic)       hydrochlorothiazide 25 MG tablet    HYDRODIURIL    90 tablet    Take 1 tablet (25 mg) by mouth every morning    Benign essential hypertension       OYSTER CALCIUM + D 250-125 MG-UNIT per tablet   Generic drug:  calcium-vitamin D      Take 2 tablets by mouth daily        ranitidine 150 MG tablet    ZANTAC     Take 150 mg by mouth daily as needed for heartburn        spironolactone 25 MG tablet    ALDACTONE    90 tablet    Take 1 tablet (25 mg) by mouth daily    Benign  essential hypertension       triamcinolone 0.1 % cream    KENALOG     Apply topically 2 times daily as needed for irritation (sores on scalp.)        TYLENOL PO      Take 1,000 mg by mouth 2 times daily as needed for mild pain or fever        Urea 20 % Crea cream      Apply topically daily        vitamin C-electrolytes 1000mg vitamin C super orange drink mix    EMERGEN-C     Take 1 packet by mouth daily Mix 1 packet in 4-6oz water.

## 2018-04-18 NOTE — PATIENT INSTRUCTIONS
Thanks for coming into [facility] clinic today.    We discussed: I think your episodes are likely due to an irregular heart rhythm.  We'll try to catch them on a monitor.  You can push the button on the monitor if you are short of breath or if you feel the fluttering.  We'll decide what to do based on what we find.    If you have lots of symptoms during the first week of wearing it, you can send it back in.  We'll have enough information.      Medication changes:  Continue current medications, we'll adjust once we know what's going on.      Results: Stress test looks good.      Follow up: in about 4-5 weeks.    Please call my nurse at [phone] with any questions or concerns.    Scheduling phone number: [phone]  Reminder: Please bring in all current medications, over the counter supplements and vitamin bottles to your next appointment.

## 2018-04-20 ENCOUNTER — HOSPITAL ENCOUNTER (OUTPATIENT)
Dept: CARDIOLOGY | Facility: CLINIC | Age: 68
Discharge: HOME | End: 2018-04-20
Attending: PHYSICIAN ASSISTANT | Admitting: PHYSICIAN ASSISTANT
Payer: MEDICARE

## 2018-04-20 DIAGNOSIS — R00.2 PALPITATIONS: ICD-10-CM

## 2018-04-20 PROCEDURE — 93272 ECG/REVIEW INTERPRET ONLY: CPT | Performed by: INTERNAL MEDICINE

## 2018-04-20 PROCEDURE — 93270 REMOTE 30 DAY ECG REV/REPORT: CPT

## 2018-04-23 ENCOUNTER — OFFICE VISIT (OUTPATIENT)
Dept: INTERNAL MEDICINE | Facility: CLINIC | Age: 68
End: 2018-04-23
Payer: COMMERCIAL

## 2018-04-23 VITALS
OXYGEN SATURATION: 98 % | WEIGHT: 165.3 LBS | RESPIRATION RATE: 22 BRPM | TEMPERATURE: 97.8 F | SYSTOLIC BLOOD PRESSURE: 134 MMHG | BODY MASS INDEX: 27.51 KG/M2 | DIASTOLIC BLOOD PRESSURE: 80 MMHG | HEART RATE: 82 BPM

## 2018-04-23 DIAGNOSIS — J44.1 COPD EXACERBATION (H): ICD-10-CM

## 2018-04-23 DIAGNOSIS — J43.9 PULMONARY EMPHYSEMA, UNSPECIFIED EMPHYSEMA TYPE (H): Primary | ICD-10-CM

## 2018-04-23 PROCEDURE — 99214 OFFICE O/P EST MOD 30 MIN: CPT | Performed by: INTERNAL MEDICINE

## 2018-04-23 RX ORDER — TIOTROPIUM BROMIDE 18 UG/1
CAPSULE ORAL; RESPIRATORY (INHALATION)
Qty: 30 CAPSULE | Refills: 2 | Status: SHIPPED | OUTPATIENT
Start: 2018-04-23 | End: 2018-06-19

## 2018-04-23 RX ORDER — PREDNISONE 10 MG/1
TABLET ORAL
Qty: 30 TABLET | Refills: 0 | Status: SHIPPED | OUTPATIENT
Start: 2018-04-23 | End: 2019-02-18

## 2018-04-23 RX ORDER — ALBUTEROL SULFATE 0.83 MG/ML
1 SOLUTION RESPIRATORY (INHALATION) EVERY 6 HOURS PRN
Qty: 1 BOX | Refills: 11 | Status: SHIPPED | OUTPATIENT
Start: 2018-04-23 | End: 2019-06-17

## 2018-04-23 NOTE — PROGRESS NOTES
SUBJECTIVE:   Marva Angela is a 67 year old female who presents to clinic today for the following health issues:      COPD Follow-Up    Symptoms are currently: much worse. Feels like she cannot catch full breath, cannot climb stairs, can only walk 1/2 block which is much less than before.    Current fatigue or dyspnea with ambulation: worsened from baseline    Shortness of breath: slightly worsened    Increased or change in Cough/Sputum: No    Fever(s): No    Baseline ambulation without stopping to rest:  1/2  blocks. Able to walk up 0 flights of stairs without stopping to rest.    Any ER/UC or hospital admissions since your last visit? Yes - ER/UC - ED 4/4/18    History   Smoking Status     Former Smoker     Packs/day: 1.00     Years: 10.00     Types: Cigarettes     Start date: 2/7/1964     Quit date: 9/7/2015   Smokeless Tobacco     Never Used     Comment: 5-10 daily     No results found for: FEV1, [id number]    Amount of exercise or physical activity: None    Problems taking medications regularly: No    Medication side effects: none    Diet: reg            Problem list and histories reviewed & adjusted, as indicated.  Additional history: as documented        Reviewed and updated as needed this visit by clinical staff  Allergies       Reviewed and updated as needed this visit by Provider           Past Medical History:  ---------------------------  Past Medical History:   Diagnosis Date     Arthritis     knees and hips     Benign essential hypertension     was on amlodipine/ then metoprolol / add lisinopril      Contact dermatitis and other eczema, due to unspecified cause      COPD (chronic obstructive pulmonary disease) (H) 01/01/2012     Diabetes (H)     pre diabetic     Hyperlipidemia LDL goal <130 11/7/2012     Nephritis      as a child (post strep)      Osteoporosis, unspecified      Phlebitis and thrombophlebitis of other deep vessels of lower extremities 1972, 1975    Both with pregnancies      Sinusitis      Tobacco use disorder        Past Surgical History:  ---------------------------  Past Surgical History:   Procedure Laterality Date     ARTHROPLASTY HIP Right 4/3/2017    Procedure: ARTHROPLASTY HIP;  Surgeon: Francisco J Alston MD;  Location: [facility] OR     ARTHROPLASTY KNEE Left 7/31/2017    Procedure: ARTHROPLASTY KNEE;  LEFT TOTAL KNEE ARTHROPLASTY ;  Surgeon: Francisco J Alston MD;  Location: [facility] OR     BIOPSY OF SKIN LESION       C DEXA, BONE DENSITY, AXIAL SKEL  6/2008    T score lumbar -0.6, femoral neck -2.4/-2.0(all stable)     C VAGINAL HYSTERECTOMY  1978    Hysterectomy, Vaginal     HC ASPIRATION &/OR INJECTION GANGLION CYST, ANY  1994     HERNIORRHAPHY INGUINAL Right 3/24/2015    Procedure: HERNIORRHAPHY INGUINAL;  Surgeon: Sam Erazo MD;  Location:  [facility]     HYSTERECTOMY, PAP NO LONGER INDICATED       ORTHOPEDIC SURGERY      bilat meniscus repair       Current Medications:  ---------------------------  Current Outpatient Prescriptions   Medication Sig Dispense Refill     Acetaminophen (TYLENOL PO) Take 1,000 mg by mouth 2 times daily as needed for mild pain or fever       albuterol (PROAIR HFA, PROVENTIL HFA, VENTOLIN HFA) 108 (90 BASE) MCG/ACT inhaler Inhale 2 puffs into the lungs every 6 hours as needed for shortness of breath / dyspnea or wheezing 3 Inhaler 1     amLODIPine (NORVASC) 2.5 MG tablet Take 1 tablet (2.5 mg) by mouth daily 90 tablet 3     aspirin EC 81 MG tablet Take 1 tablet (81 mg) by mouth daily 30 tablet 11     atorvastatin (LIPITOR) 20 MG tablet Take 1 tablet (20 mg) by mouth daily INDICATION: TO LOWER CHOLESTEROL AND TO HELP REDUCE RISK OF HEART ATTACK AND STROKE 90 tablet 3     betamethasone dipropionate (DIPROSONE) 0.05 % cream Apply topically 2 times daily as needed (For foot) 45 g prn     calcium-vitamin D (OYSTER CALCIUM + D) 250-125 MG-UNIT per tablet Take 2 tablets by mouth daily       celecoxib (CELEBREX) 200 MG capsule Take 1 capsule  "(200 mg) by mouth 2 times daily (Patient taking differently: Take 200 mg by mouth 2 times daily as needed ) 180 capsule 3     cholecalciferol (VITAMIN D3) 54811 UNITS capsule Take 1 capsule (50,000 Units) by mouth every 14 days SIG: TAKE INDICATION: 1ST AND 15TH OF EACH MONTH, TO TREAT VITAMIN D DEFICIENCY 40 capsule 0     Cyanocobalamin (B-12 SUPER STRENGTH) 5000 MCG/ML LIQD Place 1 mL under the tongue every morning FOR VITAMIN B12 SUPPLEMENTATION, PLEASE PLACE UNDER THE TONGUE 2 Bottle PRN     folic acid (FOLVITE) 1 MG tablet Take 1 tablet (1 mg) by mouth daily INDICATION: FOLIC ACID SUPPLEMENT 100 tablet 3     hydrochlorothiazide (HYDRODIURIL) 25 MG tablet Take 1 tablet (25 mg) by mouth every morning 90 tablet 3     ranitidine (ZANTAC) 150 MG tablet Take 150 mg by mouth daily as needed for heartburn        spironolactone (ALDACTONE) 25 MG tablet Take 1 tablet (25 mg) by mouth daily 90 tablet 3     triamcinolone (KENALOG) 0.1 % cream Apply topically 2 times daily as needed for irritation (sores on scalp.)       Urea 20 % CREA cream Apply topically daily        vitamin C-electrolytes (EMERGEN-C) 1000mg vitamin C super orange drink mix Take 1 packet by mouth daily Mix 1 packet in 4-6oz water.         Allergies:  -------------  Allergies   Allergen Reactions     Spiriva Handihaler Other (See Comments)     Felt like the med \"paralyzed\" her diaphragm.  Had increased difficulty taking deep breath     Penicillins Rash     rash     Sulfa Drugs Rash     rash       Social History:  -------------------  Social History     Social History     Marital status: Single     Spouse name: N/A     Number of children: 2     Years of education: 18     Occupational History     Mental health therapist [employer]     Social History Main Topics     Smoking status: Former Smoker     Packs/day: 1.00     Years: 10.00     Types: Cigarettes     Start date: 2/7/1964     Quit date: 9/7/2015     Smokeless tobacco: Never Used      " Comment: 5-10 daily     Alcohol use 0.0 oz/week     0 Standard drinks or equivalent per week      Comment: 1 a month     Drug use: No     Sexual activity: No     Other Topics Concern      Service No     Blood Transfusions No     Caffeine Concern No     1 cup coffee a day, 1-2 can's of pop a wk in the summer      Occupational Exposure No     Hobby Hazards No     Sleep Concern No     Stress Concern No     Weight Concern No     Special Diet No     Back Care No     Exercise No     not due to knee's and hip     Bike Helmet No     n/a     Seat Belt Yes     Self-Exams Yes     sometimes     Parent/Sibling W/ Cabg, Mi Or Angioplasty Before 65f 55m? No     Social History Narrative    Eats fruits and vegetables every day. She takes a calcium supplement twice daily.    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            Family Medical History:  ------------------------------  Family History   Problem Relation Age of Onset     C.A.D. Father      DIABETES Father      type 2     Myocardial Infarction Father      Heart Surgery Father      CABGx4     Hyperlipidemia Father      Breast Cancer Mother 70     OSTEOPOROSIS Mother      Thyroid Disease Mother      Hyperlipidemia Mother      CANCER Brother      Bladder     Hyperlipidemia Brother      DIABETES Brother      type 2     Colon Cancer Brother      Neurologic Disorder Paternal Grandfather      OSTEOPOROSIS Sister      Arrhythmia Sister      Heart Surgery Sister      cardioversion, ablation     Other - See Comments Sister 55     arhythmogenic right ventricular dysplasia     OSTEOPOROSIS Maternal Grandmother      OSTEOPOROSIS Paternal Grandmother      Other - See Comments Maternal Grandfather      pneumonia     Family History Negative Son      Thyroid Disease Daughter      NODULES     Family History Negative Daughter      Arrhythmia Cousin      a-fib     Cancer - colorectal No family hx of      Prostate Cancer No family hx of      Alzheimer Disease No family hx of       Anesthesia Reaction No family hx of      Blood Disease No family hx of      Eye Disorder No family hx of          ROS:  REVIEW OF SYSTEMS:    RESP: positive for cough, dyspnea, wheezing; negative for hemoptysis   CV: negative for chest pain, palpitations, PND, orthopnea  GI: negative for dysphagia, N/V, pain, melena, diarrhea and constipation  NEURO: negative for numbness/tingling, paralysis, incoordination, or focal weakness     OBJECTIVE:                                                    /80  Pulse 82  Temp 97.8  F (36.6  C) (Oral)  Resp 22  Wt 165 lb 4.8 oz (75 kg)  SpO2 98%  BMI 27.51 kg/m2     GENERAL alert and no distress  EYES:  Normal sclera,conjunctiva, EOMI  HENT: oral and posterior pharynx without lesions or erythema, facies symmetric  NECK: Neck supple. No LAD, without thyroidmegaly or JVD., Carotids without bruits.  RESP: Scattered mild rhonci in all lung fields, no focal rales, scattered mild end expiraotry wheezes with forced expiration .  CV: RRR normal S1S2 without murmurs, rubs or gallops. PMI normal  LYMPH: no cervical lymph adenopathy appreciated  MS: extremities- no gross deformities of the visible extremities noted, no edema  PSYCH: Alert and oriented times 3; speech- coherent  SKIN:  No obvious significant skin lesions on visible portions of face           ASSESSMENT/PLAN:                                                      (J43.9) Pulmonary emphysema, unspecified emphysema type (H)  (primary encounter diagnosis)  Comment: COPD exacerbation  Was told to being spiriva last visit but she did not do so.   Will need to use this everydya to help prevent future excaerbation and matinain better lung function  Discussed general issues of COPD, including pathophysiology, ways it will affect the pt., when to seek help, reviewed the typical medications (how they are taken, how they help)   Plan: albuterol (2.5 MG/3ML) 0.083% neb solution,         order for DME, tiotropium (SPIRIVA  HANDIHALER)         18 MCG capsule            (J44.1) COPD exacerbation (H)  Comment: Pt appears to be having bronchospasm. Discussed issues of bronchial disease/bronchospasm.    Recommended symptomatic treatment with bronchodilator (albuterol) as needed.  Due to the degree of inflammation present in exam, Prednisone was indicated.  Antibiotics are not indicated based on history and exam findings today.  Continue to treat any congestion as needed with decongestants, any allergic components with nonsedating antihistamine.  If sx. recur or worsen, may need more chronic/regular medication such as Advair or similar.  Patient was instructed to contact us if there is any worsening, changes in symptoms, or no improvement.     Plan: predniSONE (DELTASONE) 10 MG tablet, albuterol         (2.5 MG/3ML) 0.083% neb solution               See Patient Instructions    YADIRA ESPINOSA M.D., MD  [facility]

## 2018-04-23 NOTE — MR AVS SNAPSHOT
"              After Visit Summary   4/23/2018    Marva Angela    MRN: [id number]           Patient Information     Date Of Birth          1950        Visit Information        Provider Department      4/23/2018 1:00 PM Kendall Mendez MD [facility]        Today's Diagnoses     Pulmonary emphysema, unspecified emphysema type (H)    -  1    COPD exacerbation (H)          Care Instructions    COPD EXACERBATION (Post infectious Bronchospasm) (wheezing):  ===============================================      *  Bronchospasm is irritation of the airways that causes them to spasm and temporarily \"narrow\" which causes coughing (usually minimal sputum production), shortness of breath, dyspnea on exertion, wheezing.  Your airways will be more reactive to things such as temperature changes (too cold, too hot, too humid, etc.), activity, pollutants such as dander, smoke, air pollution, etc.  This will get better with time, but will produce lingering symptoms as described above for a couple weeks to a couple of months.     *  Antibiotics not indicated    *  Due to the degree of inflammation inside the airways, take an oral steroid over the next 12 days.      --Prednisone 40 mg once per day for 3 days, then 30 mg per day for 3 days, then 20 mg per day for 3 days, then 10 mg per day for 3 days, then stop.     *  Due to the prior history of COPD and general airway irritibility       --Start using Tiotropium (generic Spiriva) inhaler again.  Take ONE capsule per day.   This will help prevent your lungs from becoming as easily bothered.     (I do not see any evidence for an actual allergy to this medication.)    *  Albuterol, either through nebulizer (1 vial every 6 hours as needed) or the inhaler (2 puffs every 4-6 hours as needed)      Consider using this inhaler before any known triggers for our coughing or wheezing (usually these include cold or hot temperatures, humidity, dust, air " pollutants, smoke).      *  Expect that your airways may remain more easily irritated for a few weeks after upper respiratory infections.       *  Cough suppressant Delsym, follow directions on bottle    *  Mucinex extended release, one or two tablets twice per day for the next 5-7 days.  This helps loosen the secretions to they can be passed more easily out of the body.     *  Be sure to drink lots of fluids.  If the appetite and intake of food is way down, then at leat try to eat soup.  Dehydration is the thing that causes people to end up in the hospital with viral infections and the flu.     *  For sore throat:  Try lozenges (Cepostat, Cepacol, hard candies, Zinc lozenges, etc)    *  If you have thick secretions:  Mucinex extended release twice per day on a regular basis for the next few days, then twice per day as needed.  OK to take the regular Mucinex, you may just have to take it 2-3 times per day.      *  If you have dry nasal passages or frequent bloody noses during the winter time:  Saline nasal spray as often as needed for dry nose.     *  If you have a lot of congestion and/or runny noses:  OK to try decongestants as needed (e.g. pseudoephedrine or phenylephrine).  Be sure to take the lowest dose needed.  Take decongestants from only ONE source.  It is possible to inadvertantly take more than the recommended amounts if you take decongestants from multiple products (i.e. Do NOT take Mucinex-D and Claritin-D together)    *  If you have been told to NOT take decongestants or if you cannot tolerate decongestants due to effect on blood pressure, sleep or heart rate:  Try Coricidin HBP or Chlortrimeton (chlorpheniramine).  These can have a similar effect as some decongestants but will not affect your heart rate or blood pressure.      *  If you have SEVERE nasal congestion:  Affrin nasal spray as needed for severe nasal congestion (especially before the airplane trip), but do not use for more than one week.       *  Tylenol as needed for any headaches of low grade temperatures.     *  Ibuprofen as needed for body aches, fevers, headaches    *  Call the clinic if any changes in the symptoms such as worsening fevers, or the amount and quality of the sputum changes, or if you have any major difficulties breathing, or the symptoms fail to clear for a few weeks.    *  Most upper respiratory infection will clear 1-2 weeks, but it is not uncommon for post viral coughs to last for several weeks, even rarely the entire winter.        BRONCHOSPASM (AIRWAY SPASM):              Follow-ups after your visit        Your next 10 appointments already scheduled     May 23, 2018  9:30 AM CDT   Return Visit with Rufina Palacios PA-C   [facility]    [address] [id number]  [facility] [address] [phone] OPT 2              Who to contact     If you have questions or need follow up information about today's clinic visit or your schedule please contact [facility] directly at [phone].  Normal or non-critical lab and imaging results will be communicated to you by MyChart, letter or phone within 4 business days after the clinic has received the results. If you do not hear from us within 7 days, please contact the clinic through [employer] or phone. If you have a critical or abnormal lab result, we will notify you by phone as soon as possible.  Submit refill requests through [employer] or call your pharmacy and they will forward the refill request to us. Please allow 3 business days for your refill to be completed.          Additional Information About Your Visit        [employer] Information     [employer] gives you secure access to your electronic health record. If you see a primary care provider, you can also send messages to your care team and make appointments. If you have questions, please call your primary care clinic.  If you do not have a  primary care provider, please call [phone] and they will assist you.        Care EveryWhere ID     This is your Care EveryWhere ID. This could be used by other organizations to access your [place] medical records  [id number]        Your Vitals Were     Pulse Temperature Respirations Pulse Oximetry BMI (Body Mass Index)       82 97.8  F (36.6  C) (Oral) 22 98% 27.51 kg/m2        Blood Pressure from Last 3 Encounters:   04/23/18 134/80   04/18/18 114/70   04/04/18 110/65    Weight from Last 3 Encounters:   04/23/18 165 lb 4.8 oz (75 kg)   04/18/18 166 lb 11.2 oz (75.6 kg)   04/04/18 165 lb (74.8 kg)              Today, you had the following     No orders found for display         Today's Medication Changes          These changes are accurate as of 4/23/18  2:13 PM.  If you have any questions, ask your nurse or doctor.               Start taking these medicines.        Dose/Directions    order for DME   Used for:  Pulmonary emphysema, unspecified emphysema type (H)   Started by:  Kendall Mendez MD        Equipment being ordered: Nebulizer   Quantity:  1 each   Refills:  0       predniSONE 10 MG tablet   Commonly known as:  DELTASONE   Used for:  COPD exacerbation (H)   Started by:  Kendall Mendez MD        40mg qday x3 day, then 30mg qday x 3 day, then 20mg qday x 3 day, then 10mg qday x 3 day, then D/C   Quantity:  30 tablet   Refills:  0       tiotropium 18 MCG capsule   Commonly known as:  SPIRIVA HANDIHALER   Used for:  Pulmonary emphysema, unspecified emphysema type (H)   Started by:  Kendall Mendez MD        Inhale contents of one capsule daily.   Quantity:  30 capsule   Refills:  2         These medicines have changed or have updated prescriptions.        Dose/Directions    * albuterol 108 (90 Base) MCG/ACT Inhaler   Commonly known as:  PROAIR HFA/PROVENTIL HFA/VENTOLIN HFA   This may have changed:  Another medication with the same name was added. Make sure you understand  how and when to take each.   Used for:  Chronic obstructive pulmonary disease, unspecified COPD type (H)   Changed by:  Kendall Mendez MD        Dose:  2 puff   Inhale 2 puffs into the lungs every 6 hours as needed for shortness of breath / dyspnea or wheezing   Quantity:  3 Inhaler   Refills:  1       * albuterol (2.5 MG/3ML) 0.083% neb solution   This may have changed:  You were already taking a medication with the same name, and this prescription was added. Make sure you understand how and when to take each.   Used for:  Pulmonary emphysema, unspecified emphysema type (H), COPD exacerbation (H)   Changed by:  Kendall Mendez MD        Dose:  1 vial   Take 1 vial (2.5 mg) by nebulization every 6 hours as needed for shortness of breath / dyspnea or wheezing   Quantity:  1 Box   Refills:  11       celecoxib 200 MG capsule   Commonly known as:  celeBREX   This may have changed:    - when to take this  - reasons to take this   Used for:  Osteoarthritis, unspecified osteoarthritis type, unspecified site        Dose:  200 mg   Take 1 capsule (200 mg) by mouth 2 times daily   Quantity:  180 capsule   Refills:  3       * Notice:  This list has 2 medication(s) that are the same as other medications prescribed for you. Read the directions carefully, and ask your doctor or other care provider to review them with you.         Where to get your medicines      These medications were sent to [facility] [address] [address]     Phone:  [phone]     albuterol (2.5 MG/3ML) 0.083% neb solution    predniSONE 10 MG tablet         Some of these will need a paper prescription and others can be bought over the counter.  Ask your nurse if you have questions.     Bring a paper prescription for each of these medications     order for DME    tiotropium 18 MCG capsule                Primary Care Provider Office Phone # Fax #    Kendall Dunlap  MD Andrea [phone]       [address] [facility] [address]        Equal Access to Services     KHUSHBU FRIAS : Ruddy jaya afllon yolette Meier, waaylada lisalucina, edenta kaangelicada truman, jenniffer hallclarisa andres. So [facility] [phone].    ATENCIÓN: Si habla español, tiene a barfield disposición servicios gratuitos de asistencia lingüística. Llame al [phone].    We comply with applicable federal civil rights laws and [place] laws. We do not discriminate on the basis of race, color, national origin, age, disability, sex, sexual orientation, or gender identity.            Thank you!     Thank you for choosing [facility][1]  for your care. Our goal is always to provide you with excellent care. Hearing back from our patients is one way we can continue to improve our services. Please take a few minutes to complete the written survey that you may receive in the mail after your visit with us. Thank you!             Your Updated Medication List - Protect others around you: Learn how to safely use, store and throw away your medicines at www.disposemymeds.org.          This list is accurate as of 4/23/18  2:13 PM.  Always use your most recent med list.                   Brand Name Dispense Instructions for use Diagnosis    * albuterol 108 (90 Base) MCG/ACT Inhaler    PROAIR HFA/PROVENTIL HFA/VENTOLIN HFA    3 Inhaler    Inhale 2 puffs into the lungs every 6 hours as needed for shortness of breath / dyspnea or wheezing    Chronic obstructive pulmonary disease, unspecified COPD type (H)       * albuterol (2.5 MG/3ML) 0.083% neb solution     1 Box    Take 1 vial (2.5 mg) by nebulization every 6 hours as needed for shortness of breath / dyspnea or wheezing    Pulmonary emphysema, unspecified emphysema type (H), COPD exacerbation (H)       amLODIPine 2.5 MG tablet    NORVASC    90 tablet    Take 1 tablet (2.5 mg) by mouth daily    Benign essential hypertension       aspirin EC  81 MG EC tablet     30 tablet    Take 1 tablet (81 mg) by mouth daily    Coronary artery disease involving native coronary artery of native heart without angina pectoris       atorvastatin 20 MG tablet    LIPITOR    90 tablet    Take 1 tablet (20 mg) by mouth daily INDICATION: TO LOWER CHOLESTEROL AND TO HELP REDUCE RISK OF HEART ATTACK AND STROKE    Hyperlipidemia LDL goal <130       betamethasone dipropionate 0.05 % cream    DIPROSONE    45 g    Apply topically 2 times daily as needed (For foot)    Psoriasis       celecoxib 200 MG capsule    celeBREX    180 capsule    Take 1 capsule (200 mg) by mouth 2 times daily    Osteoarthritis, unspecified osteoarthritis type, unspecified site       cholecalciferol 20077 units capsule    VITAMIN D3    40 capsule    Take 1 capsule (50,000 Units) by mouth every 14 days SIG: TAKE INDICATION: 1ST AND 15TH OF EACH MONTH, TO TREAT VITAMIN D DEFICIENCY    Osteopenia, unspecified location, Psoriasis with arthropathy (H)       Cyanocobalamin 5000 MCG/ML Liqd    B-12 SUPER STRENGTH    2 Bottle    Place 1 mL under the tongue every morning FOR VITAMIN B12 SUPPLEMENTATION, PLEASE PLACE UNDER THE TONGUE    Vitamin B12 deficiency (non anemic)       folic acid 1 MG tablet    FOLVITE    100 tablet    Take 1 tablet (1 mg) by mouth daily INDICATION: FOLIC ACID SUPPLEMENT    Vitamin B12 deficiency (non anemic)       hydrochlorothiazide 25 MG tablet    HYDRODIURIL    90 tablet    Take 1 tablet (25 mg) by mouth every morning    Benign essential hypertension       order for DME     1 each    Equipment being ordered: Nebulizer    Pulmonary emphysema, unspecified emphysema type (H)       OYSTER CALCIUM + D 250-125 MG-UNIT per tablet   Generic drug:  calcium-vitamin D      Take 2 tablets by mouth daily        predniSONE 10 MG tablet    DELTASONE    30 tablet    40mg qday x3 day, then 30mg qday x 3 day, then 20mg qday x 3 day, then 10mg qday x 3 day, then D/C    COPD exacerbation (H)       ranitidine  150 MG tablet    ZANTAC     Take 150 mg by mouth daily as needed for heartburn        spironolactone 25 MG tablet    ALDACTONE    90 tablet    Take 1 tablet (25 mg) by mouth daily    Benign essential hypertension       tiotropium 18 MCG capsule    SPIRIVA HANDIHALER    30 capsule    Inhale contents of one capsule daily.    Pulmonary emphysema, unspecified emphysema type (H)       triamcinolone 0.1 % cream    KENALOG     Apply topically 2 times daily as needed for irritation (sores on scalp.)        TYLENOL PO      Take 1,000 mg by mouth 2 times daily as needed for mild pain or fever        Urea 20 % Crea cream      Apply topically daily        vitamin C-electrolytes 1000mg vitamin C super orange drink mix    EMERGEN-C     Take 1 packet by mouth daily Mix 1 packet in 4-6oz water.        * Notice:  This list has 2 medication(s) that are the same as other medications prescribed for you. Read the directions carefully, and ask your doctor or other care provider to review them with you.

## 2018-04-23 NOTE — PATIENT INSTRUCTIONS
"COPD EXACERBATION (Post infectious Bronchospasm) (wheezing):  ===============================================      *  Bronchospasm is irritation of the airways that causes them to spasm and temporarily \"narrow\" which causes coughing (usually minimal sputum production), shortness of breath, dyspnea on exertion, wheezing.  Your airways will be more reactive to things such as temperature changes (too cold, too hot, too humid, etc.), activity, pollutants such as dander, smoke, air pollution, etc.  This will get better with time, but will produce lingering symptoms as described above for a couple weeks to a couple of months.     *  Antibiotics not indicated    *  Due to the degree of inflammation inside the airways, take an oral steroid over the next 12 days.      --Prednisone 40 mg once per day for 3 days, then 30 mg per day for 3 days, then 20 mg per day for 3 days, then 10 mg per day for 3 days, then stop.     *  Due to the prior history of COPD and general airway irritibility       --Start using Tiotropium (generic Spiriva) inhaler again.  Take ONE capsule per day.   This will help prevent your lungs from becoming as easily bothered.     (I do not see any evidence for an actual allergy to this medication.)    *  Albuterol, either through nebulizer (1 vial every 6 hours as needed) or the inhaler (2 puffs every 4-6 hours as needed)      Consider using this inhaler before any known triggers for our coughing or wheezing (usually these include cold or hot temperatures, humidity, dust, air pollutants, smoke).      *  Expect that your airways may remain more easily irritated for a few weeks after upper respiratory infections.       *  Cough suppressant Delsym, follow directions on bottle    *  Mucinex extended release, one or two tablets twice per day for the next 5-7 days.  This helps loosen the secretions to they can be passed more easily out of the body.     *  Be sure to drink lots of fluids.  If the appetite and intake " of food is way down, then at leat try to eat soup.  Dehydration is the thing that causes people to end up in the hospital with viral infections and the flu.     *  For sore throat:  Try lozenges (Cepostat, Cepacol, hard candies, Zinc lozenges, etc)    *  If you have thick secretions:  Mucinex extended release twice per day on a regular basis for the next few days, then twice per day as needed.  OK to take the regular Mucinex, you may just have to take it 2-3 times per day.      *  If you have dry nasal passages or frequent bloody noses during the winter time:  Saline nasal spray as often as needed for dry nose.     *  If you have a lot of congestion and/or runny noses:  OK to try decongestants as needed (e.g. pseudoephedrine or phenylephrine).  Be sure to take the lowest dose needed.  Take decongestants from only ONE source.  It is possible to inadvertantly take more than the recommended amounts if you take decongestants from multiple products (i.e. Do NOT take Mucinex-D and Claritin-D together)    *  If you have been told to NOT take decongestants or if you cannot tolerate decongestants due to effect on blood pressure, sleep or heart rate:  Try Coricidin HBP or Chlortrimeton (chlorpheniramine).  These can have a similar effect as some decongestants but will not affect your heart rate or blood pressure.      *  If you have SEVERE nasal congestion:  Affrin nasal spray as needed for severe nasal congestion (especially before the airplane trip), but do not use for more than one week.      *  Tylenol as needed for any headaches of low grade temperatures.     *  Ibuprofen as needed for body aches, fevers, headaches    *  Call the clinic if any changes in the symptoms such as worsening fevers, or the amount and quality of the sputum changes, or if you have any major difficulties breathing, or the symptoms fail to clear for a few weeks.    *  Most upper respiratory infection will clear 1-2 weeks, but it is not uncommon for  post viral coughs to last for several weeks, even rarely the entire winter.        BRONCHOSPASM (AIRWAY SPASM):

## 2018-04-25 ENCOUNTER — DOCUMENTATION ONLY (OUTPATIENT)
Dept: CARDIOLOGY | Facility: CLINIC | Age: 68
End: 2018-04-25

## 2018-04-25 NOTE — PROGRESS NOTES
Baseline rhythm strips from 4/20/18 30 day event monitor for palpitations showing sinus at 72 bpm with one PVC. Also noted an automatic event showing sinus tachy with short burst of SVT with aberrancy vs atrial fib lasting less than 10 seconds occurred on 4/20/18 at 11:01.     Will continue to monitor. Was placed due to complaints of palpitations of heart pounding out of her chest, lasting few minutes to 30 minutes. Feels nauseous and often woozy and unable to catch her breath with these episodes. Ordered by KIRTI Herrera, with Primary cardiologist was Dr. Fountain, and is now going to be seeing Dr. Stephen as his primary.

## 2018-04-25 NOTE — PROGRESS NOTES
One rhythm strip from 4/23/18 at 07:15 showing sinus rhythm with short 4 beat run of SVT, PAC's. This was an automatic event.

## 2018-04-30 NOTE — PROGRESS NOTES
4-  2 automatic transmissions 10:09:46 Sinus Rhythm with Atrial couplet and PAC's HR 99bpm 14:24:07 Sinus Tachycardia with PAC and AF/FL less than 10 seconds   [id number] Automatic transmissions 7:13:05 Sinus Rhythm with Atrial run HR 81 bpm 7:20:18 Sinus Rhythm with atrial couplet HR 73 bpm   4- Automatic transmissions showing Sinus Bradycardia 1:25:32 HR 40bpm, 8:19:22 Sinus Rhythm with AF/FL less than 10 seconds HR 83 bpm.  4- Automatic transmission  5:50:27 Sinus Rhythm with PAC with Atrial Run  HR 60bpm will continue to monitor and will send strips to file.

## 2018-05-07 NOTE — PROGRESS NOTES
Four automatic monitoring strips from 5/1-5/5. Continuing to have short bursts of PAT. Strip from 5/4/18 at 12:38 showing an atrial run with one PVC.     Patient called. States saw PMD after her OV with Rufina Suzanne in April. He started her on a nebulizer, and inhaler and on prednisone. That was on a Friday and by the following Monday, felt 75% improved.  Is aware of short bursts of rapid or irregular heart beats and occasionally a funny feeling in her throat.  Asked her to activate the monitor when she is noting these for a couple of days, so we can corollate the feeling with the rhythm. States she is still dizzy at time.  Is currently on ASA 81 mg /day. States she has a brother and a sister with hx of atrial fib.   Plan will be to touch base in a couple of days as to discuss rhythm strips with symptoms and review with Rufina.   Has f/u OV on 5/23/18.

## 2018-05-10 NOTE — PROGRESS NOTES
Rhythm strips from 5/6-5/8/18. Showing PAC's, few junctional beats,short burst of PAT. Rhythms from 5/8 were symptom events which correlated with atrial fib/flutter runs lasting <10 seconds.   Continue to monitor.

## 2018-05-16 NOTE — PROGRESS NOTES
Symptomatic Event 5- 9:52:15 showing Sinus rhythm with PAC and PVC Activity Light, Symptoms Short of breath . HR 90bpm. Sent to [facility]

## 2018-05-17 NOTE — PROGRESS NOTES
Event monitor transmissions from 5/10 and 5/13 received and reviewed for occasional PVC, short burst of atrial beats (all less than 10 seconds).   Continue to monitor.

## 2018-05-18 NOTE — PROGRESS NOTES
Event monitor transmissions from 5/15 revealed SR with PAC'S and Atrial run, HR 90- 93 bpm, activity - none indicated, sx - automatic event.   Event monitor transmission from 5/17 revealed SR with [place] run and PAC's, HR 91 bpm, activity - none indicated, sx - automatic event. Will continue to monitor. Caitlyn Mascorro RN 3:43 PM 05/18/18

## 2018-05-23 ENCOUNTER — OFFICE VISIT (OUTPATIENT)
Dept: CARDIOLOGY | Facility: CLINIC | Age: 68
End: 2018-05-23
Attending: PHYSICIAN ASSISTANT
Payer: COMMERCIAL

## 2018-05-23 ENCOUNTER — TELEPHONE (OUTPATIENT)
Dept: CARDIOLOGY | Facility: CLINIC | Age: 68
End: 2018-05-23

## 2018-05-23 VITALS
HEIGHT: 65 IN | SYSTOLIC BLOOD PRESSURE: 124 MMHG | BODY MASS INDEX: 27.84 KG/M2 | DIASTOLIC BLOOD PRESSURE: 69 MMHG | WEIGHT: 167.1 LBS | HEART RATE: 71 BPM

## 2018-05-23 DIAGNOSIS — R06.09 DOE (DYSPNEA ON EXERTION): ICD-10-CM

## 2018-05-23 DIAGNOSIS — I48.0 PAROXYSMAL ATRIAL FIBRILLATION (H): Primary | ICD-10-CM

## 2018-05-23 DIAGNOSIS — R00.2 PALPITATIONS: ICD-10-CM

## 2018-05-23 DIAGNOSIS — I10 BENIGN ESSENTIAL HYPERTENSION: ICD-10-CM

## 2018-05-23 DIAGNOSIS — E78.5 HYPERLIPIDEMIA LDL GOAL <130: ICD-10-CM

## 2018-05-23 DIAGNOSIS — I48.0 PAROXYSMAL ATRIAL FIBRILLATION (H): ICD-10-CM

## 2018-05-23 PROCEDURE — 99214 OFFICE O/P EST MOD 30 MIN: CPT | Performed by: PHYSICIAN ASSISTANT

## 2018-05-23 RX ORDER — DILTIAZEM HYDROCHLORIDE 120 MG/1
120 TABLET, FILM COATED ORAL
Qty: 30 TABLET | Refills: 3 | Status: SHIPPED | OUTPATIENT
Start: 2018-05-23 | End: 2018-05-23 | Stop reason: DRUGHIGH

## 2018-05-23 RX ORDER — ATORVASTATIN CALCIUM 20 MG/1
20 TABLET, FILM COATED ORAL DAILY
Qty: 90 TABLET | Refills: 3 | Status: SHIPPED | OUTPATIENT
Start: 2018-05-23 | End: 2019-06-17

## 2018-05-23 RX ORDER — DILTIAZEM HYDROCHLORIDE 120 MG/1
120 CAPSULE, COATED, EXTENDED RELEASE ORAL DAILY
Qty: 90 CAPSULE | Refills: 3 | COMMUNITY
Start: 2018-05-23 | End: 2018-06-18

## 2018-05-23 NOTE — LETTER
2018      Kendall Mendez MD  [address] [facility] [address]      RE: Marva Angela       Dear Colleague,    I had the pleasure of seeing Marva Angela in the [facility].    Service Date: 2018      PRIMARY CARDIOLOGIST:  Was Dr. Fountain.  Will be Dr. Stephen upon his [facility]FP.      REASON FOR VISIT:  Palpitations, event monitor followup.      HISTORY OF PRESENT ILLNESS:  Ms. Angela is a delightful 67-year-old woman with past medical history significant for the followin.  Hypertension with intolerance to several medications.   2.  History of tobacco, now quit.   3.  COPD.   4.  Dyslipidemia.   5.  Calcification of the coronary arteries and thoracic aorta on CT of chest.        I saw her last month, as she had had a very difficult winter and after developing a respiratory infection, had profound dyspnea on exertion and intermittent palpitations.  They were often together but not necessarily.  She went for a stress echocardiogram after being seen by Dr. Stephen and that showed a normal response to exercise with occasional PVCs and PACs and a normal EF.  There are no ST changes.  At her last visit, she continued to feel poorly.  We placed an event monitor and she is here today for followup.  Unfortunately, after I saw her, she actually worsened.  She went and saw her primary several days after and was given a nebulizer immediately on entering the clinic and she said that improved her 50%.  She was started back on her Spiriva which she had not been on and that improved her even more and she now feels like she is about 80%.  She continues to feel somewhat winded but is not nearly as presyncopal.  She continues to have palpitations that last anywhere from a few seconds to up to 30 minutes.  They are often associated with shortness of breath but she is not sure which precedes the other.  She did have an event monitor done.  This showed multiple episodes  of PACs and several atrial runs of atrial fibrillation and atrial flutter.  These were all less than 10 seconds but happened daily to a couple times a day.  Apparently, her primary had told her to not keep a diary, so she did not know if she was symptomatic with all of them.  She has not had syncope or presyncope since then.  Today, she actually feels pretty well.  She denies chest pain, orthopnea or PND but continues to have occasional palpitations.  Of interest, she says her brother just called her yesterday and he has now also been diagnosed with atrial fibrillation.  Her sister has had it a long time along with the sister also having arrhythmogenic right ventricular dysplasia.      SOCIAL HISTORY:  She is a retired .  Former smoker, half pack a day, quit in 2015.  Occasional alcohol use.  She is a pickle ball player.  Looking forward to that this summer.  She walks several times a week but has not been doing that since her knee replacement and her dyspnea on exertion.      PHYSICAL EXAMINATION:   GENERAL:  Well-developed, well-nourished woman in no acute distress.   HEENT:  Normocephalic, atraumatic.   HEART:  Regular with occasional early beats consistent with PACs or PVCs.  There is no murmur, rub or gallop.   LUNGS:  Clear.  No wheezes, rales or rhonchi.   EXTREMITIES:  Without peripheral edema.   SKIN:  Warm and dry.      ASSESSMENT AND PLAN:   1.  Palpitations consistent with brief runs of atrial fibrillation and atrial flutter as well as PACs and PVCs on her event monitor.  All of these are less than 10 seconds and this monitor was worn over 1 month.  We discussed the etiology of atrial fibrillation, the pathophysiology and our goals of minimizing it, given she is symptomatic and preventing her from being tachycardic when she is in it.  Given her episodes are less than 10 seconds, anticoagulation is not indicated.  Should they become longer, will need to consider anticoagulation, as her  CHADS-VASc would be 3 for age, gender and hypertension.  At this point, we will use medication to try to settle things down.  Will start with diltiazem 120 mg a day and I will discontinue her amlodipine.  She is sensitive to medications and if she does not tolerate this, my next step would probably be verapamil, as she does have active COPD on beta-adrenergic medications.   2.  Chronic obstructive pulmonary disease/emphysema.  I encouraged her to continue on her Spiriva, as this has been helpful and likely contributes to the palpitations and worsening them if she is hypoxic or winded.  Additionally, I prefer to have her on the controlling medicine of Spiriva rather than needing a p.r.n. albuterol or nebulizer.   3.  Dyslipidemia.  Elevated LDL at last visit.  Patient prefers to continue on her current dose of Lipitor at this time.  She does not want to change too many medications at once, given her sensitivities which is fine with me.      We will see her in 6 weeks, reassess her symptoms, sooner with concerns.  Thank you for allowing me to participate in her care.         PIA BROWN PA-C             D: 2018   T: 2018   MT: XENA      Name:     NORBERTO IBRAHIM   MRN:      [id number]        Account:      [id number]   :      1950           Service Date: 2018      Document: [id number]         Outpatient Encounter Prescriptions as of 2018   Medication Sig Dispense Refill     Acetaminophen (TYLENOL PO) Take 1,000 mg by mouth 2 times daily as needed for mild pain or fever       albuterol (2.5 MG/3ML) 0.083% neb solution Take 1 vial (2.5 mg) by nebulization every 6 hours as needed for shortness of breath / dyspnea or wheezing 1 Box 11     albuterol (PROAIR HFA, PROVENTIL HFA, VENTOLIN HFA) 108 (90 BASE) MCG/ACT inhaler Inhale 2 puffs into the lungs every 6 hours as needed for shortness of breath / dyspnea or wheezing 3 Inhaler 1     aspirin EC 81 MG tablet Take 1 tablet (81 mg)  by mouth daily 30 tablet 11     atorvastatin (LIPITOR) 20 MG tablet Take 1 tablet (20 mg) by mouth daily INDICATION: TO LOWER CHOLESTEROL AND TO HELP REDUCE RISK OF HEART ATTACK AND STROKE 90 tablet 3     betamethasone dipropionate (DIPROSONE) 0.05 % cream Apply topically 2 times daily as needed (For foot) 45 g prn     calcium-vitamin D (OYSTER CALCIUM + D) 250-125 MG-UNIT per tablet Take 2 tablets by mouth daily       celecoxib (CELEBREX) 200 MG capsule Take 1 capsule (200 mg) by mouth 2 times daily (Patient taking differently: Take 200 mg by mouth 2 times daily as needed ) 180 capsule 3     cholecalciferol (VITAMIN D3) 45081 UNITS capsule Take 1 capsule (50,000 Units) by mouth every 14 days SIG: TAKE INDICATION: 1ST AND 15TH OF EACH MONTH, TO TREAT VITAMIN D DEFICIENCY 40 capsule 0     Cyanocobalamin (B-12 SUPER STRENGTH) 5000 MCG/ML LIQD Place 1 mL under the tongue every morning FOR VITAMIN B12 SUPPLEMENTATION, PLEASE PLACE UNDER THE TONGUE 2 Bottle PRN     folic acid (FOLVITE) 1 MG tablet Take 1 tablet (1 mg) by mouth daily INDICATION: FOLIC ACID SUPPLEMENT 100 tablet 3     hydrochlorothiazide (HYDRODIURIL) 25 MG tablet Take 1 tablet (25 mg) by mouth every morning 90 tablet 3     order for DME Equipment being ordered: Nebulizer 1 each 0     ranitidine (ZANTAC) 150 MG tablet Take 150 mg by mouth daily as needed for heartburn        spironolactone (ALDACTONE) 25 MG tablet Take 1 tablet (25 mg) by mouth daily 90 tablet 3     tiotropium (SPIRIVA HANDIHALER) 18 MCG capsule Inhale contents of one capsule daily. 30 capsule 2     triamcinolone (KENALOG) 0.1 % cream Apply topically 2 times daily as needed for irritation (sores on scalp.)       Urea 20 % CREA cream Apply topically daily        vitamin C-electrolytes (EMERGEN-C) 1000mg vitamin C super orange drink mix Take 1 packet by mouth daily Mix 1 packet in 4-6oz water.       [DISCONTINUED] amLODIPine (NORVASC) 2.5 MG tablet Take 1 tablet (2.5 mg) by mouth daily 90  tablet 3     [DISCONTINUED] atorvastatin (LIPITOR) 20 MG tablet Take 1 tablet (20 mg) by mouth daily INDICATION: TO LOWER CHOLESTEROL AND TO HELP REDUCE RISK OF HEART ATTACK AND STROKE 90 tablet 3     [DISCONTINUED] diltiazem (CARDIZEM) 120 MG tablet Take 1 tablet (120 mg) by mouth every morning (before breakfast) 30 tablet 3     No facility-administered encounter medications on file as of 5/23/2018.        Again, thank you for allowing me to participate in the care of your patient.      Sincerely,    Rufina Palacios PA-C     [facility]

## 2018-05-23 NOTE — TELEPHONE ENCOUNTER
Call received from Brittany with [facility] in [place] requesting clarification on a script received this morning for diltiazem. It was received for 120 mg immediate release tablets once a day so she wanted to see if this was intended to be 120 mg extended release 24 hr capsules. Reviewed with Rufina Palacios PA-C and she confirmed that it was intended to be the extended release 24 hr capsules. Update Brittany at [facility]. She stated understanding and update the script. PEYMAN Alan RN - 05/23/18, 2:38 PM

## 2018-05-23 NOTE — MR AVS SNAPSHOT
After Visit Summary   5/23/2018    Marva Angela    MRN: [id number]           Patient Information     Date Of Birth          1950        Visit Information        Provider Department      5/23/2018 9:30 AM Suzanne, Rufina Gr PA-C [facility]   [place]        Today's Diagnoses     Paroxysmal atrial fibrillation (H)    -  1    POSADAS (dyspnea on exertion)        Benign essential hypertension        Palpitations        Hyperlipidemia LDL goal <130          Care Instructions    Thanks for coming into [facility] today.    We discussed: your monitor showed very short runs of atrial fibrillation.  We'll add medications to help keep your heart in a normal rhythm.      Medication changes:  Stop taking amlodipine.   Start taking diltiazem 120 mg once a day in the morning.    Continue taking your spiriva regularly.      Follow up: with me in about 6 weeks.       Please call my nurse at [phone] with any questions or concerns.    Scheduling phone number: [phone]  Reminder: Please bring in all current medications, over the counter supplements and vitamin bottles to your next appointment.                  Follow-ups after your visit        Additional Services     Follow-Up with Cardiac Advanced Practice Provider                 Your next 10 appointments already scheduled     Jun 19, 2018  9:20 AM TARIQT   MyChart Long with Kendall Mendez [facility]   [facility]    [address] [address] [phone]              Future tests that were ordered for you today     Open Future Orders        Priority Expected Expires Ordered    Follow-Up with Cardiac Advanced Practice Provider Routine 7/4/2018 5/23/2019 5/23/2018            Who to contact     If you have questions or need follow up information about today's clinic visit or your schedule please contact  "[facility]   DIANNA directly at [phone].  Normal or non-critical lab and imaging results will be communicated to you by MyChart, letter or phone within 4 business days after the clinic has received the results. If you do not hear from us within 7 days, please contact the clinic through Informed [employer] or phone. If you have a critical or abnormal lab result, we will notify you by phone as soon as possible.  Submit refill requests through [employer] or call your pharmacy and they will forward the refill request to us. Please allow 3 business days for your refill to be completed.          Additional Information About Your Visit        Informed [employer] Information     [employer] gives you secure access to your electronic health record. If you see a primary care provider, you can also send messages to your care team and make appointments. If you have questions, please call your primary care clinic.  If you do not have a primary care provider, please call [phone] and they will assist you.        Care EveryWhere ID     This is your Care EveryWhere ID. This could be used by other organizations to access your [facility] medical records  [id number]        Your Vitals Were     Pulse Height BMI (Body Mass Index)             71 1.651 m (5' 5\") 27.81 kg/m2          Blood Pressure from Last 3 Encounters:   05/23/18 124/69   04/23/18 134/80   04/18/18 114/70    Weight from Last 3 Encounters:   05/23/18 75.8 kg (167 lb 1.6 oz)   04/23/18 75 kg (165 lb 4.8 oz)   04/18/18 75.6 kg (166 lb 11.2 oz)              We Performed the Following     Follow-Up with Cardiac Advanced Practice Provider          Today's Medication Changes          These changes are accurate as of 5/23/18 10:03 AM.  If you have any questions, ask your nurse or doctor.               Start taking these medicines.        Dose/Directions    diltiazem 120 MG tablet   Commonly known as:  CARDIZEM   Used for:  Paroxysmal atrial fibrillation (H)   Started " by:  Rufina Palacios PA-C        Dose:  120 mg   Take 1 tablet (120 mg) by mouth every morning (before breakfast)   Quantity:  30 tablet   Refills:  3         These medicines have changed or have updated prescriptions.        Dose/Directions    celecoxib 200 MG capsule   Commonly known as:  celeBREX   This may have changed:    - when to take this  - reasons to take this   Used for:  Osteoarthritis, unspecified osteoarthritis type, unspecified site        Dose:  200 mg   Take 1 capsule (200 mg) by mouth 2 times daily   Quantity:  180 capsule   Refills:  3         Stop taking these medicines if you haven't already. Please contact your care team if you have questions.     amLODIPine 2.5 MG tablet   Commonly known as:  NORVASC   Stopped by:  Rufina Palacios PA-C                Where to get your medicines      These medications were sent to [id number] - [place], [place] - [address] AT [facility] [address] [facility]  [address] [facility], [facility] [address]     Phone:  [phone]     atorvastatin 20 MG tablet    diltiazem 120 MG tablet                Primary Care Provider Office Phone # Fax #    Kendall Mendez [phone]       [address] [place] [address]        Equal Access to Services     KHUSHBU FRIAS AH: Hadii jaya ku hadasho Soomaali, waaxda luqadaha, qaybta kaalmada adeegyada, waxay idiin haysonja campos. So [facility] [phone].    ATENCIÓN: Si habla español, tiene a barfield disposición servicios gratadamos de asistencia lingüística. ame al [phone].    We comply with applicable federal civil rights laws and [place] laws. We do not discriminate on the basis of race, color, national origin, age, disability, sex, sexual orientation, or gender identity.            Thank you!     Thank you for choosing [facility]  for your care. Our goal is always to provide you with excellent care.  Hearing back from our patients is one way we can continue to improve our services. Please take a few minutes to complete the written survey that you may receive in the mail after your visit with us. Thank you!             Your Updated Medication List - Protect others around you: Learn how to safely use, store and throw away your medicines at www.disposemymeds.org.          This list is accurate as of 5/23/18 10:03 AM.  Always use your most recent med list.                   Brand Name Dispense Instructions for use Diagnosis    * albuterol 108 (90 Base) MCG/ACT Inhaler    PROAIR HFA/PROVENTIL HFA/VENTOLIN HFA    3 Inhaler    Inhale 2 puffs into the lungs every 6 hours as needed for shortness of breath / dyspnea or wheezing    Chronic obstructive pulmonary disease, unspecified COPD type (H)       * albuterol (2.5 MG/3ML) 0.083% neb solution     1 Box    Take 1 vial (2.5 mg) by nebulization every 6 hours as needed for shortness of breath / dyspnea or wheezing    Pulmonary emphysema, unspecified emphysema type (H), COPD exacerbation (H)       aspirin 81 MG EC tablet     30 tablet    Take 1 tablet (81 mg) by mouth daily    Coronary artery disease involving native coronary artery of native heart without angina pectoris       atorvastatin 20 MG tablet    LIPITOR    90 tablet    Take 1 tablet (20 mg) by mouth daily INDICATION: TO LOWER CHOLESTEROL AND TO HELP REDUCE RISK OF HEART ATTACK AND STROKE    Hyperlipidemia LDL goal <130       betamethasone dipropionate 0.05 % cream    DIPROSONE    45 g    Apply topically 2 times daily as needed (For foot)    Psoriasis       celecoxib 200 MG capsule    celeBREX    180 capsule    Take 1 capsule (200 mg) by mouth 2 times daily    Osteoarthritis, unspecified osteoarthritis type, unspecified site       cholecalciferol 87735 units capsule    VITAMIN D3    40 capsule    Take 1 capsule (50,000 Units) by mouth every 14 days SIG: TAKE INDICATION: 1ST AND 15TH OF EACH MONTH, TO TREAT VITAMIN  D DEFICIENCY    Osteopenia, unspecified location, Psoriasis with arthropathy (H)       Cyanocobalamin 5000 MCG/ML Liqd    B-12 SUPER STRENGTH    2 Bottle    Place 1 mL under the tongue every morning FOR VITAMIN B12 SUPPLEMENTATION, PLEASE PLACE UNDER THE TONGUE    Vitamin B12 deficiency (non anemic)       diltiazem 120 MG tablet    CARDIZEM    30 tablet    Take 1 tablet (120 mg) by mouth every morning (before breakfast)    Paroxysmal atrial fibrillation (H)       folic acid 1 MG tablet    FOLVITE    100 tablet    Take 1 tablet (1 mg) by mouth daily INDICATION: FOLIC ACID SUPPLEMENT    Vitamin B12 deficiency (non anemic)       hydrochlorothiazide 25 MG tablet    HYDRODIURIL    90 tablet    Take 1 tablet (25 mg) by mouth every morning    Benign essential hypertension       order for DME     1 each    Equipment being ordered: Nebulizer    Pulmonary emphysema, unspecified emphysema type (H)       OYSTER CALCIUM + D 250-125 MG-UNIT per tablet   Generic drug:  calcium-vitamin D      Take 2 tablets by mouth daily        ranitidine 150 MG tablet    ZANTAC     Take 150 mg by mouth daily as needed for heartburn        spironolactone 25 MG tablet    ALDACTONE    90 tablet    Take 1 tablet (25 mg) by mouth daily    Benign essential hypertension       tiotropium 18 MCG capsule    SPIRIVA HANDIHALER    30 capsule    Inhale contents of one capsule daily.    Pulmonary emphysema, unspecified emphysema type (H)       triamcinolone 0.1 % cream    KENALOG     Apply topically 2 times daily as needed for irritation (sores on scalp.)        TYLENOL PO      Take 1,000 mg by mouth 2 times daily as needed for mild pain or fever        Urea 20 % Crea cream      Apply topically daily        vitamin C-electrolytes 1000mg vitamin C super orange drink mix    EMERGEN-C     Take 1 packet by mouth daily Mix 1 packet in 4-6oz water.        * Notice:  This list has 2 medication(s) that are the same as other medications prescribed for you. Read the  directions carefully, and ask your doctor or other care provider to review them with you.

## 2018-05-23 NOTE — PROGRESS NOTES
[id number]  HPI and Plan:   See dictation    Orders this Visit:  Orders Placed This Encounter   Procedures     Follow-Up with Cardiac Advanced Practice Provider     Orders Placed This Encounter   Medications     atorvastatin (LIPITOR) 20 MG tablet     Sig: Take 1 tablet (20 mg) by mouth daily INDICATION: TO LOWER CHOLESTEROL AND TO HELP REDUCE RISK OF HEART ATTACK AND STROKE     Dispense:  90 tablet     Refill:  3     diltiazem (CARDIZEM) 120 MG tablet     Sig: Take 1 tablet (120 mg) by mouth every morning (before breakfast)     Dispense:  30 tablet     Refill:  3     Medications Discontinued During This Encounter   Medication Reason     amLODIPine (NORVASC) 2.5 MG tablet      atorvastatin (LIPITOR) 20 MG tablet Reorder         Encounter Diagnoses   Name Primary?     POSADAS (dyspnea on exertion)      Benign essential hypertension      Palpitations      Hyperlipidemia LDL goal <130      Paroxysmal atrial fibrillation (H) Yes       CURRENT MEDICATIONS:  Current Outpatient Prescriptions   Medication Sig Dispense Refill     Acetaminophen (TYLENOL PO) Take 1,000 mg by mouth 2 times daily as needed for mild pain or fever       albuterol (2.5 MG/3ML) 0.083% neb solution Take 1 vial (2.5 mg) by nebulization every 6 hours as needed for shortness of breath / dyspnea or wheezing 1 Box 11     albuterol (PROAIR HFA, PROVENTIL HFA, VENTOLIN HFA) 108 (90 BASE) MCG/ACT inhaler Inhale 2 puffs into the lungs every 6 hours as needed for shortness of breath / dyspnea or wheezing 3 Inhaler 1     aspirin EC 81 MG tablet Take 1 tablet (81 mg) by mouth daily 30 tablet 11     atorvastatin (LIPITOR) 20 MG tablet Take 1 tablet (20 mg) by mouth daily INDICATION: TO LOWER CHOLESTEROL AND TO HELP REDUCE RISK OF HEART ATTACK AND STROKE 90 tablet 3     betamethasone dipropionate (DIPROSONE) 0.05 % cream Apply topically 2 times daily as needed (For foot) 45 g prn     calcium-vitamin D (OYSTER CALCIUM + D) 250-125 MG-UNIT per tablet Take 2 tablets by mouth  daily       celecoxib (CELEBREX) 200 MG capsule Take 1 capsule (200 mg) by mouth 2 times daily (Patient taking differently: Take 200 mg by mouth 2 times daily as needed ) 180 capsule 3     cholecalciferol (VITAMIN D3) 58068 UNITS capsule Take 1 capsule (50,000 Units) by mouth every 14 days SIG: TAKE INDICATION: 1ST AND 15TH OF EACH MONTH, TO TREAT VITAMIN D DEFICIENCY 40 capsule 0     Cyanocobalamin (B-12 SUPER STRENGTH) 5000 MCG/ML LIQD Place 1 mL under the tongue every morning FOR VITAMIN B12 SUPPLEMENTATION, PLEASE PLACE UNDER THE TONGUE 2 Bottle PRN     diltiazem (CARDIZEM) 120 MG tablet Take 1 tablet (120 mg) by mouth every morning (before breakfast) 30 tablet 3     folic acid (FOLVITE) 1 MG tablet Take 1 tablet (1 mg) by mouth daily INDICATION: FOLIC ACID SUPPLEMENT 100 tablet 3     hydrochlorothiazide (HYDRODIURIL) 25 MG tablet Take 1 tablet (25 mg) by mouth every morning 90 tablet 3     order for DME Equipment being ordered: Nebulizer 1 each 0     ranitidine (ZANTAC) 150 MG tablet Take 150 mg by mouth daily as needed for heartburn        spironolactone (ALDACTONE) 25 MG tablet Take 1 tablet (25 mg) by mouth daily 90 tablet 3     tiotropium (SPIRIVA HANDIHALER) 18 MCG capsule Inhale contents of one capsule daily. 30 capsule 2     triamcinolone (KENALOG) 0.1 % cream Apply topically 2 times daily as needed for irritation (sores on scalp.)       Urea 20 % CREA cream Apply topically daily        vitamin C-electrolytes (EMERGEN-C) 1000mg vitamin C super orange drink mix Take 1 packet by mouth daily Mix 1 packet in 4-6oz water.       [DISCONTINUED] atorvastatin (LIPITOR) 20 MG tablet Take 1 tablet (20 mg) by mouth daily INDICATION: TO LOWER CHOLESTEROL AND TO HELP REDUCE RISK OF HEART ATTACK AND STROKE 90 tablet 3       ALLERGIES     Allergies   Allergen Reactions     Penicillins Rash     rash     Sulfa Drugs Rash     rash       PAST MEDICAL HISTORY:  Past Medical History:   Diagnosis Date     Arthritis     knees  and hips     Benign essential hypertension     was on amlodipine/ then metoprolol / add lisinopril      Contact dermatitis and other eczema, due to unspecified cause      COPD (chronic obstructive pulmonary disease) (H) 01/01/2012     Diabetes (H)     pre diabetic     Hyperlipidemia LDL goal <130 11/7/2012     Nephritis      as a child (post strep)      Osteoporosis, unspecified      Phlebitis and thrombophlebitis of other deep vessels of lower extremities 1972, 1975    Both with pregnancies     Sinusitis      Tobacco use disorder        PAST SURGICAL HISTORY:  Past Surgical History:   Procedure Laterality Date     ARTHROPLASTY HIP Right 4/3/2017    Procedure: ARTHROPLASTY HIP;  Surgeon: Francisco J Alston MD;  Location:  OR     ARTHROPLASTY KNEE Left 7/31/2017    Procedure: ARTHROPLASTY KNEE;  LEFT TOTAL KNEE ARTHROPLASTY ;  Surgeon: Francisco J Alston MD;  Location:  OR     BIOPSY OF SKIN LESION       C DEXA, BONE DENSITY, AXIAL SKEL  6/2008    T score lumbar -0.6, femoral neck -2.4/-2.0(all stable)     C VAGINAL HYSTERECTOMY  1978    Hysterectomy, Vaginal     HC ASPIRATION &/OR INJECTION GANGLION CYST, ANY  1994     HERNIORRHAPHY INGUINAL Right 3/24/2015    Procedure: HERNIORRHAPHY INGUINAL;  Surgeon: Sam Erazo MD;  Location:  [facility]     HYSTERECTOMY, PAP NO LONGER INDICATED       ORTHOPEDIC SURGERY      bilat meniscus repair       FAMILY HISTORY:  Family History   Problem Relation Age of Onset     C.A.D. Father      DIABETES Father      type 2     Myocardial Infarction Father      Heart Surgery Father      CABGx4     Hyperlipidemia Father      Breast Cancer Mother 70     OSTEOPOROSIS Mother      Thyroid Disease Mother      Hyperlipidemia Mother      CANCER Brother      Bladder     Hyperlipidemia Brother      DIABETES Brother      type 2     Colon Cancer Brother      Neurologic Disorder Paternal Grandfather      OSTEOPOROSIS Sister      Arrhythmia Sister      Heart Surgery Sister       cardioversion, ablation     Other - See Comments Sister 55     arhythmogenic right ventricular dysplasia     OSTEOPOROSIS Maternal Grandmother      OSTEOPOROSIS Paternal Grandmother      Other - See Comments Maternal Grandfather      pneumonia     Family History Negative Son      Thyroid Disease Daughter      NODULES     Family History Negative Daughter      Arrhythmia Cousin      a-fib     Cancer - colorectal No family hx of      Prostate Cancer No family hx of      Alzheimer Disease No family hx of      Anesthesia Reaction No family hx of      Blood Disease No family hx of      Eye Disorder No family hx of        SOCIAL HISTORY:  Social History     Social History     Marital status: Single     Spouse name: N/A     Number of children: 2     Years of education: 18     Occupational History     Mental health therapist [employer]     Social History Main Topics     Smoking status: Former Smoker     Packs/day: 1.00     Years: 10.00     Types: Cigarettes     Start date: 2/7/1964     Quit date: 9/7/2015     Smokeless tobacco: Never Used      Comment: 5-10 daily     Alcohol use 0.0 oz/week     0 Standard drinks or equivalent per week      Comment: 1 a month     Drug use: No     Sexual activity: No     Other Topics Concern      Service No     Blood Transfusions No     Caffeine Concern No     1 cup coffee a day, 1-2 can's of pop a wk in the summer      Occupational Exposure No     Hobby Hazards No     Sleep Concern No     Stress Concern No     Weight Concern No     Special Diet No     Back Care No     Exercise No     not due to knee's and hip     Bike Helmet No     n/a     Seat Belt Yes     Self-Exams Yes     sometimes     Parent/Sibling W/ Cabg, Mi Or Angioplasty Before 65f 55m? No     Social History Narrative    Eats fruits and vegetables every day. She takes a calcium supplement twice daily.    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            Review of Systems:  Skin:  Negative     Eyes:   "Positive for glasses  ENT:  Negative    Respiratory:  Positive for shortness of breath  Cardiovascular:    dizziness;lightheadedness;Positive for  Gastroenterology: Negative    Genitourinary:  Negative    Musculoskeletal:  Positive for joint pain  Neurologic:  Negative    Psychiatric:  Positive for sleep disturbances  Heme/Lymph/Imm:  Positive for night sweats;allergies  Endocrine:  Negative      Physical Exam:  Vitals: /69  Pulse 71  Ht 1.651 m (5' 5\")  Wt 75.8 kg (167 lb 1.6 oz)  BMI 27.81 kg/m2   Please refer to dictation for physical exam    Recent Lab Results:  LIPID RESULTS:  Lab Results   Component Value Date    CHOL 172 03/19/2018    HDL 56 03/19/2018    LDL 99 03/19/2018    TRIG 85 03/19/2018    CHOLHDLRATIO 4.2 09/18/2015       LIVER ENZYME RESULTS:  Lab Results   Component Value Date    AST 19 07/10/2017    ALT 28 03/19/2018       CBC RESULTS:  Lab Results   Component Value Date    WBC 8.8 04/04/2018    RBC 4.95 04/04/2018    HGB 15.2 04/04/2018    HCT 44.2 04/04/2018    MCV 89 04/04/2018    MCH 30.7 04/04/2018    MCHC 34.4 04/04/2018    RDW 12.7 04/04/2018     04/04/2018       BMP RESULTS:  Lab Results   Component Value Date     04/04/2018    POTASSIUM 3.7 04/04/2018    CHLORIDE 99 04/04/2018    CO2 33 (H) 04/04/2018    ANIONGAP 5 04/04/2018     (H) 04/04/2018    BUN 20 04/04/2018    CR 0.86 04/04/2018    GFRESTIMATED 65 04/04/2018    GFRESTBLACK 79 04/04/2018    SANGEETA 9.9 04/04/2018        A1C RESULTS:  Lab Results   Component Value Date    A1C 6.4 (H) 03/21/2017       INR RESULTS:  Lab Results   Component Value Date    INR 2.1 (A) 08/31/2017    INR 2.8 (A) 08/22/2017    INR 1.07 08/03/2017    INR 1.20 (H) 04/05/2017           CHALO Palacios PAMARTHA  [id number]  [place] [id number]      "

## 2018-05-23 NOTE — PROGRESS NOTES
Service Date: 2018      PRIMARY CARDIOLOGIST:  Was Dr. Fountain.  Will be Dr. Stephen upon his [facility].      REASON FOR VISIT:  Palpitations, event monitor followup.      HISTORY OF PRESENT ILLNESS:  Ms. Angela is a delightful 67-year-old woman with past medical history significant for the followin.  Hypertension with intolerance to several medications.   2.  History of tobacco, now quit.   3.  COPD.   4.  Dyslipidemia.   5.  Calcification of the coronary arteries and thoracic aorta on CT of chest.        I saw her last month, as she had had a very difficult winter and after developing a respiratory infection, had profound dyspnea on exertion and intermittent palpitations.  They were often together but not necessarily.  She went for a stress echocardiogram after being seen by Dr. Stephen and that showed a normal response to exercise with occasional PVCs and PACs and a normal EF.  There are no ST changes.  At her last visit, she continued to feel poorly.  We placed an event monitor and she is here today for followup.  Unfortunately, after I saw her, she actually worsened.  She went and saw her primary several days after and was given a nebulizer immediately on entering the clinic and she said that improved her 50%.  She was started back on her Spiriva which she had not been on and that improved her even more and she now feels like she is about 80%.  She continues to feel somewhat winded but is not nearly as presyncopal.  She continues to have palpitations that last anywhere from a few seconds to up to 30 minutes.  They are often associated with shortness of breath but she is not sure which precedes the other.  She did have an event monitor done.  This showed multiple episodes of PACs and several atrial runs of atrial fibrillation and atrial flutter.  These were all less than 10 seconds but happened daily to a couple times a day.  Apparently, her primary had told her to not keep a diary, so she did not know if  she was symptomatic with all of them.  She has not had syncope or presyncope since then.  Today, she actually feels pretty well.  She denies chest pain, orthopnea or PND but continues to have occasional palpitations.  Of interest, she says her brother just called her yesterday and he has now also been diagnosed with atrial fibrillation.  Her sister has had it a long time along with the sister also having arrhythmogenic right ventricular dysplasia.      SOCIAL HISTORY:  She is a retired .  Former smoker, half pack a day, quit in 2015.  Occasional alcohol use.  She is a pickle ball player.  Looking forward to that this summer.  She walks several times a week but has not been doing that since her knee replacement and her dyspnea on exertion.      PHYSICAL EXAMINATION:   GENERAL:  Well-developed, well-nourished woman in no acute distress.   HEENT:  Normocephalic, atraumatic.   HEART:  Regular with occasional early beats consistent with PACs or PVCs.  There is no murmur, rub or gallop.   LUNGS:  Clear.  No wheezes, rales or rhonchi.   EXTREMITIES:  Without peripheral edema.   SKIN:  Warm and dry.      ASSESSMENT AND PLAN:   1.  Palpitations consistent with brief runs of atrial fibrillation and atrial flutter as well as PACs and PVCs on her event monitor.  All of these are less than 10 seconds and this monitor was worn over 1 month.  We discussed the etiology of atrial fibrillation, the pathophysiology and our goals of minimizing it, given she is symptomatic and preventing her from being tachycardic when she is in it.  Given her episodes are less than 10 seconds, anticoagulation is not indicated.  Should they become longer, will need to consider anticoagulation, as her CHADS-VASc would be 3 for age, gender and hypertension.  At this point, we will use medication to try to settle things down.  Will start with diltiazem 120 mg a day and I will discontinue her amlodipine.  She is sensitive to medications and if  she does not tolerate this, my next step would probably be verapamil, as she does have active COPD on beta-adrenergic medications.   2.  Chronic obstructive pulmonary disease/emphysema.  I encouraged her to continue on her Spiriva, as this has been helpful and likely contributes to the palpitations and worsening them if she is hypoxic or winded.  Additionally, I prefer to have her on the controlling medicine of Spiriva rather than needing a p.r.n. albuterol or nebulizer.   3.  Dyslipidemia.  Elevated LDL at last visit.  Patient prefers to continue on her current dose of Lipitor at this time.  She does not want to change too many medications at once, given her sensitivities which is fine with me.      We will see her in 6 weeks, reassess her symptoms, sooner with concerns.  Thank you for allowing me to participate in her care.         PIA BROWN PA-C             D: 2018   T: 2018   MT: XENA      Name:     NORBERTO IBRAHIM   MRN:      [id number]        Account:      [id number]   :      1950           Service Date: 2018      Document: [id number]

## 2018-05-23 NOTE — PATIENT INSTRUCTIONS
Thanks for coming into [facility] today.    We discussed: your monitor showed very short runs of atrial fibrillation.  We'll add medications to help keep your heart in a normal rhythm.      Medication changes:  Stop taking amlodipine.   Start taking diltiazem 120 mg once a day in the morning.    Continue taking your spiriva regularly.      Follow up: with me in about 6 weeks.       Please call my nurse at [phone] with any questions or concerns.    Scheduling phone number: [phone]  Reminder: Please bring in all current medications, over the counter supplements and vitamin bottles to your next appointment.

## 2018-05-23 NOTE — LETTER
5/23/2018    Kendall Mendez MD  [address] [facility] [address]    RE: Marva Angela       Dear Colleague,    I had the pleasure of seeing Marva Angela in the [facility].    [id number]  HPI and Plan:   See dictation    Orders this Visit:  Orders Placed This Encounter   Procedures     Follow-Up with Cardiac Advanced Practice Provider     Orders Placed This Encounter   Medications     atorvastatin (LIPITOR) 20 MG tablet     Sig: Take 1 tablet (20 mg) by mouth daily INDICATION: TO LOWER CHOLESTEROL AND TO HELP REDUCE RISK OF HEART ATTACK AND STROKE     Dispense:  90 tablet     Refill:  3     diltiazem (CARDIZEM) 120 MG tablet     Sig: Take 1 tablet (120 mg) by mouth every morning (before breakfast)     Dispense:  30 tablet     Refill:  3     Medications Discontinued During This Encounter   Medication Reason     amLODIPine (NORVASC) 2.5 MG tablet      atorvastatin (LIPITOR) 20 MG tablet Reorder         Encounter Diagnoses   Name Primary?     POSADAS (dyspnea on exertion)      Benign essential hypertension      Palpitations      Hyperlipidemia LDL goal <130      Paroxysmal atrial fibrillation (H) Yes       CURRENT MEDICATIONS:  Current Outpatient Prescriptions   Medication Sig Dispense Refill     Acetaminophen (TYLENOL PO) Take 1,000 mg by mouth 2 times daily as needed for mild pain or fever       albuterol (2.5 MG/3ML) 0.083% neb solution Take 1 vial (2.5 mg) by nebulization every 6 hours as needed for shortness of breath / dyspnea or wheezing 1 Box 11     albuterol (PROAIR HFA, PROVENTIL HFA, VENTOLIN HFA) 108 (90 BASE) MCG/ACT inhaler Inhale 2 puffs into the lungs every 6 hours as needed for shortness of breath / dyspnea or wheezing 3 Inhaler 1     aspirin EC 81 MG tablet Take 1 tablet (81 mg) by mouth daily 30 tablet 11     atorvastatin (LIPITOR) 20 MG tablet Take 1 tablet (20 mg) by mouth daily INDICATION: TO LOWER CHOLESTEROL AND TO HELP REDUCE RISK OF HEART ATTACK  AND STROKE 90 tablet 3     betamethasone dipropionate (DIPROSONE) 0.05 % cream Apply topically 2 times daily as needed (For foot) 45 g prn     calcium-vitamin D (OYSTER CALCIUM + D) 250-125 MG-UNIT per tablet Take 2 tablets by mouth daily       celecoxib (CELEBREX) 200 MG capsule Take 1 capsule (200 mg) by mouth 2 times daily (Patient taking differently: Take 200 mg by mouth 2 times daily as needed ) 180 capsule 3     cholecalciferol (VITAMIN D3) 46567 UNITS capsule Take 1 capsule (50,000 Units) by mouth every 14 days SIG: TAKE INDICATION: 1ST AND 15TH OF EACH MONTH, TO TREAT VITAMIN D DEFICIENCY 40 capsule 0     Cyanocobalamin (B-12 SUPER STRENGTH) 5000 MCG/ML LIQD Place 1 mL under the tongue every morning FOR VITAMIN B12 SUPPLEMENTATION, PLEASE PLACE UNDER THE TONGUE 2 Bottle PRN     diltiazem (CARDIZEM) 120 MG tablet Take 1 tablet (120 mg) by mouth every morning (before breakfast) 30 tablet 3     folic acid (FOLVITE) 1 MG tablet Take 1 tablet (1 mg) by mouth daily INDICATION: FOLIC ACID SUPPLEMENT 100 tablet 3     hydrochlorothiazide (HYDRODIURIL) 25 MG tablet Take 1 tablet (25 mg) by mouth every morning 90 tablet 3     order for DME Equipment being ordered: Nebulizer 1 each 0     ranitidine (ZANTAC) 150 MG tablet Take 150 mg by mouth daily as needed for heartburn        spironolactone (ALDACTONE) 25 MG tablet Take 1 tablet (25 mg) by mouth daily 90 tablet 3     tiotropium (SPIRIVA HANDIHALER) 18 MCG capsule Inhale contents of one capsule daily. 30 capsule 2     triamcinolone (KENALOG) 0.1 % cream Apply topically 2 times daily as needed for irritation (sores on scalp.)       Urea 20 % CREA cream Apply topically daily        vitamin C-electrolytes (EMERGEN-C) 1000mg vitamin C super orange drink mix Take 1 packet by mouth daily Mix 1 packet in 4-6oz water.       [DISCONTINUED] atorvastatin (LIPITOR) 20 MG tablet Take 1 tablet (20 mg) by mouth daily INDICATION: TO LOWER CHOLESTEROL AND TO HELP REDUCE RISK OF HEART  ATTACK AND STROKE 90 tablet 3       ALLERGIES     Allergies   Allergen Reactions     Penicillins Rash     rash     Sulfa Drugs Rash     rash       PAST MEDICAL HISTORY:  Past Medical History:   Diagnosis Date     Arthritis     knees and hips     Benign essential hypertension     was on amlodipine/ then metoprolol / add lisinopril      Contact dermatitis and other eczema, due to unspecified cause      COPD (chronic obstructive pulmonary disease) (H) 01/01/2012     Diabetes (H)     pre diabetic     Hyperlipidemia LDL goal <130 11/7/2012     Nephritis      as a child (post strep)      Osteoporosis, unspecified      Phlebitis and thrombophlebitis of other deep vessels of lower extremities 1972, 1975    Both with pregnancies     Sinusitis      Tobacco use disorder        PAST SURGICAL HISTORY:  Past Surgical History:   Procedure Laterality Date     ARTHROPLASTY HIP Right 4/3/2017    Procedure: ARTHROPLASTY HIP;  Surgeon: Francisco J Alston MD;  Location:  OR     ARTHROPLASTY KNEE Left 7/31/2017    Procedure: ARTHROPLASTY KNEE;  LEFT TOTAL KNEE ARTHROPLASTY ;  Surgeon: Francisco J Alston MD;  Location:  OR     BIOPSY OF SKIN LESION       C DEXA, BONE DENSITY, AXIAL SKEL  6/2008    T score lumbar -0.6, femoral neck -2.4/-2.0(all stable)     C VAGINAL HYSTERECTOMY  1978    Hysterectomy, Vaginal     HC ASPIRATION &/OR INJECTION GANGLION CYST, ANY  1994     HERNIORRHAPHY INGUINAL Right 3/24/2015    Procedure: HERNIORRHAPHY INGUINAL;  Surgeon: Sam Erazo MD;  Location:  SD     HYSTERECTOMY, PAP NO LONGER INDICATED       ORTHOPEDIC SURGERY      bilat meniscus repair       FAMILY HISTORY:  Family History   Problem Relation Age of Onset     C.A.D. Father      DIABETES Father      type 2     Myocardial Infarction Father      Heart Surgery Father      CABGx4     Hyperlipidemia Father      Breast Cancer Mother 70     OSTEOPOROSIS Mother      Thyroid Disease Mother      Hyperlipidemia Mother       CANCER Brother      Bladder     Hyperlipidemia Brother      DIABETES Brother      type 2     Colon Cancer Brother      Neurologic Disorder Paternal Grandfather      OSTEOPOROSIS Sister      Arrhythmia Sister      Heart Surgery Sister      cardioversion, ablation     Other - See Comments Sister 55     arhythmogenic right ventricular dysplasia     OSTEOPOROSIS Maternal Grandmother      OSTEOPOROSIS Paternal Grandmother      Other - See Comments Maternal Grandfather      pneumonia     Family History Negative Son      Thyroid Disease Daughter      NODULES     Family History Negative Daughter      Arrhythmia Cousin      a-fib     Cancer - colorectal No family hx of      Prostate Cancer No family hx of      Alzheimer Disease No family hx of      Anesthesia Reaction No family hx of      Blood Disease No family hx of      Eye Disorder No family hx of        SOCIAL HISTORY:  Social History     Social History     Marital status: Single     Spouse name: N/A     Number of children: 2     Years of education: 18     Occupational History     Mental health therapist [employer]     Social History Main Topics     Smoking status: Former Smoker     Packs/day: 1.00     Years: 10.00     Types: Cigarettes     Start date: 2/7/1964     Quit date: 9/7/2015     Smokeless tobacco: Never Used      Comment: 5-10 daily     Alcohol use 0.0 oz/week     0 Standard drinks or equivalent per week      Comment: 1 a month     Drug use: No     Sexual activity: No     Other Topics Concern      Service No     Blood Transfusions No     Caffeine Concern No     1 cup coffee a day, 1-2 can's of pop a wk in the summer      Occupational Exposure No     Hobby Hazards No     Sleep Concern No     Stress Concern No     Weight Concern No     Special Diet No     Back Care No     Exercise No     not due to knee's and hip     Bike Helmet No     n/a     Seat Belt Yes     Self-Exams Yes     sometimes     Parent/Sibling W/ Cabg, Mi Or Angioplasty Before  "65f 55m? No     Social History Narrative    Eats fruits and vegetables every day. She takes a calcium supplement twice daily.    *                Was psychiatric social owrker            Review of Systems:  Skin:  Negative     Eyes:  Positive for glasses  ENT:  Negative    Respiratory:  Positive for shortness of breath  Cardiovascular:    dizziness;lightheadedness;Positive for  Gastroenterology: Negative    Genitourinary:  Negative    Musculoskeletal:  Positive for joint pain  Neurologic:  Negative    Psychiatric:  Positive for sleep disturbances  Heme/Lymph/Imm:  Positive for night sweats;allergies  Endocrine:  Negative      Physical Exam:  Vitals: /69  Pulse 71  Ht 1.651 m (5' 5\")  Wt 75.8 kg (167 lb 1.6 oz)  BMI 27.81 kg/m2   Please refer to dictation for physical exam    Recent Lab Results:  LIPID RESULTS:  Lab Results   Component Value Date    CHOL 172 03/19/2018    HDL 56 03/19/2018    LDL 99 03/19/2018    TRIG 85 03/19/2018    CHOLHDLRATIO 4.2 09/18/2015       LIVER ENZYME RESULTS:  Lab Results   Component Value Date    AST 19 07/10/2017    ALT 28 03/19/2018       CBC RESULTS:  Lab Results   Component Value Date    WBC 8.8 04/04/2018    RBC 4.95 04/04/2018    HGB 15.2 04/04/2018    HCT 44.2 04/04/2018    MCV 89 04/04/2018    MCH 30.7 04/04/2018    MCHC 34.4 04/04/2018    RDW 12.7 04/04/2018     04/04/2018       BMP RESULTS:  Lab Results   Component Value Date     04/04/2018    POTASSIUM 3.7 04/04/2018    CHLORIDE 99 04/04/2018    CO2 33 (H) 04/04/2018    ANIONGAP 5 04/04/2018     (H) 04/04/2018    BUN 20 04/04/2018    CR 0.86 04/04/2018    GFRESTIMATED 65 04/04/2018    GFRESTBLACK 79 04/04/2018    SANGEETA 9.9 04/04/2018        A1C RESULTS:  Lab Results   Component Value Date    A1C 6.4 (H) 03/21/2017       INR RESULTS:  Lab Results   Component Value Date    INR 2.1 (A) 08/31/2017    INR 2.8 (A) 08/22/2017    INR 1.07 08/03/2017    INR 1.20 (H) 04/05/2017           CHALO Quesada " Simón Palacios PA-C  [address]  [place] [id number]        Thank you for allowing me to participate in the care of your patient.      Sincerely,     Rufina Palacios PA-C     [facility]    cc:   Rufina Palacios PA-C  [address]  [place] [id number]

## 2018-06-18 DIAGNOSIS — I48.0 PAROXYSMAL ATRIAL FIBRILLATION (H): ICD-10-CM

## 2018-06-18 RX ORDER — DILTIAZEM HYDROCHLORIDE 120 MG/1
120 CAPSULE, COATED, EXTENDED RELEASE ORAL DAILY
Qty: 90 CAPSULE | Refills: 0 | Status: SHIPPED | OUTPATIENT
Start: 2018-06-18 | End: 2018-07-10

## 2018-06-19 ENCOUNTER — OFFICE VISIT (OUTPATIENT)
Dept: INTERNAL MEDICINE | Facility: CLINIC | Age: 68
End: 2018-06-19
Payer: COMMERCIAL

## 2018-06-19 VITALS
BODY MASS INDEX: 27.89 KG/M2 | OXYGEN SATURATION: 96 % | HEART RATE: 65 BPM | TEMPERATURE: 98.3 F | DIASTOLIC BLOOD PRESSURE: 80 MMHG | SYSTOLIC BLOOD PRESSURE: 100 MMHG | WEIGHT: 167.6 LBS

## 2018-06-19 DIAGNOSIS — J43.9 PULMONARY EMPHYSEMA, UNSPECIFIED EMPHYSEMA TYPE (H): Primary | ICD-10-CM

## 2018-06-19 DIAGNOSIS — I10 BENIGN ESSENTIAL HYPERTENSION: ICD-10-CM

## 2018-06-19 DIAGNOSIS — E53.8 VITAMIN B12 DEFICIENCY (NON ANEMIC): ICD-10-CM

## 2018-06-19 DIAGNOSIS — L40.50 PSORIASIS WITH ARTHROPATHY (H): ICD-10-CM

## 2018-06-19 DIAGNOSIS — E55.9 VITAMIN D DEFICIENCY: ICD-10-CM

## 2018-06-19 DIAGNOSIS — M85.80 OSTEOPENIA, UNSPECIFIED LOCATION: ICD-10-CM

## 2018-06-19 DIAGNOSIS — I25.10 CORONARY ARTERY DISEASE INVOLVING NATIVE CORONARY ARTERY OF NATIVE HEART WITHOUT ANGINA PECTORIS: ICD-10-CM

## 2018-06-19 DIAGNOSIS — I48.0 PAROXYSMAL ATRIAL FIBRILLATION (H): ICD-10-CM

## 2018-06-19 DIAGNOSIS — R73.03 PREDIABETES: ICD-10-CM

## 2018-06-19 DIAGNOSIS — Z12.11 SCREEN FOR COLON CANCER: ICD-10-CM

## 2018-06-19 PROCEDURE — 99214 OFFICE O/P EST MOD 30 MIN: CPT | Performed by: INTERNAL MEDICINE

## 2018-06-19 RX ORDER — ATORVASTATIN CALCIUM 10 MG/1
20 TABLET, FILM COATED ORAL
Refills: 3 | COMMUNITY
Start: 2018-05-18 | End: 2018-06-19 | Stop reason: DRUGHIGH

## 2018-06-19 RX ORDER — TIOTROPIUM BROMIDE 18 UG/1
CAPSULE ORAL; RESPIRATORY (INHALATION)
Qty: 90 CAPSULE | Refills: 3 | Status: SHIPPED | OUTPATIENT
Start: 2018-06-19 | End: 2019-05-02

## 2018-06-19 RX ORDER — CYANOCOBALAMIN (VITAMIN B-12) 2500 MCG
2500 TABLET, SUBLINGUAL SUBLINGUAL DAILY
COMMUNITY
Start: 2018-06-19 | End: 2018-11-05 | Stop reason: DRUGHIGH

## 2018-06-19 NOTE — PATIENT INSTRUCTIONS
"*  Stop the HCTZ due to lower blood pressures.     *  OK to take the HCTZ 1/2 or 1 tablet as needed for lower extremity swelling, which might be less likely since stopping the Amlodipine.  Diltiazem can cause lower extremity edema, but not as much as amlodipine.     *  Follow up with the Cardiology clinic as planned.     *  Change the Vitamin D supplement to Vitamin D3 5000 unit capsule, 5 days per week or every other day.  Get the version from [employer].     *  Change B12 supplement to 2500 mcg sublingual lozenge once per day.  Get from [employer]    *  Continue all other medications at the same doses.  Contact your usual pharmacy if you need refills.     *  Return to see me in 3 months for a Annual Medicare wellness exam, sooner if needed.    Please get fasting labs done at the [facility] or any other [facility] lab 1-2 days before this appointment.  If you get the labs done at another [facility], make arrangements with them directly.  The orders will be in place.  Eat nothing for at least 8 hours prior to having these labs drawn.  Call [phone] to schedule appointments at [facility].       5 GOALS TO PREVENT VASCULAR DISEASE:     1.  Aggressive blood pressure control, under 130/80 ideally.  Using medications if needed.    Your blood pressure is under good control    BP Readings from Last 4 Encounters:   06/19/18 100/80   05/23/18 124/69   04/23/18 134/80   04/18/18 114/70       2.  Aggressive LDL cholesterol (\"bad cholesterol\") lowering as indicated.    Your goal is an LDL under 130 for sure, preferably under 100.  (If you have diabetes or previous vascular disease, the the LDL goals would be under 100 for sure, preferably under 70.)    New guidelines identify four high-risk groups who could benefit from statins:   *people with pre-existing heart disease, such as those who have had a heart attack;   *people ages 40 to 75 who have diabetes of any type  *patients ages 40 to 75 with at " "least a 7.5% risk of developing cardiovascular disease over the next decade, according to a formula described in the guidelines  *patients with the sort of super-high cholesterol that sometimes runs in families, as evidenced by an LDL of 190 milligrams per deciliter or higher    Your cholesterol levels are well controlled.    Recent Labs   Lab Test  03/19/18   0732  10/30/17   0726   09/18/15   0738   CHOL  172  175   < >  221*   HDL  56  69   < >  53   LDL  99  87   < >  144*   TRIG  85  95   < >  122   CHOLHDLRATIO   --    --    --   4.2    < > = values in this interval not displayed.       3.  Aggressive diabetic prevention, screening and/or management.      You do not have diabetes as of the most recent blood tests.     4.  No smoking    5.  Consider taking low dose aspirin (81 mg) tablet once per day over the age of 50, every day unless there is a specific reason that you cannot take aspirin (such as side effect, allergy, or you are on another \"blood thinner\").        --Based on your current cardiac risk factors, you should take Aspirin 81 mg once per day if you are over 50 years of age.            --Good Grains:  Oats, brown rice, Quinoa (these do not raise the blood sugar as much)     --Bad grains:  Anything made from wheat or white rice     (because these raise the blood sugars significantly, and the possible gluten issue from wheat for some people).      --Proteins:  Aim for \"lean proteins\" including chicken, fish, seafood, pork, turkey, and eggs (in moderation); Eat red meat only occasionally          Vasu Martinez:                "

## 2018-06-19 NOTE — PROGRESS NOTES
SUBJECTIVE:   Marva Angela is a 67 year old female who presents to clinic today for the following health issues:    Is following up to a follow up she had about two months ago     ED/UC Followup:    Facility:   Emergency Department   Date of visit: 4/4/18  Reason for visit: palpitations and SOB   Current Status: has had a few days with SOB, but palpitations is better controled      Changed to diltaizem with reduciton in plapitations and heart rate.   BP stanble.   Reports much less lower extremity edema since stopping amlodipine.   No dyspnea on exertion.   No anticpoagulation indicated.      1.    Blood presure remains well controlled at home  Readings outside clinic are within normal limits.  Reviewed last 6 BP readings in chart:  BP Readings from Last 6 Encounters:   06/19/18 100/80   05/23/18 124/69   04/23/18 134/80   04/18/18 114/70   04/04/18 110/65   04/04/18 144/74     He has not experienced any significant side effects from medicaiotns for hypertension.    NO active cardiac complaints or symptoms with exercise.       2.  The patient has a history of impaired glucose tolerance with regularly elevated blood sugars.    They have not been diagnosed with type II DM or placed on medications for diabetes before.   They deny polyuria, polydipsia.     The patient is notobese with a BMI of Body mass index is 27.89 kg/(m^2)..    Review of current labs show:    Lab Results   Component Value Date    A1C 6.4 03/21/2017           Problem list and histories reviewed & adjusted, as indicated.  Additional history: as documented        Reviewed and updated as needed this visit by clinical staff       Reviewed and updated as needed this visit by Provider           Past Medical History:  ---------------------------  Past Medical History:   Diagnosis Date     Arthritis     knees and hips     Benign essential hypertension     was on amlodipine/ then metoprolol / add lisinopril      Contact dermatitis and other  eczema, due to unspecified cause      COPD (chronic obstructive pulmonary disease) (H) 01/01/2012     Diabetes (H)     pre diabetic     Hyperlipidemia LDL goal <130 11/7/2012     Nephritis      as a child (post strep)      Osteoporosis, unspecified      Phlebitis and thrombophlebitis of other deep vessels of lower extremities 1972, 1975    Both with pregnancies     Sinusitis      Tobacco use disorder        Past Surgical History:  ---------------------------  Past Surgical History:   Procedure Laterality Date     ARTHROPLASTY HIP Right 4/3/2017    Procedure: ARTHROPLASTY HIP;  Surgeon: Francisco J Alston MD;  Location:  OR     ARTHROPLASTY KNEE Left 7/31/2017    Procedure: ARTHROPLASTY KNEE;  LEFT TOTAL KNEE ARTHROPLASTY ;  Surgeon: Francisco J Alston MD;  Location:  OR     BIOPSY OF SKIN LESION       C DEXA, BONE DENSITY, AXIAL SKEL  6/2008    T score lumbar -0.6, femoral neck -2.4/-2.0(all stable)     C VAGINAL HYSTERECTOMY  1978    Hysterectomy, Vaginal     HC ASPIRATION &/OR INJECTION GANGLION CYST, ANY  1994     HERNIORRHAPHY INGUINAL Right 3/24/2015    Procedure: HERNIORRHAPHY INGUINAL;  Surgeon: Sam Erazo MD;  Location:  [facility]     HYSTERECTOMY, PAP NO LONGER INDICATED       ORTHOPEDIC SURGERY      bilat meniscus repair       Current Medications:  ---------------------------  Current Outpatient Prescriptions   Medication Sig Dispense Refill     Acetaminophen (TYLENOL PO) Take 1,000 mg by mouth 2 times daily as needed for mild pain or fever       albuterol (2.5 MG/3ML) 0.083% neb solution Take 1 vial (2.5 mg) by nebulization every 6 hours as needed for shortness of breath / dyspnea or wheezing 1 Box 11     albuterol (PROAIR HFA, PROVENTIL HFA, VENTOLIN HFA) 108 (90 BASE) MCG/ACT inhaler Inhale 2 puffs into the lungs every 6 hours as needed for shortness of breath / dyspnea or wheezing 3 Inhaler 1     aspirin EC 81 MG tablet Take 1 tablet (81 mg) by mouth daily 30 tablet 11      atorvastatin (LIPITOR) 10 MG tablet 20 mg  3     atorvastatin (LIPITOR) 20 MG tablet Take 1 tablet (20 mg) by mouth daily INDICATION: TO LOWER CHOLESTEROL AND TO HELP REDUCE RISK OF HEART ATTACK AND STROKE 90 tablet 3     betamethasone dipropionate (DIPROSONE) 0.05 % cream Apply topically 2 times daily as needed (For foot) 45 g prn     calcium-vitamin D (OYSTER CALCIUM + D) 250-125 MG-UNIT per tablet Take 2 tablets by mouth daily       celecoxib (CELEBREX) 200 MG capsule Take 1 capsule (200 mg) by mouth 2 times daily (Patient taking differently: Take 200 mg by mouth 2 times daily as needed ) 180 capsule 3     cholecalciferol (VITAMIN D3) 62910 UNITS capsule Take 1 capsule (50,000 Units) by mouth every 14 days SIG: TAKE INDICATION: 1ST AND 15TH OF EACH MONTH, TO TREAT VITAMIN D DEFICIENCY 40 capsule 0     Cyanocobalamin (B-12 SUPER STRENGTH) 5000 MCG/ML LIQD Place 1 mL under the tongue every morning FOR VITAMIN B12 SUPPLEMENTATION, PLEASE PLACE UNDER THE TONGUE 2 Bottle PRN     diltiazem (CARDIZEM CD/CARTIA XT) 120 MG 24 hr capsule Take 1 capsule (120 mg) by mouth daily with breakfast 90 capsule 0     folic acid (FOLVITE) 1 MG tablet Take 1 tablet (1 mg) by mouth daily INDICATION: FOLIC ACID SUPPLEMENT 100 tablet 3     hydrochlorothiazide (HYDRODIURIL) 25 MG tablet Take 1 tablet (25 mg) by mouth every morning 90 tablet 3     order for DME Equipment being ordered: Nebulizer 1 each 0     ranitidine (ZANTAC) 150 MG tablet Take 150 mg by mouth daily as needed for heartburn        spironolactone (ALDACTONE) 25 MG tablet Take 1 tablet (25 mg) by mouth daily 90 tablet 3     tiotropium (SPIRIVA HANDIHALER) 18 MCG capsule Inhale contents of one capsule daily. 30 capsule 2     triamcinolone (KENALOG) 0.1 % cream Apply topically 2 times daily as needed for irritation (sores on scalp.)       Urea 20 % CREA cream Apply topically daily        vitamin C-electrolytes (EMERGEN-C) 1000mg vitamin C super orange drink mix Take 1 packet  by mouth daily Mix 1 packet in 4-6oz water.         Allergies:  -------------  Allergies   Allergen Reactions     Penicillins Rash     rash     Sulfa Drugs Rash     rash       Social History:  -------------------  Social History     Social History     Marital status: Single     Spouse name: N/A     Number of children: 2     Years of education: 18     Occupational History     Mental health therapist [employer]     Social History Main Topics     Smoking status: Former Smoker     Packs/day: 1.00     Years: 10.00     Types: Cigarettes     Start date: 2/7/1964     Quit date: 9/7/2015     Smokeless tobacco: Never Used      Comment: 5-10 daily     Alcohol use 0.0 oz/week     0 Standard drinks or equivalent per week      Comment: 1 a month     Drug use: No     Sexual activity: No     Other Topics Concern      Service No     Blood Transfusions No     Caffeine Concern No     1 cup coffee a day, 1-2 can's of pop a wk in the summer      Occupational Exposure No     Hobby Hazards No     Sleep Concern No     Stress Concern No     Weight Concern No     Special Diet No     Back Care No     Exercise No     not due to knee's and hip     Bike Helmet No     n/a     Seat Belt Yes     Self-Exams Yes     sometimes     Parent/Sibling W/ Cabg, Mi Or Angioplasty Before 65f 55m? No     Social History Narrative    Eats fruits and vegetables every day. She takes a calcium supplement twice daily.    *                Was psychiatric social owrker            Family Medical History:  ------------------------------  Family History   Problem Relation Age of Onset     C.A.D. Father      Diabetes Father      type 2     Myocardial Infarction Father      Heart Surgery Father      CABGx4     Hyperlipidemia Father      Breast Cancer Mother 70     Osteoperosis Mother      Thyroid Disease Mother      Hyperlipidemia Mother      Cancer Brother      Bladder     Hyperlipidemia Brother      Diabetes Brother      type 2     Colon Cancer  Brother      Neurologic Disorder Paternal Grandfather      Osteoperosis Sister      Arrhythmia Sister      Heart Surgery Sister      cardioversion, ablation     Other - See Comments Sister 55     arhythmogenic right ventricular dysplasia     Osteoperosis Maternal Grandmother      Osteoperosis Paternal Grandmother      Other - See Comments Maternal Grandfather      pneumonia     Family History Negative Son      Thyroid Disease Daughter      NODULES     Family History Negative Daughter      Arrhythmia Cousin      a-fib     Cancer - colorectal No family hx of      Prostate Cancer No family hx of      Alzheimer Disease No family hx of      Anesthesia Reaction No family hx of      Blood Disease No family hx of      Eye Disorder No family hx of          ROS:  REVIEW OF SYSTEMS:    RESP: negative for cough, dyspnea, wheezing, hemoptysis  CV: negative for chest pain, palpitations, PND, POSADAS, orthopnea; reports no changes in their ability to perform physical activity (from cardiovascular standpoint)  GI: negative for dysphagia, N/V, pain, melena, diarrhea and constipation  NEURO: negative for numbness/tingling, paralysis, incoordination, or focal weakness     OBJECTIVE:                                                    /80  Pulse 65  Temp 98.3  F (36.8  C) (Oral)  Wt 167 lb 9.6 oz (76 kg)  SpO2 96%  BMI 27.89 kg/m2     GENERAL alert and no distress  EYES:  Normal sclera,conjunctiva, EOMI  HENT: oral and posterior pharynx without lesions or erythema, facies symmetric  NECK: Neck supple. No LAD, without thyroidmegaly or JVD., Carotids without bruits.  RESP: Clear to ausculation bilaterally without wheezes or crackles. Normal BS in all fields.  CV: RRR normal S1S2 without murmurs, rubs or gallops. PMI normal  LYMPH: no cervical lymph adenopathy appreciated  MS: extremities- no gross deformities of the visible extremities noted, no edema  PSYCH: Alert and oriented times 3; speech- coherent  SKIN:  No obvious  significant skin lesions on visible portions of face          ASSESSMENT/PLAN:                                                      (J43.9) Pulmonary emphysema, unspecified emphysema type (H)  (primary encounter diagnosis)  Comment: feelign much better ont he spiriva.   Feels breathing much less affected by heat and humidity  Discussed general issues of COPD, including pathophysiology, ways it will affect the pt., when to seek help, reviewed the typical medications (how they are taken, how they help)   Plan: tiotropium (SPIRIVA HANDIHALER) 18 MCG capsule            (I48.0) Paroxysmal atrial fibrillation (H)  Comment: rate controlled with diltiazem.   Anticoagulation not indicated per cardiology notes.   Plan:    (E53.8) Vitamin B12 deficiency (non anemic)  Comment: OK to reduce dose.   Plan: cyanocobalamin (VITAMIN B-12) 2500 MCG         sublingual tablet            (M85.80) Osteopenia, unspecified location  Comment: continue calcicunm and vit D  Plan: cholecalciferol (VITAMIN D3) 5000 units TABS         tablet            (I10) Benign essential hypertension  Comment: Amlodipine stopped due to change to diltiazem.   BP rather low today, will stop the HCTZ for now.   Continue spironolactone.   Discussed current hypertension treatment guidelines, including indications for treatment and treatment options.  Discussed the importance for aggressive management of HTN to prevent vascular complications later.  Recommended lower fat, lower carbohydrate, and lower sodium (<2000 mg)diet.  Discussed required intervals for follow up on HTN, lab studies.  Recommened pt. follow their blood pressures outside the clinic to ensure that BPs are remaining within guidelines, and to contact me if the readings are not within guidelines on a regular basis so we can adjust treatment as needed.   Plan: **CBC with platelets FUTURE 2mo, Basic         metabolic panel            (E55.9) Vitamin D deficiency  Comment:   Plan: cholecalciferol  "(VITAMIN D3) 5000 units TABS         tablet            (Z12.11) Screen for colon cancer  Comment:   Plan:     (R73.03) Prediabetes  Comment: Reviewed the labs showing elevated glucose levels.    Discussed \"pre-diabetes\", impaired glucose tolerance, and its part in the dysmetabolic syndrome.    Discussed the inevitable progression of impaired glucose tolerance toward worsening diabetes mellitus and the need for agressive interventions now to delay and prevent this inevitable progression.  Discussed the overall risks that dysmetabolic syndromes/impaired glucose tolerance/syndrome X pose toward increased risks of vascular disease as the main reason for agressive intervention now.  Will add medications for glucose control (i.e. metformin, glitazones, etc), lipid (e.g. statins), and for blood pressure (preferably ARBs and ACE) as indicated.  Will start these as early as needed based other proven ability to delay and modify these risk factors.   Discussed the need for aggressive diet control as the cornerstone of pre-diabetes and diabetes management, emphasizing the reduction of \"simple carbohydrates\" (e.g. Any kind of wheat products (e.g. any bread, any pasta), white rice, noodles, potatoes, snack foods, regular soda, juices (except fresh squeezed), cakes, cookies, candy, etc.) along with regular exercise.       Plan: **CBC with platelets FUTURE 2mo, Basic         metabolic panel, **A1C FUTURE 3mo            (L40.50) Psoriasis with arthropathy (H)  Comment: This condition is currently controlled on the current medical regimen.  Continue current therapy.   Plan:     (I25.10) Coronary artery disease involving native coronary artery of native heart without angina pectoris  Comment: nonocclsuive coronary artery disease with negative nuclear stress tests.   coronayr artery calcifications seen on CT scans.   Discussed secondary risk factor modification and recommended continuing aggressive management of these items.   Plan:    "     See Patient Instructions    YADIRA ESPINOSA M.D., MD  [facility]

## 2018-06-19 NOTE — MR AVS SNAPSHOT
After Visit Summary   6/19/2018    Marva Angela    MRN: [id number]           Patient Information     Date Of Birth          1950        Visit Information        Provider Department      6/19/2018 9:20 AM Kendall Mendez MD [facility]        Today's Diagnoses     Pulmonary emphysema, unspecified emphysema type (H)    -  1    Paroxysmal atrial fibrillation (H)        Vitamin B12 deficiency (non anemic)        Osteopenia, unspecified location        Benign essential hypertension        Vitamin D deficiency        Screen for colon cancer        Prediabetes          Care Instructions    *  Stop the HCTZ due to lower blood pressures.     *  OK to take the HCTZ 1/2 or 1 tablet as needed for lower extremity swelling, which might be less likely since stopping the Amlodipine.  Diltiazem can cause lower extremity edema, but not as much as amlodipine.     *  Follow up with the Cardiology clinic as planned.     *  Change the Vitamin D supplement to Vitamin D3 5000 unit capsule, 5 days per week or every other day.  Get the version from [employer].     *  Change B12 supplement to 2500 mcg sublingual lozenge once per day.  Get from [facility]    *  Continue all other medications at the same doses.  Contact your usual pharmacy if you need refills.     *  Return to see me in 3 months for a Annual Medicare wellness exam, sooner if needed.    Please get fasting labs done at the [facility] or any other [facility] lab 1-2 days before this appointment.  If you get the labs done at another [facility], make arrangements with them directly.  The orders will be in place.  Eat nothing for at least 8 hours prior to having these labs drawn.  Call [phone] to schedule appointments at [facility].       5 GOALS TO PREVENT VASCULAR DISEASE:     1.  Aggressive blood pressure control, under 130/80 ideally.  Using medications if needed.    Your blood pressure is  "under good control    BP Readings from Last 4 Encounters:   06/19/18 100/80   05/23/18 124/69   04/23/18 134/80   04/18/18 114/70       2.  Aggressive LDL cholesterol (\"bad cholesterol\") lowering as indicated.    Your goal is an LDL under 130 for sure, preferably under 100.  (If you have diabetes or previous vascular disease, the the LDL goals would be under 100 for sure, preferably under 70.)    New guidelines identify four high-risk groups who could benefit from statins:   *people with pre-existing heart disease, such as those who have had a heart attack;   *people ages 40 to 75 who have diabetes of any type  *patients ages 40 to 75 with at least a 7.5% risk of developing cardiovascular disease over the next decade, according to a formula described in the guidelines  *patients with the sort of super-high cholesterol that sometimes runs in families, as evidenced by an LDL of 190 milligrams per deciliter or higher    Your cholesterol levels are well controlled.    Recent Labs   Lab Test  03/19/18   0732  10/30/17   0726   09/18/15   0738   CHOL  172  175   < >  221*   HDL  56  69   < >  53   LDL  99  87   < >  144*   TRIG  85  95   < >  122   CHOLHDLRATIO   --    --    --   4.2    < > = values in this interval not displayed.       3.  Aggressive diabetic prevention, screening and/or management.      You do not have diabetes as of the most recent blood tests.     4.  No smoking    5.  Consider taking low dose aspirin (81 mg) tablet once per day over the age of 50, every day unless there is a specific reason that you cannot take aspirin (such as side effect, allergy, or you are on another \"blood thinner\").        --Based on your current cardiac risk factors, you should take Aspirin 81 mg once per day if you are over 50 years of age.            --Good Grains:  Oats, brown rice, Quinoa (these do not raise the blood sugar as much)     --Bad grains:  Anything made from wheat or white rice     (because these raise the " "blood sugars significantly, and the possible gluten issue from wheat for some people).      --Proteins:  Aim for \"lean proteins\" including chicken, fish, seafood, pork, turkey, and eggs (in moderation); Eat red meat only occasionally          Vasu Martinez:                        Follow-ups after your visit        Follow-up notes from your care team     Return in about 3 months (around 9/19/2018) for Lab Work, BP Recheck.      Your next 10 appointments already scheduled     Jul 10, 2018  7:30 AM CDT   Return Visit with Rufina Palacios PA-C   [facility] ([facility])    [address] [place] [address] [address]  [facility] [id number] [phone] OPT 2              Future tests that were ordered for you today     Open Future Orders        Priority Expected Expires Ordered    Basic metabolic panel ASAP 9/19/2018 11/19/2018 6/19/2018    **CBC with platelets FUTURE 2mo Routine 9/19/2018 11/19/2018 6/19/2018    **A1C FUTURE 3mo Routine 9/19/2018 11/19/2018 6/19/2018            Who to contact     If you have questions or need follow up information about today's clinic visit or your schedule please contact [facility] directly at [phone].  Normal or non-critical lab and imaging results will be communicated to you by PingTankhart, letter or phone within 4 business days after the clinic has received the results. If you do not hear from us within 7 days, please contact the clinic through PingTankhart or phone. If you have a critical or abnormal lab result, we will notify you by phone as soon as possible.  Submit refill requests through [employer] or call your pharmacy and they will forward the refill request to us. Please allow 3 business days for your refill to be completed.          Additional Information About Your Visit        [employer] Information     [employer] gives you secure access to your electronic health record. If you see a primary care provider, you can " also send messages to your care team and make appointments. If you have questions, please call your primary care clinic.  If you do not have a primary care provider, please call [phone] and they will assist you.        Care EveryWhere ID     This is your Care EveryWhere ID. This could be used by other organizations to access your [facility] medical records  [id number]        Your Vitals Were     Pulse Temperature Pulse Oximetry BMI (Body Mass Index)          65 98.3  F (36.8  C) (Oral) 96% 27.89 kg/m2         Blood Pressure from Last 3 Encounters:   06/19/18 100/80   05/23/18 124/69   04/23/18 134/80    Weight from Last 3 Encounters:   06/19/18 167 lb 9.6 oz (76 kg)   05/23/18 167 lb 1.6 oz (75.8 kg)   04/23/18 165 lb 4.8 oz (75 kg)                 Today's Medication Changes          These changes are accurate as of 6/19/18 10:00 AM.  If you have any questions, ask your nurse or doctor.               These medicines have changed or have updated prescriptions.        Dose/Directions    atorvastatin 20 MG tablet   Commonly known as:  LIPITOR   This may have changed:  Another medication with the same name was removed. Continue taking this medication, and follow the directions you see here.   Used for:  Hyperlipidemia LDL goal <130   Changed by:  Kendall Mendez MD        Dose:  20 mg   Take 1 tablet (20 mg) by mouth daily INDICATION: TO LOWER CHOLESTEROL AND TO HELP REDUCE RISK OF HEART ATTACK AND STROKE   Quantity:  90 tablet   Refills:  3       celecoxib 200 MG capsule   Commonly known as:  celeBREX   This may have changed:    - when to take this  - reasons to take this   Used for:  Osteoarthritis, unspecified osteoarthritis type, unspecified site        Dose:  200 mg   Take 1 capsule (200 mg) by mouth 2 times daily   Quantity:  180 capsule   Refills:  3       cholecalciferol 5000 units Tabs tablet   Commonly known as:  vitamin D3   This may have changed:  Another medication with the same name was  removed. Continue taking this medication, and follow the directions you see here.   Used for:  Vitamin D deficiency, Osteopenia, unspecified location   Changed by:  Kendall Mendez MD        Dose:  5000 Units   Take 1 tablet (5,000 Units) by mouth daily   Refills:  0       cyanocobalamin 2500 MCG sublingual tablet   Commonly known as:  VITAMIN B-12   This may have changed:  Another medication with the same name was removed. Continue taking this medication, and follow the directions you see here.   Used for:  Vitamin B12 deficiency (non anemic)   Changed by:  Kendall Mendez MD        Dose:  2500 mcg   Place 2,500 mcg under the tongue daily   Refills:  0            Where to get your medicines      Some of these will need a paper prescription and others can be bought over the counter.  Ask your nurse if you have questions.     Bring a paper prescription for each of these medications     tiotropium 18 MCG capsule                Primary Care Provider Office Phone # Fax #    Kendall Mendez [phone]       [address] [place] [id number]        Equal Access to Services     NITIN [facility]ALEXANDRA : Hadii aad ku hadasho Soomaali, waaxda luqadaha, qaybta kaalmada adeegyada, waxay idiin haysonja antoine . So [facility] [phone].    ATENCIÓN: Si habla español, tiene a barfield disposición servicios gratuitos de asistencia lingüística. Llame al [phone].    We comply with applicable federal civil rights laws and [place] laws. We do not discriminate on the basis of race, color, national origin, age, disability, sex, sexual orientation, or gender identity.            Thank you!     Thank you for choosing [facility]  for your care. Our goal is always to provide you with excellent care. Hearing back from our patients is one way we can continue to improve our services. Please take a few minutes to complete the written survey that you may receive in the mail  after your visit with us. Thank you!             Your Updated Medication List - Protect others around you: Learn how to safely use, store and throw away your medicines at www.disposemymeds.org.          This list is accurate as of 6/19/18 10:00 AM.  Always use your most recent med list.                   Brand Name Dispense Instructions for use Diagnosis    * albuterol 108 (90 Base) MCG/ACT Inhaler    PROAIR HFA/PROVENTIL HFA/VENTOLIN HFA    3 Inhaler    Inhale 2 puffs into the lungs every 6 hours as needed for shortness of breath / dyspnea or wheezing    Chronic obstructive pulmonary disease, unspecified COPD type (H)       * albuterol (2.5 MG/3ML) 0.083% neb solution     1 Box    Take 1 vial (2.5 mg) by nebulization every 6 hours as needed for shortness of breath / dyspnea or wheezing    Pulmonary emphysema, unspecified emphysema type (H), COPD exacerbation (H)       aspirin 81 MG EC tablet     30 tablet    Take 1 tablet (81 mg) by mouth daily    Coronary artery disease involving native coronary artery of native heart without angina pectoris       atorvastatin 20 MG tablet    LIPITOR    90 tablet    Take 1 tablet (20 mg) by mouth daily INDICATION: TO LOWER CHOLESTEROL AND TO HELP REDUCE RISK OF HEART ATTACK AND STROKE    Hyperlipidemia LDL goal <130       betamethasone dipropionate 0.05 % cream    DIPROSONE    45 g    Apply topically 2 times daily as needed (For foot)    Psoriasis       celecoxib 200 MG capsule    celeBREX    180 capsule    Take 1 capsule (200 mg) by mouth 2 times daily    Osteoarthritis, unspecified osteoarthritis type, unspecified site       cholecalciferol 5000 units Tabs tablet    vitamin D3     Take 1 tablet (5,000 Units) by mouth daily    Vitamin D deficiency, Osteopenia, unspecified location       cyanocobalamin 2500 MCG sublingual tablet    VITAMIN B-12     Place 2,500 mcg under the tongue daily    Vitamin B12 deficiency (non anemic)       diltiazem 120 MG 24 hr capsule    CARDIZEM  CD/CARTIA XT    90 capsule    Take 1 capsule (120 mg) by mouth daily with breakfast    Paroxysmal atrial fibrillation (H)       folic acid 1 MG tablet    FOLVITE    100 tablet    Take 1 tablet (1 mg) by mouth daily INDICATION: FOLIC ACID SUPPLEMENT    Vitamin B12 deficiency (non anemic)       order for DME     1 each    Equipment being ordered: Nebulizer    Pulmonary emphysema, unspecified emphysema type (H)       OYSTER CALCIUM + D 250-125 MG-UNIT per tablet   Generic drug:  calcium-vitamin D      Take 2 tablets by mouth daily        ranitidine 150 MG tablet    ZANTAC     Take 150 mg by mouth daily as needed for heartburn        spironolactone 25 MG tablet    ALDACTONE    90 tablet    Take 1 tablet (25 mg) by mouth daily    Benign essential hypertension       tiotropium 18 MCG capsule    SPIRIVA HANDIHALER    90 capsule    Inhale contents of one capsule daily.    Pulmonary emphysema, unspecified emphysema type (H)       triamcinolone 0.1 % cream    KENALOG     Apply topically 2 times daily as needed for irritation (sores on scalp.)        TYLENOL PO      Take 1,000 mg by mouth 2 times daily as needed for mild pain or fever        Urea 20 % Crea cream      Apply topically daily        vitamin C-electrolytes 1000mg vitamin C super orange drink mix    EMERGEN-C     Take 1 packet by mouth daily Mix 1 packet in 4-6oz water.        * Notice:  This list has 2 medication(s) that are the same as other medications prescribed for you. Read the directions carefully, and ask your doctor or other care provider to review them with you.

## 2018-07-10 ENCOUNTER — OFFICE VISIT (OUTPATIENT)
Dept: CARDIOLOGY | Facility: CLINIC | Age: 68
End: 2018-07-10
Attending: PHYSICIAN ASSISTANT
Payer: COMMERCIAL

## 2018-07-10 VITALS
WEIGHT: 166 LBS | DIASTOLIC BLOOD PRESSURE: 69 MMHG | SYSTOLIC BLOOD PRESSURE: 117 MMHG | HEART RATE: 68 BPM | BODY MASS INDEX: 27.66 KG/M2 | HEIGHT: 65 IN

## 2018-07-10 DIAGNOSIS — I48.0 PAROXYSMAL ATRIAL FIBRILLATION (H): ICD-10-CM

## 2018-07-10 PROCEDURE — 99214 OFFICE O/P EST MOD 30 MIN: CPT | Performed by: PHYSICIAN ASSISTANT

## 2018-07-10 RX ORDER — DILTIAZEM HYDROCHLORIDE 120 MG/1
120 CAPSULE, COATED, EXTENDED RELEASE ORAL DAILY
Qty: 90 CAPSULE | Refills: 3 | Status: SHIPPED | OUTPATIENT
Start: 2018-07-10 | End: 2019-06-24

## 2018-07-10 NOTE — MR AVS SNAPSHOT
After Visit Summary   7/10/2018    Marva Angela    MRN: [id number]           Patient Information     Date Of Birth          1950        Visit Information        Provider Department      7/10/2018 7:30 AM Rufina Palacios PA-C [facility]   [place]        Today's Diagnoses     Paroxysmal atrial fibrillation (H)          Care Instructions    Thanks for coming into [facility] clinic today.    We discussed: I'm glad you are feeling better!    If you're blood pressure is getting too low, we can eventually stop spironolactone.      Medication changes:  Continue current medications.      Follow up: with Dr. Stephen in November.       Please call my nurse at [phone] with any questions or concerns.    Scheduling phone number: [phone]  Reminder: Please bring in all current medications, over the counter supplements and vitamin bottles to your next appointment.                  Follow-ups after your visit        Additional Services     Follow-Up with Cardiologist                 Your next 10 appointments already scheduled     Nov 02, 2018  7:30 AM CDT   MA SCREENING DIGITAL BILATERAL with [facility]   [facility] ([facility])    [address] [place] [id number] [phone]           Do not use any powder, lotion or deodorant under your arms or on your breast. If you do, we will ask you to remove it before your exam.  Wear comfortable, two-piece clothing.  If you have any allergies, tell your care team.  Bring any previous mammograms from other facilities or have them mailed to the breast center. Three-dimensional (3D) mammograms are available at [facility] in [facility], [facility], and [place]. -Health locations include [place] [facility] in [place]. Benefits of 3D mammograms include: - Improved  "rate of cancer detection - Decreases your chance of having to go back for more tests, which means fewer: - \"False-positive\" results (This means that there is an abnormal area but it isn't cancer.) - Invasive testing procedures, such as a biopsy or surgery - Can provide clearer images of the breast if you have dense breast tissue. 3D mammography is an optional exam that anyone can have with a 2D mammogram. It doesn't replace or take the place of a 2D mammogram. 2D mammograms remain an effective screening test for all women.  Not all insurance companies cover the cost of a 3D mammogram. Check with your insurance.            Nov 02, 2018  8:00 AM CDT   [facility]   [facility] ([facility])    [address] [place] [id number] [phone]           Please do not eat 10-12 hours before your appointment if you are coming in fasting for labs on lipids, cholesterol, or glucose (sugar). This does not apply to pregnant women. Water, hot tea and black coffee (with nothing added) are okay. Do not drink other fluids, diet soda or chew gum.            Nov 05, 2018  9:20 AM CST   PHYSICAL with Kendall Mendez MD   [facility] ([facility])    [address] [place] [id number] [phone]              Future tests that were ordered for you today     Open Future Orders        Priority Expected Expires Ordered    Follow-Up with Cardiologist Routine 11/7/2018 7/10/2019 7/10/2018            Who to contact     If you have questions or need follow up information about today's clinic visit or your schedule please contact [facility]   [place] directly at [phone].  Normal or non-critical lab and imaging results will be communicated to you by MyChart, letter or phone within 4 business days after the clinic has received the results. If you do not hear from us " "within 7 days, please contact the clinic through [employer] or phone. If you have a critical or abnormal lab result, we will notify you by phone as soon as possible.  Submit refill requests through [employer] or call your pharmacy and they will forward the refill request to us. Please allow 3 business days for your refill to be completed.          Additional Information About Your Visit        [employer] Information     [employer] gives you secure access to your electronic health record. If you see a primary care provider, you can also send messages to your care team and make appointments. If you have questions, please call your primary care clinic.  If you do not have a primary care provider, please call [phone] and they will assist you.        Care EveryWhere ID     This is your Care EveryWhere ID. This could be used by other organizations to access your [place] medical records  [id number]        Your Vitals Were     Pulse Height BMI (Body Mass Index)             68 1.651 m (5' 5\") 27.62 kg/m2          Blood Pressure from Last 3 Encounters:   07/10/18 117/69   06/19/18 100/80   05/23/18 124/69    Weight from Last 3 Encounters:   07/10/18 75.3 kg (166 lb)   06/19/18 76 kg (167 lb 9.6 oz)   05/23/18 75.8 kg (167 lb 1.6 oz)              We Performed the Following     Follow-Up with Cardiac Advanced Practice Provider          Today's Medication Changes          These changes are accurate as of 7/10/18  7:59 AM.  If you have any questions, ask your nurse or doctor.               These medicines have changed or have updated prescriptions.        Dose/Directions    celecoxib 200 MG capsule   Commonly known as:  celeBREX   This may have changed:    - when to take this  - reasons to take this   Used for:  Osteoarthritis, unspecified osteoarthritis type, unspecified site        Dose:  200 mg   Take 1 capsule (200 mg) by mouth 2 times daily   Quantity:  180 capsule   Refills:  3            Where to get your medicines      These " medications were sent to [id number] - [place], [place] - [address] AT [facility] OF  & [address]  [address], [place] [address]     Phone:  [phone]     diltiazem 120 MG 24 hr capsule                Primary Care Provider Office Phone # Fax #    Kendall Mendez [phone]       [address] [place] [address]        Equal Access to Services     NITIN FRIAS : Hadii aad ku hadasho Soomaali, waaxda luqadaha, qaybta kaalmada adeegyada, waxay idiin hayaan adeeg kharash laSpenceraaclarisa . So [facility] [phone].    ATENCIÓN: Si habla español, tiene a barfield disposición servicios gratuitos de asistencia lingüística. [facility] [phone].    We comply with applicable federal civil rights laws and [place] laws. We do not discriminate on the basis of race, color, national origin, age, disability, sex, sexual orientation, or gender identity.            Thank you!     Thank you for choosing [facility]  for your care. Our goal is always to provide you with excellent care. Hearing back from our patients is one way we can continue to improve our services. Please take a few minutes to complete the written survey that you may receive in the mail after your visit with us. Thank you!             Your Updated Medication List - Protect others around you: Learn how to safely use, store and throw away your medicines at www.disposemymeds.org.          This list is accurate as of 7/10/18  7:59 AM.  Always use your most recent med list.                   Brand Name Dispense Instructions for use Diagnosis    * albuterol 108 (90 Base) MCG/ACT Inhaler    PROAIR HFA/PROVENTIL HFA/VENTOLIN HFA    3 Inhaler    Inhale 2 puffs into the lungs every 6 hours as needed for shortness of breath / dyspnea or wheezing    Chronic obstructive pulmonary disease, unspecified COPD type (H)       * albuterol (2.5 MG/3ML) 0.083% neb solution     1 Box     Take 1 vial (2.5 mg) by nebulization every 6 hours as needed for shortness of breath / dyspnea or wheezing    Pulmonary emphysema, unspecified emphysema type (H), COPD exacerbation (H)       aspirin 81 MG EC tablet     30 tablet    Take 1 tablet (81 mg) by mouth daily    Coronary artery disease involving native coronary artery of native heart without angina pectoris       atorvastatin 20 MG tablet    LIPITOR    90 tablet    Take 1 tablet (20 mg) by mouth daily INDICATION: TO LOWER CHOLESTEROL AND TO HELP REDUCE RISK OF HEART ATTACK AND STROKE    Hyperlipidemia LDL goal <130       betamethasone dipropionate 0.05 % cream    DIPROSONE    45 g    Apply topically 2 times daily as needed (For foot)    Psoriasis       celecoxib 200 MG capsule    celeBREX    180 capsule    Take 1 capsule (200 mg) by mouth 2 times daily    Osteoarthritis, unspecified osteoarthritis type, unspecified site       cholecalciferol 5000 units Tabs tablet    vitamin D3     Take 1 tablet (5,000 Units) by mouth daily    Vitamin D deficiency, Osteopenia, unspecified location       cyanocobalamin 2500 MCG sublingual tablet    VITAMIN B-12     Place 2,500 mcg under the tongue daily    Vitamin B12 deficiency (non anemic)       diltiazem 120 MG 24 hr capsule    CARDIZEM CD/CARTIA XT    90 capsule    Take 1 capsule (120 mg) by mouth daily with breakfast    Paroxysmal atrial fibrillation (H)       folic acid 1 MG tablet    FOLVITE    100 tablet    Take 1 tablet (1 mg) by mouth daily INDICATION: FOLIC ACID SUPPLEMENT    Vitamin B12 deficiency (non anemic)       order for DME     1 each    Equipment being ordered: Nebulizer    Pulmonary emphysema, unspecified emphysema type (H)       OYSTER CALCIUM + D 250-125 MG-UNIT per tablet   Generic drug:  calcium-vitamin D      Take 2 tablets by mouth daily        ranitidine 150 MG tablet    ZANTAC     Take 150 mg by mouth daily as needed for heartburn        spironolactone 25 MG tablet    ALDACTONE    90 tablet     Take 1 tablet (25 mg) by mouth daily    Benign essential hypertension       tiotropium 18 MCG capsule    SPIRIVA HANDIHALER    90 capsule    Inhale contents of one capsule daily.    Pulmonary emphysema, unspecified emphysema type (H)       triamcinolone 0.1 % cream    KENALOG     Apply topically 2 times daily as needed for irritation (sores on scalp.)        TYLENOL PO      Take 1,000 mg by mouth 2 times daily as needed for mild pain or fever        Urea 20 % Crea cream      Apply topically daily        vitamin C-electrolytes 1000mg vitamin C super orange drink mix    EMERGEN-C     Take 1 packet by mouth daily Mix 1 packet in 4-6oz water.        * Notice:  This list has 2 medication(s) that are the same as other medications prescribed for you. Read the directions carefully, and ask your doctor or other care provider to review them with you.

## 2018-07-10 NOTE — PROGRESS NOTES
[id number]  HPI and Plan:   See dictation    Orders this Visit:  Orders Placed This Encounter   Procedures     Follow-Up with Cardiologist     Orders Placed This Encounter   Medications     diltiazem (CARDIZEM CD/CARTIA XT) 120 MG 24 hr capsule     Sig: Take 1 capsule (120 mg) by mouth daily with breakfast     Dispense:  90 capsule     Refill:  3     Medications Discontinued During This Encounter   Medication Reason     diltiazem (CARDIZEM CD/CARTIA XT) 120 MG 24 hr capsule Reorder         Encounter Diagnosis   Name Primary?     Paroxysmal atrial fibrillation (H)        CURRENT MEDICATIONS:  Current Outpatient Prescriptions   Medication Sig Dispense Refill     Acetaminophen (TYLENOL PO) Take 1,000 mg by mouth 2 times daily as needed for mild pain or fever       albuterol (2.5 MG/3ML) 0.083% neb solution Take 1 vial (2.5 mg) by nebulization every 6 hours as needed for shortness of breath / dyspnea or wheezing 1 Box 11     albuterol (PROAIR HFA, PROVENTIL HFA, VENTOLIN HFA) 108 (90 BASE) MCG/ACT inhaler Inhale 2 puffs into the lungs every 6 hours as needed for shortness of breath / dyspnea or wheezing 3 Inhaler 1     aspirin EC 81 MG tablet Take 1 tablet (81 mg) by mouth daily 30 tablet 11     atorvastatin (LIPITOR) 20 MG tablet Take 1 tablet (20 mg) by mouth daily INDICATION: TO LOWER CHOLESTEROL AND TO HELP REDUCE RISK OF HEART ATTACK AND STROKE 90 tablet 3     betamethasone dipropionate (DIPROSONE) 0.05 % cream Apply topically 2 times daily as needed (For foot) 45 g prn     calcium-vitamin D (OYSTER CALCIUM + D) 250-125 MG-UNIT per tablet Take 2 tablets by mouth daily       celecoxib (CELEBREX) 200 MG capsule Take 1 capsule (200 mg) by mouth 2 times daily (Patient taking differently: Take 200 mg by mouth 2 times daily as needed ) 180 capsule 3     cholecalciferol (VITAMIN D3) 5000 units TABS tablet Take 1 tablet (5,000 Units) by mouth daily       cyanocobalamin (VITAMIN B-12) 2500 MCG sublingual tablet Place 2,500 mcg  under the tongue daily       diltiazem (CARDIZEM CD/CARTIA XT) 120 MG 24 hr capsule Take 1 capsule (120 mg) by mouth daily with breakfast 90 capsule 3     folic acid (FOLVITE) 1 MG tablet Take 1 tablet (1 mg) by mouth daily INDICATION: FOLIC ACID SUPPLEMENT 100 tablet 3     order for DME Equipment being ordered: Nebulizer 1 each 0     ranitidine (ZANTAC) 150 MG tablet Take 150 mg by mouth daily as needed for heartburn        spironolactone (ALDACTONE) 25 MG tablet Take 1 tablet (25 mg) by mouth daily 90 tablet 3     tiotropium (SPIRIVA HANDIHALER) 18 MCG capsule Inhale contents of one capsule daily. 90 capsule 3     triamcinolone (KENALOG) 0.1 % cream Apply topically 2 times daily as needed for irritation (sores on scalp.)       Urea 20 % CREA cream Apply topically daily        vitamin C-electrolytes (EMERGEN-C) 1000mg vitamin C super orange drink mix Take 1 packet by mouth daily Mix 1 packet in 4-6oz water.       [DISCONTINUED] diltiazem (CARDIZEM CD/CARTIA XT) 120 MG 24 hr capsule Take 1 capsule (120 mg) by mouth daily with breakfast 90 capsule 0       ALLERGIES     Allergies   Allergen Reactions     Penicillins Rash     rash     Sulfa Drugs Rash     rash       PAST MEDICAL HISTORY:  Past Medical History:   Diagnosis Date     Arthritis     knees and hips     Benign essential hypertension     was on amlodipine/ then metoprolol / add lisinopril      Contact dermatitis and other eczema, due to unspecified cause      COPD (chronic obstructive pulmonary disease) (H) 01/01/2012     Coronary artery disease involving native coronary artery of native heart without angina pectoris 2016    coronary artery calcifications seen on CT scan, mild nonocclusive CAD     Diabetes (H)     pre diabetic     Hyperlipidemia LDL goal <130 11/7/2012     Nephritis      as a child (post strep)      Osteoporosis, unspecified      Phlebitis and thrombophlebitis of other deep vessels of lower extremities 1972, 1975    Both with pregnancies      Sinusitis      Tobacco use disorder        PAST SURGICAL HISTORY:  Past Surgical History:   Procedure Laterality Date     ARTHROPLASTY HIP Right 4/3/2017    Procedure: ARTHROPLASTY HIP;  Surgeon: Francisco J Alston MD;  Location: [facility] OR     ARTHROPLASTY KNEE Left 7/31/2017    Procedure: ARTHROPLASTY KNEE;  LEFT TOTAL KNEE ARTHROPLASTY ;  Surgeon: Francisco J Alston MD;  Location: [facility] OR     BIOPSY OF SKIN LESION       C DEXA, BONE DENSITY, AXIAL SKEL  6/2008    T score lumbar -0.6, femoral neck -2.4/-2.0(all stable)     C VAGINAL HYSTERECTOMY  1978    Hysterectomy, Vaginal     HC ASPIRATION &/OR INJECTION GANGLION CYST, ANY  1994     HERNIORRHAPHY INGUINAL Right 3/24/2015    Procedure: HERNIORRHAPHY INGUINAL;  Surgeon: Sam Erazo MD;  Location:  [facility]     HYSTERECTOMY, PAP NO LONGER INDICATED       ORTHOPEDIC SURGERY      bilat meniscus repair       FAMILY HISTORY:  Family History   Problem Relation Age of Onset     C.A.D. Father      Diabetes Father      type 2     Myocardial Infarction Father      Heart Surgery Father      CABGx4     Hyperlipidemia Father      Breast Cancer Mother 70     Osteoperosis Mother      Thyroid Disease Mother      Hyperlipidemia Mother      Cancer Brother      Bladder     Hyperlipidemia Brother      Diabetes Brother      type 2     Colon Cancer Brother      Neurologic Disorder Paternal Grandfather      Osteoperosis Sister      Arrhythmia Sister      Heart Surgery Sister      cardioversion, ablation     Other - See Comments Sister 55     arhythmogenic right ventricular dysplasia     Osteoperosis Maternal Grandmother      Osteoperosis Paternal Grandmother      Other - See Comments Maternal Grandfather      pneumonia     Family History Negative Son      Thyroid Disease Daughter      NODULES     Family History Negative Daughter      Arrhythmia Cousin      a-fib     Cancer - colorectal No family hx of      Prostate Cancer No family hx of      Alzheimer Disease No  "family hx of      Anesthesia Reaction No family hx of      Blood Disease No family hx of      Eye Disorder No family hx of        SOCIAL HISTORY:  Social History     Social History     Marital status: Single     Spouse name: N/A     Number of children: 2     Years of education: 18     Occupational History     Mental health therapist [employer]     Social History Main Topics     Smoking status: Former Smoker     Packs/day: 1.00     Years: 10.00     Types: Cigarettes     Start date: 2/7/1964     Quit date: 9/7/2015     Smokeless tobacco: Never Used      Comment: 5-10 daily     Alcohol use 0.0 oz/week     0 Standard drinks or equivalent per week      Comment: 1 a month     Drug use: No     Sexual activity: No     Other Topics Concern      Service No     Blood Transfusions No     Caffeine Concern No     1 cup coffee a day, 1-2 can's of pop a wk in the summer      Occupational Exposure No     Hobby Hazards No     Sleep Concern No     Stress Concern No     Weight Concern No     Special Diet No     Back Care No     Exercise No     not due to knee's and hip     Bike Helmet No     n/a     Seat Belt Yes     Self-Exams Yes     sometimes     Parent/Sibling W/ Cabg, Mi Or Angioplasty Before 65f 55m? No     Social History Narrative    Eats fruits and vegetables every day. She takes a calcium supplement twice daily.    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[employer]            Review of Systems:  Skin:  Negative     Eyes:  Positive for glasses  ENT:  Negative    Respiratory:  Positive for shortness of breath  Cardiovascular:    dizziness;lightheadedness;Positive for  Gastroenterology: Negative    Genitourinary:  Negative    Musculoskeletal:  Positive for joint pain  Neurologic:  Negative    Psychiatric:  Positive for sleep disturbances  Heme/Lymph/Imm:  Positive for night sweats;allergies  Endocrine:  Negative      Physical Exam:  Vitals: /69  Pulse 68  Ht 1.651 m (5' 5\")  Wt 75.3 kg (166 lb)  BMI " 27.62 kg/m2   Please refer to dictation for physical exam    Recent Lab Results:  LIPID RESULTS:  Lab Results   Component Value Date    CHOL 172 03/19/2018    HDL 56 03/19/2018    LDL 99 03/19/2018    TRIG 85 03/19/2018    CHOLHDLRATIO 4.2 09/18/2015       LIVER ENZYME RESULTS:  Lab Results   Component Value Date    AST 19 07/10/2017    ALT 28 03/19/2018       CBC RESULTS:  Lab Results   Component Value Date    WBC 8.8 04/04/2018    RBC 4.95 04/04/2018    HGB 15.2 04/04/2018    HCT 44.2 04/04/2018    MCV 89 04/04/2018    MCH 30.7 04/04/2018    MCHC 34.4 04/04/2018    RDW 12.7 04/04/2018     04/04/2018       BMP RESULTS:  Lab Results   Component Value Date     04/04/2018    POTASSIUM 3.7 04/04/2018    CHLORIDE 99 04/04/2018    CO2 33 (H) 04/04/2018    ANIONGAP 5 04/04/2018     (H) 04/04/2018    BUN 20 04/04/2018    CR 0.86 04/04/2018    GFRESTIMATED 65 04/04/2018    GFRESTBLACK 79 04/04/2018    SANGEETA 9.9 04/04/2018        A1C RESULTS:  Lab Results   Component Value Date    A1C 6.4 (H) 03/21/2017       INR RESULTS:  Lab Results   Component Value Date    INR 2.1 (A) 08/31/2017    INR 2.8 (A) 08/22/2017    INR 1.07 08/03/2017    INR 1.20 (H) 04/05/2017           CHALO Palacios PA-C  [address]  [place] [id number]

## 2018-07-10 NOTE — PATIENT INSTRUCTIONS
Thanks for coming into [facility] today.    We discussed: I'm glad you are feeling better!    If you're blood pressure is getting too low, we can eventually stop spironolactone.      Medication changes:  Continue current medications.      Follow up: with Dr. Stephen in November.       Please call my nurse at [phone] with any questions or concerns.    Scheduling phone number: [phone]  Reminder: Please bring in all current medications, over the counter supplements and vitamin bottles to your next appointment.

## 2018-07-10 NOTE — LETTER
7/10/2018      Kendall Mendez MD  [address] [facility] [address]      RE: Marva Angela       Dear Colleague,    I had the pleasure of seeing Marva Angela in the [facility].    Service Date: 07/10/2018      PRIMARY CARDIOLOGIST:  Has been Dr. Fountain, will be Dr. Stephen.      REASON FOR VISIT:  Palpitation followup.      HISTORY OF PRESENT ILLNESS:  Ms. Angela is a delightful, 67-year-old woman with a past medical history significant for the followin.  Hypertension with intolerance to several medications.   2.  History of tobacco, now quit.   3.  COPD.   4.  Dyslipidemia.   5.  Calcification of the coronary arteries and thoracic aorta on CT of her chest.      I saw her earlier this spring where she had developed a respiratory infection and profound dyspnea on exertion with intermittent palpitations.  She was sent for a stress echo by Dr. Stephen, and this showed normal exercise response with occasional PVCs and PACs and a normal EF without ST changes.  Unfortunately, I saw her, and then she continued to worsen and actually was in the ER.  Eventually, she was given a nebulizer by her Primary, and this improved her symptoms greatly.  She was put back on inhalers and improved.  Given her palpitations, we did an event monitor.  This showed multiple episodes of PACs and several very brief runs of atrial fibrillation and atrial flutter, all less than 10 seconds.  Given this, we started her on diltiazem at her last visit.        She says that after that about 3 days after starting the medication, she had an episode of palpitations and shortness of breath that lasted about 30 minutes.  She rested, and they resolved on their own.  She had one about 1 week after that that again lasted maybe 20 minutes and resolved on its own.  She had some shortness of breath and fluttering in her chest, but no syncope or presyncope.  Overall, her breathing is good.  She has not had  as much shortness of breath.  She feels back to herself, and she has resumed her normal activity.  She does have anxiety related to driving if she is by herself, as the palpitations initially happened when she was driving, and she is concerned about being able to pull over, etc., and get her car back home.  At this point, she is very pleased and has not had any palpitations at all for basically the last 1 month.  She has not had adverse effects from the medication, and in fact, she had been on hydrochlorothiazide before, and this was discontinued due to hypotension at her visit with her Primary.      SOCIAL HISTORY:  She is a retired .  Former smoker, 1/2 pack a day, quit in 2015.  Occasional alcohol use.  She is a pickleball player.  She has not yet restarted walking, but typically had walked several times a week.      FAMILY HISTORY:  Significant for brother with AFib and sister with ARVD.      PHYSICAL EXAMINATION:   GENERAL:  Well-developed, well-nourished woman in no acute distress.   HEENT:  Normocephalic, atraumatic.   HEART:  Regular in rate and rhythm.  No murmur, rub or gallop.   LUNGS:  Clear bilaterally.  Carotids are without bruit.   SKIN:  Warm and dry.   EXTREMITIES:  No peripheral edema.  Varicosities noted.      ASSESSMENT AND PLAN:   1.  Episode of palpitations with brief runs of AFib and AFlutter noted on event monitor, all less than 10 seconds.  Even her most recent episodes were likely less than 30 minutes.  She does have a CHADS-VASc of 3, but given the brief episodes, I do not want to put her at risk of bleeding, especially because these are improved with the diltiazem.  We will continue her on diltiazem 120 mg daily, extended release, as this seems to be effective.  We did discuss if these episodes become longer, we will need to anticoagulate.  At this point, we will continue with observing, as she is symptomatic with these.   2.  COPD/emphysema.  Continue with inhalers per  Primary.   3.  Dyslipidemia.  Slightly elevated LDL at 99, where our goal is probably less than 100 for primary prevention.  She does have calcifications noted on her aorta and in her coronaries on CT of the chest, and we are trying to prevent a future MI or stroke.  This was reviewed with her today.  We will follow for now.  Again, we will repeat a lipid panel in March.      I will have her follow up with Dr. Stephen in 4 months, sooner with concerns.         PIA BROWN PA-C             D: 07/10/2018   T: 07/10/2018   MT: [email or web address]      Name:     NORBERTO IBRAHIM   MRN:      [id number]        Account:      [id number]   :      1950           Service Date: 07/10/2018      Document: [id number]         Outpatient Encounter Prescriptions as of 7/10/2018   Medication Sig Dispense Refill     Acetaminophen (TYLENOL PO) Take 1,000 mg by mouth 2 times daily as needed for mild pain or fever       albuterol (2.5 MG/3ML) 0.083% neb solution Take 1 vial (2.5 mg) by nebulization every 6 hours as needed for shortness of breath / dyspnea or wheezing 1 Box 11     albuterol (PROAIR HFA, PROVENTIL HFA, VENTOLIN HFA) 108 (90 BASE) MCG/ACT inhaler Inhale 2 puffs into the lungs every 6 hours as needed for shortness of breath / dyspnea or wheezing 3 Inhaler 1     aspirin EC 81 MG tablet Take 1 tablet (81 mg) by mouth daily 30 tablet 11     atorvastatin (LIPITOR) 20 MG tablet Take 1 tablet (20 mg) by mouth daily INDICATION: TO LOWER CHOLESTEROL AND TO HELP REDUCE RISK OF HEART ATTACK AND STROKE 90 tablet 3     betamethasone dipropionate (DIPROSONE) 0.05 % cream Apply topically 2 times daily as needed (For foot) 45 g prn     calcium-vitamin D (OYSTER CALCIUM + D) 250-125 MG-UNIT per tablet Take 2 tablets by mouth daily       celecoxib (CELEBREX) 200 MG capsule Take 1 capsule (200 mg) by mouth 2 times daily (Patient taking differently: Take 200 mg by mouth 2 times daily as needed ) 180 capsule 3     cholecalciferol (VITAMIN D3)  5000 units TABS tablet Take 1 tablet (5,000 Units) by mouth daily       cyanocobalamin (VITAMIN B-12) 2500 MCG sublingual tablet Place 2,500 mcg under the tongue daily       diltiazem (CARDIZEM CD/CARTIA XT) 120 MG 24 hr capsule Take 1 capsule (120 mg) by mouth daily with breakfast 90 capsule 3     folic acid (FOLVITE) 1 MG tablet Take 1 tablet (1 mg) by mouth daily INDICATION: FOLIC ACID SUPPLEMENT 100 tablet 3     order for DME Equipment being ordered: Nebulizer 1 each 0     ranitidine (ZANTAC) 150 MG tablet Take 150 mg by mouth daily as needed for heartburn        spironolactone (ALDACTONE) 25 MG tablet Take 1 tablet (25 mg) by mouth daily 90 tablet 3     tiotropium (SPIRIVA HANDIHALER) 18 MCG capsule Inhale contents of one capsule daily. 90 capsule 3     triamcinolone (KENALOG) 0.1 % cream Apply topically 2 times daily as needed for irritation (sores on scalp.)       Urea 20 % CREA cream Apply topically daily        vitamin C-electrolytes (EMERGEN-C) 1000mg vitamin C super orange drink mix Take 1 packet by mouth daily Mix 1 packet in 4-6oz water.       [DISCONTINUED] diltiazem (CARDIZEM CD/CARTIA XT) 120 MG 24 hr capsule Take 1 capsule (120 mg) by mouth daily with breakfast 90 capsule 0     No facility-administered encounter medications on file as of 7/10/2018.        Again, thank you for allowing me to participate in the care of your patient.      Sincerely,    Rufina Palacios PA-C     [facility]

## 2018-07-10 NOTE — PROGRESS NOTES
Service Date: 07/10/2018      PRIMARY CARDIOLOGIST:  Has been Dr. Fountain, will be Dr. Stephen.      REASON FOR VISIT:  Palpitation followup.      HISTORY OF PRESENT ILLNESS:  Ms. Angela is a delightful, 67-year-old woman with a past medical history significant for the followin.  Hypertension with intolerance to several medications.   2.  History of tobacco, now quit.   3.  COPD.   4.  Dyslipidemia.   5.  Calcification of the coronary arteries and thoracic aorta on CT of her chest.      I saw her earlier this spring where she had developed a respiratory infection and profound dyspnea on exertion with intermittent palpitations.  She was sent for a stress echo by Dr. Stephen, and this showed normal exercise response with occasional PVCs and PACs and a normal EF without ST changes.  Unfortunately, I saw her, and then she continued to worsen and actually was in the ER.  Eventually, she was given a nebulizer by her Primary, and this improved her symptoms greatly.  She was put back on inhalers and improved.  Given her palpitations, we did an event monitor.  This showed multiple episodes of PACs and several very brief runs of atrial fibrillation and atrial flutter, all less than 10 seconds.  Given this, we started her on diltiazem at her last visit.        She says that after that about 3 days after starting the medication, she had an episode of palpitations and shortness of breath that lasted about 30 minutes.  She rested, and they resolved on their own.  She had one about 1 week after that that again lasted maybe 20 minutes and resolved on its own.  She had some shortness of breath and fluttering in her chest, but no syncope or presyncope.  Overall, her breathing is good.  She has not had as much shortness of breath.  She feels back to herself, and she has resumed her normal activity.  She does have anxiety related to driving if she is by herself, as the palpitations initially happened when she was driving, and she is  concerned about being able to pull over, etc., and get her car back home.  At this point, she is very pleased and has not had any palpitations at all for basically the last 1 month.  She has not had adverse effects from the medication, and in fact, she had been on hydrochlorothiazide before, and this was discontinued due to hypotension at her visit with her Primary.      SOCIAL HISTORY:  She is a retired .  Former smoker, 1/2 pack a day, quit in 2015.  Occasional alcohol use.  She is a pickleball player.  She has not yet restarted walking, but typically had walked several times a week.      FAMILY HISTORY:  Significant for brother with AFib and sister with ARVD.      PHYSICAL EXAMINATION:   GENERAL:  Well-developed, well-nourished woman in no acute distress.   HEENT:  Normocephalic, atraumatic.   HEART:  Regular in rate and rhythm.  No murmur, rub or gallop.   LUNGS:  Clear bilaterally.  Carotids are without bruit.   SKIN:  Warm and dry.   EXTREMITIES:  No peripheral edema.  Varicosities noted.      ASSESSMENT AND PLAN:   1.  Episode of palpitations with brief runs of AFib and AFlutter noted on event monitor, all less than 10 seconds.  Even her most recent episodes were likely less than 30 minutes.  She does have a CHADS-VASc of 3, but given the brief episodes, I do not want to put her at risk of bleeding, especially because these are improved with the diltiazem.  We will continue her on diltiazem 120 mg daily, extended release, as this seems to be effective.  We did discuss if these episodes become longer, we will need to anticoagulate.  At this point, we will continue with observing, as she is symptomatic with these.   2.  COPD/emphysema.  Continue with inhalers per Primary.   3.  Dyslipidemia.  Slightly elevated LDL at 99, where our goal is probably less than 100 for primary prevention.  She does have calcifications noted on her aorta and in her coronaries on CT of the chest, and we are trying to  prevent a future MI or stroke.  This was reviewed with her today.  We will follow for now.  Again, we will repeat a lipid panel in March.      I will have her follow up with Dr. Stephen in 4 months, sooner with concerns.         PIA BROWN PA-C             D: 07/10/2018   T: 07/10/2018   MT: keshav      Name:     NORBERTO IBRAHIM   MRN:      [id number]        Account:      [id number]   :      1950           Service Date: 07/10/2018      Document: [id number]

## 2018-07-10 NOTE — LETTER
7/10/2018    Kendall Mendez MD  [address] [facility] [address]    RE: Marva Angela       Dear Colleague,    I had the pleasure of seeing Marva Angela in the [facility].    [id number]  HPI and Plan:   See dictation    Orders this Visit:  Orders Placed This Encounter   Procedures     Follow-Up with Cardiologist     Orders Placed This Encounter   Medications     diltiazem (CARDIZEM CD/CARTIA XT) 120 MG 24 hr capsule     Sig: Take 1 capsule (120 mg) by mouth daily with breakfast     Dispense:  90 capsule     Refill:  3     Medications Discontinued During This Encounter   Medication Reason     diltiazem (CARDIZEM CD/CARTIA XT) 120 MG 24 hr capsule Reorder         Encounter Diagnosis   Name Primary?     Paroxysmal atrial fibrillation (H)        CURRENT MEDICATIONS:  Current Outpatient Prescriptions   Medication Sig Dispense Refill     Acetaminophen (TYLENOL PO) Take 1,000 mg by mouth 2 times daily as needed for mild pain or fever       albuterol (2.5 MG/3ML) 0.083% neb solution Take 1 vial (2.5 mg) by nebulization every 6 hours as needed for shortness of breath / dyspnea or wheezing 1 Box 11     albuterol (PROAIR HFA, PROVENTIL HFA, VENTOLIN HFA) 108 (90 BASE) MCG/ACT inhaler Inhale 2 puffs into the lungs every 6 hours as needed for shortness of breath / dyspnea or wheezing 3 Inhaler 1     aspirin EC 81 MG tablet Take 1 tablet (81 mg) by mouth daily 30 tablet 11     atorvastatin (LIPITOR) 20 MG tablet Take 1 tablet (20 mg) by mouth daily INDICATION: TO LOWER CHOLESTEROL AND TO HELP REDUCE RISK OF HEART ATTACK AND STROKE 90 tablet 3     betamethasone dipropionate (DIPROSONE) 0.05 % cream Apply topically 2 times daily as needed (For foot) 45 g prn     calcium-vitamin D (OYSTER CALCIUM + D) 250-125 MG-UNIT per tablet Take 2 tablets by mouth daily       celecoxib (CELEBREX) 200 MG capsule Take 1 capsule (200 mg) by mouth 2 times daily (Patient taking differently: Take  200 mg by mouth 2 times daily as needed ) 180 capsule 3     cholecalciferol (VITAMIN D3) 5000 units TABS tablet Take 1 tablet (5,000 Units) by mouth daily       cyanocobalamin (VITAMIN B-12) 2500 MCG sublingual tablet Place 2,500 mcg under the tongue daily       diltiazem (CARDIZEM CD/CARTIA XT) 120 MG 24 hr capsule Take 1 capsule (120 mg) by mouth daily with breakfast 90 capsule 3     folic acid (FOLVITE) 1 MG tablet Take 1 tablet (1 mg) by mouth daily INDICATION: FOLIC ACID SUPPLEMENT 100 tablet 3     order for DME Equipment being ordered: Nebulizer 1 each 0     ranitidine (ZANTAC) 150 MG tablet Take 150 mg by mouth daily as needed for heartburn        spironolactone (ALDACTONE) 25 MG tablet Take 1 tablet (25 mg) by mouth daily 90 tablet 3     tiotropium (SPIRIVA HANDIHALER) 18 MCG capsule Inhale contents of one capsule daily. 90 capsule 3     triamcinolone (KENALOG) 0.1 % cream Apply topically 2 times daily as needed for irritation (sores on scalp.)       Urea 20 % CREA cream Apply topically daily        vitamin C-electrolytes (EMERGEN-C) 1000mg vitamin C super orange drink mix Take 1 packet by mouth daily Mix 1 packet in 4-6oz water.       [DISCONTINUED] diltiazem (CARDIZEM CD/CARTIA XT) 120 MG 24 hr capsule Take 1 capsule (120 mg) by mouth daily with breakfast 90 capsule 0       ALLERGIES     Allergies   Allergen Reactions     Penicillins Rash     rash     Sulfa Drugs Rash     rash       PAST MEDICAL HISTORY:  Past Medical History:   Diagnosis Date     Arthritis     knees and hips     Benign essential hypertension     was on amlodipine/ then metoprolol / add lisinopril      Contact dermatitis and other eczema, due to unspecified cause      COPD (chronic obstructive pulmonary disease) (H) 01/01/2012     Coronary artery disease involving native coronary artery of native heart without angina pectoris 2016    coronary artery calcifications seen on CT scan, mild nonocclusive CAD     Diabetes (H)     pre diabetic      Hyperlipidemia LDL goal <130 11/7/2012     Nephritis      as a child (post strep)      Osteoporosis, unspecified      Phlebitis and thrombophlebitis of other deep vessels of lower extremities 1972, 1975    Both with pregnancies     Sinusitis      Tobacco use disorder        PAST SURGICAL HISTORY:  Past Surgical History:   Procedure Laterality Date     ARTHROPLASTY HIP Right 4/3/2017    Procedure: ARTHROPLASTY HIP;  Surgeon: Francisco J Alston MD;  Location: [facility] OR     ARTHROPLASTY KNEE Left 7/31/2017    Procedure: ARTHROPLASTY KNEE;  LEFT TOTAL KNEE ARTHROPLASTY ;  Surgeon: Francisco J Alston MD;  Location:  OR     BIOPSY OF SKIN LESION       C DEXA, BONE DENSITY, AXIAL SKEL  6/2008    T score lumbar -0.6, femoral neck -2.4/-2.0(all stable)     C VAGINAL HYSTERECTOMY  1978    Hysterectomy, Vaginal     HC ASPIRATION &/OR INJECTION GANGLION CYST, ANY  1994     HERNIORRHAPHY INGUINAL Right 3/24/2015    Procedure: HERNIORRHAPHY INGUINAL;  Surgeon: Sam Erazo MD;  Location:  [facility]     HYSTERECTOMY, PAP NO LONGER INDICATED       ORTHOPEDIC SURGERY      bilat meniscus repair       FAMILY HISTORY:  Family History   Problem Relation Age of Onset     C.A.D. Father      Diabetes Father      type 2     Myocardial Infarction Father      Heart Surgery Father      CABGx4     Hyperlipidemia Father      Breast Cancer Mother 70     Osteoperosis Mother      Thyroid Disease Mother      Hyperlipidemia Mother      Cancer Brother      Bladder     Hyperlipidemia Brother      Diabetes Brother      type 2     Colon Cancer Brother      Neurologic Disorder Paternal Grandfather      Osteoperosis Sister      Arrhythmia Sister      Heart Surgery Sister      cardioversion, ablation     Other - See Comments Sister 55     arhythmogenic right ventricular dysplasia     Osteoperosis Maternal Grandmother      Osteoperosis Paternal Grandmother      Other - See Comments Maternal Grandfather      pneumonia     Family History  Negative Son      Thyroid Disease Daughter      NODULES     Family History Negative Daughter      Arrhythmia Cousin      a-fib     Cancer - colorectal No family hx of      Prostate Cancer No family hx of      Alzheimer Disease No family hx of      Anesthesia Reaction No family hx of      Blood Disease No family hx of      Eye Disorder No family hx of        SOCIAL HISTORY:  Social History     Social History     Marital status: Single     Spouse name: N/A     Number of children: 2     Years of education: 18     Occupational History     Mental health therapist [employer]     Social History Main Topics     Smoking status: Former Smoker     Packs/day: 1.00     Years: 10.00     Types: Cigarettes     Start date: 2/7/1964     Quit date: 9/7/2015     Smokeless tobacco: Never Used      Comment: 5-10 daily     Alcohol use 0.0 oz/week     0 Standard drinks or equivalent per week      Comment: 1 a month     Drug use: No     Sexual activity: No     Other Topics Concern      Service No     Blood Transfusions No     Caffeine Concern No     1 cup coffee a day, 1-2 can's of pop a wk in the summer      Occupational Exposure No     Hobby Hazards No     Sleep Concern No     Stress Concern No     Weight Concern No     Special Diet No     Back Care No     Exercise No     not due to knee's and hip     Bike Helmet No     n/a     Seat Belt Yes     Self-Exams Yes     sometimes     Parent/Sibling W/ Cabg, Mi Or Angioplasty Before 65f 55m? No     Social History Narrative    Eats fruits and vegetables every day. She takes a calcium supplement twice daily.    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            Review of Systems:  Skin:  Negative     Eyes:  Positive for glasses  ENT:  Negative    Respiratory:  Positive for shortness of breath  Cardiovascular:    dizziness;lightheadedness;Positive for  Gastroenterology: Negative    Genitourinary:  Negative    Musculoskeletal:  Positive for joint pain  Neurologic:   "Negative    Psychiatric:  Positive for sleep disturbances  Heme/Lymph/Imm:  Positive for night sweats;allergies  Endocrine:  Negative      Physical Exam:  Vitals: /69  Pulse 68  Ht 1.651 m (5' 5\")  Wt 75.3 kg (166 lb)  BMI 27.62 kg/m2   Please refer to dictation for physical exam    Recent Lab Results:  LIPID RESULTS:  Lab Results   Component Value Date    CHOL 172 2018    HDL 56 2018    LDL 99 2018    TRIG 85 2018    CHOLHDLRATIO 4.2 2015       LIVER ENZYME RESULTS:  Lab Results   Component Value Date    AST 19 07/10/2017    ALT 28 2018       CBC RESULTS:  Lab Results   Component Value Date    WBC 8.8 2018    RBC 4.95 2018    HGB 15.2 2018    HCT 44.2 2018    MCV 89 2018    MCH 30.7 2018    MCHC 34.4 2018    RDW 12.7 2018     2018       BMP RESULTS:  Lab Results   Component Value Date     2018    POTASSIUM 3.7 2018    CHLORIDE 99 2018    CO2 33 (H) 2018    ANIONGAP 5 2018     (H) 2018    BUN 20 2018    CR 0.86 2018    GFRESTIMATED 65 2018    GFRESTBLACK 79 2018    SANGEETA 9.9 2018        A1C RESULTS:  Lab Results   Component Value Date    A1C 6.4 (H) 2017       INR RESULTS:  Lab Results   Component Value Date    INR 2.1 (A) 2017    INR 2.8 (A) 2017    INR 1.07 2017    INR 1.20 (H) 2017           CC  Rufina Palacios PA-C  [address]  [place] [id number]        Service Date: 07/10/2018      PRIMARY CARDIOLOGIST:  Has been Dr. Fountain, will be Dr. Stephen.      REASON FOR VISIT:  Palpitation followup.      HISTORY OF PRESENT ILLNESS:  Ms. Angela is a delightful, 67-year-old woman with a past medical history significant for the followin.  Hypertension with intolerance to several medications.   2.  History of tobacco, now quit.   3.  COPD.   4.  Dyslipidemia.   5.  Calcification of the coronary " arteries and thoracic aorta on CT of her chest.      I saw her earlier this spring where she had developed a respiratory infection and profound dyspnea on exertion with intermittent palpitations.  She was sent for a stress echo by Dr. Stephen, and this showed normal exercise response with occasional PVCs and PACs and a normal EF without ST changes.  Unfortunately, I saw her, and then she continued to worsen and actually was in the ER.  Eventually, she was given a nebulizer by her Primary, and this improved her symptoms greatly.  She was put back on inhalers and improved.  Given her palpitations, we did an event monitor.  This showed multiple episodes of PACs and several very brief runs of atrial fibrillation and atrial flutter, all less than 10 seconds.  Given this, we started her on diltiazem at her last visit.        She says that after that about 3 days after starting the medication, she had an episode of palpitations and shortness of breath that lasted about 30 minutes.  She rested, and they resolved on their own.  She had one about 1 week after that that again lasted maybe 20 minutes and resolved on its own.  She had some shortness of breath and fluttering in her chest, but no syncope or presyncope.  Overall, her breathing is good.  She has not had as much shortness of breath.  She feels back to herself, and she has resumed her normal activity.  She does have anxiety related to driving if she is by herself, as the palpitations initially happened when she was driving, and she is concerned about being able to pull over, etc., and get her car back home.  At this point, she is very pleased and has not had any palpitations at all for basically the last 1 month.  She has not had adverse effects from the medication, and in fact, she had been on hydrochlorothiazide before, and this was discontinued due to hypotension at her visit with her Primary.      SOCIAL HISTORY:  She is a retired .  Former smoker, 1/2  pack a day, quit in .  Occasional alcohol use.  She is a pickleball player.  She has not yet restarted walking, but typically had walked several times a week.      FAMILY HISTORY:  Significant for brother with AFib and sister with ARVD.      PHYSICAL EXAMINATION:   GENERAL:  Well-developed, well-nourished woman in no acute distress.   HEENT:  Normocephalic, atraumatic.   HEART:  Regular in rate and rhythm.  No murmur, rub or gallop.   LUNGS:  Clear bilaterally.  Carotids are without bruit.   SKIN:  Warm and dry.   EXTREMITIES:  No peripheral edema.  Varicosities noted.      ASSESSMENT AND PLAN:   1.  Episode of palpitations with brief runs of AFib and AFlutter noted on event monitor, all less than 10 seconds.  Even her most recent episodes were likely less than 30 minutes.  She does have a CHADS-VASc of 3, but given the brief episodes, I do not want to put her at risk of bleeding, especially because these are improved with the diltiazem.  We will continue her on diltiazem 120 mg daily, extended release, as this seems to be effective.  We did discuss if these episodes become longer, we will need to anticoagulate.  At this point, we will continue with observing, as she is symptomatic with these.   2.  COPD/emphysema.  Continue with inhalers per Primary.   3.  Dyslipidemia.  Slightly elevated LDL at 99, where our goal is probably less than 100 for primary prevention.  She does have calcifications noted on her aorta and in her coronaries on CT of the chest, and we are trying to prevent a future MI or stroke.  This was reviewed with her today.  We will follow for now.  Again, we will repeat a lipid panel in March.      I will have her follow up with Dr. Stephen in 4 months, sooner with concerns.         PIA BROWN PA-C             D: 07/10/2018   T: 07/10/2018   MT: [email or web address]      Name:     NORBERTO IBRAHIM   MRN:      [id number]        Account:      [id number]   :      1950           Service Date:  07/10/2018      Document: [id number]       Thank you for allowing me to participate in the care of your patient.      Sincerely,     Rufina Palacios PA-C     [facility]    cc:   Rufina Palacios PA-C  [address]  [place] [address]

## 2018-07-18 ENCOUNTER — MYC MEDICAL ADVICE (OUTPATIENT)
Dept: INTERNAL MEDICINE | Facility: CLINIC | Age: 68
End: 2018-07-18

## 2018-07-18 DIAGNOSIS — M85.80 OSTEOPENIA, UNSPECIFIED LOCATION: ICD-10-CM

## 2018-07-18 DIAGNOSIS — L40.50 PSORIASIS WITH ARTHROPATHY (H): ICD-10-CM

## 2018-07-18 NOTE — TELEPHONE ENCOUNTER
Patient is requesting dosage and sig be changed from 5,000 units to 50,000 units.  It appears Dr. Fair had patient on 50,000 units previously and PCP changed regimen.  Per last OV notes;   Change the Vitamin D supplement to Vitamin D3 5000 unit capsule, 5 days per week or every other day.  Get the version from [facility].   Routing to PCP/[facility].  Please advise.

## 2018-11-01 ENCOUNTER — OFFICE VISIT (OUTPATIENT)
Dept: CARDIOLOGY | Facility: CLINIC | Age: 68
End: 2018-11-01
Attending: PHYSICIAN ASSISTANT
Payer: COMMERCIAL

## 2018-11-01 VITALS
HEIGHT: 65 IN | WEIGHT: 166.6 LBS | HEART RATE: 82 BPM | SYSTOLIC BLOOD PRESSURE: 119 MMHG | BODY MASS INDEX: 27.76 KG/M2 | DIASTOLIC BLOOD PRESSURE: 69 MMHG

## 2018-11-01 DIAGNOSIS — I48.0 PAROXYSMAL ATRIAL FIBRILLATION (H): ICD-10-CM

## 2018-11-01 PROCEDURE — 99213 OFFICE O/P EST LOW 20 MIN: CPT | Performed by: INTERNAL MEDICINE

## 2018-11-01 NOTE — LETTER
11/1/2018    Kendall Mendez MD  [address] [facility] [address]    RE: Mavra Angela       Dear Colleague,    I had the pleasure of seeing Marva Angela in the [facility].    Cardiology Progress Note          Assessment and Plan:     1. Paroxysmal atrial fibrillation, low burden    We discussed that patient may not feel her paroxysms of atrial fibrillation now that the heart rate may be slower on the diltiazem.  We will see her back in 6 months with 1 week monitor prior.  She will see Rufina More whom she liked very much.    We could consider antiarrhythmic therapy if additional breakthrough.  Even though she has coronary calcification, stress echocardiogram was negative and we could consider flecainide as her first antiarrhythmic.  Alternately, we could consider sotalol, but she did not tolerate metoprolol previously secondary to side effects.    Follow-up in 6 months with 1 week monitor prior to review if additional paroxysmal atrial fibrillation that we might need to consider antiarrhythmic therapy or anticoagulation.    This note was transcribed using electronic voice recognition software and there may be typographical errors present.                Interval History:   The patient is a very pleasant 68 year old whom I have been following for hypertension, coronary calcification, negative stress echocardiogram but symptoms that turned out to be paroxysmal atrial fibrillation now markedly improved since starting on the diltiazem.  She also has family history with brother and sister having paroxysmal atrial fibrillation.    She feels very well and does not notice any recurrence of her palpitations.  Due to low burden of atrial fibrillation, we did not start anticoagulation at this point.                     Review of Systems:   Review of Systems:  Skin:  Negative hair changes;itching;bruising   Eyes:  Positive for glasses  ENT:  Negative    Respiratory:   "Negative    Cardiovascular:  Negative    Gastroenterology: Negative    Genitourinary:  Negative urinary frequency  Musculoskeletal:  Positive for joint pain  Neurologic:  Negative headaches;stroke;seizures;numbness or tingling of hands;numbness or tingling of feet  Psychiatric:  Negative    Heme/Lymph/Imm:  Positive for allergies  Endocrine:  Negative                Physical Exam:     Vitals: /69  Pulse 82  Ht 1.651 m (5' 5\")  Wt 75.6 kg (166 lb 9.6 oz)  BMI 27.72 kg/m2  Constitutional:  cooperative, alert and oriented, well developed, well nourished, in no acute distress overweight      Skin:  warm and dry to the touch, no apparent skin lesions or masses noted        Head:  normocephalic, no masses or lesions        Eyes:  pupils equal and round, conjunctivae and lids unremarkable, sclera white, no xanthalasma, EOMS intact, no nystagmus        ENT:  no pallor or cyanosis, dentition good        Neck:  JVP normal        Chest:  normal breath sounds, clear to auscultation, normal A-P diameter, normal symmetry, normal respiratory excursion, no use of accessory muscles        Cardiac: regular rhythm;normal S1 and S2 occasional premature beats S4   systolic murmur;LUSB;grade 1          Abdomen:      Benign    Extremities and Back:  no deformities, clubbing, cyanosis, erythema observed;no edema        Neurological:  no gross motor deficits;affect appropriate                 Medications:     Current Outpatient Prescriptions   Medication Sig Dispense Refill     Acetaminophen (TYLENOL PO) Take 1,000 mg by mouth 2 times daily as needed for mild pain or fever       albuterol (2.5 MG/3ML) 0.083% neb solution Take 1 vial (2.5 mg) by nebulization every 6 hours as needed for shortness of breath / dyspnea or wheezing 1 Box 11     albuterol (PROAIR HFA, PROVENTIL HFA, VENTOLIN HFA) 108 (90 BASE) MCG/ACT inhaler Inhale 2 puffs into the lungs every 6 hours as needed for shortness of breath / dyspnea or wheezing 3 Inhaler 1 "     aspirin EC 81 MG tablet Take 1 tablet (81 mg) by mouth daily 30 tablet 11     atorvastatin (LIPITOR) 20 MG tablet Take 1 tablet (20 mg) by mouth daily INDICATION: TO LOWER CHOLESTEROL AND TO HELP REDUCE RISK OF HEART ATTACK AND STROKE 90 tablet 3     betamethasone dipropionate (DIPROSONE) 0.05 % cream Apply topically 2 times daily as needed (For foot) 45 g prn     calcium-vitamin D (OYSTER CALCIUM + D) 250-125 MG-UNIT per tablet Take 2 tablets by mouth daily       celecoxib (CELEBREX) 200 MG capsule Take 1 capsule (200 mg) by mouth 2 times daily (Patient taking differently: Take 200 mg by mouth 2 times daily as needed ) 180 capsule 3     cholecalciferol (VITAMIN D3) 25680 units capsule Take 1 capsule (50,000 Units) by mouth every 14 days SIG: TAKE INDICATION: 1ST AND 15TH OF EACH MONTH, TO TREAT VITAMIN D DEFICIENCY 10 capsule 3     cyanocobalamin (VITAMIN B-12) 2500 MCG sublingual tablet Place 2,500 mcg under the tongue daily       diltiazem (CARDIZEM CD/CARTIA XT) 120 MG 24 hr capsule Take 1 capsule (120 mg) by mouth daily with breakfast 90 capsule 3     folic acid (FOLVITE) 1 MG tablet Take 1 tablet (1 mg) by mouth daily INDICATION: FOLIC ACID SUPPLEMENT 100 tablet 3     ranitidine (ZANTAC) 150 MG tablet Take 150 mg by mouth daily as needed for heartburn        spironolactone (ALDACTONE) 25 MG tablet Take 1 tablet (25 mg) by mouth daily 90 tablet 3     tiotropium (SPIRIVA HANDIHALER) 18 MCG capsule Inhale contents of one capsule daily. 90 capsule 3     triamcinolone (KENALOG) 0.1 % cream Apply topically 2 times daily as needed for irritation (sores on scalp.)       Urea 20 % CREA cream Apply topically daily        vitamin C-electrolytes (EMERGEN-C) 1000mg vitamin C super orange drink mix Take 1 packet by mouth daily Mix 1 packet in 4-6oz water.       order for DME Equipment being ordered: Nebulizer 1 each 0                Data:   All laboratory data reviewed  No results found for this or any previous visit  (from the past 24 hour(s)).    All laboratory data reviewed  Lab Results   Component Value Date    CHOL 172 03/19/2018     Lab Results   Component Value Date    HDL 56 03/19/2018     Lab Results   Component Value Date    LDL 99 03/19/2018     Lab Results   Component Value Date    TRIG 85 03/19/2018     Lab Results   Component Value Date    CHOLHDLRATIO 4.2 09/18/2015     TSH   Date Value Ref Range Status   04/04/2018 1.07 0.40 - 4.00 mU/L Final     Last Basic Metabolic Panel:  Lab Results   Component Value Date     04/04/2018      Lab Results   Component Value Date    POTASSIUM 3.7 04/04/2018     Lab Results   Component Value Date    CHLORIDE 99 04/04/2018     Lab Results   Component Value Date    SANGEETA 9.9 04/04/2018     Lab Results   Component Value Date    CO2 33 04/04/2018     Lab Results   Component Value Date    BUN 20 04/04/2018     Lab Results   Component Value Date    CR 0.86 04/04/2018     Lab Results   Component Value Date     04/04/2018     Lab Results   Component Value Date    WBC 8.8 04/04/2018     Lab Results   Component Value Date    RBC 4.95 04/04/2018     Lab Results   Component Value Date    HGB 15.2 04/04/2018     Lab Results   Component Value Date    HCT 44.2 04/04/2018     Lab Results   Component Value Date    MCV 89 04/04/2018     Lab Results   Component Value Date    MCH 30.7 04/04/2018     Lab Results   Component Value Date    MCHC 34.4 04/04/2018     Lab Results   Component Value Date    RDW 12.7 04/04/2018     Lab Results   Component Value Date     04/04/2018       Thank you for allowing me to participate in the care of your patient.    Sincerely,     Robert Stephen MD     [facility]

## 2018-11-01 NOTE — LETTER
11/1/2018    Kendall Mendez MD  [address] [facility] [address]    RE: Marva Angela       Dear Colleague,    I had the pleasure of seeing Marva Angela in the [facility].    Cardiology Progress Note          Assessment and Plan:     1. Paroxysmal atrial fibrillation, low burden    We discussed that patient may not feel her paroxysms of atrial fibrillation now that the heart rate may be slower on the diltiazem.  We will see her back in 6 months with 1 week monitor prior.  She will see Rufina More whom she liked very much.    We could consider antiarrhythmic therapy if additional breakthrough.  Even though she has coronary calcification, stress echocardiogram was negative and we could consider flecainide as her first antiarrhythmic.  Alternately, we could consider sotalol, but she did not tolerate metoprolol previously secondary to side effects.    Follow-up in 6 months with 1 week monitor prior to review if additional paroxysmal atrial fibrillation that we might need to consider antiarrhythmic therapy or anticoagulation.    This note was transcribed using electronic voice recognition software and there may be typographical errors present.                Interval History:   The patient is a very pleasant 68 year old whom I have been following for hypertension, coronary calcification, negative stress echocardiogram but symptoms that turned out to be paroxysmal atrial fibrillation now markedly improved since starting on the diltiazem.  She also has family history with brother and sister having paroxysmal atrial fibrillation.    She feels very well and does not notice any recurrence of her palpitations.  Due to low burden of atrial fibrillation, we did not start anticoagulation at this point.                     Review of Systems:   Review of Systems:  Skin:  Negative hair changes;itching;bruising   Eyes:  Positive for glasses  ENT:  Negative    Respiratory:   "Negative    Cardiovascular:  Negative    Gastroenterology: Negative    Genitourinary:  Negative urinary frequency  Musculoskeletal:  Positive for joint pain  Neurologic:  Negative headaches;stroke;seizures;numbness or tingling of hands;numbness or tingling of feet  Psychiatric:  Negative    Heme/Lymph/Imm:  Positive for allergies  Endocrine:  Negative                Physical Exam:     Vitals: /69  Pulse 82  Ht 1.651 m (5' 5\")  Wt 75.6 kg (166 lb 9.6 oz)  BMI 27.72 kg/m2  Constitutional:  cooperative, alert and oriented, well developed, well nourished, in no acute distress overweight      Skin:  warm and dry to the touch, no apparent skin lesions or masses noted        Head:  normocephalic, no masses or lesions        Eyes:  pupils equal and round, conjunctivae and lids unremarkable, sclera white, no xanthalasma, EOMS intact, no nystagmus        ENT:  no pallor or cyanosis, dentition good        Neck:  JVP normal        Chest:  normal breath sounds, clear to auscultation, normal A-P diameter, normal symmetry, normal respiratory excursion, no use of accessory muscles        Cardiac: regular rhythm;normal S1 and S2 occasional premature beats S4   systolic murmur;LUSB;grade 1          Abdomen:      Benign    Extremities and Back:  no deformities, clubbing, cyanosis, erythema observed;no edema        Neurological:  no gross motor deficits;affect appropriate                 Medications:     Current Outpatient Prescriptions   Medication Sig Dispense Refill     Acetaminophen (TYLENOL PO) Take 1,000 mg by mouth 2 times daily as needed for mild pain or fever       albuterol (2.5 MG/3ML) 0.083% neb solution Take 1 vial (2.5 mg) by nebulization every 6 hours as needed for shortness of breath / dyspnea or wheezing 1 Box 11     albuterol (PROAIR HFA, PROVENTIL HFA, VENTOLIN HFA) 108 (90 BASE) MCG/ACT inhaler Inhale 2 puffs into the lungs every 6 hours as needed for shortness of breath / dyspnea or wheezing 3 Inhaler 1 "     aspirin EC 81 MG tablet Take 1 tablet (81 mg) by mouth daily 30 tablet 11     atorvastatin (LIPITOR) 20 MG tablet Take 1 tablet (20 mg) by mouth daily INDICATION: TO LOWER CHOLESTEROL AND TO HELP REDUCE RISK OF HEART ATTACK AND STROKE 90 tablet 3     betamethasone dipropionate (DIPROSONE) 0.05 % cream Apply topically 2 times daily as needed (For foot) 45 g prn     calcium-vitamin D (OYSTER CALCIUM + D) 250-125 MG-UNIT per tablet Take 2 tablets by mouth daily       celecoxib (CELEBREX) 200 MG capsule Take 1 capsule (200 mg) by mouth 2 times daily (Patient taking differently: Take 200 mg by mouth 2 times daily as needed ) 180 capsule 3     cholecalciferol (VITAMIN D3) 74127 units capsule Take 1 capsule (50,000 Units) by mouth every 14 days SIG: TAKE INDICATION: 1ST AND 15TH OF EACH MONTH, TO TREAT VITAMIN D DEFICIENCY 10 capsule 3     cyanocobalamin (VITAMIN B-12) 2500 MCG sublingual tablet Place 2,500 mcg under the tongue daily       diltiazem (CARDIZEM CD/CARTIA XT) 120 MG 24 hr capsule Take 1 capsule (120 mg) by mouth daily with breakfast 90 capsule 3     folic acid (FOLVITE) 1 MG tablet Take 1 tablet (1 mg) by mouth daily INDICATION: FOLIC ACID SUPPLEMENT 100 tablet 3     ranitidine (ZANTAC) 150 MG tablet Take 150 mg by mouth daily as needed for heartburn        spironolactone (ALDACTONE) 25 MG tablet Take 1 tablet (25 mg) by mouth daily 90 tablet 3     tiotropium (SPIRIVA HANDIHALER) 18 MCG capsule Inhale contents of one capsule daily. 90 capsule 3     triamcinolone (KENALOG) 0.1 % cream Apply topically 2 times daily as needed for irritation (sores on scalp.)       Urea 20 % CREA cream Apply topically daily        vitamin C-electrolytes (EMERGEN-C) 1000mg vitamin C super orange drink mix Take 1 packet by mouth daily Mix 1 packet in 4-6oz water.       order for DME Equipment being ordered: Nebulizer 1 each 0                Data:   All laboratory data reviewed  No results found for this or any previous visit  (from the past 24 hour(s)).    All laboratory data reviewed  Lab Results   Component Value Date    CHOL 172 03/19/2018     Lab Results   Component Value Date    HDL 56 03/19/2018     Lab Results   Component Value Date    LDL 99 03/19/2018     Lab Results   Component Value Date    TRIG 85 03/19/2018     Lab Results   Component Value Date    CHOLHDLRATIO 4.2 09/18/2015     TSH   Date Value Ref Range Status   04/04/2018 1.07 0.40 - 4.00 mU/L Final     Last Basic Metabolic Panel:  Lab Results   Component Value Date     04/04/2018      Lab Results   Component Value Date    POTASSIUM 3.7 04/04/2018     Lab Results   Component Value Date    CHLORIDE 99 04/04/2018     Lab Results   Component Value Date    SANGEETA 9.9 04/04/2018     Lab Results   Component Value Date    CO2 33 04/04/2018     Lab Results   Component Value Date    BUN 20 04/04/2018     Lab Results   Component Value Date    CR 0.86 04/04/2018     Lab Results   Component Value Date     04/04/2018     Lab Results   Component Value Date    WBC 8.8 04/04/2018     Lab Results   Component Value Date    RBC 4.95 04/04/2018     Lab Results   Component Value Date    HGB 15.2 04/04/2018     Lab Results   Component Value Date    HCT 44.2 04/04/2018     Lab Results   Component Value Date    MCV 89 04/04/2018     Lab Results   Component Value Date    MCH 30.7 04/04/2018     Lab Results   Component Value Date    MCHC 34.4 04/04/2018     Lab Results   Component Value Date    RDW 12.7 04/04/2018     Lab Results   Component Value Date     04/04/2018                 Thank you for allowing me to participate in the care of your patient.      Sincerely,     Robert Stephen MD     [facility]    cc:   Rufina Palacios, PA-C  [address] [place]  [place] [address]

## 2018-11-01 NOTE — MR AVS SNAPSHOT
After Visit Summary   11/1/2018    Marva Angela    MRN: [id number]           Patient Information     Date Of Birth          1950        Visit Information        Provider Department      11/1/2018 9:45 AM Robert Stephen MD [facility]        Today's Diagnoses     Paroxysmal atrial fibrillation (H)           Follow-ups after your visit        Additional Services     Follow-Up with Cardiac Advanced Practice Provider                 Your next 10 appointments already scheduled     Nov 02, 2018  8:00 AM CDT   [facility]    [address] [place] [id number]   [phone]           Please do not eat 10-12 hours before your appointment if you are coming in fasting for labs on lipids, cholesterol, or glucose (sugar). This does not apply to pregnant women. Water, hot tea and black coffee (with nothing added) are okay. Do not drink other fluids, diet soda or chew gum.            Nov 05, 2018  8:45 AM CST   MA SCREENING DIGITAL BILATERAL with [facility]   [facility]    [address] [place] [address]   [phone]           How do I prepare for my exam? (Food and drink instructions) No Food and Drink Restrictions.  How do I prepare for my exam? (Other instructions) Do not use any powder, lotion or deodorant under your arms or on your breast. If you do, we will ask you to remove it before your exam.  What should I wear: Wear comfortable, two-piece clothing.  How long does the exam take: Most scans will take 15 minutes.  What should I bring: Bring any previous mammograms from other facilities or have them mailed to the breast center.  Do I need a :  No  is needed.  What do I need to tell my doctor: If you have any allergies, tell your care team.  What should I do after the  "exam: No restrictions, You may resume normal activities.  What is this test: This test is an x-ray of the breast to look for breast disease. The breast is pressed between two plates to flatten and spread the tissue. An X-ray is taken of the breast from different angles.  Who should I call with questions: If you have any questions, please call the Imaging Department where you will have your exam. Directions, parking instructions, and other information is available on our website, [email or web address].  Other information about my exam Three-dimensional (3D) mammograms are available at [facility] locations in [facility], [facility], [place], [facility] and [place]. [facility] locations include [place] and the [facility] in [place].  Benefits of 3D mammograms include * Improved rate of cancer detection * Decreases your chance of having to go back for more tests, which means fewer: * \"False-positive\" results (This means that there is an abnormal area but it isn't cancer.) * Invasive testing procedures, such as a biopsy or surgery * Can provide clearer images of the breast if you have dense breast tissue.  *3D mammography is an optional exam that anyone can have with a 2D mammogram. It doesn't replace or take the place of a 2D mammogram. 2D mammograms remain an effective screening test for all women.  Not all insurance companies cover the cost of a 3D mammogram. Check with your insurance. Three-dimensional (3D) mammograms are available at [facility] locations in [facility], [facility], [facility], and [place]. Health locations include [place] and [facility] in [place]. Benefits of 3D mammograms include: - Improved rate of cancer detection - Decreases your chance of having to go back for more tests, which means fewer: - \"False-positive\" results (This means that there is an abnormal area but it isn't cancer.) - " Invasive testing procedures, such as a biopsy or surgery - Can provide clearer images of the breast if you have dense breast tissue. 3D mammography is an optional exam that anyone can have with a 2D mammogram. It doesn't replace or take the place of a 2D mammogram. 2D mammograms remain an effective screening test for all women.  Not all insurance companies cover the cost of a 3D mammogram. Check with your insurance.            Nov 05, 2018  9:20 AM CST   PHYSICAL with Kendall Mendez [facility]   [facility]    [address] [id number] [phone]              Future tests that were ordered for you today     Open Future Orders        Priority Expected Expires Ordered    Follow-Up with Cardiac Advanced Practice Provider Routine 1/30/2019 11/1/2019 11/1/2018    Zio Patch Holter Routine 1/30/2019 11/1/2019 11/1/2018            Who to contact     If you have questions or need follow up information about today's clinic visit or your schedule please contact [facility] directly at [phone].  Normal or non-critical lab and imaging results will be communicated to you by [employer], letter or phone within 4 business days after the clinic has received the results. If you do not hear from us within 7 days, please contact the clinic through [employer] or phone. If you have a critical or abnormal lab result, we will notify you by phone as soon as possible.  Submit refill requests through [employer] or call your pharmacy and they will forward the refill request to us. Please allow 3 business days for your refill to be completed.          Additional Information About Your Visit        [employer] Information     [facility] gives you secure access to your electronic health record. If you see a primary care provider, you can also send messages to your care team and make appointments. If you have questions, please call  "your primary care clinic.  If you do not have a primary care provider, please call [phone] and they will assist you.        Care EveryWhere ID     This is your Care EveryWhere ID. This could be used by other organizations to access your [facility] medical records  [id number]        Your Vitals Were     Pulse Height BMI (Body Mass Index)             82 1.651 m (5' 5\") 27.72 kg/m2          Blood Pressure from Last 3 Encounters:   11/01/18 119/69   07/10/18 117/69   06/19/18 100/80    Weight from Last 3 Encounters:   11/01/18 75.6 kg (166 lb 9.6 oz)   07/10/18 75.3 kg (166 lb)   06/19/18 76 kg (167 lb 9.6 oz)              We Performed the Following     Follow-Up with Cardiologist          Today's Medication Changes          These changes are accurate as of 11/1/18 10:16 AM.  If you have any questions, ask your nurse or doctor.               These medicines have changed or have updated prescriptions.        Dose/Directions    celecoxib 200 MG capsule   Commonly known as:  celeBREX   This may have changed:    - when to take this  - reasons to take this   Used for:  Osteoarthritis, unspecified osteoarthritis type, unspecified site        Dose:  200 mg   Take 1 capsule (200 mg) by mouth 2 times daily   Quantity:  180 capsule   Refills:  3                Primary Care Provider Office Phone # Fax #    Kendall Mendez [phone]       [address] [facility] [address]        Equal Access to Services     KHUSHBU FRIAS AH: Hadii jaya Meier, waaxda lujose luisadaha, qaybta kaalmaswapna laughlin, jenniffer campos. So [facility] [phone].    ATENCIÓN: Si habla giovannaañol, tiene a barfield disposición servicios gratuitos de asistencia lingüística. Llame al [phone].    We comply with applicable federal civil rights laws and [place] laws. We do not discriminate on the basis of race, color, national origin, age, disability, sex, sexual orientation, or gender identity.          "   Thank you!     Thank you for choosing [facility]  for your care. Our goal is always to provide you with excellent care. Hearing back from our patients is one way we can continue to improve our services. Please take a few minutes to complete the written survey that you may receive in the mail after your visit with us. Thank you!             Your Updated Medication List - Protect others around you: Learn how to safely use, store and throw away your medicines at www.disposemymeds.org.          This list is accurate as of 11/1/18 10:16 AM.  Always use your most recent med list.                   Brand Name Dispense Instructions for use Diagnosis    * albuterol 108 (90 Base) MCG/ACT inhaler    PROAIR HFA/PROVENTIL HFA/VENTOLIN HFA    3 Inhaler    Inhale 2 puffs into the lungs every 6 hours as needed for shortness of breath / dyspnea or wheezing    Chronic obstructive pulmonary disease, unspecified COPD type (H)       * albuterol (2.5 MG/3ML) 0.083% neb solution     1 Box    Take 1 vial (2.5 mg) by nebulization every 6 hours as needed for shortness of breath / dyspnea or wheezing    Pulmonary emphysema, unspecified emphysema type (H), COPD exacerbation (H)       aspirin 81 MG EC tablet     30 tablet    Take 1 tablet (81 mg) by mouth daily    Coronary artery disease involving native coronary artery of native heart without angina pectoris       atorvastatin 20 MG tablet    LIPITOR    90 tablet    Take 1 tablet (20 mg) by mouth daily INDICATION: TO LOWER CHOLESTEROL AND TO HELP REDUCE RISK OF HEART ATTACK AND STROKE    Hyperlipidemia LDL goal <130       betamethasone dipropionate 0.05 % cream    DIPROSONE    45 g    Apply topically 2 times daily as needed (For foot)    Psoriasis       celecoxib 200 MG capsule    celeBREX    180 capsule    Take 1 capsule (200 mg) by mouth 2 times daily    Osteoarthritis, unspecified osteoarthritis type, unspecified site       cholecalciferol 22295  units capsule    VITAMIN D3    10 capsule    Take 1 capsule (50,000 Units) by mouth every 14 days SIG: TAKE INDICATION: 1ST AND 15TH OF EACH MONTH, TO TREAT VITAMIN D DEFICIENCY    Osteopenia, unspecified location, Psoriasis with arthropathy (H)       cyanocobalamin 2500 MCG sublingual tablet    VITAMIN B-12     Place 2,500 mcg under the tongue daily    Vitamin B12 deficiency (non anemic)       diltiazem 120 MG 24 hr capsule    CARDIZEM CD/CARTIA XT    90 capsule    Take 1 capsule (120 mg) by mouth daily with breakfast    Paroxysmal atrial fibrillation (H)       folic acid 1 MG tablet    FOLVITE    100 tablet    Take 1 tablet (1 mg) by mouth daily INDICATION: FOLIC ACID SUPPLEMENT    Vitamin B12 deficiency (non anemic)       order for DME     1 each    Equipment being ordered: Nebulizer    Pulmonary emphysema, unspecified emphysema type (H)       OYSTER CALCIUM + D 250-125 MG-UNIT per tablet   Generic drug:  calcium-vitamin D      Take 2 tablets by mouth daily        ranitidine 150 MG tablet    ZANTAC     Take 150 mg by mouth daily as needed for heartburn        spironolactone 25 MG tablet    ALDACTONE    90 tablet    Take 1 tablet (25 mg) by mouth daily    Benign essential hypertension       tiotropium 18 MCG capsule    SPIRIVA HANDIHALER    90 capsule    Inhale contents of one capsule daily.    Pulmonary emphysema, unspecified emphysema type (H)       triamcinolone 0.1 % cream    KENALOG     Apply topically 2 times daily as needed for irritation (sores on scalp.)        TYLENOL PO      Take 1,000 mg by mouth 2 times daily as needed for mild pain or fever        Urea 20 % Crea cream      Apply topically daily        vitamin C-electrolytes 1000mg vitamin C super orange drink mix    EMERGEN-C     Take 1 packet by mouth daily Mix 1 packet in 4-6oz water.        * Notice:  This list has 2 medication(s) that are the same as other medications prescribed for you. Read the directions carefully, and ask your doctor or other  care provider to review them with you.

## 2018-11-01 NOTE — PROGRESS NOTES
Cardiology Progress Note          Assessment and Plan:     1. Paroxysmal atrial fibrillation, low burden    We discussed that patient may not feel her paroxysms of atrial fibrillation now that the heart rate may be slower on the diltiazem.  We will see her back in 6 months with 1 week monitor prior.  She will see Rufina More whom she liked very much.    We could consider antiarrhythmic therapy if additional breakthrough.  Even though she has coronary calcification, stress echocardiogram was negative and we could consider flecainide as her first antiarrhythmic.  Alternately, we could consider sotalol, but she did not tolerate metoprolol previously secondary to side effects.    Follow-up in 6 months with 1 week monitor prior to review if additional paroxysmal atrial fibrillation that we might need to consider antiarrhythmic therapy or anticoagulation.    This note was transcribed using electronic voice recognition software and there may be typographical errors present.                Interval History:   The patient is a very pleasant 68 year old whom I have been following for hypertension, coronary calcification, negative stress echocardiogram but symptoms that turned out to be paroxysmal atrial fibrillation now markedly improved since starting on the diltiazem.  She also has family history with brother and sister having paroxysmal atrial fibrillation.    She feels very well and does not notice any recurrence of her palpitations.  Due to low burden of atrial fibrillation, we did not start anticoagulation at this point.                     Review of Systems:   Review of Systems:  Skin:  Negative hair changes;itching;bruising   Eyes:  Positive for glasses  ENT:  Negative    Respiratory:  Negative    Cardiovascular:  Negative    Gastroenterology: Negative    Genitourinary:  Negative urinary frequency  Musculoskeletal:  Positive for joint pain  Neurologic:  Negative headaches;stroke;seizures;numbness or tingling of  "hands;numbness or tingling of feet  Psychiatric:  Negative    Heme/Lymph/Imm:  Positive for allergies  Endocrine:  Negative                Physical Exam:     Vitals: /69  Pulse 82  Ht 1.651 m (5' 5\")  Wt 75.6 kg (166 lb 9.6 oz)  BMI 27.72 kg/m2  Constitutional:  cooperative, alert and oriented, well developed, well nourished, in no acute distress overweight      Skin:  warm and dry to the touch, no apparent skin lesions or masses noted        Head:  normocephalic, no masses or lesions        Eyes:  pupils equal and round, conjunctivae and lids unremarkable, sclera white, no xanthalasma, EOMS intact, no nystagmus        ENT:  no pallor or cyanosis, dentition good        Neck:  JVP normal        Chest:  normal breath sounds, clear to auscultation, normal A-P diameter, normal symmetry, normal respiratory excursion, no use of accessory muscles        Cardiac: regular rhythm;normal S1 and S2 occasional premature beats S4   systolic murmur;LUSB;grade 1          Abdomen:      Benign    Extremities and Back:  no deformities, clubbing, cyanosis, erythema observed;no edema        Neurological:  no gross motor deficits;affect appropriate                 Medications:     Current Outpatient Prescriptions   Medication Sig Dispense Refill     Acetaminophen (TYLENOL PO) Take 1,000 mg by mouth 2 times daily as needed for mild pain or fever       albuterol (2.5 MG/3ML) 0.083% neb solution Take 1 vial (2.5 mg) by nebulization every 6 hours as needed for shortness of breath / dyspnea or wheezing 1 Box 11     albuterol (PROAIR HFA, PROVENTIL HFA, VENTOLIN HFA) 108 (90 BASE) MCG/ACT inhaler Inhale 2 puffs into the lungs every 6 hours as needed for shortness of breath / dyspnea or wheezing 3 Inhaler 1     aspirin EC 81 MG tablet Take 1 tablet (81 mg) by mouth daily 30 tablet 11     atorvastatin (LIPITOR) 20 MG tablet Take 1 tablet (20 mg) by mouth daily INDICATION: TO LOWER CHOLESTEROL AND TO HELP REDUCE RISK OF HEART ATTACK AND " STROKE 90 tablet 3     betamethasone dipropionate (DIPROSONE) 0.05 % cream Apply topically 2 times daily as needed (For foot) 45 g prn     calcium-vitamin D (OYSTER CALCIUM + D) 250-125 MG-UNIT per tablet Take 2 tablets by mouth daily       celecoxib (CELEBREX) 200 MG capsule Take 1 capsule (200 mg) by mouth 2 times daily (Patient taking differently: Take 200 mg by mouth 2 times daily as needed ) 180 capsule 3     cholecalciferol (VITAMIN D3) 34098 units capsule Take 1 capsule (50,000 Units) by mouth every 14 days SIG: TAKE INDICATION: 1ST AND 15TH OF EACH MONTH, TO TREAT VITAMIN D DEFICIENCY 10 capsule 3     cyanocobalamin (VITAMIN B-12) 2500 MCG sublingual tablet Place 2,500 mcg under the tongue daily       diltiazem (CARDIZEM CD/CARTIA XT) 120 MG 24 hr capsule Take 1 capsule (120 mg) by mouth daily with breakfast 90 capsule 3     folic acid (FOLVITE) 1 MG tablet Take 1 tablet (1 mg) by mouth daily INDICATION: FOLIC ACID SUPPLEMENT 100 tablet 3     ranitidine (ZANTAC) 150 MG tablet Take 150 mg by mouth daily as needed for heartburn        spironolactone (ALDACTONE) 25 MG tablet Take 1 tablet (25 mg) by mouth daily 90 tablet 3     tiotropium (SPIRIVA HANDIHALER) 18 MCG capsule Inhale contents of one capsule daily. 90 capsule 3     triamcinolone (KENALOG) 0.1 % cream Apply topically 2 times daily as needed for irritation (sores on scalp.)       Urea 20 % CREA cream Apply topically daily        vitamin C-electrolytes (EMERGEN-C) 1000mg vitamin C super orange drink mix Take 1 packet by mouth daily Mix 1 packet in 4-6oz water.       order for DME Equipment being ordered: Nebulizer 1 each 0                Data:   All laboratory data reviewed  No results found for this or any previous visit (from the past 24 hour(s)).    All laboratory data reviewed  Lab Results   Component Value Date    CHOL 172 03/19/2018     Lab Results   Component Value Date    HDL 56 03/19/2018     Lab Results   Component Value Date    LDL 99  03/19/2018     Lab Results   Component Value Date    TRIG 85 03/19/2018     Lab Results   Component Value Date    CHOLHDLRATIO 4.2 09/18/2015     TSH   Date Value Ref Range Status   04/04/2018 1.07 0.40 - 4.00 mU/L Final     Last Basic Metabolic Panel:  Lab Results   Component Value Date     04/04/2018      Lab Results   Component Value Date    POTASSIUM 3.7 04/04/2018     Lab Results   Component Value Date    CHLORIDE 99 04/04/2018     Lab Results   Component Value Date    SANGEETA 9.9 04/04/2018     Lab Results   Component Value Date    CO2 33 04/04/2018     Lab Results   Component Value Date    BUN 20 04/04/2018     Lab Results   Component Value Date    CR 0.86 04/04/2018     Lab Results   Component Value Date     04/04/2018     Lab Results   Component Value Date    WBC 8.8 04/04/2018     Lab Results   Component Value Date    RBC 4.95 04/04/2018     Lab Results   Component Value Date    HGB 15.2 04/04/2018     Lab Results   Component Value Date    HCT 44.2 04/04/2018     Lab Results   Component Value Date    MCV 89 04/04/2018     Lab Results   Component Value Date    MCH 30.7 04/04/2018     Lab Results   Component Value Date    MCHC 34.4 04/04/2018     Lab Results   Component Value Date    RDW 12.7 04/04/2018     Lab Results   Component Value Date     04/04/2018

## 2018-11-02 DIAGNOSIS — I10 BENIGN ESSENTIAL HYPERTENSION: ICD-10-CM

## 2018-11-02 DIAGNOSIS — R73.03 PREDIABETES: ICD-10-CM

## 2018-11-02 LAB
ANION GAP SERPL CALCULATED.3IONS-SCNC: 8 MMOL/L (ref 3–14)
BUN SERPL-MCNC: 21 MG/DL (ref 7–30)
CALCIUM SERPL-MCNC: 10 MG/DL (ref 8.5–10.1)
CHLORIDE SERPL-SCNC: 102 MMOL/L (ref 94–109)
CO2 SERPL-SCNC: 29 MMOL/L (ref 20–32)
CREAT SERPL-MCNC: 0.86 MG/DL (ref 0.52–1.04)
ERYTHROCYTE [DISTWIDTH] IN BLOOD BY AUTOMATED COUNT: 12.9 % (ref 10–15)
GFR SERPL CREATININE-BSD FRML MDRD: 65 ML/MIN/1.7M2
GLUCOSE SERPL-MCNC: 100 MG/DL (ref 70–99)
HBA1C MFR BLD: 6.1 % (ref 0–5.6)
HCT VFR BLD AUTO: 45 % (ref 35–47)
HGB BLD-MCNC: 14.6 G/DL (ref 11.7–15.7)
MCH RBC QN AUTO: 30.1 PG (ref 26.5–33)
MCHC RBC AUTO-ENTMCNC: 32.4 G/DL (ref 31.5–36.5)
MCV RBC AUTO: 93 FL (ref 78–100)
PLATELET # BLD AUTO: 247 10E9/L (ref 150–450)
POTASSIUM SERPL-SCNC: 4.5 MMOL/L (ref 3.4–5.3)
RBC # BLD AUTO: 4.85 10E12/L (ref 3.8–5.2)
SODIUM SERPL-SCNC: 139 MMOL/L (ref 133–144)
WBC # BLD AUTO: 6.6 10E9/L (ref 4–11)

## 2018-11-02 PROCEDURE — 85027 COMPLETE CBC AUTOMATED: CPT | Performed by: INTERNAL MEDICINE

## 2018-11-02 PROCEDURE — 36415 COLL VENOUS BLD VENIPUNCTURE: CPT | Performed by: INTERNAL MEDICINE

## 2018-11-02 PROCEDURE — 83036 HEMOGLOBIN GLYCOSYLATED A1C: CPT | Performed by: INTERNAL MEDICINE

## 2018-11-02 PROCEDURE — 80048 BASIC METABOLIC PNL TOTAL CA: CPT | Performed by: INTERNAL MEDICINE

## 2018-11-05 ENCOUNTER — OFFICE VISIT (OUTPATIENT)
Dept: INTERNAL MEDICINE | Facility: CLINIC | Age: 68
End: 2018-11-05
Payer: COMMERCIAL

## 2018-11-05 ENCOUNTER — RADIANT APPOINTMENT (OUTPATIENT)
Dept: MAMMOGRAPHY | Facility: CLINIC | Age: 68
End: 2018-11-05
Attending: INTERNAL MEDICINE
Payer: COMMERCIAL

## 2018-11-05 ENCOUNTER — MYC MEDICAL ADVICE (OUTPATIENT)
Dept: INTERNAL MEDICINE | Facility: CLINIC | Age: 68
End: 2018-11-05

## 2018-11-05 VITALS
DIASTOLIC BLOOD PRESSURE: 70 MMHG | BODY MASS INDEX: 27.49 KG/M2 | OXYGEN SATURATION: 95 % | HEART RATE: 74 BPM | SYSTOLIC BLOOD PRESSURE: 104 MMHG | TEMPERATURE: 98.1 F | WEIGHT: 165.2 LBS

## 2018-11-05 DIAGNOSIS — E78.5 HYPERLIPIDEMIA LDL GOAL <130: ICD-10-CM

## 2018-11-05 DIAGNOSIS — I25.10 CORONARY ARTERY DISEASE INVOLVING NATIVE CORONARY ARTERY OF NATIVE HEART WITHOUT ANGINA PECTORIS: ICD-10-CM

## 2018-11-05 DIAGNOSIS — M19.90 OSTEOARTHRITIS, UNSPECIFIED OSTEOARTHRITIS TYPE, UNSPECIFIED SITE: ICD-10-CM

## 2018-11-05 DIAGNOSIS — R73.03 PREDIABETES: ICD-10-CM

## 2018-11-05 DIAGNOSIS — Z00.00 MEDICARE ANNUAL WELLNESS VISIT, SUBSEQUENT: Primary | ICD-10-CM

## 2018-11-05 DIAGNOSIS — Z12.2 ENCOUNTER FOR SCREENING FOR LUNG CANCER: ICD-10-CM

## 2018-11-05 DIAGNOSIS — Z12.11 SCREEN FOR COLON CANCER: ICD-10-CM

## 2018-11-05 DIAGNOSIS — J43.9 PULMONARY EMPHYSEMA, UNSPECIFIED EMPHYSEMA TYPE (H): ICD-10-CM

## 2018-11-05 DIAGNOSIS — Z87.891 PERSONAL HISTORY OF TOBACCO USE: ICD-10-CM

## 2018-11-05 DIAGNOSIS — I48.0 PAROXYSMAL ATRIAL FIBRILLATION (H): ICD-10-CM

## 2018-11-05 DIAGNOSIS — J44.9 CHRONIC OBSTRUCTIVE PULMONARY DISEASE, UNSPECIFIED COPD TYPE (H): ICD-10-CM

## 2018-11-05 DIAGNOSIS — E53.8 B12 DEFICIENCY: ICD-10-CM

## 2018-11-05 DIAGNOSIS — Z12.31 VISIT FOR SCREENING MAMMOGRAM: ICD-10-CM

## 2018-11-05 DIAGNOSIS — Z87.891 HISTORY OF SMOKING 25-50 PACK YEARS: Primary | ICD-10-CM

## 2018-11-05 DIAGNOSIS — I10 BENIGN ESSENTIAL HYPERTENSION: ICD-10-CM

## 2018-11-05 DIAGNOSIS — L40.50 PSORIASIS WITH ARTHROPATHY (H): ICD-10-CM

## 2018-11-05 DIAGNOSIS — M85.80 OSTEOPENIA, UNSPECIFIED LOCATION: ICD-10-CM

## 2018-11-05 DIAGNOSIS — E55.9 VITAMIN D DEFICIENCY: ICD-10-CM

## 2018-11-05 PROCEDURE — 77067 SCR MAMMO BI INCL CAD: CPT | Mod: TC

## 2018-11-05 PROCEDURE — G0296 VISIT TO DETERM LDCT ELIG: HCPCS | Performed by: INTERNAL MEDICINE

## 2018-11-05 PROCEDURE — 99397 PER PM REEVAL EST PAT 65+ YR: CPT | Performed by: INTERNAL MEDICINE

## 2018-11-05 PROCEDURE — 82274 ASSAY TEST FOR BLOOD FECAL: CPT | Performed by: INTERNAL MEDICINE

## 2018-11-05 RX ORDER — ALBUTEROL SULFATE 90 UG/1
2 AEROSOL, METERED RESPIRATORY (INHALATION) EVERY 6 HOURS PRN
Qty: 3 INHALER | Refills: 11 | Status: SHIPPED | OUTPATIENT
Start: 2018-11-05 | End: 2019-12-20

## 2018-11-05 RX ORDER — CELECOXIB 200 MG/1
200 CAPSULE ORAL 2 TIMES DAILY PRN
Qty: 60 CAPSULE | Refills: 5 | Status: SHIPPED | OUTPATIENT
Start: 2018-11-05 | End: 2021-04-07

## 2018-11-05 NOTE — MR AVS SNAPSHOT
After Visit Summary   11/5/2018    Marva Angela    MRN: [id number]           Patient Information     Date Of Birth          1950        Visit Information        Provider Department      11/5/2018 9:20 AM Kendall Mendez MD [facility]        Today's Diagnoses     Pulmonary emphysema, unspecified emphysema type (H)    -  1    Screen for colon cancer        Osteoarthritis, unspecified osteoarthritis type, unspecified site        B12 deficiency        Hyperlipidemia LDL goal <130        Osteopenia, unspecified location        Psoriasis with arthropathy (H)        Benign essential hypertension        Paroxysmal atrial fibrillation (H)        Chronic obstructive pulmonary disease, unspecified COPD type (H)          Care Instructions    *  OK to reduce the B12 supplement to either 1000 mcg per day or else 2500 mcg 3 times per week or every other day.     *  continue the Vitamin D supplement either 50,000 units twice per month, or else Vitamin D3 5000 unit supplement every day ( get at [employer] or [employer])    *  Consider a B complex vitmain to give all the B's              *  Colon Cancer Screening:  ============================================================  *  All men and women are recommended to have some sort of formal colon cancer screening starting at age of 50 (or earlier if indicated based on family history of colon cancer.     *  Colon cancer is a preventable type of cancer that if caught early can be easily treated even before it becomes cancer by removing the precancerous.      *  Common Colon cancer screening options:     --Colonoscopy (every 10 years if normal exam, no family history of colon cancer)  I place order and you will be contacted to arrange this procedure.   Pros: most complete and thorough exam, identify and remove any pre-cancerous polyps.   Cons: prep before procedure, sedation required, possible cost     --ColoGuard Stool test every 3  "years (be sure to confirm coverage by insurance).  This test checks for colon cancer specific DNA in the stool.  You will receive the collection kit in the mail.  Return the samples via mail.  If positive, you do not necessarily have colon cancer, but will need a colonoscopy.    Pros: easy to collect and return, decent specific screening test, newer test  Cons: cost     --\"FIT\" Stool test once per year.    Return the \"FIT\" stool card test to us via mail.  This is a basic level screening for colon cancer by detecting trace amount of occult blood in the intestinal tract.  If the FIT test shows any traces of occult blood, it does NOT necessarily mean that you have colon cancer, it just means that we should probably consider doing the colonoscopy.   Pros: easy to collect, cheap  Cons: not very specific, high false positive rate        SHINGLES VACCINE:     I would recommend that you consider getting a \"shingles vaccine\".  The shingles vaccine does not stop you from getting shingles, but it decreases the intensity of the event, the duration of the event, and decreases the painful nerve condition that results     There are two options available:     --Shingrix (available starting early 2018), 2 shots, 2-6 months apart  **recommended**   OR   --Zostavax, one shot    --Based on the available data, the Shingrix vaccine has superior benefit and should be considered even if you have had the Zostavax vaccine before.      --Contact your insurance provider and ask them if either shingles vaccine is covered and is so, how much it will cost you.  Usually this will be cheaper to get in a pharmacy given by the pharmacist.    --Regardless of the coverage, I would recommend that you consider the vaccine since shingles is very painful, just ask anyone who has ever had it.                 5 GOALS TO PREVENT VASCULAR DISEASE:     1.  Aggressive blood pressure control, under 130/80 ideally.  Using medications if needed.    Your blood " "pressure is under good control    BP Readings from Last 4 Encounters:   11/05/18 104/70   11/01/18 119/69   07/10/18 117/69   06/19/18 100/80       2.  Aggressive LDL cholesterol (\"bad cholesterol\") lowering as indicated.    Your goal is an LDL under 130 for sure, preferably under 100.  (If you have diabetes or previous vascular disease, the the LDL goals would be under 100 for sure, preferably under 70.)    New guidelines identify four high-risk groups who could benefit from statins:   *people with pre-existing heart disease, such as those who have had a heart attack;   *people ages 40 to 75 who have diabetes of any type  *patients ages 40 to 75 with at least a 7.5% risk of developing cardiovascular disease over the next decade, according to a formula described in the guidelines  *patients with the sort of super-high cholesterol that sometimes runs in families, as evidenced by an LDL of 190 milligrams per deciliter or higher    Your cholesterol levels are well controlled.    Recent Labs   Lab Test  03/19/18   0732  10/30/17   0726   09/18/15   0738   CHOL  172  175   < >  221*   HDL  56  69   < >  53   LDL  99  87   < >  144*   TRIG  85  95   < >  122   CHOLHDLRATIO   --    --    --   4.2    < > = values in this interval not displayed.       3.  Aggressive diabetic prevention, screening and/or management.      You do not have diabetes as of the most recent blood tests.     4.  No smoking    5.  Daily aspirin: Have a discussion about the relative benefits and risks of taking daily low dose aspirin (81 mg) tablet once per day over the age of 50, unless there is a specific reason that you cannot take aspirin (such as side effect, allergy, or you are on another \"blood thinner\").        --Based on your current cardiac risk factors, you should take Aspirin 81 mg once per day, unless you have any reasons (side effects, intolerance, etc.) that you cannot take aspirin.             Preventive Health Recommendations    See " your health care provider every year to    Review health changes.     Discuss preventive care.      Review your medicines if your doctor has prescribed any.      You no longer need a yearly Pap test unless you've had an abnormal Pap test in the past 10 years. If you have vaginal symptoms, such as bleeding or discharge, be sure to talk with your provider about a Pap test.      Every 1 to 2 years, have a mammogram.  If you are over 69, talk with your health care provider about whether or not you want to continue having screening mammograms.      Every 10 years, have a colonoscopy. Or, have a yearly FIT test (stool test). These exams will check for colon cancer.       Have a cholesterol test every 5 years, or more often if your doctor advises it.       Have a diabetes test (fasting glucose) every three years. If you are at risk for diabetes, you should have this test more often.       At age 65, have a bone density scan (DEXA) to check for osteoporosis (brittle bone disease).    Shots:    Get a flu shot each year.    Get a tetanus shot every 10 years.    Talk to your doctor about your pneumonia vaccines. There are now two you should receive - Pneumovax (PPSV 23) and Prevnar (PCV 13).    Talk to your pharmacist about the shingles vaccine.    Talk to your doctor about the hepatitis B vaccine.    Nutrition:     Eat at least 5 servings of fruits and vegetables each day.      Eat whole-grain bread, whole-wheat pasta and brown rice instead of white grains and rice.      Get adequate Calcium and Vitamin D.     Lifestyle    Exercise at least 150 minutes a week (30 minutes a day, 5 days a week). This will help you control your weight and prevent disease.      Limit alcohol to one drink per day.      No smoking.       Wear sunscreen to prevent skin cancer.       See your dentist twice a year for an exam and cleaning.      See your eye doctor every 1 to 2 years to screen for conditions such as glaucoma, macular degeneration and  cataracts.    Personalized Prevention Plan  You are due for the preventive services outlined below.  Your care team is available to assist you in scheduling these services.  If you have already completed any of these items, please share that information with your care team to update in your medical record.  Health Maintenance Due   Topic Date Due     COPD ACTION PLAN Q1 YR  03/07/2017     Colon Cancer Screening - FIT Test - yearly  04/26/2018     Flu Vaccine (1) 09/01/2018     Lung Cancer Screening (CT Scan) - yearly  11/03/2018             Follow-ups after your visit        Who to contact     If you have questions or need follow up information about today's clinic visit or your schedule please contact [facility] directly at [phone].  Normal or non-critical lab and imaging results will be communicated to you by [employer], letter or phone within 4 business days after the clinic has received the results. If you do not hear from us within 7 days, please contact the clinic through [employer] or phone. If you have a critical or abnormal lab result, we will notify you by phone as soon as possible.  Submit refill requests through [employer] or call your pharmacy and they will forward the refill request to us. Please allow 3 business days for your refill to be completed.          Additional Information About Your Visit        [employer] [facility] Information     [employer] gives you secure access to your electronic health record. If you see a primary care provider, you can also send messages to your care team and make appointments. If you have questions, please call your primary care clinic.  If you do not have a primary care provider, please call [phone] and they will assist you.        Care EveryWhere ID     This is your Care EveryWhere ID. This could be used by other organizations to access your [facility] medical records  [id number]        Your Vitals Were     Pulse Temperature Pulse Oximetry BMI (Body Mass  Index)          74 98.1  F (36.7  C) (Oral) 95% 27.49 kg/m2         Blood Pressure from Last 3 Encounters:   11/05/18 104/70   11/01/18 119/69   07/10/18 117/69    Weight from Last 3 Encounters:   11/05/18 165 lb 3.2 oz (74.9 kg)   11/01/18 166 lb 9.6 oz (75.6 kg)   07/10/18 166 lb (75.3 kg)              Today, you had the following     No orders found for display         Today's Medication Changes          These changes are accurate as of 11/5/18 10:19 AM.  If you have any questions, ask your nurse or doctor.               Start taking these medicines.        Dose/Directions    B-12 1000 MCG Tbcr   Used for:  B12 deficiency   Replaces:  cyanocobalamin 2500 MCG sublingual tablet   Started by:  Kendall Mendez MD        Dose:  1000 mcg   Place 1,000 mcg under the tongue daily   Refills:  0         These medicines have changed or have updated prescriptions.        Dose/Directions    * albuterol (2.5 MG/3ML) 0.083% neb solution   This may have changed:  Another medication with the same name was changed. Make sure you understand how and when to take each.   Used for:  Pulmonary emphysema, unspecified emphysema type (H), COPD exacerbation (H)   Changed by:  Kendall Mendez MD        Dose:  1 vial   Take 1 vial (2.5 mg) by nebulization every 6 hours as needed for shortness of breath / dyspnea or wheezing   Quantity:  1 Box   Refills:  11       * albuterol 108 (90 Base) MCG/ACT inhaler   Commonly known as:  PROAIR HFA/PROVENTIL HFA/VENTOLIN HFA   This may have changed:  additional instructions   Used for:  Chronic obstructive pulmonary disease, unspecified COPD type (H)   Changed by:  Kendall Mendez MD        Dose:  2 puff   Inhale 2 puffs into the lungs every 6 hours as needed for shortness of breath / dyspnea or wheezing GENERIC VENTOLIN   Quantity:  3 Inhaler   Refills:  11       celecoxib 200 MG capsule   Commonly known as:  celeBREX   This may have changed:    - when to take this  - reasons  to take this   Used for:  Osteoarthritis, unspecified osteoarthritis type, unspecified site   Changed by:  Kendall Mendez MD        Dose:  200 mg   Take 1 capsule (200 mg) by mouth 2 times daily as needed for pain (arthritis pain)   Quantity:  60 capsule   Refills:  5       * Notice:  This list has 2 medication(s) that are the same as other medications prescribed for you. Read the directions carefully, and ask your doctor or other care provider to review them with you.      Stop taking these medicines if you haven't already. Please contact your care team if you have questions.     cyanocobalamin 2500 MCG sublingual tablet   Commonly known as:  VITAMIN B-12   Replaced by:  B-12 1000 MCG Tbcr   Stopped by:  Kendall Mendez MD                Where to get your medicines      Some of these will need a paper prescription and others can be bought over the counter.  Ask your nurse if you have questions.     Bring a paper prescription for each of these medications     albuterol 108 (90 Base) MCG/ACT inhaler    celecoxib 200 MG capsule       You don't need a prescription for these medications     B-12 1000 MCG cr                Primary Care Provider Office Phone # Fax #    Kendall Mendez [phone]       [address] [facility] [address]        Equal Access to Services     NITIN FRIAS AH: Hadii jaya canaleso Soangel, waaxda luqadaha, qaybta kaalmada adeegyada, jenniffer campos. So [facility] [phone].    ATENCIÓN: Si habla español, tiene a barfield disposición servicios gratuitos de asistencia lingüística. Llame al [phone].    We comply with applicable federal civil rights laws and [place] laws. We do not discriminate on the basis of race, color, national origin, age, disability, sex, sexual orientation, or gender identity.            Thank you!     Thank you for choosing [facility]  for your care. Our goal is always to provide  you with excellent care. Hearing back from our patients is one way we can continue to improve our services. Please take a few minutes to complete the written survey that you may receive in the mail after your visit with us. Thank you!             Your Updated Medication List - Protect others around you: Learn how to safely use, store and throw away your medicines at www.disposemymeds.org.          This list is accurate as of 11/5/18 10:19 AM.  Always use your most recent med list.                   Brand Name Dispense Instructions for use Diagnosis    * albuterol (2.5 MG/3ML) 0.083% neb solution     1 Box    Take 1 vial (2.5 mg) by nebulization every 6 hours as needed for shortness of breath / dyspnea or wheezing    Pulmonary emphysema, unspecified emphysema type (H), COPD exacerbation (H)       * albuterol 108 (90 Base) MCG/ACT inhaler    PROAIR HFA/PROVENTIL HFA/VENTOLIN HFA    3 Inhaler    Inhale 2 puffs into the lungs every 6 hours as needed for shortness of breath / dyspnea or wheezing GENERIC VENTOLIN    Chronic obstructive pulmonary disease, unspecified COPD type (H)       aspirin 81 MG EC tablet     30 tablet    Take 1 tablet (81 mg) by mouth daily    Coronary artery disease involving native coronary artery of native heart without angina pectoris       atorvastatin 20 MG tablet    LIPITOR    90 tablet    Take 1 tablet (20 mg) by mouth daily INDICATION: TO LOWER CHOLESTEROL AND TO HELP REDUCE RISK OF HEART ATTACK AND STROKE    Hyperlipidemia LDL goal <130       B-12 1000 MCG Tbcr      Place 1,000 mcg under the tongue daily    B12 deficiency       betamethasone dipropionate 0.05 % cream    DIPROSONE    45 g    Apply topically 2 times daily as needed (For foot)    Psoriasis       celecoxib 200 MG capsule    celeBREX    60 capsule    Take 1 capsule (200 mg) by mouth 2 times daily as needed for pain (arthritis pain)    Osteoarthritis, unspecified osteoarthritis type, unspecified site       cholecalciferol 41196  units capsule    VITAMIN D3    10 capsule    Take 1 capsule (50,000 Units) by mouth every 14 days SIG: TAKE INDICATION: 1ST AND 15TH OF EACH MONTH, TO TREAT VITAMIN D DEFICIENCY    Osteopenia, unspecified location, Psoriasis with arthropathy (H)       diltiazem 120 MG 24 hr capsule    CARDIZEM CD/CARTIA XT    90 capsule    Take 1 capsule (120 mg) by mouth daily with breakfast    Paroxysmal atrial fibrillation (H)       folic acid 1 MG tablet    FOLVITE    100 tablet    Take 1 tablet (1 mg) by mouth daily INDICATION: FOLIC ACID SUPPLEMENT    Vitamin B12 deficiency (non anemic)       order for DME     1 each    Equipment being ordered: Nebulizer    Pulmonary emphysema, unspecified emphysema type (H)       OYSTER CALCIUM + D 250-125 MG-UNIT per tablet   Generic drug:  calcium-vitamin D      Take 2 tablets by mouth daily        ranitidine 150 MG tablet    ZANTAC     Take 150 mg by mouth daily as needed for heartburn        spironolactone 25 MG tablet    ALDACTONE    90 tablet    Take 1 tablet (25 mg) by mouth daily    Benign essential hypertension       tiotropium 18 MCG capsule    SPIRIVA HANDIHALER    90 capsule    Inhale contents of one capsule daily.    Pulmonary emphysema, unspecified emphysema type (H)       triamcinolone 0.1 % cream    KENALOG     Apply topically 2 times daily as needed for irritation (sores on scalp.)        TYLENOL PO      Take 1,000 mg by mouth 2 times daily as needed for mild pain or fever        Urea 20 % Crea cream      Apply topically daily        vitamin C-electrolytes 1000mg vitamin C super orange drink mix    EMERGEN-C     Take 1 packet by mouth daily Mix 1 packet in 4-6oz water.        * Notice:  This list has 2 medication(s) that are the same as other medications prescribed for you. Read the directions carefully, and ask your doctor or other care provider to review them with you.

## 2018-11-05 NOTE — PROGRESS NOTES
"  SUBJECTIVE:   Marva Angela is a 68 year old female who presents for Preventive Visit.    Are you in the first 12 months of your Medicare Part B coverage?  No    Physical Health:    In general, how would you rate your overall physical health? good    Outside of work, how many days during the week do you exercise? 4-5 days/week    Outside of work, approximately how many minutes a day do you exercise?30-45 minutes    If you drink alcohol do you typically have >3 drinks per day or >7 drinks per week? No    Do you usually eat at least 4 servings of fruit and vegetables a day, include whole grains & fiber and avoid regularly eating high fat or \"junk\" foods? Yes    Do you have any problems taking medications regularly?  No    Do you have any side effects from medications? not applicable    Needs assistance for the following daily activities: no assistance needed    Which of the following safety concerns are present in your home?:  none identified     Hearing impairment: No    In the past 6 months, have you been bothered by leaking of urine? no    Mental Health:    In general, how would you rate your overall mental or emotional health? excellent  PHQ-2 Score:      Additional concerns to address?  No    Fall risk:           1.  COPD remains stable.  Has not had any recent breathing troubles beyond usual baseline.  Has not any acute respiratory events.  Remains with intermittent cough, mild shortness of breath with overexertion as per usual.  Using medication as directed with reported side effects.         COGNITIVE SCREEN  1) Repeat 3 items (Leader, Season, Table)    2) Clock draw: NORMAL  3) 3 item recall: Recalls 2 objects   Results: NORMAL clock, 1-2 items recalled: COGNITIVE IMPAIRMENT LESS LIKELY    Mini-CogTM Copyright FRITZ Phan. Licensed by the author for use in [facility]; reprinted with permission (myriam@.[email or web address]). All rights reserved.            1.  The patient has a history of impaired glucose " tolerance with regularly elevated blood sugars.    They have not been diagnosed with type II DM or placed on medications for diabetes before.   They deny polyuria, polydipsia.     The patient is obese with a BMI of Body mass index is 27.49 kg/(m^2)..    Review of current labs show:    Lab Results   Component Value Date    A1C 6.1 11/02/2018    A1C 6.4 03/21/2017     Lab Results   Component Value Date     11/02/2018     04/04/2018     03/19/2018     10/30/2017    GLC 99 07/10/2017     04/05/2017     04/04/2017     03/21/2017     03/14/2017     03/08/2017     02/01/2017     12/19/2016    GLC 95 10/14/2016     08/26/2016     07/27/2016    GLC 99 05/12/2016    GLC 97 04/21/2016     08/31/2009    GLC 91 07/23/2007     10/23/2006     09/21/2004          Reviewed and updated as needed this visit by clinical staff  Tobacco  Allergies  Meds         Reviewed and updated as needed this visit by Provider        Social History   Substance Use Topics     Smoking status: Former Smoker     Packs/day: 1.00     Years: 10.00     Types: Cigarettes     Start date: 2/7/1964     Quit date: 9/7/2015     Smokeless tobacco: Never Used      Comment: 5-10 daily     Alcohol use 0.0 oz/week     0 Standard drinks or equivalent per week      Comment: 1 a month                             Do you feel safe in your environment - Yes    Do you have a Health Care Directive?: Yes: Advance Directive has been received and scanned.    Current providers sharing in care for this patient include:   Patient Care Team:  Kendall Mendez MD as PCP - General (Internal Medicine)    The following health maintenance items are reviewed in Epic and correct as of today:  Health Maintenance   Topic Date Due     COPD ACTION PLAN Q1 YR  03/07/2017     FIT Q1 YR  04/26/2018     INFLUENZA VACCINE (1) 09/01/2018     LUNG CANCER SCREENING ANNUAL   11/03/2018     PHQ-2 Q1 YR  02/28/2019     LIPID MONITORING Q1 YEAR  03/19/2019     TSH Q1 YEAR  04/04/2019     FALL RISK ASSESSMENT  06/19/2019     MAMMO SCREEN Q2 YR (SYSTEM ASSIGNED)  10/30/2019     CBC Q1 YR  11/02/2019     A1C Q1 YR  11/02/2019     ADVANCE DIRECTIVE PLANNING Q5 YRS  03/31/2021     TETANUS IMMUNIZATION (SYSTEM ASSIGNED)  09/03/2025     SPIROMETRY ONETIME  Completed     DEXA SCAN SCREENING (SYSTEM ASSIGNED)  Completed     PNEUMOCOCCAL  Completed     HEPATITIS C SCREENING  Completed         Past Medical History:  ---------------------------  Past Medical History:   Diagnosis Date     Arthritis     knees and hips     Benign essential hypertension     was on amlodipine/ then metoprolol / add lisinopril      Contact dermatitis and other eczema, due to unspecified cause      COPD (chronic obstructive pulmonary disease) (H) 01/01/2012     Coronary artery disease involving native coronary artery of native heart without angina pectoris 2016    coronary artery calcifications seen on CT scan, mild nonocclusive CAD     Hyperlipidemia LDL goal <130 11/7/2012     Nephritis      as a child (post strep)      Osteoporosis, unspecified      Phlebitis and thrombophlebitis of other deep vessels of lower extremities 1972, 1975    Both with pregnancies     Prediabetes 11/01/2018    A1C 6.1     Sinusitis      Tobacco use disorder        Past Surgical History:  ---------------------------  Past Surgical History:   Procedure Laterality Date     ARTHROPLASTY HIP Right 4/3/2017    Procedure: ARTHROPLASTY HIP;  Surgeon: Francisco J Alston MD;  Location:  OR     ARTHROPLASTY KNEE Left 7/31/2017    Procedure: ARTHROPLASTY KNEE;  LEFT TOTAL KNEE ARTHROPLASTY ;  Surgeon: Francisco J Alston MD;  Location: [facility] OR     BIOPSY OF SKIN LESION       C DEXA, BONE DENSITY, AXIAL SKEL  6/2008    T score lumbar -0.6, femoral neck -2.4/-2.0(all stable)     C VAGINAL HYSTERECTOMY  1978    Hysterectomy, Vaginal     [facility]  ASPIRATION &/OR INJECTION GANGLION CYST, ANY  1994     HERNIORRHAPHY INGUINAL Right 3/24/2015    Procedure: HERNIORRHAPHY INGUINAL;  Surgeon: Sam Erazo MD;  Location:  [facility]     HYSTERECTOMY, PAP NO LONGER INDICATED       ORTHOPEDIC SURGERY      bilat meniscus repair       Current Medications:  ---------------------------  Current Outpatient Prescriptions   Medication Sig Dispense Refill     Acetaminophen (TYLENOL PO) Take 1,000 mg by mouth 2 times daily as needed for mild pain or fever       albuterol (2.5 MG/3ML) 0.083% neb solution Take 1 vial (2.5 mg) by nebulization every 6 hours as needed for shortness of breath / dyspnea or wheezing 1 Box 11     albuterol (PROAIR HFA, PROVENTIL HFA, VENTOLIN HFA) 108 (90 BASE) MCG/ACT inhaler Inhale 2 puffs into the lungs every 6 hours as needed for shortness of breath / dyspnea or wheezing 3 Inhaler 1     aspirin EC 81 MG tablet Take 1 tablet (81 mg) by mouth daily 30 tablet 11     atorvastatin (LIPITOR) 20 MG tablet Take 1 tablet (20 mg) by mouth daily INDICATION: TO LOWER CHOLESTEROL AND TO HELP REDUCE RISK OF HEART ATTACK AND STROKE 90 tablet 3     betamethasone dipropionate (DIPROSONE) 0.05 % cream Apply topically 2 times daily as needed (For foot) 45 g prn     calcium-vitamin D (OYSTER CALCIUM + D) 250-125 MG-UNIT per tablet Take 2 tablets by mouth daily       celecoxib (CELEBREX) 200 MG capsule Take 1 capsule (200 mg) by mouth 2 times daily as needed for pain (arthritis pain) 60 capsule 5     cholecalciferol (VITAMIN D3) 95329 units capsule Take 1 capsule (50,000 Units) by mouth every 14 days SIG: TAKE INDICATION: 1ST AND 15TH OF EACH MONTH, TO TREAT VITAMIN D DEFICIENCY 10 capsule 3     cyanocobalamin (VITAMIN B-12) 2500 MCG sublingual tablet Place 2,500 mcg under the tongue daily       diltiazem (CARDIZEM CD/CARTIA XT) 120 MG 24 hr capsule Take 1 capsule (120 mg) by mouth daily with breakfast 90 capsule 3     folic acid (FOLVITE) 1 MG tablet Take 1  tablet (1 mg) by mouth daily INDICATION: FOLIC ACID SUPPLEMENT 100 tablet 3     order for DME Equipment being ordered: Nebulizer 1 each 0     ranitidine (ZANTAC) 150 MG tablet Take 150 mg by mouth daily as needed for heartburn        spironolactone (ALDACTONE) 25 MG tablet Take 1 tablet (25 mg) by mouth daily 90 tablet 3     tiotropium (SPIRIVA HANDIHALER) 18 MCG capsule Inhale contents of one capsule daily. 90 capsule 3     triamcinolone (KENALOG) 0.1 % cream Apply topically 2 times daily as needed for irritation (sores on scalp.)       Urea 20 % CREA cream Apply topically daily        vitamin C-electrolytes (EMERGEN-C) 1000mg vitamin C super orange drink mix Take 1 packet by mouth daily Mix 1 packet in 4-6oz water.       [DISCONTINUED] celecoxib (CELEBREX) 200 MG capsule Take 1 capsule (200 mg) by mouth 2 times daily (Patient taking differently: Take 200 mg by mouth 2 times daily as needed ) 180 capsule 3       Allergies:  -------------  Allergies   Allergen Reactions     Penicillins Rash     rash     Sulfa Drugs Rash     rash       Social History:  -------------------  Social History     Social History     Marital status: Single     Spouse name: N/A     Number of children: 2     Years of education: 18     Occupational History     Mental health therapist [employer]     Social History Main Topics     Smoking status: Former Smoker     Packs/day: 1.00     Years: 10.00     Types: Cigarettes     Start date: 2/7/1964     Quit date: 9/7/2015     Smokeless tobacco: Never Used      Comment: 5-10 daily     Alcohol use 0.0 oz/week     0 Standard drinks or equivalent per week      Comment: 1 a month     Drug use: No     Sexual activity: No     Other Topics Concern      Service No     Blood Transfusions No     Caffeine Concern No     1 cup coffee a day, 1-2 can's of pop a wk in the summer      Occupational Exposure No     Hobby Hazards No     Sleep Concern No     Stress Concern No     Weight Concern No      Special Diet No     Back Care No     Exercise No     not due to knee's and hip     Bike Helmet No     n/a     Seat Belt Yes     Self-Exams Yes     sometimes     Parent/Sibling W/ Cabg, Mi Or Angioplasty Before 65f 55m? No     Social History Narrative    Eats fruits and vegetables every day. She takes a calcium supplement twice daily.    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            Family Medical History:  ------------------------------  Family History   Problem Relation Age of Onset     C.A.D. Father      Diabetes Father      type 2     Myocardial Infarction Father      Heart Surgery Father      CABGx4     Hyperlipidemia Father      Breast Cancer Mother 70     Osteoporosis Mother      Thyroid Disease Mother      Hyperlipidemia Mother      Cancer Brother      Bladder     Hyperlipidemia Brother      Diabetes Brother      type 2     Colon Cancer Brother      Neurologic Disorder Paternal Grandfather      Osteoporosis Sister      Arrhythmia Sister      Heart Surgery Sister      cardioversion, ablation     Other - See Comments Sister 55     arhythmogenic right ventricular dysplasia     Osteoporosis Maternal Grandmother      Osteoporosis Paternal Grandmother      Other - See Comments Maternal Grandfather      pneumonia     Family History Negative Son      Thyroid Disease Daughter      NODULES     Family History Negative Daughter      Arrhythmia Cousin      a-fib     Cancer - colorectal No family hx of      Prostate Cancer No family hx of      Alzheimer Disease No family hx of      Anesthesia Reaction No family hx of      Blood Disease No family hx of      Eye Disorder No family hx of         ROS:  Constitutional, HEENT, cardiovascular, pulmonary, gi and gu systems are negative, except as otherwise noted.    OBJECTIVE:   /70  Pulse 74  Temp 98.1  F (36.7  C) (Oral)  Wt 165 lb 3.2 oz (74.9 kg)  SpO2 95%  BMI 27.49 kg/m2 Estimated body mass index is 27.49 kg/(m^2) as calculated from the following:     "Height as of 11/1/18: 5' 5\" (1.651 m).    Weight as of this encounter: 165 lb 3.2 oz (74.9 kg).  EXAM:   GENERAL alert and no distress  EYES:  Normal sclera,conjunctiva, EOMI  HENT: oral and posterior pharynx without lesions or erythema, facies symmetric  NECK: Neck supple. No LAD, without thyroidmegaly or JVD., Carotids without bruits.  RESP: Clear to ausculation bilaterally without wheezes or crackles. Normal BS in all fields.  CV: RRR normal S1S2 without murmurs, rubs or gallops. PMI normal  LYMPH: no cervical lymph adenopathy appreciated  MS: extremities- no gross deformities of the visible extremities noted, no edema  PSYCH: Alert and oriented times 3; speech- coherent  SKIN:  No obvious significant skin lesions on visible portions of face         ASSESSMENT / PLAN:   \  (Z00.00) Medicare annual wellness visit, subsequent  (primary encounter diagnosis)  Comment: Discussed cardiac disease risk factors and cardiac disease risk factor modification, including diabetes screening, blood pressure screening (and management if indicated), and cholesterol screening.   Reviewed immunzation guidelines, including pneumococcal vaccines, annual influenza, and shingles vaccines.   Discussed routine cancer screenings, including skin cancer, colon cancer screening for everyone until age 80, prostate cancer screening in men until age 75, mammogram and PAP/pelvic for women until age 75.   Recommended regular dentist visits to care for remaining teeth.   Recommended regular screening for vision and glaucoma.   Recommended safe driving and accident avoidance.   Plan:     (Z12.11) Screen for colon cancer  Comment: I discussed current recommendations about colon cancer screening recommending colonoscopy as gold standard test, starting age 50 (age 40 for family history of colon cancer).  The patient is declining to do colonoscopy at this time.   They will agree to FIT testing annually.   Would reconsider colonoscopy if the FIT test is " positive.    Plan: Fecal colorectal cancer screen (FIT)            (M19.90) Osteoarthritis, unspecified osteoarthritis type, unspecified site  Comment: This condition is currently controlled on the current medical regimen.  Continue current therapy.   Plan: celecoxib (CELEBREX) 200 MG capsule            (E53.8) B12 deficiency  Comment: OK to reduce the b12 dose, dose not need such high levels.   Plan: Cyanocobalamin (B-12) 1000 MCG TBCR, Vitamin         B12            (J43.9) Pulmonary emphysema, unspecified emphysema type (H)  Comment: This condition is currently controlled on the current medical regimen.  Continue current therapy.  Discussed general issues of COPD, including pathophysiology, ways it will affect the pt., when to seek help, reviewed the typical medications (how they are taken, how they help)   Plan:     (E78.5) Hyperlipidemia LDL goal <130  Comment: This condition is currently controlled on the current medical regimen.  Continue current therapy.   Plan: **Comprehensive metabolic panel FUTURE 6mo,         Lipid panel reflex to direct LDL Fasting            (M85.80) Osteopenia, unspecified location  Comment:   Plan: Vitamin D Deficiency            (L40.50) Psoriasis with arthropathy (H)  Comment:   Plan:     (I10) Benign essential hypertension  Comment: This condition is currently controlled on the current medical regimen.  Continue current therapy.   hypertension   Plan: **Comprehensive metabolic panel FUTURE 6mo,         **CBC with platelets FUTURE 6mo            (I48.0) Paroxysmal atrial fibrillation (H)  Comment: This condition is currently controlled on the current medical regimen.  Continue current therapy.   Plan:     (J44.9) Chronic obstructive pulmonary disease, unspecified COPD type (H)  Comment: NOW OFF CIGARETTES   Plan: albuterol (PROAIR HFA/PROVENTIL HFA/VENTOLIN         HFA) 108 (90 Base) MCG/ACT inhaler            (R73.03) Prediabetes  Comment: Reviewed the labs showing elevated  "glucose levels.    Discussed \"pre-diabetes\", impaired glucose tolerance, and its part in the dysmetabolic syndrome.    Discussed the inevitable progression of impaired glucose tolerance toward worsening diabetes mellitus and the need for agressive interventions now to delay and prevent this inevitable progression.  Discussed the overall risks that dysmetabolic syndromes/impaired glucose tolerance/syndrome X pose toward increased risks of vascular disease as the main reason for agressive intervention now.  Will add medications for glucose control (i.e. metformin, glitazones, etc), lipid (e.g. statins), and for blood pressure (preferably ARBs and ACE) as indicated.  Will start these as early as needed based other proven ability to delay and modify these risk factors.   Discussed the need for aggressive diet control as the cornerstone of pre-diabetes and diabetes management, emphasizing the reduction of \"simple carbohydrates\" (e.g. Any kind of wheat products (e.g. any bread, any pasta), white rice, noodles, potatoes, snack foods, regular soda, juices (except fresh squeezed), cakes, cookies, candy, etc.) along with regular exercise.       Plan: **A1C FUTURE 6mo, **Comprehensive metabolic         panel FUTURE 6mo, Lipid panel reflex to direct         LDL Fasting, **TSH with free T4 reflex FUTURE         6mo            (E55.9) Vitamin D deficiency  Comment:   Plan: Vitamin D Deficiency            (I25.10) Coronary artery disease involving native coronary artery of native heart without angina pectoris  Comment: The patient does not report any signs or symptoms of angina or active cardiac ischemia.   They do not report any relative changes in their ability to perform physical activities over the past year.    We discussed aggressive secondary risk factor modification, including aggressive BP control (under 130/ideally), aggressive LDL lowering with statin (goal under 100 for sure/under 70 ideally), no smoking, diabetes " "prevention/management, no smoking, and use of either ASA or similar anti platelet agent if tolerated.     Plan: **Comprehensive metabolic panel FUTURE 6mo,         Lipid panel reflex to direct LDL Fasting, **CBC        with platelets FUTURE 6mo               End of Life Planning:  Patient currently has an advanced directive: No.  I have verified the patient's ablity to prepare an advanced directive/make health care decisions.  Literature was provided to assist patient in preparing an advanced directive.    COUNSELING:  Reviewed preventive health counseling, as reflected in patient instructions       Regular exercise       Healthy diet/nutrition       Vision screening       Hearing screening       Dental care       Consider lung cancer screening for ages 55-80 years and 30 pack-year smoking history        Colon cancer screening    BP Readings from Last 1 Encounters:   11/05/18 104/70     Estimated body mass index is 27.49 kg/(m^2) as calculated from the following:    Height as of 11/1/18: 5' 5\" (1.651 m).    Weight as of this encounter: 165 lb 3.2 oz (74.9 kg).           reports that she quit smoking about 3 years ago. Her smoking use included Cigarettes. She started smoking about 54 years ago. She has a 10.00 pack-year smoking history. She has never used smokeless tobacco.      Appropriate preventive services were discussed with this patient, including applicable screening as appropriate for cardiovascular disease, diabetes, osteopenia/osteoporosis, and glaucoma.  As appropriate for age/gender, discussed screening for colorectal cancer, prostate cancer, breast cancer, and cervical cancer. Checklist reviewing preventive services available has been given to the patient.    Reviewed patients plan of care and provided an AVS. The Intermediate Care Plan ( asthma action plan, low back pain action plan, and migraine action plan) for Marva meets the Care Plan requirement. This Care Plan has been established and reviewed " with the Patient.    Counseling Resources:  ATP IV Guidelines  Pooled Cohorts Equation Calculator  Breast Cancer Risk Calculator  FRAX Risk Assessment  ICSI Preventive Guidelines  Dietary Guidelines for Americans, 2010  USDA's MyPlate  ASA Prophylaxis  Lung CA Screening    Kendall Mendez MD  [facility]

## 2018-11-05 NOTE — PATIENT INSTRUCTIONS
"*  OK to reduce the B12 supplement to either 1000 mcg per day or else 2500 mcg 3 times per week or every other day.     *  continue the Vitamin D supplement either 50,000 units twice per month, or else Vitamin D3 5000 unit supplement every day ( get at [employer] or [employer])    *  Consider a B complex vitmain to give all the B's              *  Colon Cancer Screening:  ============================================================  *  All men and women are recommended to have some sort of formal colon cancer screening starting at age of 50 (or earlier if indicated based on family history of colon cancer.     *  Colon cancer is a preventable type of cancer that if caught early can be easily treated even before it becomes cancer by removing the precancerous.      *  Common Colon cancer screening options:     --Colonoscopy (every 10 years if normal exam, no family history of colon cancer)  I place order and you will be contacted to arrange this procedure.   Pros: most complete and thorough exam, identify and remove any pre-cancerous polyps.   Cons: prep before procedure, sedation required, possible cost     --ColoGuard Stool test every 3 years (be sure to confirm coverage by insurance).  This test checks for colon cancer specific DNA in the stool.  You will receive the collection kit in the mail.  Return the samples via mail.  If positive, you do not necessarily have colon cancer, but will need a colonoscopy.    Pros: easy to collect and return, decent specific screening test, newer test  Cons: cost     --\"FIT\" Stool test once per year.    Return the \"FIT\" stool card test to us via mail.  This is a basic level screening for colon cancer by detecting trace amount of occult blood in the intestinal tract.  If the FIT test shows any traces of occult blood, it does NOT necessarily mean that you have colon cancer, it just means that we should probably consider doing the colonoscopy.   Pros: easy to collect, cheap  Cons: not very " "specific, high false positive rate        SHINGLES VACCINE:     I would recommend that you consider getting a \"shingles vaccine\".  The shingles vaccine does not stop you from getting shingles, but it decreases the intensity of the event, the duration of the event, and decreases the painful nerve condition that results     There are two options available:     --Shingrix (available starting early 2018), 2 shots, 2-6 months apart  **recommended**   OR   --Zostavax, one shot    --Based on the available data, the Shingrix vaccine has superior benefit and should be considered even if you have had the Zostavax vaccine before.      --Contact your insurance provider and ask them if either shingles vaccine is covered and is so, how much it will cost you.  Usually this will be cheaper to get in a pharmacy given by the pharmacist.    --Regardless of the coverage, I would recommend that you consider the vaccine since shingles is very painful, just ask anyone who has ever had it.                 5 GOALS TO PREVENT VASCULAR DISEASE:     1.  Aggressive blood pressure control, under 130/80 ideally.  Using medications if needed.    Your blood pressure is under good control    BP Readings from Last 4 Encounters:   11/05/18 104/70   11/01/18 119/69   07/10/18 117/69   06/19/18 100/80       2.  Aggressive LDL cholesterol (\"bad cholesterol\") lowering as indicated.    Your goal is an LDL under 130 for sure, preferably under 100.  (If you have diabetes or previous vascular disease, the the LDL goals would be under 100 for sure, preferably under 70.)    New guidelines identify four high-risk groups who could benefit from statins:   *people with pre-existing heart disease, such as those who have had a heart attack;   *people ages 40 to 75 who have diabetes of any type  *patients ages 40 to 75 with at least a 7.5% risk of developing cardiovascular disease over the next decade, according to a formula described in the guidelines  *patients with " "the sort of super-high cholesterol that sometimes runs in families, as evidenced by an LDL of 190 milligrams per deciliter or higher    Your cholesterol levels are well controlled.    Recent Labs   Lab Test  03/19/18   0732  10/30/17   0726   09/18/15   0738   CHOL  172  175   < >  221*   HDL  56  69   < >  53   LDL  99  87   < >  144*   TRIG  85  95   < >  122   CHOLHDLRATIO   --    --    --   4.2    < > = values in this interval not displayed.       3.  Aggressive diabetic prevention, screening and/or management.      You do not have diabetes as of the most recent blood tests.     4.  No smoking    5.  Daily aspirin: Have a discussion about the relative benefits and risks of taking daily low dose aspirin (81 mg) tablet once per day over the age of 50, unless there is a specific reason that you cannot take aspirin (such as side effect, allergy, or you are on another \"blood thinner\").        --Based on your current cardiac risk factors, you should take Aspirin 81 mg once per day, unless you have any reasons (side effects, intolerance, etc.) that you cannot take aspirin.             Preventive Health Recommendations    See your health care provider every year to    Review health changes.     Discuss preventive care.      Review your medicines if your doctor has prescribed any.      You no longer need a yearly Pap test unless you've had an abnormal Pap test in the past 10 years. If you have vaginal symptoms, such as bleeding or discharge, be sure to talk with your provider about a Pap test.      Every 1 to 2 years, have a mammogram.  If you are over 69, talk with your health care provider about whether or not you want to continue having screening mammograms.      Every 10 years, have a colonoscopy. Or, have a yearly FIT test (stool test). These exams will check for colon cancer.       Have a cholesterol test every 5 years, or more often if your doctor advises it.       Have a diabetes test (fasting glucose) every " three years. If you are at risk for diabetes, you should have this test more often.       At age 65, have a bone density scan (DEXA) to check for osteoporosis (brittle bone disease).    Shots:    Get a flu shot each year.    Get a tetanus shot every 10 years.    Talk to your doctor about your pneumonia vaccines. There are now two you should receive - Pneumovax (PPSV 23) and Prevnar (PCV 13).    Talk to your pharmacist about the shingles vaccine.    Talk to your doctor about the hepatitis B vaccine.    Nutrition:     Eat at least 5 servings of fruits and vegetables each day.      Eat whole-grain bread, whole-wheat pasta and brown rice instead of white grains and rice.      Get adequate Calcium and Vitamin D.     Lifestyle    Exercise at least 150 minutes a week (30 minutes a day, 5 days a week). This will help you control your weight and prevent disease.      Limit alcohol to one drink per day.      No smoking.       Wear sunscreen to prevent skin cancer.       See your dentist twice a year for an exam and cleaning.      See your eye doctor every 1 to 2 years to screen for conditions such as glaucoma, macular degeneration and cataracts.    Personalized Prevention Plan  You are due for the preventive services outlined below.  Your care team is available to assist you in scheduling these services.  If you have already completed any of these items, please share that information with your care team to update in your medical record.  Health Maintenance Due   Topic Date Due     COPD ACTION PLAN Q1 YR  03/07/2017     Colon Cancer Screening - FIT Test - yearly  04/26/2018     Flu Vaccine (1) 09/01/2018     Lung Cancer Screening (CT Scan) - yearly  11/03/2018

## 2018-11-05 NOTE — TELEPHONE ENCOUNTER
Please review [employer] message with additional questions and thoughts from today's office visit.    1) Repeat CT scan?  per 11/3/2017 report, continue annual screen    2) Cologuard versus Fit Screen?  - ( Cologuard would be mailed to patient home after completing paper work,correct?)    Yaquelin KRUSE RN, BSN, PHN

## 2018-11-09 NOTE — TELEPHONE ENCOUNTER
Lung Cancer Screening Shared Decision Making Visit     Marva Angela is eligible for lung cancer screening on the basis of the information provided in my signed lung cancer screening order.     I have discussed with patient the risks and benefits of screening for lung cancer with low-dose CT.     The risks include:  radiation exposure: one low dose chest CT has as much ionizing radiation as about 15 chest x-rays or 6 months of background radiation living in [place]    false positives: 96% of positive findings/nodules are NOT cancer, but some might still require additional diagnostic evaluation, including biopsy  over-diagnosis: some slow growing cancers that might never have been clinically significant will be detected and treated unnecessarily     The benefit of early detection of lung cancer is contingent upon adherence to annual screening or more frequent follow up if indicated.     Furthermore, reaping the benefits of screening requires Marva Angela to be willing and physically able to undergo diagnostic procedures, if indicated. Although no specific guide is available for determining severity of comorbidities, it is reasonable to withhold screening in patients who have greater mortality risk from other diseases.     We did discuss that the only way to prevent lung cancer is to not smoke. Smoking cessation assistance was offered. (patient already stopped smoking in 2015)    I did not offer risk estimation using a calculator such as this one:    ShouldIScreen

## 2018-11-16 ENCOUNTER — HOSPITAL ENCOUNTER (OUTPATIENT)
Dept: CT IMAGING | Facility: CLINIC | Age: 68
Discharge: HOME | End: 2018-11-16
Attending: INTERNAL MEDICINE | Admitting: INTERNAL MEDICINE
Payer: MEDICARE

## 2018-11-16 DIAGNOSIS — Z12.2 ENCOUNTER FOR SCREENING FOR LUNG CANCER: ICD-10-CM

## 2018-11-16 DIAGNOSIS — Z87.891 HISTORY OF SMOKING 25-50 PACK YEARS: ICD-10-CM

## 2018-11-16 PROCEDURE — G0297 LDCT FOR LUNG CA SCREEN: HCPCS

## 2018-11-18 LAB — HEMOCCULT STL QL IA: NEGATIVE

## 2018-11-19 ENCOUNTER — OFFICE VISIT (OUTPATIENT)
Dept: PODIATRY | Facility: CLINIC | Age: 68
End: 2018-11-19
Payer: COMMERCIAL

## 2018-11-19 VITALS
DIASTOLIC BLOOD PRESSURE: 66 MMHG | BODY MASS INDEX: 27.49 KG/M2 | SYSTOLIC BLOOD PRESSURE: 106 MMHG | WEIGHT: 165 LBS | HEIGHT: 65 IN

## 2018-11-19 DIAGNOSIS — M79.674 PAIN OF TOE OF RIGHT FOOT: ICD-10-CM

## 2018-11-19 DIAGNOSIS — L84 CALLUS OF FOOT: ICD-10-CM

## 2018-11-19 DIAGNOSIS — Z12.11 SCREEN FOR COLON CANCER: ICD-10-CM

## 2018-11-19 DIAGNOSIS — M20.61 DEFORMITY OF TOE OF RIGHT FOOT: Primary | ICD-10-CM

## 2018-11-19 PROCEDURE — 99203 OFFICE O/P NEW LOW 30 MIN: CPT | Performed by: PODIATRIST

## 2018-11-19 NOTE — PATIENT INSTRUCTIONS
Thank you for choosing [facility]!    DR. VERDUZCO'S CLINIC LOCATIONS     MONDAY - [place] WEDNESDAY - [place]   [address] [place] [address] [address] [place] [address] JOE [place]lego [address]   [phone] / [phone] / [phone]       THURSDAY - [place] SCHEDULE SURGERY: [phone]   [address] APPOINTMENTS: [phone]    [id number] BILLING QUESTIONS: [phone] / [phone]       HAMMERTOES  Hammertoe is a contracture (bending) of one or both joints of the second, third, fourth, or fifth (little) toes. This abnormal bending can put pressure on the toe when wearing shoes, causing problems to develop.  Hammertoes usually start out as mild deformities and get progressively worse over time. In the earlier stages, hammertoes are flexible and the symptoms can often be managed with noninvasive measures. But if left untreated, hammertoes can become more rigid and will not respond to non-surgical treatment.  Because of the progressive nature of hammertoes, they should receive early attention. Hammertoes never get better without some kind of intervention.  CAUSES  The most common cause of hammertoe is a muscle/tendon imbalance. This imbalance, which leads to a bending of the toe, results from mechanical (structural) changes in the foot that occur over time in some people.  Hammertoes may be aggravated by shoes that don t fit properly. A hammertoe may result if a toe is too long and is forced into a cramped position when a tight shoe is worn.  Occasionally, hammertoe is the result of an earlier trauma to the toe. In some people, hammertoes are inherited.  SYMPTOMS  Pain or irritation of the affected toe when wearing shoes.   Corns and calluses (a buildup of skin) on the toe, between two toes, or on the ball of the foot. Corns are caused by constant friction against the shoe. They may be soft or hard, depending upon their  location.   Inflammation, redness, or a burning sensation   Contracture of the toe   In more severe cases of hammertoe, open sores may form.   DIAGNOSIS  Although hammertoes are readily apparent, to arrive at a diagnosis the foot and ankle surgeon will obtain a thorough history of your symptoms and examine your foot. During the physical examination, the doctor may attempt to reproduce your symptoms by manipulating your foot and will study the contractures of the toes. In addition, the foot and ankle surgeon may take x-rays to determine the degree of the deformities and assess any changes that may have occurred.   Hammertoes are progressive - they don t go away by themselves and usually they will get worse over time. However, not all cases are alike - some hammertoes progress more rapidly than others. Once your foot and ankle surgeon has evaluated your hammertoes, a treatment plan can be developed that is suited to your needs.  NON-SURGICAL TREATMENT  There is a variety of treatment options for hammertoe. The treatment your foot and ankle surgeon selects will depend upon the severity of your hammertoe and other factors.  A number of non-surgical measures can be undertaken:  Padding corns and calluses. Your foot and ankle surgeon can provide or prescribe pads designed to shield corns from irritation. If you want to try over-the-counter pads, avoid the medicated types. Medicated pads are generally not recommended because they may contain a small amount of acid that can be harmful. Consult your surgeon about this option.   Changes in shoewear. Avoid shoes with pointed toes, shoes that are too short, or shoes with high heels - conditions that can force your toe against the front of the shoe. Instead, choose comfortable shoes with a deep, roomy toe box and heels no higher than two inches.   Orthotic devices. A custom orthotic device placed in your shoe may help control the muscle/tendon imbalance.   Injection therapy.  Corticosteroid injections are sometimes used to ease pain and inflammation caused by hammertoe.   Medications. Oral nonsteroidal anti-inflammatory drugs (NSAIDs), such as ibuprofen, may be recommended to reduce pain and inflammation.   Splinting/strapping. Splints or small straps may be applied by the surgeon to realign the bent toe.   Exercises:   1. The Toe Tap  Stand flat on the ground in your bare feet. Raise all of your toes up off the ground as high as you can. Then starting with the little toes, slowly press them down to the ground. After the big toes are on the ground, start over by raising all of them up off the ground again. This motion is similar to tapping your fingers on a desk. Repeat this ten times.     2. Interlocking your Fingers and Toes  Cross your right foot over your knee and place the fingers of your left hand between your toes. Squeeze your toes together, pinching your fingers between them. Spread the toes apart and squeeze them together again. Repeat this ten times then do the other foot. Like most exercises, this will get easier the more you do it. If you are having a lot of difficulty with this exercise, start with just your index finger between your first and second toe, then later add your middle finger between your second and third toes, and so on until you can fit all your fingers between your toes. Do this ten times on each foot. Eventually you will be able to spread your toes apart without using your fingers.    3. Gripping the Floor   the floor by pressing the pads of your toes (not the tips) into the floor without curling your toes. Relax and repeat this ten times. If your shoes have the proper amount of depth, you should be able to do this with shoes on.    HAMMERTOE TOE SURGERY   Hammertoe surgery is complex. The surgical procedure is an attempt to help the toe lay in a better position. Nearly every structure in the toe will be cut including the tendons, ligaments, skin and  bone. Hammertoes are a complex deformity and final toe position is difficult to predict. The only sure way to position a toe is to fuse all three digital joints. That will not happen as some degree of toe motion is needed for walking. The toe may not be completely reduced as the surrounding skin and other structures may not allow the toe to return to a normal position. The tendons on adjacent toes may need to be cut at the time of the original or subsequent surgeries, as interconnections exist between the toes. The toe may drift after surgery. Stiffness may develop leading to new areas of pressure.   Future shoe choices will be critical in allowing the surgery to provide comfort. The toes will still hurt if shoes rub. The original pain may also persist as other foot problems may be contributing to the current pain. The toe may or may not be pinned in place. External pins would require complete avoidance of water on the foot for six weeks. The pin would be removed about six weeks after the surgery. Strict attention to protection is critical. The pin could get bumped or loosen resulting in early removal. Removal might be necessary before the bone heals which would negatively affect the final surgical outcome and toe allignment.             FYI:  BODY MASS INDEX (BMI)  Many things can cause foot and ankle problems. Foot structure, activity level, foot mechanics and injuries are common causes of pain. One very important issue that often goes unmentioned, is body weight. Extra weight can cause increased stress on muscles, ligaments, bones and tendons. Sometimes just a few extra pounds is all it takes to put one over her/his threshold. Without reducing that stress, it can be difficult to alleviate pain. Some people are uncomfortable addressing this issue, but we feel it is important for you to think about it. As Foot & Ankle specialists, our job is addressing the lower extremity problem and possible causes. Regarding  extra body weight, we encourage patients to discuss diet and weight management plans with their primary care doctors. It is this team approach that gives you the best opportunity for pain relief and getting you back on your feet.

## 2018-11-19 NOTE — MR AVS SNAPSHOT
After Visit Summary   11/19/2018    Marva Angela    MRN: [id number]           Patient Information     Date Of Birth          1950        Visit Information        Provider Department      11/19/2018 8:30 AM Vasu Verduzco DPM [facility]        Care Instructions    Thank you for choosing [facility]!    DR. VERDUZCO'S CLINIC LOCATIONS     [facility] [place]   [address] [address] [place] [address]   [phone] / [phone] / [phone]       THURSDAY - [place] SCHEDULE SURGERY: [phone]   [address] APPOINTMENTS: [phone]   [place] [address] BILLING QUESTIONS: [phone] / [phone]       HAMMERTOES  Hammertoe is a contracture (bending) of one or both joints of the second, third, fourth, or fifth (little) toes. This abnormal bending can put pressure on the toe when wearing shoes, causing problems to develop.  Hammertoes usually start out as mild deformities and get progressively worse over time. In the earlier stages, hammertoes are flexible and the symptoms can often be managed with noninvasive measures. But if left untreated, hammertoes can become more rigid and will not respond to non-surgical treatment.  Because of the progressive nature of hammertoes, they should receive early attention. Hammertoes never get better without some kind of intervention.  CAUSES  The most common cause of hammertoe is a muscle/tendon imbalance. This imbalance, which leads to a bending of the toe, results from mechanical (structural) changes in the foot that occur over time in some people.  Hammertoes may be aggravated by shoes that don t fit properly. A hammertoe may result if a toe is too long and is forced into a cramped position when a tight shoe is worn.  Occasionally, hammertoe is the result of an earlier trauma to the  toe. In some people, hammertoes are inherited.  SYMPTOMS  Pain or irritation of the affected toe when wearing shoes.   Corns and calluses (a buildup of skin) on the toe, between two toes, or on the ball of the foot. Corns are caused by constant friction against the shoe. They may be soft or hard, depending upon their location.   Inflammation, redness, or a burning sensation   Contracture of the toe   In more severe cases of hammertoe, open sores may form.   DIAGNOSIS  Although hammertoes are readily apparent, to arrive at a diagnosis the foot and ankle surgeon will obtain a thorough history of your symptoms and examine your foot. During the physical examination, the doctor may attempt to reproduce your symptoms by manipulating your foot and will study the contractures of the toes. In addition, the foot and ankle surgeon may take x-rays to determine the degree of the deformities and assess any changes that may have occurred.   Hammertoes are progressive - they don t go away by themselves and usually they will get worse over time. However, not all cases are alike - some hammertoes progress more rapidly than others. Once your foot and ankle surgeon has evaluated your hammertoes, a treatment plan can be developed that is suited to your needs.  NON-SURGICAL TREATMENT  There is a variety of treatment options for hammertoe. The treatment your foot and ankle surgeon selects will depend upon the severity of your hammertoe and other factors.  A number of non-surgical measures can be undertaken:  Padding corns and calluses. Your foot and ankle surgeon can provide or prescribe pads designed to shield corns from irritation. If you want to try over-the-counter pads, avoid the medicated types. Medicated pads are generally not recommended because they may contain a small amount of acid that can be harmful. Consult your surgeon about this option.   Changes in shoewear. Avoid shoes with pointed toes, shoes that are too short, or  shoes with high heels - conditions that can force your toe against the front of the shoe. Instead, choose comfortable shoes with a deep, roomy toe box and heels no higher than two inches.   Orthotic devices. A custom orthotic device placed in your shoe may help control the muscle/tendon imbalance.   Injection therapy. Corticosteroid injections are sometimes used to ease pain and inflammation caused by hammertoe.   Medications. Oral nonsteroidal anti-inflammatory drugs (NSAIDs), such as ibuprofen, may be recommended to reduce pain and inflammation.   Splinting/strapping. Splints or small straps may be applied by the surgeon to realign the bent toe.   Exercises:   1. The Toe Tap  Stand flat on the ground in your bare feet. Raise all of your toes up off the ground as high as you can. Then starting with the little toes, slowly press them down to the ground. After the big toes are on the ground, start over by raising all of them up off the ground again. This motion is similar to tapping your fingers on a desk. Repeat this ten times.     2. Interlocking your Fingers and Toes  Cross your right foot over your knee and place the fingers of your left hand between your toes. Squeeze your toes together, pinching your fingers between them. Spread the toes apart and squeeze them together again. Repeat this ten times then do the other foot. Like most exercises, this will get easier the more you do it. If you are having a lot of difficulty with this exercise, start with just your index finger between your first and second toe, then later add your middle finger between your second and third toes, and so on until you can fit all your fingers between your toes. Do this ten times on each foot. Eventually you will be able to spread your toes apart without using your fingers.    3. Gripping the Floor   the floor by pressing the pads of your toes (not the tips) into the floor without curling your toes. Relax and repeat this ten times.  If your shoes have the proper amount of depth, you should be able to do this with shoes on.    HAMMERTOE TOE SURGERY   Hammertoe surgery is complex. The surgical procedure is an attempt to help the toe lay in a better position. Nearly every structure in the toe will be cut including the tendons, ligaments, skin and bone. Hammertoes are a complex deformity and final toe position is difficult to predict. The only sure way to position a toe is to fuse all three digital joints. That will not happen as some degree of toe motion is needed for walking. The toe may not be completely reduced as the surrounding skin and other structures may not allow the toe to return to a normal position. The tendons on adjacent toes may need to be cut at the time of the original or subsequent surgeries, as interconnections exist between the toes. The toe may drift after surgery. Stiffness may develop leading to new areas of pressure.   Future shoe choices will be critical in allowing the surgery to provide comfort. The toes will still hurt if shoes rub. The original pain may also persist as other foot problems may be contributing to the current pain. The toe may or may not be pinned in place. External pins would require complete avoidance of water on the foot for six weeks. The pin would be removed about six weeks after the surgery. Strict attention to protection is critical. The pin could get bumped or loosen resulting in early removal. Removal might be necessary before the bone heals which would negatively affect the final surgical outcome and toe allignment.             FYI:  BODY MASS INDEX (BMI)  Many things can cause foot and ankle problems. Foot structure, activity level, foot mechanics and injuries are common causes of pain. One very important issue that often goes unmentioned, is body weight. Extra weight can cause increased stress on muscles, ligaments, bones and tendons. Sometimes just a few extra pounds is all it takes to put  one over her/his threshold. Without reducing that stress, it can be difficult to alleviate pain. Some people are uncomfortable addressing this issue, but we feel it is important for you to think about it. As Foot & Ankle specialists, our job is addressing the lower extremity problem and possible causes. Regarding extra body weight, we encourage patients to discuss diet and weight management plans with their primary care doctors. It is this team approach that gives you the best opportunity for pain relief and getting you back on your feet.                Follow-ups after your visit        Who to contact     If you have questions or need follow up information about today's clinic visit or your schedule please contact [facility] directly at [phone].  Normal or non-critical lab and imaging results will be communicated to you by [employer], letter or phone within 4 business days after the clinic has received the results. If you do not hear from us within 7 days, please contact the clinic through [employer] or phone. If you have a critical or abnormal lab result, we will notify you by phone as soon as possible.  Submit refill requests through [employer] or call your pharmacy and they will forward the refill request to us. Please allow 3 business days for your refill to be completed.          Additional Information About Your Visit        [facility] Information     [employer] gives you secure access to your electronic health record. If you see a primary care provider, you can also send messages to your care team and make appointments. If you have questions, please call your primary care clinic.  If you do not have a primary care provider, please call [phone] and they will assist you.        Care EveryWhere ID     This is your Care EveryWhere ID. This could be used by other organizations to access your [facility] medical records  [id number]        Your Vitals Were     Height BMI (Body Mass Index)        "         5' 5\" (1.651 m) 27.46 kg/m2           Blood Pressure from Last 3 Encounters:   11/19/18 106/66   11/05/18 104/70   11/01/18 119/69    Weight from Last 3 Encounters:   11/19/18 165 lb (74.8 kg)   11/05/18 165 lb 3.2 oz (74.9 kg)   11/01/18 166 lb 9.6 oz (75.6 kg)              Today, you had the following     No orders found for display       Primary Care Provider Office Phone # Fax #    Kendall Mendez [phone]       [address] [facility] [address]        Equal Access to Services     KHUSHBU FRIAS : Ruddy de la vega Soangel, wayong santacruz, jose kaalmada truman, jenniffer antoine . So [facility] [phone].    ATENCIÓN: Si habla español, tiene a barfield disposición servicios gratuitos de asistencia lingüística. Llame al [phone].    We comply with applicable federal civil rights laws and [place] laws. We do not discriminate on the basis of race, color, national origin, age, disability, sex, sexual orientation, or gender identity.            Thank you!     Thank you for choosing [facility]  for your care. Our goal is always to provide you with excellent care. Hearing back from our patients is one way we can continue to improve our services. Please take a few minutes to complete the written survey that you may receive in the mail after your visit with us. Thank you!             Your Updated Medication List - Protect others around you: Learn how to safely use, store and throw away your medicines at www.disposemymeds.org.          This list is accurate as of 11/19/18  8:46 AM.  Always use your most recent med list.                   Brand Name Dispense Instructions for use Diagnosis    * albuterol (2.5 MG/3ML) 0.083% neb solution     1 Box    Take 1 vial (2.5 mg) by nebulization every 6 hours as needed for shortness of breath / dyspnea or wheezing    Pulmonary emphysema, unspecified emphysema type (H), COPD exacerbation (H)    "    * albuterol 108 (90 Base) MCG/ACT inhaler    PROAIR HFA/PROVENTIL HFA/VENTOLIN HFA    3 Inhaler    Inhale 2 puffs into the lungs every 6 hours as needed for shortness of breath / dyspnea or wheezing GENERIC VENTOLIN    Chronic obstructive pulmonary disease, unspecified COPD type (H)       aspirin 81 MG EC tablet     30 tablet    Take 1 tablet (81 mg) by mouth daily    Coronary artery disease involving native coronary artery of native heart without angina pectoris       atorvastatin 20 MG tablet    LIPITOR    90 tablet    Take 1 tablet (20 mg) by mouth daily INDICATION: TO LOWER CHOLESTEROL AND TO HELP REDUCE RISK OF HEART ATTACK AND STROKE    Hyperlipidemia LDL goal <130       B-12 1000 MCG Tbcr      Place 1,000 mcg under the tongue daily    B12 deficiency       betamethasone dipropionate 0.05 % cream    DIPROSONE    45 g    Apply topically 2 times daily as needed (For foot)    Psoriasis       celecoxib 200 MG capsule    celeBREX    60 capsule    Take 1 capsule (200 mg) by mouth 2 times daily as needed for pain (arthritis pain)    Osteoarthritis, unspecified osteoarthritis type, unspecified site       diltiazem 120 MG 24 hr capsule    CARDIZEM CD/CARTIA XT    90 capsule    Take 1 capsule (120 mg) by mouth daily with breakfast    Paroxysmal atrial fibrillation (H)       folic acid 1 MG tablet    FOLVITE    100 tablet    Take 1 tablet (1 mg) by mouth daily INDICATION: FOLIC ACID SUPPLEMENT    Vitamin B12 deficiency (non anemic)       order for DME     1 each    Equipment being ordered: Nebulizer    Pulmonary emphysema, unspecified emphysema type (H)       OYSTER CALCIUM + D 250-125 MG-UNIT per tablet   Generic drug:  calcium-vitamin D      Take 2 tablets by mouth daily        ranitidine 150 MG tablet    ZANTAC     Take 150 mg by mouth daily as needed for heartburn        spironolactone 25 MG tablet    ALDACTONE    90 tablet    Take 1 tablet (25 mg) by mouth daily    Benign essential hypertension       tiotropium 18  MCG capsule    SPIRIVA HANDIHALER    90 capsule    Inhale contents of one capsule daily.    Pulmonary emphysema, unspecified emphysema type (H)       triamcinolone 0.1 % cream    KENALOG     Apply topically 2 times daily as needed for irritation (sores on scalp.)        TYLENOL PO      Take 1,000 mg by mouth 2 times daily as needed for mild pain or fever        urea 20 % cream      Apply topically daily        vitamin C-electrolytes 1000mg vitamin C super orange drink mix    EMERGEN-C     Take 1 packet by mouth daily Mix 1 packet in 4-6oz water.        vitamin D3 25756 units capsule    CHOLECALCIFEROL    10 capsule    Take 1 capsule (50,000 Units) by mouth every 14 days SIG: TAKE INDICATION: 1ST AND 15TH OF EACH MONTH, TO TREAT VITAMIN D DEFICIENCY    Osteopenia, unspecified location, Psoriasis with arthropathy (H)       * Notice:  This list has 2 medication(s) that are the same as other medications prescribed for you. Read the directions carefully, and ask your doctor or other care provider to review them with you.

## 2018-11-19 NOTE — PROGRESS NOTES
"  ASSESSMENT/PLAN:    Encounter Diagnoses   Name Primary?     Deformity of toe of right foot Yes     Pain of toe of right foot      Callus of foot      I explained why the 4th and 5th toes sometimes contract and rotate (into varus).    She said that she does not have pain, if using a toe separator.   I also suggested a crest pad to elevate the 4th toe, so it doesn't sit below the third. I showed her one and demonstrated how she should use.  She was provided a crest pad  Continue current toes with adequate toe box room.    Surgery was briefly discussed: the same day procedure and course of recovery.  I think her pain can be managed conservatively at this time.      Body mass index is 27.46 kg/(m^2).    Weight management plan: Patient was referred to their PCP to discuss a diet and exercise plan.      Vasu Jimenez DPM, FACFAS, [facility] Department of Podiatry/Foot & Ankle Surgery      ____________________________________________________________________    HPI:         Chief Complaint: curved toe makes walking painful  Onset of problem: 6 months  Pain/ discomfort is described as:  ache  Frequency: intermittent    The pain is made worse with walking any distance  Previous treatment: \"buying out [employer] and [employer] of their toe separators\"  *  Patient Active Problem List   Diagnosis     Osteopenia     Personal history of tobacco use, presenting hazards to health     Eczema     Advance Care Planning     Mixed hyperlipidemia     Benign essential hypertension     Hip pain, right     Family history of thyroid disease     Fatigue, unspecified type     Hair loss     Pain in joint, multiple sites     Seborrheic keratosis     Psoriasis with arthropathy (H)     Urine abnormality     Vitamin B12 deficiency (non anemic)     Scurvy     Vitamin D deficiency     Hematuria     Primary osteoarthritis, unspecified site     Arthralgia of both knees     Tinea pedis of left foot     Impaired fasting glucose     Hypercalcemia     Hip " joint replacement status     Psoriasis     Prediabetes     Personal history of DVT (deep vein thrombosis)     Knee joint replacement status     Aftercare following joint replacement [Z47.1]     COPD (chronic obstructive pulmonary disease) (H)     Paroxysmal atrial fibrillation (H)     Coronary artery disease involving native coronary artery of native heart without angina pectoris     B12 deficiency   *  *  Past Surgical History:   Procedure Laterality Date     ARTHROPLASTY HIP Right 4/3/2017    Procedure: ARTHROPLASTY HIP;  Surgeon: Francisco J Alston MD;  Location: [facility]     ARTHROPLASTY KNEE Left 7/31/2017    Procedure: ARTHROPLASTY KNEE;  LEFT TOTAL KNEE ARTHROPLASTY ;  Surgeon: Francisco J Alston MD;  Location: [facility] OR     BIOPSY OF SKIN LESION       C DEXA, BONE DENSITY, AXIAL SKEL  6/2008    T score lumbar -0.6, femoral neck -2.4/-2.0(all stable)     C VAGINAL HYSTERECTOMY  1978    Hysterectomy, Vaginal     HC ASPIRATION &/OR INJECTION GANGLION CYST, ANY  1994     HERNIORRHAPHY INGUINAL Right 3/24/2015    Procedure: HERNIORRHAPHY INGUINAL;  Surgeon: Sam Erazo MD;  [facility]     HYSTERECTOMY, PAP NO LONGER INDICATED       ORTHOPEDIC SURGERY      bilat meniscus repair   *  *  Current Outpatient Prescriptions   Medication Sig Dispense Refill     Acetaminophen (TYLENOL PO) Take 1,000 mg by mouth 2 times daily as needed for mild pain or fever       albuterol (2.5 MG/3ML) 0.083% neb solution Take 1 vial (2.5 mg) by nebulization every 6 hours as needed for shortness of breath / dyspnea or wheezing 1 Box 11     albuterol (PROAIR HFA/PROVENTIL HFA/VENTOLIN HFA) 108 (90 Base) MCG/ACT inhaler Inhale 2 puffs into the lungs every 6 hours as needed for shortness of breath / dyspnea or wheezing GENERIC VENTOLIN 3 Inhaler 11     aspirin EC 81 MG tablet Take 1 tablet (81 mg) by mouth daily 30 tablet 11     atorvastatin (LIPITOR) 20 MG tablet Take 1 tablet (20 mg) by mouth daily INDICATION: TO  LOWER CHOLESTEROL AND TO HELP REDUCE RISK OF HEART ATTACK AND STROKE 90 tablet 3     betamethasone dipropionate (DIPROSONE) 0.05 % cream Apply topically 2 times daily as needed (For foot) 45 g prn     calcium-vitamin D (OYSTER CALCIUM + D) 250-125 MG-UNIT per tablet Take 2 tablets by mouth daily       celecoxib (CELEBREX) 200 MG capsule Take 1 capsule (200 mg) by mouth 2 times daily as needed for pain (arthritis pain) 60 capsule 5     cholecalciferol (VITAMIN D3) 75279 units capsule Take 1 capsule (50,000 Units) by mouth every 14 days SIG: TAKE INDICATION: 1ST AND 15TH OF EACH MONTH, TO TREAT VITAMIN D DEFICIENCY 10 capsule 3     Cyanocobalamin (B-12) 1000 MCG TBCR Place 1,000 mcg under the tongue daily       diltiazem (CARDIZEM CD/CARTIA XT) 120 MG 24 hr capsule Take 1 capsule (120 mg) by mouth daily with breakfast 90 capsule 3     folic acid (FOLVITE) 1 MG tablet Take 1 tablet (1 mg) by mouth daily INDICATION: FOLIC ACID SUPPLEMENT 100 tablet 3     order for DME Equipment being ordered: Nebulizer 1 each 0     ranitidine (ZANTAC) 150 MG tablet Take 150 mg by mouth daily as needed for heartburn        spironolactone (ALDACTONE) 25 MG tablet Take 1 tablet (25 mg) by mouth daily 90 tablet 3     tiotropium (SPIRIVA HANDIHALER) 18 MCG capsule Inhale contents of one capsule daily. 90 capsule 3     triamcinolone (KENALOG) 0.1 % cream Apply topically 2 times daily as needed for irritation (sores on scalp.)       Urea 20 % CREA cream Apply topically daily        vitamin C-electrolytes (EMERGEN-C) 1000mg vitamin C super orange drink mix Take 1 packet by mouth daily Mix 1 packet in 4-6oz water.         ROS:     A 10-point review of systems was performed and is positive for that noted above in the HPI and as seen below.  All other areas are negative.     Numbness in feet?  no   Calf pain with walking? no  Recent foot/ankle injury? no  Weight change over past 12 months? no  Self perception as overweight? yes  Recent flu-like  symptoms? no  Joint pain other than feet ? 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[employer]     Social History Main Topics     Smoking status: Former Smoker     Packs/day: 1.00     Years: 40.00     Types: Cigarettes     Start date: 2/7/1964     Quit date: 9/7/2015     Smokeless tobacco: Never Used      Comment: 5-10 daily     Alcohol use 0.0 oz/week     0 Standard drinks or equivalent per week      Comment: 1 a month     Drug use: No     Sexual activity: No     Other Topics Concern      Service No     Blood Transfusions No     Caffeine Concern No     1 cup coffee a day, 1-2 can's of pop a wk in the summer      Occupational Exposure No     Hobby Hazards No     Sleep Concern No     Stress Concern No     Weight Concern No     Special Diet No     Back Care No     Exercise No     not due to knee's and hip     Bike Helmet No     n/a     Seat Belt Yes     Self-Exams Yes     sometimes     Parent/Sibling W/ Cabg, Mi Or Angioplasty Before 65f 55m? No     Social History Narrative    Eats fruits and vegetables every day. She takes a calcium supplement twice daily.    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            Family history:  Family History   Problem Relation Age of Onset     C.A.D. Father      Diabetes Father      type 2     Myocardial Infarction Father      Heart Surgery Father      CABGx4     Hyperlipidemia Father      Breast Cancer Mother 70     Osteoporosis Mother      Thyroid Disease Mother      Hyperlipidemia Mother      Cancer Brother      Bladder     Hyperlipidemia Brother      Diabetes Brother      type 2     Colon Cancer Brother      Neurologic Disorder Paternal Grandfather      Osteoporosis Sister      Arrhythmia Sister      Heart Surgery  "Sister      cardioversion, ablation     Other - See Comments Sister 55     arhythmogenic right ventricular dysplasia     Osteoporosis Maternal Grandmother      Osteoporosis Paternal Grandmother      Other - See Comments Maternal Grandfather      pneumonia     Family History Negative Son      Thyroid Disease Daughter      NODULES     Family History Negative Daughter      Arrhythmia Cousin      a-fib     Cancer - colorectal No family hx of      Prostate Cancer No family hx of      Alzheimer Disease No family hx of      Anesthesia Reaction No family hx of      Blood Disease No family hx of      Eye Disorder No family hx of        Rheumatoid arthritis:  no  Foot Problems: no  Diabetes: parent      EXAM:    Vitals: /66 (BP Location: Right arm, Patient Position: Chair, Cuff Size: Adult Regular)  Ht 5' 5\" (1.651 m)  Wt 165 lb (74.8 kg)  BMI 27.46 kg/m2  BMI: Body mass index is 27.46 kg/(m^2).  Height: 5' 5\"    Constitutional/ general:  Pt is in no apparent distress, appears well-nourished.  Cooperative with history and physical exam.     Vascular:  Pedal pulses are palpable bilaterally for both the DP and PT arteries.  CFT < 3 sec.  No edema.  Pedal hair growth noted.     Neuro:  Alert and oriented x 3. Coordinated gait.  Light touch sensation is intact to the L4, L5, S1 distributions. No obvious deficits.  No evidence of neurological-based weakness, spasticity, or contracture in the lower extremities.     Derm: Normal texture and turgor.  No erythema, ecchymosis, or cyanosis.  No open lesions.  Pinch type callus, plantar right 4th toe.    Musculoskeletal:    Lower extremity muscle strength is normal.  Patient is ambulatory without an assistive device or brace .  No gross deformities. She has contractures of the right 4th and 5th toes. The 4th is a curly toe with triplane deformity.  It curls under the tip of the 3rd toe.  These toes are both flexible.      Vasu Jimenez, LORRI, FACFAS, MS    [place] Department of " Podiatry/Foot & Ankle Surgery

## 2018-11-19 NOTE — LETTER
"    11/19/2018         RE: Marva Angela  [address] [address] [place] [address]        Dear Colleague,    Thank you for referring your patient, Marva Angela, to the [facility]. Please see a copy of my visit note below.      ASSESSMENT/PLAN:    Encounter Diagnoses   Name Primary?     Deformity of toe of right foot Yes     Pain of toe of right foot      Callus of foot      I explained why the 4th and 5th toes sometimes contract and rotate (into varus).    She said that she does not have pain, if using a toe separator.   I also suggested a crest pad to elevate the 4th toe, so it doesn't sit below the third. I showed her one and demonstrated how she should use.  She was provided a crest pad  Continue current toes with adequate toe box room.    Surgery was briefly discussed: the same day procedure and course of recovery.  I think her pain can be managed conservatively at this time.      Body mass index is 27.46 kg/(m^2).    Weight management plan: Patient was referred to their PCP to discuss a diet and exercise plan.      Vasu Jimenez DPM, FACFAS, MS    [place] Department of Podiatry/Foot & Ankle Surgery      ____________________________________________________________________    HPI:         Chief Complaint: curved toe makes walking painful  Onset of problem: 6 months  Pain/ discomfort is described as:  ache  Frequency: intermittent    The pain is made worse with walking any distance  Previous treatment: \"buying out [employer] and [employer] of their toe separators\"  *  Patient Active Problem List   Diagnosis     Osteopenia     Personal history of tobacco use, presenting hazards to health     Eczema     Advance Care Planning     Mixed hyperlipidemia     Benign essential hypertension     Hip pain, right     Family history of thyroid disease     Fatigue, unspecified type     Hair loss     Pain in joint, multiple sites     Seborrheic keratosis     Psoriasis with arthropathy " (H)     Urine abnormality     Vitamin B12 deficiency (non anemic)     Scurvy     Vitamin D deficiency     Hematuria     Primary osteoarthritis, unspecified site     Arthralgia of both knees     Tinea pedis of left foot     Impaired fasting glucose     Hypercalcemia     Hip joint replacement status     Psoriasis     Prediabetes     Personal history of DVT (deep vein thrombosis)     Knee joint replacement status     Aftercare following joint replacement [Z47.1]     COPD (chronic obstructive pulmonary disease) (H)     Paroxysmal atrial fibrillation (H)     Coronary artery disease involving native coronary artery of native heart without angina pectoris     B12 deficiency   *  *  Past Surgical History:   Procedure Laterality Date     ARTHROPLASTY HIP Right 4/3/2017    Procedure: ARTHROPLASTY HIP;  Surgeon: Francisco J Alston MD;  Location:  OR     ARTHROPLASTY KNEE Left 7/31/2017    Procedure: ARTHROPLASTY KNEE;  LEFT TOTAL KNEE ARTHROPLASTY ;  Surgeon: Francisco J Alston MD;  Location:  OR     BIOPSY OF SKIN LESION       C DEXA, BONE DENSITY, AXIAL SKEL  6/2008    T score lumbar -0.6, femoral neck -2.4/-2.0(all stable)     C VAGINAL HYSTERECTOMY  1978    Hysterectomy, Vaginal     HC ASPIRATION &/OR INJECTION GANGLION CYST, ANY  1994     HERNIORRHAPHY INGUINAL Right 3/24/2015    Procedure: HERNIORRHAPHY INGUINAL;  Surgeon: Sam Erazo MD;  [facility]     HYSTERECTOMY, PAP NO LONGER INDICATED       ORTHOPEDIC SURGERY      bilat meniscus repair   *  *  Current Outpatient Prescriptions   Medication Sig Dispense Refill     Acetaminophen (TYLENOL PO) Take 1,000 mg by mouth 2 times daily as needed for mild pain or fever       albuterol (2.5 MG/3ML) 0.083% neb solution Take 1 vial (2.5 mg) by nebulization every 6 hours as needed for shortness of breath / dyspnea or wheezing 1 Box 11     albuterol (PROAIR HFA/PROVENTIL HFA/VENTOLIN HFA) 108 (90 Base) MCG/ACT inhaler Inhale 2 puffs into the  lungs every 6 hours as needed for shortness of breath / dyspnea or wheezing GENERIC VENTOLIN 3 Inhaler 11     aspirin EC 81 MG tablet Take 1 tablet (81 mg) by mouth daily 30 tablet 11     atorvastatin (LIPITOR) 20 MG tablet Take 1 tablet (20 mg) by mouth daily INDICATION: TO LOWER CHOLESTEROL AND TO HELP REDUCE RISK OF HEART ATTACK AND STROKE 90 tablet 3     betamethasone dipropionate (DIPROSONE) 0.05 % cream Apply topically 2 times daily as needed (For foot) 45 g prn     calcium-vitamin D (OYSTER CALCIUM + D) 250-125 MG-UNIT per tablet Take 2 tablets by mouth daily       celecoxib (CELEBREX) 200 MG capsule Take 1 capsule (200 mg) by mouth 2 times daily as needed for pain (arthritis pain) 60 capsule 5     cholecalciferol (VITAMIN D3) 12437 units capsule Take 1 capsule (50,000 Units) by mouth every 14 days SIG: TAKE INDICATION: 1ST AND 15TH OF EACH MONTH, TO TREAT VITAMIN D DEFICIENCY 10 capsule 3     Cyanocobalamin (B-12) 1000 MCG TBCR Place 1,000 mcg under the tongue daily       diltiazem (CARDIZEM CD/CARTIA XT) 120 MG 24 hr capsule Take 1 capsule (120 mg) by mouth daily with breakfast 90 capsule 3     folic acid (FOLVITE) 1 MG tablet Take 1 tablet (1 mg) by mouth daily INDICATION: FOLIC ACID SUPPLEMENT 100 tablet 3     order for DME Equipment being ordered: Nebulizer 1 each 0     ranitidine (ZANTAC) 150 MG tablet Take 150 mg by mouth daily as needed for heartburn        spironolactone (ALDACTONE) 25 MG tablet Take 1 tablet (25 mg) by mouth daily 90 tablet 3     tiotropium (SPIRIVA HANDIHALER) 18 MCG capsule Inhale contents of one capsule daily. 90 capsule 3     triamcinolone (KENALOG) 0.1 % cream Apply topically 2 times daily as needed for irritation (sores on scalp.)       Urea 20 % CREA cream Apply topically daily        vitamin C-electrolytes (EMERGEN-C) 1000mg vitamin C super orange drink mix Take 1 packet by mouth daily Mix 1 packet in 4-6oz water.         ROS:     A 10-point review of systems was performed  and is positive for that noted above in the HPI and as seen below.  All other areas are negative.     Numbness in feet?  no   Calf pain with walking? no  Recent foot/ankle injury? no  Weight change over past 12 months? no  Self perception as overweight? yes  Recent flu-like symptoms? no  Joint pain other than feet ? 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     Social History Main Topics     Smoking status: Former Smoker     Packs/day: 1.00     Years: 40.00     Types: Cigarettes     Start date: 2/7/1964     Quit date: 9/7/2015     Smokeless tobacco: Never Used      Comment: 5-10 daily     Alcohol use 0.0 oz/week     0 Standard drinks or equivalent per week      Comment: 1 a month     Drug use: No     Sexual activity: No     Other Topics Concern      Service No     Blood Transfusions No     Caffeine Concern No     1 cup coffee a day, 1-2 can's of pop a wk in the summer      Occupational Exposure No     Hobby Hazards No     Sleep Concern No     Stress Concern No     Weight Concern No     Special Diet No     Back Care No     Exercise No     not due to knee's and hip     Bike Helmet No     n/a     Seat Belt Yes     Self-Exams Yes     sometimes     Parent/Sibling W/ Cabg, Mi Or Angioplasty Before 65f 55m? No     Social History Narrative    Eats fruits and vegetables every day. She takes a calcium supplement twice daily.    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            Family history:  Family History   Problem Relation Age of Onset     C.A.D. Father      Diabetes Father      type 2     Myocardial Infarction Father      Heart Surgery Father      CABGx4     Hyperlipidemia Father      Breast Cancer Mother 70     Osteoporosis Mother      Thyroid  "Disease Mother      Hyperlipidemia Mother      Cancer Brother      Bladder     Hyperlipidemia Brother      Diabetes Brother      type 2     Colon Cancer Brother      Neurologic Disorder Paternal Grandfather      Osteoporosis Sister      Arrhythmia Sister      Heart Surgery Sister      cardioversion, ablation     Other - See Comments Sister 55     arhythmogenic right ventricular dysplasia     Osteoporosis Maternal Grandmother      Osteoporosis Paternal Grandmother      Other - See Comments Maternal Grandfather      pneumonia     Family History Negative Son      Thyroid Disease Daughter      NODULES     Family History Negative Daughter      Arrhythmia Cousin      a-fib     Cancer - colorectal No family hx of      Prostate Cancer No family hx of      Alzheimer Disease No family hx of      Anesthesia Reaction No family hx of      Blood Disease No family hx of      Eye Disorder No family hx of        Rheumatoid arthritis:  no  Foot Problems: no  Diabetes: parent      EXAM:    Vitals: /66 (BP Location: Right arm, Patient Position: Chair, Cuff Size: Adult Regular)  Ht 5' 5\" (1.651 m)  Wt 165 lb (74.8 kg)  BMI 27.46 kg/m2  BMI: Body mass index is 27.46 kg/(m^2).  Height: 5' 5\"    Constitutional/ general:  Pt is in no apparent distress, appears well-nourished.  Cooperative with history and physical exam.     Vascular:  Pedal pulses are palpable bilaterally for both the DP and PT arteries.  CFT < 3 sec.  No edema.  Pedal hair growth noted.     Neuro:  Alert and oriented x 3. Coordinated gait.  Light touch sensation is intact to the L4, L5, S1 distributions. No obvious deficits.  No evidence of neurological-based weakness, spasticity, or contracture in the lower extremities.     Derm: Normal texture and turgor.  No erythema, ecchymosis, or cyanosis.  No open lesions.  Pinch type callus, plantar right 4th toe.    Musculoskeletal:    Lower extremity muscle strength is normal.  Patient is ambulatory without an assistive " device or brace .  No gross deformities. She has contractures of the right 4th and 5th toes. The 4th is a curly toe with triplane deformity.  It curls under the tip of the 3rd toe.  These toes are both flexible.      Vasu Jimenez DPM, FACFAS, MS    [place] Department of Podiatry/Foot & Ankle Surgery      Again, thank you for allowing me to participate in the care of your patient.        Sincerely,        Vasu Jimenez DPM

## 2019-02-16 DIAGNOSIS — I10 BENIGN ESSENTIAL HYPERTENSION: ICD-10-CM

## 2019-02-16 NOTE — TELEPHONE ENCOUNTER
"Requested Prescriptions   Pending Prescriptions Disp Refills     spironolactone (ALDACTONE) 25 MG tablet [Pharmacy Med Name: SPIRONOLACTONE 25MG TABLETS] 90 tablet 0    Last Written Prescription Date:  3/13/2018  Last Fill Quantity: 90,  # refills: 3   Last office visit: 11/5/2018 with prescribing provider:  11/5/2018   Future Office Visit:     Sig: TAKE 1 TABLET(25 MG) BY MOUTH DAILY    Diuretics (Including Combos) Protocol Passed - 2/16/2019 10:13 AM       Passed - Blood pressure under 140/90 in past 12 months    BP Readings from Last 3 Encounters:   11/19/18 106/66   11/05/18 104/70   11/01/18 119/69                Passed - Recent (12 mo) or future (30 days) visit within the authorizing provider's specialty    Patient had office visit in the last 12 months or has a visit in the next 30 days with authorizing provider or within the authorizing provider's specialty.  See \"Patient Info\" tab in inbasket, or \"Choose Columns\" in Meds & Orders section of the refill encounter.             Passed - Medication is active on med list       Passed - Patient is age 18 or older       Passed - No active pregancy on record       Passed - Normal serum creatinine on file in past 12 months    Recent Labs   Lab Test 11/02/18  0758   CR 0.86             Passed - Normal serum potassium on file in past 12 months    Recent Labs   Lab Test 11/02/18  0758   POTASSIUM 4.5                   Passed - Normal serum sodium on file in past 12 months    Recent Labs   Lab Test 11/02/18  0758                Passed - No positive pregnancy test in past 12 months          "

## 2019-02-18 RX ORDER — SPIRONOLACTONE 25 MG/1
TABLET ORAL
Qty: 90 TABLET | Refills: 1 | Status: SHIPPED | OUTPATIENT
Start: 2019-02-18 | End: 2019-06-17

## 2019-03-04 ENCOUNTER — TELEPHONE (OUTPATIENT)
Dept: CARDIOLOGY | Facility: CLINIC | Age: 69
End: 2019-03-04

## 2019-03-04 NOTE — TELEPHONE ENCOUNTER
Patient called stating that scheduling called her to get a holter and OV with Rufina Palacios. Pt stated that when she saw Dr. Stephen in November she thought he wanted her to do this in May but scheduling seemed to think she was past due. Informed patient that she is correct and should get the 1 week holter in May and follow up after with Rufina Palacios NP. Orders corrected. Pt transferred back to scheduling.     Chart reviewed:   11/1/19 OV with Dr. Stephen   1. Paroxysmal atrial fibrillation, low burden    We discussed that patient may not feel her paroxysms of atrial fibrillation now that the heart rate may be slower on the diltiazem.  We will see her back in 6 months with 1 week monitor prior.  She will see Rufina Palacios whom she liked very much.    We could consider antiarrhythmic therapy if additional breakthrough.  Even though she has coronary calcification, stress echocardiogram was negative and we could consider flecainide as her first antiarrhythmic.  Alternately, we could consider sotalol, but she did not tolerate metoprolol previously secondary to side effects.     Follow-up in 6 months with 1 week monitor prior to review if additional paroxysmal atrial fibrillation that we might need to consider antiarrhythmic therapy or anticoagulation.

## 2019-05-02 DIAGNOSIS — J43.9 PULMONARY EMPHYSEMA, UNSPECIFIED EMPHYSEMA TYPE (H): ICD-10-CM

## 2019-05-02 RX ORDER — TIOTROPIUM BROMIDE 18 UG/1
CAPSULE ORAL; RESPIRATORY (INHALATION)
Qty: 90 CAPSULE | Refills: 3 | Status: SHIPPED | OUTPATIENT
Start: 2019-05-02 | End: 2019-12-20

## 2019-05-02 NOTE — TELEPHONE ENCOUNTER
Patient needs a written script so she can mail to a pharmacy in [place].  Please call her at [phone] when she can [facility] it up at [facility] .      tiotropium (SPIRIVA HANDIHALER) 18 MCG capsule      Last Written Prescription Date:  6/19/2018  Last Fill Quantity: 90,   # refills: 3  Last Office Visit: 11/05/2018  Future Office visit:    Next 5 appointments (look out 90 days)    Jun 24, 2019 12:30 PM CDT  Return Visit with Rufina Palacios PA-C  [facility] ([facility]) [address] [place] [id number] [phone] OPT 2           Routing refill request to provider for review/approval because:  Drug not on the [facility], [facility] or [facility] refill protocol or controlled substance

## 2019-05-21 ENCOUNTER — HOSPITAL ENCOUNTER (OUTPATIENT)
Dept: CARDIOLOGY | Facility: CLINIC | Age: 69
Discharge: HOME | End: 2019-05-21
Attending: INTERNAL MEDICINE | Admitting: INTERNAL MEDICINE
Payer: COMMERCIAL

## 2019-05-21 DIAGNOSIS — I48.0 PAROXYSMAL ATRIAL FIBRILLATION (H): ICD-10-CM

## 2019-05-21 PROCEDURE — 0296T ZIO PATCH HOLTER ADULT PEDIATRIC GREATER THAN 48 HRS: CPT

## 2019-05-21 PROCEDURE — 0298T ZIO PATCH HOLTER ADULT PEDIATRIC GREATER THAN 48 HRS: CPT | Performed by: INTERNAL MEDICINE

## 2019-05-28 ENCOUNTER — OFFICE VISIT (OUTPATIENT)
Dept: FAMILY MEDICINE | Facility: CLINIC | Age: 69
End: 2019-05-28
Payer: COMMERCIAL

## 2019-05-28 VITALS — SYSTOLIC BLOOD PRESSURE: 122 MMHG | DIASTOLIC BLOOD PRESSURE: 60 MMHG

## 2019-05-28 DIAGNOSIS — L72.0 EPIDERMOID CYST OF NECK: Primary | ICD-10-CM

## 2019-05-28 PROCEDURE — 11421 EXC H-F-NK-SP B9+MARG 0.6-1: CPT | Mod: 51 | Performed by: FAMILY MEDICINE

## 2019-05-28 PROCEDURE — 88304 TISSUE EXAM BY PATHOLOGIST: CPT | Mod: TC | Performed by: FAMILY MEDICINE

## 2019-05-28 PROCEDURE — 12041 INTMD RPR N-HF/GENIT 2.5CM/<: CPT | Performed by: FAMILY MEDICINE

## 2019-05-28 NOTE — LETTER
5/28/2019         RE: Marva Angela  [id number] [address] [place] [facility]"" [id number]        Dear Colleague,    Thank you for referring your patient, Marva Angela, to the [facility] [place]. Please see a copy of my visit note below.    [facility] - PRIMARY CARE SKIN    CC: cyst   SUBJECTIVE:   Marva Angela is a(n) 68 year old female who presents to clinic today because of a cyst on the left jawline. This spot began as a vargas pimple.    Enlarging: YES - slowly enlarging.    Personal Medical History  Skin Cancer: NO    Family Medical History  Skin Cancer: YES - keratinocyte carcinoma(s) in sister and mother    Refer to electronic medical record (EMR) for past medical history and medications.    INTEGUMENTARY/SKIN: POSITIVE for lumps or bumps  ROS: 14 point review of systems was negative except the symptoms listed above in the HPI.    This document serves as a record of the services and decisions personally performed and made by Yanci Fraser MD and was created by Rachid Cannon, a trained medical scribe, based on personal observations and provider statements to the medical scribe.  May 28, 2019 8:38 AM   Rachid Cannon    OBJECTIVE:   GENERAL: healthy, alert and no distress.  SKIN: Gonzalez Skin Type - II.  Face and Neck were examined. The dermatoscope was used to help evaluate pigmented lesions.  Skin Pertinent Findings:  Left lateral neck, 2 cm inferior to mandible: 10 mm in size smooth subepidermal mass most consistent with epidermoid cyst.    Diagnostic Test Results:  None    ASSESSMENT:     Encounter Diagnosis   Name Primary?     Epidermoid cyst of neck Yes     MDM:   Discussion regarding treatment options for epidermoid cysts, including observation and excision.   Discussed risks of the excision procedure, including infection and scarring. It was explained that the cyst can reoccur, especially if it has become inflamed or infected prior to removal.    PLAN:  "  Patient Instructions   FUTURE APPOINTMENTS  Follow up in 7 days for bandage change with the nurse.    BANDAGE CARE INSTRUCTIONS    Keep dressing completely dry.    Make sure to avoid soaking the procedure area. Cover with Tegaderm or plastic wrap when showering.    Limit use of the area where the procedure was done for a few days to allow for optimal healing.    If you experience bleeding:  Wash hands and hold firm pressure on the area for 10 minutes without checking to see if the bleeding has stopped. \"Checking\" pulls off the protective wound clot and restarts the bleeding all over again. Re-apply pressure for 10 minutes if necessary to stop bleeding.  Use additional sterile gauze and tape to maintain pressure once bleeding has stopped.  If bleeding continues, then call back to clinic at [phone].    Signs of Infection:  Infection can occur in any area where skin has been disrupted.  If you notice persistent redness, swelling, colored drainage, increasing pain, fever or other signs of infection, please call us at: [phone] and ask to have me or my colleague paged. We will call you back to discuss.    Pathology Results:  You will be notified, generally via letter or MyChart, in approximately 10 days. If there is anything we need to discuss or further treatment needed, I will call you to discuss it.        TT: 20 minutes  CT: 15 minutes    The information in this document, created by the medical scribe for me, accurately reflects the services I personally performed and the decisions made by me. I have reviewed and approved this document for accuracy prior to leaving the patient care area.  May 28, 2019 8:38 AM  Yanci Fraser MD  [facility]    Again, thank you for allowing me to participate in the care of your patient.        Sincerely,        Yanci Fraser MD    "

## 2019-05-28 NOTE — PATIENT INSTRUCTIONS
"FUTURE APPOINTMENTS  Follow up in 7 days for bandage change with the nurse.    BANDAGE CARE INSTRUCTIONS    Keep dressing completely dry.    Make sure to avoid soaking the procedure area. Cover with Tegaderm or plastic wrap when showering.    Limit use of the area where the procedure was done for a few days to allow for optimal healing.    If you experience bleeding:  Wash hands and hold firm pressure on the area for 10 minutes without checking to see if the bleeding has stopped. \"Checking\" pulls off the protective wound clot and restarts the bleeding all over again. Re-apply pressure for 10 minutes if necessary to stop bleeding.  Use additional sterile gauze and tape to maintain pressure once bleeding has stopped.  If bleeding continues, then call back to clinic at [phone].    Signs of Infection:  Infection can occur in any area where skin has been disrupted.  If you notice persistent redness, swelling, colored drainage, increasing pain, fever or other signs of infection, please call us at: [phone] and ask to have me or my colleague paged. We will call you back to discuss.    Pathology Results:  You will be notified, generally via letter or MyChart, in approximately 10 days. If there is anything we need to discuss or further treatment needed, I will call you to discuss it.    "

## 2019-05-28 NOTE — PROGRESS NOTES
[facility] - PRIMARY CARE SKIN    CC: cyst   SUBJECTIVE:   Marva Angela is a(n) 68 year old female who presents to clinic today because of a cyst on the left jawline. This spot began as a vargas pimple.    Enlarging: YES - slowly enlarging.    Personal Medical History  Skin Cancer: NO    Family Medical History  Skin Cancer: YES - keratinocyte carcinoma(s) in sister and mother    Refer to electronic medical record (EMR) for past medical history and medications.    INTEGUMENTARY/SKIN: POSITIVE for lumps or bumps  ROS: 14 point review of systems was negative except the symptoms listed above in the HPI.    This document serves as a record of the services and decisions personally performed and made by Yanci Fraser MD and was created by Rachid Cannon, a trained medical scribe, based on personal observations and provider statements to the medical scribe.  May 28, 2019 8:38 AM   Rachid Cannon    OBJECTIVE:   GENERAL: healthy, alert and no distress.  SKIN: Gonzalez Skin Type - II.  Face and Neck were examined. The dermatoscope was used to help evaluate pigmented lesions.  Skin Pertinent Findings:  Left lateral neck, 2 cm inferior to mandible: 10 mm in size smooth subepidermal mass most consistent with epidermoid cyst.    Diagnostic Test Results:  None    ASSESSMENT:     Encounter Diagnosis   Name Primary?     Epidermoid cyst of neck Yes     MDM:   Discussion regarding treatment options for epidermoid cysts, including observation and excision.   Discussed risks of the excision procedure, including infection and scarring. It was explained that the cyst can reoccur, especially if it has become inflamed or infected prior to removal.    PLAN:   Patient Instructions   FUTURE APPOINTMENTS  Follow up in 7 days for bandage change with the nurse.    BANDAGE CARE INSTRUCTIONS    Keep dressing completely dry.    Make sure to avoid soaking the procedure area. Cover with Tegaderm or plastic wrap when showering.    Limit  "use of the area where the procedure was done for a few days to allow for optimal healing.    If you experience bleeding:  Wash hands and hold firm pressure on the area for 10 minutes without checking to see if the bleeding has stopped. \"Checking\" pulls off the protective wound clot and restarts the bleeding all over again. Re-apply pressure for 10 minutes if necessary to stop bleeding.  Use additional sterile gauze and tape to maintain pressure once bleeding has stopped.  If bleeding continues, then call back to clinic at [phone].    Signs of Infection:  Infection can occur in any area where skin has been disrupted.  If you notice persistent redness, swelling, colored drainage, increasing pain, fever or other signs of infection, please call us at: [phone] and ask to have me or my colleague paged. We will call you back to discuss.    Pathology Results:  You will be notified, generally via letter or MyChart, in approximately 10 days. If there is anything we need to discuss or further treatment needed, I will call you to discuss it.        TT: 20 minutes  CT: 15 minutes    The information in this document, created by the medical scribe for me, accurately reflects the services I personally performed and the decisions made by me. I have reviewed and approved this document for accuracy prior to leaving the patient care area.  May 28, 2019 8:38 AM  Ynaci Fraser MD  [facility] [place]  "

## 2019-05-28 NOTE — PROCEDURES
Name : Excision  Indication : Excision of irritated/enlarging cyst.  Location(s) : Left lateral neck, 2 cm inferior to mandible: 10 mm in size smooth subepidermal mass most consistent with epidermoid cyst..  Completed by : Hermelinda Fraser MD  Photo Taken : no.  Anesthesia : Patient was anesthetized by infiltrating the area surrounding the lesion with 1% lidocaine.   epinephrine [id number] : Yes.  Note : Discussed risks of the excision procedure, including pain, infection, scarring, hypopigmentation, hyperpigmentation and potential for recurrence or need for retreatment. Benefits of treatment and alternative treatments were also discussed.    During this procedure, the universal protocol was utilized. The patient's identity was confirmed by no less than two patient identifiers, correct procedure was verified, correct site was verified and marked as applicable and a final pause was completed.    Sterile technique was used throughout the procedure. The skin was cleaned and prepped with surgical cleanser. Once adequate anesthesia was obtained, lesion excision was performed using a #15 blade. An incision was made over the top of the lesion. The cyst lining was identified, then dissected out with a Metzenbaum and a curved Martha. The lining was removed in total    Culture was not obtained.    Tissue samples submitted : YES.    Irrigated with sterile saline: No.    Direct pressure and monopolar cautery was applied for hemostasis.   Defect was approximated with 4-0 Vicryl.  Edges were approximated with 6-0 Rapidgut interrupted simple stitch.    No bleeding was present upon the completion of the procedure. A dry sterile dressing was applied. Patient tolerated the procedure well and was discharged in satisfactory condition.    Widest diameter : 10 mm.  Length : 2 cm.    Primary provider and referring provider will be informed regarding the wound culture and tissue sample report when it returns.    Bandage change: 7 days.

## 2019-05-29 LAB — COPATH REPORT: NORMAL

## 2019-05-30 ENCOUNTER — TELEPHONE (OUTPATIENT)
Dept: FAMILY MEDICINE | Facility: CLINIC | Age: 69
End: 2019-05-30

## 2019-05-30 NOTE — TELEPHONE ENCOUNTER
Message was sent via [employer] to make sure patient read results.    Barbara GreenRN BSN  [facility]  [phone]

## 2019-05-30 NOTE — LETTER
May 31, 2019    Marva Angela  [address] [address] [address]        Dear Marva,    Great news! The lesion(s) that was removed has been found to be benign (not cancer) and is called a(n) Cyst. Thus, no further treatment is needed.      Thank you for allowing me to be involved in your health care and for choosing [facility].  If you have any questions or concerns please feel free to contact me at [phone].      Sincerely,      Yanci Fraser M.D.

## 2019-05-30 NOTE — TELEPHONE ENCOUNTER
----- Message from Yanci Fraser MD sent at 5/30/2019  1:10 PM CDT -----  Marva,       Tissue report was consistent with a benign cyst. No further treatment is needed.     Thank you for allowing me to be involved in your health care.  If you have any questions or concerns please feel free to contact me.  Yanci Fraser M.D.  [facility]

## 2019-05-31 ENCOUNTER — TELEPHONE (OUTPATIENT)
Dept: INTERNAL MEDICINE | Facility: CLINIC | Age: 69
End: 2019-05-31

## 2019-05-31 NOTE — TELEPHONE ENCOUNTER
"Future labs for everything had already been in place.    [facility] 1-2 days before this appointment in  A\"lab only\" appointment.Eat nothing for at least 8 hours prior to having these labs drawn.    We will discuss results at the time I see her.    "

## 2019-05-31 NOTE — TELEPHONE ENCOUNTER
Left message on answering machine for patient to call back to get results over the phone  Also sent letter via mail  Barbara GreenRN BSN  [facility]  [phone]

## 2019-05-31 NOTE — TELEPHONE ENCOUNTER
Reason for Call: Request for an order or referral:    Order or referral being requested: Cholesterol lab    Date needed: before my next appointment (6/17/19)    Has the patient been seen by the PCP for this problem? YES    Additional comments: Patient has a follow up appointment with Dr. Mendez but would like to do her fasting cholesterol check right before. Please place the order.    Phone number Patient can be reached at:  Home number on file [phone] (home)    Best Time:  n/a    Can we leave a detailed message on this number?  Not Applicable    Call taken on 5/31/2019 at 9:22 AM by Arielle Rico

## 2019-06-04 ENCOUNTER — ALLIED HEALTH/NURSE VISIT (OUTPATIENT)
Dept: NURSING | Facility: CLINIC | Age: 69
End: 2019-06-04
Payer: COMMERCIAL

## 2019-06-04 DIAGNOSIS — Z48.00 CHANGE OF DRESSING: Primary | ICD-10-CM

## 2019-06-04 PROCEDURE — 99207 ZZC NO CHARGE NURSE ONLY: CPT

## 2019-06-04 NOTE — NURSING NOTE
Patient returned to clinic for post surgery 1 week follow up bandage change. Patient has no complaints, denies pain.  Bandage removed from left jaw/neckline. Area cleansed with wound cleanser. Site is healing with no signs of infection, wound edges approximating well.   Reapplied new steri strips and paper tape.     Advised to watch for signs and symptons of infection; spreading redness, increasing pain, drainage, odor, fever.   Call or report promptly to clinic if any of these symptoms are present.    Wound care post op instructions given and discussed for 14 day bandage removal and continued incision/scar care.    Patient verbalized understanding.     Barbara GUNDERSON RN  [facility]  [phone]

## 2019-06-17 ENCOUNTER — OFFICE VISIT (OUTPATIENT)
Dept: INTERNAL MEDICINE | Facility: CLINIC | Age: 69
End: 2019-06-17
Payer: COMMERCIAL

## 2019-06-17 VITALS
TEMPERATURE: 97.2 F | HEIGHT: 65 IN | WEIGHT: 168 LBS | RESPIRATION RATE: 14 BRPM | DIASTOLIC BLOOD PRESSURE: 80 MMHG | OXYGEN SATURATION: 99 % | BODY MASS INDEX: 27.99 KG/M2 | SYSTOLIC BLOOD PRESSURE: 124 MMHG | HEART RATE: 72 BPM

## 2019-06-17 DIAGNOSIS — E78.5 HYPERLIPIDEMIA LDL GOAL <130: ICD-10-CM

## 2019-06-17 DIAGNOSIS — Z12.11 SPECIAL SCREENING FOR MALIGNANT NEOPLASMS, COLON: ICD-10-CM

## 2019-06-17 DIAGNOSIS — I48.0 PAROXYSMAL ATRIAL FIBRILLATION (H): ICD-10-CM

## 2019-06-17 DIAGNOSIS — E53.8 B12 DEFICIENCY: ICD-10-CM

## 2019-06-17 DIAGNOSIS — I10 BENIGN ESSENTIAL HYPERTENSION: ICD-10-CM

## 2019-06-17 DIAGNOSIS — L40.50 PSORIASIS WITH ARTHROPATHY (H): ICD-10-CM

## 2019-06-17 DIAGNOSIS — R73.03 PREDIABETES: ICD-10-CM

## 2019-06-17 DIAGNOSIS — E55.9 VITAMIN D DEFICIENCY: ICD-10-CM

## 2019-06-17 DIAGNOSIS — E53.8 VITAMIN B12 DEFICIENCY (NON ANEMIC): ICD-10-CM

## 2019-06-17 DIAGNOSIS — M85.80 OSTEOPENIA, UNSPECIFIED LOCATION: ICD-10-CM

## 2019-06-17 DIAGNOSIS — I25.10 CORONARY ARTERY DISEASE INVOLVING NATIVE CORONARY ARTERY OF NATIVE HEART WITHOUT ANGINA PECTORIS: ICD-10-CM

## 2019-06-17 DIAGNOSIS — J43.9 PULMONARY EMPHYSEMA, UNSPECIFIED EMPHYSEMA TYPE (H): Primary | ICD-10-CM

## 2019-06-17 LAB
ALBUMIN SERPL-MCNC: 4.1 G/DL (ref 3.4–5)
ALP SERPL-CCNC: 86 U/L (ref 40–150)
ALT SERPL W P-5'-P-CCNC: 30 U/L (ref 0–50)
ANION GAP SERPL CALCULATED.3IONS-SCNC: 6 MMOL/L (ref 3–14)
AST SERPL W P-5'-P-CCNC: 23 U/L (ref 0–45)
BILIRUB SERPL-MCNC: 0.6 MG/DL (ref 0.2–1.3)
BUN SERPL-MCNC: 23 MG/DL (ref 7–30)
CALCIUM SERPL-MCNC: 9.4 MG/DL (ref 8.5–10.1)
CHLORIDE SERPL-SCNC: 103 MMOL/L (ref 94–109)
CHOLEST SERPL-MCNC: 152 MG/DL
CO2 SERPL-SCNC: 30 MMOL/L (ref 20–32)
CREAT SERPL-MCNC: 0.92 MG/DL (ref 0.52–1.04)
DEPRECATED CALCIDIOL+CALCIFEROL SERPL-MC: 85 UG/L (ref 20–75)
ERYTHROCYTE [DISTWIDTH] IN BLOOD BY AUTOMATED COUNT: 13.1 % (ref 10–15)
GFR SERPL CREATININE-BSD FRML MDRD: 63 ML/MIN/{1.73_M2}
GLUCOSE SERPL-MCNC: 109 MG/DL (ref 70–99)
HBA1C MFR BLD: 6 % (ref 0–5.6)
HCT VFR BLD AUTO: 45.1 % (ref 35–47)
HDLC SERPL-MCNC: 51 MG/DL
HGB BLD-MCNC: 14.6 G/DL (ref 11.7–15.7)
LDLC SERPL CALC-MCNC: 87 MG/DL
MCH RBC QN AUTO: 29.9 PG (ref 26.5–33)
MCHC RBC AUTO-ENTMCNC: 32.4 G/DL (ref 31.5–36.5)
MCV RBC AUTO: 92 FL (ref 78–100)
NONHDLC SERPL-MCNC: 101 MG/DL
PLATELET # BLD AUTO: 255 10E9/L (ref 150–450)
POTASSIUM SERPL-SCNC: 4.5 MMOL/L (ref 3.4–5.3)
PROT SERPL-MCNC: 7.4 G/DL (ref 6.8–8.8)
RBC # BLD AUTO: 4.89 10E12/L (ref 3.8–5.2)
SODIUM SERPL-SCNC: 139 MMOL/L (ref 133–144)
TRIGL SERPL-MCNC: 68 MG/DL
TSH SERPL DL<=0.005 MIU/L-ACNC: 1.2 MU/L (ref 0.4–4)
VIT B12 SERPL-MCNC: 1972 PG/ML (ref 193–986)
WBC # BLD AUTO: 6.2 10E9/L (ref 4–11)

## 2019-06-17 PROCEDURE — 82607 VITAMIN B-12: CPT | Performed by: INTERNAL MEDICINE

## 2019-06-17 PROCEDURE — 99214 OFFICE O/P EST MOD 30 MIN: CPT | Performed by: INTERNAL MEDICINE

## 2019-06-17 PROCEDURE — 80053 COMPREHEN METABOLIC PANEL: CPT | Performed by: INTERNAL MEDICINE

## 2019-06-17 PROCEDURE — 80061 LIPID PANEL: CPT | Performed by: INTERNAL MEDICINE

## 2019-06-17 PROCEDURE — 36415 COLL VENOUS BLD VENIPUNCTURE: CPT | Performed by: INTERNAL MEDICINE

## 2019-06-17 PROCEDURE — 85027 COMPLETE CBC AUTOMATED: CPT | Performed by: INTERNAL MEDICINE

## 2019-06-17 PROCEDURE — 84443 ASSAY THYROID STIM HORMONE: CPT | Performed by: INTERNAL MEDICINE

## 2019-06-17 PROCEDURE — 82306 VITAMIN D 25 HYDROXY: CPT | Performed by: INTERNAL MEDICINE

## 2019-06-17 PROCEDURE — 83036 HEMOGLOBIN GLYCOSYLATED A1C: CPT | Performed by: INTERNAL MEDICINE

## 2019-06-17 PROCEDURE — 99207 C PAF COMPLETED  NO CHARGE: CPT | Mod: 25 | Performed by: INTERNAL MEDICINE

## 2019-06-17 RX ORDER — SPIRONOLACTONE 25 MG/1
25 TABLET ORAL DAILY
Qty: 90 TABLET | Refills: 1 | Status: SHIPPED | OUTPATIENT
Start: 2019-06-17 | End: 2019-12-20

## 2019-06-17 RX ORDER — ATORVASTATIN CALCIUM 20 MG/1
20 TABLET, FILM COATED ORAL DAILY
Qty: 90 TABLET | Refills: 3 | Status: SHIPPED | OUTPATIENT
Start: 2019-06-17 | End: 2020-08-31

## 2019-06-17 RX ORDER — ALBUTEROL SULFATE 0.83 MG/ML
2.5 SOLUTION RESPIRATORY (INHALATION) EVERY 6 HOURS PRN
Qty: 1 BOX | Refills: 11 | Status: SHIPPED | OUTPATIENT
Start: 2019-06-17 | End: 2020-09-25

## 2019-06-17 ASSESSMENT — MIFFLIN-ST. JEOR: SCORE: 1282.92

## 2019-06-17 NOTE — PATIENT INSTRUCTIONS
"*-  Continue all medications at the same doses.  Contact your usual pharmacy if you need refills.    *  Follow up with cardiology clinic as planned, they may consider adjusting the dose of diltaizem and can give you a new prescription if needed.     *  Return to see me in approximately 6 months, sooner if needed.  Please get nonfasting labs done in the [facility] any other [facility] lab 1-2 days before this appointment.  If you get the labs done at another clinic, make arrangements with that clinic directly.  The orders will be in place.  Call [phone] to schedule appointments at [facility].        5 GOALS TO PREVENT VASCULAR DISEASE:     1.  Aggressive blood pressure control, under 130/80 ideally.  Using medications if needed.    Your blood pressure is under good control    BP Readings from Last 4 Encounters:   06/17/19 124/80   05/28/19 122/60   11/19/18 106/66   11/05/18 104/70       2.  Aggressive LDL cholesterol (\"bad cholesterol\") lowering as indicated.    Your goal is an LDL under 130 for sure, preferably under 100.  (If you have diabetes or previous vascular disease, the the LDL goals would be under 100 for sure, preferably under 70.)    New guidelines identify four high-risk groups who could benefit from statins:   *people with pre-existing heart disease, such as those who have had a heart attack;   *people ages 40 to 75 who have diabetes of any type  *patients ages 40 to 75 with at least a 7.5% risk of developing cardiovascular disease over the next decade, according to a formula described in the guidelines  *patients with the sort of super-high cholesterol that sometimes runs in families, as evidenced by an LDL of 190 milligrams per deciliter or higher    Your cholesterol levels are well controlled.    Recent Labs   Lab Test 06/17/19  0827 03/19/18  0732  09/18/15  0738   CHOL 152 172   < > 221*   HDL 51 56   < > 53   LDL 87 99   < > 144*   TRIG 68 85   < > 122   CHOLHDLRATIO  --   --   --  " "4.2    < > = values in this interval not displayed.       3.  Aggressive diabetic prevention, screening and/or management.      You do not have diabetes as of the most recent blood tests.     4.  No smoking    5.  Consider Daily aspirin: Have a discussion about the relative benefits and risks of taking daily low dose aspirin (81 mg) tablet once per day over the age of 50, unless there is a specific reason that you cannot take aspirin (such as side effect, allergy, or you are on another \"blood thinner\").        --Based on your current cardiac risk factors, you should take regular daily Aspirin 81 mg once per day, unless you have any reasons (side effects, intolerance, etc.) that you cannot take aspirin.         "

## 2019-06-17 NOTE — PROGRESS NOTES
Subjective     Marva Angela is a 68 year old female who presents to clinic today for the following health issues:    HPI   Hyperlipidemia Follow-Up      Are you having any of the following symptoms? (Select all that apply)  No complaints of shortness of breath, chest pain or pressure.  No increased sweating or nausea with activity.  No left-sided neck or arm pain.  No complaints of pain in calves when walking 1-2 blocks.    Are you regularly taking any medication or supplement to lower your cholesterol?   Yes- atrovastatin    Are you having muscle aches or other side effects that you think could be caused by your cholesterol lowering medication?  No      Hypertension Follow-up      Do you check your blood pressure regularly outside of the clinic? Yes     Are you following a low salt diet? No    Are your blood pressures ever more than 140 on the top number (systolic) OR more   than 90 on the bottom number (diastolic), for example 140/90? No  COPD Follow-Up    Overall, how are your COPD symptoms since your last clinic visit?  No change    How much fatigue or shortness of breath do you have when you are walking?  None    How much shortness of breath do you have when you are resting?  None    How often do you cough? Never    Have you noticed any change in your sputum/phlegm?  No    Have you experienced a recent fever? No    Please describe how far you can walk without stopping to rest:  Greater than 2 miles    How many flights of stairs are you able to walk up without stopping?  3 or more    Have you had any Emergency Room Visits, Urgent Care Visits, or Hospital Admissions because of your COPD since your last office visit?  No    History   Smoking Status     Former Smoker     Packs/day: 1.00     Years: 40.00     Types: Cigarettes     Start date: 2/7/1964     Quit date: 9/7/2015   Smokeless Tobacco     Never Used     Comment: 5-10 daily     No results found for: FEV1, [id number]    Amount of exercise or physical  activity: 6-7 days/week for an average of greater than 60 minutes    Problems taking medications regularly: No    Medication side effects: none    Diet: regular (no restrictions)      The patient has a history of impaired glucose tolerance with regularly elevated blood sugars.    They have not been diagnosed with type II DM or placed on medications for diabetes before.   They deny polyuria, polydipsia.     The patient is not obese with a BMI of Body mass index is 27.96 kg/m ..    Review of current labs show:    Lab Results   Component Value Date    A1C 6.0 06/17/2019    A1C 6.1 11/02/2018    A1C 6.4 03/21/2017     @bgl@           Reviewed and updated as needed this visit by Provider         Review of Systems         Objective    There were no vitals taken for this visit.  There is no height or weight on file to calculate BMI.  Physical Exam                     Past Medical History:  ---------------------------  Past Medical History:   Diagnosis Date     Arthritis     knees and hips     B12 deficiency 10/14/2016    B12 178     Benign essential hypertension     was on amlodipine/ then metoprolol / add lisinopril      Contact dermatitis and other eczema, due to unspecified cause      COPD (chronic obstructive pulmonary disease) (H) 01/01/2012     Coronary artery disease involving native coronary artery of native heart without angina pectoris 2016    coronary artery calcifications seen on CT scan, mild nonocclusive CAD     Hyperlipidemia LDL goal <130 11/7/2012     Nephritis      as a child (post strep)      Osteoporosis, unspecified      Phlebitis and thrombophlebitis of other deep vessels of lower extremities 1972, 1975    Both with pregnancies     Prediabetes 11/01/2018    A1C 6.1     Sinusitis      Tobacco use disorder        Past Surgical History:  ---------------------------  Past Surgical History:   Procedure Laterality Date     ARTHROPLASTY HIP Right 4/3/2017    Procedure: ARTHROPLASTY HIP;  Surgeon: Gamaliel  Francisco J Kay MD;  Location: [facility] OR     ARTHROPLASTY KNEE Left 7/31/2017    Procedure: ARTHROPLASTY KNEE;  LEFT TOTAL KNEE ARTHROPLASTY ;  Surgeon: Francisco J Alston MD;  Location: [facility] OR     BIOPSY OF SKIN LESION       C DEXA, BONE DENSITY, AXIAL SKEL  6/2008    T score lumbar -0.6, femoral neck -2.4/-2.0(all stable)     C VAGINAL HYSTERECTOMY  1978    Hysterectomy, Vaginal     HC ASPIRATION &/OR INJECTION GANGLION CYST, ANY  1994     HERNIORRHAPHY INGUINAL Right 3/24/2015    Procedure: HERNIORRHAPHY INGUINAL;  Surgeon: Sam Erazo MD;  Location:  [facility]     HYSTERECTOMY, PAP NO LONGER INDICATED       ORTHOPEDIC SURGERY      bilat meniscus repair       Current Medications:  ---------------------------  Current Outpatient Medications   Medication Sig Dispense Refill     Acetaminophen (TYLENOL PO) Take 1,000 mg by mouth 2 times daily as needed for mild pain or fever       albuterol (PROAIR HFA/PROVENTIL HFA/VENTOLIN HFA) 108 (90 Base) MCG/ACT inhaler Inhale 2 puffs into the lungs every 6 hours as needed for shortness of breath / dyspnea or wheezing GENERIC VENTOLIN 3 Inhaler 11     albuterol (PROVENTIL) (2.5 MG/3ML) 0.083% neb solution Take 1 vial (2.5 mg) by nebulization every 6 hours as needed for shortness of breath / dyspnea or wheezing 1 Box 11     aspirin EC 81 MG tablet Take 1 tablet (81 mg) by mouth daily 30 tablet 11     atorvastatin (LIPITOR) 20 MG tablet Take 1 tablet (20 mg) by mouth daily INDICATION: TO LOWER CHOLESTEROL AND TO HELP REDUCE RISK OF HEART ATTACK AND STROKE 90 tablet 3     betamethasone dipropionate (DIPROSONE) 0.05 % cream Apply topically 2 times daily as needed (For foot) 45 g prn     calcium-vitamin D (OYSTER CALCIUM + D) 250-125 MG-UNIT per tablet Take 2 tablets by mouth daily       celecoxib (CELEBREX) 200 MG capsule Take 1 capsule (200 mg) by mouth 2 times daily as needed for pain (arthritis pain) 60 capsule 5     Cyanocobalamin (B-12) 1000 MCG TBCR Place 1,000 mcg  under the tongue daily (Patient taking differently: Place 1,000 mcg under the tongue every other day )       folic acid (FOLVITE) 1 MG tablet Take 1 tablet (1 mg) by mouth daily INDICATION: FOLIC ACID SUPPLEMENT (Patient taking differently: Take 400 mcg by mouth daily INDICATION: FOLIC ACID SUPPLEMENT) 100 tablet 3     order for DME Equipment being ordered: Nebulizer 1 each 0     ranitidine (ZANTAC) 150 MG tablet Take 150 mg by mouth daily as needed for heartburn        spironolactone (ALDACTONE) 25 MG tablet Take 1 tablet (25 mg) by mouth daily 90 tablet 1     tiotropium (SPIRIVA HANDIHALER) 18 MCG inhaled capsule Inhale contents of one capsule daily. 90 capsule 3     triamcinolone (KENALOG) 0.1 % cream Apply topically 2 times daily as needed for irritation (sores on scalp.)       Urea 20 % CREA cream Apply topically daily        vitamin C-electrolytes (EMERGEN-C) 1000mg vitamin C super orange drink mix Take 1 packet by mouth daily Mix 1 packet in 4-6oz water.       vitamin D3 (CHOLECALCIFEROL) 85519 units capsule Take 1 capsule (50,000 Units) by mouth every 14 days SIG: TAKE INDICATION: 1ST AND 15TH OF EACH MONTH, TO TREAT VITAMIN D DEFICIENCY 10 capsule 3     diltiazem ER COATED BEADS (CARDIZEM CD/CARTIA XT) 180 MG 24 hr capsule Take 1 capsule (180 mg) by mouth daily with breakfast 90 capsule 3       Allergies:  -------------  Allergies   Allergen Reactions     Penicillins Rash     rash     Sulfa Drugs Rash     rash       Social History:  -------------------  Social History     Socioeconomic History     Marital status: Single     Spouse name: Not on file     Number of children: 2     Years of education: 18     Highest education level: Not on file   Occupational History     Occupation: Mental health therapist     Employer: [employer]   Social Needs     Financial resource strain: Not on file     Food insecurity:     Worry: Not on file     Inability: Not on file     Transportation needs:     Medical:  Not on file     Non-medical: Not on file   Tobacco Use     Smoking status: Former Smoker     Packs/day: 1.00     Years: 40.00     Pack years: 40.00     Types: Cigarettes     Start date: 2/7/1964     Last attempt to quit: 9/7/2015     Years since quitting: 3.8     Smokeless tobacco: Never Used     Tobacco comment: 5-10 daily   Substance and Sexual Activity     Alcohol use: Yes     Alcohol/week: 0.0 oz     Comment: 1 a month     Drug use: No     Sexual activity: Never     Partners: Male   Lifestyle     Physical activity:     Days per week: Not on file     Minutes per session: Not on file     Stress: Not on file   Relationships     Social connections:     Talks on phone: Not on file     Gets together: Not on file     Attends Yazidism service: Not on file     Active member of club or organization: Not on file     Attends meetings of clubs or organizations: Not on file     Relationship status: Not on file     Intimate partner violence:     Fear of current or ex partner: Not on file     Emotionally abused: Not on file     Physically abused: Not on file     Forced sexual activity: Not on file   Other Topics Concern      Service No     Blood Transfusions No     Caffeine Concern No     Comment: 1 cup coffee a day, 1-2 can's of pop a wk in the summer      Occupational Exposure No     Hobby Hazards No     Sleep Concern No     Stress Concern No     Weight Concern No     Special Diet No     Back Care No     Exercise No     Comment: not due to knee's and hip     Bike Helmet No     Comment: n/a     Seat Belt Yes     Self-Exams Yes     Comment: sometimes     Parent/sibling w/ CABG, MI or angioplasty before 65F 55M? No   Social History Narrative    Eats fruits and vegetables every day. She takes a calcium supplement twice daily.    *                Was psychiatric social owrker        Family Medical History:  ------------------------------  Family History   Problem Relation Age of Onset     C.A.D. Father      Diabetes  "Father         type 2     Myocardial Infarction Father      Heart Surgery Father         CABGx4     Hyperlipidemia Father      Breast Cancer Mother 70     Osteoporosis Mother      Thyroid Disease Mother      Hyperlipidemia Mother      Cancer Brother         Bladder     Hyperlipidemia Brother      Diabetes Brother         type 2     Colon Cancer Brother      Neurologic Disorder Paternal Grandfather      Osteoporosis Sister      Arrhythmia Sister      Heart Surgery Sister         cardioversion, ablation     Other - See Comments Sister 55        arhythmogenic right ventricular dysplasia     Osteoporosis Maternal Grandmother      Osteoporosis Paternal Grandmother      Other - See Comments Maternal Grandfather         pneumonia     Family History Negative Son      Thyroid Disease Daughter         NODULES     Family History Negative Daughter      Arrhythmia Cousin         a-fib     Cancer - colorectal No family hx of      Prostate Cancer No family hx of      Alzheimer Disease No family hx of      Anesthesia Reaction No family hx of      Blood Disease No family hx of      Eye Disorder No family hx of          ROS:  REVIEW OF SYSTEMS:    RESP: positive for cough, dyspnea, wheezing; negative for hemoptysis   CV: negative for chest pain, palpitations, PND, POSADAS, orthopnea; reports no significant changes in their ability to perform physical activity (from cardiovascular standpoint)  GI: negative for dysphagia, N/V, pain, melena, diarrhea and constipation  NEURO: negative for new numbness/tingling, paralysis, incoordination, or focal weakness     OBJECTIVE:                                                    /80   Pulse 72   Temp 97.2  F (36.2  C) (Temporal)   Resp 14   Ht 1.651 m (5' 5\")   Wt 76.2 kg (168 lb)   SpO2 99%   BMI 27.96 kg/m       GENERAL alert and no distress  EYES:  Normal sclera,conjunctiva, EOMI  HENT: oral and posterior pharynx without lesions or erythema, facies symmetric  NECK: Neck supple. No " LAD, without thyroidmegaly.  RESP: breath sounds quiet but clear, diminished respiratory excursion, prolonged expiratory phase,  no rhonci, no wheezes, no rales   CV: RRR normal S1S2 without murmurs, rubs or gallops.  LYMPH: no cervical lymph adenopathy appreciated  MS: extremities- no gross deformities of the visible extremities noted,   EXT:  no lower extremity edema  PSYCH: Alert and oriented times 3; speech- coherent  SKIN:  No obvious significant skin lesions on visible portions of face          ASSESSMENT/PLAN:                                                      (J43.9) Pulmonary emphysema, unspecified emphysema type (H)  (primary encounter diagnosis)  Comment: This condition is currently controlled on the current medical regimen.  Continue current therapy.  Discussed general issues of COPD, including pathophysiology, ways it will affect the pt., when to seek help, reviewed the typical medications (how they are taken, how they help)   Plan: PAF COMPLETED, albuterol (PROVENTIL) (2.5         MG/3ML) 0.083% neb solution            (I48.0) Paroxysmal atrial fibrillation (H)  Comment: This condition is currently controlled on the current medical regimen.  Continue current therapy.   Plan: PAF COMPLETED, Basic metabolic panel            (L40.50) Psoriasis with arthropathy (H)  Comment: This condition is currently controlled on the current medical regimen.  Continue current therapy. Plan: PAF COMPLETED, vitamin D3 (CHOLECALCIFEROL)         56719 units capsule            (I10) Benign essential hypertension  Comment: This condition is currently controlled on the current medical regimen.  Continue current therapy.  Discussed current hypertension treatment guidelines, including indications for treatment and treatment options.  Discussed the importance for aggressive management of HTN to prevent vascular complications later.  Recommended lower fat, lower carbohydrate, and lower sodium (<2000 mg)diet.  Discussed required  "intervals for follow up on HTN, lab studies.  Recommened pt. follow their blood pressures outside the clinic to ensure that BPs are remaining within guidelines, and to contact me if the readings are not within guidelines on a regular basis so we can adjust treatment as needed.   Plan: PAF COMPLETED, spironolactone (ALDACTONE) 25 MG        tablet, Basic metabolic panel            (E78.5) Hyperlipidemia LDL goal <130  Comment: This condition is currently controlled on the current medical regimen.  Continue current therapy.  Discussed current lipid results, previous results (if available) current guidelines (NCEP) for treatment and goals for lipids.  Discussed lifestyle modification, dietary changes (low fat, low simple carb) and regular aerobic exercise.  Discussed the link between dysmetabolic syndrome and impaired glucose tolerance seen in certain patterns of lipids.  Briefly discussed medication used for lipid lowering, including the statins are their possible side effects of myalgias, rhabdomyolysis, and liver toxicity.   Plan: PAF COMPLETED, atorvastatin (LIPITOR) 20 MG         tablet            (M85.80) Osteopenia, unspecified location  Comment: This condition is currently controlled on the current medical regimen.  Continue current therapy.   Confirm taking vit D  Plan: PAF COMPLETED, vitamin D3 (CHOLECALCIFEROL)         74809 units capsule            (E55.9) Vitamin D deficiency  Comment: This condition is currently controlled on the current medical regimen.  Continue current therapy.   Plan:     (E53.8) Vitamin B12 deficiency (non anemic)  Comment: rechecking labs  This condition is currently controlled on the current medical regimen.  Continue current therapy.   Plan:     (R73.03) Prediabetes  Comment: Reviewed the labs showing elevated glucose levels.    Discussed \"pre-diabetes\", impaired glucose tolerance, and its part in the dysmetabolic syndrome.    Discussed the inevitable progression of impaired " "glucose tolerance toward worsening diabetes mellitus and the need for agressive interventions now to delay and prevent this inevitable progression.  Discussed the overall risks that dysmetabolic syndromes/impaired glucose tolerance/syndrome X pose toward increased risks of vascular disease as the main reason for agressive intervention now.  Will add medications for glucose control (i.e. metformin, glitazones, etc), lipid (e.g. statins), and for blood pressure (preferably ARBs and ACE) as indicated.  Will start these as early as needed based other proven ability to delay and modify these risk factors.   Discussed the need for aggressive diet control as the cornerstone of pre-diabetes and diabetes management, emphasizing the reduction of \"simple carbohydrates\" (e.g. Any kind of wheat products (e.g. any bread, any pasta), white rice, noodles, potatoes, snack foods, regular soda, juices (except fresh squeezed), cakes, cookies, candy, etc.) along with regular exercise.       Plan: Hemoglobin A1c, Basic metabolic panel            (Z12.11) Special screening for malignant neoplasms, colon  Comment: I discussed current recommendations about colon cancer screening recommending colonoscopy as gold standard test, starting age 50 (age 40 for family history of colon cancer).  The patient is declining to do colonoscopy at this time.   They will agree to FIT testing annually.   Would reconsider colonoscopy if the FIT test is positive.    Plan: Fecal colorectal cancer screen (FIT)              See Patient Instructions    YADIRA ESPINOSA M.D., MD  [facility]    (Chart documentation may have been completed, in part, with "[employer] voice-recognition software. Even though reviewed, some grammatical, spelling, and word errors may remain.)     "

## 2019-06-24 ENCOUNTER — OFFICE VISIT (OUTPATIENT)
Dept: CARDIOLOGY | Facility: CLINIC | Age: 69
End: 2019-06-24
Attending: INTERNAL MEDICINE
Payer: COMMERCIAL

## 2019-06-24 ENCOUNTER — TELEPHONE (OUTPATIENT)
Dept: CARDIOLOGY | Facility: CLINIC | Age: 69
End: 2019-06-24

## 2019-06-24 VITALS
DIASTOLIC BLOOD PRESSURE: 69 MMHG | BODY MASS INDEX: 28.29 KG/M2 | HEIGHT: 65 IN | HEART RATE: 72 BPM | WEIGHT: 169.8 LBS | SYSTOLIC BLOOD PRESSURE: 125 MMHG

## 2019-06-24 DIAGNOSIS — I48.0 PAROXYSMAL ATRIAL FIBRILLATION (H): ICD-10-CM

## 2019-06-24 PROCEDURE — 99214 OFFICE O/P EST MOD 30 MIN: CPT | Performed by: PHYSICIAN ASSISTANT

## 2019-06-24 RX ORDER — DILTIAZEM HYDROCHLORIDE 180 MG/1
180 CAPSULE, COATED, EXTENDED RELEASE ORAL DAILY
Qty: 90 CAPSULE | Refills: 3 | Status: SHIPPED | OUTPATIENT
Start: 2019-06-24 | End: 2020-02-14

## 2019-06-24 ASSESSMENT — MIFFLIN-ST. JEOR: SCORE: 1291.09

## 2019-06-24 NOTE — LETTER
2019      Kendall Mendez MD  [address] [facility] [address]      RE: Marva Angela       Dear Colleague,    I had the pleasure of seeing Marva Angela in the [facility].    Service Date: 2019      PRIMARY CARDIOLOGIST:  Dr. Stephen, had followed with Dr. Fountain.      REASON FOR VISIT:  Palpitations followup.      HISTORY OF PRESENT ILLNESS:  Ms. Angela is a delightful 68-year-old woman with past medical history significant for the followin.  Hypertension, on several medications.   2.  History of tobacco, now quit.   3.  COPD.   4.  Dyslipidemia.   5.  Calcification of the coronaries and thoracic aorta on CT of chest.   6.  Paroxysmal atrial fibrillation.      She is here today for routine followup.  I had met her in .  She had followup with Dr. Fountain for palpitations.  Unfortunately, she had a really difficult spring in  due to runs of PACs and PVCs.  Since then, she has been on diltiazem and actually done fairly well.  She was seen by Dr. Stephen in November, and no changes were made.  He recommended a Zio Patch that was completed before the visit.      The Zio Patch showed brief runs of AFib, the longest being 10 minutes with an average heart rate of 67 with 1 run of VT just 4 beats, but 152 runs of SVT, the fastest at 174 beats per minute, but only 6 beats, and the longest being 20 beats at 118.  Her AFib burden was less than 1% and it ranged from 120 to 172 beats per minute with the average of 143, with the longest lasting 10 minutes.  Overall, she had about a 10% burden of ectopic atrial rhythms.      She comes in today saying that the week she wore the monitor, she was actually feeling a lot of palpitations.  She did not have any syncope or presyncope.  Once she gets them, she does tend to be short of breath and then it resolves.  She did not have any chest pain.  She said since then it has only happened 2 or 3 times and she has  otherwise felt well.  She has noted, though, that her blood pressures have increased somewhat.  They typically run in the 104s-105s and now are running more around 120.  She just feels like her diltiazem is not working very much anymore.      SOCIAL HISTORY:  She is a retired , former smoker, half pack a day, quit in 2015.  Occasional alcohol use.  She is now back to walking, walking outside several times a week.  She previously played Pickleball.      FAMILY HISTORY:  Significant for a brother with AFib and a sister with ARVD.      PHYSICAL EXAMINATION:   GENERAL:  Well-developed, well-nourished woman in no acute distress.   HEENT:  Normocephalic, atraumatic.   HEART:  Regular with early beats consistent with PACs or PVCs.   RESPIRATORY:  Lungs are clear without wheezes, rales or rhonchi.   EXTREMITIES:  Without peripheral edema.    SKIN:  Warm and dry.      ASSESSMENT AND PLAN:   1.  Paroxysmal atrial fibrillation and SVT on her Holter monitor.  Her longest run of AFib was less than 10 minutes.  She has a CHADS-VASc of 3, and we did discuss the risks and benefits of starting anticoagulation today and today she declines.  The hope is that with the higher dose of diltiazem we will be able to keep the AFib away.  We also discussed starting possible flecainide or sotalol per Dr. Stephen's recommendations, and at this point, which she would prefer to do an increased dose of diltiazem and see if that is effective.  Fortunately, she did not have any heart block on her monitor.   2.  COPD/emphysema with primary per inhaler.   3.  Dyslipidemia, goal less than 100 for primary prevention, although she does have coronary calcification on her CT, so we could consider nearly secondary prevention on Lipitor 20.  With LDL 87, HDL 51, total cholesterol 152, I expect this will continue to improve as she exercises.      We will have her get a repeat Zio Patch in about 6 months and then visit with Dr. Stephen, sooner with  concerns.  I did discuss warning signs of syncope, presyncope, chest pain and when she should come back to clinic.      Thank you for allowing me to participate in her care.      ZIGGY Herrera PA-C             D: 2019   T: 2019   MT: JHONNY      Name:     NORBEROT IBRAHIM   MRN:      [id number]        Account:      [id number]   :      1950           Service Date: 2019      Document: [id number]         Outpatient Encounter Medications as of 2019   Medication Sig Dispense Refill     Acetaminophen (TYLENOL PO) Take 1,000 mg by mouth 2 times daily as needed for mild pain or fever       albuterol (PROAIR HFA/PROVENTIL HFA/VENTOLIN HFA) 108 (90 Base) MCG/ACT inhaler Inhale 2 puffs into the lungs every 6 hours as needed for shortness of breath / dyspnea or wheezing GENERIC VENTOLIN 3 Inhaler 11     albuterol (PROVENTIL) (2.5 MG/3ML) 0.083% neb solution Take 1 vial (2.5 mg) by nebulization every 6 hours as needed for shortness of breath / dyspnea or wheezing 1 Box 11     aspirin EC 81 MG tablet Take 1 tablet (81 mg) by mouth daily 30 tablet 11     atorvastatin (LIPITOR) 20 MG tablet Take 1 tablet (20 mg) by mouth daily INDICATION: TO LOWER CHOLESTEROL AND TO HELP REDUCE RISK OF HEART ATTACK AND STROKE 90 tablet 3     betamethasone dipropionate (DIPROSONE) 0.05 % cream Apply topically 2 times daily as needed (For foot) 45 g prn     calcium-vitamin D (OYSTER CALCIUM + D) 250-125 MG-UNIT per tablet Take 2 tablets by mouth daily       celecoxib (CELEBREX) 200 MG capsule Take 1 capsule (200 mg) by mouth 2 times daily as needed for pain (arthritis pain) 60 capsule 5     Cyanocobalamin (B-12) 1000 MCG TBCR Place 1,000 mcg under the tongue daily (Patient taking differently: Place 1,000 mcg under the tongue every other day )       diltiazem ER COATED BEADS (CARDIZEM CD/CARTIA XT) 180 MG 24 hr capsule Take 1 capsule (180 mg) by mouth daily with breakfast 90 capsule 3      folic acid (FOLVITE) 1 MG tablet Take 1 tablet (1 mg) by mouth daily INDICATION: FOLIC ACID SUPPLEMENT (Patient taking differently: Take 400 mcg by mouth daily INDICATION: FOLIC ACID SUPPLEMENT) 100 tablet 3     order for DME Equipment being ordered: Nebulizer 1 each 0     ranitidine (ZANTAC) 150 MG tablet Take 150 mg by mouth daily as needed for heartburn        spironolactone (ALDACTONE) 25 MG tablet Take 1 tablet (25 mg) by mouth daily 90 tablet 1     tiotropium (SPIRIVA HANDIHALER) 18 MCG inhaled capsule Inhale contents of one capsule daily. 90 capsule 3     triamcinolone (KENALOG) 0.1 % cream Apply topically 2 times daily as needed for irritation (sores on scalp.)       Urea 20 % CREA cream Apply topically daily        vitamin C-electrolytes (EMERGEN-C) 1000mg vitamin C super orange drink mix Take 1 packet by mouth daily Mix 1 packet in 4-6oz water.       vitamin D3 (CHOLECALCIFEROL) 04979 units capsule Take 1 capsule (50,000 Units) by mouth every 14 days SIG: TAKE INDICATION: 1ST AND 15TH OF EACH MONTH, TO TREAT VITAMIN D DEFICIENCY 10 capsule 3     [DISCONTINUED] diltiazem (CARDIZEM CD/CARTIA XT) 120 MG 24 hr capsule Take 1 capsule (120 mg) by mouth daily with breakfast 90 capsule 3     No facility-administered encounter medications on file as of 6/24/2019.        Again, thank you for allowing me to participate in the care of your patient.      Sincerely,    Rufina Palacios PA-C     [facility]

## 2019-06-24 NOTE — LETTER
6/24/2019    Kendall Mendez MD  [address] [facility] [address]    RE: Marva Angela       Dear Colleague,    I had the pleasure of seeing Marva Angela in the [facility].    [id number]  HPI and Plan:   See dictation    Orders this Visit:  Orders Placed This Encounter   Procedures     Follow-Up with Cardiologist     Zio Patch Holter Adult Pediatric Greater than 48 hrs     Orders Placed This Encounter   Medications     diltiazem ER COATED BEADS (CARDIZEM CD/CARTIA XT) 180 MG 24 hr capsule     Sig: Take 1 capsule (180 mg) by mouth daily with breakfast     Dispense:  90 capsule     Refill:  3     Needs tier exception     Medications Discontinued During This Encounter   Medication Reason     diltiazem (CARDIZEM CD/CARTIA XT) 120 MG 24 hr capsule Reorder         Encounter Diagnosis   Name Primary?     Paroxysmal atrial fibrillation (H)        CURRENT MEDICATIONS:  Current Outpatient Medications   Medication Sig Dispense Refill     Acetaminophen (TYLENOL PO) Take 1,000 mg by mouth 2 times daily as needed for mild pain or fever       albuterol (PROAIR HFA/PROVENTIL HFA/VENTOLIN HFA) 108 (90 Base) MCG/ACT inhaler Inhale 2 puffs into the lungs every 6 hours as needed for shortness of breath / dyspnea or wheezing GENERIC VENTOLIN 3 Inhaler 11     albuterol (PROVENTIL) (2.5 MG/3ML) 0.083% neb solution Take 1 vial (2.5 mg) by nebulization every 6 hours as needed for shortness of breath / dyspnea or wheezing 1 Box 11     aspirin EC 81 MG tablet Take 1 tablet (81 mg) by mouth daily 30 tablet 11     atorvastatin (LIPITOR) 20 MG tablet Take 1 tablet (20 mg) by mouth daily INDICATION: TO LOWER CHOLESTEROL AND TO HELP REDUCE RISK OF HEART ATTACK AND STROKE 90 tablet 3     betamethasone dipropionate (DIPROSONE) 0.05 % cream Apply topically 2 times daily as needed (For foot) 45 g prn     calcium-vitamin D (OYSTER CALCIUM + D) 250-125 MG-UNIT per tablet Take 2 tablets by mouth daily        celecoxib (CELEBREX) 200 MG capsule Take 1 capsule (200 mg) by mouth 2 times daily as needed for pain (arthritis pain) 60 capsule 5     Cyanocobalamin (B-12) 1000 MCG TBCR Place 1,000 mcg under the tongue daily (Patient taking differently: Place 1,000 mcg under the tongue every other day )       diltiazem ER COATED BEADS (CARDIZEM CD/CARTIA XT) 180 MG 24 hr capsule Take 1 capsule (180 mg) by mouth daily with breakfast 90 capsule 3     folic acid (FOLVITE) 1 MG tablet Take 1 tablet (1 mg) by mouth daily INDICATION: FOLIC ACID SUPPLEMENT (Patient taking differently: Take 400 mcg by mouth daily INDICATION: FOLIC ACID SUPPLEMENT) 100 tablet 3     order for DME Equipment being ordered: Nebulizer 1 each 0     ranitidine (ZANTAC) 150 MG tablet Take 150 mg by mouth daily as needed for heartburn        spironolactone (ALDACTONE) 25 MG tablet Take 1 tablet (25 mg) by mouth daily 90 tablet 1     tiotropium (SPIRIVA HANDIHALER) 18 MCG inhaled capsule Inhale contents of one capsule daily. 90 capsule 3     triamcinolone (KENALOG) 0.1 % cream Apply topically 2 times daily as needed for irritation (sores on scalp.)       Urea 20 % CREA cream Apply topically daily        vitamin C-electrolytes (EMERGEN-C) 1000mg vitamin C super orange drink mix Take 1 packet by mouth daily Mix 1 packet in 4-6oz water.       vitamin D3 (CHOLECALCIFEROL) 84853 units capsule Take 1 capsule (50,000 Units) by mouth every 14 days SIG: TAKE INDICATION: 1ST AND 15TH OF EACH MONTH, TO TREAT VITAMIN D DEFICIENCY 10 capsule 3       ALLERGIES     Allergies   Allergen Reactions     Penicillins Rash     rash     Sulfa Drugs Rash     rash       PAST MEDICAL HISTORY:  Past Medical History:   Diagnosis Date     Arthritis     knees and hips     B12 deficiency 10/14/2016    B12 178     Benign essential hypertension     was on amlodipine/ then metoprolol / add lisinopril      Contact dermatitis and other eczema, due to unspecified cause      COPD (chronic  obstructive pulmonary disease) (H) 01/01/2012     Coronary artery disease involving native coronary artery of native heart without angina pectoris 2016    coronary artery calcifications seen on CT scan, mild nonocclusive CAD     Hyperlipidemia LDL goal <130 11/7/2012     Nephritis      as a child (post strep)      Osteoporosis, unspecified      Phlebitis and thrombophlebitis of other deep vessels of lower extremities 1972, 1975    Both with pregnancies     Prediabetes 11/01/2018    A1C 6.1     Sinusitis      Tobacco use disorder        PAST SURGICAL HISTORY:  Past Surgical History:   Procedure Laterality Date     ARTHROPLASTY HIP Right 4/3/2017    Procedure: ARTHROPLASTY HIP;  Surgeon: Francisco J Alston MD;  Location:  OR     ARTHROPLASTY KNEE Left 7/31/2017    Procedure: ARTHROPLASTY KNEE;  LEFT TOTAL KNEE ARTHROPLASTY ;  Surgeon: Francisco J Alston MD;  Location: [facility] OR     BIOPSY OF SKIN LESION       C DEXA, BONE DENSITY, AXIAL SKEL  6/2008    T score lumbar -0.6, femoral neck -2.4/-2.0(all stable)     C VAGINAL HYSTERECTOMY  1978    Hysterectomy, Vaginal     HC ASPIRATION &/OR INJECTION GANGLION CYST, ANY  1994     HERNIORRHAPHY INGUINAL Right 3/24/2015    Procedure: HERNIORRHAPHY INGUINAL;  Surgeon: Sam Erazo MD;  Location:  [facility]     HYSTERECTOMY, PAP NO LONGER INDICATED       ORTHOPEDIC SURGERY      bilat meniscus repair       FAMILY HISTORY:  Family History   Problem Relation Age of Onset     C.A.D. Father      Diabetes Father         type 2     Myocardial Infarction Father      Heart Surgery Father         CABGx4     Hyperlipidemia Father      Breast Cancer Mother 70     Osteoporosis Mother      Thyroid Disease Mother      Hyperlipidemia Mother      Cancer Brother         Bladder     Hyperlipidemia Brother      Diabetes Brother         type 2     Colon Cancer Brother      Neurologic Disorder Paternal Grandfather      Osteoporosis Sister      Arrhythmia Sister      Heart  Surgery Sister         cardioversion, ablation     Other - See Comments Sister 55        arhythmogenic right ventricular dysplasia     Osteoporosis Maternal Grandmother      Osteoporosis Paternal Grandmother      Other - See Comments Maternal Grandfather         pneumonia     Family History Negative Son      Thyroid Disease Daughter         NODULES     Family History Negative Daughter      Arrhythmia Cousin         a-fib     Cancer - colorectal No family hx of      Prostate Cancer No family hx of      Alzheimer Disease No family hx of      Anesthesia Reaction No family hx of      Blood Disease No family hx of      Eye Disorder No family hx of        SOCIAL HISTORY:  Social History     Socioeconomic History     Marital status: Single     Spouse name: None     Number of children: 2     Years of education: 18     Highest education level: None   Occupational History     Occupation: Mental health therapist     Employer: [employer]   Social Needs     Financial resource strain: None     Food insecurity:     Worry: None     Inability: None     Transportation needs:     Medical: None     Non-medical: None   Tobacco Use     Smoking status: Former Smoker     Packs/day: 1.00     Years: 40.00     Pack years: 40.00     Types: Cigarettes     Start date: 2/7/1964     Last attempt to quit: 9/7/2015     Years since quitting: 3.7     Smokeless tobacco: Never Used     Tobacco comment: 5-10 daily   Substance and Sexual Activity     Alcohol use: Yes     Alcohol/week: 0.0 oz     Comment: 1 a month     Drug use: No     Sexual activity: Never     Partners: Male   Lifestyle     Physical activity:     Days per week: None     Minutes per session: None     Stress: None   Relationships     Social connections:     Talks on phone: None     Gets together: None     Attends Sikh service: None     Active member of club or organization: None     Attends meetings of clubs or organizations: None     Relationship status: None      "Intimate partner violence:     Fear of current or ex partner: None     Emotionally abused: None     Physically abused: None     Forced sexual activity: None   Other Topics Concern      Service No     Blood Transfusions No     Caffeine Concern No     Comment: 1 cup coffee a day, 1-2 can's of pop a wk in the summer      Occupational Exposure No     Hobby Hazards No     Sleep Concern No     Stress Concern No     Weight Concern No     Special Diet No     Back Care No     Exercise No     Comment: not due to knee's and hip     Bike Helmet No     Comment: n/a     Seat Belt Yes     Self-Exams Yes     Comment: sometimes     Parent/sibling w/ CABG, MI or angioplasty before 65F 55M? No   Social History Narrative    Eats fruits and vegetables every day. She takes a calcium supplement twice daily.    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        Review of Systems:  Skin:  Negative     Eyes:  Negative    ENT:  Negative    Respiratory:  Positive for    Cardiovascular:  Negative;chest pain;syncope or near-syncope;cyanosis;fatigue Positive for;palpitations  Gastroenterology: Negative    Genitourinary:  not assessed    Musculoskeletal:  Positive for joint pain  Neurologic:  Negative    Psychiatric:  Positive for sleep disturbances  Heme/Lymph/Imm:  Positive for allergies  Endocrine:  Negative      Physical Exam:  Vitals: /69 (BP Location: Left arm, Cuff Size: Adult Large)   Pulse 72   Ht 1.651 m (5' 5\")   Wt 77 kg (169 lb 12.8 oz)   BMI 28.26 kg/m      Please refer to dictation for physical exam    Recent Lab Results:  LIPID RESULTS:  Lab Results   Component Value Date    CHOL 152 06/17/2019    HDL 51 06/17/2019    LDL 87 06/17/2019    TRIG 68 06/17/2019    CHOLHDLRATIO 4.2 09/18/2015       LIVER ENZYME RESULTS:  Lab Results   Component Value Date    AST 23 06/17/2019    ALT 30 06/17/2019       CBC RESULTS:  Lab Results   Component Value Date    WBC 6.2 06/17/2019    RBC 4.89 06/17/2019    HGB 14.6 " 2019    HCT 45.1 2019    MCV 92 2019    MCH 29.9 2019    MCHC 32.4 2019    RDW 13.1 2019     2019       BMP RESULTS:  Lab Results   Component Value Date     2019    POTASSIUM 4.5 2019    CHLORIDE 103 2019    CO2 30 2019    ANIONGAP 6 2019     (H) 2019    BUN 23 2019    CR 0.92 2019    GFRESTIMATED 63 2019    GFRESTBLACK 73 2019    SANGEETA 9.4 2019        A1C RESULTS:  Lab Results   Component Value Date    A1C 6.0 (H) 2019       INR RESULTS:  Lab Results   Component Value Date    INR 2.1 (A) 2017    INR 2.8 (A) 2017    INR 1.07 2017    INR 1.20 (H) 2017           CC  Robert Stephen MD  [address]  [place] [address]        Service Date: 2019      PRIMARY CARDIOLOGIST:  Dr. Stephen, had followed with Dr. Fountain.      REASON FOR VISIT:  Palpitations followup.      HISTORY OF PRESENT ILLNESS:  Ms. Angela is a delightful 68-year-old woman with past medical history significant for the followin.  Hypertension, on several medications.   2.  History of tobacco, now quit.   3.  COPD.   4.  Dyslipidemia.   5.  Calcification of the coronaries and thoracic aorta on CT of chest.   6.  Paroxysmal atrial fibrillation.      She is here today for routine followup.  I had met her in 2016.  She had followup with Dr. Fountain for palpitations.  Unfortunately, she had a really difficult spring in  due to runs of PACs and PVCs.  Since then, she has been on diltiazem and actually done fairly well.  She was seen by Dr. Stephen in November, and no changes were made.  He recommended a Zio Patch that was completed before the visit.      The Zio Patch showed brief runs of AFib, the longest being 10 minutes with an average heart rate of 67 with 1 run of VT just 4 beats, but 152 runs of SVT, the fastest at 174 beats per minute, but only 6 beats, and the longest being 20  beats at 118.  Her AFib burden was less than 1% and it ranged from 120 to 172 beats per minute with the average of 143, with the longest lasting 10 minutes.  Overall, she had about a 10% burden of ectopic atrial rhythms.      She comes in today saying that the week she wore the monitor, she was actually feeling a lot of palpitations.  She did not have any syncope or presyncope.  Once she gets them, she does tend to be short of breath and then it resolves.  She did not have any chest pain.  She said since then it has only happened 2 or 3 times and she has otherwise felt well.  She has noted, though, that her blood pressures have increased somewhat.  They typically run in the 104s-105s and now are running more around 120.  She just feels like her diltiazem is not working very much anymore.      SOCIAL HISTORY:  She is a retired , former smoker, half pack a day, quit in 2015.  Occasional alcohol use.  She is now back to walking, walking outside several times a week.  She previously played Pickleball.      FAMILY HISTORY:  Significant for a brother with AFib and a sister with ARVD.      PHYSICAL EXAMINATION:   GENERAL:  Well-developed, well-nourished woman in no acute distress.   HEENT:  Normocephalic, atraumatic.   HEART:  Regular with early beats consistent with PACs or PVCs.   RESPIRATORY:  Lungs are clear without wheezes, rales or rhonchi.   EXTREMITIES:  Without peripheral edema.    SKIN:  Warm and dry.      ASSESSMENT AND PLAN:   1.  Paroxysmal atrial fibrillation and SVT on her Holter monitor.  Her longest run of AFib was less than 10 minutes.  She has a CHADS-VASc of 3, and we did discuss the risks and benefits of starting anticoagulation today and today she declines.  The hope is that with the higher dose of diltiazem we will be able to keep the AFib away.  We also discussed starting possible flecainide or sotalol per Dr. Stephen's recommendations, and at this point, which she would prefer to do an  increased dose of diltiazem and see if that is effective.  Fortunately, she did not have any heart block on her monitor.   2.  COPD/emphysema with primary per inhaler.   3.  Dyslipidemia, goal less than 100 for primary prevention, although she does have coronary calcification on her CT, so we could consider nearly secondary prevention on Lipitor 20.  With LDL 87, HDL 51, total cholesterol 152, I expect this will continue to improve as she exercises.      We will have her get a repeat Zio Patch in about 6 months and then visit with Dr. Stephen, sooner with concerns.  I did discuss warning signs of syncope, presyncope, chest pain and when she should come back to clinic.      Thank you for allowing me to participate in her care.      ZIGGY Herrera PA-C             D: 2019   T: 2019   MT: JHONNY      Name:     NORBERTO IBRAHIM   MRN:      [id number]        Account:      [id number]   :      1950           Service Date: 2019      Document: [id number]       Thank you for allowing me to participate in the care of your patient.      Sincerely,     Rufina Palacios PA-C     [facility]    cc:   Robert Stephen MD  [address]  [place] [id number]

## 2019-06-24 NOTE — TELEPHONE ENCOUNTER
Received a message from Rufina Palacios PA-C requesting to initiate a tier exception for the patient's diltiazem. Will forward message to the prior authorization team. PEYMAN Alan RN - 06/24/19, 1:30 PM

## 2019-06-24 NOTE — PROGRESS NOTES
[id number]  HPI and Plan:   See dictation    Orders this Visit:  Orders Placed This Encounter   Procedures     Follow-Up with Cardiologist     Zio Patch Holter Adult Pediatric Greater than 48 hrs     Orders Placed This Encounter   Medications     diltiazem ER COATED BEADS (CARDIZEM CD/CARTIA XT) 180 MG 24 hr capsule     Sig: Take 1 capsule (180 mg) by mouth daily with breakfast     Dispense:  90 capsule     Refill:  3     Needs tier exception     Medications Discontinued During This Encounter   Medication Reason     diltiazem (CARDIZEM CD/CARTIA XT) 120 MG 24 hr capsule Reorder         Encounter Diagnosis   Name Primary?     Paroxysmal atrial fibrillation (H)        CURRENT MEDICATIONS:  Current Outpatient Medications   Medication Sig Dispense Refill     Acetaminophen (TYLENOL PO) Take 1,000 mg by mouth 2 times daily as needed for mild pain or fever       albuterol (PROAIR HFA/PROVENTIL HFA/VENTOLIN HFA) 108 (90 Base) MCG/ACT inhaler Inhale 2 puffs into the lungs every 6 hours as needed for shortness of breath / dyspnea or wheezing GENERIC VENTOLIN 3 Inhaler 11     albuterol (PROVENTIL) (2.5 MG/3ML) 0.083% neb solution Take 1 vial (2.5 mg) by nebulization every 6 hours as needed for shortness of breath / dyspnea or wheezing 1 Box 11     aspirin EC 81 MG tablet Take 1 tablet (81 mg) by mouth daily 30 tablet 11     atorvastatin (LIPITOR) 20 MG tablet Take 1 tablet (20 mg) by mouth daily INDICATION: TO LOWER CHOLESTEROL AND TO HELP REDUCE RISK OF HEART ATTACK AND STROKE 90 tablet 3     betamethasone dipropionate (DIPROSONE) 0.05 % cream Apply topically 2 times daily as needed (For foot) 45 g prn     calcium-vitamin D (OYSTER CALCIUM + D) 250-125 MG-UNIT per tablet Take 2 tablets by mouth daily       celecoxib (CELEBREX) 200 MG capsule Take 1 capsule (200 mg) by mouth 2 times daily as needed for pain (arthritis pain) 60 capsule 5     Cyanocobalamin (B-12) 1000 MCG TBCR Place 1,000 mcg under the tongue daily (Patient taking  differently: Place 1,000 mcg under the tongue every other day )       diltiazem ER COATED BEADS (CARDIZEM CD/CARTIA XT) 180 MG 24 hr capsule Take 1 capsule (180 mg) by mouth daily with breakfast 90 capsule 3     folic acid (FOLVITE) 1 MG tablet Take 1 tablet (1 mg) by mouth daily INDICATION: FOLIC ACID SUPPLEMENT (Patient taking differently: Take 400 mcg by mouth daily INDICATION: FOLIC ACID SUPPLEMENT) 100 tablet 3     order for DME Equipment being ordered: Nebulizer 1 each 0     ranitidine (ZANTAC) 150 MG tablet Take 150 mg by mouth daily as needed for heartburn        spironolactone (ALDACTONE) 25 MG tablet Take 1 tablet (25 mg) by mouth daily 90 tablet 1     tiotropium (SPIRIVA HANDIHALER) 18 MCG inhaled capsule Inhale contents of one capsule daily. 90 capsule 3     triamcinolone (KENALOG) 0.1 % cream Apply topically 2 times daily as needed for irritation (sores on scalp.)       Urea 20 % CREA cream Apply topically daily        vitamin C-electrolytes (EMERGEN-C) 1000mg vitamin C super orange drink mix Take 1 packet by mouth daily Mix 1 packet in 4-6oz water.       vitamin D3 (CHOLECALCIFEROL) 19108 units capsule Take 1 capsule (50,000 Units) by mouth every 14 days SIG: TAKE INDICATION: 1ST AND 15TH OF EACH MONTH, TO TREAT VITAMIN D DEFICIENCY 10 capsule 3       ALLERGIES     Allergies   Allergen Reactions     Penicillins Rash     rash     Sulfa Drugs Rash     rash       PAST MEDICAL HISTORY:  Past Medical History:   Diagnosis Date     Arthritis     knees and hips     B12 deficiency 10/14/2016    B12 178     Benign essential hypertension     was on amlodipine/ then metoprolol / add lisinopril      Contact dermatitis and other eczema, due to unspecified cause      COPD (chronic obstructive pulmonary disease) (H) 01/01/2012     Coronary artery disease involving native coronary artery of native heart without angina pectoris 2016    coronary artery calcifications seen on CT scan, mild nonocclusive CAD      Hyperlipidemia LDL goal <130 11/7/2012     Nephritis      as a child (post strep)      Osteoporosis, unspecified      Phlebitis and thrombophlebitis of other deep vessels of lower extremities 1972, 1975    Both with pregnancies     Prediabetes 11/01/2018    A1C 6.1     Sinusitis      Tobacco use disorder        PAST SURGICAL HISTORY:  Past Surgical History:   Procedure Laterality Date     ARTHROPLASTY HIP Right 4/3/2017    Procedure: ARTHROPLASTY HIP;  Surgeon: Francisco J Alston MD;  Location: [facility]     ARTHROPLASTY KNEE Left 7/31/2017    Procedure: ARTHROPLASTY KNEE;  LEFT TOTAL KNEE ARTHROPLASTY ;  Surgeon: Francisco J Alston MD;  Location: [facility] OR     BIOPSY OF SKIN LESION       C DEXA, BONE DENSITY, AXIAL SKEL  6/2008    T score lumbar -0.6, femoral neck -2.4/-2.0(all stable)     C VAGINAL HYSTERECTOMY  1978    Hysterectomy, Vaginal     HC ASPIRATION &/OR INJECTION GANGLION CYST, ANY  1994     HERNIORRHAPHY INGUINAL Right 3/24/2015    Procedure: HERNIORRHAPHY INGUINAL;  Surgeon: Sam Erazo MD;  Location:  [facility]     HYSTERECTOMY, PAP NO LONGER INDICATED       ORTHOPEDIC SURGERY      bilat meniscus repair       FAMILY HISTORY:  Family History   Problem Relation Age of Onset     C.A.D. Father      Diabetes Father         type 2     Myocardial Infarction Father      Heart Surgery Father         CABGx4     Hyperlipidemia Father      Breast Cancer Mother 70     Osteoporosis Mother      Thyroid Disease Mother      Hyperlipidemia Mother      Cancer Brother         Bladder     Hyperlipidemia Brother      Diabetes Brother         type 2     Colon Cancer Brother      Neurologic Disorder Paternal Grandfather      Osteoporosis Sister      Arrhythmia Sister      Heart Surgery Sister         cardioversion, ablation     Other - See Comments Sister 55        arhythmogenic right ventricular dysplasia     Osteoporosis Maternal Grandmother      Osteoporosis Paternal Grandmother      Other - See Comments  Maternal Grandfather         pneumonia     Family History Negative Son      Thyroid Disease Daughter         NODULES     Family History Negative Daughter      Arrhythmia Cousin         a-fib     Cancer - colorectal No family hx of      Prostate Cancer No family hx of      Alzheimer Disease No family hx of      Anesthesia Reaction No family hx of      Blood Disease No family hx of      Eye Disorder No family hx of        SOCIAL HISTORY:  Social History     Socioeconomic History     Marital status: Single     Spouse name: None     Number of children: 2     Years of education: 18     Highest education level: None   Occupational History     Occupation: Mental health therapist     Employer: [employer]   Social Needs     Financial resource strain: None     Food insecurity:     Worry: None     Inability: None     Transportation needs:     Medical: None     Non-medical: None   Tobacco Use     Smoking status: Former Smoker     Packs/day: 1.00     Years: 40.00     Pack years: 40.00     Types: Cigarettes     Start date: 2/7/1964     Last attempt to quit: 9/7/2015     Years since quitting: 3.7     Smokeless tobacco: Never Used     Tobacco comment: 5-10 daily   Substance and Sexual Activity     Alcohol use: Yes     Alcohol/week: 0.0 oz     Comment: 1 a month     Drug use: No     Sexual activity: Never     Partners: Male   Lifestyle     Physical activity:     Days per week: None     Minutes per session: None     Stress: None   Relationships     Social connections:     Talks on phone: None     Gets together: None     Attends Denominational service: None     Active member of club or organization: None     Attends meetings of clubs or organizations: None     Relationship status: None     Intimate partner violence:     Fear of current or ex partner: None     Emotionally abused: None     Physically abused: None     Forced sexual activity: None   Other Topics Concern      Service No     Blood Transfusions No      "Caffeine Concern No     Comment: 1 cup coffee a day, 1-2 can's of pop a wk in the summer      Occupational Exposure No     Hobby Hazards No     Sleep Concern No     Stress Concern No     Weight Concern No     Special Diet No     Back Care No     Exercise No     Comment: not due to knee's and hip     Bike Helmet No     Comment: n/a     Seat Belt Yes     Self-Exams Yes     Comment: sometimes     Parent/sibling w/ CABG, MI or angioplasty before 65F 55M? No   Social History Narrative    Eats fruits and vegetables every day. She takes a calcium supplement twice daily.    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        Review of Systems:  Skin:  Negative     Eyes:  Negative    ENT:  Negative    Respiratory:  Positive for    Cardiovascular:  Negative;chest pain;syncope or near-syncope;cyanosis;fatigue Positive for;palpitations  Gastroenterology: Negative    Genitourinary:  not assessed    Musculoskeletal:  Positive for joint pain  Neurologic:  Negative    Psychiatric:  Positive for sleep disturbances  Heme/Lymph/Imm:  Positive for allergies  Endocrine:  Negative      Physical Exam:  Vitals: /69 (BP Location: Left arm, Cuff Size: Adult Large)   Pulse 72   Ht 1.651 m (5' 5\")   Wt 77 kg (169 lb 12.8 oz)   BMI 28.26 kg/m     Please refer to dictation for physical exam    Recent Lab Results:  LIPID RESULTS:  Lab Results   Component Value Date    CHOL 152 06/17/2019    HDL 51 06/17/2019    LDL 87 06/17/2019    TRIG 68 06/17/2019    CHOLHDLRATIO 4.2 09/18/2015       LIVER ENZYME RESULTS:  Lab Results   Component Value Date    AST 23 06/17/2019    ALT 30 06/17/2019       CBC RESULTS:  Lab Results   Component Value Date    WBC 6.2 06/17/2019    RBC 4.89 06/17/2019    HGB 14.6 06/17/2019    HCT 45.1 06/17/2019    MCV 92 06/17/2019    MCH 29.9 06/17/2019    MCHC 32.4 06/17/2019    RDW 13.1 06/17/2019     06/17/2019       BMP RESULTS:  Lab Results   Component Value Date     06/17/2019    POTASSIUM 4.5 " 06/17/2019    CHLORIDE 103 06/17/2019    CO2 30 06/17/2019    ANIONGAP 6 06/17/2019     (H) 06/17/2019    BUN 23 06/17/2019    CR 0.92 06/17/2019    GFRESTIMATED 63 06/17/2019    GFRESTBLACK 73 06/17/2019    SANGEETA 9.4 06/17/2019        A1C RESULTS:  Lab Results   Component Value Date    A1C 6.0 (H) 06/17/2019       INR RESULTS:  Lab Results   Component Value Date    INR 2.1 (A) 08/31/2017    INR 2.8 (A) 08/22/2017    INR 1.07 08/03/2017    INR 1.20 (H) 04/05/2017           CC  Robert Stephen MD  [id number]  [place] [id number]

## 2019-06-24 NOTE — PROGRESS NOTES
Service Date: 2019      PRIMARY CARDIOLOGIST:  Dr. Stephen, had followed with Dr. Fountain.      REASON FOR VISIT:  Palpitations followup.      HISTORY OF PRESENT ILLNESS:  Ms. Angela is a delightful 68-year-old woman with past medical history significant for the followin.  Hypertension, on several medications.   2.  History of tobacco, now quit.   3.  COPD.   4.  Dyslipidemia.   5.  Calcification of the coronaries and thoracic aorta on CT of chest.   6.  Paroxysmal atrial fibrillation.      She is here today for routine followup.  I had met her in .  She had followup with Dr. Fountain for palpitations.  Unfortunately, she had a really difficult spring in  due to runs of PACs and PVCs.  Since then, she has been on diltiazem and actually done fairly well.  She was seen by Dr. Stephen in November, and no changes were made.  He recommended a Zio Patch that was completed before the visit.      The Zio Patch showed brief runs of AFib, the longest being 10 minutes with an average heart rate of 67 with 1 run of VT just 4 beats, but 152 runs of SVT, the fastest at 174 beats per minute, but only 6 beats, and the longest being 20 beats at 118.  Her AFib burden was less than 1% and it ranged from 120 to 172 beats per minute with the average of 143, with the longest lasting 10 minutes.  Overall, she had about a 10% burden of ectopic atrial rhythms.      She comes in today saying that the week she wore the monitor, she was actually feeling a lot of palpitations.  She did not have any syncope or presyncope.  Once she gets them, she does tend to be short of breath and then it resolves.  She did not have any chest pain.  She said since then it has only happened 2 or 3 times and she has otherwise felt well.  She has noted, though, that her blood pressures have increased somewhat.  They typically run in the 104s-105s and now are running more around 120.  She just feels like her diltiazem is not working very much anymore.       SOCIAL HISTORY:  She is a retired , former smoker, half pack a day, quit in 2015.  Occasional alcohol use.  She is now back to walking, walking outside several times a week.  She previously played Pickleball.      FAMILY HISTORY:  Significant for a brother with AFib and a sister with ARVD.      PHYSICAL EXAMINATION:   GENERAL:  Well-developed, well-nourished woman in no acute distress.   HEENT:  Normocephalic, atraumatic.   HEART:  Regular with early beats consistent with PACs or PVCs.   RESPIRATORY:  Lungs are clear without wheezes, rales or rhonchi.   EXTREMITIES:  Without peripheral edema.    SKIN:  Warm and dry.      ASSESSMENT AND PLAN:   1.  Paroxysmal atrial fibrillation and SVT on her Holter monitor.  Her longest run of AFib was less than 10 minutes.  She has a CHADS-VASc of 3, and we did discuss the risks and benefits of starting anticoagulation today and today she declines.  The hope is that with the higher dose of diltiazem we will be able to keep the AFib away.  We also discussed starting possible flecainide or sotalol per Dr. Stephen's recommendations, and at this point, which she would prefer to do an increased dose of diltiazem and see if that is effective.  Fortunately, she did not have any heart block on her monitor.   2.  COPD/emphysema with primary per inhaler.   3.  Dyslipidemia, goal less than 100 for primary prevention, although she does have coronary calcification on her CT, so we could consider nearly secondary prevention on Lipitor 20.  With LDL 87, HDL 51, total cholesterol 152, I expect this will continue to improve as she exercises.      We will have her get a repeat Zio Patch in about 6 months and then visit with Dr. Stephen, sooner with concerns.  I did discuss warning signs of syncope, presyncope, chest pain and when she should come back to clinic.      Thank you for allowing me to participate in her care.      ZIGGY Herrera PA-C             D:  2019   T: 2019   MT: JHONNY      Name:     NORBERTO IBRAHIM   MRN:      [id number]        Account:      [id number]   :      1950           Service Date: 2019      Document: [id number]

## 2019-06-24 NOTE — PATIENT INSTRUCTIONS
Thank you for coming into [facility] clinic today.    We discussed: We'll increase your diltiazem to 180 mg once a day to help keep the palpitations away.  My nurse will call you with a tier exception.      Follow up:   1.  Get a ziopatch placed in early January.    2.  Follow up with Dr. Stephen about 3 weeks after getting the monitor placed.      Please call my nurse at [phone] with any questions or concerns.    Scheduling phone number: [phone]  Reminder: Please bring in all current medications, over the counter supplements and vitamin bottles to your next appointment.

## 2019-06-25 NOTE — TELEPHONE ENCOUNTER
PA Initiation    Medication: Diltiazem ER COATED BEADS 180MG -   Insurance Company: [email or web address] - Phone [phone] Fax [phone]  Pharmacy Filling the Rx: [id number] [place] - [address] AT [facility] OF [facility] [address]  Filling Pharmacy Phone: [phone]  Filling Pharmacy Fax: [phone]  Start Date: 6/25/2019

## 2019-06-25 NOTE — RESULT ENCOUNTER NOTE
Reviewed during clinic visit.  Please see progress note for plan.  Rufina Palacios PA-C 6/25/2019 10:04 AM

## 2019-06-26 NOTE — TELEPHONE ENCOUNTER
Prior Authorization Approval - TIER EXCEPTION    Authorization Effective Date: 3/27/2019  Authorization Expiration Date: 6/24/2020  Medication: Diltiazem ER COATED BEADS 180MG- Tier Exception - APPROVED  Approved Dose/Quantity:   Reference #:     Insurance Company: Peggy Teague - Phone [phone] Fax [phone]  Expected CoPay:       CoPay Card Available:      Foundation Assistance Needed:    Which Pharmacy is filling the prescription (Not needed for infusion/clinic administered): [id number] [place] - [address] AT [facility] [address] [facility]  Pharmacy Notified: Yes  Patient Notified: Comment:  **Instructed pharmacy to notify patient when script is ready to /ship.**

## 2019-08-09 DIAGNOSIS — I10 BENIGN ESSENTIAL HYPERTENSION: ICD-10-CM

## 2019-08-09 RX ORDER — SPIRONOLACTONE 25 MG/1
TABLET ORAL
Qty: 90 TABLET | Refills: 0 | Status: SHIPPED | OUTPATIENT
Start: 2019-08-09 | End: 2019-12-20

## 2019-08-09 NOTE — TELEPHONE ENCOUNTER
"Requested Prescriptions   Pending Prescriptions Disp Refills     spironolactone (ALDACTONE) 25 MG tablet [Pharmacy Med Name: SPIRONOLACTONE 25MG TABLETS] 90 tablet 0     Sig: TAKE 1 TABLET(25 MG) BY MOUTH DAILY   Last Written Prescription Date:  6/17/2019  Last Fill Quantity: 90,  # refills: 1   Last Office Visit: 6/17/2019   Future Office Visit:         Diuretics (Including Combos) Protocol Passed - 8/9/2019 10:47 AM        Passed - Blood pressure under 140/90 in past 12 months     BP Readings from Last 3 Encounters:   06/24/19 125/69   06/17/19 124/80   05/28/19 122/60                 Passed - Recent (12 mo) or future (30 days) visit within the authorizing provider's specialty     Patient had office visit in the last 12 months or has a visit in the next 30 days with authorizing provider or within the authorizing provider's specialty.  See \"Patient Info\" tab in inbasket, or \"Choose Columns\" in Meds & Orders section of the refill encounter.              Passed - Medication is active on med list        Passed - Patient is age 18 or older        Passed - No active pregancy on record        Passed - Normal serum creatinine on file in past 12 months     Recent Labs   Lab Test 06/17/19  0827   CR 0.92              Passed - Normal serum potassium on file in past 12 months     Recent Labs   Lab Test 06/17/19  0827   POTASSIUM 4.5                    Passed - Normal serum sodium on file in past 12 months     Recent Labs   Lab Test 06/17/19  0827                 Passed - No positive pregnancy test in past 12 months          "

## 2019-08-09 NOTE — TELEPHONE ENCOUNTER
Prescription approved per [facility] Refill Protocol.  Yaquelin KRUSE RN, BSN, PHN

## 2019-09-29 ENCOUNTER — HEALTH MAINTENANCE LETTER (OUTPATIENT)
Age: 69
End: 2019-09-29

## 2019-10-23 ENCOUNTER — TELEPHONE (OUTPATIENT)
Dept: INTERNAL MEDICINE | Facility: CLINIC | Age: 69
End: 2019-10-23
Payer: COMMERCIAL

## 2019-10-23 DIAGNOSIS — Z87.891 PERSONAL HISTORY OF TOBACCO USE: Primary | ICD-10-CM

## 2019-10-23 PROCEDURE — G0296 VISIT TO DETERM LDCT ELIG: HCPCS | Performed by: INTERNAL MEDICINE

## 2019-10-23 NOTE — PATIENT INSTRUCTIONS

## 2019-10-25 NOTE — ANESTHESIA CARE TRANSFER NOTE
Patient: Marva Angela    Procedure(s):  RIGHT TOTAL HIP ARTHROPLASTY  - Wound Class: I-Clean    Diagnosis: DJD RIGHT HIP  Diagnosis Additional Information: No value filed.    Anesthesia Type:   General     Note:  Airway :Face Mask  Patient transferred to:PACU  Comments: Restless.  No c/o pain.  Report to RN.      Vitals: (Last set prior to Anesthesia Care Transfer)    CRNA VITALS  4/3/2017 0816 - 4/3/2017 0850      4/3/2017             EKG: Sinus rhythm;PAC's                Electronically Signed By: Marti Mera  April 3, 2017  8:50 AM  
no

## 2019-11-04 DIAGNOSIS — I48.0 PAF (PAROXYSMAL ATRIAL FIBRILLATION) (H): Primary | ICD-10-CM

## 2019-11-07 ENCOUNTER — MYC MEDICAL ADVICE (OUTPATIENT)
Dept: INTERNAL MEDICINE | Facility: CLINIC | Age: 69
End: 2019-11-07

## 2019-11-08 ENCOUNTER — ANCILLARY PROCEDURE (OUTPATIENT)
Dept: MAMMOGRAPHY | Facility: CLINIC | Age: 69
End: 2019-11-08
Attending: INTERNAL MEDICINE
Payer: COMMERCIAL

## 2019-11-08 DIAGNOSIS — Z12.31 VISIT FOR SCREENING MAMMOGRAM: ICD-10-CM

## 2019-11-08 PROCEDURE — 77067 SCR MAMMO BI INCL CAD: CPT | Mod: TC

## 2019-11-18 ENCOUNTER — HOSPITAL ENCOUNTER (OUTPATIENT)
Dept: CT IMAGING | Facility: CLINIC | Age: 69
Discharge: HOME | End: 2019-11-18
Attending: INTERNAL MEDICINE | Admitting: INTERNAL MEDICINE
Payer: COMMERCIAL

## 2019-11-18 DIAGNOSIS — Z87.891 PERSONAL HISTORY OF TOBACCO USE: ICD-10-CM

## 2019-11-18 PROCEDURE — G0297 LDCT FOR LUNG CA SCREEN: HCPCS

## 2019-12-15 ASSESSMENT — ACTIVITIES OF DAILY LIVING (ADL): CURRENT_FUNCTION: NO ASSISTANCE NEEDED

## 2019-12-16 DIAGNOSIS — Z12.11 SPECIAL SCREENING FOR MALIGNANT NEOPLASMS, COLON: ICD-10-CM

## 2019-12-16 PROCEDURE — 82274 ASSAY TEST FOR BLOOD FECAL: CPT | Performed by: INTERNAL MEDICINE

## 2019-12-17 DIAGNOSIS — I10 BENIGN ESSENTIAL HYPERTENSION: ICD-10-CM

## 2019-12-17 DIAGNOSIS — I48.0 PAROXYSMAL ATRIAL FIBRILLATION (H): ICD-10-CM

## 2019-12-17 DIAGNOSIS — R73.03 PREDIABETES: ICD-10-CM

## 2019-12-17 LAB
ANION GAP SERPL CALCULATED.3IONS-SCNC: 4 MMOL/L (ref 3–14)
BUN SERPL-MCNC: 22 MG/DL (ref 7–30)
CALCIUM SERPL-MCNC: 9.9 MG/DL (ref 8.5–10.1)
CHLORIDE SERPL-SCNC: 105 MMOL/L (ref 94–109)
CO2 SERPL-SCNC: 31 MMOL/L (ref 20–32)
CREAT SERPL-MCNC: 0.82 MG/DL (ref 0.52–1.04)
GFR SERPL CREATININE-BSD FRML MDRD: 72 ML/MIN/{1.73_M2}
GLUCOSE SERPL-MCNC: 110 MG/DL (ref 70–99)
HBA1C MFR BLD: 6.2 % (ref 0–5.6)
POTASSIUM SERPL-SCNC: 4.4 MMOL/L (ref 3.4–5.3)
SODIUM SERPL-SCNC: 140 MMOL/L (ref 133–144)

## 2019-12-17 PROCEDURE — 83036 HEMOGLOBIN GLYCOSYLATED A1C: CPT | Performed by: INTERNAL MEDICINE

## 2019-12-17 PROCEDURE — 36415 COLL VENOUS BLD VENIPUNCTURE: CPT | Performed by: INTERNAL MEDICINE

## 2019-12-17 PROCEDURE — 80048 BASIC METABOLIC PNL TOTAL CA: CPT | Performed by: INTERNAL MEDICINE

## 2019-12-20 ENCOUNTER — OFFICE VISIT (OUTPATIENT)
Dept: INTERNAL MEDICINE | Facility: CLINIC | Age: 69
End: 2019-12-20
Payer: COMMERCIAL

## 2019-12-20 VITALS
OXYGEN SATURATION: 97 % | HEART RATE: 68 BPM | WEIGHT: 165 LBS | HEIGHT: 64 IN | BODY MASS INDEX: 28.17 KG/M2 | DIASTOLIC BLOOD PRESSURE: 76 MMHG | TEMPERATURE: 98.2 F | SYSTOLIC BLOOD PRESSURE: 120 MMHG

## 2019-12-20 DIAGNOSIS — L40.50 PSORIASIS WITH ARTHROPATHY (H): ICD-10-CM

## 2019-12-20 DIAGNOSIS — J43.9 PULMONARY EMPHYSEMA, UNSPECIFIED EMPHYSEMA TYPE (H): ICD-10-CM

## 2019-12-20 DIAGNOSIS — J44.9 CHRONIC OBSTRUCTIVE PULMONARY DISEASE, UNSPECIFIED COPD TYPE (H): ICD-10-CM

## 2019-12-20 DIAGNOSIS — I48.0 PAROXYSMAL ATRIAL FIBRILLATION (H): ICD-10-CM

## 2019-12-20 DIAGNOSIS — E78.5 HYPERLIPIDEMIA LDL GOAL <130: ICD-10-CM

## 2019-12-20 DIAGNOSIS — R73.03 PREDIABETES: ICD-10-CM

## 2019-12-20 DIAGNOSIS — I10 BENIGN ESSENTIAL HYPERTENSION: ICD-10-CM

## 2019-12-20 DIAGNOSIS — I70.0 ATHEROSCLEROSIS OF AORTA (H): ICD-10-CM

## 2019-12-20 DIAGNOSIS — Z00.00 MEDICARE ANNUAL WELLNESS VISIT, SUBSEQUENT: Primary | ICD-10-CM

## 2019-12-20 DIAGNOSIS — M19.90 OSTEOARTHRITIS, UNSPECIFIED OSTEOARTHRITIS TYPE, UNSPECIFIED SITE: ICD-10-CM

## 2019-12-20 DIAGNOSIS — L40.9 PSORIASIS: ICD-10-CM

## 2019-12-20 DIAGNOSIS — M17.11 PRIMARY OSTEOARTHRITIS OF RIGHT KNEE: ICD-10-CM

## 2019-12-20 PROCEDURE — 99397 PER PM REEVAL EST PAT 65+ YR: CPT | Performed by: INTERNAL MEDICINE

## 2019-12-20 PROCEDURE — 99214 OFFICE O/P EST MOD 30 MIN: CPT | Mod: 25 | Performed by: INTERNAL MEDICINE

## 2019-12-20 RX ORDER — BETAMETHASONE DIPROPIONATE 0.5 MG/G
CREAM TOPICAL
Qty: 45 G | Refills: 2 | Status: SHIPPED | OUTPATIENT
Start: 2019-12-20 | End: 2022-11-15

## 2019-12-20 RX ORDER — SPIRONOLACTONE 25 MG/1
25 TABLET ORAL DAILY
Qty: 90 TABLET | Refills: 1 | Status: SHIPPED | OUTPATIENT
Start: 2019-12-20 | End: 2020-04-24

## 2019-12-20 RX ORDER — ALBUTEROL SULFATE 90 UG/1
2 AEROSOL, METERED RESPIRATORY (INHALATION) EVERY 6 HOURS PRN
Qty: 3 INHALER | Refills: 11 | Status: SHIPPED | OUTPATIENT
Start: 2019-12-20 | End: 2020-09-25

## 2019-12-20 RX ORDER — TIOTROPIUM BROMIDE 18 UG/1
18 CAPSULE ORAL; RESPIRATORY (INHALATION) DAILY
Qty: 90 CAPSULE | Refills: 4 | Status: SHIPPED | OUTPATIENT
Start: 2019-12-20 | End: 2020-09-25

## 2019-12-20 ASSESSMENT — ACTIVITIES OF DAILY LIVING (ADL): CURRENT_FUNCTION: NO ASSISTANCE NEEDED

## 2019-12-20 ASSESSMENT — MIFFLIN-ST. JEOR: SCORE: 1240.5

## 2019-12-20 NOTE — NURSING NOTE
"Chief Complaint   Patient presents with     Medicare Visit     /76   Pulse 68   Temp 98.2  F (36.8  C) (Temporal)   Ht 1.613 m (5' 3.5\")   Wt 74.8 kg (165 lb)   SpO2 97%   BMI 28.77 kg/m   Estimated body mass index is 28.77 kg/m  as calculated from the following:    Height as of this encounter: 1.613 m (5' 3.5\").    Weight as of this encounter: 74.8 kg (165 lb).        Health Maintenance that is potentially due pending provider review:  Colonoscopy/FIT    Dropped off FIT at the post office yesterday, 12/19/2019.    YVETTE Jones  "

## 2019-12-20 NOTE — PROGRESS NOTES
"SUBJECTIVE:   Marva Angela is a 69 year old female who presents for Preventive Visit.    Are you in the first 12 months of your Medicare coverage?  No    Healthy Habits:     In general, how would you rate your overall health?  Good    Frequency of exercise:  2-3 days/week    Duration of exercise:  15-30 minutes    Do you usually eat at least 4 servings of fruit and vegetables a day, include whole grains    & fiber and avoid regularly eating high fat or \"junk\" foods?  Yes    Taking medications regularly:  Yes    Medication side effects:  None    Ability to successfully perform activities of daily living:  No assistance needed    Home Safety:  No safety concerns identified    Hearing Impairment:  No hearing concerns    In the past 6 months, have you been bothered by leaking of urine?  No    In general, how would you rate your overall mental or emotional health?  Excellent      PHQ-2 Total Score: 0    Additional concerns today:  No    Do you feel safe in your environment? Yes    Have you ever done Advance Care Planning? (For example, a Health Directive, POLST, or a discussion with a medical provider or your loved ones about your wishes): Yes, advance care planning is on file.      Fall risk  Fallen 2 or more times in the past year?: No  Any fall with injury in the past year?: No    Cognitive Screening   1) Repeat 3 items (Leader, Season, Table)    2) Clock draw: NORMAL  3) 3 item recall: Recalls 3 objects  Results: 3 items recalled: COGNITIVE IMPAIRMENT LESS LIKELY    Mini-CogTM Copyright FRITZ Phan. Licensed by the author for use in [facility]; reprinted with permission ([email or web address]). All rights reserved.      Do you have sleep apnea, excessive snoring or daytime drowsiness?: no    Reviewed and updated as needed this visit by clinical staff  Tobacco  Allergies  Meds         Reviewed and updated as needed this visit by Provider        Social History     Tobacco Use     Smoking status: Former " Smoker     Packs/day: 1.00     Years: 40.00     Pack years: 40.00     Types: Cigarettes     Start date: 1964     Last attempt to quit: 2015     Years since quittin.2     Smokeless tobacco: Never Used     Tobacco comment: 5-10 daily   Substance Use Topics     Alcohol use: Yes     Alcohol/week: 0.0 standard drinks     Comment: 1 a month         No flowsheet data found.    1.  History of psoriasis with arthritis.  Currently stable with intermittent use of steroid creams and Celebrex.    2.  History of atherosclerosis seen in her aorta on previous screening exams.  No abdominal pain.  Currently employing secondary risk factor modification guidelines.    1.  COPD remains stable.  Has not had any recent breathing troubles beyond usual baseline.  Has not any acute respiratory events.  Remains with intermittent cough, mild shortness of breath with overexertion as per usual.  Using medication as directed with reported side effects. D    2.  The patient has a history of impaired glucose tolerance with regularly elevated blood sugars.    They have not been diagnosed with type II DM or placed on medications for diabetes before.   They deny polyuria, polydipsia.     The patient is not obese with a BMI of Body mass index is 28.77 kg/m ..    Review of current labs show:    Lab Results   Component Value Date    A1C 6.2 2019    A1C 6.0 2019    A1C 6.1 2018    A1C 6.4 2017     @bgl@     Current providers sharing in care for this patient include:   Patient Care Team:  Kendall Mendez MD as PCP - General (Internal Medicine)  Kendall Mendez MD as Assigned PCP    The following health maintenance items are reviewed in Epic and correct as of today:  Health Maintenance   Topic Date Due     FALL RISK ASSESSMENT  2019     MEDICARE ANNUAL WELLNESS VISIT  2019     FIT  2019     LIPID  2020     TSH W/FREE T4 REFLEX  2020     CBC  2020     LUNG CANCER  SCREENING ANNUAL  11/18/2020     A1C  12/17/2020     ADVANCE CARE PLANNING  03/31/2021     MAMMO SCREENING  11/08/2021     DTAP/TDAP/TD IMMUNIZATION (3 - Td) 09/03/2025     DEXA  Completed     SPIROMETRY  Completed     HEPATITIS C SCREENING  Completed     COPD ACTION PLAN  Completed     PHQ-2  Completed     INFLUENZA VACCINE  Completed     PNEUMOCOCCAL IMMUNIZATION 65+ LOW/MEDIUM RISK  Completed     ZOSTER IMMUNIZATION  Completed     IPV IMMUNIZATION  Aged Out     MENINGITIS IMMUNIZATION  Aged Out       Past Medical History:  ---------------------------  Past Medical History:   Diagnosis Date     Arthritis     knees and hips     B12 deficiency 10/14/2016    B12 178     Benign essential hypertension     was on amlodipine/ then metoprolol / add lisinopril      Contact dermatitis and other eczema, due to unspecified cause      COPD (chronic obstructive pulmonary disease) (H) 01/01/2012     Coronary artery disease involving native coronary artery of native heart without angina pectoris 2016    coronary artery calcifications seen on CT scan, mild nonocclusive CAD     Hyperlipidemia LDL goal <130 11/7/2012     Nephritis      as a child (post strep)      Osteoporosis, unspecified      Phlebitis and thrombophlebitis of other deep vessels of lower extremities 1972, 1975    Both with pregnancies     Prediabetes 11/01/2018    A1C 6.1     Sinusitis      Tobacco use disorder        Past Surgical History:  ---------------------------  Past Surgical History:   Procedure Laterality Date     ARTHROPLASTY HIP Right 4/3/2017    Procedure: ARTHROPLASTY HIP;  Surgeon: Francisco J Alston MD;  Location: [facility] OR     ARTHROPLASTY KNEE Left 7/31/2017    Procedure: ARTHROPLASTY KNEE;  LEFT TOTAL KNEE ARTHROPLASTY ;  Surgeon: Francisco J Alston MD;  Location: [facility] OR     BIOPSY OF SKIN LESION       C DEXA, BONE DENSITY, AXIAL SKEL  6/2008    T score lumbar -0.6, femoral neck -2.4/-2.0(all stable)     C VAGINAL HYSTERECTOMY  1978     Hysterectomy, Vaginal     HC ASPIRATION &/OR INJECTION GANGLION CYST, ANY  1994     HERNIORRHAPHY INGUINAL Right 3/24/2015    Procedure: HERNIORRHAPHY INGUINAL;  Surgeon: Sam Erazo MD;  Location: [facility]     HYSTERECTOMY, PAP NO LONGER INDICATED       ORTHOPEDIC SURGERY      bilat meniscus repair       Current Medications:  ---------------------------  Current Outpatient Medications   Medication Sig Dispense Refill     Acetaminophen (TYLENOL PO) Take 1,000 mg by mouth 2 times daily as needed for mild pain or fever       albuterol (PROAIR HFA/PROVENTIL HFA/VENTOLIN HFA) 108 (90 Base) MCG/ACT inhaler Inhale 2 puffs into the lungs every 6 hours as needed for shortness of breath / dyspnea or wheezing GENERIC VENTOLIN/ALBUTEROL 3 Inhaler 11     aspirin EC 81 MG tablet Take 1 tablet (81 mg) by mouth daily 30 tablet 11     atorvastatin (LIPITOR) 20 MG tablet Take 1 tablet (20 mg) by mouth daily INDICATION: TO LOWER CHOLESTEROL AND TO HELP REDUCE RISK OF HEART ATTACK AND STROKE 90 tablet 3     betamethasone dipropionate (DIPROSONE) 0.05 % external cream Apply 1 FTU sparingly once or twice per day as needed to affected area until the skin is better, then stop; REPEAT AS NEEDED 45 g 2     calcium-vitamin D (OYSTER CALCIUM + D) 250-125 MG-UNIT per tablet Take 2 tablets by mouth daily       celecoxib (CELEBREX) 200 MG capsule Take 1 capsule (200 mg) by mouth 2 times daily as needed for pain (arthritis pain) 60 capsule 5     Cyanocobalamin (B-12) 1000 MCG TBCR Place 1,000 mcg under the tongue daily (Patient taking differently: Place 1,000 mcg under the tongue every other day )       diltiazem ER COATED BEADS (CARDIZEM CD/CARTIA XT) 180 MG 24 hr capsule Take 1 capsule (180 mg) by mouth daily with breakfast 90 capsule 3     folic acid (FOLVITE) 1 MG tablet Take 1 tablet (1 mg) by mouth daily INDICATION: FOLIC ACID SUPPLEMENT (Patient taking differently: Take 400 mcg by mouth daily INDICATION: FOLIC ACID SUPPLEMENT) 100  tablet 3     ranitidine (ZANTAC) 150 MG tablet Take 150 mg by mouth daily as needed for heartburn        spironolactone (ALDACTONE) 25 MG tablet Take 1 tablet (25 mg) by mouth daily 90 tablet 1     tiotropium (SPIRIVA HANDIHALER) 18 MCG inhaled capsule Inhale 1 capsule (18 mcg) into the lungs daily GENERIC TIOTROPIUM/SPIRIVA 90 capsule 4     Urea 20 % CREA cream Apply topically daily        vitamin C-electrolytes (EMERGEN-C) 1000mg vitamin C super orange drink mix Take 1 packet by mouth daily Mix 1 packet in 4-6oz water.       vitamin D3 (CHOLECALCIFEROL) 61857 units capsule Take 1 capsule (50,000 Units) by mouth every 14 days SIG: TAKE INDICATION: 1ST AND 15TH OF EACH MONTH, TO TREAT VITAMIN D DEFICIENCY 10 capsule 3     albuterol (PROVENTIL) (2.5 MG/3ML) 0.083% neb solution Take 1 vial (2.5 mg) by nebulization every 6 hours as needed for shortness of breath / dyspnea or wheezing 1 Box 11     order for DME Equipment being ordered: Nebulizer 1 each 0     triamcinolone (KENALOG) 0.1 % cream Apply topically 2 times daily as needed for irritation (sores on scalp.)         Allergies:  -------------  Allergies   Allergen Reactions     Penicillins Rash     rash     Sulfa Drugs Rash     rash       Social History:  -------------------  Social History     Socioeconomic History     Marital status: Single     Spouse name: Not on file     Number of children: 2     Years of education: 18     Highest education level: Not on file   Occupational History     Occupation: Mental health therapist     Employer: [employer]   Social Needs     Financial resource strain: Not on file     Food insecurity:     Worry: Not on file     Inability: Not on file     Transportation needs:     Medical: Not on file     Non-medical: Not on file   Tobacco Use     Smoking status: Former Smoker     Packs/day: 1.00     Years: 40.00     Pack years: 40.00     Types: Cigarettes     Start date: 2/7/1964     Last attempt to quit: 9/7/2015     Years  since quittin.2     Smokeless tobacco: Never Used     Tobacco comment: 5-10 daily   Substance and Sexual Activity     Alcohol use: Yes     Alcohol/week: 0.0 standard drinks     Comment: 1 a month     Drug use: No     Sexual activity: Never     Partners: Male   Lifestyle     Physical activity:     Days per week: Not on file     Minutes per session: Not on file     Stress: Not on file   Relationships     Social connections:     Talks on phone: Not on file     Gets together: Not on file     Attends Bahai service: Not on file     Active member of club or organization: Not on file     Attends meetings of clubs or organizations: Not on file     Relationship status: Not on file     Intimate partner violence:     Fear of current or ex partner: Not on file     Emotionally abused: Not on file     Physically abused: Not on file     Forced sexual activity: Not on file   Other Topics Concern      Service No     Blood Transfusions No     Caffeine Concern No     Comment: 1 cup coffee a day, 1-2 can's of pop a wk in the summer      Occupational Exposure No     Hobby Hazards No     Sleep Concern No     Stress Concern No     Weight Concern No     Special Diet No     Back Care No     Exercise No     Comment: not due to knee's and hip     Bike Helmet No     Comment: n/a     Seat Belt Yes     Self-Exams Yes     Comment: sometimes     Parent/sibling w/ CABG, MI or angioplasty before 65F 55M? No   Social History Narrative    Eats fruits and vegetables every day. She takes a calcium supplement twice daily.    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        Family Medical History:  ------------------------------  Family History   Problem Relation Age of Onset     C.A.D. Father      Diabetes Father         type 2     Myocardial Infarction Father      Heart Surgery Father         CABGx4     Hyperlipidemia Father      Breast Cancer Mother 70     Osteoporosis Mother      Thyroid Disease Mother      Hyperlipidemia Mother   "    Cancer Brother         Bladder     Hyperlipidemia Brother      Diabetes Brother         type 2     Colon Cancer Brother      Neurologic Disorder Paternal Grandfather      Osteoporosis Sister      Arrhythmia Sister      Heart Surgery Sister         cardioversion, ablation     Other - See Comments Sister 55        arhythmogenic right ventricular dysplasia     Osteoporosis Maternal Grandmother      Osteoporosis Paternal Grandmother      Other - See Comments Maternal Grandfather         pneumonia     Family History Negative Son      Thyroid Disease Daughter         NODULES     Family History Negative Daughter      Arrhythmia Cousin         a-fib     Cancer - colorectal No family hx of      Prostate Cancer No family hx of      Alzheimer Disease No family hx of      Anesthesia Reaction No family hx of      Blood Disease No family hx of      Eye Disorder No family hx of         Review of Systems  Constitutional, HEENT, cardiovascular, pulmonary, gi and gu systems are negative, except as otherwise noted.    OBJECTIVE:   /76   Pulse 68   Temp 98.2  F (36.8  C) (Temporal)   Ht 1.613 m (5' 3.5\")   Wt 74.8 kg (165 lb)   SpO2 97%   BMI 28.77 kg/m   Estimated body mass index is 28.77 kg/m  as calculated from the following:    Height as of this encounter: 1.613 m (5' 3.5\").    Weight as of this encounter: 74.8 kg (165 lb).  Physical Exam  GENERAL alert and no distress  EYES:  Normal sclera,conjunctiva, EOMI  HENT: oral and posterior pharynx without lesions or erythema, facies symmetric  NECK: Neck supple. No LAD, without thyroidmegaly.  RESP: Clear to ausculation bilaterally without wheezes or crackles. Normal BS in all fields.  CV: RRR normal S1S2 without murmurs, rubs or gallops.  LYMPH: no cervical lymph adenopathy appreciated  MS: extremities- no gross deformities of the visible extremities noted,   EXT:  no lower extremity edema  PSYCH: Alert and oriented times 3; speech- coherent  SKIN:  No obvious " significant skin lesions on visible portions of face         ASSESSMENT / PLAN:     (Z00.00) Medicare annual wellness visit, subsequent  (primary encounter diagnosis)  Comment: Discussed cardiac disease risk factors and cardiac disease risk factor modification, including diabetes screening, blood pressure screening (and management if indicated), and cholesterol screening.   Reviewed immunzation guidelines, including pneumococcal vaccines, annual influenza, and shingles vaccines.   Discussed routine cancer screenings, including skin cancer, colon cancer screening for everyone until age 80, prostate cancer screening in men until age 75, mammogram and PAP/pelvic for women until age 75.   Recommended regular dentist visits to care for remaining teeth.   Recommended regular screening for vision and glaucoma.   Recommended safe driving and accident avoidance.   Plan:     (L40.9) Psoriasis  Comment: This condition is currently controlled on the current medical regimen.  Continue current therapy.   Wants refills for occasional use.  Plan: betamethasone dipropionate (DIPROSONE) 0.05 %         external cream            (I48.0) Paroxysmal atrial fibrillation (H)  Comment: This condition is currently controlled on the current medical regimen.  Continue current therapy.   Plan: Lipid panel reflex to direct LDL Fasting,         Hemoglobin A1c, Comprehensive metabolic panel,         CBC with platelets            (E78.5) Hyperlipidemia LDL goal <130  Comment: Discussed guidelines recommending a statin cholesterol lowering medication for any patient with either diabetes and/or vascular disease, aiming for a LDL goal under 100 for sure, ideally under 70.    Reviewed statins and their side effects including muscle pain, muscle inflammation, GI upset.  Told the patient to stop the medication in question and to call if any side effects develop.   Recommended CoQ10 200-300 mg at the same time as taking the statin medication to help  reduce the chance of muscle side effects from the statin.    Plan: Lipid panel reflex to direct LDL Fasting,         Hemoglobin A1c, Comprehensive metabolic panel,         CBC with platelets            (I10) Benign essential hypertension  Comment: This condition is currently controlled on the current medical regimen.  Continue current therapy.   Discussed current hypertension treatment guidelines, including indications for treatment and treatment options.  Discussed the importance for aggressive management of HTN to prevent vascular complications later.  Recommended lower fat, lower carbohydrate, and lower sodium (<2000 mg)diet.  Discussed required intervals for follow up on HTN, lab studies.  Recommened pt. follow their blood pressures outside the clinic to ensure that BPs are remaining within guidelines, and to contact me if the readings are not within guidelines on a regular basis so we can adjust treatment as needed.   Plan: spironolactone (ALDACTONE) 25 MG tablet, Lipid         panel reflex to direct LDL Fasting, Hemoglobin         A1c, Comprehensive metabolic panel, CBC with         platelets            (J43.9) Pulmonary emphysema, unspecified emphysema type (H)  Comment: This condition is currently controlled on the current medical regimen.  Continue current therapy.   Gets her medications for far less expensive from [facility].  Requesting written prescriptions to send out for this.  Discussed general issues of COPD, including pathophysiology, ways it will affect the pt., when to seek help, reviewed the typical medications (how they are taken, how they help)   Plan: tiotropium (SPIRIVA HANDIHALER) 18 MCG inhaled         capsule            (J44.9) Chronic obstructive pulmonary disease, unspecified COPD type (H)  Comment: NOW OFF CIGARETTES   This condition is currently controlled on the current medical regimen.  Continue current therapy.   Gets her medications for far less expensive from [facility].  Requesting  "written prescriptions to send out for this.  Discussed general issues of COPD, including pathophysiology, ways it will affect the pt., when to seek help, reviewed the typical medications (how they are taken, how they help)     Plan: albuterol (PROAIR HFA/PROVENTIL HFA/VENTOLIN         HFA) 108 (90 Base) MCG/ACT inhaler            (M17.11) Primary osteoarthritis of right knee  Comment: Admits her right knee is becoming more more painful and limits her activities more more.  She is not the spot where she would like to see the orthopedist to discuss joint replacement.  Plan:     (M19.90) Osteoarthritis, unspecified osteoarthritis type, unspecified site  Comment: Celebrex use as needed.  Plan:     (L40.50) Psoriasis with arthropathy (H)  Comment: Currently stable on current medications.  Plan:     (R73.03) Prediabetes  Comment: Reviewed the labs showing elevated glucose levels.    Discussed \"pre-diabetes\", impaired glucose tolerance, and its part in the dysmetabolic syndrome.    Discussed the inevitable progression of impaired glucose tolerance toward worsening diabetes mellitus and the need for agressive interventions now to delay and prevent this inevitable progression.  Discussed the overall risks that dysmetabolic syndromes/impaired glucose tolerance/syndrome X pose toward increased risks of vascular disease as the main reason for agressive intervention now.  Will add medications for glucose control (i.e. metformin, glitazones, etc), lipid (e.g. statins), and for blood pressure (preferably ARBs and ACE) as indicated.  Will start these as early as needed based other proven ability to delay and modify these risk factors.   Discussed the need for aggressive diet control as the cornerstone of pre-diabetes and diabetes management, emphasizing the reduction of \"simple carbohydrates\" (e.g. Any kind of wheat products (e.g. any bread, any pasta), white rice, noodles, potatoes, snack foods, regular soda, juices (except fresh " "squeezed), cakes, cookies, candy, etc.) along with regular exercise.       Plan: Lipid panel reflex to direct LDL Fasting,         Hemoglobin A1c, Comprehensive metabolic panel             (I70.0) Atherosclerosis of aorta (H)  Comment: This condition is currently controlled on the current medical regimen.  Continue current therapy.   Discussed secondary risk factor modification and recommended continuing aggressive management of these items.   Plan:           COUNSELING:  Reviewed preventive health counseling, as reflected in patient instructions       Regular exercise       Healthy diet/nutrition       Vision screening       Hearing screening       Dental care       Immunizations    Vaccines up-to-date.           Aspirin Prophylaxsis    Estimated body mass index is 28.77 kg/m  as calculated from the following:    Height as of this encounter: 1.613 m (5' 3.5\").    Weight as of this encounter: 74.8 kg (165 lb).         reports that she quit smoking about 4 years ago. Her smoking use included cigarettes. She started smoking about 55 years ago. She has a 40.00 pack-year smoking history. She has never used smokeless tobacco.      Appropriate preventive services were discussed with this patient, including applicable screening as appropriate for cardiovascular disease, diabetes, osteopenia/osteoporosis, and glaucoma.  As appropriate for age/gender, discussed screening for colorectal cancer, prostate cancer, breast cancer, and cervical cancer. Checklist reviewing preventive services available has been given to the patient.    Reviewed patients plan of care and provided an AVS. The Basic Care Plan (routine screening as documented in Health Maintenance) for Marva meets the Care Plan requirement. This Care Plan has been established and reviewed with the Patient.    Counseling Resources:  ATP IV Guidelines  Pooled Cohorts Equation Calculator  Breast Cancer Risk Calculator  FRAX Risk Assessment  ICSI Preventive " "Guidelines  Dietary Guidelines for Americans, 2010  USDA's MyPlate  ASA Prophylaxis  Lung CA Screening    Return to see me in 6 months, sooner if needed.  Please get fasting labs done at the [facility] or any other [facility] lab 1-2 days before this appointment (schedule a \"lab appointment\").  If you get the labs done at another clinic, make arrangements with them directly.  The orders will be in place.  Eat nothing for at least 8 hours prior to having these labs drawn.  Use [employer] or Call [phone] to schedule the appointment with me and lab appointment.         Kendall Mendez MD  [facility]    Identified Health Risks:  "

## 2019-12-20 NOTE — PATIENT INSTRUCTIONS
"*  Consdier referral to physical therapy to work on your lower back and knee.     *  Consdier evaluation of the degenerative joint disease of your knee if it becomes more bothersome and limiting for you.      *  Continue all medications at the same doses.  Contact your usual pharmacy if you need refills.     *  Return to see me in 6 months, sooner if needed.  Please get fasting labs done at the [facility] or any other [facility] lab 1-2 days before this appointment (schedule a \"lab appointment\").  If you get the labs done at another clinic, make arrangements with them directly.  The orders will be in place.  Eat nothing for at least 8 hours prior to having these labs drawn.  Use [employer] or Call [phone] to schedule the appointment with me and lab appointment.           5 GOALS IN MANAGING DIABETES (i.e. to give the best chance to prevent diabetic complications and vascular disease):     1.  Aggressive diabetic management.  The target for A1C (3 months average blood sugar) is ideally below 6.5, preferably below 7.5    Your diabetes is under good control.      Lab Results   Component Value Date    A1C 6.2 12/17/2019    A1C 6.0 06/17/2019    A1C 6.1 11/02/2018    A1C 6.4 03/21/2017       2.  Aggressive blood pressure control, under 130/80 ideally.  Using medications if needed.    Your blood pressure is under good control    BP Readings from Last 4 Encounters:   12/20/19 120/76   06/24/19 125/69   06/17/19 124/80   05/28/19 122/60       3.  Aggressive LDL cholesterol (bad cholesterol) lowering as needed.  Your goal is an LDL under 100 for sure, preferably under 70.     *  All patients with diabetes are recommended to be on a \"statin\" cholesterol lowering medication regardless of the cholesterol levels to give the best chance at avoiding vascular disease.      New guidelines identify four high-risk groups who could benefit from statins:   *people with pre-existing heart disease, such as those who " "have had a heart attack;   *people ages 40 to 75 who have diabetes of any type  *patients ages 40 to 75 with at least a 7.5% risk of developing cardiovascular disease over the next decade, according to a formula described in the guidelines  *patients with the sort of super-high cholesterol that sometimes runs in families, as evidenced by an LDL of 190 milligrams per deciliter or higher    *  Your cholesterol levels are well controlled.    Recent Labs   Lab Test 06/17/19  0827 03/19/18  0732  09/18/15  0738   CHOL 152 172   < > 221*   HDL 51 56   < > 53   LDL 87 99   < > 144*   TRIG 68 85   < > 122   CHOLHDLRATIO  --   --   --  4.2    < > = values in this interval not displayed.       4.  No smoking    5.  Aspirin 81 mg tablet once per day, every day unless there is a specific reason that you cannot take aspirin.       *You should take Aspirin 81 mg once per day, unless you have a reason NOT to take aspirin (i.e. side effect, intolerance, taking another \"blood tinning\" medication)       DIABETES REMINDERS:     1.  Check your blood sugar at least once per day, more often if you take insulin or your diabetes has not been under good control.  1) Check your blood sugar at least once per day, more often if you take insulin or have not been under good control.  Always bring your blood sugar log and meter to your diabetes-related appointments.  2) Your blood sugar goals:   before eating and  two hours after eating.  3) Always be prepared to treat low blood sugar symptoms should it happen. Keep a sugar-containing beverage or food nearby.  4) When to call your clinic:     Blood sugar over 400     If you have 2 to 3 low blood sugars (under 70) in a row    Low readings the same time of day several days in a row  5) When to call 911: If your low blood glucose symptoms do not get better with treatment, or if you/someone else is unable to give you treatment.  6) Work with a Certified Diabetes Educator to assist you " with your diabetes management  7) Contact us if you have ANY questions about your medications or instructions, or have problems with getting prescriptions filled.         Preventive Health Recommendations  Female Ages 50 - 64    Yearly exam: See your health care provider every year in order to  o Review health changes.   o Discuss preventive care.    o Review your medicines if your doctor has prescribed any.      Get a Pap test every three years (unless you have an abnormal result and your provider advises testing more often).    If you get Pap tests with HPV test, you only need to test every 5 years, unless you have an abnormal result.     You do not need a Pap test if your uterus was removed (hysterectomy) and you have not had cancer.    You should be tested each year for STDs (sexually transmitted diseases) if you're at risk.     Have a mammogram every 1 to 2 years.    Have a colonoscopy at age 50, or have a yearly FIT test (stool test). These exams screen for colon cancer.      Have a cholesterol test every 5 years, or more often if advised.    Have a diabetes test (fasting glucose) every three years. If you are at risk for diabetes, you should have this test more often.     If you are at risk for osteoporosis (brittle bone disease), think about having a bone density scan (DEXA).    Shots: Get a flu shot each year. Get a tetanus shot every 10 years.    Nutrition:     Eat at least 5 servings of fruits and vegetables each day.    Eat whole-grain bread, whole-wheat pasta and brown rice instead of white grains and rice.    Talk to your provider about Calcium and Vitamin D.     Lifestyle    Exercise at least 150 minutes a week (30 minutes a day, 5 days a week). This will help you control your weight and prevent disease.    Limit alcohol to one drink per day.    No smoking.     Wear sunscreen to prevent skin cancer.     See your dentist every six months for an exam and cleaning.    See your eye doctor every 1 to 2  years.

## 2019-12-22 LAB — HEMOCCULT STL QL IA: NEGATIVE

## 2020-01-14 ENCOUNTER — HOSPITAL ENCOUNTER (OUTPATIENT)
Dept: CARDIOLOGY | Facility: CLINIC | Age: 70
Discharge: HOME | End: 2020-01-14
Attending: PHYSICIAN ASSISTANT | Admitting: PHYSICIAN ASSISTANT
Payer: COMMERCIAL

## 2020-01-14 DIAGNOSIS — I48.0 PAF (PAROXYSMAL ATRIAL FIBRILLATION) (H): ICD-10-CM

## 2020-01-14 PROCEDURE — 0298T ZIO PATCH HOLTER ADULT PEDIATRIC GREATER THAN 48 HRS: CPT | Performed by: INTERNAL MEDICINE

## 2020-01-14 PROCEDURE — 0296T ZIO PATCH HOLTER ADULT PEDIATRIC GREATER THAN 48 HRS: CPT

## 2020-02-04 ENCOUNTER — DOCUMENTATION ONLY (OUTPATIENT)
Dept: CARDIOLOGY | Facility: CLINIC | Age: 70
End: 2020-02-04

## 2020-02-04 NOTE — PROGRESS NOTES
Result message received.     Rufina Palacios PA-C [facility] Heart Team 3             Pt continues to have a 6% PVC burden and had 43 SVT episodes the longest being 17 secs.  Please find out if she's been symptomatic- if so will discuss with Dr. Stephen if she should be seen by EP.  If asymptomatic can be seen by Dr. Stephen as scheduled.        Patient called. States she feels much better since the Diltiazem ER was increased from 120 mg daily to 180 mg daily on 6/24/19 at last OV with Rufina Palacios. Denies any awareness of skipped beats, fatigue or decreased BP at this time. Will keep upcoming OV with Dr. Stephen on 2/14/20.  No further questions at this time.

## 2020-02-14 ENCOUNTER — OFFICE VISIT (OUTPATIENT)
Dept: CARDIOLOGY | Facility: CLINIC | Age: 70
End: 2020-02-14
Attending: PHYSICIAN ASSISTANT
Payer: COMMERCIAL

## 2020-02-14 VITALS
HEART RATE: 62 BPM | HEIGHT: 65 IN | WEIGHT: 162 LBS | DIASTOLIC BLOOD PRESSURE: 64 MMHG | SYSTOLIC BLOOD PRESSURE: 120 MMHG | BODY MASS INDEX: 26.99 KG/M2

## 2020-02-14 DIAGNOSIS — I10 BENIGN ESSENTIAL HYPERTENSION: Primary | ICD-10-CM

## 2020-02-14 DIAGNOSIS — I48.0 PAROXYSMAL ATRIAL FIBRILLATION (H): ICD-10-CM

## 2020-02-14 PROCEDURE — 99214 OFFICE O/P EST MOD 30 MIN: CPT | Performed by: INTERNAL MEDICINE

## 2020-02-14 RX ORDER — DILTIAZEM HYDROCHLORIDE 180 MG/1
180 CAPSULE, COATED, EXTENDED RELEASE ORAL DAILY
Qty: 90 CAPSULE | Refills: 3 | Status: SHIPPED | OUTPATIENT
Start: 2020-02-14 | End: 2020-08-13

## 2020-02-14 ASSESSMENT — MIFFLIN-ST. JEOR: SCORE: 1242.77

## 2020-02-14 NOTE — LETTER
"2/14/2020    Kendall Mendez MD  [address] [facility] [address]    RE: Marva Angela       Dear Colleague,    I had the pleasure of seeing Marva Angela in the [facility].    Cardiology Progress Note          Assessment and Plan:     1. Paroxysmal atrial fibrillation, low burden overall and none detected on recent 1 week monitor    Patient declines anticoagulation    Continue diltiazem at current dose      2. PAC, PVCs, SVT  asymptomatic    Continue current diltiazem    If patient feels more palpitations, repeat monitor    Routine follow-up in 1 year with Rufina Palacios    This note was transcribed using electronic voice recognition software and there may be typographical errors present.                Interval History:   The patient is a very pleasant 69 year old whom I have been following for paroxysmal atrial fibrillation, low burden.  On most recent 1 week monitor in January 2020, she did not have any episodes of atrial fibrillation.  The amount of SVT and PACs were also reduced significantly.  She did have frequent PVCs, but asymptomatic.  Patient feels very well.  She denies any dyspnea on exertion or exertional chest discomfort.  She has declined anticoagulation previously for her low burden of atrial fibrillation.                     Review of Systems:   Review of Systems:  Skin:  Negative     Eyes:  Negative    ENT:  Negative    Respiratory:  Positive for dyspnea on exertion(when going up and down stairs a lot)  Cardiovascular:    Positive for(Afib episodes)  Gastroenterology: Negative    Genitourinary:  Negative    Musculoskeletal:  Positive for joint pain  Neurologic:  Negative    Psychiatric:  Positive for sleep disturbances  Heme/Lymph/Imm:  Positive for allergies  Endocrine:  Negative                Physical Exam:     Vitals: /64   Pulse 62   Ht 1.638 m (5' 4.5\")   Wt 73.5 kg (162 lb)   BMI 27.38 kg/m     Constitutional:  cooperative, alert " and oriented, well developed, well nourished, in no acute distress overweight      Skin:  warm and dry to the touch, no apparent skin lesions or masses noted        Head:  normocephalic, no masses or lesions        Eyes:  pupils equal and round, conjunctivae and lids unremarkable, sclera white, no xanthalasma, EOMS intact, no nystagmus        ENT:  no pallor or cyanosis, dentition good        Neck:  JVP normal        Chest:  normal breath sounds, clear to auscultation, normal A-P diameter, normal symmetry, normal respiratory excursion, no use of accessory muscles        Cardiac: regular rhythm;normal S1 and S2   S4   systolic murmur;LUSB;grade 1          Abdomen:  BS normoactive   Benign    Extremities and Back:  no deformities, clubbing, cyanosis, erythema observed;no edema        Neurological:  no gross motor deficits;affect appropriate                 Medications:     Current Outpatient Medications   Medication Sig Dispense Refill     Acetaminophen (TYLENOL PO) Take 1,000 mg by mouth 2 times daily as needed for mild pain or fever       albuterol (PROAIR HFA/PROVENTIL HFA/VENTOLIN HFA) 108 (90 Base) MCG/ACT inhaler Inhale 2 puffs into the lungs every 6 hours as needed for shortness of breath / dyspnea or wheezing GENERIC VENTOLIN/ALBUTEROL 3 Inhaler 11     albuterol (PROVENTIL) (2.5 MG/3ML) 0.083% neb solution Take 1 vial (2.5 mg) by nebulization every 6 hours as needed for shortness of breath / dyspnea or wheezing 1 Box 11     aspirin EC 81 MG tablet Take 1 tablet (81 mg) by mouth daily 30 tablet 11     atorvastatin (LIPITOR) 20 MG tablet Take 1 tablet (20 mg) by mouth daily INDICATION: TO LOWER CHOLESTEROL AND TO HELP REDUCE RISK OF HEART ATTACK AND STROKE 90 tablet 3     betamethasone dipropionate (DIPROSONE) 0.05 % external cream Apply 1 FTU sparingly once or twice per day as needed to affected area until the skin is better, then stop; REPEAT AS NEEDED 45 g 2     calcium-vitamin D (OYSTER CALCIUM + D) 250-125  MG-UNIT per tablet Take 2 tablets by mouth daily       celecoxib (CELEBREX) 200 MG capsule Take 1 capsule (200 mg) by mouth 2 times daily as needed for pain (arthritis pain) 60 capsule 5     Cyanocobalamin (B-12) 1000 MCG TBCR Place 1,000 mcg under the tongue daily (Patient taking differently: Place 1,000 mcg under the tongue every other day )       diltiazem ER COATED BEADS (CARDIZEM CD/CARTIA XT) 180 MG 24 hr capsule Take 1 capsule (180 mg) by mouth daily with breakfast 90 capsule 3     folic acid (FOLVITE) 1 MG tablet Take 1 tablet (1 mg) by mouth daily INDICATION: FOLIC ACID SUPPLEMENT (Patient taking differently: Take 400 mcg by mouth daily INDICATION: FOLIC ACID SUPPLEMENT) 100 tablet 3     order for DME Equipment being ordered: Nebulizer 1 each 0     ranitidine (ZANTAC) 150 MG tablet Take 150 mg by mouth daily as needed for heartburn        spironolactone (ALDACTONE) 25 MG tablet Take 1 tablet (25 mg) by mouth daily 90 tablet 1     tiotropium (SPIRIVA HANDIHALER) 18 MCG inhaled capsule Inhale 1 capsule (18 mcg) into the lungs daily GENERIC TIOTROPIUM/SPIRIVA 90 capsule 4     triamcinolone (KENALOG) 0.1 % cream Apply topically 2 times daily as needed for irritation (sores on scalp.)       Urea 20 % CREA cream Apply topically daily        vitamin C-electrolytes (EMERGEN-C) 1000mg vitamin C super orange drink mix Take 1 packet by mouth daily Mix 1 packet in 4-6oz water.       vitamin D3 (CHOLECALCIFEROL) 63510 units capsule Take 1 capsule (50,000 Units) by mouth every 14 days SIG: TAKE INDICATION: 1ST AND 15TH OF EACH MONTH, TO TREAT VITAMIN D DEFICIENCY 10 capsule 3                Data:   All laboratory data reviewed  No results found for this or any previous visit (from the past 24 hour(s)).    All laboratory data reviewed  Lab Results   Component Value Date    CHOL 152 06/17/2019     Lab Results   Component Value Date    HDL 51 06/17/2019     Lab Results   Component Value Date    LDL 87 06/17/2019     Lab  Results   Component Value Date    TRIG 68 06/17/2019     Lab Results   Component Value Date    CHOLHDLRATIO 4.2 09/18/2015     TSH   Date Value Ref Range Status   06/17/2019 1.20 0.40 - 4.00 mU/L Final     Last Basic Metabolic Panel:  Lab Results   Component Value Date     12/17/2019      Lab Results   Component Value Date    POTASSIUM 4.4 12/17/2019     Lab Results   Component Value Date    CHLORIDE 105 12/17/2019     Lab Results   Component Value Date    SANGEETA 9.9 12/17/2019     Lab Results   Component Value Date    CO2 31 12/17/2019     Lab Results   Component Value Date    BUN 22 12/17/2019     Lab Results   Component Value Date    CR 0.82 12/17/2019     Lab Results   Component Value Date     12/17/2019     Lab Results   Component Value Date    WBC 6.2 06/17/2019     Lab Results   Component Value Date    RBC 4.89 06/17/2019     Lab Results   Component Value Date    HGB 14.6 06/17/2019     Lab Results   Component Value Date    HCT 45.1 06/17/2019     Lab Results   Component Value Date    MCV 92 06/17/2019     Lab Results   Component Value Date    MCH 29.9 06/17/2019     Lab Results   Component Value Date    MCHC 32.4 06/17/2019     Lab Results   Component Value Date    RDW 13.1 06/17/2019     Lab Results   Component Value Date     06/17/2019             Thank you for allowing me to participate in the care of your patient.    Sincerely,     Robert Stephen MD     [facility]

## 2020-02-14 NOTE — PROGRESS NOTES
"Cardiology Progress Note          Assessment and Plan:     1. Paroxysmal atrial fibrillation, low burden overall and none detected on recent 1 week monitor    Patient declines anticoagulation    Continue diltiazem at current dose      2. PAC, PVCs, SVT  asymptomatic    Continue current diltiazem    If patient feels more palpitations, repeat monitor    Routine follow-up in 1 year with Rufina Palacios    This note was transcribed using electronic voice recognition software and there may be typographical errors present.                Interval History:   The patient is a very pleasant 69 year old whom I have been following for paroxysmal atrial fibrillation, low burden.  On most recent 1 week monitor in January 2020, she did not have any episodes of atrial fibrillation.  The amount of SVT and PACs were also reduced significantly.  She did have frequent PVCs, but asymptomatic.  Patient feels very well.  She denies any dyspnea on exertion or exertional chest discomfort.  She has declined anticoagulation previously for her low burden of atrial fibrillation.                     Review of Systems:   Review of Systems:  Skin:  Negative     Eyes:  Negative    ENT:  Negative    Respiratory:  Positive for dyspnea on exertion(when going up and down stairs a lot)  Cardiovascular:    Positive for(Afib episodes)  Gastroenterology: Negative    Genitourinary:  Negative    Musculoskeletal:  Positive for joint pain  Neurologic:  Negative    Psychiatric:  Positive for sleep disturbances  Heme/Lymph/Imm:  Positive for allergies  Endocrine:  Negative                Physical Exam:     Vitals: /64   Pulse 62   Ht 1.638 m (5' 4.5\")   Wt 73.5 kg (162 lb)   BMI 27.38 kg/m    Constitutional:  cooperative, alert and oriented, well developed, well nourished, in no acute distress overweight      Skin:  warm and dry to the touch, no apparent skin lesions or masses noted        Head:  normocephalic, no masses or lesions        Eyes:  pupils " equal and round, conjunctivae and lids unremarkable, sclera white, no xanthalasma, EOMS intact, no nystagmus        ENT:  no pallor or cyanosis, dentition good        Neck:  JVP normal        Chest:  normal breath sounds, clear to auscultation, normal A-P diameter, normal symmetry, normal respiratory excursion, no use of accessory muscles        Cardiac: regular rhythm;normal S1 and S2   S4   systolic murmur;LUSB;grade 1          Abdomen:  BS normoactive   Benign    Extremities and Back:  no deformities, clubbing, cyanosis, erythema observed;no edema        Neurological:  no gross motor deficits;affect appropriate                 Medications:     Current Outpatient Medications   Medication Sig Dispense Refill     Acetaminophen (TYLENOL PO) Take 1,000 mg by mouth 2 times daily as needed for mild pain or fever       albuterol (PROAIR HFA/PROVENTIL HFA/VENTOLIN HFA) 108 (90 Base) MCG/ACT inhaler Inhale 2 puffs into the lungs every 6 hours as needed for shortness of breath / dyspnea or wheezing GENERIC VENTOLIN/ALBUTEROL 3 Inhaler 11     albuterol (PROVENTIL) (2.5 MG/3ML) 0.083% neb solution Take 1 vial (2.5 mg) by nebulization every 6 hours as needed for shortness of breath / dyspnea or wheezing 1 Box 11     aspirin EC 81 MG tablet Take 1 tablet (81 mg) by mouth daily 30 tablet 11     atorvastatin (LIPITOR) 20 MG tablet Take 1 tablet (20 mg) by mouth daily INDICATION: TO LOWER CHOLESTEROL AND TO HELP REDUCE RISK OF HEART ATTACK AND STROKE 90 tablet 3     betamethasone dipropionate (DIPROSONE) 0.05 % external cream Apply 1 FTU sparingly once or twice per day as needed to affected area until the skin is better, then stop; REPEAT AS NEEDED 45 g 2     calcium-vitamin D (OYSTER CALCIUM + D) 250-125 MG-UNIT per tablet Take 2 tablets by mouth daily       celecoxib (CELEBREX) 200 MG capsule Take 1 capsule (200 mg) by mouth 2 times daily as needed for pain (arthritis pain) 60 capsule 5     Cyanocobalamin (B-12) 1000 MCG TBCR  Place 1,000 mcg under the tongue daily (Patient taking differently: Place 1,000 mcg under the tongue every other day )       diltiazem ER COATED BEADS (CARDIZEM CD/CARTIA XT) 180 MG 24 hr capsule Take 1 capsule (180 mg) by mouth daily with breakfast 90 capsule 3     folic acid (FOLVITE) 1 MG tablet Take 1 tablet (1 mg) by mouth daily INDICATION: FOLIC ACID SUPPLEMENT (Patient taking differently: Take 400 mcg by mouth daily INDICATION: FOLIC ACID SUPPLEMENT) 100 tablet 3     order for DME Equipment being ordered: Nebulizer 1 each 0     ranitidine (ZANTAC) 150 MG tablet Take 150 mg by mouth daily as needed for heartburn        spironolactone (ALDACTONE) 25 MG tablet Take 1 tablet (25 mg) by mouth daily 90 tablet 1     tiotropium (SPIRIVA HANDIHALER) 18 MCG inhaled capsule Inhale 1 capsule (18 mcg) into the lungs daily GENERIC TIOTROPIUM/SPIRIVA 90 capsule 4     triamcinolone (KENALOG) 0.1 % cream Apply topically 2 times daily as needed for irritation (sores on scalp.)       Urea 20 % CREA cream Apply topically daily        vitamin C-electrolytes (EMERGEN-C) 1000mg vitamin C super orange drink mix Take 1 packet by mouth daily Mix 1 packet in 4-6oz water.       vitamin D3 (CHOLECALCIFEROL) 07165 units capsule Take 1 capsule (50,000 Units) by mouth every 14 days SIG: TAKE INDICATION: 1ST AND 15TH OF EACH MONTH, TO TREAT VITAMIN D DEFICIENCY 10 capsule 3                Data:   All laboratory data reviewed  No results found for this or any previous visit (from the past 24 hour(s)).    All laboratory data reviewed  Lab Results   Component Value Date    CHOL 152 06/17/2019     Lab Results   Component Value Date    HDL 51 06/17/2019     Lab Results   Component Value Date    LDL 87 06/17/2019     Lab Results   Component Value Date    TRIG 68 06/17/2019     Lab Results   Component Value Date    CHOLHDLRATIO 4.2 09/18/2015     TSH   Date Value Ref Range Status   06/17/2019 1.20 0.40 - 4.00 mU/L Final     Last Basic Metabolic  Panel:  Lab Results   Component Value Date     12/17/2019      Lab Results   Component Value Date    POTASSIUM 4.4 12/17/2019     Lab Results   Component Value Date    CHLORIDE 105 12/17/2019     Lab Results   Component Value Date    SANGEETA 9.9 12/17/2019     Lab Results   Component Value Date    CO2 31 12/17/2019     Lab Results   Component Value Date    BUN 22 12/17/2019     Lab Results   Component Value Date    CR 0.82 12/17/2019     Lab Results   Component Value Date     12/17/2019     Lab Results   Component Value Date    WBC 6.2 06/17/2019     Lab Results   Component Value Date    RBC 4.89 06/17/2019     Lab Results   Component Value Date    HGB 14.6 06/17/2019     Lab Results   Component Value Date    HCT 45.1 06/17/2019     Lab Results   Component Value Date    MCV 92 06/17/2019     Lab Results   Component Value Date    MCH 29.9 06/17/2019     Lab Results   Component Value Date    MCHC 32.4 06/17/2019     Lab Results   Component Value Date    RDW 13.1 06/17/2019     Lab Results   Component Value Date     06/17/2019

## 2020-05-29 ENCOUNTER — MYC MEDICAL ADVICE (OUTPATIENT)
Dept: INTERNAL MEDICINE | Facility: CLINIC | Age: 70
End: 2020-05-29

## 2020-05-29 NOTE — TELEPHONE ENCOUNTER
OK to postpone routine follow up visits and labs until December 2020 due to need for social distancing (possibly longer depending on the recommendations at that time)  Future orders adjusted.     Contact us sooner for any more acute issues.   We can send additional refills to cover this time period, let us know if any refills are needed.

## 2020-08-13 ENCOUNTER — MYC MEDICAL ADVICE (OUTPATIENT)
Dept: CARDIOLOGY | Facility: CLINIC | Age: 70
End: 2020-08-13

## 2020-08-13 DIAGNOSIS — I48.0 PAROXYSMAL ATRIAL FIBRILLATION (H): ICD-10-CM

## 2020-08-13 RX ORDER — DILTIAZEM HYDROCHLORIDE 180 MG/1
180 CAPSULE, COATED, EXTENDED RELEASE ORAL DAILY
Qty: 90 CAPSULE | Refills: 3 | Status: SHIPPED | OUTPATIENT
Start: 2020-08-13 | End: 2021-02-02

## 2020-08-13 NOTE — TELEPHONE ENCOUNTER
[employer] message received from patient regarding tier exemption for Diltiazem medication. Will route to PA team for authorization. Patient has been unable to afford this medication in the past and her previous tier exemption has .         MICHEL Gatica 2020 3:56 PM

## 2020-08-14 ENCOUNTER — TELEPHONE (OUTPATIENT)
Dept: CARDIOLOGY | Facility: CLINIC | Age: 70
End: 2020-08-14

## 2020-08-14 NOTE — TELEPHONE ENCOUNTER
PA Initiation    Medication: diltiazem ER COATED BEADS (CARDIZEM CD/CARTIA XT) 180 MG 24 hr capsule -   Insurance Company: [employer] - Phone [phone] Fax [phone]  Pharmacy Filling the Rx: [id number] - [place] [place] - [address] AT [facility] [address] [facility] [address]  Filling Pharmacy Phone: [phone]  Filling Pharmacy Fax: [phone]  Start Date: 8/14/2020

## 2020-08-17 NOTE — TELEPHONE ENCOUNTER
Insurance rep calling needing more information.  Verified dx and tried/failed medications.  Ref#[id number].

## 2020-08-20 NOTE — TELEPHONE ENCOUNTER
Prior Authorization Approval    Medication: diltiazem ER COATED BEADS (CARDIZEM CD/CARTIA XT) 180 MG 24 hr capsule - APPROVED was approved on 8/19/2020  Effective: 1/1/2020 to 12/31/2020  Reference #:    Approved Dose/Quantity:   Insurance Company: [employer] - Phone [phone] Fax [phone]  Expected CoPay:    Pharmacy Filling the Rx: [id number] - [place], [place] - [address] AT [facility] OF [facility] [address] [facility]  Pharmacy Notified: Yes  Patient Notified: Comment:  **Instructed pharmacy to notify patient when script is ready to /ship.**

## 2020-08-29 DIAGNOSIS — E78.5 HYPERLIPIDEMIA LDL GOAL <130: ICD-10-CM

## 2020-08-31 RX ORDER — ATORVASTATIN CALCIUM 20 MG/1
TABLET, FILM COATED ORAL
Qty: 90 TABLET | Refills: 0 | Status: SHIPPED | OUTPATIENT
Start: 2020-08-31 | End: 2020-09-25

## 2020-08-31 NOTE — TELEPHONE ENCOUNTER
Lipitor    Routing refill request to provider for review/approval because:  Labs not current:  Lipids    Yaquelin FORDEN, RN, PHN

## 2020-09-23 DIAGNOSIS — I10 BENIGN ESSENTIAL HYPERTENSION: ICD-10-CM

## 2020-09-23 DIAGNOSIS — E78.5 HYPERLIPIDEMIA LDL GOAL <130: ICD-10-CM

## 2020-09-23 DIAGNOSIS — I48.0 PAROXYSMAL ATRIAL FIBRILLATION (H): ICD-10-CM

## 2020-09-23 DIAGNOSIS — R73.03 PREDIABETES: ICD-10-CM

## 2020-09-23 LAB
ALBUMIN SERPL-MCNC: 4 G/DL (ref 3.4–5)
ALP SERPL-CCNC: 74 U/L (ref 40–150)
ALT SERPL W P-5'-P-CCNC: 40 U/L (ref 0–50)
ANION GAP SERPL CALCULATED.3IONS-SCNC: 5 MMOL/L (ref 3–14)
AST SERPL W P-5'-P-CCNC: 20 U/L (ref 0–45)
BILIRUB SERPL-MCNC: 0.9 MG/DL (ref 0.2–1.3)
BUN SERPL-MCNC: 22 MG/DL (ref 7–30)
CALCIUM SERPL-MCNC: 9.7 MG/DL (ref 8.5–10.1)
CHLORIDE SERPL-SCNC: 101 MMOL/L (ref 94–109)
CHOLEST SERPL-MCNC: 147 MG/DL
CO2 SERPL-SCNC: 28 MMOL/L (ref 20–32)
CREAT SERPL-MCNC: 0.88 MG/DL (ref 0.52–1.04)
ERYTHROCYTE [DISTWIDTH] IN BLOOD BY AUTOMATED COUNT: 13 % (ref 10–15)
GFR SERPL CREATININE-BSD FRML MDRD: 65 ML/MIN/{1.73_M2}
GLUCOSE SERPL-MCNC: 106 MG/DL (ref 70–99)
HBA1C MFR BLD: 6.2 % (ref 0–5.6)
HCT VFR BLD AUTO: 45.9 % (ref 35–47)
HDLC SERPL-MCNC: 56 MG/DL
HGB BLD-MCNC: 14.9 G/DL (ref 11.7–15.7)
LDLC SERPL CALC-MCNC: 81 MG/DL
MCH RBC QN AUTO: 30.2 PG (ref 26.5–33)
MCHC RBC AUTO-ENTMCNC: 32.5 G/DL (ref 31.5–36.5)
MCV RBC AUTO: 93 FL (ref 78–100)
NONHDLC SERPL-MCNC: 91 MG/DL
PLATELET # BLD AUTO: 271 10E9/L (ref 150–450)
POTASSIUM SERPL-SCNC: 4.7 MMOL/L (ref 3.4–5.3)
PROT SERPL-MCNC: 7.5 G/DL (ref 6.8–8.8)
RBC # BLD AUTO: 4.94 10E12/L (ref 3.8–5.2)
SODIUM SERPL-SCNC: 134 MMOL/L (ref 133–144)
TRIGL SERPL-MCNC: 48 MG/DL
WBC # BLD AUTO: 11.9 10E9/L (ref 4–11)

## 2020-09-23 PROCEDURE — 83036 HEMOGLOBIN GLYCOSYLATED A1C: CPT | Performed by: INTERNAL MEDICINE

## 2020-09-23 PROCEDURE — 85027 COMPLETE CBC AUTOMATED: CPT | Performed by: INTERNAL MEDICINE

## 2020-09-23 PROCEDURE — 80061 LIPID PANEL: CPT | Performed by: INTERNAL MEDICINE

## 2020-09-23 PROCEDURE — 36415 COLL VENOUS BLD VENIPUNCTURE: CPT | Performed by: INTERNAL MEDICINE

## 2020-09-23 PROCEDURE — 80053 COMPREHEN METABOLIC PANEL: CPT | Performed by: INTERNAL MEDICINE

## 2020-09-25 ENCOUNTER — OFFICE VISIT (OUTPATIENT)
Dept: INTERNAL MEDICINE | Facility: CLINIC | Age: 70
End: 2020-09-25
Payer: COMMERCIAL

## 2020-09-25 VITALS
SYSTOLIC BLOOD PRESSURE: 120 MMHG | HEIGHT: 65 IN | OXYGEN SATURATION: 98 % | BODY MASS INDEX: 25.18 KG/M2 | WEIGHT: 151.1 LBS | HEART RATE: 60 BPM | TEMPERATURE: 98.5 F | DIASTOLIC BLOOD PRESSURE: 70 MMHG

## 2020-09-25 DIAGNOSIS — M19.90 OSTEOARTHRITIS, UNSPECIFIED OSTEOARTHRITIS TYPE, UNSPECIFIED SITE: ICD-10-CM

## 2020-09-25 DIAGNOSIS — Z12.11 SCREEN FOR COLON CANCER: ICD-10-CM

## 2020-09-25 DIAGNOSIS — M85.80 OSTEOPENIA, UNSPECIFIED LOCATION: ICD-10-CM

## 2020-09-25 DIAGNOSIS — E78.5 HYPERLIPIDEMIA LDL GOAL <130: ICD-10-CM

## 2020-09-25 DIAGNOSIS — I10 BENIGN ESSENTIAL HYPERTENSION: Primary | ICD-10-CM

## 2020-09-25 DIAGNOSIS — J43.9 PULMONARY EMPHYSEMA, UNSPECIFIED EMPHYSEMA TYPE (H): ICD-10-CM

## 2020-09-25 DIAGNOSIS — R73.03 PREDIABETES: ICD-10-CM

## 2020-09-25 DIAGNOSIS — J44.9 CHRONIC OBSTRUCTIVE PULMONARY DISEASE, UNSPECIFIED COPD TYPE (H): ICD-10-CM

## 2020-09-25 DIAGNOSIS — I48.0 PAROXYSMAL ATRIAL FIBRILLATION (H): ICD-10-CM

## 2020-09-25 DIAGNOSIS — L40.50 PSORIASIS WITH ARTHROPATHY (H): ICD-10-CM

## 2020-09-25 PROCEDURE — 99214 OFFICE O/P EST MOD 30 MIN: CPT | Performed by: INTERNAL MEDICINE

## 2020-09-25 RX ORDER — SPIRONOLACTONE 25 MG/1
25 TABLET ORAL DAILY
Qty: 90 TABLET | Refills: 1 | Status: SHIPPED | OUTPATIENT
Start: 2020-09-25 | End: 2021-04-07

## 2020-09-25 RX ORDER — FAMOTIDINE 20 MG/1
20 TABLET, FILM COATED ORAL PRN
COMMUNITY

## 2020-09-25 RX ORDER — ATORVASTATIN CALCIUM 20 MG/1
20 TABLET, FILM COATED ORAL DAILY
Qty: 90 TABLET | Refills: 3 | Status: SHIPPED | OUTPATIENT
Start: 2020-09-25 | End: 2021-10-08

## 2020-09-25 RX ORDER — ALBUTEROL SULFATE 90 UG/1
2 AEROSOL, METERED RESPIRATORY (INHALATION) EVERY 6 HOURS PRN
Qty: 18 G | Refills: 11 | Status: SHIPPED | OUTPATIENT
Start: 2020-09-25 | End: 2021-10-08

## 2020-09-25 RX ORDER — ALBUTEROL SULFATE 0.83 MG/ML
2.5 SOLUTION RESPIRATORY (INHALATION) EVERY 6 HOURS PRN
Qty: 1 BOX | Refills: 11 | Status: SHIPPED | OUTPATIENT
Start: 2020-09-25 | End: 2021-10-08

## 2020-09-25 RX ORDER — TIOTROPIUM BROMIDE 18 UG/1
18 CAPSULE ORAL; RESPIRATORY (INHALATION) DAILY
Qty: 90 CAPSULE | Refills: 3 | Status: SHIPPED | OUTPATIENT
Start: 2020-09-25 | End: 2021-04-07

## 2020-09-25 ASSESSMENT — MIFFLIN-ST. JEOR: SCORE: 1188.33

## 2020-09-25 NOTE — PATIENT INSTRUCTIONS
"*  Continue all medications at the same doses.  Contact your usual pharmacy if you need refills.     *  Return to see me in approximately April 2021, sooner if needed.  Please get nonfasting labs done in the [facility] or at any other [facility] lab 1-2 days before this appointment.  If you get the labs done at another clinic, make arrangements with that clinic directly.  The orders will be in place.  Use [employer] or Call [phone] to schedule the appointment with me and lab appointment.       5 GOALS TO PREVENT VASCULAR DISEASE:     1.  Aggressive blood pressure control, under 130/80 ideally.  Using medications if needed.    Your blood pressure is under good control    BP Readings from Last 4 Encounters:   09/25/20 120/70   02/14/20 120/64   12/20/19 120/76   06/24/19 125/69       2.  Aggressive LDL cholesterol (\"bad cholesterol\") lowering as indicated.    Your goal is an LDL under 130 for sure, preferably under 100.  (If you have diabetes or previous vascular disease, the the LDL goals would be under 100 for sure, preferably under 70.)    New guidelines identify four high-risk groups who could benefit from statins:   *people with pre-existing heart disease, such as those who have had a heart attack;   *people ages 40 to 75 who have diabetes of any type  *patients ages 40 to 75 with at least a 7.5% risk of developing cardiovascular disease over the next decade, according to a formula described in the guidelines  *patients with the sort of super-high cholesterol that sometimes runs in families, as evidenced by an LDL of 190 milligrams per deciliter or higher    Your cholesterol levels are well controlled.    Recent Labs   Lab Test 09/23/20  0829 06/17/19  0827  09/18/15  0738   CHOL 147 152   < > 221*   HDL 56 51   < > 53   LDL 81 87   < > 144*   TRIG 48 68   < > 122   CHOLHDLRATIO  --   --   --  4.2    < > = values in this interval not displayed.       3.  Aggressive diabetic prevention, screening and/or " management.      You do not have diabetes as of the most recent blood tests.     4.  No smoking    5.  Consider daily preventative aspirin over age 50 if you have enough cardiac risk factors to place you at higher risk for the presence of vascular disease.    If you have any reason not to take aspirin such easy bruising or bleeding, stomach problems, other anticoagulant medications, or any other side effects, then you should not take Aspirin.      --Based on your current cardiac risk factor profile, you should take regular daily Aspirin 81 mg once per day.

## 2020-09-25 NOTE — PROGRESS NOTES
Subjective     Marva Angela is a 70 year old female who presents to clinic today for the following health issues:    HPI       Hyperlipidemia Follow-Up      Are you regularly taking any medication or supplement to lower your cholesterol?   Yes- Lipitor 20mg    Are you having muscle aches or other side effects that you think could be caused by your cholesterol lowering medication?  No     Hyperlipidemia:  Has history of hyperlipidemia.    The patient is taking a medication for this.  Denies any significant side effects from his medication.      Latest labs reviewed:    Recent Labs   Lab Test 09/23/20  0829 06/17/19  0827  09/18/15  0738   CHOL 147 152   < > 221*   HDL 56 51   < > 53   LDL 81 87   < > 144*   TRIG 48 68   < > 122   CHOLHDLRATIO  --   --   --  4.2    < > = values in this interval not displayed.        Lab Results   Component Value Date    AST 20 09/23/2020           Hypertension Follow-up      Do you check your blood pressure regularly outside of the clinic? No     Are you following a low salt diet? Yes    Are your blood pressures ever more than 140 on the top number (systolic) OR more   than 90 on the bottom number (diastolic), for example 140/90? n/a      How many servings of fruits and vegetables do you eat daily?  4 or more    On average, how many sweetened beverages do you drink each day (Examples: soda, juice, sweet tea, etc.  Do NOT count diet or artificially sweetened beverages)?   0    How many days per week do you exercise enough to make your heart beat faster? 5    How many minutes a day do you exercise enough to make your heart beat faster? 30 - 60    How many days per week do you miss taking your medication? 0      3.  COPD remains stable.  Has not had any recent breathing troubles beyond usual baseline.  Has not any acute respiratory events.  Remains with intermittent cough, mild shortness of breath with overexertion as per usual.  Using medication as directed with reported side  "effects. D    4.  The patient has a history of impaired glucose tolerance with regularly elevated blood sugars.    They have not been diagnosed with type II DM or placed on medications for diabetes before.   They deny polyuria, polydipsia.     The patient is not obese with a BMI of Body mass index is 25.54 kg/m ..    Review of current labs show:    Lab Results   Component Value Date    A1C 6.2 09/23/2020    A1C 6.2 12/17/2019    A1C 6.0 06/17/2019    A1C 6.1 11/02/2018    A1C 6.4 03/21/2017       **I reviewed the information recorded in the patient's EPIC chart (including but not limited to medical history, surgical history, problem list, medication list, and allergy list) and updated information as needed.             Review of Systems   Constitutional, HEENT, cardiovascular, pulmonary, gi and gu systems are negative, except as otherwise noted.      Objective    /70   Pulse 60   Temp 98.5  F (36.9  C) (Temporal)   Ht 1.638 m (5' 4.5\")   Wt 68.5 kg (151 lb 1.6 oz)   SpO2 98%   BMI 25.54 kg/m    Body mass index is 25.54 kg/m .  Physical Exam   GENERAL alert and no distress  EYES:  Normal sclera,conjunctiva, EOMI  HENT: oral and posterior pharynx without lesions or erythema, facies symmetric  NECK: Neck supple. No LAD, without thyroidmegaly.  RESP: Clear to ausculation bilaterally without wheezes or crackles. Normal BS in all fields.  CV: RRR normal S1S2 without murmurs, rubs or gallops.  LYMPH: no cervical lymph adenopathy appreciated  MS: extremities- no gross deformities of the visible extremities noted,   EXT:  no lower extremity edema  PSYCH: Alert and oriented times 3; speech- coherent  SKIN:  No obvious significant skin lesions on visible portions of face               (I10) Benign essential hypertension  (primary encounter diagnosis)  Comment: This condition is currently controlled on the current medical regimen.  Continue current therapy.   Discussed current hypertension treatment guidelines, " including indications for treatment and treatment options.  Discussed the importance for aggressive management of HTN to prevent vascular complications later.  Recommended lower fat, lower carbohydrate, and lower sodium (<2000 mg)diet.  Discussed required intervals for follow up on HTN, lab studies.  Recommened pt. follow their blood pressures outside the clinic to ensure that BPs are remaining within guidelines, and to contact me if the readings are not within guidelines on a regular basis so we can adjust treatment as needed.   Plan: spironolactone (ALDACTONE) 25 MG tablet,         Hemoglobin A1c, Basic metabolic panel, CBC with        platelets            (E78.5) Hyperlipidemia LDL goal <130  Comment: Discussed current lipid results, previous results (if available) current guidelines (NCEP) for treatment and goals for lipids.  Discussed lifestyle modification, dietary changes (low fat, low simple carb) and regular aerobic exercise.  Discussed the link between dysmetabolic syndrome and impaired glucose tolerance seen in certain patterns of lipids.  Briefly discussed medication used for lipid lowering, including the statins are their possible side effects of myalgias, rhabdomyolysis, and liver toxicity.   Plan: atorvastatin (LIPITOR) 20 MG tablet            (J43.9) Pulmonary emphysema, unspecified emphysema type (H)  Comment: This condition is currently controlled on the current medical regimen.  Continue current therapy.   Discussed general issues of COPD, including pathophysiology, ways it will affect the pt., when to seek help, reviewed the typical medications (how they are taken, how they help)   Plan: tiotropium (SPIRIVA HANDIHALER) 18 MCG inhaled         capsule, albuterol (PROVENTIL) (2.5 MG/3ML)         0.083% neb solution            (M85.80) Osteopenia, unspecified location  Comment: reviewed otesopenia management.   Plan: vitamin D3 (CHOLECALCIFEROL) 1.25 MG (05678 UT)        capsule            (L40.50)  "Psoriasis with arthropathy (H)  Comment: This condition is currently controlled on the current medical regimen.  Continue current therapy.   Plan: vitamin D3 (CHOLECALCIFEROL) 1.25 MG (89327 UT)        capsule            (I48.0) Paroxysmal atrial fibrillation (H)  Comment: This condition is currently controlled on the current medical regimen.  Continue current therapy.   Plan:     (M19.90) Osteoarthritis, unspecified osteoarthritis type, unspecified site  Comment: This condition is currently controlled on the current medical regimen.  Continue current therapy.   Plan:     (J44.9) Chronic obstructive pulmonary disease, unspecified COPD type (H)  Comment: NOW OFF CIGARETTES   Plan: albuterol (PROAIR HFA/PROVENTIL HFA/VENTOLIN         HFA) 108 (90 Base) MCG/ACT inhaler            (R73.03) Prediabetes  Comment: Reviewed the labs showing elevated glucose levels.    Discussed \"pre-diabetes\", impaired glucose tolerance, and its part in the dysmetabolic syndrome.    Discussed the inevitable progression of impaired glucose tolerance toward worsening diabetes mellitus and the need for agressive interventions now to delay and prevent this inevitable progression.  Discussed the overall risks that dysmetabolic syndromes/impaired glucose tolerance/syndrome X pose toward increased risks of vascular disease as the main reason for agressive intervention now.  Will add medications for glucose control (i.e. metformin, glitazones, etc), lipid (e.g. statins), and for blood pressure (preferably ARBs and ACE) as indicated.  Will start these as early as needed based other proven ability to delay and modify these risk factors.   Discussed the need for aggressive diet control as the cornerstone of pre-diabetes and diabetes management, emphasizing the reduction of \"simple carbohydrates\" (e.g. Any kind of wheat products (e.g. any bread, any pasta), white rice, noodles, potatoes, snack foods, regular soda, juices (except fresh squeezed), cakes, " cookies, candy, etc.) along with regular exercise.       Plan: Hemoglobin A1c, Basic metabolic panel            (Z12.11) Screen for colon cancer  Comment: I discussed current recommendations about colon cancer screening recommending colonoscopy as gold standard test, starting age 50 (age 40 for family history of colon cancer).  The patient is declining to do colonoscopy at this time.   They will agree to FIT testing annually.   Would reconsider colonoscopy if the FIT test is positive.    Plan: Fecal colorectal cancer screen (FIT)

## 2021-01-25 ENCOUNTER — MYC MEDICAL ADVICE (OUTPATIENT)
Dept: CARDIOLOGY | Facility: CLINIC | Age: 71
End: 2021-01-25

## 2021-01-25 NOTE — TELEPHONE ENCOUNTER
[employer] message received from patient:    I am currently taking diltiazem cd 180mg capsules and need the formulary exception renewed  / updated  for .Dr Stephen prescribes as does Rufina Palacios so it helps if both are included.Cecelia manages pharmacy for [employer] my health insurance.Thank you     Patient has had PA done for diltiazem in the past ( 20) and patient has upcoming OV on 21 with Rufnia Palacios. Will route to PA team to initiate PA.

## 2021-01-25 NOTE — TELEPHONE ENCOUNTER
PA Initiation    Medication: diltiazem ER COATED BEADS (CARDIZEM CD/CARTIA XT) 180 MG 24 hr capsule -   Insurance Company: [employer] - Phone [phone] Fax [phone]  Pharmacy Filling the Rx: [id number] [place] [facility] [place] [facility] [address] AT [facility] OF [facility] [address]  Filling Pharmacy Phone: [phone]  Filling Pharmacy Fax: [phone]  Start Date: 1/25/2021

## 2021-01-27 NOTE — TELEPHONE ENCOUNTER
Prior Authorization Approval    Medication: diltiazem ER COATED BEADS (CARDIZEM CD/CARTIA XT) 180 MG 24 hr capsule - APPROVED was approved on 1/26/2021  Effective: 1/1/2021 to 12/31/2021  Reference #:    Approved Dose/Quantity:   Insurance Company: [employer] - Phone [phone] Fax [phone]  Expected CoPay:    Pharmacy Filling the Rx: [id number] - [place] - [address] AT [facility] [address] [facility] [address] [facility]  Pharmacy Notified: Yes  Patient Notified: Comment:  **Instructed pharmacy to notify patient when script is ready to /ship.**

## 2021-02-02 ENCOUNTER — OFFICE VISIT (OUTPATIENT)
Dept: CARDIOLOGY | Facility: CLINIC | Age: 71
End: 2021-02-02
Attending: INTERNAL MEDICINE
Payer: COMMERCIAL

## 2021-02-02 VITALS
SYSTOLIC BLOOD PRESSURE: 132 MMHG | HEIGHT: 65 IN | BODY MASS INDEX: 24.49 KG/M2 | DIASTOLIC BLOOD PRESSURE: 69 MMHG | HEART RATE: 74 BPM | WEIGHT: 147 LBS

## 2021-02-02 DIAGNOSIS — I48.0 PAROXYSMAL ATRIAL FIBRILLATION (H): ICD-10-CM

## 2021-02-02 DIAGNOSIS — I10 BENIGN ESSENTIAL HYPERTENSION: ICD-10-CM

## 2021-02-02 DIAGNOSIS — I48.0 PAROXYSMAL ATRIAL FIBRILLATION (H): Primary | ICD-10-CM

## 2021-02-02 PROCEDURE — 99214 OFFICE O/P EST MOD 30 MIN: CPT | Performed by: PHYSICIAN ASSISTANT

## 2021-02-02 RX ORDER — DILTIAZEM HYDROCHLORIDE 180 MG/1
180 CAPSULE, COATED, EXTENDED RELEASE ORAL DAILY
Qty: 90 CAPSULE | Refills: 3 | Status: SHIPPED | OUTPATIENT
Start: 2021-02-02 | End: 2022-02-24

## 2021-02-02 ASSESSMENT — MIFFLIN-ST. JEOR: SCORE: 1169.73

## 2021-02-02 NOTE — LETTER
2/2/2021    Kendall Mendez MD  [address] [facility] [address]    RE: Marva Angela       Dear Colleague,    I had the pleasure of seeing Marva Angela in the [facility].    [id number]  {2021 E&M time:[id number]}    HPI and Plan:   See dictation    Orders this Visit:  Orders Placed This Encounter   Procedures     Follow-Up with Cardiologist     Orders Placed This Encounter   Medications     diltiazem ER COATED BEADS (CARDIZEM CD/CARTIA XT) 180 MG 24 hr capsule     Sig: Take 1 capsule (180 mg) by mouth daily with breakfast     Dispense:  90 capsule     Refill:  3     Medications Discontinued During This Encounter   Medication Reason     diltiazem ER COATED BEADS (CARDIZEM CD/CARTIA XT) 180 MG 24 hr capsule Reorder         Encounter Diagnoses   Name Primary?     Paroxysmal atrial fibrillation (H)      Benign essential hypertension        CURRENT MEDICATIONS:  Current Outpatient Medications   Medication Sig Dispense Refill     Acetaminophen (TYLENOL PO) Take 1,000 mg by mouth 2 times daily as needed for mild pain or fever       albuterol (PROAIR HFA/PROVENTIL HFA/VENTOLIN HFA) 108 (90 Base) MCG/ACT inhaler Inhale 2 puffs into the lungs every 6 hours as needed for shortness of breath / dyspnea or wheezing GENERIC VENTOLIN/ALBUTEROL 18 g 11     albuterol (PROVENTIL) (2.5 MG/3ML) 0.083% neb solution Take 1 vial (2.5 mg) by nebulization every 6 hours as needed for shortness of breath / dyspnea or wheezing 1 Box 11     aspirin EC 81 MG tablet Take 1 tablet (81 mg) by mouth daily 30 tablet 11     atorvastatin (LIPITOR) 20 MG tablet Take 1 tablet (20 mg) by mouth daily 90 tablet 3     betamethasone dipropionate (DIPROSONE) 0.05 % external cream Apply 1 FTU sparingly once or twice per day as needed to affected area until the skin is better, then stop; REPEAT AS NEEDED 45 g 2     calcium-vitamin D (OYSTER CALCIUM + D) 250-125 MG-UNIT per tablet Take 2 tablets by mouth daily        celecoxib (CELEBREX) 200 MG capsule Take 1 capsule (200 mg) by mouth 2 times daily as needed for pain (arthritis pain) 60 capsule 5     Cyanocobalamin (B-12) 1000 MCG TBCR Place 1,000 mcg under the tongue daily (Patient taking differently: Place 1,000 mcg under the tongue every other day )       diltiazem ER COATED BEADS (CARDIZEM CD/CARTIA XT) 180 MG 24 hr capsule Take 1 capsule (180 mg) by mouth daily with breakfast 90 capsule 3     famotidine (PEPCID) 20 MG tablet        folic acid (FOLVITE) 1 MG tablet Take 1 tablet (1 mg) by mouth daily INDICATION: FOLIC ACID SUPPLEMENT (Patient taking differently: Take 400 mcg by mouth daily INDICATION: FOLIC ACID SUPPLEMENT) 100 tablet 3     order for DME Equipment being ordered: Nebulizer 1 each 0     spironolactone (ALDACTONE) 25 MG tablet Take 1 tablet (25 mg) by mouth daily 90 tablet 1     tiotropium (SPIRIVA HANDIHALER) 18 MCG inhaled capsule Inhale 1 capsule (18 mcg) into the lungs daily GENERIC TIOTROPIUM/SPIRIVA 90 capsule 3     triamcinolone (KENALOG) 0.1 % cream Apply topically 2 times daily as needed for irritation (sores on scalp.)       Urea 20 % CREA cream Apply topically daily        vitamin C-electrolytes (EMERGEN-C) 1000mg vitamin C super orange drink mix Take 1 packet by mouth daily Mix 1 packet in 4-6oz water.       vitamin D3 (CHOLECALCIFEROL) 1.25 MG (88077 UT) capsule Take 1 capsule (50,000 Units) by mouth every 14 days SIG: TAKE INDICATION: 1ST AND 15TH OF EACH MONTH, TO TREAT VITAMIN D DEFICIENCY 24 capsule 1       ALLERGIES     Allergies   Allergen Reactions     Penicillins Rash     rash     Sulfa Drugs Rash     rash       PAST MEDICAL HISTORY:  Past Medical History:   Diagnosis Date     Arthritis     knees and hips     B12 deficiency 10/14/2016    B12 178     Benign essential hypertension     was on amlodipine/ then metoprolol / add lisinopril      Contact dermatitis and other eczema, due to unspecified cause      COPD (chronic obstructive  pulmonary disease) (H) 01/01/2012     Coronary artery disease involving native coronary artery of native heart without angina pectoris 2016    coronary artery calcifications seen on CT scan, mild nonocclusive CAD     Hyperlipidemia LDL goal <130 11/7/2012     Nephritis      as a child (post strep)      Osteoporosis, unspecified      Phlebitis and thrombophlebitis of other deep vessels of lower extremities 1972, 1975    Both with pregnancies     Prediabetes 11/01/2018    A1C 6.1     Sinusitis      Tobacco use disorder        PAST SURGICAL HISTORY:  Past Surgical History:   Procedure Laterality Date     ARTHROPLASTY HIP Right 4/3/2017    Procedure: ARTHROPLASTY HIP;  Surgeon: Francisco J Alston MD;  Location:  OR     ARTHROPLASTY KNEE Left 7/31/2017    Procedure: ARTHROPLASTY KNEE;  LEFT TOTAL KNEE ARTHROPLASTY ;  Surgeon: Francisco J Alston MD;  Location:  OR     BIOPSY OF SKIN LESION       C DEXA, BONE DENSITY, AXIAL SKEL  6/2008    T score lumbar -0.6, femoral neck -2.4/-2.0(all stable)     C VAGINAL HYSTERECTOMY  1978    Hysterectomy, Vaginal     HC ASPIRATION &/OR INJECTION GANGLION CYST, ANY  1994     HERNIORRHAPHY INGUINAL Right 3/24/2015    Procedure: HERNIORRHAPHY INGUINAL;  Surgeon: Sam Erazo MD;  Location:  [facility]     HYSTERECTOMY, PAP NO LONGER INDICATED       ORTHOPEDIC SURGERY      bilat meniscus repair       FAMILY HISTORY:  Family History   Problem Relation Age of Onset     C.A.D. Father      Diabetes Father         type 2     Myocardial Infarction Father      Heart Surgery Father         CABGx4     Hyperlipidemia Father      Breast Cancer Mother 70     Osteoporosis Mother      Thyroid Disease Mother      Hyperlipidemia Mother      Cancer Brother         Bladder     Hyperlipidemia Brother      Diabetes Brother         type 2     Colon Cancer Brother      Neurologic Disorder Paternal Grandfather      Osteoporosis Sister      Arrhythmia Sister      Heart Surgery Sister          cardioversion, ablation     Other - See Comments Sister 55        arhythmogenic right ventricular dysplasia     Osteoporosis Maternal Grandmother      Osteoporosis Paternal Grandmother      Other - See Comments Maternal Grandfather         pneumonia     Family History Negative Son      Thyroid Disease Daughter         NODULES     Family History Negative Daughter      Arrhythmia Cousin         a-fib     Cancer - colorectal No family hx of      Prostate Cancer No family hx of      Alzheimer Disease No family hx of      Anesthesia Reaction No family hx of      Blood Disease No family hx of      Eye Disorder No family hx of        SOCIAL HISTORY:  Social History     Socioeconomic History     Marital status: Single     Spouse name: None     Number of children: 2     Years of education: 18     Highest education level: None   Occupational History     Occupation: Mental health therapist     Employer: [employer]   Social Needs     Financial resource strain: None     Food insecurity     Worry: None     Inability: None     Transportation needs     Medical: None     Non-medical: None   Tobacco Use     Smoking status: Former Smoker     Packs/day: 1.00     Years: 40.00     Pack years: 40.00     Types: Cigarettes     Start date: 1964     Quit date: 2015     Years since quittin.4     Smokeless tobacco: Never Used     Tobacco comment: 5-10 daily   Substance and Sexual Activity     Alcohol use: Yes     Alcohol/week: 0.0 standard drinks     Comment: 1 a month     Drug use: No     Sexual activity: Never     Partners: Male   Lifestyle     Physical activity     Days per week: None     Minutes per session: None     Stress: None   Relationships     Social connections     Talks on phone: None     Gets together: None     Attends [employer] service: None     Active member of club or organization: None     Attends meetings of clubs or organizations: None     Relationship status: None     Intimate partner violence     " Fear of current or ex partner: None     Emotionally abused: None     Physically abused: None     Forced sexual activity: None   Other Topics Concern      Service No     Blood Transfusions No     Caffeine Concern No     Comment: 1 cup coffee a day, 1-2 can's of pop a wk in the summer      Occupational Exposure No     Hobby Hazards No     Sleep Concern No     Stress Concern No     Weight Concern No     Special Diet No     Back Care No     Exercise No     Comment: not due to knee's and hip     Bike Helmet No     Comment: n/a     Seat Belt Yes     Self-Exams Yes     Comment: sometimes     Parent/sibling w/ CABG, MI or angioplasty before 65F 55M? No   Social History Narrative    Eats fruits and vegetables every day. She takes a calcium supplement twice daily.    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        Review of Systems:  Skin:  Negative     Eyes:  Positive for glasses  ENT:  Negative    Respiratory:  Negative    Cardiovascular:  Negative    Gastroenterology: Negative    Genitourinary:  Negative    Musculoskeletal:  Negative    Neurologic:  Negative    Psychiatric:  Negative    Heme/Lymph/Imm:  Negative    Endocrine:  Negative      Physical Exam:  Vitals: /69   Pulse 74   Ht 1.638 m (5' 4.5\")   Wt 66.7 kg (147 lb)   BMI 24.84 kg/m     Please refer to dictation for physical exam    Recent Lab Results:  LIPID RESULTS:  Lab Results   Component Value Date    CHOL 147 09/23/2020    HDL 56 09/23/2020    LDL 81 09/23/2020    TRIG 48 09/23/2020    CHOLHDLRATIO 4.2 09/18/2015       LIVER ENZYME RESULTS:  Lab Results   Component Value Date    AST 20 09/23/2020    ALT 40 09/23/2020       CBC RESULTS:  Lab Results   Component Value Date    WBC 11.9 (H) 09/23/2020    RBC 4.94 09/23/2020    HGB 14.9 09/23/2020    HCT 45.9 09/23/2020    MCV 93 09/23/2020    MCH 30.2 09/23/2020    MCHC 32.5 09/23/2020    RDW 13.0 09/23/2020     09/23/2020       BMP RESULTS:  Lab Results   Component Value Date    NA " 134 2020    POTASSIUM 4.7 2020    CHLORIDE 101 2020    CO2 28 2020    ANIONGAP 5 2020     (H) 2020    BUN 22 2020    CR 0.88 2020    GFRESTIMATED 65 2020    GFRESTBLACK 75 2020    SANGEETA 9.7 2020        A1C RESULTS:  Lab Results   Component Value Date    A1C 6.2 (H) 2020       INR RESULTS:  Lab Results   Component Value Date    INR 2.1 (A) 2017    INR 2.8 (A) 2017    INR 1.07 2017    INR 1.20 (H) 2017           CC  Robert Stephen MD  [address] [facility] [address]  [place] [address]        Service Date: 2021      CLINIC VISIT      PRIMARY CARDIOLOGIST:  Dr. Stephen      REASON FOR VISIT:  AFib and palpitations followup.      HISTORY OF PRESENT ILLNESS:  Ms. Angela is a delightful 70-year-old woman with past medical history significant for the followin.  Hypertension.   2.  History of tobacco, now quit.   3.  COPD.   4.  Dyslipidemia.   5.  Calcification of the coronaries and thoracic aorta on CT of the chest.   6.  Paroxysmal atrial fibrillation.      I am seeing her today in routine annual followup.  I met her in 2016 when she followed with Dr. Fountain for palpitations.  Spring of 2018 was very difficult due to runs of PACs and PVCs.  Since being on diltiazem, she has done well.  She has declined anticoagulation.      Today, she comes in for her annual checkup and she feels well.  She denies chest pain, shortness of breath, orthopnea or PND.  She has had a very stressful 2 weeks, as her daughter has been hospitalized in [place] for uterine hemorrhaging and has required 8 units of blood transfused and even with this, she has not noticed palpitations.      SOCIAL HISTORY:  She is a retired .  Former smoker, half pack a day, quit in .  Occasional alcohol use.  She typically walks outside several times a week.  Is holding off due to the weather.  She previously also played pickleball.  She has 3  children, 1 in [place] who is unfortunately unwell.  She has been very safe with COVID.      PHYSICAL EXAMINATION:   GENERAL:  Well-developed, well-nourished woman in no acute distress.   HEENT:  She is normocephalic, atraumatic.   HEART:  Regular in rate and rhythm.  I do not appreciate murmur, rub or gallop.   LUNGS:  Clear without wheezes, rales or rhonchi.   NECK:  Carotids are quiet.   EXTREMITIES:  Without peripheral edema.   SKIN:  Warm and dry.      ASSESSMENT AND PLAN:   1.  Paroxysmal atrial fibrillation with brief runs and SVT on monitor.  She has a CHADS-VASc of 3 and we again discussed anticoagulation.  Her biggest drawback is cost and she is not open to Coumadin due to the frequent visits.  I will ask our pharmacy liaison to price out a variety of anticoagulants with my preference being Eliquis 5 mg b.i.d.  If we can get them in an affordable range, she is happy to take them.  If not, she said she would prefer to discuss this next year.   2.  Dyslipidemia with coronary calcification on her CT but has not had an event on secondary prevention with Lipitor 20 with last LDL 81, HDL 56, total cholesterol 147 which is reasonable.   3.  COPD/emphysema, per primary.      This patient is overall doing well.  We will get some pricing on her NOACs and see if we can find something affordable.  Otherwise, she will see Dr. Stephen in 1 year.      ZIGGY Herrera PA-C             D: 2021   T: 2021   MT: XENA      Name:     NORBERTO IBRAHIM   MRN:      [id number]        Account:      [id number]   :      1950           Service Date: 2021      Document: [id number]        Thank you for allowing me to participate in the care of your patient.      Sincerely,     Rufina Palacios PA-C     [facility]    cc:   Robert Stephen MD  [address]  [place] [address]

## 2021-02-02 NOTE — PATIENT INSTRUCTIONS
Thank you for coming into [facility] today.    We discussed: we'll get pricing on the newer blood thinners and see if we can find one that's affordable.  We'll call with these results.      Follow up: with Dr. Stephen in 1 year.        Please call my nurse at [phone] with any questions or concerns.    Scheduling phone number: [phone]  Reminder: Please bring in all current medications, over the counter supplements and vitamin bottles to your next appointment.

## 2021-02-02 NOTE — PROGRESS NOTES
Service Date: 2021      CLINIC VISIT      PRIMARY CARDIOLOGIST:  Dr. Stephen      REASON FOR VISIT:  AFib and palpitations followup.      HISTORY OF PRESENT ILLNESS:  Ms. Angela is a delightful 70-year-old woman with past medical history significant for the followin.  Hypertension.   2.  History of tobacco, now quit.   3.  COPD.   4.  Dyslipidemia.   5.  Calcification of the coronaries and thoracic aorta on CT of the chest.   6.  Paroxysmal atrial fibrillation.      I am seeing her today in routine annual followup.  I met her in  when she followed with Dr. Fountain for palpitations.  Spring of 2018 was very difficult due to runs of PACs and PVCs.  Since being on diltiazem, she has done well.  She has declined anticoagulation.      Today, she comes in for her annual checkup and she feels well.  She denies chest pain, shortness of breath, orthopnea or PND.  She has had a very stressful 2 weeks, as her daughter has been hospitalized in [place] for uterine hemorrhaging and has required 8 units of blood transfused and even with this, she has not noticed palpitations.      SOCIAL HISTORY:  She is a retired .  Former smoker, half pack a day, quit in .  Occasional alcohol use.  She typically walks outside several times a week.  Is holding off due to the weather.  She previously also played pickleball.  She has 3 children, 1 in [place] who is unfortunately unwell.  She has been very safe with COVID.      PHYSICAL EXAMINATION:   GENERAL:  Well-developed, well-nourished woman in no acute distress.   HEENT:  She is normocephalic, atraumatic.   HEART:  Regular in rate and rhythm.  I do not appreciate murmur, rub or gallop.   LUNGS:  Clear without wheezes, rales or rhonchi.   NECK:  Carotids are quiet.   EXTREMITIES:  Without peripheral edema.   SKIN:  Warm and dry.      ASSESSMENT AND PLAN:   1.  Paroxysmal atrial fibrillation with brief runs and SVT on monitor.  She has a CHADS-VASc of 3 and we again  discussed anticoagulation.  Her biggest drawback is cost and she is not open to Coumadin due to the frequent visits.  I will ask our pharmacy liaison to price out a variety of anticoagulants with my preference being Eliquis 5 mg b.i.d.  If we can get them in an affordable range, she is happy to take them.  If not, she said she would prefer to discuss this next year.   2.  Dyslipidemia with coronary calcification on her CT but has not had an event on secondary prevention with Lipitor 20 with last LDL 81, HDL 56, total cholesterol 147 which is reasonable.   3.  COPD/emphysema, per primary.      This patient is overall doing well.  We will get some pricing on her NOACs and see if we can find something affordable.  Otherwise, she will see Dr. Stephen in 1 year.      ZIGGY Herrera PA-C             D: 2021   T: 2021   MT: XENA      Name:     NORBERTO IBRAHIM   MRN:      [id number]        Account:      [id number]   :      1950           Service Date: 2021      Document: [id number]

## 2021-02-02 NOTE — TELEPHONE ENCOUNTER
Patient was seen by Rufina Palacios today and she asked the RN team to call [facility] to see what NOAC is cheaper?  I called [facility] and Eliquis 5mg BID for local pharmacy is 248$ for 30 days then after deductible it is 23.47 per month.  Mail order Pharmacy is 356$ after deductible 131$ The Xarelto was 20mg 1st 30 day was 272.00$ then 47.00$ per month Mail Order 356.00 and after deductible 131$ per 90 day supply   I contacted the patient and she wanted me to see if it would be less if had a Tier 1 exception?  I was on hold for 17 minutes with [facility] to ask this information and decided to forward this to your department.  Patient notified of the forward to your Team.  Thanks

## 2021-02-02 NOTE — LETTER
2021      Kendall Mendez MD  [address] [facility] [address]      RE: Marva Angela       Dear Colleague,    I had the pleasure of seeing Marva Angela in the [facility].    Service Date: 2021      CLINIC VISIT      PRIMARY CARDIOLOGIST:  Dr. Stephen      REASON FOR VISIT:  AFib and palpitations followup.      HISTORY OF PRESENT ILLNESS:  Ms. Angela is a delightful 70-year-old woman with past medical history significant for the followin.  Hypertension.   2.  History of tobacco, now quit.   3.  COPD.   4.  Dyslipidemia.   5.  Calcification of the coronaries and thoracic aorta on CT of the chest.   6.  Paroxysmal atrial fibrillation.      I am seeing her today in routine annual followup.  I met her in  when she followed with Dr. Fountain for palpitations.  Spring of 2018 was very difficult due to runs of PACs and PVCs.  Since being on diltiazem, she has done well.  She has declined anticoagulation.      Today, she comes in for her annual checkup and she feels well.  She denies chest pain, shortness of breath, orthopnea or PND.  She has had a very stressful 2 weeks, as her daughter has been hospitalized in [place] for uterine hemorrhaging and has required 8 units of blood transfused and even with this, she has not noticed palpitations.      SOCIAL HISTORY:  She is a retired .  Former smoker, half pack a day, quit in .  Occasional alcohol use.  She typically walks outside several times a week.  Is holding off due to the weather.  She previously also played pickleball.  She has 3 children, 1 in [place] who is unfortunately unwell.  She has been very safe with COVID.      PHYSICAL EXAMINATION:   GENERAL:  Well-developed, well-nourished woman in no acute distress.   HEENT:  She is normocephalic, atraumatic.   HEART:  Regular in rate and rhythm.  I do not appreciate murmur, rub or gallop.   LUNGS:  Clear without  wheezes, rales or rhonchi.   NECK:  Carotids are quiet.   EXTREMITIES:  Without peripheral edema.   SKIN:  Warm and dry.      ASSESSMENT AND PLAN:   1.  Paroxysmal atrial fibrillation with brief runs and SVT on monitor.  She has a CHADS-VASc of 3 and we again discussed anticoagulation.  Her biggest drawback is cost and she is not open to Coumadin due to the frequent visits.  I will ask our pharmacy liaison to price out a variety of anticoagulants with my preference being Eliquis 5 mg b.i.d.  If we can get them in an affordable range, she is happy to take them.  If not, she said she would prefer to discuss this next year.   2.  Dyslipidemia with coronary calcification on her CT but has not had an event on secondary prevention with Lipitor 20 with last LDL 81, HDL 56, total cholesterol 147 which is reasonable.   3.  COPD/emphysema, per primary.      This patient is overall doing well.  We will get some pricing on her NOACs and see if we can find something affordable.  Otherwise, she will see Dr. Stephen in 1 year.      Rufina Palacios PA-C        D: 2021   T: 2021   MT: XENA      Name:     NORBERTO IBRAHIM   MRN:      [id number]        Account:      [id number]   :      1950           Service Date: 2021      Document: [id number]        Outpatient Encounter Medications as of 2021   Medication Sig Dispense Refill     Acetaminophen (TYLENOL PO) Take 1,000 mg by mouth 2 times daily as needed for mild pain or fever       albuterol (PROAIR HFA/PROVENTIL HFA/VENTOLIN HFA) 108 (90 Base) MCG/ACT inhaler Inhale 2 puffs into the lungs every 6 hours as needed for shortness of breath / dyspnea or wheezing GENERIC VENTOLIN/ALBUTEROL 18 g 11     albuterol (PROVENTIL) (2.5 MG/3ML) 0.083% neb solution Take 1 vial (2.5 mg) by nebulization every 6 hours as needed for shortness of breath / dyspnea or wheezing 1 Box 11     aspirin EC 81 MG tablet Take 1 tablet (81 mg) by mouth daily 30 tablet 11     atorvastatin  (LIPITOR) 20 MG tablet Take 1 tablet (20 mg) by mouth daily 90 tablet 3     betamethasone dipropionate (DIPROSONE) 0.05 % external cream Apply 1 FTU sparingly once or twice per day as needed to affected area until the skin is better, then stop; REPEAT AS NEEDED 45 g 2     calcium-vitamin D (OYSTER CALCIUM + D) 250-125 MG-UNIT per tablet Take 2 tablets by mouth daily       celecoxib (CELEBREX) 200 MG capsule Take 1 capsule (200 mg) by mouth 2 times daily as needed for pain (arthritis pain) 60 capsule 5     Cyanocobalamin (B-12) 1000 MCG TBCR Place 1,000 mcg under the tongue daily (Patient taking differently: Place 1,000 mcg under the tongue every other day )       diltiazem ER COATED BEADS (CARDIZEM CD/CARTIA XT) 180 MG 24 hr capsule Take 1 capsule (180 mg) by mouth daily with breakfast 90 capsule 3     famotidine (PEPCID) 20 MG tablet        folic acid (FOLVITE) 1 MG tablet Take 1 tablet (1 mg) by mouth daily INDICATION: FOLIC ACID SUPPLEMENT (Patient taking differently: Take 400 mcg by mouth daily INDICATION: FOLIC ACID SUPPLEMENT) 100 tablet 3     order for DME Equipment being ordered: Nebulizer 1 each 0     spironolactone (ALDACTONE) 25 MG tablet Take 1 tablet (25 mg) by mouth daily 90 tablet 1     tiotropium (SPIRIVA HANDIHALER) 18 MCG inhaled capsule Inhale 1 capsule (18 mcg) into the lungs daily GENERIC TIOTROPIUM/SPIRIVA 90 capsule 3     triamcinolone (KENALOG) 0.1 % cream Apply topically 2 times daily as needed for irritation (sores on scalp.)       Urea 20 % CREA cream Apply topically daily        vitamin C-electrolytes (EMERGEN-C) 1000mg vitamin C super orange drink mix Take 1 packet by mouth daily Mix 1 packet in 4-6oz water.       vitamin D3 (CHOLECALCIFEROL) 1.25 MG (95321 UT) capsule Take 1 capsule (50,000 Units) by mouth every 14 days SIG: TAKE INDICATION: 1ST AND 15TH OF EACH MONTH, TO TREAT VITAMIN D DEFICIENCY 24 capsule 1     [DISCONTINUED] diltiazem ER COATED BEADS (CARDIZEM CD/CARTIA XT) 180 MG  24 hr capsule Take 1 capsule (180 mg) by mouth daily with breakfast 90 capsule 3     No facility-administered encounter medications on file as of 2/2/2021.        Again, thank you for allowing me to participate in the care of your patient.      Sincerely,    ZIGGY Fairchild [facility]

## 2021-02-02 NOTE — PROGRESS NOTES
[id number]      HPI and Plan:   See dictation    Orders this Visit:  Orders Placed This Encounter   Procedures     Follow-Up with Cardiologist     Orders Placed This Encounter   Medications     diltiazem ER COATED BEADS (CARDIZEM CD/CARTIA XT) 180 MG 24 hr capsule     Sig: Take 1 capsule (180 mg) by mouth daily with breakfast     Dispense:  90 capsule     Refill:  3     Medications Discontinued During This Encounter   Medication Reason     diltiazem ER COATED BEADS (CARDIZEM CD/CARTIA XT) 180 MG 24 hr capsule Reorder         Encounter Diagnoses   Name Primary?     Paroxysmal atrial fibrillation (H)      Benign essential hypertension        CURRENT MEDICATIONS:  Current Outpatient Medications   Medication Sig Dispense Refill     Acetaminophen (TYLENOL PO) Take 1,000 mg by mouth 2 times daily as needed for mild pain or fever       albuterol (PROAIR HFA/PROVENTIL HFA/VENTOLIN HFA) 108 (90 Base) MCG/ACT inhaler Inhale 2 puffs into the lungs every 6 hours as needed for shortness of breath / dyspnea or wheezing GENERIC VENTOLIN/ALBUTEROL 18 g 11     albuterol (PROVENTIL) (2.5 MG/3ML) 0.083% neb solution Take 1 vial (2.5 mg) by nebulization every 6 hours as needed for shortness of breath / dyspnea or wheezing 1 Box 11     aspirin EC 81 MG tablet Take 1 tablet (81 mg) by mouth daily 30 tablet 11     atorvastatin (LIPITOR) 20 MG tablet Take 1 tablet (20 mg) by mouth daily 90 tablet 3     betamethasone dipropionate (DIPROSONE) 0.05 % external cream Apply 1 FTU sparingly once or twice per day as needed to affected area until the skin is better, then stop; REPEAT AS NEEDED 45 g 2     calcium-vitamin D (OYSTER CALCIUM + D) 250-125 MG-UNIT per tablet Take 2 tablets by mouth daily       celecoxib (CELEBREX) 200 MG capsule Take 1 capsule (200 mg) by mouth 2 times daily as needed for pain (arthritis pain) 60 capsule 5     Cyanocobalamin (B-12) 1000 MCG TBCR Place 1,000 mcg under the tongue daily (Patient taking differently: Place 1,000  mcg under the tongue every other day )       diltiazem ER COATED BEADS (CARDIZEM CD/CARTIA XT) 180 MG 24 hr capsule Take 1 capsule (180 mg) by mouth daily with breakfast 90 capsule 3     famotidine (PEPCID) 20 MG tablet        folic acid (FOLVITE) 1 MG tablet Take 1 tablet (1 mg) by mouth daily INDICATION: FOLIC ACID SUPPLEMENT (Patient taking differently: Take 400 mcg by mouth daily INDICATION: FOLIC ACID SUPPLEMENT) 100 tablet 3     order for DME Equipment being ordered: Nebulizer 1 each 0     spironolactone (ALDACTONE) 25 MG tablet Take 1 tablet (25 mg) by mouth daily 90 tablet 1     tiotropium (SPIRIVA HANDIHALER) 18 MCG inhaled capsule Inhale 1 capsule (18 mcg) into the lungs daily GENERIC TIOTROPIUM/SPIRIVA 90 capsule 3     triamcinolone (KENALOG) 0.1 % cream Apply topically 2 times daily as needed for irritation (sores on scalp.)       Urea 20 % CREA cream Apply topically daily        vitamin C-electrolytes (EMERGEN-C) 1000mg vitamin C super orange drink mix Take 1 packet by mouth daily Mix 1 packet in 4-6oz water.       vitamin D3 (CHOLECALCIFEROL) 1.25 MG (04149 UT) capsule Take 1 capsule (50,000 Units) by mouth every 14 days SIG: TAKE INDICATION: 1ST AND 15TH OF EACH MONTH, TO TREAT VITAMIN D DEFICIENCY 24 capsule 1       ALLERGIES     Allergies   Allergen Reactions     Penicillins Rash     rash     Sulfa Drugs Rash     rash       PAST MEDICAL HISTORY:  Past Medical History:   Diagnosis Date     Arthritis     knees and hips     B12 deficiency 10/14/2016    B12 178     Benign essential hypertension     was on amlodipine/ then metoprolol / add lisinopril      Contact dermatitis and other eczema, due to unspecified cause      COPD (chronic obstructive pulmonary disease) (H) 01/01/2012     Coronary artery disease involving native coronary artery of native heart without angina pectoris 2016    coronary artery calcifications seen on CT scan, mild nonocclusive CAD     Hyperlipidemia LDL goal <130 11/7/2012      Nephritis      as a child (post strep)      Osteoporosis, unspecified      Phlebitis and thrombophlebitis of other deep vessels of lower extremities 1972, 1975    Both with pregnancies     Prediabetes 11/01/2018    A1C 6.1     Sinusitis      Tobacco use disorder        PAST SURGICAL HISTORY:  Past Surgical History:   Procedure Laterality Date     ARTHROPLASTY HIP Right 4/3/2017    Procedure: ARTHROPLASTY HIP;  Surgeon: Francisco J Alston MD;  Location: [facility] OR     ARTHROPLASTY KNEE Left 7/31/2017    Procedure: ARTHROPLASTY KNEE;  LEFT TOTAL KNEE ARTHROPLASTY ;  Surgeon: Francisco J Alston MD;  Location: [facility] OR     BIOPSY OF SKIN LESION       C DEXA, BONE DENSITY, AXIAL SKEL  6/2008    T score lumbar -0.6, femoral neck -2.4/-2.0(all stable)     C VAGINAL HYSTERECTOMY  1978    Hysterectomy, Vaginal     HC ASPIRATION &/OR INJECTION GANGLION CYST, ANY  1994     HERNIORRHAPHY INGUINAL Right 3/24/2015    Procedure: HERNIORRHAPHY INGUINAL;  Surgeon: Sam Erazo MD;  Location:  [facility]     HYSTERECTOMY, PAP NO LONGER INDICATED       ORTHOPEDIC SURGERY      bilat meniscus repair       FAMILY HISTORY:  Family History   Problem Relation Age of Onset     C.A.D. Father      Diabetes Father         type 2     Myocardial Infarction Father      Heart Surgery Father         CABGx4     Hyperlipidemia Father      Breast Cancer Mother 70     Osteoporosis Mother      Thyroid Disease Mother      Hyperlipidemia Mother      Cancer Brother         Bladder     Hyperlipidemia Brother      Diabetes Brother         type 2     Colon Cancer Brother      Neurologic Disorder Paternal Grandfather      Osteoporosis Sister      Arrhythmia Sister      Heart Surgery Sister         cardioversion, ablation     Other - See Comments Sister 55        arhythmogenic right ventricular dysplasia     Osteoporosis Maternal Grandmother      Osteoporosis Paternal Grandmother      Other - See Comments Maternal Grandfather         pneumonia      Family History Negative Son      Thyroid Disease Daughter         NODULES     Family History Negative Daughter      Arrhythmia Cousin         a-fib     Cancer - colorectal No family hx of      Prostate Cancer No family hx of      Alzheimer Disease No family hx of      Anesthesia Reaction No family hx of      Blood Disease No family hx of      Eye Disorder No family hx of        SOCIAL HISTORY:  Social History     Socioeconomic History     Marital status: Single     Spouse name: None     Number of children: 2     Years of education: 18     Highest education level: None   Occupational History     Occupation: Mental health therapist     Employer: [employer]   Social Needs     Financial resource strain: None     Food insecurity     Worry: None     Inability: None     Transportation needs     Medical: None     Non-medical: None   Tobacco Use     Smoking status: Former Smoker     Packs/day: 1.00     Years: 40.00     Pack years: 40.00     Types: Cigarettes     Start date: 1964     Quit date: 2015     Years since quittin.4     Smokeless tobacco: Never Used     Tobacco comment: 5-10 daily   Substance and Sexual Activity     Alcohol use: Yes     Alcohol/week: 0.0 standard drinks     Comment: 1 a month     Drug use: No     Sexual activity: Never     Partners: Male   Lifestyle     Physical activity     Days per week: None     Minutes per session: None     Stress: None   Relationships     Social connections     Talks on phone: None     Gets together: None     Attends Quaker service: None     Active member of club or organization: None     Attends meetings of clubs or organizations: None     Relationship status: None     Intimate partner violence     Fear of current or ex partner: None     Emotionally abused: None     Physically abused: None     Forced sexual activity: None   Other Topics Concern      Service No     Blood Transfusions No     Caffeine Concern No     Comment: 1 cup coffee a day,  "1-2 can's of pop a wk in the summer      Occupational Exposure No     Hobby Hazards No     Sleep Concern No     Stress Concern No     Weight Concern No     Special Diet No     Back Care No     Exercise No     Comment: not due to knee's and hip     Bike Helmet No     Comment: n/a     Seat Belt Yes     Self-Exams Yes     Comment: sometimes     Parent/sibling w/ CABG, MI or angioplasty before 65F 55M? No   Social History Narrative    Eats fruits and vegetables every day. She takes a calcium supplement twice daily.    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        Review of Systems:  Skin:  Negative     Eyes:  Positive for glasses  ENT:  Negative    Respiratory:  Negative    Cardiovascular:  Negative    Gastroenterology: Negative    Genitourinary:  Negative    Musculoskeletal:  Negative    Neurologic:  Negative    Psychiatric:  Negative    Heme/Lymph/Imm:  Negative    Endocrine:  Negative      Physical Exam:  Vitals: /69   Pulse 74   Ht 1.638 m (5' 4.5\")   Wt 66.7 kg (147 lb)   BMI 24.84 kg/m     Please refer to dictation for physical exam    Recent Lab Results:  LIPID RESULTS:  Lab Results   Component Value Date    CHOL 147 09/23/2020    HDL 56 09/23/2020    LDL 81 09/23/2020    TRIG 48 09/23/2020    CHOLHDLRATIO 4.2 09/18/2015       LIVER ENZYME RESULTS:  Lab Results   Component Value Date    AST 20 09/23/2020    ALT 40 09/23/2020       CBC RESULTS:  Lab Results   Component Value Date    WBC 11.9 (H) 09/23/2020    RBC 4.94 09/23/2020    HGB 14.9 09/23/2020    HCT 45.9 09/23/2020    MCV 93 09/23/2020    MCH 30.2 09/23/2020    MCHC 32.5 09/23/2020    RDW 13.0 09/23/2020     09/23/2020       BMP RESULTS:  Lab Results   Component Value Date     09/23/2020    POTASSIUM 4.7 09/23/2020    CHLORIDE 101 09/23/2020    CO2 28 09/23/2020    ANIONGAP 5 09/23/2020     (H) 09/23/2020    BUN 22 09/23/2020    CR 0.88 09/23/2020    GFRESTIMATED 65 09/23/2020    GFRESTBLACK 75 09/23/2020    SANGEETA 9.7 " 09/23/2020        A1C RESULTS:  Lab Results   Component Value Date    A1C 6.2 (H) 09/23/2020       INR RESULTS:  Lab Results   Component Value Date    INR 2.1 (A) 08/31/2017    INR 2.8 (A) 08/22/2017    INR 1.07 08/03/2017    INR 1.20 (H) 04/05/2017           CC  Robert Stephen MD  [address] [facility] [address]  [place] [id number]

## 2021-02-03 NOTE — TELEPHONE ENCOUNTER
No Rx in pt's chart for any NOAC. Unable to start PA/Tier Exception without an Rx.  Please place script in pt's chart and route back to writer.

## 2021-02-09 ENCOUNTER — TELEPHONE (OUTPATIENT)
Dept: CARDIOLOGY | Facility: CLINIC | Age: 71
End: 2021-02-09

## 2021-02-09 NOTE — TELEPHONE ENCOUNTER
Central Prior Authorization Team   Phone: [phone]      TIER EXCEPTION DENIED    Medication: apixaban ANTICOAGULANT (ELIQUIS) 5 MG tablet - DENIED    Denial Date: 2/9/2021     Denial Rational: There are no other brand drugs to treat this condition on a lower cost tier. Medication is already provided on the lowest tier.    Case # [id number]    Appeal Information: Must be labeled as a first level appeal and include the case number.  Fax : [phone]

## 2021-02-09 NOTE — TELEPHONE ENCOUNTER
Received note of denial from [facility] PA team  Patient notified of Tier Exception Denied for Eliquis 5mg BID option is to to have Rufina write an appeal letter stating why she should be on this medication.  Patient states her past history after both pregnancies had blood clots in her legs and groin, along with atrial fibrillation.  Will forward to Rufina if willing to write a letter of appeal.

## 2021-02-09 NOTE — TELEPHONE ENCOUNTER
pls write appeal letter, including concern of clinic visits with covid that would occur with coumadin.  Is anyother noac less expensive?Rufina Palacios PA-C 2/9/2021 4:33 PM

## 2021-02-11 ENCOUNTER — MYC MEDICAL ADVICE (OUTPATIENT)
Dept: CARDIOLOGY | Facility: CLINIC | Age: 71
End: 2021-02-11

## 2021-02-11 NOTE — TELEPHONE ENCOUNTER
Phone call to patient to infrom her that after she meets her part D drug deductible her cost of Eliquis will be 23.47/month with a local pharmacy and with a mail order it will be 131.00/month.    She was pleased to know this and verbalized understanding.    She will contact [facility] over the coming week.

## 2021-03-14 ENCOUNTER — HEALTH MAINTENANCE LETTER (OUTPATIENT)
Age: 71
End: 2021-03-14

## 2021-03-22 ENCOUNTER — MYC MEDICAL ADVICE (OUTPATIENT)
Dept: CARDIOLOGY | Facility: CLINIC | Age: 71
End: 2021-03-22

## 2021-03-22 NOTE — TELEPHONE ENCOUNTER
Patient has a boil which she is seeing a dermatologist for. Had patient call her dermatologist for request of hold to Eliquis they were recommending. This is her response via [employer] in [employer].    The dermatologist prefers I not take Eliquis 5 mg Tues morn,Tuesnight and Wed morn if doctor believes this would be safe to do.I have been taking 5 mg in am and 5mg for last month when I initially started medication.Please let me know today if possible.Thank you.    Will message Rufina More for okay on Eliquis hold. Started on Eliquis secondary to paroxysmal atrial fib.

## 2021-03-22 NOTE — TELEPHONE ENCOUNTER
Ok to hold eliquis as dermatologist requests, no bridging required.  Restart when ok with derm.  Very tiny risk of stroke during this time, less than 1%.  Rufina Palacios PA-C 3/22/2021 11:56 AM

## 2021-03-22 NOTE — TELEPHONE ENCOUNTER
[employer] message with Rufina Palacios's okay to hold Eliquis for the dermatology appointment was sent to patient via [employer].

## 2021-03-24 DIAGNOSIS — R73.03 PREDIABETES: ICD-10-CM

## 2021-03-24 DIAGNOSIS — I10 BENIGN ESSENTIAL HYPERTENSION: ICD-10-CM

## 2021-03-24 LAB
ANION GAP SERPL CALCULATED.3IONS-SCNC: 3 MMOL/L (ref 3–14)
BUN SERPL-MCNC: 20 MG/DL (ref 7–30)
CALCIUM SERPL-MCNC: 10.6 MG/DL (ref 8.5–10.1)
CHLORIDE SERPL-SCNC: 102 MMOL/L (ref 94–109)
CO2 SERPL-SCNC: 32 MMOL/L (ref 20–32)
CREAT SERPL-MCNC: 0.8 MG/DL (ref 0.52–1.04)
ERYTHROCYTE [DISTWIDTH] IN BLOOD BY AUTOMATED COUNT: 13.2 % (ref 10–15)
GFR SERPL CREATININE-BSD FRML MDRD: 74 ML/MIN/{1.73_M2}
GLUCOSE SERPL-MCNC: 115 MG/DL (ref 70–99)
HBA1C MFR BLD: 6.2 % (ref 0–5.6)
HCT VFR BLD AUTO: 44.7 % (ref 35–47)
HGB BLD-MCNC: 14.1 G/DL (ref 11.7–15.7)
MCH RBC QN AUTO: 29.7 PG (ref 26.5–33)
MCHC RBC AUTO-ENTMCNC: 31.5 G/DL (ref 31.5–36.5)
MCV RBC AUTO: 94 FL (ref 78–100)
PLATELET # BLD AUTO: 273 10E9/L (ref 150–450)
POTASSIUM SERPL-SCNC: 3.9 MMOL/L (ref 3.4–5.3)
RBC # BLD AUTO: 4.75 10E12/L (ref 3.8–5.2)
SODIUM SERPL-SCNC: 137 MMOL/L (ref 133–144)
WBC # BLD AUTO: 9.1 10E9/L (ref 4–11)

## 2021-03-24 PROCEDURE — 83036 HEMOGLOBIN GLYCOSYLATED A1C: CPT | Performed by: INTERNAL MEDICINE

## 2021-03-24 PROCEDURE — 36415 COLL VENOUS BLD VENIPUNCTURE: CPT | Performed by: INTERNAL MEDICINE

## 2021-03-24 PROCEDURE — 80048 BASIC METABOLIC PNL TOTAL CA: CPT | Performed by: INTERNAL MEDICINE

## 2021-03-24 PROCEDURE — 85027 COMPLETE CBC AUTOMATED: CPT | Performed by: INTERNAL MEDICINE

## 2021-03-29 DIAGNOSIS — Z12.11 SCREEN FOR COLON CANCER: ICD-10-CM

## 2021-03-29 LAB — HEMOCCULT STL QL IA: POSITIVE

## 2021-03-29 PROCEDURE — 82274 ASSAY TEST FOR BLOOD FECAL: CPT | Performed by: INTERNAL MEDICINE

## 2021-04-07 ENCOUNTER — ANCILLARY PROCEDURE (OUTPATIENT)
Dept: MAMMOGRAPHY | Facility: CLINIC | Age: 71
End: 2021-04-07
Payer: COMMERCIAL

## 2021-04-07 ENCOUNTER — OFFICE VISIT (OUTPATIENT)
Dept: INTERNAL MEDICINE | Facility: CLINIC | Age: 71
End: 2021-04-07
Payer: COMMERCIAL

## 2021-04-07 VITALS
HEART RATE: 59 BPM | TEMPERATURE: 97.6 F | SYSTOLIC BLOOD PRESSURE: 112 MMHG | HEIGHT: 65 IN | WEIGHT: 149.1 LBS | DIASTOLIC BLOOD PRESSURE: 62 MMHG | OXYGEN SATURATION: 98 % | BODY MASS INDEX: 24.84 KG/M2

## 2021-04-07 DIAGNOSIS — I70.0 ATHEROSCLEROSIS OF AORTA (H): ICD-10-CM

## 2021-04-07 DIAGNOSIS — E55.9 VITAMIN D DEFICIENCY: ICD-10-CM

## 2021-04-07 DIAGNOSIS — Z12.31 VISIT FOR SCREENING MAMMOGRAM: ICD-10-CM

## 2021-04-07 DIAGNOSIS — L40.50 PSORIASIS WITH ARTHROPATHY (H): ICD-10-CM

## 2021-04-07 DIAGNOSIS — Z12.11 SCREEN FOR COLON CANCER: ICD-10-CM

## 2021-04-07 DIAGNOSIS — M19.90 OSTEOARTHRITIS, UNSPECIFIED OSTEOARTHRITIS TYPE, UNSPECIFIED SITE: ICD-10-CM

## 2021-04-07 DIAGNOSIS — I10 BENIGN ESSENTIAL HYPERTENSION: ICD-10-CM

## 2021-04-07 DIAGNOSIS — J43.9 PULMONARY EMPHYSEMA, UNSPECIFIED EMPHYSEMA TYPE (H): Primary | ICD-10-CM

## 2021-04-07 DIAGNOSIS — Z87.891 PERSONAL HISTORY OF TOBACCO USE: ICD-10-CM

## 2021-04-07 DIAGNOSIS — R73.03 PREDIABETES: ICD-10-CM

## 2021-04-07 DIAGNOSIS — I48.0 PAROXYSMAL ATRIAL FIBRILLATION (H): ICD-10-CM

## 2021-04-07 PROCEDURE — 77067 SCR MAMMO BI INCL CAD: CPT | Mod: TC | Performed by: RADIOLOGY

## 2021-04-07 PROCEDURE — G0296 VISIT TO DETERM LDCT ELIG: HCPCS | Performed by: INTERNAL MEDICINE

## 2021-04-07 PROCEDURE — 99214 OFFICE O/P EST MOD 30 MIN: CPT | Performed by: INTERNAL MEDICINE

## 2021-04-07 RX ORDER — CELECOXIB 200 MG/1
200 CAPSULE ORAL 2 TIMES DAILY PRN
Qty: 200 CAPSULE | Refills: 5 | Status: SHIPPED | OUTPATIENT
Start: 2021-04-07 | End: 2023-05-19

## 2021-04-07 RX ORDER — TIOTROPIUM BROMIDE 18 UG/1
18 CAPSULE ORAL; RESPIRATORY (INHALATION) DAILY
Qty: 90 CAPSULE | Refills: 3 | Status: SHIPPED | OUTPATIENT
Start: 2021-04-07 | End: 2022-04-13

## 2021-04-07 RX ORDER — SPIRONOLACTONE 25 MG/1
25 TABLET ORAL DAILY
Qty: 90 TABLET | Refills: 1 | Status: SHIPPED | OUTPATIENT
Start: 2021-04-07 | End: 2021-10-08

## 2021-04-07 ASSESSMENT — MIFFLIN-ST. JEOR: SCORE: 1179.25

## 2021-04-07 NOTE — PATIENT INSTRUCTIONS
"  *  Colonoscopy as planned in June   In case they need to take a biopsy:      --stop Eliquis for at least 2 days before procedure ( prferably 3 days)   --No aspirin with 7 days (you shuldn't be taking this anyway)   --No motrin, Celebrex within 7 days      [facility]  [email or web address]  [phone] (phone)  [phone] (fax)      *  Continue all medications at the same doses.  Contact your usual pharmacy if you need refills.     *  Return to see me in 6 months, sooner if needed.  Use [place] or Call [phone] to schedule the appointment with me.       5 GOALS TO PREVENT VASCULAR DISEASE:     1.  Aggressive blood pressure control, under 130/80 ideally.  Using medications if needed.    Your blood pressure is under good control    BP Readings from Last 4 Encounters:   04/07/21 112/62   02/02/21 132/69   09/25/20 120/70   02/14/20 120/64       2.  Aggressive LDL cholesterol (\"bad cholesterol\") lowering as indicated.    Your goal is an LDL under 130 for sure, preferably under 100.  (If you have diabetes or previous vascular disease, the the LDL goals would be under 100 for sure, preferably under 70.)    New guidelines identify four high-risk groups who could benefit from statins:   *people with pre-existing heart disease, such as those who have had a heart attack;   *people ages 40 to 75 who have diabetes of any type  *patients ages 40 to 75 with at least a 7.5% risk of developing cardiovascular disease over the next decade, according to a formula described in the guidelines  *patients with the sort of super-high cholesterol that sometimes runs in families, as evidenced by an LDL of 190 milligrams per deciliter or higher    Your cholesterol levels are well controlled.    Recent Labs   Lab Test 09/23/20  0829 06/17/19  0827 09/18/15  0738 09/18/15  0738   CHOL 147 152   < > 221*   HDL 56 51   < > 53   LDL 81 87   < > 144*   TRIG 48 68   < > 122   CHOLHDLRATIO  --   --   --  4.2    < > = values " in this interval not displayed.       3.  Aggressive diabetic prevention, screening and/or management.      You do not have diabetes as of the most recent blood tests.     4.  No smoking    5.  Consider daily preventative aspirin over age 50 if you have enough cardiac risk factors to place you at higher risk for the presence of vascular disease.    If you have any reason not to take aspirin such easy bruising or bleeding, stomach problems, other anticoagulant medications, or any other side effects, then you should not take Aspirin.      --YOU SHOULD NOT TAKE ASPIRING DUE TO ELIQUIS USE.             Preventive Health Recommendations  Female Ages 50 - 64    Yearly exam: See your health care provider every year in order to  o Review health changes.   o Discuss preventive care.    o Review your medicines if your doctor has prescribed any.      Get a Pap test every three years (unless you have an abnormal result and your provider advises testing more often).    If you get Pap tests with HPV test, you only need to test every 5 years, unless you have an abnormal result.     You do not need a Pap test if your uterus was removed (hysterectomy) and you have not had cancer.    You should be tested each year for STDs (sexually transmitted diseases) if you're at risk.     Have a mammogram every 1 to 2 years.    Have a colonoscopy at age 50, or have a yearly FIT test (stool test). These exams screen for colon cancer.      Have a cholesterol test every 5 years, or more often if advised.    Have a diabetes test (fasting glucose) every three years. If you are at risk for diabetes, you should have this test more often.     If you are at risk for osteoporosis (brittle bone disease), think about having a bone density scan (DEXA).    Shots: Get a flu shot each year. Get a tetanus shot every 10 years.    Nutrition:     Eat at least 5 servings of fruits and vegetables each day.    Eat whole-grain bread, whole-wheat pasta and brown rice  "instead of white grains and rice.    Talk to your provider about Calcium and Vitamin D.        --Good Grains:  Oats, brown rice, Quinoa (these do not raise the blood sugar as much)     --Bad grains:  Anything made from wheat or white rice     (because these raise the blood sugars significantly, and the possible gluten issue from wheat for some people).      --Proteins:  Aim for \"lean proteins\" including chicken, fish, seafood, pork, turkey, and eggs (in moderation); Eat red meat only occasionally    Lifestyle    Exercise at least 150 minutes a week (30 minutes a day, 5 days a week). This will help you control your weight and prevent disease.    Limit alcohol to one drink per day.    No smoking.     Wear sunscreen to prevent skin cancer.     See your dentist every six months for an exam and cleaning.    See your eye doctor every 1 to 2 years.    Lung Cancer Screening   Frequently Asked Questions  If you are at high-risk for lung cancer, getting screened with low-dose computed tomography (LDCT) every year can help save your life. This handout offers answers to some of the most common questions about lung cancer screening. If you have other questions, please call [phone] ([phone]).     What is it?  Lung cancer screening uses special X-ray technology to create an image of your lung tissue. The exam is quick and easy and takes less than 10 seconds. We don t give you any medicine or use any needles. You can eat before and after the exam. You don t need to change your clothes as long as the clothing on your chest doesn t contain metal. But, you do need to be able to hold your breath for at least 6 seconds during the exam.    What is the goal of lung cancer screening?  The goal of lung cancer screening is to save lives. Many times, lung cancer is not found until a person starts having physical symptoms. Lung cancer screening can help detect lung cancer in the earliest stages when it may be easier to " treat.    Who should be screened for lung cancer?  We suggest lung cancer screening for anyone who is at high-risk for lung cancer. You are in the high-risk group if you:      are between the ages of 55 and 79, and    have smoked at least 1 pack of cigarettes a day for 30 or more years, and    still smoke or have quit within the past 15 years.    However, if you have a new cough or shortness of breath, you should talk to your doctor before being screened.    Some national lung health advocacy groups also recommend screening for people ages 50 to 79 who have smoked an average of 1 pack of cigarettes a day for 20 years. They must also have at least 1 other risk factor for lung cancer, not including exposure to secondhand smoke. Other risk factors are having had cancer in the past, emphysema, pulmonary fibrosis, COPD, a family history of lung cancer, or exposure to certain materials such as arsenic, asbestos, beryllium, cadmium, chromium, diesel fumes, nickel, radon or silica. Your care team can help you know if you have one of these risk factors.     Why does it matter if I have symptoms?  Certain symptoms can be a sign that you have a condition in your lungs that should be checked and treated by your doctor. These symptoms include fever, chest pain, a new or changing cough, shortness of breath that you have never felt before, coughing up blood or unexplained weight loss. Having any of these symptoms can greatly affect the results of lung cancer screening.       Should all smokers get an LDCT lung cancer screening exam?  It depends. Lung cancer screening is for a very specific group of men and women who have a history of heavy smoking over a long period of time (see  Who should be screened for lung cancer  above).  I am in the high-risk group, but have been diagnosed with cancer in the past. Is LDCT lung cancer screening right for me?  In some cases, you should not have LDCT lung screening, such as when your doctor is  already following your cancer with CT scan studies. Your doctor will help you decide if LDCT lung screening is right for you.  Do I need to have a screening exam every year?  Yes. If you are in the high-risk group described earlier, you should get an LDCT lung cancer screening exam every year until you are 79, or are no longer willing or able to undergo screening and possible procedures to diagnose and treat lung cancer.  How effective is LDCT at preventing death from lung cancer?  Studies have shown that LDCT lung cancer screening can lower the risk of death from lung cancer by 20 percent in people who are at high-risk.  What are the risks?  There are some risks and limitations of LDCT lung cancer screening. We want to make sure you understand the risks and benefits, so please let us know if you have any questions. Your doctor may want to talk with you more about these risks.    Radiation exposure: As with any exam that uses radiation, there is a very small increased risk of cancer. The amount of radiation in LDCT is small--about the same amount a person would get from a mammogram. Your doctor orders the exam when he or she feels the potential benefits outweigh the risks.    False negatives: No test is perfect, including LDCT. It is possible that you may have a medical condition, including lung cancer, that is not found during your exam. This is called a false negative result.    False positives and more testing: LDCT very often finds something in the lung that could be cancer, but in fact is not. This is called a false positive result. False positive tests often cause anxiety. To make sure these findings are not cancer, you may need to have more tests. These tests will be done only if you give us permission. Sometimes patients need a treatment that can have side effects, such as a biopsy. For more information on false positives, see  What can I expect from the results?     Findings not related to lung cancer: Your  LDCT exam also takes pictures of areas of your body next to your lungs. In a very small number of cases, the CT scan will show an abnormal finding in one of these areas, such as your kidneys, adrenal glands, liver or thyroid. This finding may not be serious, but you may need more tests. Your doctor can help you decide what other tests you may need, if any.  What can I expect from the results?  About 1 out of 4 LDCT exams will find something that may need more tests. Most of the time, these findings are lung nodules. Lung nodules are very small collections of tissue in the lung. These nodules are very common, and the vast majority--more than 97 percent--are not cancer (benign). Most are normal lymph nodes or small areas of scarring from past infections.  But, if a small lung nodule is found to be cancer, the cancer can be cured more than 90 percent of the time. To know if the nodule is cancer, we may need to get more images before your next yearly screening exam. If the nodule has suspicious features (for example, it is large, has an odd shape or grows over time), we will refer you to a specialist for further testing.  Will my doctor also get the results?  Yes. Your doctor will get a copy of your results.  Is it okay to keep smoking now that there s a cancer screening exam?  No. Tobacco is one of the strongest cancer-causing agents. It causes not only lung cancer, but other cancers and cardiovascular (heart) diseases as well. The damage caused by smoking builds over time. This means that the longer you smoke, the higher your risk of disease. While it is never too late to quit, the sooner you quit, the better.  Where can I find help to quit smoking?  The best way to prevent lung cancer is to stop smoking. If you have already quit smoking, congratulations and keep it up! For help on quitting smoking, please call [employer] at 8-608-594-OBHX ([phone]) or the American [employer] Society at 1-662.846.7974 to find local resources  near you.  One-on-one health coaching:  If you d prefer to work individually with a health care provider on tobacco cessation, we offer:      Medication Therapy Management:  Our specially trained pharmacists work closely with you and your doctor to help you quit smoking.  Call [phone] or [phone] (toll free).     Can Do: Health coaching offered by [facility].  [email or web address]

## 2021-04-07 NOTE — PROGRESS NOTES
Assessment & Plan     (J43.9) Pulmonary emphysema, unspecified emphysema type (H)  (primary encounter diagnosis)  Comment: This condition is currently controlled on the current medical regimen.  Continue current therapy.  Discussed general issues of COPD, including pathophysiology, ways it will affect the pt., when to seek help, reviewed the typical medications (how they are taken, how they help)   Gave prescriptions to get drugs with any pharmacy for reduced prices.  Plan: tiotropium (SPIRIVA HANDIHALER) 18 MCG inhaled         capsule            (M19.90) Osteoarthritis, unspecified osteoarthritis type, unspecified site  Comment: This condition is currently controlled on the current medical regimen.  Continue current therapy.   Plan: celecoxib (CELEBREX) 200 MG capsule            (I48.0) Paroxysmal atrial fibrillation (H)  Comment: This condition is currently controlled on the current medical regimen.  Continue current therapy.   Plan: ASPIRIN NOT PRESCRIBED (INTENTIONAL)            (I70.0) Atherosclerosis of aorta (H)  Comment: Discussed secondary risk factor modification and recommended continuing aggressive management of these items.   Plan:     (L40.50) Psoriasis with arthropathy (H)  Comment: This condition is currently controlled on the current medical regimen.  Continue current therapy.   Plan:     (Z12.11) Screen for colon cancer  Comment: Due for colonoscopy.  Colonoscopy order placed, stop Eliquis for 3 days before the procedure.  Resume afterwards.  Plan: GASTROENTEROLOGY ADULT REF PROCEDURE ONLY            (I10) Benign essential hypertension  Comment: This condition is currently controlled on the current medical regimen.  Continue current therapy.   Plan: spironolactone (ALDACTONE) 25 MG tablet, CBC         with platelets, Basic metabolic panel,         Hemoglobin A1c            (E55.9) Vitamin D deficiency  Comment:   Plan:     (R73.03) Prediabetes  Comment: This condition is currently controlled on  "the current medical regimen.  Continue current therapy.   Plan: CBC with platelets, Basic metabolic panel,         Hemoglobin A1c            (Z87.891) Personal history of tobacco use  Comment: Recommend annual lung cancer screening with low-dose CT scan.  Plan: Prof Fee: Shared Decision Making Visit for Lung        Cancer Screening, CT Chest Lung Cancer Scrn Low        Dose wo                        BMI:   Estimated body mass index is 25.2 kg/m  as calculated from the following:    Height as of this encounter: 1.638 m (5' 4.5\").    Weight as of this encounter: 67.6 kg (149 lb 1.6 oz).           Return in about 6 months (around 10/7/2021) for Medicare Annual Wellness Exam, Blood pressure.    Kendall Mendez MD  [facility]    Ramone Lynn is a 70 year old who presents for the following health issues     HPI     Hypertension Follow-up      Do you check your blood pressure regularly outside of the clinic? No     Are you following a low salt diet? Yes    Are your blood pressures ever more than 140 on the top number (systolic) OR more   than 90 on the bottom number (diastolic), for example 140/90? n/a    COPD Follow-Up    Overall, how are your COPD symptoms since your last clinic visit?  No change    How much fatigue or shortness of breath do you have when you are walking?  None    How much shortness of breath do you have when you are resting?  None    How often do you cough? Never    Have you noticed any change in your sputum/phlegm?  No    Have you experienced a recent fever? No    Please describe how far you can walk without stopping to rest:  1-2 miles    How many flights of stairs are you able to walk up without stopping?  2    Have you had any Emergency Room Visits, Urgent Care Visits, or Hospital Admissions because of your COPD since your last office visit?  No    History   Smoking Status     Former Smoker     Packs/day: 1.00     Years: 40.00     Types: Cigarettes     " "Start date: 2/7/1964     Quit date: 9/7/2015   Smokeless Tobacco     Never Used     Comment: 5-10 daily     No results found for: FEV1, HYQ4QBP  3.  The patient has a history of impaired glucose tolerance with regularly elevated blood sugars.    They have not been diagnosed with type II DM or placed on medications for diabetes before.   They deny polyuria, polydipsia.     The patient is not obese with a BMI of Body mass index is 25.2 kg/m ..    Review of current labs show:    Lab Results   Component Value Date    A1C 6.2 03/24/2021    A1C 6.2 09/23/2020    A1C 6.2 12/17/2019    A1C 6.0 06/17/2019    A1C 6.1 11/02/2018    A1C 6.4 03/21/2017     @bgl@     4.  History of atherosclerosis seen in the aorta on previous imaging.            Review of Systems   Constitutional, HEENT, cardiovascular, pulmonary, gi and gu systems are negative, except as otherwise noted.      Objective    /62   Pulse 59   Temp 97.6  F (36.4  C) (Temporal)   Ht 1.638 m (5' 4.5\")   Wt 67.6 kg (149 lb 1.6 oz)   SpO2 98%   BMI 25.20 kg/m    Body mass index is 25.2 kg/m .  Physical Exam   GENERAL alert and no distress  EYES:  Normal sclera,conjunctiva, EOMI  HENT: oral and posterior pharynx without lesions or erythema, facies symmetric  NECK: Neck supple. No LAD, without thyroidmegaly.  RESP: Clear to ausculation bilaterally without wheezes or crackles. Normal BS in all fields.  CV: RRR normal S1S2 without murmurs, rubs or gallops.  LYMPH: no cervical lymph adenopathy appreciated  MS: extremities- no gross deformities of the visible extremities noted,   EXT:  no lower extremity edema  PSYCH: Alert and oriented times 3; speech- coherent  SKIN:  No obvious significant skin lesions on visible portions of face                 "

## 2021-04-07 NOTE — PROGRESS NOTES
Lung Cancer Screening Shared Decision Making Visit     Marva Angela is eligible for lung cancer screening on the basis of the information provided in my signed lung cancer screening order.     I have discussed with patient the risks and benefits of screening for lung cancer with low-dose CT.     The risks include:  radiation exposure: one low dose chest CT has as much ionizing radiation as about 15 chest x-rays or 6 months of background radiation living in [place]    false positives: 96% of positive findings/nodules are NOT cancer, but some might still require additional diagnostic evaluation, including biopsy  over-diagnosis: some slow growing cancers that might never have been clinically significant will be detected and treated unnecessarily     The benefit of early detection of lung cancer is contingent upon adherence to annual screening or more frequent follow up if indicated.     Furthermore, reaping the benefits of screening requires Marva Angela to be willing and physically able to undergo diagnostic procedures, if indicated. Although no specific guide is available for determining severity of comorbidities, it is reasonable to withhold screening in patients who have greater mortality risk from other diseases.     We did discuss that the only way to prevent lung cancer is to not smoke. Smoking cessation counseling was not given.      I did not offer risk estimation using a calculator such as this one:    ShouldIScreen

## 2021-04-14 ENCOUNTER — HOSPITAL ENCOUNTER (OUTPATIENT)
Dept: CT IMAGING | Facility: CLINIC | Age: 71
Discharge: HOME | End: 2021-04-14
Attending: INTERNAL MEDICINE | Admitting: INTERNAL MEDICINE
Payer: COMMERCIAL

## 2021-04-14 DIAGNOSIS — Z87.891 PERSONAL HISTORY OF TOBACCO USE: ICD-10-CM

## 2021-04-14 PROCEDURE — 71271 CT THORAX LUNG CANCER SCR C-: CPT

## 2021-04-16 ENCOUNTER — TELEPHONE (OUTPATIENT)
Dept: INTERNAL MEDICINE | Facility: CLINIC | Age: 71
End: 2021-04-16

## 2021-04-16 NOTE — TELEPHONE ENCOUNTER
Coronary artery calcifications are very common incidental findings on lung cancer screening CT scans.  These same calcifications have been seen on her other CT scans in the past in 2016.    She has had 3 stress tests in last 5 years showing no evidence for active heart disease.    We have been managing her cardiac risk factors aggressively, this is the reason she is on atorvastatin.      Nothing further to do at this time.  Continue all medications at the same doses.      If she has further questions or concerns, please schedule a virtual appointment to discuss further.

## 2021-04-16 NOTE — TELEPHONE ENCOUNTER
Please review CT chest results:    2. Significant Incidental Finding(s):  Category S: Yes. Coronary  artery calcium moderate

## 2021-05-21 ENCOUNTER — CARE COORDINATION (OUTPATIENT)
Dept: CARDIOLOGY | Facility: CLINIC | Age: 71
End: 2021-05-21

## 2021-05-21 NOTE — PROGRESS NOTES
Message from [facility] [phone] for hold orders for Eliquis for colonoscopy.   On Eliquis for Paroxysmal atrial fibrillation with brief runs and SVT on monitor and  CHADS-VASc of 3. Eliquis 5 mg b.i.d ordered at last office visit.    Messaged [facility] for review and recommendations.  Sarah Cantrell RN 05/21/21 10:29 AM    ================================    More, Rufina Gr PA-C 5/24/2021 (12:24 PM)        Ok to hold eliquis for colonoscopy, low risk of stroke <1% during that time.  Typical hold is 2 days, ok to hold longer if GI would like.  Rufina Palacios PA-C 5/24/2021 12:24 PM        ===========================    Called to [facility] with [facility] recommendations.  Sarah Cantrell RN 05/24/21 12:35 PM

## 2021-05-24 NOTE — PROGRESS NOTES
Ok to hold eliquis for colonoscopy, low risk of stroke <1% during that time.  Typical hold is 2 days, ok to hold longer if GI would like.  Rufina Palacios PA-C 5/24/2021 12:24 PM

## 2021-05-25 NOTE — PROGRESS NOTES
Call back from [facility] for clarification of Rufina Palacios PA-C response to hold orders - left message with Rufina palacios recommendations.  Sarah Cantrell RN 05/25/21 4:27 PM

## 2021-06-21 ENCOUNTER — TRANSFERRED RECORDS (OUTPATIENT)
Dept: HEALTH INFORMATION MANAGEMENT | Facility: CLINIC | Age: 71
End: 2021-06-21

## 2021-09-30 ENCOUNTER — LAB (OUTPATIENT)
Dept: LAB | Facility: CLINIC | Age: 71
End: 2021-09-30
Payer: COMMERCIAL

## 2021-09-30 DIAGNOSIS — I10 BENIGN ESSENTIAL HYPERTENSION: ICD-10-CM

## 2021-09-30 DIAGNOSIS — R73.03 PREDIABETES: ICD-10-CM

## 2021-09-30 LAB
ANION GAP SERPL CALCULATED.3IONS-SCNC: 5 MMOL/L (ref 3–14)
BUN SERPL-MCNC: 23 MG/DL (ref 7–30)
CALCIUM SERPL-MCNC: 9.4 MG/DL (ref 8.5–10.1)
CHLORIDE BLD-SCNC: 105 MMOL/L (ref 94–109)
CO2 SERPL-SCNC: 31 MMOL/L (ref 20–32)
CREAT SERPL-MCNC: 0.7 MG/DL (ref 0.52–1.04)
ERYTHROCYTE [DISTWIDTH] IN BLOOD BY AUTOMATED COUNT: 13.2 % (ref 10–15)
GFR SERPL CREATININE-BSD FRML MDRD: 86 ML/MIN/1.73M2
GLUCOSE BLD-MCNC: 101 MG/DL (ref 70–99)
HBA1C MFR BLD: 6 % (ref 0–5.6)
HCT VFR BLD AUTO: 44.3 % (ref 35–47)
HGB BLD-MCNC: 14.2 G/DL (ref 11.7–15.7)
MCH RBC QN AUTO: 30.5 PG (ref 26.5–33)
MCHC RBC AUTO-ENTMCNC: 32.1 G/DL (ref 31.5–36.5)
MCV RBC AUTO: 95 FL (ref 78–100)
PLATELET # BLD AUTO: 258 10E3/UL (ref 150–450)
POTASSIUM BLD-SCNC: 3.9 MMOL/L (ref 3.4–5.3)
RBC # BLD AUTO: 4.66 10E6/UL (ref 3.8–5.2)
SODIUM SERPL-SCNC: 141 MMOL/L (ref 133–144)
WBC # BLD AUTO: 5.7 10E3/UL (ref 4–11)

## 2021-09-30 PROCEDURE — 83036 HEMOGLOBIN GLYCOSYLATED A1C: CPT

## 2021-09-30 PROCEDURE — 80048 BASIC METABOLIC PNL TOTAL CA: CPT

## 2021-09-30 PROCEDURE — 36415 COLL VENOUS BLD VENIPUNCTURE: CPT

## 2021-09-30 PROCEDURE — 85027 COMPLETE CBC AUTOMATED: CPT

## 2021-10-08 ENCOUNTER — OFFICE VISIT (OUTPATIENT)
Dept: INTERNAL MEDICINE | Facility: CLINIC | Age: 71
End: 2021-10-08
Payer: COMMERCIAL

## 2021-10-08 VITALS
HEIGHT: 65 IN | DIASTOLIC BLOOD PRESSURE: 66 MMHG | SYSTOLIC BLOOD PRESSURE: 118 MMHG | HEART RATE: 58 BPM | TEMPERATURE: 97.4 F | OXYGEN SATURATION: 97 % | WEIGHT: 156 LBS | BODY MASS INDEX: 25.99 KG/M2

## 2021-10-08 DIAGNOSIS — J44.9 CHRONIC OBSTRUCTIVE PULMONARY DISEASE, UNSPECIFIED COPD TYPE (H): Primary | ICD-10-CM

## 2021-10-08 DIAGNOSIS — E55.9 VITAMIN D DEFICIENCY: ICD-10-CM

## 2021-10-08 DIAGNOSIS — E78.5 HYPERLIPIDEMIA LDL GOAL <130: ICD-10-CM

## 2021-10-08 DIAGNOSIS — L40.50 PSORIASIS WITH ARTHROPATHY (H): ICD-10-CM

## 2021-10-08 DIAGNOSIS — Z23 NEED FOR INFLUENZA VACCINATION: ICD-10-CM

## 2021-10-08 DIAGNOSIS — Z13.820 SCREENING FOR OSTEOPOROSIS: ICD-10-CM

## 2021-10-08 DIAGNOSIS — J43.9 PULMONARY EMPHYSEMA, UNSPECIFIED EMPHYSEMA TYPE (H): ICD-10-CM

## 2021-10-08 DIAGNOSIS — R73.03 PREDIABETES: ICD-10-CM

## 2021-10-08 DIAGNOSIS — Z13.29 SCREENING FOR THYROID DISORDER: ICD-10-CM

## 2021-10-08 DIAGNOSIS — I10 BENIGN ESSENTIAL HYPERTENSION: ICD-10-CM

## 2021-10-08 DIAGNOSIS — Z78.0 ASYMPTOMATIC MENOPAUSAL STATE: ICD-10-CM

## 2021-10-08 DIAGNOSIS — M85.80 OSTEOPENIA, UNSPECIFIED LOCATION: ICD-10-CM

## 2021-10-08 LAB
ALT SERPL W P-5'-P-CCNC: 37 U/L (ref 0–50)
CHOLEST SERPL-MCNC: 172 MG/DL
FASTING STATUS PATIENT QL REPORTED: YES
HDLC SERPL-MCNC: 64 MG/DL
LDLC SERPL CALC-MCNC: 96 MG/DL
NONHDLC SERPL-MCNC: 108 MG/DL
TRIGL SERPL-MCNC: 59 MG/DL
TSH SERPL DL<=0.005 MIU/L-ACNC: 1.03 MU/L (ref 0.4–4)

## 2021-10-08 PROCEDURE — 99214 OFFICE O/P EST MOD 30 MIN: CPT | Mod: 25 | Performed by: INTERNAL MEDICINE

## 2021-10-08 PROCEDURE — 90662 IIV NO PRSV INCREASED AG IM: CPT | Performed by: INTERNAL MEDICINE

## 2021-10-08 PROCEDURE — 80061 LIPID PANEL: CPT | Performed by: INTERNAL MEDICINE

## 2021-10-08 PROCEDURE — 84443 ASSAY THYROID STIM HORMONE: CPT | Performed by: INTERNAL MEDICINE

## 2021-10-08 PROCEDURE — 84460 ALANINE AMINO (ALT) (SGPT): CPT | Performed by: INTERNAL MEDICINE

## 2021-10-08 PROCEDURE — 36415 COLL VENOUS BLD VENIPUNCTURE: CPT | Performed by: INTERNAL MEDICINE

## 2021-10-08 PROCEDURE — G0008 ADMIN INFLUENZA VIRUS VAC: HCPCS | Performed by: INTERNAL MEDICINE

## 2021-10-08 RX ORDER — ATORVASTATIN CALCIUM 20 MG/1
20 TABLET, FILM COATED ORAL DAILY
Qty: 90 TABLET | Refills: 3 | Status: SHIPPED | OUTPATIENT
Start: 2021-10-08 | End: 2022-03-16

## 2021-10-08 RX ORDER — SPIRONOLACTONE 25 MG/1
25 TABLET ORAL DAILY
Qty: 90 TABLET | Refills: 1 | Status: SHIPPED | OUTPATIENT
Start: 2021-10-08 | End: 2022-04-13

## 2021-10-08 RX ORDER — ALBUTEROL SULFATE 0.83 MG/ML
2.5 SOLUTION RESPIRATORY (INHALATION) EVERY 6 HOURS PRN
Qty: 90 ML | Refills: 3 | Status: SHIPPED | OUTPATIENT
Start: 2021-10-08 | End: 2022-11-15

## 2021-10-08 RX ORDER — ALBUTEROL SULFATE 90 UG/1
2 AEROSOL, METERED RESPIRATORY (INHALATION) EVERY 6 HOURS PRN
Qty: 18 G | Refills: 11 | Status: SHIPPED | OUTPATIENT
Start: 2021-10-08 | End: 2022-11-15

## 2021-10-08 ASSESSMENT — MIFFLIN-ST. JEOR: SCORE: 1205.55

## 2021-10-08 NOTE — PATIENT INSTRUCTIONS
"*  Schedule bone density exam ( DEXA) at your convenience.  Last done 2016    *  Continue all medications at the same doses.  Contact your usual pharmacy if you need refills.     *  Return to see me in approximately 6 months, sooner if needed.  Please get nonfasting labs done in the [facility] or at any other [facility] lab 1-2 days before this appointment.  If you get the labs done at another clinic, make arrangements with that clinic directly.  The orders will be in place.  Use [employer] or Call [phone] to schedule the appointment with me and lab appointment.         5 GOALS IN MANAGING DIABETES (i.e. to give the best chance to prevent diabetic complications and vascular disease):     1.  Aggressive diabetic management.  The target for A1C (3 months average blood sugar) is ideally below 6.5, preferably below 7.5    Your diabetes is under good control.      Lab Results   Component Value Date    A1C 6.0 09/30/2021    A1C 6.2 03/24/2021    A1C 6.2 09/23/2020    A1C 6.2 12/17/2019    A1C 6.0 06/17/2019    A1C 6.1 11/02/2018    A1C 6.4 03/21/2017       2.  Aggressive blood pressure control, under 130/80 ideally.  Using medications if needed.    Your blood pressure is under good control    BP Readings from Last 4 Encounters:   10/08/21 118/66   04/07/21 112/62   02/02/21 132/69   09/25/20 120/70       3.  Aggressive LDL cholesterol (bad cholesterol) lowering as needed.  Your goal is an LDL under 100 for sure, preferably under 70.     *  All patients with diabetes are recommended to be on a \"statin\" cholesterol lowering medication regardless of the cholesterol levels to give the best chance at avoiding vascular disease.      New guidelines identify four high-risk groups who could benefit from statins:   *people with pre-existing heart disease, such as those who have had a heart attack;   *people ages 40 to 75 who have diabetes of any type  *patients ages 40 to 75 with at least a 7.5% risk of developing " "cardiovascular disease over the next decade, according to a formula described in the guidelines  *patients with the sort of super-high cholesterol that sometimes runs in families, as evidenced by an LDL of 190 milligrams per deciliter or higher    *  Your cholesterol levels are well controlled.    Recent Labs   Lab Test 09/23/20  0829 06/17/19  0827 04/19/16  0846 09/18/15  0738   CHOL 147 152   < > 221*   HDL 56 51   < > 53   LDL 81 87   < > 144*   TRIG 48 68   < > 122   CHOLHDLRATIO  --   --   --  4.2    < > = values in this interval not displayed.       4.  No smoking    5.  Aspirin 81 mg tablet once per day, every day over age 50 unless there is a specific reason that you cannot take aspirin.       *You should take Aspirin 81 mg once per day, unless you have a reason NOT to take aspirin (i.e. side effect, excessive bruising or bleeding, taking another \"blood tinning\" medication, etc.)       DIABETES REMINDERS:   1. Check your blood glucose regularly either with standard glucometer or personal continuous glucose monitor.    2) Your blood sugar goals:  before eating and  two hours after eating.  If using personal continuous glucose monitor, the goal is Time in the Target range ( ) of 70-75% with very few (under 2%) Below target.    3) Always be prepared to treat low blood sugar symptoms should it happen. Keep a sugar-containing beverage or food nearby.  4) When to call your clinic:     Blood sugar over 400     If you have 2 to 3 low blood sugars (under 70) in a row    Low readings the same time of day several days in a row  5) When to call 911: If your low blood glucose symptoms do not get better with treatment, or if you/someone else is unable to give you treatment.  6) Work with a Certified Diabetes Educator to assist you with your diabetes management  7) Contact us if you have ANY questions about your medications or instructions, or have problems with getting prescriptions filled.     "

## 2021-10-08 NOTE — PROGRESS NOTES
Assessment & Plan     (J44.9) Chronic obstructive pulmonary disease, unspecified COPD type (H)  (primary encounter diagnosis)  Comment: Known issue that I take into account for their medical decisions; no current exacerbations, or new concerns.  This condition is currently controlled on the current medical regimen.  Continue current therapy.    Plan: albuterol (PROAIR HFA/PROVENTIL HFA/VENTOLIN         HFA) 108 (90 Base) MCG/ACT inhaler, REVIEW OF         HEALTH MAINTENANCE PROTOCOL ORDERS            (L40.50) Psoriasis with arthropathy (H)  Comment: Known issue that I take into account for their medical decisions; no current exacerbations, or new concerns.  This condition is currently controlled on the current medical regimen.  Continue current therapy.   Plan: REVIEW OF HEALTH MAINTENANCE PROTOCOL ORDERS            (I10) Benign essential hypertension  Comment: This condition is currently controlled on the current medical regimen.  Continue current therapy.   Discussed current hypertension treatment guidelines, including indications for treatment and treatment options.  Discussed the importance for aggressive management of HTN to prevent vascular complications later.  Recommended lower fat, lower carbohydrate, and lower sodium (<2000 mg)diet.  Discussed required intervals for follow up on HTN, lab studies.  Recommened pt. occasionally follow their blood pressures outside the clinic to ensure that BPs are remaining within guidelines, and to contact me if the readings are not within guidelines on a regular basis so we can adjust treatment as needed.   Plan: spironolactone (ALDACTONE) 25 MG tablet, REVIEW        OF HEALTH MAINTENANCE PROTOCOL ORDERS            (E78.5) Hyperlipidemia LDL goal <130  Comment: This condition is currently controlled on the current medical regimen.  Continue current therapy.   Plan: atorvastatin (LIPITOR) 20 MG tablet, Lipid         panel reflex to direct LDL Fasting, ALT, REVIEW         "OF HEALTH MAINTENANCE PROTOCOL ORDERS            (J43.9) Pulmonary emphysema, unspecified emphysema type (H)  Comment: This condition is currently controlled on the current medical regimen.  Continue current therapy.   Plan: albuterol (PROVENTIL) (2.5 MG/3ML) 0.083% neb         solution, REVIEW OF HEALTH MAINTENANCE PROTOCOL        ORDERS            (Z13.29) Screening for thyroid disorder  Comment:   Plan: TSH with free T4 reflex, REVIEW OF HEALTH         MAINTENANCE PROTOCOL ORDERS            (Z13.820) Screening for osteoporosis  Comment:   Plan: DX Hip/Pelvis/Spine, REVIEW OF HEALTH         MAINTENANCE PROTOCOL ORDERS            (M85.80) Osteopenia, unspecified location  Comment:   Plan: DX Hip/Pelvis/Spine, REVIEW OF HEALTH         MAINTENANCE PROTOCOL ORDERS            (E55.9) Vitamin D deficiency  Comment:   Plan: REVIEW OF HEALTH MAINTENANCE PROTOCOL ORDERS            (R73.03) Prediabetes  Comment:   Plan: REVIEW OF HEALTH MAINTENANCE PROTOCOL ORDERS            (Z78.0) Asymptomatic menopausal state   Comment: due for DEXA scan.,   Plan: DX Hip/Pelvis/Spine, REVIEW OF HEALTH         MAINTENANCE PROTOCOL ORDERS            (Z23) Need for influenza vaccination  Comment:   Plan: INFLUENZA, QUAD, HIGH DOSE, PF, 65YR + (FLUZONE        HD), REVIEW OF HEALTH MAINTENANCE PROTOCOL         ORDERS                      BMI:   Estimated body mass index is 26.36 kg/m  as calculated from the following:    Height as of this encounter: 1.638 m (5' 4.5\").    Weight as of this encounter: 70.8 kg (156 lb).           Return in about 6 months (around 4/8/2022) for Blood pressure, COPD.    Kendall Mendez MD  [facility]   Leah is a 71 year old who presents for the following health issues     HPI   Lab results-fasting today for cholesterol (not ordered for this year)  6 month follow up    Hyperlipidemia Follow-Up      Are you regularly taking any medication or supplement to lower " your cholesterol?   Yes- Atorvastatin    Are you having muscle aches or other side effects that you think could be caused by your cholesterol lowering medication?  No    Hypertension Follow-up      Do you check your blood pressure regularly outside of the clinic? Yes occasionally    Are you following a low salt diet? Yes    Are your blood pressures ever more than 140 on the top number (systolic) OR more   than 90 on the bottom number (diastolic), for example 140/90? No    Vascular Disease Follow-up      How often do you take nitroglycerin? Never-not on med list    Do you take an aspirin every day? No not indicated    COPD Follow-Up    Overall, how are your COPD symptoms since your last clinic visit?  No change    How much fatigue or shortness of breath do you have when you are walking?  Same as usual    How much shortness of breath do you have when you are resting?  None    How often do you cough? Never    Have you noticed any change in your sputum/phlegm?  No    Have you experienced a recent fever? No    Please describe how far you can walk without stopping to rest:  1-2 miles    How many flights of stairs are you able to walk up without stopping?  2    Have you had any Emergency Room Visits, Urgent Care Visits, or Hospital Admissions because of your COPD since your last office visit?  No    History   Smoking Status     Former Smoker     Packs/day: 1.00     Years: 40.00     Types: Cigarettes     Start date: 2/7/1964     Quit date: 9/7/2015   Smokeless Tobacco     Never Used     Comment: 5-10 daily     No results found for: FEV1, [id number]    **I reviewed the information recorded in the patient's EPIC chart (including but not limited to medical history, surgical history, family history, problem list, medication list, and allergy list) and updated the information as indicated based on the patients reported information.         Review of Systems   Constitutional, HEENT, cardiovascular, pulmonary, gi and gu systems are  "negative, except as otherwise noted.      Objective    /66   Pulse 58   Temp 97.4  F (36.3  C) (Tympanic)   Ht 1.638 m (5' 4.5\")   Wt 70.8 kg (156 lb)   LMP  (LMP Unknown)   SpO2 97%   Breastfeeding No   BMI 26.36 kg/m    Body mass index is 26.36 kg/m .  Physical Exam   GENERAL alert and no distress  EYES:  Normal sclera,conjunctiva, EOMI  HENT: oral and posterior pharynx without lesions or erythema, facies symmetric  NECK: Neck supple. No LAD, without thyroidmegaly.  RESP: Clear to ausculation bilaterally without wheezes or crackles. Normal BS in all fields.  CV: RRR normal S1S2 without murmurs, rubs or gallops.  LYMPH: no cervical lymph adenopathy appreciated  MS: extremities- no gross deformities of the visible extremities noted,   EXT:  no lower extremity edema  PSYCH: Alert and oriented times 3; speech- coherent  SKIN:  No obvious significant skin lesions on visible portions of face                 "

## 2021-10-21 ENCOUNTER — ANCILLARY PROCEDURE (OUTPATIENT)
Dept: BONE DENSITY | Facility: CLINIC | Age: 71
End: 2021-10-21
Attending: INTERNAL MEDICINE
Payer: COMMERCIAL

## 2021-10-21 DIAGNOSIS — Z78.0 ASYMPTOMATIC MENOPAUSAL STATE: ICD-10-CM

## 2021-10-21 DIAGNOSIS — Z13.820 SCREENING FOR OSTEOPOROSIS: ICD-10-CM

## 2021-10-21 DIAGNOSIS — M85.80 OSTEOPENIA, UNSPECIFIED LOCATION: ICD-10-CM

## 2021-10-21 PROCEDURE — 77085 DXA BONE DENSITY AXL VRT FX: CPT | Performed by: INTERNAL MEDICINE

## 2022-01-17 DIAGNOSIS — I48.0 PAROXYSMAL ATRIAL FIBRILLATION (H): ICD-10-CM

## 2022-01-17 NOTE — TELEPHONE ENCOUNTER
Phone request from: patient  Medication requested: written Rx for Eliquis to - request we mail Rx to patient home so she can send to [facility] [place] for discount of $ 400 per year. Per Leah this takes 4 to 6 weeks to process, so she needs it to be sent soon.  Last Office visit with: Rufina Palacios PA-C   2/2/22  Follow up scheduled: Dr Stephen 3/16/2022  Written Rx to Rufina for signature. Signature obtainedand maileed to patient home.  /Sarah Cantrell RN 01/17/22 10:49 AM

## 2022-01-31 ENCOUNTER — TELEPHONE (OUTPATIENT)
Dept: CARDIOLOGY | Facility: CLINIC | Age: 72
End: 2022-01-31
Payer: COMMERCIAL

## 2022-01-31 NOTE — TELEPHONE ENCOUNTER
Prior Authorization Approval    Authorization Effective Date: 1/1/2022  Authorization Expiration Date: 12/31/2022  Medication: diltiazem ER COATED BEADS (CARDIZEM CD/CARTIA XT) 180 MG 24 hr capsule TIER EXCEPTION--APPROVED  Approved Dose/Quantity:   Reference #:     Insurance Company: [employer] - Phone [phone] Fax [phone]  Expected CoPay:       CoPay Card Available:      Foundation Assistance Needed:    Which Pharmacy is filling the prescription (Not needed for infusion/clinic administered): [id number] - [place], [place] - [address] AT [place] [address] & [facility]  Pharmacy Notified: Yes  Patient Notified: Yes **Instructed pharmacy to notify patient when script is ready to /ship.**

## 2022-01-31 NOTE — TELEPHONE ENCOUNTER
PA Initiation    Medication: diltiazem ER COATED BEADS (CARDIZEM CD/CARTIA XT) 180 MG 24 hr capsule TIER EXCEPTION  Insurance Company: [employer] - Phone [phone] Fax [phone]  Pharmacy Filling the Rx: [id number] - [place] [place] - [address] AT [facility] [address] [facility] [address]  Filling Pharmacy Phone: [phone]  Filling Pharmacy Fax: [phone]  Start Date: 1/31/2022

## 2022-01-31 NOTE — TELEPHONE ENCOUNTER
[facility] Call Center    Phone Message    May a detailed message be left on voicemail: yes     Reason for Call: Medication Request -tier one exception needed    Pt needs Tier one exception for Diltiazem--Pt states that she has gotten this before from the clinic--and needs you to fill out paperwork or whatever to get that approved for her.    Dr.Charles Shahid Stephen or HORTENCIA Herrera is ordering provider    Action Taken: Sent to RN pool [place]    Travel Screening: Not Applicable

## 2022-02-24 DIAGNOSIS — I48.0 PAROXYSMAL ATRIAL FIBRILLATION (H): ICD-10-CM

## 2022-02-24 RX ORDER — DILTIAZEM HYDROCHLORIDE 180 MG/1
180 CAPSULE, COATED, EXTENDED RELEASE ORAL DAILY
Qty: 90 CAPSULE | Refills: 0 | Status: SHIPPED | OUTPATIENT
Start: 2022-02-24 | End: 2022-03-16

## 2022-03-03 ENCOUNTER — MYC MEDICAL ADVICE (OUTPATIENT)
Dept: INTERNAL MEDICINE | Facility: CLINIC | Age: 72
End: 2022-03-03
Payer: COMMERCIAL

## 2022-03-03 DIAGNOSIS — I48.0 PAROXYSMAL ATRIAL FIBRILLATION (H): ICD-10-CM

## 2022-03-03 DIAGNOSIS — M85.80 OSTEOPENIA, UNSPECIFIED LOCATION: ICD-10-CM

## 2022-03-03 DIAGNOSIS — R73.03 PREDIABETES: ICD-10-CM

## 2022-03-03 DIAGNOSIS — J43.9 PULMONARY EMPHYSEMA, UNSPECIFIED EMPHYSEMA TYPE (H): ICD-10-CM

## 2022-03-03 DIAGNOSIS — E78.5 HYPERLIPIDEMIA LDL GOAL <130: ICD-10-CM

## 2022-03-03 DIAGNOSIS — J44.9 CHRONIC OBSTRUCTIVE PULMONARY DISEASE, UNSPECIFIED COPD TYPE (H): ICD-10-CM

## 2022-03-03 DIAGNOSIS — M19.90 OSTEOARTHRITIS, UNSPECIFIED OSTEOARTHRITIS TYPE, UNSPECIFIED SITE: ICD-10-CM

## 2022-03-03 DIAGNOSIS — I70.0 ATHEROSCLEROSIS OF AORTA (H): Primary | ICD-10-CM

## 2022-03-03 DIAGNOSIS — L40.50 PSORIASIS WITH ARTHROPATHY (H): ICD-10-CM

## 2022-03-03 DIAGNOSIS — I10 BENIGN ESSENTIAL HYPERTENSION: ICD-10-CM

## 2022-03-03 NOTE — TELEPHONE ENCOUNTER
Please see mychart from patient and advise appropriate course of action.      Labs are pended for review      Naresh Maddox RN  [facility] Triage Nurse

## 2022-03-04 NOTE — TELEPHONE ENCOUNTER
No need for fasting labs, lipids were normal in the fall.    He does need the A1c, BMP, and CBC, these are nonfasting.    Future orders placed.       We will discuss these results in further detail in her upcoming appointment.

## 2022-03-16 ENCOUNTER — OFFICE VISIT (OUTPATIENT)
Dept: CARDIOLOGY | Facility: CLINIC | Age: 72
End: 2022-03-16
Payer: COMMERCIAL

## 2022-03-16 VITALS
BODY MASS INDEX: 27.61 KG/M2 | HEART RATE: 68 BPM | WEIGHT: 165.7 LBS | HEIGHT: 65 IN | DIASTOLIC BLOOD PRESSURE: 58 MMHG | SYSTOLIC BLOOD PRESSURE: 116 MMHG

## 2022-03-16 DIAGNOSIS — E78.5 HYPERLIPIDEMIA LDL GOAL <130: ICD-10-CM

## 2022-03-16 DIAGNOSIS — I10 BENIGN ESSENTIAL HYPERTENSION: ICD-10-CM

## 2022-03-16 DIAGNOSIS — I48.0 PAROXYSMAL ATRIAL FIBRILLATION (H): Primary | ICD-10-CM

## 2022-03-16 PROCEDURE — 99214 OFFICE O/P EST MOD 30 MIN: CPT | Performed by: INTERNAL MEDICINE

## 2022-03-16 RX ORDER — ATORVASTATIN CALCIUM 20 MG/1
20 TABLET, FILM COATED ORAL DAILY
Qty: 90 TABLET | Refills: 3 | Status: SHIPPED | OUTPATIENT
Start: 2022-03-16 | End: 2023-03-23

## 2022-03-16 RX ORDER — DILTIAZEM HYDROCHLORIDE 180 MG/1
180 CAPSULE, COATED, EXTENDED RELEASE ORAL DAILY
Qty: 90 CAPSULE | Refills: 3 | Status: SHIPPED | OUTPATIENT
Start: 2022-03-16 | End: 2023-02-06

## 2022-03-16 NOTE — PROGRESS NOTES
"Cardiology Progress Note          Assessment and Plan:       1. Preoperative cardiovascular assessment    Patient may proceed with hip surgery without any further cardiovascular testing unless she develops cardiac symptoms in the meantime.      2. Paroxysmal atrial fibrillation, asymptomatic at this time    Continue anticoagulation and diltiazem.  May interrupt the anticoagulation for hip surgery this summer.    Follow-up in 6 months with Rufina More      30 minutes was spent with the patient, precharting and reviewing tests as well as post visit charting all done today..    This note was transcribed using electronic voice recognition software and there may be typographical errors present.                    Interval History:     The patient is a very pleasant 71 year old whom I have been following for paroxysmal atrial fibrillation that was previously symptomatic prior to diltiazem.  She has not felt any palpitations since that time.  She is also anticoagulated in the last year to prevent stroke with recurrent atrial fibrillation.    Her heart rate is a little bit irregular today, she may be in atrial fibrillation.  She is asymptomatic.  She has upcoming hip surgery this summer for which she may need to interrupt her Eliquis.  Overall, no cardiovascular complaints at this time.                     Review of Systems:     Review of Systems:  Skin:  Negative     Eyes:  Positive for glasses  ENT:  Negative    Respiratory:  Positive for dyspnea on exertion  Cardiovascular:  Negative    Gastroenterology: Positive for heartburn  Genitourinary:  Negative    Musculoskeletal:  Positive for arthritis  Neurologic:  Negative    Psychiatric:  Negative    Heme/Lymph/Imm:  Negative    Endocrine:  Negative                Physical Exam:     Vitals: /58   Pulse 68   Ht 1.638 m (5' 4.5\")   Wt 75.2 kg (165 lb 11.2 oz)   LMP  (LMP Unknown)   BMI 28.00 kg/m    Constitutional:  cooperative, alert and oriented, well developed, " well nourished, in no acute distress        Skin:  warm and dry to the touch, no apparent skin lesions or masses noted        Head:  normocephalic, no masses or lesions        Eyes:  pupils equal and round, conjunctivae and lids unremarkable, sclera white, no xanthalasma, EOMS intact, no nystagmus        Chest:  normal symmetry        Cardiac: normal S1 and S2 irregular rhythm     systolic murmur;LUSB;grade 1          Extremities and Back:  no deformities, clubbing, cyanosis, erythema observed;no edema        Neurological:  no gross motor deficits;affect appropriate                 Medications:     Current Outpatient Medications   Medication Sig Dispense Refill     Acetaminophen (TYLENOL PO) Take 1,000 mg by mouth 2 times daily as needed for mild pain or fever       albuterol (PROAIR HFA/PROVENTIL HFA/VENTOLIN HFA) 108 (90 Base) MCG/ACT inhaler Inhale 2 puffs into the lungs every 6 hours as needed for shortness of breath / dyspnea or wheezing GENERIC VENTOLIN/ALBUTEROL 18 g 11     albuterol (PROVENTIL) (2.5 MG/3ML) 0.083% neb solution Take 1 vial (2.5 mg) by nebulization every 6 hours as needed for shortness of breath / dyspnea or wheezing 90 mL 3     apixaban ANTICOAGULANT (ELIQUIS) 5 MG tablet Take 1 tablet (5 mg) by mouth 2 times daily 180 tablet 3     atorvastatin (LIPITOR) 20 MG tablet Take 1 tablet (20 mg) by mouth daily 90 tablet 3     betamethasone dipropionate (DIPROSONE) 0.05 % external cream Apply 1 FTU sparingly once or twice per day as needed to affected area until the skin is better, then stop; REPEAT AS NEEDED 45 g 2     calcium-vitamin D (OYSTER CALCIUM + D) 250-125 MG-UNIT per tablet Take 2 tablets by mouth daily       celecoxib (CELEBREX) 200 MG capsule Take 1 capsule (200 mg) by mouth 2 times daily as needed for pain (arthritis pain) 200 capsule 5     Cyanocobalamin (B-12) 1000 MCG TBCR Place 1,000 mcg under the tongue daily (Patient taking differently: Place 1,000 mcg under the tongue every other  day )       diltiazem ER COATED BEADS (CARDIZEM CD/CARTIA XT) 180 MG 24 hr capsule Take 1 capsule (180 mg) by mouth daily with breakfast 90 capsule 3     famotidine (PEPCID) 20 MG tablet        folic acid (FOLVITE) 1 MG tablet Take 1 tablet (1 mg) by mouth daily INDICATION: FOLIC ACID SUPPLEMENT (Patient taking differently: Take 400 mcg by mouth daily INDICATION: FOLIC ACID SUPPLEMENT) 100 tablet 3     order for DME Equipment being ordered: Nebulizer 1 each 0     spironolactone (ALDACTONE) 25 MG tablet Take 1 tablet (25 mg) by mouth daily 90 tablet 1     tiotropium (SPIRIVA HANDIHALER) 18 MCG inhaled capsule Inhale 1 capsule (18 mcg) into the lungs daily GENERIC TIOTROPIUM/SPIRIVA 90 capsule 3     triamcinolone (KENALOG) 0.1 % cream Apply topically 2 times daily as needed for irritation (sores on scalp.)       Urea 20 % CREA cream Apply topically daily        vitamin C-electrolytes (EMERGEN-C) 1000mg vitamin C super orange drink mix Take 1 packet by mouth daily Mix 1 packet in 4-6oz water.       vitamin D3 (CHOLECALCIFEROL) 1.25 MG (00859 UT) capsule Take 1 capsule (50,000 Units) by mouth every 14 days SIG: TAKE INDICATION: 1ST AND 15TH OF EACH MONTH, TO TREAT VITAMIN D DEFICIENCY 24 capsule 1     ASPIRIN NOT PRESCRIBED (INTENTIONAL) Please choose reason not prescribed from choices below. (Patient not taking: Reported on 10/8/2021)                  Data:   All laboratory data reviewed  No results found for this or any previous visit (from the past 24 hour(s)).    All laboratory data reviewed  Lab Results   Component Value Date    CHOL 172 10/08/2021    CHOL 147 09/23/2020     Lab Results   Component Value Date    HDL 64 10/08/2021    HDL 56 09/23/2020     Lab Results   Component Value Date    LDL 96 10/08/2021    LDL 81 09/23/2020     Lab Results   Component Value Date    TRIG 59 10/08/2021    TRIG 48 09/23/2020     Lab Results   Component Value Date    CHOLHDLRATIO 4.2 09/18/2015     TSH   Date Value Ref Range  Status   10/08/2021 1.03 0.40 - 4.00 mU/L Final   06/17/2019 1.20 0.40 - 4.00 mU/L Final     Last Basic Metabolic Panel:  Lab Results   Component Value Date     09/30/2021     03/24/2021      Lab Results   Component Value Date    POTASSIUM 3.9 09/30/2021    POTASSIUM 3.9 03/24/2021     Lab Results   Component Value Date    CHLORIDE 105 09/30/2021    CHLORIDE 102 03/24/2021     Lab Results   Component Value Date    SANGEETA 9.4 09/30/2021    SANGEETA 10.6 03/24/2021     Lab Results   Component Value Date    CO2 31 09/30/2021    CO2 32 03/24/2021     Lab Results   Component Value Date    BUN 23 09/30/2021    BUN 20 03/24/2021     Lab Results   Component Value Date    CR 0.70 09/30/2021    CR 0.80 03/24/2021     Lab Results   Component Value Date     09/30/2021     03/24/2021     Lab Results   Component Value Date    WBC 5.7 09/30/2021    WBC 9.1 03/24/2021     Lab Results   Component Value Date    RBC 4.66 09/30/2021    RBC 4.75 03/24/2021     Lab Results   Component Value Date    HGB 14.2 09/30/2021    HGB 14.1 03/24/2021     Lab Results   Component Value Date    HCT 44.3 09/30/2021    HCT 44.7 03/24/2021     Lab Results   Component Value Date    MCV 95 09/30/2021    MCV 94 03/24/2021     Lab Results   Component Value Date    MCH 30.5 09/30/2021    MCH 29.7 03/24/2021     Lab Results   Component Value Date    MCHC 32.1 09/30/2021    MCHC 31.5 03/24/2021     Lab Results   Component Value Date    RDW 13.2 09/30/2021    RDW 13.2 03/24/2021     Lab Results   Component Value Date     09/30/2021     03/24/2021                70005 Detailed

## 2022-03-16 NOTE — LETTER
3/16/2022    Kendall Mendez MD  [address] [facility] [address]    RE: Marva Angela       Dear Colleague,     I had the pleasure of seeing Marva Angela in the [facility].  Cardiology Progress Note          Assessment and Plan:       1. Preoperative cardiovascular assessment    Patient may proceed with hip surgery without any further cardiovascular testing unless she develops cardiac symptoms in the meantime.      2. Paroxysmal atrial fibrillation, asymptomatic at this time    Continue anticoagulation and diltiazem.  May interrupt the anticoagulation for hip surgery this summer.    Follow-up in 6 months with Rufina More      30 minutes was spent with the patient, precharting and reviewing tests as well as post visit charting all done today..    This note was transcribed using electronic voice recognition software and there may be typographical errors present.                    Interval History:     The patient is a very pleasant 71 year old whom I have been following for paroxysmal atrial fibrillation that was previously symptomatic prior to diltiazem.  She has not felt any palpitations since that time.  She is also anticoagulated in the last year to prevent stroke with recurrent atrial fibrillation.    Her heart rate is a little bit irregular today, she may be in atrial fibrillation.  She is asymptomatic.  She has upcoming hip surgery this summer for which she may need to interrupt her Eliquis.  Overall, no cardiovascular complaints at this time.                     Review of Systems:     Review of Systems:  Skin:  Negative     Eyes:  Positive for glasses  ENT:  Negative    Respiratory:  Positive for dyspnea on exertion  Cardiovascular:  Negative    Gastroenterology: Positive for heartburn  Genitourinary:  Negative    Musculoskeletal:  Positive for arthritis  Neurologic:  Negative    Psychiatric:  Negative    Heme/Lymph/Imm:  Negative    Endocrine:  Negative              "   Physical Exam:     Vitals: /58   Pulse 68   Ht 1.638 m (5' 4.5\")   Wt 75.2 kg (165 lb 11.2 oz)   LMP  (LMP Unknown)   BMI 28.00 kg/m    Constitutional:  cooperative, alert and oriented, well developed, well nourished, in no acute distress        Skin:  warm and dry to the touch, no apparent skin lesions or masses noted        Head:  normocephalic, no masses or lesions        Eyes:  pupils equal and round, conjunctivae and lids unremarkable, sclera white, no xanthalasma, EOMS intact, no nystagmus        Chest:  normal symmetry        Cardiac: normal S1 and S2 irregular rhythm     systolic murmur;LUSB;grade 1          Extremities and Back:  no deformities, clubbing, cyanosis, erythema observed;no edema        Neurological:  no gross motor deficits;affect appropriate                 Medications:     Current Outpatient Medications   Medication Sig Dispense Refill     Acetaminophen (TYLENOL PO) Take 1,000 mg by mouth 2 times daily as needed for mild pain or fever       albuterol (PROAIR HFA/PROVENTIL HFA/VENTOLIN HFA) 108 (90 Base) MCG/ACT inhaler Inhale 2 puffs into the lungs every 6 hours as needed for shortness of breath / dyspnea or wheezing GENERIC VENTOLIN/ALBUTEROL 18 g 11     albuterol (PROVENTIL) (2.5 MG/3ML) 0.083% neb solution Take 1 vial (2.5 mg) by nebulization every 6 hours as needed for shortness of breath / dyspnea or wheezing 90 mL 3     apixaban ANTICOAGULANT (ELIQUIS) 5 MG tablet Take 1 tablet (5 mg) by mouth 2 times daily 180 tablet 3     atorvastatin (LIPITOR) 20 MG tablet Take 1 tablet (20 mg) by mouth daily 90 tablet 3     betamethasone dipropionate (DIPROSONE) 0.05 % external cream Apply 1 FTU sparingly once or twice per day as needed to affected area until the skin is better, then stop; REPEAT AS NEEDED 45 g 2     calcium-vitamin D (OYSTER CALCIUM + D) 250-125 MG-UNIT per tablet Take 2 tablets by mouth daily       celecoxib (CELEBREX) 200 MG capsule Take 1 capsule (200 mg) by mouth " 2 times daily as needed for pain (arthritis pain) 200 capsule 5     Cyanocobalamin (B-12) 1000 MCG TBCR Place 1,000 mcg under the tongue daily (Patient taking differently: Place 1,000 mcg under the tongue every other day )       diltiazem ER COATED BEADS (CARDIZEM CD/CARTIA XT) 180 MG 24 hr capsule Take 1 capsule (180 mg) by mouth daily with breakfast 90 capsule 3     famotidine (PEPCID) 20 MG tablet        folic acid (FOLVITE) 1 MG tablet Take 1 tablet (1 mg) by mouth daily INDICATION: FOLIC ACID SUPPLEMENT (Patient taking differently: Take 400 mcg by mouth daily INDICATION: FOLIC ACID SUPPLEMENT) 100 tablet 3     order for DME Equipment being ordered: Nebulizer 1 each 0     spironolactone (ALDACTONE) 25 MG tablet Take 1 tablet (25 mg) by mouth daily 90 tablet 1     tiotropium (SPIRIVA HANDIHALER) 18 MCG inhaled capsule Inhale 1 capsule (18 mcg) into the lungs daily GENERIC TIOTROPIUM/SPIRIVA 90 capsule 3     triamcinolone (KENALOG) 0.1 % cream Apply topically 2 times daily as needed for irritation (sores on scalp.)       Urea 20 % CREA cream Apply topically daily        vitamin C-electrolytes (EMERGEN-C) 1000mg vitamin C super orange drink mix Take 1 packet by mouth daily Mix 1 packet in 4-6oz water.       vitamin D3 (CHOLECALCIFEROL) 1.25 MG (15354 UT) capsule Take 1 capsule (50,000 Units) by mouth every 14 days SIG: TAKE INDICATION: 1ST AND 15TH OF EACH MONTH, TO TREAT VITAMIN D DEFICIENCY 24 capsule 1     ASPIRIN NOT PRESCRIBED (INTENTIONAL) Please choose reason not prescribed from choices below. (Patient not taking: Reported on 10/8/2021)                  Data:   All laboratory data reviewed  No results found for this or any previous visit (from the past 24 hour(s)).    All laboratory data reviewed  Lab Results   Component Value Date    CHOL 172 10/08/2021    CHOL 147 09/23/2020     Lab Results   Component Value Date    HDL 64 10/08/2021    HDL 56 09/23/2020     Lab Results   Component Value Date    LDL 96  10/08/2021    LDL 81 09/23/2020     Lab Results   Component Value Date    TRIG 59 10/08/2021    TRIG 48 09/23/2020     Lab Results   Component Value Date    CHOLHDLRATIO 4.2 09/18/2015     TSH   Date Value Ref Range Status   10/08/2021 1.03 0.40 - 4.00 mU/L Final   06/17/2019 1.20 0.40 - 4.00 mU/L Final     Last Basic Metabolic Panel:  Lab Results   Component Value Date     09/30/2021     03/24/2021      Lab Results   Component Value Date    POTASSIUM 3.9 09/30/2021    POTASSIUM 3.9 03/24/2021     Lab Results   Component Value Date    CHLORIDE 105 09/30/2021    CHLORIDE 102 03/24/2021     Lab Results   Component Value Date    SANGEETA 9.4 09/30/2021    SANGEETA 10.6 03/24/2021     Lab Results   Component Value Date    CO2 31 09/30/2021    CO2 32 03/24/2021     Lab Results   Component Value Date    BUN 23 09/30/2021    BUN 20 03/24/2021     Lab Results   Component Value Date    CR 0.70 09/30/2021    CR 0.80 03/24/2021     Lab Results   Component Value Date     09/30/2021     03/24/2021     Lab Results   Component Value Date    WBC 5.7 09/30/2021    WBC 9.1 03/24/2021     Lab Results   Component Value Date    RBC 4.66 09/30/2021    RBC 4.75 03/24/2021     Lab Results   Component Value Date    HGB 14.2 09/30/2021    HGB 14.1 03/24/2021     Lab Results   Component Value Date    HCT 44.3 09/30/2021    HCT 44.7 03/24/2021     Lab Results   Component Value Date    MCV 95 09/30/2021    MCV 94 03/24/2021     Lab Results   Component Value Date    MCH 30.5 09/30/2021    MCH 29.7 03/24/2021     Lab Results   Component Value Date    MCHC 32.1 09/30/2021    MCHC 31.5 03/24/2021     Lab Results   Component Value Date    RDW 13.2 09/30/2021    RDW 13.2 03/24/2021     Lab Results   Component Value Date     09/30/2021     03/24/2021     Thank you for allowing me to participate in the care of your patient.    Sincerely,   Robert Stephen MD   [facility]  cc: No referring provider defined for this encounter.

## 2022-04-08 ENCOUNTER — LAB (OUTPATIENT)
Dept: LAB | Facility: CLINIC | Age: 72
End: 2022-04-08
Payer: COMMERCIAL

## 2022-04-08 DIAGNOSIS — I10 BENIGN ESSENTIAL HYPERTENSION: ICD-10-CM

## 2022-04-08 DIAGNOSIS — R73.03 PREDIABETES: ICD-10-CM

## 2022-04-08 DIAGNOSIS — E78.5 HYPERLIPIDEMIA LDL GOAL <130: ICD-10-CM

## 2022-04-08 DIAGNOSIS — I70.0 ATHEROSCLEROSIS OF AORTA (H): ICD-10-CM

## 2022-04-08 LAB
ANION GAP SERPL CALCULATED.3IONS-SCNC: 4 MMOL/L (ref 3–14)
BUN SERPL-MCNC: 21 MG/DL (ref 7–30)
CALCIUM SERPL-MCNC: 9.8 MG/DL (ref 8.5–10.1)
CHLORIDE BLD-SCNC: 104 MMOL/L (ref 94–109)
CO2 SERPL-SCNC: 29 MMOL/L (ref 20–32)
CREAT SERPL-MCNC: 0.81 MG/DL (ref 0.52–1.04)
ERYTHROCYTE [DISTWIDTH] IN BLOOD BY AUTOMATED COUNT: 13.1 % (ref 10–15)
GFR SERPL CREATININE-BSD FRML MDRD: 76 ML/MIN/1.73M2
GLUCOSE BLD-MCNC: 116 MG/DL (ref 70–99)
HBA1C MFR BLD: 6 % (ref 0–5.6)
HCT VFR BLD AUTO: 46.2 % (ref 35–47)
HGB BLD-MCNC: 14.6 G/DL (ref 11.7–15.7)
MCH RBC QN AUTO: 30 PG (ref 26.5–33)
MCHC RBC AUTO-ENTMCNC: 31.6 G/DL (ref 31.5–36.5)
MCV RBC AUTO: 95 FL (ref 78–100)
PLATELET # BLD AUTO: 230 10E3/UL (ref 150–450)
POTASSIUM BLD-SCNC: 3.8 MMOL/L (ref 3.4–5.3)
RBC # BLD AUTO: 4.87 10E6/UL (ref 3.8–5.2)
SODIUM SERPL-SCNC: 137 MMOL/L (ref 133–144)
WBC # BLD AUTO: 6.8 10E3/UL (ref 4–11)

## 2022-04-08 PROCEDURE — 83036 HEMOGLOBIN GLYCOSYLATED A1C: CPT

## 2022-04-08 PROCEDURE — 36415 COLL VENOUS BLD VENIPUNCTURE: CPT

## 2022-04-08 PROCEDURE — 85027 COMPLETE CBC AUTOMATED: CPT

## 2022-04-08 PROCEDURE — 80048 BASIC METABOLIC PNL TOTAL CA: CPT

## 2022-04-09 ENCOUNTER — HEALTH MAINTENANCE LETTER (OUTPATIENT)
Age: 72
End: 2022-04-09

## 2022-04-13 ENCOUNTER — OFFICE VISIT (OUTPATIENT)
Dept: INTERNAL MEDICINE | Facility: CLINIC | Age: 72
End: 2022-04-13
Payer: COMMERCIAL

## 2022-04-13 ENCOUNTER — TELEPHONE (OUTPATIENT)
Dept: INTERNAL MEDICINE | Facility: CLINIC | Age: 72
End: 2022-04-13

## 2022-04-13 VITALS
WEIGHT: 165.7 LBS | TEMPERATURE: 97.8 F | RESPIRATION RATE: 15 BRPM | OXYGEN SATURATION: 96 % | BODY MASS INDEX: 28 KG/M2 | DIASTOLIC BLOOD PRESSURE: 73 MMHG | HEART RATE: 79 BPM | SYSTOLIC BLOOD PRESSURE: 119 MMHG

## 2022-04-13 DIAGNOSIS — L40.50 PSORIASIS WITH ARTHROPATHY (H): ICD-10-CM

## 2022-04-13 DIAGNOSIS — I70.0 ATHEROSCLEROSIS OF AORTA (H): ICD-10-CM

## 2022-04-13 DIAGNOSIS — J44.9 CHRONIC OBSTRUCTIVE PULMONARY DISEASE, UNSPECIFIED COPD TYPE (H): ICD-10-CM

## 2022-04-13 DIAGNOSIS — I10 BENIGN ESSENTIAL HYPERTENSION: ICD-10-CM

## 2022-04-13 DIAGNOSIS — J43.9 PULMONARY EMPHYSEMA, UNSPECIFIED EMPHYSEMA TYPE (H): ICD-10-CM

## 2022-04-13 DIAGNOSIS — I48.0 PAROXYSMAL ATRIAL FIBRILLATION (H): ICD-10-CM

## 2022-04-13 DIAGNOSIS — M16.12 PRIMARY OSTEOARTHRITIS OF LEFT HIP: Primary | ICD-10-CM

## 2022-04-13 DIAGNOSIS — M85.80 OSTEOPENIA, UNSPECIFIED LOCATION: ICD-10-CM

## 2022-04-13 DIAGNOSIS — M19.90 OSTEOARTHRITIS, UNSPECIFIED OSTEOARTHRITIS TYPE, UNSPECIFIED SITE: ICD-10-CM

## 2022-04-13 DIAGNOSIS — E78.5 HYPERLIPIDEMIA LDL GOAL <130: ICD-10-CM

## 2022-04-13 PROCEDURE — 99214 OFFICE O/P EST MOD 30 MIN: CPT | Performed by: INTERNAL MEDICINE

## 2022-04-13 RX ORDER — TIOTROPIUM BROMIDE 18 UG/1
18 CAPSULE ORAL; RESPIRATORY (INHALATION) DAILY
Qty: 90 CAPSULE | Refills: 3 | Status: SHIPPED | OUTPATIENT
Start: 2022-04-13 | End: 2023-05-19

## 2022-04-13 RX ORDER — SPIRONOLACTONE 25 MG/1
25 TABLET ORAL DAILY
Qty: 90 TABLET | Refills: 1 | Status: SHIPPED | OUTPATIENT
Start: 2022-04-13 | End: 2022-09-28

## 2022-04-13 RX ORDER — SPIRONOLACTONE 25 MG/1
25 TABLET ORAL DAILY
Qty: 90 TABLET | Refills: 1 | Status: CANCELLED | OUTPATIENT
Start: 2022-04-13

## 2022-04-13 RX ORDER — TIOTROPIUM BROMIDE 18 UG/1
18 CAPSULE ORAL; RESPIRATORY (INHALATION) DAILY
Qty: 90 CAPSULE | Refills: 3 | Status: CANCELLED | OUTPATIENT
Start: 2022-04-13

## 2022-04-13 NOTE — PROGRESS NOTES
"  Assessment & Plan   Problem List Items Addressed This Visit     Osteopenia    Relevant Medications    vitamin D3 (CHOLECALCIFEROL) 1.25 MG (79305 UT) capsule    Benign essential hypertension    Relevant Medications    spironolactone (ALDACTONE) 25 MG tablet    Psoriasis with arthropathy (H)    Relevant Medications    tiotropium (SPIRIVA HANDIHALER) 18 MCG inhaled capsule    vitamin D3 (CHOLECALCIFEROL) 1.25 MG (20824 UT) capsule    COPD (chronic obstructive pulmonary disease) (H)    Relevant Medications    tiotropium (SPIRIVA HANDIHALER) 18 MCG inhaled capsule    Paroxysmal atrial fibrillation (H)    Atherosclerosis of aorta (H)      Other Visit Diagnoses     Primary osteoarthritis of left hip    -  Primary    Relevant Medications    vitamin D3 (CHOLECALCIFEROL) 1.25 MG (75042 UT) capsule    Osteoarthritis, unspecified osteoarthritis type, unspecified site        Relevant Medications    vitamin D3 (CHOLECALCIFEROL) 1.25 MG (88678 UT) capsule    Hyperlipidemia LDL goal <130                Continue all medications at the same doses.  Contact your usual pharmacy if you need refills.        Return for pre-op exam ideally 1 week before the joint replacement surgery.       [facility] Covid Scheduling number: [phone]   (to arrange Covid testing and/or Covid vaccine appointments)        Consider taking a iron supplement every other day for the 6 weeks before the joint repalcement surgery to help increase your supply of iron to make more red blood cells easier.              BMI:   Estimated body mass index is 28 kg/m  as calculated from the following:    Height as of 3/16/22: 1.638 m (5' 4.5\").    Weight as of this encounter: 75.2 kg (165 lb 11.2 oz).           Return in about 2 months (around 6/13/2022) for Pre-Op exam.    Kendall Mendez MD  [facility]    Ramone Lynn is a 71 year old who presents for the following health issues     History of Present Illness "       Reason for visit:  Follow up lab Candida consumes 0 sweetened beverage(s) daily.She exercises with enough effort to increase her heart rate 20 to 29 minutes per day.  She exercises with enough effort to increase her heart rate 3 or less days per week.   She is taking medications regularly.       Hypertension Follow-up      Do you check your blood pressure regularly outside of the clinic? Yes     Are you following a low salt diet? Yes    Are your blood pressures ever more than 140 on the top number (systolic) OR more   than 90 on the bottom number (diastolic), for example 140/90? Yes    COPD Follow-Up    Overall, how are your COPD symptoms since your last clinic visit?  Same     How much fatigue or shortness of breath do you have when you are walking?  Same as usual    How much shortness of breath do you have when you are resting?  None    How often do you cough? Never    Have you noticed any change in your sputum/phlegm?  No    Have you experienced a recent fever? No    Please describe how far you can walk without stopping to rest:  1-2 miles    How many flights of stairs are you able to walk up without stopping?  3 or more    Have you had any Emergency Room Visits, Urgent Care Visits, or Hospital Admissions because of your COPD since your last office visit?  No    History   Smoking Status     Former Smoker     Packs/day: 1.00     Years: 10.00     Types: Cigarettes     Start date: 2/7/1964     Quit date: 9/7/2015   Smokeless Tobacco     Never Used     Comment: 5-10 daily     No results found for: FEV1, MSE0DRE        Other concerns:  1. Patient completed a urine albumin/ creatinine for insurance, results were normal   2. Discuss lung cancer screening, patient would like to discuss insurance coverage for this screening. Requesting a future orders to have this completed in the fall     **I reviewed the information recorded in the patient's EPIC chart (including but not limited to medical history, surgical  history, family history, problem list, medication list, and allergy list) and updated the information as indicated based on the patients reported information.          Review of Systems   Constitutional, HEENT, cardiovascular, pulmonary, gi and gu systems are negative, except as otherwise noted.      Objective    /73   Pulse 79   Temp 97.8  F (36.6  C) (Temporal)   Resp 15   Wt 75.2 kg (165 lb 11.2 oz)   LMP  (LMP Unknown)   SpO2 96%   BMI 28.00 kg/m    Body mass index is 28 kg/m .  Physical Exam   GENERAL alert and no distress  EYES:  Normal sclera,conjunctiva, EOMI  HENT: oral and posterior pharynx without lesions or erythema, facies symmetric  NECK: Neck supple. No LAD, without thyroidmegaly.  RESP: Clear to ausculation bilaterally without wheezes or crackles. Normal BS in all fields.  CV: RRR normal S1S2 without murmurs, rubs or gallops.  LYMPH: no cervical lymph adenopathy appreciated  MS: extremities- no gross deformities of the visible extremities noted,   EXT:  no lower extremity edema  PSYCH: Alert and oriented times 3; speech- coherent  SKIN:  No obvious significant skin lesions on visible portions of face

## 2022-04-13 NOTE — PATIENT INSTRUCTIONS
Continue all medications at the same doses.  Contact your usual pharmacy if you need refills.      Return for pre-op exam ideally 1 week before the joint replacement surgery.       [facility] Covid Scheduling number: [phone]   (to arrange Covid testing and/or Covid vaccine appointments)      Consider taking a iron supplement every other day for the 6 weeks before the joint repalcement surgery to help increase your supply of iron to make more red blood cells easier.       Example of over the counter iron supplement:

## 2022-04-13 NOTE — TELEPHONE ENCOUNTER
Patient Quality Outreach      Summary:    Patient has the following on her problem list/HM:     Hypertension   Last three blood pressure readings:  BP Readings from Last 3 Encounters:   03/16/22 116/58   10/08/21 118/66   04/07/21 112/62     Blood pressure: Passed    HTN Guidelines:  ? 139/89     Patient is due/failing the following:   Hypertension -  Hypertension follow-up visit  Physical  - Past due    Type of outreach:    Patient has upcoming appointment    Questions for provider review:    None                                                                                                                                     Monet Lester CMA       Chart routed to .

## 2022-06-16 ENCOUNTER — OFFICE VISIT (OUTPATIENT)
Dept: INTERNAL MEDICINE | Facility: CLINIC | Age: 72
End: 2022-06-16
Payer: COMMERCIAL

## 2022-06-16 VITALS
TEMPERATURE: 97.7 F | SYSTOLIC BLOOD PRESSURE: 122 MMHG | WEIGHT: 162 LBS | DIASTOLIC BLOOD PRESSURE: 69 MMHG | HEIGHT: 65 IN | HEART RATE: 83 BPM | OXYGEN SATURATION: 93 % | BODY MASS INDEX: 26.99 KG/M2

## 2022-06-16 DIAGNOSIS — I10 BENIGN ESSENTIAL HYPERTENSION: ICD-10-CM

## 2022-06-16 DIAGNOSIS — R73.03 PREDIABETES: ICD-10-CM

## 2022-06-16 DIAGNOSIS — E78.5 HYPERLIPIDEMIA LDL GOAL <130: ICD-10-CM

## 2022-06-16 DIAGNOSIS — Z01.812 ENCOUNTER FOR PREOPERATIVE SCREENING LABORATORY TESTING FOR COVID-19 VIRUS: ICD-10-CM

## 2022-06-16 DIAGNOSIS — M16.12 PRIMARY OSTEOARTHRITIS OF LEFT HIP: ICD-10-CM

## 2022-06-16 DIAGNOSIS — Z86.718 PERSONAL HISTORY OF DVT (DEEP VEIN THROMBOSIS): ICD-10-CM

## 2022-06-16 DIAGNOSIS — Z01.818 PREOP GENERAL PHYSICAL EXAM: Primary | ICD-10-CM

## 2022-06-16 DIAGNOSIS — M19.90 OSTEOARTHRITIS, UNSPECIFIED OSTEOARTHRITIS TYPE, UNSPECIFIED SITE: ICD-10-CM

## 2022-06-16 DIAGNOSIS — I48.0 PAROXYSMAL ATRIAL FIBRILLATION (H): ICD-10-CM

## 2022-06-16 DIAGNOSIS — J44.9 CHRONIC OBSTRUCTIVE PULMONARY DISEASE, UNSPECIFIED COPD TYPE (H): ICD-10-CM

## 2022-06-16 DIAGNOSIS — Z11.52 ENCOUNTER FOR PREOPERATIVE SCREENING LABORATORY TESTING FOR COVID-19 VIRUS: ICD-10-CM

## 2022-06-16 LAB
ANION GAP SERPL CALCULATED.3IONS-SCNC: 2 MMOL/L (ref 3–14)
BUN SERPL-MCNC: 18 MG/DL (ref 7–30)
CALCIUM SERPL-MCNC: 9.8 MG/DL (ref 8.5–10.1)
CHLORIDE BLD-SCNC: 104 MMOL/L (ref 94–109)
CO2 SERPL-SCNC: 32 MMOL/L (ref 20–32)
CREAT SERPL-MCNC: 0.69 MG/DL (ref 0.52–1.04)
ERYTHROCYTE [DISTWIDTH] IN BLOOD BY AUTOMATED COUNT: 13 % (ref 10–15)
GFR SERPL CREATININE-BSD FRML MDRD: >90 ML/MIN/1.73M2
GLUCOSE BLD-MCNC: 111 MG/DL (ref 70–99)
HBA1C MFR BLD: 6.4 % (ref 0–5.6)
HCT VFR BLD AUTO: 44.2 % (ref 35–47)
HGB BLD-MCNC: 14.5 G/DL (ref 11.7–15.7)
MCH RBC QN AUTO: 30.3 PG (ref 26.5–33)
MCHC RBC AUTO-ENTMCNC: 32.8 G/DL (ref 31.5–36.5)
MCV RBC AUTO: 93 FL (ref 78–100)
PLATELET # BLD AUTO: 236 10E3/UL (ref 150–450)
POTASSIUM BLD-SCNC: 4.2 MMOL/L (ref 3.4–5.3)
RBC # BLD AUTO: 4.78 10E6/UL (ref 3.8–5.2)
SODIUM SERPL-SCNC: 138 MMOL/L (ref 133–144)
WBC # BLD AUTO: 6.8 10E3/UL (ref 4–11)

## 2022-06-16 PROCEDURE — 93000 ELECTROCARDIOGRAM COMPLETE: CPT | Performed by: INTERNAL MEDICINE

## 2022-06-16 PROCEDURE — 80048 BASIC METABOLIC PNL TOTAL CA: CPT | Performed by: INTERNAL MEDICINE

## 2022-06-16 PROCEDURE — 85027 COMPLETE CBC AUTOMATED: CPT | Performed by: INTERNAL MEDICINE

## 2022-06-16 PROCEDURE — 99215 OFFICE O/P EST HI 40 MIN: CPT | Performed by: INTERNAL MEDICINE

## 2022-06-16 PROCEDURE — 83036 HEMOGLOBIN GLYCOSYLATED A1C: CPT | Performed by: INTERNAL MEDICINE

## 2022-06-16 PROCEDURE — 36415 COLL VENOUS BLD VENIPUNCTURE: CPT | Performed by: INTERNAL MEDICINE

## 2022-06-16 NOTE — PATIENT INSTRUCTIONS
PRE-OPERATIVE INSTRUCTIONS:     *  Contact your surgeon if there is any change in your health. This includes signs of new infection, such as cold or flu (such as a sore throat, runny nose, cough, rash or fever).       *  Confirm any Covid testing requirements before your procedure.   Be aware that each surgery facility has their specific Covid testing requirements.  Many surgery facilities require Covid testing within 2-4 days of the surgery.  Typically the surgeon's office is responsible for arranging and completing this testing before surgery.    Follow any instructions you have been given.  Contact the surgery office for questions about this.       --[facility] Covid Scheduling number: [phone]   (to arrange Covid testing and/or Covid vaccine appointments within the [facility] system)     Stop Eliquis for doses (2 full days) prior to the surgery.     *  Stop aspirin of any kind for 7 days before procedure.   (even low dose daily aspirin).    *  No NSAIDs (Motrin, Advil, ibuprofen, Aleve, etc) within 5-7 days of surgery.    *  Stop any Fish Oil, multi vitamin (and specifically anything containing vitamin E) supplements for 7 days prior to surgery because these can affect platelet function.      *  Tylenol (acetaminophen) OK to take if needed for pain or headache.  Follow instructions on the bottle    *  Prepare your body as instructed by the surgery clinic.  If instructed, Take a shower or bath the night before surgery. Use any special soaps or cleaning instructions according to instructions from your surgeon.  If you do not have soap from your surgeon, use your regular soap. Do not shave or scrub the surgery site.  Wear clean pajamas and have clean sheets on your bed     *  ON THE MORNING OF SURGERY:     --Take ONLY the Diltiazem with small sip of water, take no other oral medications.      --use your inhaled medications.       *  Resume all medications at the same doses after surgery,  unless instructed otherwise by the medical staff.     *  Attend all follow up appointments with the surgeons (and/or therapists if applicable) as instructed.     *  Contact the surgeon's office for any specific questions about after-surgery cares and follow up instructions.      You do not need to necessarily return to see us after your surgery unless instructed to do so.        IF YOU REQUIRE NARCOTIC PAIN MEDICATION AFTER YOUR SURGERY:    --Take the narcotic pain medication exactly as prescribed, and use the absolute lowest dose needed for pain control.   The goal of pain medication is not complete pain relief, aim to just make it manageable.    --Beware of drowsiness, nausea, vomiting, when taking this medication.  Do not drive, or operate dangerous equipment after taking this.    --The main side effects from narcotic pain medication can also include intestinal side effects including nausea, vomiting, constipation, or diarrhea.    --If your pain is not able to be controlled with the pain medication supplied to you, contact the surgeons because uncontrolled pain can be a sign of possible surgical complications.    --In case of constipation from pain medications, take over the counter Miralax powder or stool softner Senokot.  If you have a history of constipation with narcotic pain medication, consider taking Miralax powder on any day that you take a pain tablet.

## 2022-06-16 NOTE — PROGRESS NOTES
[address] [address]  Phone: [phone]  Primary Provider: Kendall Mendez  Pre-op Performing Provider: KENDALL MENDEZ      PREOPERATIVE EVALUATION:  Today's date: 6/16/2022    Marva Angela is a 71 year old female who presents for a preoperative evaluation.    Surgical Information:  Surgery/Procedure: L total hip arthroplasty   Surgery Location: [facility]  Surgeon: Dr. Alston   Surgery Date: 6/27/2022  Time of Surgery: 7:30am  Where patient plans to recover: At home with family  Fax number for surgical facility: Note does not need to be faxed, will be available electronically in Epic.    Type of Anesthesia Anticipated: Choice    Assessment & Plan     The proposed surgical procedure is considered INTERMEDIATE risk.    1. Preop general physical exam    2. Primary osteoarthritis of left hip    3. Paroxysmal atrial fibrillation (H)    4. Chronic obstructive pulmonary disease, unspecified COPD type (H)    5. Hyperlipidemia LDL goal <130    6. Benign essential hypertension    7. Osteoarthritis, unspecified osteoarthritis type, unspecified site    8. Prediabetes    9. Personal history of DVT (deep vein thrombosis)    10. Encounter for preoperative screening laboratory testing for COVID-19 virus              Risks and Recommendations:  The patient has the following additional risks and recommendations for perioperative complications:    Cardiac:   -- History of paroxysmal atrial fibrillation.  No recent episodes of palpitations or tachycardia.    -- Current condition is rate is well controlled on diltiazem, taking oral anticoagulants with out side effect.  --Seen in routine visit with her cardiologist in March, he said she was fine to have surgery from a cardiac standpoint    Pulmonary:   --History of COPD, no recent exacerbations, currently well controlled on current inhaled medicines.  --Observe for postoperative  bronchospasm, treat with albuterol nebulizer if needed.     - No other identified additional risk factors other than previously addressed    Medication Instructions:   - apixaban (Eliquis), edoxaban (Savaysa), rivaroxaban (Xarelto): Bleeding risk is moderate or high for this procedure AND CrCl  (>=) 50 mL/min. HOLD 2 days before surgery.    - Diuretics: HOLD on the day of surgery.   - LABA, inhaled corticosteroid, long-acting anticholinergics: Continue without modification.   - rescue Inhaler: Continue PRN. Bring to hospital on the day of surgery.    RECOMMENDATION:  APPROVAL GIVEN to proceed with proposed procedure, without further diagnostic evaluation.                      Subjective     HPI related to upcoming procedure: end stage DJD of hip, failed conservative measures, severely impacting her daily activities.    Preop Questions 6/16/2022   1. Have you ever had a heart attack or stroke? No   2. Have you ever had surgery on your heart or blood vessels, such as a stent placement, a coronary artery bypass, or surgery on an artery in your head, neck, heart, or legs? No   3. Do you have chest pain with activity? No   4. Do you have a history of  heart failure? No   5. Do you currently have a cold, bronchitis or symptoms of other infection? No   6. Do you have a cough, shortness of breath, or wheezing? No   7. Do you or anyone in your family have previous history of blood clots? YES -personal history of phlebitis/possible DVT in the 1970s.  On chronic anticoagulation already for A. fib   8. Do you or does anyone in your family have a serious bleeding problem such as prolonged bleeding following surgeries or cuts? No   9. Have you ever had problems with anemia or been told to take iron pills? No   10. Have you had any abnormal blood loss such as black, tarry or bloody stools, or abnormal vaginal bleeding? No   11. Have you ever had a blood transfusion? No   12. Are you willing to have a blood transfusion if it is  medically needed before, during, or after your surgery? Yes   13. Have you or any of your relatives ever had problems with anesthesia? YES - daughter had ill defined anesthesia reaction, no personal anesthesia reactions for the patient   14. Do you have sleep apnea, excessive snoring or daytime drowsiness? No   15. Do you have any artifical heart valves or other implanted medical devices like a pacemaker, defibrillator, or continuous glucose monitor? No   16. Do you have artificial joints? YES - right hip replacement April 2017 & left knee replacement July 2017   17. Are you allergic to latex? No   18. Is there any chance that you may be pregnant? No       Health Care Directive:  Patient has a Health Care Directive on file      Preoperative Review of :   reviewed - controlled substances reflected in medication list.      *  No recent infectious illnesses.    *  No recent cardiac or pulmonary issues or symptoms.    *  No problems performing vigorous physical activity, no changes in exercise tolerance.    *  No personal or family history of anesthesia complications.    *  No personal or family history of bleeding or clotting disorders.       COPD remains stable.  Has not had any recent breathing troubles beyond usual baseline.  Has not any acute respiratory events.  Remains with intermittent cough, mild shortness of breath with overexertion as per usual.  Using medication as directed with reported side effects.       Has history of intermittent paroxysmal atrial fibrillation.    No palpitations, no dizziness, no dyspnea on exertion, no shortness of breath.  No racing heart, no fast heart rates.    Taking medications as ordered, no side effects reported.   Patient is taking anticoagulation according to direction, with no reported side effects.   Seen by cardiology March 2022, told she would be okay for surgery the summer      The patient has a history of impaired glucose tolerance with regularly elevated blood  sugars.    They have not been diagnosed with type II DM or placed on medications for diabetes before.   They deny polyuria, polydipsia.     The patient is not obese with a BMI of Body mass index is 27.38 kg/m ..    Review of current labs show:    Lab Results   Component Value Date    A1C 6.0 04/08/2022    A1C 6.0 09/30/2021    A1C 6.2 03/24/2021    A1C 6.2 09/23/2020    A1C 6.2 12/17/2019    A1C 6.0 06/17/2019    A1C 6.1 11/02/2018    A1C 6.4 03/21/2017       Review of Systems  Constitutional, neuro, ENT, endocrine, pulmonary, cardiac, gastrointestinal, genitourinary, musculoskeletal, integument and psychiatric systems are negative, except as otherwise noted.    Patient Active Problem List    Diagnosis Date Noted     Mixed hyperlipidemia 11/07/2012     Priority: High     Osteopenia 11/01/2005     Priority: High     Problem list name updated by automated process. Provider to review       Personal history of tobacco use, presenting hazards to health 11/01/2005     Priority: High     Atherosclerosis of aorta (H) 12/20/2019     Priority: Medium     Deformity of toe of right foot 11/19/2018     Priority: Medium     Paroxysmal atrial fibrillation (H) 06/19/2018     Priority: Medium     Aftercare following joint replacement [Z47.1] 08/04/2017     Priority: Medium     Knee joint replacement status 07/31/2017     Priority: Medium     Psoriasis 07/17/2017     Priority: Medium     Prediabetes 07/17/2017     Priority: Medium     Personal history of DVT (deep vein thrombosis) 07/17/2017     Priority: Medium     AFTER CHILDBIRTH × 2, ON COUMADIN × 6 WEEKS EACH TIME       Hip joint replacement status 04/03/2017     Priority: Medium     Tinea pedis of left foot 03/21/2017     Priority: Medium     Impaired fasting glucose 03/21/2017     Priority: Medium     Hypercalcemia 03/21/2017     Priority: Medium     Vitamin B12 deficiency (non anemic) 12/05/2016     Priority: Medium     SEVERE       Scurvy 12/05/2016     Priority: Medium      SEVERE       Vitamin D deficiency 12/05/2016     Priority: Medium     Hematuria 12/05/2016     Priority: Medium     Primary osteoarthritis, unspecified site 12/05/2016     Priority: Medium     Arthralgia of both knees 12/05/2016     Priority: Medium     B12 deficiency 10/17/2016     Priority: Medium     B12 178       Hip pain, right 10/14/2016     Priority: Medium     Family history of thyroid disease 10/14/2016     Priority: Medium     Fatigue, unspecified type 10/14/2016     Priority: Medium     Hair loss 10/14/2016     Priority: Medium     Pain in joint, multiple sites 10/14/2016     Priority: Medium     Seborrheic keratosis 10/14/2016     Priority: Medium     Psoriasis with arthropathy (H) 10/14/2016     Priority: Medium     Urine abnormality 10/14/2016     Priority: Medium     BROWNISH STAIN ON UNDERWEAR       Coronary artery disease involving native coronary artery of native heart without angina pectoris 01/01/2016     Priority: Medium     coronary artery calcifications seen on CT scan, mild nonocclusive CAD       Benign essential hypertension      Priority: Medium     COPD (chronic obstructive pulmonary disease) (H) 01/01/2012     Priority: Medium     Eczema 08/31/2009     Priority: Medium     Advance Care Planning 03/12/2012     Priority: Low     Advance Care Planning 3/31/2016: Receipt of ACP document:  Received: Health Care Directive which was witnessed or notarized on 8-3-08.  Document previously scanned on 3-29-16.  Validation form completed and sent to be scanned.  Code Status needs to be updated to reflect choices in most recent ACP document.  Confirmed/documented designated decision maker(s).  Added by Rosa Maria Gutierrez RN, System Director ACP-Honoring Choices   Advance Care Planning 3/12/12 Discussed advance care planning with patient; information given to patient to review. Mary Doherty        Past Medical History:   Diagnosis Date     Arthritis     knees and hips     B12 deficiency 10/14/2016    B12 178      Benign essential hypertension     was on amlodipine/ then metoprolol / add lisinopril      Contact dermatitis and other eczema, due to unspecified cause      COPD (chronic obstructive pulmonary disease) (H) 01/01/2012     Coronary artery disease involving native coronary artery of native heart without angina pectoris 2016    coronary artery calcifications seen on CT scan, mild nonocclusive CAD     Hyperlipidemia LDL goal <130 11/07/2012     Nephritis      as a child (post strep)      Osteoporosis, unspecified      Phlebitis and thrombophlebitis of other deep vessels of lower extremities 1972, 1975    Both with pregnancies     Prediabetes 11/01/2018    A1C 6.1     Sinusitis      Tobacco use disorder      Past Surgical History:   Procedure Laterality Date     ABDOMEN SURGERY  April2015     ARTHROPLASTY HIP Right 04/03/2017    Procedure: ARTHROPLASTY HIP;  Surgeon: Francisco J Alston MD;  Location:  OR     ARTHROPLASTY KNEE Left 07/31/2017    Procedure: ARTHROPLASTY KNEE;  LEFT TOTAL KNEE ARTHROPLASTY ;  Surgeon: Francisco J Alston MD;  Location:  OR     BIOPSY OF SKIN LESION       C DEXA, BONE DENSITY, AXIAL SKEL  06/2008    T score lumbar -0.6, femoral neck -2.4/-2.0(all stable)     COLONOSCOPY  6/21/21     HC ASPIRATION &/OR INJECTION GANGLION CYST, ANY  1994     HERNIORRHAPHY INGUINAL Right 03/24/2015    Procedure: HERNIORRHAPHY INGUINAL;  Surgeon: Sam Erazo MD;  Location:  [facility]     HYSTERECTOMY, PAP NO LONGER INDICATED       ORTHOPEDIC SURGERY      bilat meniscus repair     Z VAGINAL HYSTERECTOMY  1978    Hysterectomy, Vaginal     Current Outpatient Medications   Medication Sig Dispense Refill     Acetaminophen (TYLENOL PO) Take 1,000 mg by mouth 2 times daily as needed for mild pain or fever       albuterol (PROAIR HFA/PROVENTIL HFA/VENTOLIN HFA) 108 (90 Base) MCG/ACT inhaler Inhale 2 puffs into the lungs every 6 hours as needed for shortness of breath / dyspnea or wheezing  GENERIC VENTOLIN/ALBUTEROL 18 g 11     albuterol (PROVENTIL) (2.5 MG/3ML) 0.083% neb solution Take 1 vial (2.5 mg) by nebulization every 6 hours as needed for shortness of breath / dyspnea or wheezing 90 mL 3     apixaban ANTICOAGULANT (ELIQUIS) 5 MG tablet Take 1 tablet (5 mg) by mouth 2 times daily 180 tablet 3     ASPIRIN NOT PRESCRIBED (INTENTIONAL) Please choose reason not prescribed from choices below.       atorvastatin (LIPITOR) 20 MG tablet Take 1 tablet (20 mg) by mouth daily 90 tablet 3     betamethasone dipropionate (DIPROSONE) 0.05 % external cream Apply 1 FTU sparingly once or twice per day as needed to affected area until the skin is better, then stop; REPEAT AS NEEDED 45 g 2     calcium-vitamin D (OSCAL) 250-125 MG-UNIT per tablet Take 2 tablets by mouth daily       celecoxib (CELEBREX) 200 MG capsule Take 1 capsule (200 mg) by mouth 2 times daily as needed for pain (arthritis pain) 200 capsule 5     Cyanocobalamin (B-12) 1000 MCG TBCR Place 1,000 mcg under the tongue daily (Patient taking differently: Place 1,000 mcg under the tongue every other day)       diltiazem ER COATED BEADS (CARDIZEM CD/CARTIA XT) 180 MG 24 hr capsule Take 1 capsule (180 mg) by mouth daily with breakfast 90 capsule 3     famotidine (PEPCID) 20 MG tablet as needed       folic acid (FOLVITE) 1 MG tablet Take 1 tablet (1 mg) by mouth daily INDICATION: FOLIC ACID SUPPLEMENT (Patient taking differently: Take 400 mcg by mouth daily INDICATION: FOLIC ACID SUPPLEMENT) 100 tablet 3     order for DME Equipment being ordered: Nebulizer 1 each 0     spironolactone (ALDACTONE) 25 MG tablet Take 1 tablet (25 mg) by mouth daily 90 tablet 1     tiotropium (SPIRIVA HANDIHALER) 18 MCG inhaled capsule Inhale 1 capsule (18 mcg) into the lungs daily GENERIC TIOTROPIUM/SPIRIVA 90 capsule 3     triamcinolone (KENALOG) 0.1 % cream Apply topically 2 times daily as needed for irritation (sores on scalp.)       Urea 20 % CREA cream Apply topically  daily        vitamin C-electrolytes (EMERGEN-C) 1000mg vitamin C super orange drink mix Take 1 packet by mouth daily Mix 1 packet in 4-6oz water.       vitamin D3 (CHOLECALCIFEROL) 1.25 MG (55471 UT) capsule Take 1 capsule (50,000 Units) by mouth every 14 days SIG: TAKE INDICATION: 1ST AND 15TH OF EACH MONTH, TO TREAT VITAMIN D DEFICIENCY 24 capsule 1       Allergies   Allergen Reactions     Penicillins Rash     rash     Sulfa Drugs Rash     rash        Social History     Tobacco Use     Smoking status: Former Smoker     Packs/day: 1.00     Years: 10.00     Pack years: 10.00     Types: Cigarettes     Start date: 1964     Quit date: 2015     Years since quittin.7     Smokeless tobacco: Never Used     Tobacco comment: 5-10 daily   Substance Use Topics     Alcohol use: Yes     Comment: Rarely have any alcohol     Family History   Problem Relation Age of Onset     C.A.D. Father      Diabetes Father      Myocardial Infarction Father      Heart Surgery Father         CABGx4     Hyperlipidemia Father      Breast Cancer Mother 70     Osteoporosis Mother      Thyroid Disease Mother      Hyperlipidemia Mother      Cancer Brother         Bladder     Hyperlipidemia Brother      Diabetes Brother      Colon Cancer Brother      Other Cancer Brother      Neurologic Disorder Paternal Grandfather      Osteoporosis Sister      Arrhythmia Sister      Heart Surgery Sister         cardioversion, ablation     Other - See Comments Sister 55        arhythmogenic right ventricular dysplasia     Thyroid Disease Sister      Osteoporosis Maternal Grandmother      Osteoporosis Paternal Grandmother      Other - See Comments Maternal Grandfather         pneumonia     Family History Negative Son      Thyroid Disease Daughter      Family History Negative Daughter      Anesthesia Reaction Daughter      Arrhythmia Cousin         a-fib     Cancer - colorectal No family hx of      Prostate Cancer No family hx of      Alzheimer Disease No  "family hx of      Blood Disease No family hx of      Eye Disorder No family hx of      History   Drug Use No         Objective     /69   Pulse 83   Temp 97.7  F (36.5  C) (Oral)   Ht 1.638 m (5' 4.5\")   Wt 73.5 kg (162 lb)   LMP  (LMP Unknown)   SpO2 93%   BMI 27.38 kg/m      Physical Exam  GENERAL alert and no distress  EYES:  Normal sclera,conjunctiva, EOMI  HENT: oral and posterior pharynx without lesions or erythema, facies symmetric  NECK: Neck supple. No LAD, without thyroidmegaly.  RESP: Clear to ausculation bilaterally without wheezes or crackles. Normal BS in all fields.  CV: RRR normal S1S2 without murmurs, rubs or gallops.  LYMPH: no cervical lymph adenopathy appreciated  MS: extremities-decreased range of motion of the left hip due to pain and degenerative joint disease.  EXT:  no lower extremity edema  PSYCH: Alert and oriented times 3; speech- coherent  SKIN:  No obvious significant skin lesions on visible portions of face     Recent Labs   Lab Test 04/08/22  0751 09/30/21  0819 09/30/21  0818   HGB 14.6 14.2  --     258  --      --  141   POTASSIUM 3.8  --  3.9   CR 0.81  --  0.70   A1C 6.0* 6.0*  --         Diagnostics:  Labs pending at this time.  Results will be reviewed when available.     EKG (6/16/2022): sinus bradycardia, normal axis, normal intervals, no acute ST/T changes c/w ischemia, no LVH by voltage criteria, unchanged from previous tracings    Results for orders placed or performed in visit on 06/16/22   Basic metabolic panel     Status: Abnormal   Result Value Ref Range    Sodium 138 133 - 144 mmol/L    Potassium 4.2 3.4 - 5.3 mmol/L    Chloride 104 94 - 109 mmol/L    Carbon Dioxide (CO2) 32 20 - 32 mmol/L    Anion Gap 2 (L) 3 - 14 mmol/L    Urea Nitrogen 18 7 - 30 mg/dL    Creatinine 0.69 0.52 - 1.04 mg/dL    Calcium 9.8 8.5 - 10.1 mg/dL    Glucose 111 (H) 70 - 99 mg/dL    GFR Estimate >90 >60 mL/min/1.73m2   CBC with platelets     Status: Normal   Result " Value Ref Range    WBC Count 6.8 4.0 - 11.0 10e3/uL    RBC Count 4.78 3.80 - 5.20 10e6/uL    Hemoglobin 14.5 11.7 - 15.7 g/dL    Hematocrit 44.2 35.0 - 47.0 %    MCV 93 78 - 100 fL    MCH 30.3 26.5 - 33.0 pg    MCHC 32.8 31.5 - 36.5 g/dL    RDW 13.0 10.0 - 15.0 %    Platelet Count 236 150 - 450 10e3/uL        Revised Cardiac Risk Index (RCRI):  The patient has the following serious cardiovascular risks for perioperative complications:   - No serious cardiac risks = 0 points     RCRI Interpretation: 0 points: Class I (very low risk - 0.4% complication rate)           Signed Electronically by: Kendall Mendez MD  Copy of this evaluation report is provided to requesting physician.

## 2022-06-16 NOTE — H&P (VIEW-ONLY)
[address] [address]  Phone: [phone]  Primary Provider: Kendall Mendez  Pre-op Performing Provider: KENDALL MENDEZ      PREOPERATIVE EVALUATION:  Today's date: 6/16/2022    Marva Angela is a 71 year old female who presents for a preoperative evaluation.    Surgical Information:  Surgery/Procedure: L total hip arthroplasty   Surgery Location: [facility]  Surgeon: Dr. Alston   Surgery Date: 6/27/2022  Time of Surgery: 7:30am  Where patient plans to recover: At home with family  Fax number for surgical facility: Note does not need to be faxed, will be available electronically in Epic.    Type of Anesthesia Anticipated: Choice    Assessment & Plan     The proposed surgical procedure is considered INTERMEDIATE risk.    1. Preop general physical exam    2. Primary osteoarthritis of left hip    3. Paroxysmal atrial fibrillation (H)    4. Chronic obstructive pulmonary disease, unspecified COPD type (H)    5. Hyperlipidemia LDL goal <130    6. Benign essential hypertension    7. Osteoarthritis, unspecified osteoarthritis type, unspecified site    8. Prediabetes    9. Personal history of DVT (deep vein thrombosis)    10. Encounter for preoperative screening laboratory testing for COVID-19 virus              Risks and Recommendations:  The patient has the following additional risks and recommendations for perioperative complications:    Cardiac:   -- History of paroxysmal atrial fibrillation.  No recent episodes of palpitations or tachycardia.    -- Current condition is rate is well controlled on diltiazem, taking oral anticoagulants with out side effect.  --Seen in routine visit with her cardiologist in March, he said she was fine to have surgery from a cardiac standpoint    Pulmonary:   --History of COPD, no recent exacerbations, currently well controlled on current inhaled medicines.  --Observe for postoperative  bronchospasm, treat with albuterol nebulizer if needed.     - No other identified additional risk factors other than previously addressed    Medication Instructions:   - apixaban (Eliquis), edoxaban (Savaysa), rivaroxaban (Xarelto): Bleeding risk is moderate or high for this procedure AND CrCl  (>=) 50 mL/min. HOLD 2 days before surgery.    - Diuretics: HOLD on the day of surgery.   - LABA, inhaled corticosteroid, long-acting anticholinergics: Continue without modification.   - rescue Inhaler: Continue PRN. Bring to hospital on the day of surgery.    RECOMMENDATION:  APPROVAL GIVEN to proceed with proposed procedure, without further diagnostic evaluation.                      Subjective     HPI related to upcoming procedure: end stage DJD of hip, failed conservative measures, severely impacting her daily activities.    Preop Questions 6/16/2022   1. Have you ever had a heart attack or stroke? No   2. Have you ever had surgery on your heart or blood vessels, such as a stent placement, a coronary artery bypass, or surgery on an artery in your head, neck, heart, or legs? No   3. Do you have chest pain with activity? No   4. Do you have a history of  heart failure? No   5. Do you currently have a cold, bronchitis or symptoms of other infection? No   6. Do you have a cough, shortness of breath, or wheezing? No   7. Do you or anyone in your family have previous history of blood clots? YES -personal history of phlebitis/possible DVT in the 1970s.  On chronic anticoagulation already for A. fib   8. Do you or does anyone in your family have a serious bleeding problem such as prolonged bleeding following surgeries or cuts? No   9. Have you ever had problems with anemia or been told to take iron pills? No   10. Have you had any abnormal blood loss such as black, tarry or bloody stools, or abnormal vaginal bleeding? No   11. Have you ever had a blood transfusion? No   12. Are you willing to have a blood transfusion if it is  medically needed before, during, or after your surgery? Yes   13. Have you or any of your relatives ever had problems with anesthesia? YES - daughter had ill defined anesthesia reaction, no personal anesthesia reactions for the patient   14. Do you have sleep apnea, excessive snoring or daytime drowsiness? No   15. Do you have any artifical heart valves or other implanted medical devices like a pacemaker, defibrillator, or continuous glucose monitor? No   16. Do you have artificial joints? YES - right hip replacement April 2017 & left knee replacement July 2017   17. Are you allergic to latex? No   18. Is there any chance that you may be pregnant? No       Health Care Directive:  Patient has a Health Care Directive on file      Preoperative Review of :   reviewed - controlled substances reflected in medication list.      *  No recent infectious illnesses.    *  No recent cardiac or pulmonary issues or symptoms.    *  No problems performing vigorous physical activity, no changes in exercise tolerance.    *  No personal or family history of anesthesia complications.    *  No personal or family history of bleeding or clotting disorders.       COPD remains stable.  Has not had any recent breathing troubles beyond usual baseline.  Has not any acute respiratory events.  Remains with intermittent cough, mild shortness of breath with overexertion as per usual.  Using medication as directed with reported side effects.       Has history of intermittent paroxysmal atrial fibrillation.    No palpitations, no dizziness, no dyspnea on exertion, no shortness of breath.  No racing heart, no fast heart rates.    Taking medications as ordered, no side effects reported.   Patient is taking anticoagulation according to direction, with no reported side effects.   Seen by cardiology March 2022, told she would be okay for surgery the summer      The patient has a history of impaired glucose tolerance with regularly elevated blood  sugars.    They have not been diagnosed with type II DM or placed on medications for diabetes before.   They deny polyuria, polydipsia.     The patient is not obese with a BMI of Body mass index is 27.38 kg/m ..    Review of current labs show:    Lab Results   Component Value Date    A1C 6.0 04/08/2022    A1C 6.0 09/30/2021    A1C 6.2 03/24/2021    A1C 6.2 09/23/2020    A1C 6.2 12/17/2019    A1C 6.0 06/17/2019    A1C 6.1 11/02/2018    A1C 6.4 03/21/2017       Review of Systems  Constitutional, neuro, ENT, endocrine, pulmonary, cardiac, gastrointestinal, genitourinary, musculoskeletal, integument and psychiatric systems are negative, except as otherwise noted.    Patient Active Problem List    Diagnosis Date Noted     Mixed hyperlipidemia 11/07/2012     Priority: High     Osteopenia 11/01/2005     Priority: High     Problem list name updated by automated process. Provider to review       Personal history of tobacco use, presenting hazards to health 11/01/2005     Priority: High     Atherosclerosis of aorta (H) 12/20/2019     Priority: Medium     Deformity of toe of right foot 11/19/2018     Priority: Medium     Paroxysmal atrial fibrillation (H) 06/19/2018     Priority: Medium     Aftercare following joint replacement [Z47.1] 08/04/2017     Priority: Medium     Knee joint replacement status 07/31/2017     Priority: Medium     Psoriasis 07/17/2017     Priority: Medium     Prediabetes 07/17/2017     Priority: Medium     Personal history of DVT (deep vein thrombosis) 07/17/2017     Priority: Medium     AFTER CHILDBIRTH × 2, ON COUMADIN × 6 WEEKS EACH TIME       Hip joint replacement status 04/03/2017     Priority: Medium     Tinea pedis of left foot 03/21/2017     Priority: Medium     Impaired fasting glucose 03/21/2017     Priority: Medium     Hypercalcemia 03/21/2017     Priority: Medium     Vitamin B12 deficiency (non anemic) 12/05/2016     Priority: Medium     SEVERE       Scurvy 12/05/2016     Priority: Medium      SEVERE       Vitamin D deficiency 12/05/2016     Priority: Medium     Hematuria 12/05/2016     Priority: Medium     Primary osteoarthritis, unspecified site 12/05/2016     Priority: Medium     Arthralgia of both knees 12/05/2016     Priority: Medium     B12 deficiency 10/17/2016     Priority: Medium     B12 178       Hip pain, right 10/14/2016     Priority: Medium     Family history of thyroid disease 10/14/2016     Priority: Medium     Fatigue, unspecified type 10/14/2016     Priority: Medium     Hair loss 10/14/2016     Priority: Medium     Pain in joint, multiple sites 10/14/2016     Priority: Medium     Seborrheic keratosis 10/14/2016     Priority: Medium     Psoriasis with arthropathy (H) 10/14/2016     Priority: Medium     Urine abnormality 10/14/2016     Priority: Medium     BROWNISH STAIN ON UNDERWEAR       Coronary artery disease involving native coronary artery of native heart without angina pectoris 01/01/2016     Priority: Medium     coronary artery calcifications seen on CT scan, mild nonocclusive CAD       Benign essential hypertension      Priority: Medium     COPD (chronic obstructive pulmonary disease) (H) 01/01/2012     Priority: Medium     Eczema 08/31/2009     Priority: Medium     Advance Care Planning 03/12/2012     Priority: Low     Advance Care Planning 3/31/2016: Receipt of ACP document:  Received: Health Care Directive which was witnessed or notarized on 8-3-08.  Document previously scanned on 3-29-16.  Validation form completed and sent to be scanned.  Code Status needs to be updated to reflect choices in most recent ACP document.  Confirmed/documented designated decision maker(s).  Added by Rosa Maria uGtierrez RN, System Director ACP-Honoring Choices   Advance Care Planning 3/12/12 Discussed advance care planning with patient; information given to patient to review. Mary Doherty        Past Medical History:   Diagnosis Date     Arthritis     knees and hips     B12 deficiency 10/14/2016    B12 178      Benign essential hypertension     was on amlodipine/ then metoprolol / add lisinopril      Contact dermatitis and other eczema, due to unspecified cause      COPD (chronic obstructive pulmonary disease) (H) 01/01/2012     Coronary artery disease involving native coronary artery of native heart without angina pectoris 2016    coronary artery calcifications seen on CT scan, mild nonocclusive CAD     Hyperlipidemia LDL goal <130 11/07/2012     Nephritis      as a child (post strep)      Osteoporosis, unspecified      Phlebitis and thrombophlebitis of other deep vessels of lower extremities 1972, 1975    Both with pregnancies     Prediabetes 11/01/2018    A1C 6.1     Sinusitis      Tobacco use disorder      Past Surgical History:   Procedure Laterality Date     ABDOMEN SURGERY  April2015     ARTHROPLASTY HIP Right 04/03/2017    Procedure: ARTHROPLASTY HIP;  Surgeon: Francisco J Alston MD;  Location:  OR     ARTHROPLASTY KNEE Left 07/31/2017    Procedure: ARTHROPLASTY KNEE;  LEFT TOTAL KNEE ARTHROPLASTY ;  Surgeon: Francisco J Alston MD;  Location:  OR     BIOPSY OF SKIN LESION       C DEXA, BONE DENSITY, AXIAL SKEL  06/2008    T score lumbar -0.6, femoral neck -2.4/-2.0(all stable)     COLONOSCOPY  6/21/21     HC ASPIRATION &/OR INJECTION GANGLION CYST, ANY  1994     HERNIORRHAPHY INGUINAL Right 03/24/2015    Procedure: HERNIORRHAPHY INGUINAL;  Surgeon: Sam Erazo MD;  Location:  [facility]     HYSTERECTOMY, PAP NO LONGER INDICATED       ORTHOPEDIC SURGERY      bilat meniscus repair     Z VAGINAL HYSTERECTOMY  1978    Hysterectomy, Vaginal     Current Outpatient Medications   Medication Sig Dispense Refill     Acetaminophen (TYLENOL PO) Take 1,000 mg by mouth 2 times daily as needed for mild pain or fever       albuterol (PROAIR HFA/PROVENTIL HFA/VENTOLIN HFA) 108 (90 Base) MCG/ACT inhaler Inhale 2 puffs into the lungs every 6 hours as needed for shortness of breath / dyspnea or wheezing  GENERIC VENTOLIN/ALBUTEROL 18 g 11     albuterol (PROVENTIL) (2.5 MG/3ML) 0.083% neb solution Take 1 vial (2.5 mg) by nebulization every 6 hours as needed for shortness of breath / dyspnea or wheezing 90 mL 3     apixaban ANTICOAGULANT (ELIQUIS) 5 MG tablet Take 1 tablet (5 mg) by mouth 2 times daily 180 tablet 3     ASPIRIN NOT PRESCRIBED (INTENTIONAL) Please choose reason not prescribed from choices below.       atorvastatin (LIPITOR) 20 MG tablet Take 1 tablet (20 mg) by mouth daily 90 tablet 3     betamethasone dipropionate (DIPROSONE) 0.05 % external cream Apply 1 FTU sparingly once or twice per day as needed to affected area until the skin is better, then stop; REPEAT AS NEEDED 45 g 2     calcium-vitamin D (OSCAL) 250-125 MG-UNIT per tablet Take 2 tablets by mouth daily       celecoxib (CELEBREX) 200 MG capsule Take 1 capsule (200 mg) by mouth 2 times daily as needed for pain (arthritis pain) 200 capsule 5     Cyanocobalamin (B-12) 1000 MCG TBCR Place 1,000 mcg under the tongue daily (Patient taking differently: Place 1,000 mcg under the tongue every other day)       diltiazem ER COATED BEADS (CARDIZEM CD/CARTIA XT) 180 MG 24 hr capsule Take 1 capsule (180 mg) by mouth daily with breakfast 90 capsule 3     famotidine (PEPCID) 20 MG tablet as needed       folic acid (FOLVITE) 1 MG tablet Take 1 tablet (1 mg) by mouth daily INDICATION: FOLIC ACID SUPPLEMENT (Patient taking differently: Take 400 mcg by mouth daily INDICATION: FOLIC ACID SUPPLEMENT) 100 tablet 3     order for DME Equipment being ordered: Nebulizer 1 each 0     spironolactone (ALDACTONE) 25 MG tablet Take 1 tablet (25 mg) by mouth daily 90 tablet 1     tiotropium (SPIRIVA HANDIHALER) 18 MCG inhaled capsule Inhale 1 capsule (18 mcg) into the lungs daily GENERIC TIOTROPIUM/SPIRIVA 90 capsule 3     triamcinolone (KENALOG) 0.1 % cream Apply topically 2 times daily as needed for irritation (sores on scalp.)       Urea 20 % CREA cream Apply topically  daily        vitamin C-electrolytes (EMERGEN-C) 1000mg vitamin C super orange drink mix Take 1 packet by mouth daily Mix 1 packet in 4-6oz water.       vitamin D3 (CHOLECALCIFEROL) 1.25 MG (10172 UT) capsule Take 1 capsule (50,000 Units) by mouth every 14 days SIG: TAKE INDICATION: 1ST AND 15TH OF EACH MONTH, TO TREAT VITAMIN D DEFICIENCY 24 capsule 1       Allergies   Allergen Reactions     Penicillins Rash     rash     Sulfa Drugs Rash     rash        Social History     Tobacco Use     Smoking status: Former Smoker     Packs/day: 1.00     Years: 10.00     Pack years: 10.00     Types: Cigarettes     Start date: 1964     Quit date: 2015     Years since quittin.7     Smokeless tobacco: Never Used     Tobacco comment: 5-10 daily   Substance Use Topics     Alcohol use: Yes     Comment: Rarely have any alcohol     Family History   Problem Relation Age of Onset     C.A.D. Father      Diabetes Father      Myocardial Infarction Father      Heart Surgery Father         CABGx4     Hyperlipidemia Father      Breast Cancer Mother 70     Osteoporosis Mother      Thyroid Disease Mother      Hyperlipidemia Mother      Cancer Brother         Bladder     Hyperlipidemia Brother      Diabetes Brother      Colon Cancer Brother      Other Cancer Brother      Neurologic Disorder Paternal Grandfather      Osteoporosis Sister      Arrhythmia Sister      Heart Surgery Sister         cardioversion, ablation     Other - See Comments Sister 55        arhythmogenic right ventricular dysplasia     Thyroid Disease Sister      Osteoporosis Maternal Grandmother      Osteoporosis Paternal Grandmother      Other - See Comments Maternal Grandfather         pneumonia     Family History Negative Son      Thyroid Disease Daughter      Family History Negative Daughter      Anesthesia Reaction Daughter      Arrhythmia Cousin         a-fib     Cancer - colorectal No family hx of      Prostate Cancer No family hx of      Alzheimer Disease No  "family hx of      Blood Disease No family hx of      Eye Disorder No family hx of      History   Drug Use No         Objective     /69   Pulse 83   Temp 97.7  F (36.5  C) (Oral)   Ht 1.638 m (5' 4.5\")   Wt 73.5 kg (162 lb)   LMP  (LMP Unknown)   SpO2 93%   BMI 27.38 kg/m      Physical Exam  GENERAL alert and no distress  EYES:  Normal sclera,conjunctiva, EOMI  HENT: oral and posterior pharynx without lesions or erythema, facies symmetric  NECK: Neck supple. No LAD, without thyroidmegaly.  RESP: Clear to ausculation bilaterally without wheezes or crackles. Normal BS in all fields.  CV: RRR normal S1S2 without murmurs, rubs or gallops.  LYMPH: no cervical lymph adenopathy appreciated  MS: extremities-decreased range of motion of the left hip due to pain and degenerative joint disease.  EXT:  no lower extremity edema  PSYCH: Alert and oriented times 3; speech- coherent  SKIN:  No obvious significant skin lesions on visible portions of face     Recent Labs   Lab Test 04/08/22  0751 09/30/21  0819 09/30/21  0818   HGB 14.6 14.2  --     258  --      --  141   POTASSIUM 3.8  --  3.9   CR 0.81  --  0.70   A1C 6.0* 6.0*  --         Diagnostics:  Labs pending at this time.  Results will be reviewed when available.     EKG (6/16/2022): sinus bradycardia, normal axis, normal intervals, no acute ST/T changes c/w ischemia, no LVH by voltage criteria, unchanged from previous tracings    Results for orders placed or performed in visit on 06/16/22   Basic metabolic panel     Status: Abnormal   Result Value Ref Range    Sodium 138 133 - 144 mmol/L    Potassium 4.2 3.4 - 5.3 mmol/L    Chloride 104 94 - 109 mmol/L    Carbon Dioxide (CO2) 32 20 - 32 mmol/L    Anion Gap 2 (L) 3 - 14 mmol/L    Urea Nitrogen 18 7 - 30 mg/dL    Creatinine 0.69 0.52 - 1.04 mg/dL    Calcium 9.8 8.5 - 10.1 mg/dL    Glucose 111 (H) 70 - 99 mg/dL    GFR Estimate >90 >60 mL/min/1.73m2   CBC with platelets     Status: Normal   Result " Value Ref Range    WBC Count 6.8 4.0 - 11.0 10e3/uL    RBC Count 4.78 3.80 - 5.20 10e6/uL    Hemoglobin 14.5 11.7 - 15.7 g/dL    Hematocrit 44.2 35.0 - 47.0 %    MCV 93 78 - 100 fL    MCH 30.3 26.5 - 33.0 pg    MCHC 32.8 31.5 - 36.5 g/dL    RDW 13.0 10.0 - 15.0 %    Platelet Count 236 150 - 450 10e3/uL        Revised Cardiac Risk Index (RCRI):  The patient has the following serious cardiovascular risks for perioperative complications:   - No serious cardiac risks = 0 points     RCRI Interpretation: 0 points: Class I (very low risk - 0.4% complication rate)           Signed Electronically by: Kendall Mendez MD  Copy of this evaluation report is provided to requesting physician.

## 2022-06-21 NOTE — PROGRESS NOTES
Pre-op Total Joint Patient Screening    1. Do you have a ride available to come to the hospital the day after your surgery by 8am with anticipated discharge of 11am? Y  2. What is the name of this person? Emi (sister)-This's patient's 3rd hip surgery, pt's very familiar with post-op home care and exercises. So, pt's sister will not be coming by 8am. Emi will be available by 11 to pick the pt.  3. Do you have a  set up after surgery? Y  4. Will your  be the same person that gives you a ride home after surgery? Y  5. Have you received the Joint Replacement Guidebook? Y  6. Do you have any questions about your guidebook? N  7. Have you activated your [employer] account? N  8. Have you signed up for MY Chart access? Y

## 2022-06-23 ENCOUNTER — LAB (OUTPATIENT)
Dept: URGENT CARE | Facility: URGENT CARE | Age: 72
End: 2022-06-23
Attending: INTERNAL MEDICINE
Payer: COMMERCIAL

## 2022-06-23 DIAGNOSIS — Z11.52 ENCOUNTER FOR PREOPERATIVE SCREENING LABORATORY TESTING FOR COVID-19 VIRUS: ICD-10-CM

## 2022-06-23 DIAGNOSIS — Z01.812 ENCOUNTER FOR PREOPERATIVE SCREENING LABORATORY TESTING FOR COVID-19 VIRUS: ICD-10-CM

## 2022-06-23 LAB — SARS-COV-2 RNA RESP QL NAA+PROBE: NEGATIVE

## 2022-06-23 PROCEDURE — U0003 INFECTIOUS AGENT DETECTION BY NUCLEIC ACID (DNA OR RNA); SEVERE ACUTE RESPIRATORY SYNDROME CORONAVIRUS 2 (SARS-COV-2) (CORONAVIRUS DISEASE [COVID-19]), AMPLIFIED PROBE TECHNIQUE, MAKING USE OF HIGH THROUGHPUT TECHNOLOGIES AS DESCRIBED BY CMS-2020-01-R: HCPCS

## 2022-06-23 PROCEDURE — U0005 INFEC AGEN DETEC AMPLI PROBE: HCPCS

## 2022-06-25 RX ORDER — LANOLIN ALCOHOL/MO/W.PET/CERES
400 CREAM (GRAM) TOPICAL DAILY
COMMUNITY

## 2022-06-25 NOTE — PROGRESS NOTES
PTA medications updated by Medication Scribe prior to surgery via phone call with patient (last doses completed by Nurse)     Medication history sources: Patient, Surescripts, H&P and Patient's home med list  In the past week, patient estimated taking medication this percent of the time: Greater than 90%  Adherence assessment: N/A Not Observed    Significant changes made to the medication list:  None      Additional medication history information:   Patient was advised to bring: Albuterol Inhaler, Tiotropium Inhalded Capsule    Medication reconciliation completed by provider prior to medication history? No    Time spent in this activity: 45 minutes    The information provided in this note is only as accurate as the sources available at the time of update(s)    Prior to Admission medications    Medication Sig Last Dose Taking? Auth Provider Long Term End Date   Acetaminophen (TYLENOL PO) Take 1,000 mg by mouth 2 times daily as needed for mild pain or fever 6/24/2022 at prn Yes Reported, Patient     albuterol (PROAIR HFA/PROVENTIL HFA/VENTOLIN HFA) 108 (90 Base) MCG/ACT inhaler Inhale 2 puffs into the lungs every 6 hours as needed for shortness of breath / dyspnea or wheezing GENERIC VENTOLIN/ALBUTEROL  at prn Yes Kendall Mendez MD Yes    albuterol (PROVENTIL) (2.5 MG/3ML) 0.083% neb solution Take 1 vial (2.5 mg) by nebulization every 6 hours as needed for shortness of breath / dyspnea or wheezing  at prn Yes Kendall Mendez MD Yes    apixaban ANTICOAGULANT (ELIQUIS) 5 MG tablet Take 1 tablet (5 mg) by mouth 2 times daily 6/22/2022 at pm Yes Rufina Palacios PA-C     atorvastatin (LIPITOR) 20 MG tablet Take 1 tablet (20 mg) by mouth daily  at am Yes Robert Stephen MD Yes    betamethasone dipropionate (DIPROSONE) 0.05 % external cream Apply 1 FTU sparingly once or twice per day as needed to affected area until the skin is better, then stop; REPEAT AS NEEDED  at prn Yes Kendall Mendez  MD Germán     calcium-vitamin D (OSCAL) 250-125 MG-UNIT per tablet Take 2 tablets by mouth daily 6/19/2022 at am Yes Reported, Patient     celecoxib (CELEBREX) 200 MG capsule Take 1 capsule (200 mg) by mouth 2 times daily as needed for pain (arthritis pain) 6/19/2022 at pm Yes Kendall Mendez MD Yes    Cyanocobalamin (B-12) 1000 MCG TBCR Place 1,000 mcg under the tongue daily  Patient taking differently: Place 1,000 mcg under the tongue every other day 6/19/2022 at am Yes Kendall Mendez MD     diltiazem ER COATED BEADS (CARDIZEM CD/CARTIA XT) 180 MG 24 hr capsule Take 1 capsule (180 mg) by mouth daily with breakfast  at am Yes Robert Stephen MD Yes    famotidine (PEPCID) 20 MG tablet Take 20 mg by mouth as needed  at prn Yes Reported, Patient     folic acid (FOLVITE) 400 MCG tablet Take 400 mcg by mouth daily 6/19/2022 at am Yes Reported, Patient     spironolactone (ALDACTONE) 25 MG tablet Take 1 tablet (25 mg) by mouth daily  at am Yes Kendall Mendez MD Yes    tiotropium (SPIRIVA HANDIHALER) 18 MCG inhaled capsule Inhale 1 capsule (18 mcg) into the lungs daily GENERIC TIOTROPIUM/SPIRIVA  at am Yes Kendall Mendez MD Yes    triamcinolone (KENALOG) 0.1 % cream Apply topically 2 times daily as needed for irritation (sores on scalp.)  at prn Yes Reported, Patient     Urea 20 % CREA cream Apply topically daily   at prn Yes Reported, Patient     vitamin C-electrolytes (EMERGEN-C) 1000mg vitamin C super orange drink mix Take 1 packet by mouth daily Mix 1 packet in 4-6oz water. 6/19/2022 at am Yes Reported, Patient     vitamin D3 (CHOLECALCIFEROL) 1.25 MG (60559 UT) capsule Take 1 capsule (50,000 Units) by mouth every 14 days SIG: TAKE INDICATION: 1ST AND 15TH OF EACH MONTH, TO TREAT VITAMIN D DEFICIENCY 6/15/2022 at am Yes Kendall Mendez MD     order for DME Equipment being ordered: Nebulizer   Kendall Mendez MD       Medication history completed  by:    Clarke Lao CPhT  Medication [facility]

## 2022-06-27 ENCOUNTER — APPOINTMENT (OUTPATIENT)
Dept: GENERAL RADIOLOGY | Facility: CLINIC | Age: 72
End: 2022-06-27
Attending: PHYSICIAN ASSISTANT
Payer: COMMERCIAL

## 2022-06-27 ENCOUNTER — APPOINTMENT (OUTPATIENT)
Dept: PHYSICAL THERAPY | Facility: CLINIC | Age: 72
End: 2022-06-27
Attending: ORTHOPAEDIC SURGERY
Payer: COMMERCIAL

## 2022-06-27 ENCOUNTER — ANESTHESIA EVENT (OUTPATIENT)
Dept: SURGERY | Facility: CLINIC | Age: 72
End: 2022-06-27
Payer: COMMERCIAL

## 2022-06-27 ENCOUNTER — APPOINTMENT (OUTPATIENT)
Dept: GENERAL RADIOLOGY | Facility: CLINIC | Age: 72
End: 2022-06-27
Attending: ORTHOPAEDIC SURGERY
Payer: COMMERCIAL

## 2022-06-27 ENCOUNTER — ANESTHESIA (OUTPATIENT)
Dept: SURGERY | Facility: CLINIC | Age: 72
End: 2022-06-27
Payer: COMMERCIAL

## 2022-06-27 ENCOUNTER — HOSPITAL ENCOUNTER (OUTPATIENT)
Facility: CLINIC | Age: 72
Discharge: HOME | End: 2022-06-28
Attending: ORTHOPAEDIC SURGERY | Admitting: ORTHOPAEDIC SURGERY
Payer: COMMERCIAL

## 2022-06-27 DIAGNOSIS — Z96.642 STATUS POST TOTAL REPLACEMENT OF LEFT HIP: Primary | ICD-10-CM

## 2022-06-27 PROBLEM — Z96.649 S/P TOTAL HIP ARTHROPLASTY: Status: ACTIVE | Noted: 2022-06-27

## 2022-06-27 LAB
CREAT SERPL-MCNC: 0.71 MG/DL (ref 0.52–1.04)
GFR SERPL CREATININE-BSD FRML MDRD: 89 ML/MIN/1.73M2
PLATELET # BLD AUTO: 247 10E3/UL (ref 150–450)

## 2022-06-27 PROCEDURE — 710N000009 HC RECOVERY PHASE 1, LEVEL 1, PER MIN: Performed by: ORTHOPAEDIC SURGERY

## 2022-06-27 PROCEDURE — 250N000011 HC RX IP 250 OP 636: Performed by: REGISTERED NURSE

## 2022-06-27 PROCEDURE — 250N000013 HC RX MED GY IP 250 OP 250 PS 637: Performed by: ORTHOPAEDIC SURGERY

## 2022-06-27 PROCEDURE — 97530 THERAPEUTIC ACTIVITIES: CPT | Mod: GP | Performed by: PHYSICAL THERAPIST

## 2022-06-27 PROCEDURE — 258N000003 HC RX IP 258 OP 636: Performed by: ORTHOPAEDIC SURGERY

## 2022-06-27 PROCEDURE — 250N000009 HC RX 250: Performed by: REGISTERED NURSE

## 2022-06-27 PROCEDURE — 999N000063 XR PELVIS AND HIP PORTABLE LEFT 1 VIEW

## 2022-06-27 PROCEDURE — 250N000011 HC RX IP 250 OP 636: Performed by: ORTHOPAEDIC SURGERY

## 2022-06-27 PROCEDURE — 250N000011 HC RX IP 250 OP 636: Performed by: ANESTHESIOLOGY

## 2022-06-27 PROCEDURE — 97116 GAIT TRAINING THERAPY: CPT | Mod: GP | Performed by: PHYSICAL THERAPIST

## 2022-06-27 PROCEDURE — 36415 COLL VENOUS BLD VENIPUNCTURE: CPT | Performed by: ORTHOPAEDIC SURGERY

## 2022-06-27 PROCEDURE — 370N000017 HC ANESTHESIA TECHNICAL FEE, PER MIN: Performed by: ORTHOPAEDIC SURGERY

## 2022-06-27 PROCEDURE — C1713 ANCHOR/SCREW BN/BN,TIS/BN: HCPCS | Performed by: ORTHOPAEDIC SURGERY

## 2022-06-27 PROCEDURE — 258N000003 HC RX IP 258 OP 636: Performed by: PHYSICIAN ASSISTANT

## 2022-06-27 PROCEDURE — 97161 PT EVAL LOW COMPLEX 20 MIN: CPT | Mod: GP | Performed by: PHYSICAL THERAPIST

## 2022-06-27 PROCEDURE — 250N000011 HC RX IP 250 OP 636: Performed by: PHYSICIAN ASSISTANT

## 2022-06-27 PROCEDURE — 999N000141 HC STATISTIC PRE-PROCEDURE NURSING ASSESSMENT: Performed by: ORTHOPAEDIC SURGERY

## 2022-06-27 PROCEDURE — 250N000013 HC RX MED GY IP 250 OP 250 PS 637: Performed by: PHYSICIAN ASSISTANT

## 2022-06-27 PROCEDURE — 250N000009 HC RX 250: Performed by: ORTHOPAEDIC SURGERY

## 2022-06-27 PROCEDURE — 999N000179 XR SURGERY CARM FLUORO LESS THAN 5 MIN W STILLS

## 2022-06-27 PROCEDURE — C1734 ORTH/DEVIC/DRUG BN/BN,TIS/BN: HCPCS | Performed by: ORTHOPAEDIC SURGERY

## 2022-06-27 PROCEDURE — 82565 ASSAY OF CREATININE: CPT | Performed by: ORTHOPAEDIC SURGERY

## 2022-06-27 PROCEDURE — 360N000084 HC SURGERY LEVEL 4 W/ FLUORO, PER MIN: Performed by: ORTHOPAEDIC SURGERY

## 2022-06-27 PROCEDURE — 99203 OFFICE O/P NEW LOW 30 MIN: CPT | Performed by: PHYSICIAN ASSISTANT

## 2022-06-27 PROCEDURE — 250N000025 HC SEVOFLURANE, PER MIN: Performed by: ORTHOPAEDIC SURGERY

## 2022-06-27 PROCEDURE — 99207 PR CDG-CODE CATEGORY CHANGED: CPT | Performed by: PHYSICIAN ASSISTANT

## 2022-06-27 PROCEDURE — 85049 AUTOMATED PLATELET COUNT: CPT | Performed by: ORTHOPAEDIC SURGERY

## 2022-06-27 PROCEDURE — 272N000001 HC OR GENERAL SUPPLY STERILE: Performed by: ORTHOPAEDIC SURGERY

## 2022-06-27 PROCEDURE — 258N000003 HC RX IP 258 OP 636: Performed by: ANESTHESIOLOGY

## 2022-06-27 PROCEDURE — C1776 JOINT DEVICE (IMPLANTABLE): HCPCS | Performed by: ORTHOPAEDIC SURGERY

## 2022-06-27 DEVICE — BIOLOX® DELTA, CERAMIC FEMORAL HEAD, M, Ø 36/0, TAPER 12/14
Type: IMPLANTABLE DEVICE | Site: HIP | Status: FUNCTIONAL
Brand: BIOLOX® DELTA

## 2022-06-27 DEVICE — TOBRA FULL DOSE ANTIBIOTIC BONE CEMENT, 10 PACK CATALOG NUMBER IS 6197-9-010
Type: IMPLANTABLE DEVICE | Site: HIP | Status: FUNCTIONAL
Brand: SIMPLEX

## 2022-06-27 DEVICE — IMP SCR ZIM 6.5X30MM ACET CUP SELF TAP 00-6250-065-30: Type: IMPLANTABLE DEVICE | Site: HIP | Status: FUNCTIONAL

## 2022-06-27 DEVICE — BUCK FEMORAL CEMENT RESTRICTOR 18.5MM
Type: IMPLANTABLE DEVICE | Site: HIP | Status: FUNCTIONAL
Brand: BUCK

## 2022-06-27 DEVICE — IMPLANTABLE DEVICE
Type: IMPLANTABLE DEVICE | Site: HIP | Status: FUNCTIONAL
Brand: G7® ACETABULAR SYSTEM

## 2022-06-27 DEVICE — FULL DOSE BONE CEMENT, 10 PACK CATALOG NUMBER IS 6191-1-010
Type: IMPLANTABLE DEVICE | Site: HIP | Status: FUNCTIONAL
Brand: SIMPLEX

## 2022-06-27 DEVICE — IMPLANTABLE DEVICE
Type: IMPLANTABLE DEVICE | Site: HIP | Status: FUNCTIONAL
Brand: G7® VIVACIT-E®

## 2022-06-27 RX ORDER — POLYETHYLENE GLYCOL 3350 17 G/17G
17 POWDER, FOR SOLUTION ORAL DAILY
Status: DISCONTINUED | OUTPATIENT
Start: 2022-06-28 | End: 2022-06-28 | Stop reason: HOSPADM

## 2022-06-27 RX ORDER — FENTANYL CITRATE 0.05 MG/ML
25 INJECTION, SOLUTION INTRAMUSCULAR; INTRAVENOUS EVERY 5 MIN PRN
Status: DISCONTINUED | OUTPATIENT
Start: 2022-06-27 | End: 2022-06-27 | Stop reason: HOSPADM

## 2022-06-27 RX ORDER — HYDROMORPHONE HYDROCHLORIDE 1 MG/ML
INJECTION, SOLUTION INTRAMUSCULAR; INTRAVENOUS; SUBCUTANEOUS PRN
Status: DISCONTINUED | OUTPATIENT
Start: 2022-06-27 | End: 2022-06-27

## 2022-06-27 RX ORDER — ONDANSETRON 2 MG/ML
4 INJECTION INTRAMUSCULAR; INTRAVENOUS EVERY 30 MIN PRN
Status: DISCONTINUED | OUTPATIENT
Start: 2022-06-27 | End: 2022-06-27 | Stop reason: HOSPADM

## 2022-06-27 RX ORDER — TIOTROPIUM BROMIDE 18 UG/1
18 CAPSULE ORAL; RESPIRATORY (INHALATION) DAILY
Status: DISCONTINUED | OUTPATIENT
Start: 2022-06-28 | End: 2022-06-28 | Stop reason: HOSPADM

## 2022-06-27 RX ORDER — BISACODYL 10 MG
10 SUPPOSITORY, RECTAL RECTAL DAILY PRN
Status: DISCONTINUED | OUTPATIENT
Start: 2022-06-27 | End: 2022-06-28 | Stop reason: HOSPADM

## 2022-06-27 RX ORDER — SODIUM CHLORIDE, SODIUM LACTATE, POTASSIUM CHLORIDE, CALCIUM CHLORIDE 600; 310; 30; 20 MG/100ML; MG/100ML; MG/100ML; MG/100ML
INJECTION, SOLUTION INTRAVENOUS CONTINUOUS
Status: DISCONTINUED | OUTPATIENT
Start: 2022-06-27 | End: 2022-06-27 | Stop reason: HOSPADM

## 2022-06-27 RX ORDER — PROCHLORPERAZINE MALEATE 5 MG
5 TABLET ORAL EVERY 6 HOURS PRN
Status: DISCONTINUED | OUTPATIENT
Start: 2022-06-27 | End: 2022-06-28 | Stop reason: HOSPADM

## 2022-06-27 RX ORDER — ONDANSETRON 4 MG/1
4 TABLET, ORALLY DISINTEGRATING ORAL EVERY 30 MIN PRN
Status: DISCONTINUED | OUTPATIENT
Start: 2022-06-27 | End: 2022-06-27 | Stop reason: HOSPADM

## 2022-06-27 RX ORDER — TRAMADOL HYDROCHLORIDE 50 MG/1
50 TABLET ORAL EVERY 6 HOURS PRN
Status: DISCONTINUED | OUTPATIENT
Start: 2022-06-27 | End: 2022-06-28 | Stop reason: HOSPADM

## 2022-06-27 RX ORDER — ACETAMINOPHEN 325 MG/1
975 TABLET ORAL EVERY 8 HOURS
Status: DISCONTINUED | OUTPATIENT
Start: 2022-06-27 | End: 2022-06-28 | Stop reason: HOSPADM

## 2022-06-27 RX ORDER — NALOXONE HYDROCHLORIDE 0.4 MG/ML
0.4 INJECTION, SOLUTION INTRAMUSCULAR; INTRAVENOUS; SUBCUTANEOUS
Status: DISCONTINUED | OUTPATIENT
Start: 2022-06-27 | End: 2022-06-28 | Stop reason: HOSPADM

## 2022-06-27 RX ORDER — CEFAZOLIN SODIUM/WATER 2 G/20 ML
2 SYRINGE (ML) INTRAVENOUS
Status: COMPLETED | OUTPATIENT
Start: 2022-06-27 | End: 2022-06-27

## 2022-06-27 RX ORDER — HYDROXYZINE HYDROCHLORIDE 50 MG/ML
50 INJECTION, SOLUTION INTRAMUSCULAR ONCE
Status: COMPLETED | OUTPATIENT
Start: 2022-06-27 | End: 2022-06-27

## 2022-06-27 RX ORDER — NALOXONE HYDROCHLORIDE 0.4 MG/ML
0.2 INJECTION, SOLUTION INTRAMUSCULAR; INTRAVENOUS; SUBCUTANEOUS
Status: DISCONTINUED | OUTPATIENT
Start: 2022-06-27 | End: 2022-06-28 | Stop reason: HOSPADM

## 2022-06-27 RX ORDER — DILTIAZEM HYDROCHLORIDE 180 MG/1
180 CAPSULE, COATED, EXTENDED RELEASE ORAL DAILY
Status: DISCONTINUED | OUTPATIENT
Start: 2022-06-28 | End: 2022-06-28 | Stop reason: HOSPADM

## 2022-06-27 RX ORDER — ENOXAPARIN SODIUM 100 MG/ML
40 INJECTION SUBCUTANEOUS EVERY 24 HOURS
Status: DISCONTINUED | OUTPATIENT
Start: 2022-06-28 | End: 2022-06-28 | Stop reason: HOSPADM

## 2022-06-27 RX ORDER — LIDOCAINE HYDROCHLORIDE 20 MG/ML
INJECTION, SOLUTION INFILTRATION; PERINEURAL PRN
Status: DISCONTINUED | OUTPATIENT
Start: 2022-06-27 | End: 2022-06-27

## 2022-06-27 RX ORDER — OXYCODONE HYDROCHLORIDE 5 MG/1
5 TABLET ORAL EVERY 4 HOURS PRN
Status: DISCONTINUED | OUTPATIENT
Start: 2022-06-27 | End: 2022-06-27 | Stop reason: HOSPADM

## 2022-06-27 RX ORDER — DEXAMETHASONE SODIUM PHOSPHATE 4 MG/ML
INJECTION, SOLUTION INTRA-ARTICULAR; INTRALESIONAL; INTRAMUSCULAR; INTRAVENOUS; SOFT TISSUE PRN
Status: DISCONTINUED | OUTPATIENT
Start: 2022-06-27 | End: 2022-06-27

## 2022-06-27 RX ORDER — PROPOFOL 10 MG/ML
INJECTION, EMULSION INTRAVENOUS PRN
Status: DISCONTINUED | OUTPATIENT
Start: 2022-06-27 | End: 2022-06-27

## 2022-06-27 RX ORDER — ONDANSETRON 4 MG/1
4 TABLET, ORALLY DISINTEGRATING ORAL EVERY 6 HOURS PRN
Status: DISCONTINUED | OUTPATIENT
Start: 2022-06-27 | End: 2022-06-28 | Stop reason: HOSPADM

## 2022-06-27 RX ORDER — ENOXAPARIN SODIUM 100 MG/ML
40 INJECTION SUBCUTANEOUS EVERY 24 HOURS
Qty: 4 ML | Refills: 0 | Status: SHIPPED | OUTPATIENT
Start: 2022-06-27 | End: 2022-07-07

## 2022-06-27 RX ORDER — ACETAMINOPHEN 325 MG/1
650 TABLET ORAL EVERY 4 HOURS PRN
Qty: 100 TABLET | Refills: 0 | Status: SHIPPED | OUTPATIENT
Start: 2022-06-27 | End: 2022-09-28

## 2022-06-27 RX ORDER — METHOCARBAMOL 500 MG/1
500 TABLET, FILM COATED ORAL EVERY 6 HOURS PRN
Status: DISCONTINUED | OUTPATIENT
Start: 2022-06-27 | End: 2022-06-28 | Stop reason: HOSPADM

## 2022-06-27 RX ORDER — ONDANSETRON 2 MG/ML
INJECTION INTRAMUSCULAR; INTRAVENOUS PRN
Status: DISCONTINUED | OUTPATIENT
Start: 2022-06-27 | End: 2022-06-27

## 2022-06-27 RX ORDER — ACETAMINOPHEN 325 MG/1
975 TABLET ORAL ONCE
Status: COMPLETED | OUTPATIENT
Start: 2022-06-27 | End: 2022-06-27

## 2022-06-27 RX ORDER — PROPOFOL 10 MG/ML
INJECTION, EMULSION INTRAVENOUS CONTINUOUS PRN
Status: DISCONTINUED | OUTPATIENT
Start: 2022-06-27 | End: 2022-06-27

## 2022-06-27 RX ORDER — AMOXICILLIN 250 MG
1 CAPSULE ORAL 2 TIMES DAILY
Status: DISCONTINUED | OUTPATIENT
Start: 2022-06-27 | End: 2022-06-28 | Stop reason: HOSPADM

## 2022-06-27 RX ORDER — HYDROMORPHONE HCL IN WATER/PF 6 MG/30 ML
0.4 PATIENT CONTROLLED ANALGESIA SYRINGE INTRAVENOUS
Status: DISCONTINUED | OUTPATIENT
Start: 2022-06-27 | End: 2022-06-28 | Stop reason: HOSPADM

## 2022-06-27 RX ORDER — HYDROMORPHONE HCL IN WATER/PF 6 MG/30 ML
0.2 PATIENT CONTROLLED ANALGESIA SYRINGE INTRAVENOUS EVERY 5 MIN PRN
Status: DISCONTINUED | OUTPATIENT
Start: 2022-06-27 | End: 2022-06-27 | Stop reason: HOSPADM

## 2022-06-27 RX ORDER — NEOSTIGMINE METHYLSULFATE 1 MG/ML
VIAL (ML) INJECTION PRN
Status: DISCONTINUED | OUTPATIENT
Start: 2022-06-27 | End: 2022-06-27

## 2022-06-27 RX ORDER — HYDROMORPHONE HCL IN WATER/PF 6 MG/30 ML
0.2 PATIENT CONTROLLED ANALGESIA SYRINGE INTRAVENOUS
Status: DISCONTINUED | OUTPATIENT
Start: 2022-06-27 | End: 2022-06-28 | Stop reason: HOSPADM

## 2022-06-27 RX ORDER — SODIUM CHLORIDE, SODIUM LACTATE, POTASSIUM CHLORIDE, CALCIUM CHLORIDE 600; 310; 30; 20 MG/100ML; MG/100ML; MG/100ML; MG/100ML
INJECTION, SOLUTION INTRAVENOUS CONTINUOUS
Status: DISCONTINUED | OUTPATIENT
Start: 2022-06-27 | End: 2022-06-28 | Stop reason: HOSPADM

## 2022-06-27 RX ORDER — TRAMADOL HYDROCHLORIDE 50 MG/1
50 TABLET ORAL EVERY 6 HOURS PRN
Qty: 25 TABLET | Refills: 0 | Status: SHIPPED | OUTPATIENT
Start: 2022-06-27 | End: 2022-09-28

## 2022-06-27 RX ORDER — LIDOCAINE 40 MG/G
CREAM TOPICAL
Status: DISCONTINUED | OUTPATIENT
Start: 2022-06-27 | End: 2022-06-28 | Stop reason: HOSPADM

## 2022-06-27 RX ORDER — CEFAZOLIN SODIUM/WATER 2 G/20 ML
2 SYRINGE (ML) INTRAVENOUS SEE ADMIN INSTRUCTIONS
Status: DISCONTINUED | OUTPATIENT
Start: 2022-06-27 | End: 2022-06-27 | Stop reason: HOSPADM

## 2022-06-27 RX ORDER — TRANEXAMIC ACID 650 MG/1
1950 TABLET ORAL ONCE
Status: COMPLETED | OUTPATIENT
Start: 2022-06-27 | End: 2022-06-27

## 2022-06-27 RX ORDER — GLYCOPYRROLATE 0.2 MG/ML
INJECTION, SOLUTION INTRAMUSCULAR; INTRAVENOUS PRN
Status: DISCONTINUED | OUTPATIENT
Start: 2022-06-27 | End: 2022-06-27

## 2022-06-27 RX ORDER — MAGNESIUM HYDROXIDE 1200 MG/15ML
LIQUID ORAL PRN
Status: DISCONTINUED | OUTPATIENT
Start: 2022-06-27 | End: 2022-06-27 | Stop reason: HOSPADM

## 2022-06-27 RX ORDER — POLYETHYLENE GLYCOL 3350 17 G/17G
1 POWDER, FOR SOLUTION ORAL DAILY
Qty: 7 PACKET | Refills: 0 | Status: SHIPPED | OUTPATIENT
Start: 2022-06-27 | End: 2022-09-28

## 2022-06-27 RX ORDER — CLINDAMYCIN PHOSPHATE 600 MG/50ML
600 INJECTION, SOLUTION INTRAVENOUS EVERY 8 HOURS
Status: DISPENSED | OUTPATIENT
Start: 2022-06-27 | End: 2022-06-27

## 2022-06-27 RX ORDER — ACETAMINOPHEN 325 MG/1
650 TABLET ORAL EVERY 4 HOURS PRN
Status: DISCONTINUED | OUTPATIENT
Start: 2022-06-30 | End: 2022-06-28 | Stop reason: HOSPADM

## 2022-06-27 RX ORDER — AMOXICILLIN 250 MG
1-2 CAPSULE ORAL 2 TIMES DAILY
Qty: 30 TABLET | Refills: 0 | Status: SHIPPED | OUTPATIENT
Start: 2022-06-27 | End: 2022-09-28

## 2022-06-27 RX ORDER — FENTANYL CITRATE 50 UG/ML
INJECTION, SOLUTION INTRAMUSCULAR; INTRAVENOUS PRN
Status: DISCONTINUED | OUTPATIENT
Start: 2022-06-27 | End: 2022-06-27

## 2022-06-27 RX ORDER — ONDANSETRON 2 MG/ML
4 INJECTION INTRAMUSCULAR; INTRAVENOUS EVERY 6 HOURS PRN
Status: DISCONTINUED | OUTPATIENT
Start: 2022-06-27 | End: 2022-06-28 | Stop reason: HOSPADM

## 2022-06-27 RX ADMIN — HYDROMORPHONE HYDROCHLORIDE 0.2 MG: 0.2 INJECTION, SOLUTION INTRAMUSCULAR; INTRAVENOUS; SUBCUTANEOUS at 11:28

## 2022-06-27 RX ADMIN — ROCURONIUM BROMIDE 10 MG: 50 INJECTION, SOLUTION INTRAVENOUS at 08:51

## 2022-06-27 RX ADMIN — SODIUM CHLORIDE, POTASSIUM CHLORIDE, SODIUM LACTATE AND CALCIUM CHLORIDE: 600; 310; 30; 20 INJECTION, SOLUTION INTRAVENOUS at 12:26

## 2022-06-27 RX ADMIN — ACETAMINOPHEN 975 MG: 325 TABLET ORAL at 06:26

## 2022-06-27 RX ADMIN — ROCURONIUM BROMIDE 20 MG: 50 INJECTION, SOLUTION INTRAVENOUS at 08:06

## 2022-06-27 RX ADMIN — ACETAMINOPHEN 975 MG: 325 TABLET ORAL at 21:08

## 2022-06-27 RX ADMIN — FENTANYL CITRATE 25 MCG: 50 INJECTION, SOLUTION INTRAMUSCULAR; INTRAVENOUS at 10:44

## 2022-06-27 RX ADMIN — DEXAMETHASONE SODIUM PHOSPHATE 4 MG: 4 INJECTION, SOLUTION INTRA-ARTICULAR; INTRALESIONAL; INTRAMUSCULAR; INTRAVENOUS; SOFT TISSUE at 07:44

## 2022-06-27 RX ADMIN — ROCURONIUM BROMIDE 50 MG: 50 INJECTION, SOLUTION INTRAVENOUS at 07:39

## 2022-06-27 RX ADMIN — HYDROMORPHONE HYDROCHLORIDE 0.2 MG: 0.2 INJECTION, SOLUTION INTRAMUSCULAR; INTRAVENOUS; SUBCUTANEOUS at 11:21

## 2022-06-27 RX ADMIN — SENNOSIDES AND DOCUSATE SODIUM 1 TABLET: 50; 8.6 TABLET ORAL at 13:56

## 2022-06-27 RX ADMIN — FENTANYL CITRATE 100 MCG: 50 INJECTION, SOLUTION INTRAMUSCULAR; INTRAVENOUS at 07:39

## 2022-06-27 RX ADMIN — CLINDAMYCIN PHOSPHATE 600 MG: 600 INJECTION, SOLUTION INTRAVENOUS at 16:21

## 2022-06-27 RX ADMIN — SODIUM CHLORIDE, POTASSIUM CHLORIDE, SODIUM LACTATE AND CALCIUM CHLORIDE: 600; 310; 30; 20 INJECTION, SOLUTION INTRAVENOUS at 06:27

## 2022-06-27 RX ADMIN — HYDROMORPHONE HYDROCHLORIDE 0.1 MG: 0.2 INJECTION, SOLUTION INTRAMUSCULAR; INTRAVENOUS; SUBCUTANEOUS at 11:44

## 2022-06-27 RX ADMIN — HYDROMORPHONE HYDROCHLORIDE 0.25 MG: 1 INJECTION, SOLUTION INTRAMUSCULAR; INTRAVENOUS; SUBCUTANEOUS at 08:15

## 2022-06-27 RX ADMIN — HYDROMORPHONE HYDROCHLORIDE 0.25 MG: 1 INJECTION, SOLUTION INTRAMUSCULAR; INTRAVENOUS; SUBCUTANEOUS at 10:07

## 2022-06-27 RX ADMIN — SENNOSIDES AND DOCUSATE SODIUM 1 TABLET: 50; 8.6 TABLET ORAL at 21:08

## 2022-06-27 RX ADMIN — NEOSTIGMINE METHYLSULFATE 3.5 MG: 1 INJECTION, SOLUTION INTRAVENOUS at 10:16

## 2022-06-27 RX ADMIN — ONDANSETRON 4 MG: 2 INJECTION INTRAMUSCULAR; INTRAVENOUS at 07:45

## 2022-06-27 RX ADMIN — LIDOCAINE HYDROCHLORIDE 100 MG: 20 INJECTION, SOLUTION INFILTRATION; PERINEURAL at 07:39

## 2022-06-27 RX ADMIN — FENTANYL CITRATE 25 MCG: 50 INJECTION, SOLUTION INTRAMUSCULAR; INTRAVENOUS at 10:38

## 2022-06-27 RX ADMIN — HYDROXYZINE HYDROCHLORIDE 50 MG: 50 INJECTION, SOLUTION INTRAMUSCULAR at 11:03

## 2022-06-27 RX ADMIN — HYDROMORPHONE HYDROCHLORIDE 0.2 MG: 0.2 INJECTION, SOLUTION INTRAMUSCULAR; INTRAVENOUS; SUBCUTANEOUS at 10:54

## 2022-06-27 RX ADMIN — FENTANYL CITRATE 25 MCG: 50 INJECTION, SOLUTION INTRAMUSCULAR; INTRAVENOUS at 10:49

## 2022-06-27 RX ADMIN — HYDROMORPHONE HYDROCHLORIDE 0.25 MG: 1 INJECTION, SOLUTION INTRAMUSCULAR; INTRAVENOUS; SUBCUTANEOUS at 09:57

## 2022-06-27 RX ADMIN — HYDROMORPHONE HYDROCHLORIDE 0.25 MG: 1 INJECTION, SOLUTION INTRAMUSCULAR; INTRAVENOUS; SUBCUTANEOUS at 08:03

## 2022-06-27 RX ADMIN — GLYCOPYRROLATE 0.5 MG: 0.2 INJECTION, SOLUTION INTRAMUSCULAR; INTRAVENOUS at 10:16

## 2022-06-27 RX ADMIN — ONDANSETRON 4 MG: 2 INJECTION INTRAMUSCULAR; INTRAVENOUS at 11:47

## 2022-06-27 RX ADMIN — PROPOFOL 200 MG: 10 INJECTION, EMULSION INTRAVENOUS at 07:39

## 2022-06-27 RX ADMIN — PROCHLORPERAZINE MALEATE 5 MG: 5 TABLET ORAL at 12:57

## 2022-06-27 RX ADMIN — FENTANYL CITRATE 25 MCG: 50 INJECTION, SOLUTION INTRAMUSCULAR; INTRAVENOUS at 10:33

## 2022-06-27 RX ADMIN — Medication 2 G: at 07:37

## 2022-06-27 RX ADMIN — ACETAMINOPHEN 975 MG: 325 TABLET ORAL at 13:56

## 2022-06-27 RX ADMIN — PROPOFOL 50 MCG/KG/MIN: 10 INJECTION, EMULSION INTRAVENOUS at 07:44

## 2022-06-27 RX ADMIN — TRANEXAMIC ACID 1950 MG: 650 TABLET ORAL at 06:27

## 2022-06-27 RX ADMIN — HYDROMORPHONE HYDROCHLORIDE 0.2 MG: 0.2 INJECTION, SOLUTION INTRAMUSCULAR; INTRAVENOUS; SUBCUTANEOUS at 11:05

## 2022-06-27 RX ADMIN — ROCURONIUM BROMIDE 5 MG: 50 INJECTION, SOLUTION INTRAVENOUS at 09:19

## 2022-06-27 ASSESSMENT — ACTIVITIES OF DAILY LIVING (ADL): TOILETING_ISSUES: NO

## 2022-06-27 ASSESSMENT — LIFESTYLE VARIABLES: TOBACCO_USE: 1

## 2022-06-27 ASSESSMENT — COPD QUESTIONNAIRES
COPD: 1
CAT_SEVERITY: MILD

## 2022-06-27 NOTE — CONSULTS
"[facility]  Hospitalist Service Consultation  JoAnna K. Barthell, PA-C pager [phone]    Date of Admission:  6/27/2022  Date of Consult: 6/27/2022    Assessment & Plan   Marva Angela is a 71 year old female with PMHx remote DVT x2, COPD, HTN, paroxysmal atrial fibrillation, and osteoarthritis who is admitted under the care of service for postop management following elective left total hip arthroplasty via direct anterior approach.  The hospitalist service is consulted for medical comanagement.    Left hip osteoarthritis s/p left ROBLES via direct anterior approach (6/27/2022).  .  Preop Hgb 14.5.  - Postop management per orthopedic surgery.  - Would recommend resuming Eliquis POD #1 instead of prophylactic dose Lovenox given patient's history pAF with ABS3AX3-ACHh score 3 and remote history DVT x 2    Paroxysmal atrial fibrillation.  Hypertension.  Dyslipidemia.  - Continue PTA diltiazem with hold parameters.  - As above recommend resumption PTA anticoagulation over prophylactic dose Lovenox injections.  - Resume spironolactone if blood pressure and renal function appropriate in AM.    COPD.  Noted patient to intermittently desaturate into low to mid 80s during conversation.  - Continue PTA Spiriva and albuterol.  - Wean O2 as able.  O2 sat goal 89-92%.  - Encourage OOB and incentive spirometry.    Remote history DVT.  Sounds provoked and related to hormonal changes from pregnancies.    Clinically Significant Risk Factors Present on Admission               # Coagulation Defect: home medication list includes an anticoagulant medication      # Overweight: Estimated body mass index is 27.04 kg/m  as calculated from the following:    Height as of this encounter: 1.638 m (5' 4.5\").    Weight as of this encounter: 72.6 kg (160 lb).          DVT Prophylaxis: Pneumatic Compression Devices  Code Status: Full Code; expresses desire for limited duration heroic interventions if felt to have " irreversible pathology or quality of life deemed so poor most would not want to live    This patient was discussed with Dr. Winston of the Hospitalist Service who agrees with current plans as outlined above.    Disposition: Expected discharge in 1 day per ortho.    JoAnna K. Barthell, PA-C, ZIGGY    Marva Angela MRN# [id number]   YOB: 1950 Age: 71 year old   Primary Care Physician: Kendall Mendez [phone]    Requesting Physician: Dr. Alston  Reason for Consult: Medical co-mngt    History provided by patient.    HPI  Marva Angela is a 71 year old female with PMHx remote DVT x 2, COPD, HTN, paroxysmal atrial fibrillation, and osteoarthritis who is admitted under the care of service for postop management following elective left total hip arthroplasty via direct anterior approach.  EBL 300ml.  The hospitalist service is consulted for medical comanagement.    Tolerated procedure well. Eval at bedside post op and pain is adequately controlled. Feels nauseous, but much improved compared to PACU. Denies cp, difficulty breathing, abd pain. Last bm this AM prior to surgery. Took diltiazem this morning. Has been holding Eliquis x 5 days. Quit smoking 7 years ago.    PAST MEDICAL HISTORY  Past Medical History:   Diagnosis Date     Arthritis     knees and hips     B12 deficiency 10/14/2016    B12 178     Benign essential hypertension     was on amlodipine/ then metoprolol / add lisinopril      Chronic atrial fibrillation (H)      Contact dermatitis and other eczema, due to unspecified cause      COPD (chronic obstructive pulmonary disease) (H) 01/01/2012     Coronary artery disease involving native coronary artery of native heart without angina pectoris 2016    coronary artery calcifications seen on CT scan, mild nonocclusive CAD     Hyperlipidemia LDL goal <130 11/07/2012     Nephritis      as a child (post strep)      Osteoporosis, unspecified      Phlebitis and thrombophlebitis of  other deep vessels of lower extremities 1972, 1975    Both with pregnancies     Prediabetes 11/01/2018    A1C 6.1     Sinusitis      Tobacco use disorder        PAST SURGICAL HISTORY  Past Surgical History:   Procedure Laterality Date     ABDOMEN SURGERY  April2015     ARTHROPLASTY HIP Right 04/03/2017    Procedure: ARTHROPLASTY HIP;  Surgeon: Francisco J Alston MD;  Location: [facility] OR     ARTHROPLASTY KNEE Left 07/31/2017    Procedure: ARTHROPLASTY KNEE;  LEFT TOTAL KNEE ARTHROPLASTY ;  Surgeon: Francisco J Alston MD;  Location: [facility] OR     BIOPSY OF SKIN LESION       C DEXA, BONE DENSITY, AXIAL SKEL  06/2008    T score lumbar -0.6, femoral neck -2.4/-2.0(all stable)     COLONOSCOPY  6/21/21     HC ASPIRATION &/OR INJECTION GANGLION CYST, ANY  1994     HERNIORRHAPHY INGUINAL Right 03/24/2015    Procedure: HERNIORRHAPHY INGUINAL;  Surgeon: Sam Erazo MD;  Location:  [facility]     HYSTERECTOMY, PAP NO LONGER INDICATED       ORTHOPEDIC SURGERY      bilat meniscus repair     ZZC VAGINAL HYSTERECTOMY  1978    Hysterectomy, Vaginal       HOME MEDICATIONS  Prior to Admission medications    Medication Sig Last Dose Taking? Auth Provider Long Term End Date   Acetaminophen (TYLENOL PO) Take 1,000 mg by mouth 2 times daily as needed for mild pain or fever 6/26/2022 at am Yes Reported, Patient     acetaminophen (TYLENOL) 325 MG tablet Take 2 tablets (650 mg) by mouth every 4 hours as needed for other (mild pain)  Yes Stacey Whittaker     albuterol (PROAIR HFA/PROVENTIL HFA/VENTOLIN HFA) 108 (90 Base) MCG/ACT inhaler Inhale 2 puffs into the lungs every 6 hours as needed for shortness of breath / dyspnea or wheezing GENERIC VENTOLIN/ALBUTEROL More than a month at prn Yes Kendall Mendez MD Yes    albuterol (PROVENTIL) (2.5 MG/3ML) 0.083% neb solution Take 1 vial (2.5 mg) by nebulization every 6 hours as needed for shortness of breath / dyspnea or wheezing More than a month at prn Yes Andrea  Kendall Dunlap MD Yes    apixaban ANTICOAGULANT (ELIQUIS) 5 MG tablet Take 1 tablet (5 mg) by mouth 2 times daily 6/22/2022 at pm Yes Suzanne, Rufina Gr PA-C     atorvastatin (LIPITOR) 20 MG tablet Take 1 tablet (20 mg) by mouth daily 6/26/2022 at am Yes Robert Stephen MD Yes    betamethasone dipropionate (DIPROSONE) 0.05 % external cream Apply 1 FTU sparingly once or twice per day as needed to affected area until the skin is better, then stop; REPEAT AS NEEDED More than a month at prn Yes Kendall Mendez MD     calcium-vitamin D (OSCAL) 250-125 MG-UNIT per tablet Take 2 tablets by mouth daily Past Month at am Yes Reported, Patient     celecoxib (CELEBREX) 200 MG capsule Take 1 capsule (200 mg) by mouth 2 times daily as needed for pain (arthritis pain) Past Month at pm Yes Kendall Mendez MD Yes    Cyanocobalamin (B-12) 1000 MCG TBCR Place 1,000 mcg under the tongue daily  Patient taking differently: Place 1,000 mcg under the tongue every other day Past Month at am Yes Kendall Mendez MD     diltiazem ER COATED BEADS (CARDIZEM CD/CARTIA XT) 180 MG 24 hr capsule Take 1 capsule (180 mg) by mouth daily with breakfast 6/27/2022 at 0230 Yes Robert Stephen MD Yes    enoxaparin ANTICOAGULANT (LOVENOX) 40 MG/0.4ML syringe Inject 0.4 mLs (40 mg) Subcutaneous every 24 hours for 10 days  Yes Stacey Whittaker  7/7/22   famotidine (PEPCID) 20 MG tablet Take 20 mg by mouth as needed Past Week at prn Yes Reported, Patient     folic acid (FOLVITE) 400 MCG tablet Take 400 mcg by mouth daily Past Month at am Yes Reported, Patient     polyethylene glycol (MIRALAX) 17 g packet Take 17 g by mouth daily  Yes Stacey Whittaker     senna-docusate (SENOKOT-S/PERICOLACE) 8.6-50 MG tablet Take 1-2 tablets by mouth 2 times daily Take while on oral narcotics to prevent or treat constipation.  Yes Stacey Whittaker     spironolactone (ALDACTONE) 25 MG tablet Take 1 tablet (25 mg) by mouth  daily 6/26/2022 at am Yes Kendall Mendez MD Yes    tiotropium (SPIRIVA HANDIHALER) 18 MCG inhaled capsule Inhale 1 capsule (18 mcg) into the lungs daily GENERIC TIOTROPIUM/SPIRIVA 6/27/2022 at 0230 Yes Kendall Mendez MD Yes    traMADol (ULTRAM) 50 MG tablet Take 1 tablet (50 mg) by mouth every 6 hours as needed for moderate to severe pain  Yes Stacey Whittaker     triamcinolone (KENALOG) 0.1 % cream Apply topically 2 times daily as needed for irritation (sores on scalp.) More than a month at prn Yes Reported, Patient     Urea 20 % CREA cream Apply topically daily  More than a month at prn Yes Reported, Patient     vitamin C-electrolytes (EMERGEN-C) 1000mg vitamin C super orange drink mix Take 1 packet by mouth daily Mix 1 packet in 4-6oz water. Past Month at am Yes Reported, Patient     vitamin D3 (CHOLECALCIFEROL) 1.25 MG (50836 UT) capsule Take 1 capsule (50,000 Units) by mouth every 14 days SIG: TAKE INDICATION: 1ST AND 15TH OF EACH MONTH, TO TREAT VITAMIN D DEFICIENCY Past Month at am Yes Kendall Mendez MD     order for DME Equipment being ordered: Nebulizer   Kendall Mendez MD         ALLERGIES  Allergies   Allergen Reactions     Penicillins Rash     rash     Sulfa Drugs Rash     rash       SOCIAL HISTORY   reports that she quit smoking about 6 years ago. Her smoking use included cigarettes. She started smoking about 58 years ago. She has a 10.00 pack-year smoking history. She has never used smokeless tobacco. She reports current alcohol use. She reports that she does not use drugs.    FAMILY HISTORY  family history includes Anesthesia Reaction in her daughter; Arrhythmia in her cousin and sister; Breast Cancer (age of onset: 70) in her mother; C.A.D. in her father; Cancer in her brother; Colon Cancer in her brother; Diabetes in her brother and father; Family History Negative in her daughter and son; Heart Surgery in her father and sister; Hyperlipidemia in her  "brother, father, and mother; Myocardial Infarction in her father; Neurologic Disorder in her paternal grandfather; Osteoporosis in her maternal grandmother, mother, paternal grandmother, and sister; Other - See Comments in her maternal grandfather; Other - See Comments (age of onset: 55) in her sister; Other Cancer in her brother; Thyroid Disease in her daughter, mother, and sister.    REVIEW OF SYSTEMS  A 10 point ROS was negative other than the symptoms noted above in the HPI.    PHYSICAL EXAM  Nursing Notes Reviewed.  /63 (BP Location: Right arm)   Pulse 86   Temp 98  F (36.7  C) (Oral)   Resp 18   Ht 1.638 m (5' 4.5\")   Wt 72.6 kg (160 lb)   LMP  (LMP Unknown)   SpO2 100%   BMI 27.04 kg/m     General:  Appears stated age in no acute distress.  Skin:  Warm, dry. No rashes or lesions on exposed skin.  HEENT:  Normocephalic, atraumatic. EOMs grossly intact.  Neck:  Supple.  Chest:  Breath sounds CTA. No increased work of breathing on 1L, but O2 sats drop low-mid 80s with conversation and recovers quickly.  Cardiovascular:  RRR, no rub or murmur. No peripheral edema.  Abdomen:  Soft, non-tender, non-distended.  Musculoskeletal:  Moves all four extremities. Left surgical dressing left in place and appears clean and dry. No apparent significant underlying ecchymosis.  Neurological:  CN 2-12 grossly intact.    Data   Data reviewed today:  I personally reviewed no images or EKG's today.  Recent Labs   Lab 06/27/22  1309      CR 0.71       Imaging:  Recent Results (from the past 24 hour(s))   XR Surgery KARTHIKEYAN L/T 5 Min Fluoro w Stills    Narrative    XR SURGERY KARTHIKEYAN FLUORO LESS THAN 5 MIN W STILLS 6/27/2022 10:04 AM     COMPARISON: None    HISTORY: left total hip karthikeyan 2 fluoro time 59 seconds 2 images    FLUOROSCOPY TIME: 1 minute(s)      Impression    IMPRESSION: 2 spot images demonstrate a normally aligned left hip  arthroplasty. See operative report for details.    PETE DYSON MD         SYSTEM " ID:  [id number]   XR Pelvis w Hip Port Left  1 View    Narrative    XR PELVIS AND PORTABLE LEFT HIP ONE VIEW  6/27/2022 11:30 AM    INDICATION: Status post Hip surgery  COMPARISON: 4/3/2017      Impression    IMPRESSION: Postop interval placement of left total hip arthroplasty.  Components project in the expected position without acute displaced  periprosthetic fracture. Expected postoperative soft tissue gas. Right  total hip arthroplasty remains in place. Both obturator rings intact.  Degenerative change both SI joints and the symphysis pubis. Diffuse  bone demineralization.    TORO SINGH MD         SYSTEM ID:  [id number]

## 2022-06-27 NOTE — ANESTHESIA PROCEDURE NOTES
Airway       Patient location during procedure: OR       Procedure Start/Stop Times: 6/27/2022 7:43 AM  Staff -        CRNA: Flor Kang APRN CRNA       Performed By: CRNA  Consent for Airway        Urgency: elective  Indications and Patient Condition       Indications for airway management: betty-procedural       Induction type:intravenous       Mask difficulty assessment: 2 - vent by mask + OA or adjuvant +/- NMBA    Final Airway Details       Final airway type: endotracheal airway       Successful airway: ETT - single  Endotracheal Airway Details        ETT size (mm): 7.0       Cuffed: yes       Successful intubation technique: video laryngoscopy       VL Blade Size: Byrne 3       Grade View of Cords: 1       Adjucts: stylet       Position: Right       Measured from: gums/teeth       Secured at (cm): 21       Bite block used: None    Post intubation assessment        Placement verified by: capnometry, equal breath sounds and chest rise        Number of attempts at approach: 1       Number of other approaches attempted: 0       Secured with: pink tape       Ease of procedure: easy       Dentition: Intact and Unchanged    Medication(s) Administered   Medication Administration Time: 6/27/2022 7:43 AM

## 2022-06-27 NOTE — PROGRESS NOTES
06/27/22 1515   Quick Adds   Quick Adds Certification   Type of Visit Initial PT Evaluation   Living Environment   People in Home alone  (sister will stay this week as needed)   Current Living Arrangements house   Home Accessibility stairs to enter home   Number of Stairs, Main Entrance 1   Stair Railings, Main Entrance none   Transportation Anticipated family or friend will provide   Living Environment Comments Pt able to lives on main level of house with 1 MARIAJOSE, has FWW/cane/crutches at home. Has walk-in shower, standard height toilet with vanity nearby, grabber on each level, sock aid.   Self-Care   Usual Activity Tolerance good   Current Activity Tolerance moderate   Regular Exercise No   Equipment Currently Used at Home none   Fall history within last six months no   Activity/Exercise/Self-Care Comment Pt normally independent with all self-cares and mobility, did not use assistive device prior to surgery but was having a hard time walking long distances. Has had previous ROBLSE/TKA before.   General Information   Onset of Illness/Injury or Date of Surgery 06/27/22   Referring Physician Stacey Whittaker   Patient/Family Therapy Goals Statement (PT) return home tomorrow, walk with less pain   Pertinent History of Current Problem (include personal factors and/or comorbidities that impact the POC) s/p L ROBLES, previous L TKA and R ROBLES   Existing Precautions/Restrictions no known precautions/restrictions   Weight-Bearing Status - LLE weight-bearing as tolerated   Cognition   Affect/Mental Status (Cognition) WFL   Orientation Status (Cognition) oriented x 4   Follows Commands (Cognition) WFL   Pain Assessment   Patient Currently in Pain Yes, see Vital Sign flowsheet   Posture    Posture Comments remains flexed at hips during gait d/t pain and weakness   Range of Motion (ROM)   Range of Motion ROM deficits secondary to surgical procedure;ROM deficits secondary to pain   Strength (Manual Muscle Testing)   Strength  (Manual Muscle Testing) Able to perform L SLR;Able to perform R SLR   Bed Mobility   Comment, (Bed Mobility) SBA supine <> sit   Transfers   Comment, (Transfers) CGA sit <> stand with FWW   Gait/Stairs (Locomotion)   [place] Level (Gait) contact guard   Assistive Device (Gait) walker, front-wheeled   Distance in Feet (Required for LE Total Joints) 50' eval + 75' + 15'   Deviations/Abnormal Patterns (Gait) antalgic   Comment, (Gait/Stairs) lateral trunk sway and slightly flexed trunk d/t bilateral hip weakness   Clinical Impression   Criteria for Skilled Therapeutic Intervention Yes, treatment indicated   PT Diagnosis (PT) impaired  mobility   Influenced by the following impairments pain, weakness, impaired balance   Functional limitations due to impairments fall risk, decreased activity tolerance   Clinical Presentation (PT Evaluation Complexity) Stable/Uncomplicated   Clinical Presentation Rationale clinical judgement   Clinical Decision Making (Complexity) low complexity   Planned Therapy Interventions (PT) balance training;bed mobility training;gait training;home exercise program;neuromuscular re-education;patient/family education;stair training;strengthening;transfer training   Anticipated Equipment Needs at Discharge (PT) walker, rolling   Risk & Benefits of therapy have been explained evaluation/treatment results reviewed;care plan/treatment goals reviewed;risks/benefits reviewed;current/potential barriers reviewed;participants voiced agreement with care plan;participants included;patient   PT Discharge Planning   PT Discharge Recommendation (DC Rec)   (defer to ortho)   PT Rationale for DC Rec Anticipate patient will be SBA with all functional mobility, will have sister assist this first week and has necessary equipment at home including FWW and cane/dressing devices. Will defer OT and plan to see her for PT in AM for one more session.   PT Brief overview of current status SBA bed mob, CGA gait/transfers    Plan of Care Review   Plan of Care Reviewed With patient   Therapy Certification   Start of care date 06/27/22   Certification date from 06/27/22   Certification date to 06/30/22   Medical Diagnosis s/p L ROBLES   Total Evaluation Time   Total Evaluation Time (Minutes) 15   Physical Therapy Goals   PT Frequency 2x/day   PT Predicted Duration/Target Date for Goal Attainment 06/29/22   PT Goals Bed Mobility;Transfers;Gait;Stairs   PT: Bed Mobility Supervision/stand-by assist;Supine to/from sit   PT: Transfers Supervision/stand-by assist;Sit to/from stand;Bed to/from chair;Assistive device   PT: Gait Supervision/stand-by assist;Rolling walker;100 feet   PT: Stairs Supervision/stand-by assist;4 stairs;Rail on both sides

## 2022-06-27 NOTE — INTERVAL H&P NOTE
"I have reviewed the surgical (or preoperative) H&P that is linked to this encounter, and examined the patient. There are no significant changes    Clinical Conditions Present on Arrival:  Clinically Significant Risk Factors Present on Admission                 # Coagulation Defect: home medication list includes an anticoagulant medication   # Overweight: Estimated body mass index is 27.04 kg/m  as calculated from the following:    Height as of this encounter: 1.638 m (5' 4.5\").    Weight as of this encounter: 72.6 kg (160 lb).       "

## 2022-06-27 NOTE — ANESTHESIA POSTPROCEDURE EVALUATION
Patient: Marva Angela    Procedure: Procedure(s):  LEFT TOTAL HIP ARTHROPLASTY DIRECT ANTERIOR APPROACH       Anesthesia Type:  General    Note:  Disposition: Inpatient   Postop Pain Control: Uneventful            Sign Out: Well controlled pain   PONV: No   Neuro/Psych: Uneventful            Sign Out: Acceptable/Baseline neuro status   Airway/Respiratory: Uneventful            Sign Out: Acceptable/Baseline resp. status   CV/Hemodynamics: Uneventful            Sign Out: Acceptable CV status; No obvious hypovolemia; No obvious fluid overload   Other NRE: NONE   DID A NON-ROUTINE EVENT OCCUR? No           Last vitals:  Vitals Value Taken Time   BP 96/66 06/27/22 1150   Temp 36.4  C (97.6  F) 06/27/22 1130   Pulse 77 06/27/22 1156   Resp 13 06/27/22 1156   SpO2 95 % 06/27/22 1156   Vitals shown include unvalidated device data.    Electronically Signed By: Neville Alford MD  June 27, 2022  11:57 AM

## 2022-06-27 NOTE — BRIEF OP NOTE
[facility]    Brief Operative Note    Pre-operative diagnosis: Osteoarthritis of left hip [M16.12]  Post-operative diagnosis Same as pre-operative diagnosis    Procedure: Procedure(s):  LEFT TOTAL HIP ARTHROPLASTY DIRECT ANTERIOR APPROACH  Surgeon: Surgeon(s) and Role:     * Francisco J Alston MD - Primary     * Stacey Whittaker - Assisting  Anesthesia: Choice   Estimated Blood Loss: 300 ml    Drains: None  Specimens: * No specimens in log *  Findings:   None.  Complications: None.  Implants:   Implant Name Type Inv. Item Serial No.  Lot No. LRB No. Used Action   IMP SHELL BIOM G7 ACETAB PPS GÓMEZ HOLE 50MM SZ D [id number] - [id number] Total Joint Component/Insert IMP SHELL BIOM G7 ACETAB PPS GÓMEZ HOLE 50MM SZ D [id number]  [employer] [id number] Left 1 Wasted   BONE CEMENT SIMPLEX FULL DOSE 6191-1-001 - [id number] Cement, Bone BONE CEMENT SIMPLEX FULL DOSE 6191-1-001  [employer] [id number] Left 1 Implanted   BONE CEMENT SIMPLEX W/TOBRAMYCIN 6197-9-001 - [id number] Cement, Bone BONE CEMENT SIMPLEX W/TOBRAMYCIN 6197-9-001  [employer] [id number] Left 1 Implanted   IMP SCR ZIM 6.5X30MM ACET CUP SELF TAP -102-30 - [id number] Metallic Hardware/[place] IMP SCR ZIM 6.5X30MM ACET CUP SELF TAP -971-30  [employer] [id number] Left 1 Wasted   IMP SCR ZIM 6.5X30MM ACET CUP SELF TAP -233-30 - [id number] Metallic Hardware/[place] IMP SCR ZIM 6.5X30MM ACET CUP SELF TAP -499-30  [employer] [id number] Left 1 Implanted   G7 ACETABULAR SYSTEM LONGEVITY HIGHLY CROSSLINKED POLYETHELENE    [employer] [id number] Left 1 Wasted   BONE CEMENT RESTRICTOR HEREDIA FEMORAL 18.5MM [id number] - [id number] Cement, Bone BONE CEMENT RESTRICTOR HEREDIA FEMORAL 18.5MM 732479  [employer]-R [id number] Left 1 Implanted   AVENIR DOMINGUEZ HIP STEM, LATERAL, CEMENTED    [employer] [id number] Left 1 Implanted   IMP SCR ZIM 6.5X30MM ACET CUP SELF TAP -243-30 - [id number] Metallic  Hardware/[place] IMP SCR ZIM 6.5X30MM ACET CUP SELF TAP -772-30  [employer] [id number] Left 1 Implanted   LINER ACTB G7 D 36MM VIVACIT-E HIP STRL LUM LF [id number] - [id number] Total Joint Component/Insert LINER ACTB G7 D 36MM VIVACIT-E HIP STRL LUM LF [id number]  [employer] [id number] Left 1 Implanted   IMP LINER ACET ZIM G7 OSSEOTI MULTIHOLE 50MM D [id number] - [id number] Total Joint Component/Insert IMP LINER ACET ZIM G7 OSSEOTI MULTIHOLE 50MM D [id number]  [employer] [id number] Left 1 Implanted   IMP HEAD FEM ZIM BIOLOX DELTA CER 36MM +0 -035-02 - [id number] Total Joint Component/Insert IMP HEAD FEM ZIM BIOLOX DELTA CER 36MM +0 -581-02  [employer] [id number] Left 1 Implanted

## 2022-06-27 NOTE — PROVIDER NOTIFICATION
PEARL Alford notified patient does not want anymore pain medication. She would like to get herself repositioned upstairs in her bed. States she has a wedge-like pillow that she uses usually.   Sign-out received from PEARL Alford.

## 2022-06-27 NOTE — OP NOTE
6/27/2022    Marva Angela    Preoperative diagnosis: End-stage osteoarthritis left hip    Postoperative diagnosis: Same    Procedure: Total hip replacement left hip, direct anterior approach    Anesthesia: Gen.    Surgeon: Dr. Francisco J Alston    Asst.: Stacey Whittaker PA-C    Description of procedure:  The patient is brought to the operating, supine upon a gurney.Preoperative antibiotic were given, as well as tranexamic acid.Gen. Anesthesia was induced. A Mcclain catheter was placed. The patient was placed in the supine position on the Jennifer table.  Preoperative  views of both hips are made.    The left lower extremity, flank, and anterior abdomen are prepped and draped in customary fashion. A brief timeout was held verify the procedure and laterality.    A slightly oblique incision was made over the tensor fascia kimberly muscle. Dissection was carried out over the muscular fascia. The muscle was dissected off the fascia,and retracted laterally. The circumflex vessels were identified,cauterized, and divided.Cobra retractors were placed superiorly and inferiorly around the femoral neck. The hip capsule was opened in a T fashion, and the anterior capsule was excised. The Cobra retractors were now placed directly around the femoral neck. The osteotomy was made with an oscillating saw, and the femoral head and neck were extracted.  The bone proved to be quite soft.  A power corkscrew was utilized. During the osteotomy and extraction of the femoral head,a small amount of traction was placed on the limb. The labrum was debrided. The acetabulum was reamed with spherical reamers up to 49  mm. A Biomet G7 50 mm shell was impacted in place. A pair of 30 mm screws were placed up into the ilium, and a trial liner was placed.    All traction was released from the limb. The hip was extended, adducted, and externally rotated. A bone hook was used to keep the proximal femur from impinging on the acetabulum.The table hook  "was placed under the proximal femur, and the femur was lifted anteriorly. Releases were performed on the medial surface of the greater trochanter, to facilitate placement of retractors. Broaching was performed with the Sera Biomet Avenir broaches up to size 4.  At intervals during the broaching process, x-rays were made to ensure proper placement. Ultimately, a size 4 Avenir stem was chosen and placed. Based on trial reductions, the increased offset was chosen.    During trial reductions, the acetabular component migrated into a more vertical position.  The size D liner was removed.  The socket was repositioned, and a third screw was placed.  The liner was replaced.  Despite having 3 screws into the ilium, the acetabular component was still unstable during trial reductions and reverted to a vertical position.    We therefore once again remove the liner, and found the screws were all \"splinters\", meaning they were obviously loose.  We had to apply some traction to the acetabular component to obtain some pressure against the screw heads to remove them.  The acetabular shell was removed.  The acetabular was very carefully inspected for any evidence of fracture.  None was found.  We elected to use the Sera multihole cup, which has a much more textured surface and more options available for placement of screws.  The 50 mm multihole component was impacted into place.  A pair of 30 mm screws were placed up in the ilium, and we now seem to have good fixation.  Trial reductions were again performed.  Based on this, we chose the D liner.    A standard acetabular liner, size D, with a 36 mm Internal diameter was placed. We ultimately choose the standard femoral head, 36 mm in diameter. This was impacted on the taper, and final reductions were performed. Based on C-arm views, leg lengths appeared to be equal. The hip was stable even when traction was applied with a bone hook.    The wound was copiously irrigated with " pulsatile lavage and saline solution. The capsule was re-approximated with the tagging sutures. The soft tissues were infiltrated with Marcaine and Toradol. The subcutaneous tissues were closed with 2-0 Vicryl, after closure of the fascia with 0 Vicryl. The skin was reapproximated with skin clips.    A bulky sterile dressing was applied. The patient left the room in satisfactory condition. Final counts were correct.

## 2022-06-27 NOTE — PLAN OF CARE
Patient voided during shift, bowel sounds active, not passing gas    Patient vital signs are at baseline: Yes  Patient able to ambulate as they were prior to admission or with assist devices provided by therapies during their stay:  No,  Reason:  Working with PT tonight  Patient MUST void prior to discharge:  Yes  Patient able to tolerate oral intake:  Yes  Pain has adequate pain control using Oral analgesics:  Yes  Does patient have an identified :  Yes  Has goal D/C date and time been discussed with patient:  Yes

## 2022-06-27 NOTE — PROVIDER NOTIFICATION
PEARL Alford and MD Alston notified patient complains of 10/10 back pain. Readjusting self in bed attempting to hand leg out of cart. Gripping side-rails. No orders received from MD Alston, he is at bedside to explain to patient the positioning with surgery. PEARL Alford orders 0.2 dilaudid to be given twice, five minutes apart. Patient resting comfortably until RN wakes patient to reassess.

## 2022-06-27 NOTE — ANESTHESIA CARE TRANSFER NOTE
Patient: Marva Angela    Procedure: Procedure(s):  LEFT TOTAL HIP ARTHROPLASTY DIRECT ANTERIOR APPROACH       Diagnosis: Osteoarthritis of left hip [M16.12]  Diagnosis Additional Information: No value filed.    Anesthesia Type:   General     Note:    Oropharynx: oropharynx clear of all foreign objects  Level of Consciousness: drowsy  Oxygen Supplementation: face mask  Level of Supplemental Oxygen (L/min / FiO2): 6  Independent Airway: airway patency satisfactory and stable  Dentition: dentition unchanged  Vital Signs Stable: post-procedure vital signs reviewed and stable  Report to RN Given: handoff report given  Patient transferred to: PACU  Comments: Patient comfortable  Handoff Report: Identifed the Patient, Identified the Reponsible Provider, Reviewed the pertinent medical history, Discussed the surgical course, Reviewed Intra-OP anesthesia mangement and issues during anesthesia, Set expectations for post-procedure period and Allowed opportunity for questions and acknowledgement of understanding      Vitals:  Vitals Value Taken Time   /79 06/27/22 1023   Temp     Pulse 89 06/27/22 1026   Resp 28 06/27/22 1026   SpO2 95 % 06/27/22 1026   Vitals shown include unvalidated device data.    Electronically Signed By: ANN Barnes CRNA  June 27, 2022  10:27 AM

## 2022-06-27 NOTE — ANESTHESIA PREPROCEDURE EVALUATION
Prior to Admission medications    Medication Sig Start Date End Date Taking? Authorizing Provider   Acetaminophen (TYLENOL PO) Take 1,000 mg by mouth 2 times daily as needed for mild pain or fever   Yes Reported, Patient   albuterol (PROAIR HFA/PROVENTIL HFA/VENTOLIN HFA) 108 (90 Base) MCG/ACT inhaler Inhale 2 puffs into the lungs every 6 hours as needed for shortness of breath / dyspnea or wheezing GENERIC VENTOLIN/ALBUTEROL 10/8/21  Yes Kendall Mendez MD   albuterol (PROVENTIL) (2.5 MG/3ML) 0.083% neb solution Take 1 vial (2.5 mg) by nebulization every 6 hours as needed for shortness of breath / dyspnea or wheezing 10/8/21  Yes Kendall Mendez MD   apixaban ANTICOAGULANT (ELIQUIS) 5 MG tablet Take 1 tablet (5 mg) by mouth 2 times daily 1/17/22  Yes Suzanne, Rufina Gr PA-C   atorvastatin (LIPITOR) 20 MG tablet Take 1 tablet (20 mg) by mouth daily 3/16/22  Yes Robert Stephen MD   betamethasone dipropionate (DIPROSONE) 0.05 % external cream Apply 1 FTU sparingly once or twice per day as needed to affected area until the skin is better, then stop; REPEAT AS NEEDED 12/20/19  Yes Kendall Mendez MD   calcium-vitamin D (OSCAL) 250-125 MG-UNIT per tablet Take 2 tablets by mouth daily   Yes Reported, Patient   celecoxib (CELEBREX) 200 MG capsule Take 1 capsule (200 mg) by mouth 2 times daily as needed for pain (arthritis pain) 4/7/21  Yes Kendall Mendez MD   Cyanocobalamin (B-12) 1000 MCG TBCR Place 1,000 mcg under the tongue daily  Patient taking differently: Place 1,000 mcg under the tongue every other day 11/5/18  Yes Kendall Mendez MD   diltiazem ER COATED BEADS (CARDIZEM CD/CARTIA XT) 180 MG 24 hr capsule Take 1 capsule (180 mg) by mouth daily with breakfast 3/16/22  Yes Robert Stephen MD   famotidine (PEPCID) 20 MG tablet Take 20 mg by mouth as needed   Yes Reported, Patient   folic acid (FOLVITE) 400 MCG tablet Take 400 mcg by mouth daily   Yes  Reported, Patient   spironolactone (ALDACTONE) 25 MG tablet Take 1 tablet (25 mg) by mouth daily 4/13/22  Yes Kendall Mendez MD   tiotropium (SPIRIVA HANDIHALER) 18 MCG inhaled capsule Inhale 1 capsule (18 mcg) into the lungs daily GENERIC TIOTROPIUM/SPIRIVA 4/13/22  Yes Kendall Mendez MD   triamcinolone (KENALOG) 0.1 % cream Apply topically 2 times daily as needed for irritation (sores on scalp.)   Yes Reported, Patient   Urea 20 % CREA cream Apply topically daily    Yes Reported, Patient   vitamin C-electrolytes (EMERGEN-C) 1000mg vitamin C super orange drink mix Take 1 packet by mouth daily Mix 1 packet in 4-6oz water.   Yes Reported, Patient   vitamin D3 (CHOLECALCIFEROL) 1.25 MG (37977 UT) capsule Take 1 capsule (50,000 Units) by mouth every 14 days SIG: TAKE INDICATION: 1ST AND 15TH OF EACH MONTH, TO TREAT VITAMIN D DEFICIENCY 4/13/22  Yes Kendall Mendez MD   order for DME Equipment being ordered: Nebulizer 4/23/18   Kendall Mendez MD     Current Facility-Administered Medications Ordered in Epic   Medication Dose Route Frequency Last Rate Last Admin     ceFAZolin Sodium (ANCEF) injection 2 g  2 g Intravenous Pre-Op/Pre-procedure x 1 dose         ceFAZolin Sodium (ANCEF) injection 2 g  2 g Intravenous See Admin Instructions         lactated ringers infusion   Intravenous Continuous 25 mL/hr at 06/27/22 0627 New Bag at 06/27/22 0627     lidocaine 1 % 0.1-1 mL  0.1-1 mL Other Q1H PRN         ropivacaine (NAROPIN) 300 mg, EPINEPHrine (ADRENALIN) 0.6 mg in sodium chloride 0.9 % 100 mL (ORTHO CACHORRO CUSTOM DOSE)   INTRA-ARTICULAR On Call to OR         sodium chloride (PF) 0.9% PF flush 3 mL  3 mL Intracatheter Q8H         No current [facility]-ordered outpatient medications on file.       lactated ringers 25 mL/hr at 06/27/22 0627     Recent Labs   Lab Test 04/03/17  0645   ABO O   RH  Neg     No results for input(s): HCG in the last 18450 hours.  No results found for this or any  previous visit (from the past 744 hour(s)).Anesthesia Pre-Procedure Evaluation    Patient: Marva Angela   MRN: [id number] : 1950        Procedure : Procedure(s):  LEFT TOTAL HIP ARTHROPLASTY DIRECT ANTERIOR APPROACH          Past Medical History:   Diagnosis Date     Arthritis     knees and hips     B12 deficiency 10/14/2016    B12 178     Benign essential hypertension     was on amlodipine/ then metoprolol / add lisinopril      Chronic atrial fibrillation (H)      Contact dermatitis and other eczema, due to unspecified cause      COPD (chronic obstructive pulmonary disease) (H) 2012     Coronary artery disease involving native coronary artery of native heart without angina pectoris     coronary artery calcifications seen on CT scan, mild nonocclusive CAD     Hyperlipidemia LDL goal <130 2012     Nephritis      as a child (post strep)      Osteoporosis, unspecified      Phlebitis and thrombophlebitis of other deep vessels of lower extremities ,     Both with pregnancies     Prediabetes 2018    A1C 6.1     Sinusitis      Tobacco use disorder       Past Surgical History:   Procedure Laterality Date     ABDOMEN SURGERY  2015     ARTHROPLASTY HIP Right 2017    Procedure: ARTHROPLASTY HIP;  Surgeon: Francisco J Alston MD;  Location:  OR     ARTHROPLASTY KNEE Left 2017    Procedure: ARTHROPLASTY KNEE;  LEFT TOTAL KNEE ARTHROPLASTY ;  Surgeon: Francisco J Alston MD;  Location:  OR     BIOPSY OF SKIN LESION       C DEXA, BONE DENSITY, AXIAL SKEL  2008    T score lumbar -0.6, femoral neck -2.4/-2.0(all stable)     COLONOSCOPY  21     HC ASPIRATION &/OR INJECTION GANGLION CYST, ANY       HERNIORRHAPHY INGUINAL Right 2015    Procedure: HERNIORRHAPHY INGUINAL;  Surgeon: Sam Erazo MD;  Location:  [facility]     HYSTERECTOMY, PAP NO LONGER INDICATED       ORTHOPEDIC SURGERY      bilat meniscus repair     ZZC VAGINAL  HYSTERECTOMY      Hysterectomy, Vaginal      Allergies   Allergen Reactions     Penicillins Rash     rash     Sulfa Drugs Rash     rash      Social History     Tobacco Use     Smoking status: Former Smoker     Packs/day: 1.00     Years: 10.00     Pack years: 10.00     Types: Cigarettes     Start date: 1964     Quit date: 2015     Years since quittin.8     Smokeless tobacco: Never Used     Tobacco comment: 5-10 daily   Substance Use Topics     Alcohol use: Yes     Comment: Rarely have any alcohol      Wt Readings from Last 1 Encounters:   22 72.6 kg (160 lb)        Anesthesia Evaluation   Pt has had prior anesthetic.     No history of anesthetic complications       ROS/MED HX  ENT/Pulmonary:     (+) tobacco use, Current use, mild,  COPD,     Neurologic:       Cardiovascular:     (+) Dyslipidemia hypertension--CAD ---    METS/Exercise Tolerance:     Hematologic:       Musculoskeletal:   (+) arthritis,     GI/Hepatic:       Renal/Genitourinary:     (+) renal disease, type: CRI,     Endo:     (+) type II DM,     Psychiatric/Substance Use:       Infectious Disease:       Malignancy:       Other:            Physical Exam    Airway        Mallampati: II    Neck ROM: full     Respiratory Devices and Support         Dental  no notable dental history     (+) caps      Cardiovascular   cardiovascular exam normal          Pulmonary   pulmonary exam normal                OUTSIDE LABS:  CBC:   Lab Results   Component Value Date    WBC 6.8 2022    WBC 6.8 2022    HGB 14.5 2022    HGB 14.6 2022    HCT 44.2 2022    HCT 46.2 2022     2022     2022     BMP:   Lab Results   Component Value Date     2022     2022    POTASSIUM 4.2 2022    POTASSIUM 3.8 2022    CHLORIDE 104 2022    CHLORIDE 104 2022    CO2 32 2022    CO2 29 2022    BUN 18 2022    BUN 21 2022    CR 0.69 2022    CR  0.81 04/08/2022     (H) 06/16/2022     (H) 04/08/2022     COAGS:   Lab Results   Component Value Date    INR 2.1 (A) 08/31/2017     POC:   Lab Results   Component Value Date     (H) 08/02/2017     HEPATIC:   Lab Results   Component Value Date    ALBUMIN 4.0 09/23/2020    PROTTOTAL 7.5 09/23/2020    ALT 37 10/08/2021    AST 20 09/23/2020    ALKPHOS 74 09/23/2020    BILITOTAL 0.9 09/23/2020     OTHER:   Lab Results   Component Value Date    A1C 6.4 (H) 06/16/2022    SANGEETA 9.8 06/16/2022    PHOS 3.1 03/21/2017    MAG 2.1 03/21/2017    TSH 1.03 10/08/2021    T4 1.23 01/13/2017    T3 129 10/14/2016       Anesthesia Plan    ASA Status:  3      Anesthesia Type: General.     - Airway: ETT   Induction: Intravenous.   Maintenance: Balanced.   Techniques and Equipment:     - Airway: Video-Laryngoscope         Consents    Anesthesia Plan(s) and associated risks, benefits, and realistic alternatives discussed. Questions answered and patient/representative(s) expressed understanding.    - Discussed:     - Discussed with:  Patient         Postoperative Care    Pain management: IV analgesics.   PONV prophylaxis: Ondansetron (or other 5HT-3)     Comments:                Neville Alford MD

## 2022-06-27 NOTE — PLAN OF CARE
[facility]      OUTPATIENT PHYSICAL THERAPY EVALUATION  PLAN OF TREATMENT FOR OUTPATIENT REHABILITATION  (COMPLETE FOR INITIAL CLAIMS ONLY)  Patient's Last Name, First Name, M.I.  YOB: 1950  Marva Angela                        Provider's Name  [facility] Medical Record No.  [id number]                               Onset Date:  06/27/22   Start of Care Date:  06/27/22      Type:     _X_PT   ___OT   ___SLP Medical Diagnosis:  s/p L ROBLES                        PT Diagnosis:  impaired  mobility   Visits from SOC:  1   _________________________________________________________________________________  Plan of Treatment/Functional Goals    Planned Interventions: balance training, bed mobility training, gait training, home exercise program, neuromuscular re-education, patient/family education, stair training, strengthening, transfer training     Goals: See Physical Therapy Goals on Care Plan in [employer] electronic health record.    Therapy Frequency: 2x/day  Predicted Duration of Therapy Intervention: 06/29/22  _________________________________________________________________________________    I CERTIFY THE NEED FOR THESE SERVICES FURNISHED UNDER        THIS PLAN OF TREATMENT AND WHILE UNDER MY CARE     (Physician co-signature of this document indicates review and certification of the therapy plan).              Certification date from: 06/27/22, Certification date to: 06/30/22    Referring Physician: Stacey Whittaker            Initial Assessment        See Physical Therapy evaluation dated 06/27/22 in Epic electronic health record.

## 2022-06-28 ENCOUNTER — APPOINTMENT (OUTPATIENT)
Dept: PHYSICAL THERAPY | Facility: CLINIC | Age: 72
End: 2022-06-28
Attending: ORTHOPAEDIC SURGERY
Payer: COMMERCIAL

## 2022-06-28 VITALS
OXYGEN SATURATION: 94 % | TEMPERATURE: 98.3 F | BODY MASS INDEX: 26.66 KG/M2 | SYSTOLIC BLOOD PRESSURE: 127 MMHG | DIASTOLIC BLOOD PRESSURE: 72 MMHG | HEIGHT: 65 IN | HEART RATE: 81 BPM | WEIGHT: 160 LBS | RESPIRATION RATE: 16 BRPM

## 2022-06-28 LAB
ANION GAP SERPL CALCULATED.3IONS-SCNC: 4 MMOL/L (ref 3–14)
BUN SERPL-MCNC: 13 MG/DL (ref 7–30)
CALCIUM SERPL-MCNC: 9.4 MG/DL (ref 8.5–10.1)
CHLORIDE BLD-SCNC: 102 MMOL/L (ref 94–109)
CO2 SERPL-SCNC: 31 MMOL/L (ref 20–32)
CREAT SERPL-MCNC: 0.69 MG/DL (ref 0.52–1.04)
GFR SERPL CREATININE-BSD FRML MDRD: >90 ML/MIN/1.73M2
GLUCOSE BLD-MCNC: 130 MG/DL (ref 70–99)
GLUCOSE BLDC GLUCOMTR-MCNC: 132 MG/DL (ref 70–99)
HGB BLD-MCNC: 11.5 G/DL (ref 11.7–15.7)
POTASSIUM BLD-SCNC: 4.2 MMOL/L (ref 3.4–5.3)
SODIUM SERPL-SCNC: 137 MMOL/L (ref 133–144)

## 2022-06-28 PROCEDURE — 36415 COLL VENOUS BLD VENIPUNCTURE: CPT | Performed by: PHYSICIAN ASSISTANT

## 2022-06-28 PROCEDURE — 258N000003 HC RX IP 258 OP 636: Performed by: PHYSICIAN ASSISTANT

## 2022-06-28 PROCEDURE — 250N000013 HC RX MED GY IP 250 OP 250 PS 637: Performed by: PHYSICIAN ASSISTANT

## 2022-06-28 PROCEDURE — 97116 GAIT TRAINING THERAPY: CPT | Mod: GP | Performed by: PHYSICAL THERAPIST

## 2022-06-28 PROCEDURE — 97530 THERAPEUTIC ACTIVITIES: CPT | Mod: GP | Performed by: PHYSICAL THERAPIST

## 2022-06-28 PROCEDURE — 82962 GLUCOSE BLOOD TEST: CPT

## 2022-06-28 PROCEDURE — 250N000011 HC RX IP 250 OP 636: Performed by: PHYSICIAN ASSISTANT

## 2022-06-28 PROCEDURE — 97110 THERAPEUTIC EXERCISES: CPT | Mod: GP | Performed by: PHYSICAL THERAPIST

## 2022-06-28 PROCEDURE — 80048 BASIC METABOLIC PNL TOTAL CA: CPT | Performed by: PHYSICIAN ASSISTANT

## 2022-06-28 PROCEDURE — 96372 THER/PROPH/DIAG INJ SC/IM: CPT | Performed by: PHYSICIAN ASSISTANT

## 2022-06-28 PROCEDURE — 85018 HEMOGLOBIN: CPT | Performed by: PHYSICIAN ASSISTANT

## 2022-06-28 RX ADMIN — ACETAMINOPHEN 975 MG: 325 TABLET ORAL at 06:42

## 2022-06-28 RX ADMIN — ENOXAPARIN SODIUM 40 MG: 40 INJECTION SUBCUTANEOUS at 09:50

## 2022-06-28 RX ADMIN — DILTIAZEM HYDROCHLORIDE 180 MG: 180 CAPSULE, COATED, EXTENDED RELEASE ORAL at 09:50

## 2022-06-28 RX ADMIN — SODIUM CHLORIDE, POTASSIUM CHLORIDE, SODIUM LACTATE AND CALCIUM CHLORIDE: 600; 310; 30; 20 INJECTION, SOLUTION INTRAVENOUS at 00:08

## 2022-06-28 NOTE — PROGRESS NOTES
Patient a/o X4, vitals stable, on room air. L hip surgical dressing remains CDI. CMS intact. Patient up with assist of 1/walker to bathroom, voiding. Purewick also in place for frequency, patient request. Pain managed with scheduled tylenol only. Tolerating PO, PIV locked this morning. Plan for discharge home today.

## 2022-06-28 NOTE — PROGRESS NOTES
Discharge education provided to patient. Patient verbalized understanding of discharge instructions and upcoming appointments. Patient left with all personal belongings. Patient discharging to home and transportation provided by sister.    Writer completed Lovenox teaching with patient and patient stated understanding.

## 2022-06-28 NOTE — PROGRESS NOTES
ORTHO PROGRESS NOTE      Procedure(s):  LEFT TOTAL HIP ARTHROPLASTY DIRECT ANTERIOR APPROACH   -1 Day Post-Op      Doing well.  Pain controlled adequately.  No concerns.    Vital Signs with Ranges  Temp:  [97.5  F (36.4  C)-98.8  F (37.1  C)] 98.3  F (36.8  C)  Pulse:  [68-92] 81  Resp:  [10-29] 16  BP: ()/(55-93) 127/72  SpO2:  [90 %-100 %] 94 %  I/O last 3 completed shifts:  In: 1500 [P.O.:600; I.V.:900]  Out: 800 [Urine:800]  Recent Labs   Lab 06/28/22  0657   HGB 11.5*       Dressing clean, dry and intact.  Calves soft bilaterally  Swelling appropriate for post operative condition  Able perform a quad contraction bilaterally  Able to dorsiflex at the ankle past neutral.      Plan:  -WBAT  -Progress Physical Therapy as able.  -Continue pain control measures  - Lovenox X 10 days then resume Eliquis at discharge for DVT propylaxis  -Discharge to home today     Stacey Whittaker PA-C

## 2022-06-28 NOTE — PLAN OF CARE
Physical Therapy Discharge Summary    Reason for therapy discharge:    Discharged to home.    Progress towards therapy goal(s). See goals on Care Plan in [facility] electronic health record for goal details.  Goals partially met.  Barriers to achieving goals:   discharge from facility.    Therapy recommendation(s):    Continue home exercise program.

## 2022-06-28 NOTE — PLAN OF CARE
[id number]    POD0 L ROBLES direct anterior approach. Dressing CDI. A&Ox4. Ax1/G/W. OOB x5. Tolerating reg diet, fair appetite. Pain managed with scheduled tylenol. CMS intact, ex impaired mobility LLE. AUOP. Pt requested purewick at night d/t frequency, positional. Passing a little bit flatus, BS active. L  ml/hr. Expected discharge 6/28.

## 2022-06-29 NOTE — DISCHARGE SUMMARY
Discharge Summary    Marva Angela MRN# [id number]   YOB: 1950 Age: 71 year old     Date of Admission:  6/27/2022  Date of Discharge:  6/28/2022 10:31 AM  Admitting Physician:  Francisco J Alston MD  Discharge Physician:  Francisco J Alston MD     Primary Provider: Kendall Mendez          Admission Diagnoses:   Osteoarthritis left hip          Discharge Diagnosis:   Same           Surgical Procedure:   left hip replacement           Secondary Diagnosis:     Patient Active Problem List   Diagnosis     Osteopenia     Personal history of tobacco use, presenting hazards to [facility]     Advance Care Planning     Mixed hyperlipidemia     Benign essential hypertension     Hip pain, right     Family history of thyroid disease     Fatigue, unspecified type     Hair loss     Pain in joint, multiple sites     Seborrheic keratosis     Psoriasis with arthropathy (H)     Urine abnormality     Vitamin B12 deficiency (non anemic)     Scurvy     Vitamin D deficiency     Hematuria     Primary osteoarthritis, unspecified site     Arthralgia of both knees     Tinea pedis of left foot     Impaired fasting glucose     Hypercalcemia     Hip joint replacement status     Psoriasis     Prediabetes     Personal history of DVT (deep vein thrombosis)     Knee joint replacement status     Aftercare following joint replacement [Z47.1]     COPD (chronic obstructive pulmonary disease) (H)     Paroxysmal atrial fibrillation (H)     Coronary artery disease involving native coronary artery of native heart without angina pectoris     B12 deficiency     Deformity of toe of right foot     Atherosclerosis of aorta (H)     S/P total hip arthroplasty              Discharge Disposition:   Discharged to home            Discharge Medications:     Discharge Medication List as of 6/28/2022 10:07 AM      START taking these medications    Details   !! acetaminophen (TYLENOL) 325 MG tablet Take 2 tablets (650  mg) by mouth every 4 hours as needed for other (mild pain), Disp-100 tablet, R-0, E-Prescribe      enoxaparin ANTICOAGULANT (LOVENOX) 40 MG/0.4ML syringe Inject 0.4 mLs (40 mg) Subcutaneous every 24 hours for 10 days, Disp-4 mL, R-0, E-Prescribe      polyethylene glycol (MIRALAX) 17 g packet Take 17 g by mouth daily, Disp-7 packet, R-0, E-Prescribe      senna-docusate (SENOKOT-S/PERICOLACE) 8.6-50 MG tablet Take 1-2 tablets by mouth 2 times daily Take while on oral narcotics to prevent or treat constipation., Disp-30 tablet, R-0, E-PrescribeWhile taking narcotics      traMADol (ULTRAM) 50 MG tablet Take 1 tablet (50 mg) by mouth every 6 hours as needed for moderate to severe pain, Disp-25 tablet, R-0, E-Prescribe       !! - Potential duplicate medications found. Please discuss with provider.      CONTINUE these medications which have NOT CHANGED    Details   !! Acetaminophen (TYLENOL PO) Take 1,000 mg by mouth 2 times daily as needed for mild pain or fever, Historical      albuterol (PROAIR HFA/PROVENTIL HFA/VENTOLIN HFA) 108 (90 Base) MCG/ACT inhaler Inhale 2 puffs into the lungs every 6 hours as needed for shortness of breath / dyspnea or wheezing GENERIC VENTOLIN/ALBUTEROL, Disp-18 g, R-11, E-PrescribePROFILE FOR FUTURE REFILLS UNTIL PATIENT CALLS FOR REFILLS      albuterol (PROVENTIL) (2.5 MG/3ML) 0.083% neb solution Take 1 vial (2.5 mg) by nebulization every 6 hours as needed for shortness of breath / dyspnea or wheezing, Disp-90 mL, R-3, E-PrescribePROFILE FOR FUTURE REFILLS UNTIL PATIENT CALLS FOR REFILLS      atorvastatin (LIPITOR) 20 MG tablet Take 1 tablet (20 mg) by mouth daily, Disp-90 tablet, R-3, E-Prescribe      betamethasone dipropionate (DIPROSONE) 0.05 % external cream Apply 1 FTU sparingly once or twice per day as needed to affected area until the skin is better, then stop; REPEAT AS NEEDEDDisp-45 g, [id number]      calcium-vitamin D (OSCAL) 250-125 MG-UNIT per tablet Take 2 tablets by mouth  daily, Historical      celecoxib (CELEBREX) 200 MG capsule Take 1 capsule (200 mg) by mouth 2 times daily as needed for pain (arthritis pain), Disp-200 capsule, R-5, Local PrintNO SIDE EFFECTS WITH PRIOR USE OF THIS PRODUCT      Cyanocobalamin (B-12) 1000 MCG TBCR Place 1,000 mcg under the tongue daily, OTC      diltiazem ER COATED BEADS (CARDIZEM CD/CARTIA XT) 180 MG 24 hr capsule Take 1 capsule (180 mg) by mouth daily with breakfast, Disp-90 capsule, R-3, E-Prescribe      famotidine (PEPCID) 20 MG tablet Take 20 mg by mouth as needed, Historical      folic acid (FOLVITE) 400 MCG tablet Take 400 mcg by mouth daily, Historical      spironolactone (ALDACTONE) 25 MG tablet Take 1 tablet (25 mg) by mouth daily, Disp-90 tablet, R-1, E-Prescribe      tiotropium (SPIRIVA HANDIHALER) 18 MCG inhaled capsule Inhale 1 capsule (18 mcg) into the lungs daily GENERIC TIOTROPIUM/SPIRIVA, Disp-90 capsule, R-3, Local Print      triamcinolone (KENALOG) 0.1 % cream Apply topically 2 times daily as needed for irritation (sores on scalp.)Historical      Urea 20 % CREA cream Apply topically daily Historical      vitamin C-electrolytes (EMERGEN-C) 1000mg vitamin C super orange drink mix Take 1 packet by mouth daily Mix 1 packet in 4-6oz water., Historical      vitamin D3 (CHOLECALCIFEROL) 1.25 MG (06391 UT) capsule Take 1 capsule (50,000 Units) by mouth every 14 days SIG: TAKE INDICATION: 1ST AND 15TH OF EACH MONTH, TO TREAT VITAMIN D DEFICIENCY, Disp-24 capsule, R-1, E-Prescribe      order for DME Equipment being ordered: NebulizerDisp-1 each, R-0, Local Print       !! - Potential duplicate medications found. Please discuss with provider.      STOP taking these medications       apixaban ANTICOAGULANT (ELIQUIS) 5 MG tablet Comments:   Reason for Stopping:                     Consultations:   Consultation during this admission received from internal medicine           Hospital Course:   The patient was admitted after the surgical  procedure. The patient underwent an uneventfulleft hip replacement. Postoperatively, anticoagulation  with Lovenox was started. No transfusion was required. The patient will be discharged to home. Home medications have been reconciled. Ultram was prescribed for pain. Lovenox  will be prescribed.             Pending Results:   None           Discharge Instructions and Follow-Up:        Discharge activity: Activity as tolerated   Discharge follow-up: Follow up with Dr. Alston in 2 weeks   Outpatient therapy: None    Home Care agency: None    Supplies and equipment: Walker        Wound care: dry dressing daily and as needed   Other instructions: None

## 2022-08-15 ENCOUNTER — OFFICE VISIT (OUTPATIENT)
Dept: INTERNAL MEDICINE | Facility: CLINIC | Age: 72
End: 2022-08-15
Payer: COMMERCIAL

## 2022-08-15 VITALS
HEIGHT: 65 IN | OXYGEN SATURATION: 96 % | WEIGHT: 171.6 LBS | HEART RATE: 58 BPM | TEMPERATURE: 98.3 F | DIASTOLIC BLOOD PRESSURE: 66 MMHG | SYSTOLIC BLOOD PRESSURE: 124 MMHG | BODY MASS INDEX: 28.59 KG/M2

## 2022-08-15 DIAGNOSIS — J43.9 PULMONARY EMPHYSEMA, UNSPECIFIED EMPHYSEMA TYPE (H): ICD-10-CM

## 2022-08-15 DIAGNOSIS — Z96.642 STATUS POST HIP REPLACEMENT, LEFT: ICD-10-CM

## 2022-08-15 DIAGNOSIS — Z47.1 AFTERCARE FOLLOWING LEFT HIP JOINT REPLACEMENT SURGERY: Primary | ICD-10-CM

## 2022-08-15 DIAGNOSIS — Z96.642 AFTERCARE FOLLOWING LEFT HIP JOINT REPLACEMENT SURGERY: Primary | ICD-10-CM

## 2022-08-15 DIAGNOSIS — R60.0 BILATERAL LOWER EXTREMITY EDEMA: ICD-10-CM

## 2022-08-15 DIAGNOSIS — I10 BENIGN ESSENTIAL HYPERTENSION: ICD-10-CM

## 2022-08-15 DIAGNOSIS — I48.0 PAROXYSMAL ATRIAL FIBRILLATION (H): ICD-10-CM

## 2022-08-15 PROBLEM — Z96.641 STATUS POST RIGHT HIP REPLACEMENT: Status: ACTIVE | Noted: 2017-04-03

## 2022-08-15 PROCEDURE — 99214 OFFICE O/P EST MOD 30 MIN: CPT | Performed by: INTERNAL MEDICINE

## 2022-08-15 NOTE — PROGRESS NOTES
"  Assessment & Plan     (Z47.1,  Z96.642) Aftercare following left hip joint replacement surgery  (primary encounter diagnosis)  Comment: Doing very well since hip joint replacement surgery.  She is increasing her activity as tolerated.  Her lower extremity edema has gotten significant better with the increase in activity.  She plans to continue with her rehab and advance activity as tolerated.  Plan:     (Z96.642) Status post hip replacement, left  Comment: As above continues to do well.  Plan:     (R60.0) Bilateral lower extremity edema  Comment: Most likely culprit for the edema was the decrease in activity due to the hip replacement, her symptoms have dramatically improved as her activities improved.  She also has been taking more sodium then realized, ask her to read labels carefully and keep sodium intake less than 2000 mg a day.  We will give low-dose diuretic as needed for additional fluid removal for short time.  No evidence for cardiac or pulmonary cause for the symptoms.  Plan:     (J43.9) Pulmonary emphysema, unspecified emphysema type (H)  Comment: This condition is currently controlled on the current medical regimen.  Continue current therapy.   Plan:     (I48.0) Paroxysmal atrial fibrillation (H)  Comment: This condition is currently controlled on the current medical regimen.  Continue current therapy.   Okay to resume Eliquis at the previous doses.  Plan: apixaban ANTICOAGULANT (ELIQUIS) 5 MG tablet,         ASPIRIN NOT PRESCRIBED (INTENTIONAL)            (I10) Benign essential hypertension  Comment: This condition is currently controlled on the current medical regimen.  Continue current therapy.   Plan:                 BMI:   Estimated body mass index is 29 kg/m  as calculated from the following:    Height as of this encounter: 1.638 m (5' 4.5\").    Weight as of this encounter: 77.8 kg (171 lb 9.6 oz).           Return in about 3 months (around 11/15/2022) for Medicare Annual Wellness Exam, Blood " "pressure, Lab Work, with pre-visit fasting labs.    Kendall Mendez MD  [facility] HospitalCLARA Lynn is a 72 year old, presenting for the following health issues:  Hospital F/U      History of Present Illness       Reason for visit:  Leg swelling pist hip replacement  Symptom onset:  More than a month  Symptoms include:  Leg swelling  Symptom intensity:  Moderate  Symptom progression:  Improving  Had these symptoms before:  No    She eats 2-3 servings of fruits and vegetables daily.She consumes 0 sweetened beverage(s) daily.   She is taking medications regularly.         Hospital Follow-up Visit:    Hospital/ Facility: [facility]  Date of Admission: 06/27/2022  Date of Discharge: 06/28/2022  Reason(s) for Admission: Left hip replacement    Was your hospitalization related to COVID-19? No   Problems taking medications regularly:  None  Medication changes since discharge: None  Problems adhering to non-medication therapy:  None    Summary of hospitalization:  [facility] discharge summary reviewed  Diagnostic Tests/Treatments reviewed.  Follow up needed: none  Other Healthcare Providers Involved in Patient s Care:         orthopedic surgery  Update since discharge: improved.   Post Medication Reconciliation Status:        Plan of care communicated with patient    Has been doing very well since her hip replacement surgery in late June.  She has been steadily increasing activity.  She had significant bilateral lower extremity swelling for weeks after the surgery.  She had been taking anticoagulation medicines.  Lower extremity ultrasound was performed and showed no evidence for blood clot.  For the past 10 days, she has greatly increased her walking and activities in her yard and reports that her lower legs are \"50% better\".    She also admits that she has been eating more processed foods and prepackaged foods for convenience while she " "recovers from the hip surgery.  She has not been watching her labels as closely.    No orthopnea, no PND, no angina, no chest pain with activity      1.    Hypertension:  History of hypertension, on medication.  No reported side effects from medications.    Reviewed last 6 BP readings in chart:  BP Readings from Last 6 Encounters:   08/15/22 124/66   06/28/22 127/72   06/16/22 122/69   04/13/22 119/73   03/16/22 116/58   10/08/21 118/66     No active cardiac complaints or symptoms with exercise.     2.  COPD remains stable.  Has not had any recent breathing troubles beyond usual baseline.  Has not any acute respiratory events.  Remains with intermittent cough, mild shortness of breath with overexertion as per usual.  Using medication as directed with reported side effects.     3.  Has history of atrial fibrillation.    No palpitations, no dizziness, no dyspnea on exertion, no shortness of breath.  No racing heart, no fast heart rates.    Taking medications as ordered, no side effects reported.   Patient is taking anticoagulation according to direction, with no reported side effects.          **I reviewed the information recorded in the patient's EPIC chart (including but not limited to medical history, surgical history, family history, problem list, medication list, and allergy list) and updated the information as indicated based on the patients reported information.           Review of Systems   Constitutional, HEENT, cardiovascular, pulmonary, gi and gu systems are negative, except as otherwise noted.      Objective    /66   Pulse 58   Temp 98.3  F (36.8  C) (Oral)   Ht 1.638 m (5' 4.5\")   Wt 77.8 kg (171 lb 9.6 oz)   LMP  (LMP Unknown)   SpO2 96%   BMI 29.00 kg/m    Body mass index is 29 kg/m .  Physical Exam   GENERAL alert and no distress  EYES:  Normal sclera,conjunctiva, EOMI  HENT: oral and posterior pharynx without lesions or erythema, facies symmetric  NECK: Neck supple. No LAD, without " thyroidmegaly.  RESP: Clear to ausculation bilaterally without wheezes or crackles. Normal BS in all fields.  CV: Irregular rhythm ( consistent with known atrial fibrillation), regular rate; normal S1S2 without murmurs, rubs or gallops   LYMPH: no cervical lymph adenopathy appreciated  MS: extremities- no gross deformities of the visible extremities noted,   EXT:  2+ bilateral  lower extremity edema  PSYCH: Alert and oriented times 3; speech- coherent  SKIN:  No obvious significant skin lesions on visible portions of face                     .  ..

## 2022-08-15 NOTE — PATIENT INSTRUCTIONS
" Swelling in the lower legs is mostly due to relative inactivity.      As you become more active, the \"pumping\" of the muscles in the lower legs will help move the fluids out of the legs easier.      Advance your activity as tolerated.      Keep sodium intake in check, try as best possible to keep sodium intake below 2000 mg per day total  READ LABELS!!.     OK to use low dose diuretic as needed for a few weeks.      --HCTZ 25 mg once per day in the monring as needed for lower leg swelling.        Continue all other medications at the same doses.  Contact your usual pharmacy if you need refills.      Return to see me in 3 months, sooner if needed.  Please get fasting labs done at the [facility] or any other [facility] lab 1-2 days before this appointment (schedule a \"lab appointment\").  If you get the labs done at another clinic, make arrangements with them directly.  The orders will be in place.  Eat nothing for at least 8 hours prior to having these labs drawn.  Use [employer] or Call [phone] to schedule the appointment with me and lab appointment.   "

## 2022-08-16 ENCOUNTER — TELEPHONE (OUTPATIENT)
Dept: INTERNAL MEDICINE | Facility: CLINIC | Age: 72
End: 2022-08-16

## 2022-08-16 DIAGNOSIS — R60.0 BILATERAL LOWER EXTREMITY EDEMA: ICD-10-CM

## 2022-08-16 RX ORDER — HYDROCHLOROTHIAZIDE 25 MG/1
25 TABLET ORAL DAILY PRN
Qty: 30 TABLET | Refills: 1 | Status: SHIPPED | OUTPATIENT
Start: 2022-08-16 | End: 2022-08-18

## 2022-08-16 NOTE — TELEPHONE ENCOUNTER
Prescription(s) sent electronically to specified pharmacy.    Close encounter when done.     hydrochlorothiazide (HYDRODIURIL) 25 MG tablet 30 tablet 1 8/16/2022  --   Sig - Route: Take 1 tablet (25 mg) by mouth daily as needed (for lower leg swelling) - Oral   Sent to pharmacy as: hydroCHLOROthiazide 25 MG Oral Tablet (HYDRODIURIL)   Class: E-Prescribe   Order: [id number]   E-Prescribing Status: Receipt confirmed by pharmacy (8/16/2022 11:26 AM CDT)        Pharmacy    [id number] - [place], [place] - [address] AT [facility] OF [facility] [address]

## 2022-08-16 NOTE — TELEPHONE ENCOUNTER
"Patient seen in office yesterday 8/15/22 with PCP and was instructed to start on a low dose diuretic, patient states that pharmacy has not received the prescription.    Per OV notes on 8/15/22:  \"We will give low-dose diuretic as needed for additional fluid removal for short time\"    No new medication listed in patient's med list after office visit on 8/15/22 - PCP please sent rx if appropriate to patient's pharmacy:    [id number] - [place], [place] - [address] AT [facility] OF [facility] [address]    Patient states she does not need a callback from clinic and will plan to follow up with pharmacy after rx is sent.    Jessica Buckley RN  [facility]    "

## 2022-08-18 RX ORDER — HYDROCHLOROTHIAZIDE 25 MG/1
TABLET ORAL
Qty: 90 TABLET | Refills: 0 | Status: SHIPPED | OUTPATIENT
Start: 2022-08-18 | End: 2022-09-28

## 2022-08-18 NOTE — TELEPHONE ENCOUNTER
Routing refill request to provider for review/approval because:  Pharmacy requesting 90 day supply    Naresh Maddox RN  [facility] Triage Nurse

## 2022-09-28 ENCOUNTER — OFFICE VISIT (OUTPATIENT)
Dept: CARDIOLOGY | Facility: CLINIC | Age: 72
End: 2022-09-28
Attending: INTERNAL MEDICINE
Payer: COMMERCIAL

## 2022-09-28 VITALS
HEIGHT: 65 IN | DIASTOLIC BLOOD PRESSURE: 72 MMHG | WEIGHT: 161.5 LBS | SYSTOLIC BLOOD PRESSURE: 130 MMHG | HEART RATE: 79 BPM | BODY MASS INDEX: 26.91 KG/M2

## 2022-09-28 DIAGNOSIS — I10 BENIGN ESSENTIAL HYPERTENSION: ICD-10-CM

## 2022-09-28 DIAGNOSIS — E78.5 HYPERLIPIDEMIA LDL GOAL <130: ICD-10-CM

## 2022-09-28 DIAGNOSIS — I48.0 PAROXYSMAL ATRIAL FIBRILLATION (H): ICD-10-CM

## 2022-09-28 PROCEDURE — 99214 OFFICE O/P EST MOD 30 MIN: CPT | Performed by: PHYSICIAN ASSISTANT

## 2022-09-28 RX ORDER — SPIRONOLACTONE 25 MG/1
25 TABLET ORAL DAILY
Qty: 90 TABLET | Refills: 3 | Status: SHIPPED | OUTPATIENT
Start: 2022-09-28 | End: 2023-10-02

## 2022-09-28 NOTE — PATIENT INSTRUCTIONS
Thank you for visiting with me today.    We discussed: I'm glad you're doing so well.  Keep up the good work!    Medication changes:  continue current medications.     Follow up: with Dr. Stephen in 1 year, please call if the swelling returns.        Please call my nurse, Sarah, at [phone] with any questions or concerns.    Scheduling phone number: [phone]  Reminder: Please bring in all current medications, over the counter supplements and vitamin bottles to your next appointment.

## 2022-09-28 NOTE — LETTER
9/28/2022    Kendall Mendez MD  [address] [facility] [address]    RE: Marva Angela       Dear Colleague,     I had the pleasure of seeing Marva Angela in the [facility].  [phone]      HPI and Plan:   See dictation    Orders this Visit:  Orders Placed This Encounter   Procedures     Follow-Up with Cardiology     Orders Placed This Encounter   Medications     spironolactone (ALDACTONE) 25 MG tablet     Sig: Take 1 tablet (25 mg) by mouth daily     Dispense:  90 tablet     Refill:  3     Medications Discontinued During This Encounter   Medication Reason     hydrochlorothiazide (HYDRODIURIL) 25 MG tablet Stopped by Patient     polyethylene glycol (MIRALAX) 17 g packet      senna-docusate (SENOKOT-S/PERICOLACE) 8.6-50 MG tablet      traMADol (ULTRAM) 50 MG tablet      Acetaminophen (TYLENOL PO)      acetaminophen (TYLENOL) 325 MG tablet      spironolactone (ALDACTONE) 25 MG tablet          Encounter Diagnoses   Name Primary?     Paroxysmal atrial fibrillation (H)      Benign essential hypertension      Hyperlipidemia LDL goal <130        CURRENT MEDICATIONS:  Current Outpatient Medications   Medication Sig Dispense Refill     albuterol (PROAIR HFA/PROVENTIL HFA/VENTOLIN HFA) 108 (90 Base) MCG/ACT inhaler Inhale 2 puffs into the lungs every 6 hours as needed for shortness of breath / dyspnea or wheezing GENERIC VENTOLIN/ALBUTEROL 18 g 11     albuterol (PROVENTIL) (2.5 MG/3ML) 0.083% neb solution Take 1 vial (2.5 mg) by nebulization every 6 hours as needed for shortness of breath / dyspnea or wheezing 90 mL 3     apixaban ANTICOAGULANT (ELIQUIS) 5 MG tablet Take 1 tablet (5 mg) by mouth 2 times daily 180 tablet 1     atorvastatin (LIPITOR) 20 MG tablet Take 1 tablet (20 mg) by mouth daily 90 tablet 3     betamethasone dipropionate (DIPROSONE) 0.05 % external cream Apply 1 FTU sparingly once or twice per day as needed to affected area until the skin is better, then stop;  REPEAT AS NEEDED 45 g 2     calcium-vitamin D (OSCAL) 250-125 MG-UNIT per tablet Take 2 tablets by mouth daily       celecoxib (CELEBREX) 200 MG capsule Take 1 capsule (200 mg) by mouth 2 times daily as needed for pain (arthritis pain) 200 capsule 5     Cyanocobalamin (B-12) 1000 MCG TBCR Place 1,000 mcg under the tongue daily (Patient taking differently: Place 1,000 mcg under the tongue every other day)       diltiazem ER COATED BEADS (CARDIZEM CD/CARTIA XT) 180 MG 24 hr capsule Take 1 capsule (180 mg) by mouth daily with breakfast 90 capsule 3     famotidine (PEPCID) 20 MG tablet Take 20 mg by mouth as needed       folic acid (FOLVITE) 400 MCG tablet Take 400 mcg by mouth daily       order for DME Equipment being ordered: Nebulizer 1 each 0     spironolactone (ALDACTONE) 25 MG tablet Take 1 tablet (25 mg) by mouth daily 90 tablet 3     tiotropium (SPIRIVA HANDIHALER) 18 MCG inhaled capsule Inhale 1 capsule (18 mcg) into the lungs daily GENERIC TIOTROPIUM/SPIRIVA 90 capsule 3     triamcinolone (KENALOG) 0.1 % cream Apply topically 2 times daily as needed for irritation (sores on scalp.)       Urea 20 % CREA cream Apply topically daily        vitamin C-electrolytes (EMERGEN-C) 1000mg vitamin C super orange drink mix Take 1 packet by mouth daily Mix 1 packet in 4-6oz water.       ASPIRIN NOT PRESCRIBED (INTENTIONAL) Please choose reason not prescribed from choices below. (Patient not taking: Reported on 9/28/2022)       vitamin D3 (CHOLECALCIFEROL) 1.25 MG (12537 UT) capsule Take 1 capsule (50,000 Units) by mouth every 14 days SIG: TAKE INDICATION: 1ST AND 15TH OF EACH MONTH, TO TREAT VITAMIN D DEFICIENCY 24 capsule 1       ALLERGIES     Allergies   Allergen Reactions     Penicillins Rash     rash     Sulfa Drugs Rash     rash       PAST MEDICAL HISTORY:  Past Medical History:   Diagnosis Date     Arthritis     knees and hips     B12 deficiency 10/14/2016    B12 178     Benign essential hypertension     was on  amlodipine/ then metoprolol / add lisinopril      Chronic atrial fibrillation (H)      Contact dermatitis and other eczema, due to unspecified cause      COPD (chronic obstructive pulmonary disease) (H) 01/01/2012     Coronary artery disease involving native coronary artery of native heart without angina pectoris 2016    coronary artery calcifications seen on CT scan, mild nonocclusive CAD     Hyperlipidemia LDL goal <130 11/07/2012     Nephritis      as a child (post strep)      Osteoporosis, unspecified      Phlebitis and thrombophlebitis of other deep vessels of lower extremities 1972, 1975    Both with pregnancies     Prediabetes 11/01/2018    A1C 6.1     Sinusitis      Tobacco use disorder        PAST SURGICAL HISTORY:  Past Surgical History:   Procedure Laterality Date     ABDOMEN SURGERY  April2015     ARTHROPLASTY HIP Right 04/03/2017    Procedure: ARTHROPLASTY HIP;  Surgeon: Francisco J Alston MD;  Location:  OR     ARTHROPLASTY HIP ANTERIOR Left 6/27/2022    Procedure: LEFT TOTAL HIP ARTHROPLASTY DIRECT ANTERIOR APPROACH;  Surgeon: Francisco J Alston MD;  Location:  OR     ARTHROPLASTY KNEE Left 07/31/2017    Procedure: ARTHROPLASTY KNEE;  LEFT TOTAL KNEE ARTHROPLASTY ;  Surgeon: Francisco J Alston MD;  Location:  OR     BIOPSY OF SKIN LESION       C DEXA, BONE DENSITY, AXIAL SKEL  06/2008    T score lumbar -0.6, femoral neck -2.4/-2.0(all stable)     COLONOSCOPY  6/21/21     HC ASPIRATION &/OR INJECTION GANGLION CYST, ANY  1994     HERNIORRHAPHY INGUINAL Right 03/24/2015    Procedure: HERNIORRHAPHY INGUINAL;  Surgeon: Sam Erazo MD;  Location:  [facility]     HYSTERECTOMY, PAP NO LONGER INDICATED       ORTHOPEDIC SURGERY      bilat meniscus repair     ZC VAGINAL HYSTERECTOMY  1978    Hysterectomy, Vaginal       FAMILY HISTORY:  Family History   Problem Relation Age of Onset     C.A.D. Father      Diabetes Father      Myocardial Infarction Father      Heart Surgery  Father         CABGx4     Hyperlipidemia Father      Breast Cancer Mother 70     Osteoporosis Mother      Thyroid Disease Mother      Hyperlipidemia Mother      Cancer Brother         Bladder     Hyperlipidemia Brother      Diabetes Brother      Colon Cancer Brother      Other Cancer Brother      Neurologic Disorder Paternal Grandfather      Osteoporosis Sister      Arrhythmia Sister      Heart Surgery Sister         cardioversion, ablation     Other - See Comments Sister 55        arhythmogenic right ventricular dysplasia     Thyroid Disease Sister      Osteoporosis Maternal Grandmother      Osteoporosis Paternal Grandmother      Other - See Comments Maternal Grandfather         pneumonia     Family History Negative Son      Thyroid Disease Daughter      Family History Negative Daughter      Anesthesia Reaction Daughter      Arrhythmia Cousin         a-fib     Cancer - colorectal No family hx of      Prostate Cancer No family hx of      Alzheimer Disease No family hx of      Blood Disease No family hx of      Eye Disorder No family hx of        SOCIAL HISTORY:  Social History     Socioeconomic History     Marital status: Single     Spouse name: None     Number of children: 2     Years of education: 18     Highest education level: None   Occupational History     Occupation: Mental health therapist     Employer: [employer]   Tobacco Use     Smoking status: Former Smoker     Packs/day: 1.00     Years: 10.00     Pack years: 10.00     Types: Cigarettes     Start date: 1964     Quit date: 2015     Years since quittin.0     Smokeless tobacco: Never Used     Tobacco comment: 5-10 daily   Substance and Sexual Activity     Alcohol use: Yes     Comment: Rarely have any alcohol     Drug use: No     Sexual activity: Not Currently     Partners: Male   Other Topics Concern      Service No     Blood Transfusions No     Caffeine Concern No     Comment: 1 cup coffee a day, 1-2 can's of pop a wk in  "the summer      Occupational Exposure No     Hobby Hazards No     Sleep Concern No     Stress Concern No     Weight Concern No     Special Diet No     Back Care No     Exercise No     Comment: not due to knee's and hip     Bike Helmet No     Comment: n/a     Seat Belt Yes     Self-Exams Yes     Comment: sometimes     Parent/sibling w/ CABG, MI or angioplasty before 65F 55M? Yes   Social History Narrative    Eats fruits and vegetables every day. She takes a calcium supplement twice daily.    *                Was psychiatric social owrker        Review of Systems:  Skin:        Eyes:       ENT:       Respiratory:  Negative    Cardiovascular:  Negative;palpitations;chest pain;syncope or near-syncope;cyanosis;lightheadedness;dizziness;fatigue;exercise intolerance;edema    Gastroenterology:      Genitourinary:       Musculoskeletal:       Neurologic:       Psychiatric:       Heme/Lymph/Imm:       Endocrine:         Physical Exam:  Vitals: /72 (BP Location: Left arm, Cuff Size: Adult Regular)   Pulse 79   Ht 1.638 m (5' 4.5\")   Wt 73.3 kg (161 lb 8 oz)   LMP  (LMP Unknown)   BMI 27.29 kg/m     Please refer to dictation for physical exam    Recent Lab Results: all reviewed today  CBC RESULTS:  Lab Results   Component Value Date    WBC 6.8 06/16/2022    WBC 9.1 03/24/2021    RBC 4.78 06/16/2022    RBC 4.75 03/24/2021    HGB 11.5 (L) 06/28/2022    HGB 14.1 03/24/2021    HCT 44.2 06/16/2022    HCT 44.7 03/24/2021    MCV 93 06/16/2022    MCV 94 03/24/2021    MCH 30.3 06/16/2022    MCH 29.7 03/24/2021    MCHC 32.8 06/16/2022    MCHC 31.5 03/24/2021    RDW 13.0 06/16/2022    RDW 13.2 03/24/2021     06/27/2022     03/24/2021       BMP RESULTS:  Lab Results   Component Value Date     06/28/2022     03/24/2021    POTASSIUM 4.2 06/28/2022    POTASSIUM 3.9 03/24/2021    CHLORIDE 102 06/28/2022    CHLORIDE 102 03/24/2021    CO2 31 06/28/2022    CO2 32 03/24/2021    ANIONGAP 4 06/28/2022    ANIONGAP " 3 2021     (H) 2022     (H) 2022     (H) 2021    BUN 13 2022    BUN 20 2021    CR 0.69 2022    CR 0.80 2021    GFRESTIMATED >90 2022    GFRESTIMATED 74 2021    GFRESTBLACK 85 2021    SANGEETA 9.4 2022    SANGEETA 10.6 (H) 2021        INR RESULTS:  Lab Results   Component Value Date    INR 2.1 (A) 2017    INR 2.8 (A) 2017    INR 1.07 2017    INR 1.20 (H) 2017           CC  Robert Stephen MD  [address] [facility] [address]  [place] [address]        Service Date: 2022    CLINIC VISIT    PRIMARY CARDIOLOGIST:  Dr. Stephen     REASON FOR VISIT:  AFib followup.    HISTORY OF PRESENT ILLNESS:  Ms. Angela is a delightful 72-year-old woman with past medical history significant for the followin.  Hypertension, well controlled.  2.  History of tobacco, quit in .  3.  COPD.  4.  Dyslipidemia.  5.  Calcification of the coronaries and thoracic aorta on CT of chest.  6.  Paroxysmal atrial fibrillation.      I am seeing her today in routine followup.  She underwent a left total hip in  and seems to be recovering well from that.  Within 4-5 days of the hip, she developed severe peripheral edema in both legs, left greater than right, so much so that she almost had weeping.  She was started on hydrochlorothiazide and her edema resolved and she lost between 10 and 15 pounds.  Since her edema had improved, she decided to go off of that at the beginning of the month and she has had no edema return nor worsening blood pressure.  She did not feel any AFib during that time, she did not have any chest pain and the reason for the edema has been unclear.  She otherwise feels well and denies chest pain, shortness of breath, orthopnea or PND.  She has not had syncope or presyncope.    SOCIAL HISTORY:  She is a retired .  Former smoker, half pack a day, quit in .  Occasional alcohol use.  Typically  walks outside several times a week.  She has 3 children.    PHYSICAL EXAMINATION:    GENERAL:  Well-developed, well-nourished woman in no acute distress.  HEENT:  Normocephalic, atraumatic.  HEART:  Regular with early beats consistent with PACs.  LUNGS:  Clear without wheezes, rales or rhonchi.  NECK:  Veins are flat at 30 degrees.  No significant hepatojugular reflex.  EXTREMITIES:  Left leg with trace edema.  Right leg without edema.  SKIN:  Warm and dry.    IMAGING:  EKG reviewed today shows sinus rhythm with frequent PVCs, dated 06/16.  Last echocardiogram reviewed from 2017 shows EF 60%-65%, trace TR, trace MR.    Stress echocardiogram reviewed 04/2018.  Occasional PVCs and PACs but otherwise normal stress test with 9 minutes of exercise.  Normal blood pressure and heart rate response.    ASSESSMENT AND PLAN:    1.  Paroxysmal atrial fibrillation, clinically in sinus rhythm with PACs today and she was in sinus with her last EKG.  She has a CHADS-VASc score of 3 and she is now on Eliquis.  She previously did not want anticoagulation but we have been able to figure out a way to get Eliquis for her.  In fact, she figured out a way and she is now getting it through [facility] at a reasonable price.  She does not want that refilled, as she wants it printed out when she runs out of her current prescription but we will continue her on the current dose.  She is also on diltiazem 180 for a rate control approach.  2.  Dyslipidemia with coronary calcifications on her CT with goal LDL less than 70.  She is getting her lipid panel checked with her primary.  She remains on Lipitor 20.  Would encourage aggressive management.  3.  COPD/emphysema, per primary.  4.  Status post left total hip replacement with severe edema postoperatively with no obvious anemia noted with this now completely resolved off of hydrochlorothiazide.  I do not suspect a cardiac cause and since it is resolved, we can watch.  Should it redevelop, I would  recommend an echocardiogram.    We will have her follow up with Dr. Stephen in 1 year, sooner with any concerns.    Rufina Palacios PA-C        D: 2022   T: 2022   MT: daniella    Name:     NORBERTO IBRAHIM  MRN:      [id number]        Account:      [id number]   :      1950           Service Date: 2022       Document: [id number]      Thank you for allowing me to participate in the care of your patient.      Sincerely,     Rufina Palacios PA-C     [facility]  cc:   Robert Stephen MD  [address]  [place] [id number]

## 2022-09-28 NOTE — PROGRESS NOTES
[id number]      HPI and Plan:   See dictation    Orders this Visit:  Orders Placed This Encounter   Procedures     Follow-Up with Cardiology     Orders Placed This Encounter   Medications     spironolactone (ALDACTONE) 25 MG tablet     Sig: Take 1 tablet (25 mg) by mouth daily     Dispense:  90 tablet     Refill:  3     Medications Discontinued During This Encounter   Medication Reason     hydrochlorothiazide (HYDRODIURIL) 25 MG tablet Stopped by Patient     polyethylene glycol (MIRALAX) 17 g packet      senna-docusate (SENOKOT-S/PERICOLACE) 8.6-50 MG tablet      traMADol (ULTRAM) 50 MG tablet      Acetaminophen (TYLENOL PO)      acetaminophen (TYLENOL) 325 MG tablet      spironolactone (ALDACTONE) 25 MG tablet          Encounter Diagnoses   Name Primary?     Paroxysmal atrial fibrillation (H)      Benign essential hypertension      Hyperlipidemia LDL goal <130        CURRENT MEDICATIONS:  Current Outpatient Medications   Medication Sig Dispense Refill     albuterol (PROAIR HFA/PROVENTIL HFA/VENTOLIN HFA) 108 (90 Base) MCG/ACT inhaler Inhale 2 puffs into the lungs every 6 hours as needed for shortness of breath / dyspnea or wheezing GENERIC VENTOLIN/ALBUTEROL 18 g 11     albuterol (PROVENTIL) (2.5 MG/3ML) 0.083% neb solution Take 1 vial (2.5 mg) by nebulization every 6 hours as needed for shortness of breath / dyspnea or wheezing 90 mL 3     apixaban ANTICOAGULANT (ELIQUIS) 5 MG tablet Take 1 tablet (5 mg) by mouth 2 times daily 180 tablet 1     atorvastatin (LIPITOR) 20 MG tablet Take 1 tablet (20 mg) by mouth daily 90 tablet 3     betamethasone dipropionate (DIPROSONE) 0.05 % external cream Apply 1 FTU sparingly once or twice per day as needed to affected area until the skin is better, then stop; REPEAT AS NEEDED 45 g 2     calcium-vitamin D (OSCAL) 250-125 MG-UNIT per tablet Take 2 tablets by mouth daily       celecoxib (CELEBREX) 200 MG capsule Take 1 capsule (200 mg) by mouth 2 times daily as needed for pain  (arthritis pain) 200 capsule 5     Cyanocobalamin (B-12) 1000 MCG TBCR Place 1,000 mcg under the tongue daily (Patient taking differently: Place 1,000 mcg under the tongue every other day)       diltiazem ER COATED BEADS (CARDIZEM CD/CARTIA XT) 180 MG 24 hr capsule Take 1 capsule (180 mg) by mouth daily with breakfast 90 capsule 3     famotidine (PEPCID) 20 MG tablet Take 20 mg by mouth as needed       folic acid (FOLVITE) 400 MCG tablet Take 400 mcg by mouth daily       order for DME Equipment being ordered: Nebulizer 1 each 0     spironolactone (ALDACTONE) 25 MG tablet Take 1 tablet (25 mg) by mouth daily 90 tablet 3     tiotropium (SPIRIVA HANDIHALER) 18 MCG inhaled capsule Inhale 1 capsule (18 mcg) into the lungs daily GENERIC TIOTROPIUM/SPIRIVA 90 capsule 3     triamcinolone (KENALOG) 0.1 % cream Apply topically 2 times daily as needed for irritation (sores on scalp.)       Urea 20 % CREA cream Apply topically daily        vitamin C-electrolytes (EMERGEN-C) 1000mg vitamin C super orange drink mix Take 1 packet by mouth daily Mix 1 packet in 4-6oz water.       ASPIRIN NOT PRESCRIBED (INTENTIONAL) Please choose reason not prescribed from choices below. (Patient not taking: Reported on 9/28/2022)       vitamin D3 (CHOLECALCIFEROL) 1.25 MG (88277 UT) capsule Take 1 capsule (50,000 Units) by mouth every 14 days SIG: TAKE INDICATION: 1ST AND 15TH OF EACH MONTH, TO TREAT VITAMIN D DEFICIENCY 24 capsule 1       ALLERGIES     Allergies   Allergen Reactions     Penicillins Rash     rash     Sulfa Drugs Rash     rash       PAST MEDICAL HISTORY:  Past Medical History:   Diagnosis Date     Arthritis     knees and hips     B12 deficiency 10/14/2016    B12 178     Benign essential hypertension     was on amlodipine/ then metoprolol / add lisinopril      Chronic atrial fibrillation (H)      Contact dermatitis and other eczema, due to unspecified cause      COPD (chronic obstructive pulmonary disease) (H) 01/01/2012      Coronary artery disease involving native coronary artery of native heart without angina pectoris 2016    coronary artery calcifications seen on CT scan, mild nonocclusive CAD     Hyperlipidemia LDL goal <130 11/07/2012     Nephritis      as a child (post strep)      Osteoporosis, unspecified      Phlebitis and thrombophlebitis of other deep vessels of lower extremities 1972, 1975    Both with pregnancies     Prediabetes 11/01/2018    A1C 6.1     Sinusitis      Tobacco use disorder        PAST SURGICAL HISTORY:  Past Surgical History:   Procedure Laterality Date     ABDOMEN SURGERY  April2015     ARTHROPLASTY HIP Right 04/03/2017    Procedure: ARTHROPLASTY HIP;  Surgeon: Francisco J Alston MD;  Location:  OR     ARTHROPLASTY HIP ANTERIOR Left 6/27/2022    Procedure: LEFT TOTAL HIP ARTHROPLASTY DIRECT ANTERIOR APPROACH;  Surgeon: Francisco J Alston MD;  Location:  OR     ARTHROPLASTY KNEE Left 07/31/2017    Procedure: ARTHROPLASTY KNEE;  LEFT TOTAL KNEE ARTHROPLASTY ;  Surgeon: Francisco J Alston MD;  Location:  OR     BIOPSY OF SKIN LESION       C DEXA, BONE DENSITY, AXIAL SKEL  06/2008    T score lumbar -0.6, femoral neck -2.4/-2.0(all stable)     COLONOSCOPY  6/21/21     HC ASPIRATION &/OR INJECTION GANGLION CYST, ANY  1994     HERNIORRHAPHY INGUINAL Right 03/24/2015    Procedure: HERNIORRHAPHY INGUINAL;  Surgeon: Sam Erazo MD;  Location:  [facility]     HYSTERECTOMY, PAP NO LONGER INDICATED       ORTHOPEDIC SURGERY      bilat meniscus repair     ZZC VAGINAL HYSTERECTOMY  1978    Hysterectomy, Vaginal       FAMILY HISTORY:  Family History   Problem Relation Age of Onset     C.A.D. Father      Diabetes Father      Myocardial Infarction Father      Heart Surgery Father         CABGx4     Hyperlipidemia Father      Breast Cancer Mother 70     Osteoporosis Mother      Thyroid Disease Mother      Hyperlipidemia Mother      Cancer Brother         Bladder     Hyperlipidemia Brother       Diabetes Brother      Colon Cancer Brother      Other Cancer Brother      Neurologic Disorder Paternal Grandfather      Osteoporosis Sister      Arrhythmia Sister      Heart Surgery Sister         cardioversion, ablation     Other - See Comments Sister 55        arhythmogenic right ventricular dysplasia     Thyroid Disease Sister      Osteoporosis Maternal Grandmother      Osteoporosis Paternal Grandmother      Other - See Comments Maternal Grandfather         pneumonia     Family History Negative Son      Thyroid Disease Daughter      Family History Negative Daughter      Anesthesia Reaction Daughter      Arrhythmia Cousin         a-fib     Cancer - colorectal No family hx of      Prostate Cancer No family hx of      Alzheimer Disease No family hx of      Blood Disease No family hx of      Eye Disorder No family hx of        SOCIAL HISTORY:  Social History     Socioeconomic History     Marital status: Single     Spouse name: None     Number of children: 2     Years of education: 18     Highest education level: None   Occupational History     Occupation: Mental health therapist     Employer: [employer]   Tobacco Use     Smoking status: Former Smoker     Packs/day: 1.00     Years: 10.00     Pack years: 10.00     Types: Cigarettes     Start date: 1964     Quit date: 2015     Years since quittin.0     Smokeless tobacco: Never Used     Tobacco comment: 5-10 daily   Substance and Sexual Activity     Alcohol use: Yes     Comment: Rarely have any alcohol     Drug use: No     Sexual activity: Not Currently     Partners: Male   Other Topics Concern      Service No     Blood Transfusions No     Caffeine Concern No     Comment: 1 cup coffee a day, 1-2 can's of pop a wk in the summer      Occupational Exposure No     Hobby Hazards No     Sleep Concern No     Stress Concern No     Weight Concern No     Special Diet No     Back Care No     Exercise No     Comment: not due to knee's and hip      "Bike Helmet No     Comment: n/a     Seat Belt Yes     Self-Exams Yes     Comment: sometimes     Parent/sibling w/ CABG, MI or angioplasty before 65F 55M? Yes   Social History Narrative    Eats fruits and vegetables every day. She takes a calcium supplement twice daily.    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        Review of Systems:  Skin:        Eyes:       ENT:       Respiratory:  Negative    Cardiovascular:  Negative;palpitations;chest pain;syncope or near-syncope;cyanosis;lightheadedness;dizziness;fatigue;exercise intolerance;edema    Gastroenterology:      Genitourinary:       Musculoskeletal:       Neurologic:       Psychiatric:       Heme/Lymph/Imm:       Endocrine:         Physical Exam:  Vitals: /72 (BP Location: Left arm, Cuff Size: Adult Regular)   Pulse 79   Ht 1.638 m (5' 4.5\")   Wt 73.3 kg (161 lb 8 oz)   LMP  (LMP Unknown)   BMI 27.29 kg/m     Please refer to dictation for physical exam    Recent Lab Results: all reviewed today  CBC RESULTS:  Lab Results   Component Value Date    WBC 6.8 06/16/2022    WBC 9.1 03/24/2021    RBC 4.78 06/16/2022    RBC 4.75 03/24/2021    HGB 11.5 (L) 06/28/2022    HGB 14.1 03/24/2021    HCT 44.2 06/16/2022    HCT 44.7 03/24/2021    MCV 93 06/16/2022    MCV 94 03/24/2021    MCH 30.3 06/16/2022    MCH 29.7 03/24/2021    MCHC 32.8 06/16/2022    MCHC 31.5 03/24/2021    RDW 13.0 06/16/2022    RDW 13.2 03/24/2021     06/27/2022     03/24/2021       BMP RESULTS:  Lab Results   Component Value Date     06/28/2022     03/24/2021    POTASSIUM 4.2 06/28/2022    POTASSIUM 3.9 03/24/2021    CHLORIDE 102 06/28/2022    CHLORIDE 102 03/24/2021    CO2 31 06/28/2022    CO2 32 03/24/2021    ANIONGAP 4 06/28/2022    ANIONGAP 3 03/24/2021     (H) 06/28/2022     (H) 06/28/2022     (H) 03/24/2021    BUN 13 06/28/2022    BUN 20 03/24/2021    CR 0.69 06/28/2022    CR 0.80 03/24/2021    GFRESTIMATED >90 06/28/2022    " GFRESTIMATED 74 03/24/2021    GFRESTBLACK 85 03/24/2021    SANGEETA 9.4 06/28/2022    SANGEETA 10.6 (H) 03/24/2021        INR RESULTS:  Lab Results   Component Value Date    INR 2.1 (A) 08/31/2017    INR 2.8 (A) 08/22/2017    INR 1.07 08/03/2017    INR 1.20 (H) 04/05/2017           CC  Robert Stephen MD  [address] [facility] [address]  [place] [id number]

## 2022-09-28 NOTE — PROGRESS NOTES
Service Date: 2022    CLINIC VISIT    PRIMARY CARDIOLOGIST:  Dr. Stephen     REASON FOR VISIT:  AFib followup.    HISTORY OF PRESENT ILLNESS:  Ms. Angela is a delightful 72-year-old woman with past medical history significant for the followin.  Hypertension, well controlled.  2.  History of tobacco, quit in .  3.  COPD.  4.  Dyslipidemia.  5.  Calcification of the coronaries and thoracic aorta on CT of chest.  6.  Paroxysmal atrial fibrillation.      I am seeing her today in routine followup.  She underwent a left total hip in  and seems to be recovering well from that.  Within 4-5 days of the hip, she developed severe peripheral edema in both legs, left greater than right, so much so that she almost had weeping.  She was started on hydrochlorothiazide and her edema resolved and she lost between 10 and 15 pounds.  Since her edema had improved, she decided to go off of that at the beginning of the month and she has had no edema return nor worsening blood pressure.  She did not feel any AFib during that time, she did not have any chest pain and the reason for the edema has been unclear.  She otherwise feels well and denies chest pain, shortness of breath, orthopnea or PND.  She has not had syncope or presyncope.    SOCIAL HISTORY:  She is a retired .  Former smoker, half pack a day, quit in .  Occasional alcohol use.  Typically walks outside several times a week.  She has 3 children.    PHYSICAL EXAMINATION:    GENERAL:  Well-developed, well-nourished woman in no acute distress.  HEENT:  Normocephalic, atraumatic.  HEART:  Regular with early beats consistent with PACs.  LUNGS:  Clear without wheezes, rales or rhonchi.  NECK:  Veins are flat at 30 degrees.  No significant hepatojugular reflex.  EXTREMITIES:  Left leg with trace edema.  Right leg without edema.  SKIN:  Warm and dry.    IMAGING:  EKG reviewed today shows sinus rhythm with frequent PVCs, dated .  Last echocardiogram  reviewed from 2017 shows EF 60%-65%, trace TR, trace MR.    Stress echocardiogram reviewed 2018.  Occasional PVCs and PACs but otherwise normal stress test with 9 minutes of exercise.  Normal blood pressure and heart rate response.    ASSESSMENT AND PLAN:    1.  Paroxysmal atrial fibrillation, clinically in sinus rhythm with PACs today and she was in sinus with her last EKG.  She has a CHADS-VASc score of 3 and she is now on Eliquis.  She previously did not want anticoagulation but we have been able to figure out a way to get Eliquis for her.  In fact, she figured out a way and she is now getting it through [facility] at a reasonable price.  She does not want that refilled, as she wants it printed out when she runs out of her current prescription but we will continue her on the current dose.  She is also on diltiazem 180 for a rate control approach.  2.  Dyslipidemia with coronary calcifications on her CT with goal LDL less than 70.  She is getting her lipid panel checked with her primary.  She remains on Lipitor 20.  Would encourage aggressive management.  3.  COPD/emphysema, per primary.  4.  Status post left total hip replacement with severe edema postoperatively with no obvious anemia noted with this now completely resolved off of hydrochlorothiazide.  I do not suspect a cardiac cause and since it is resolved, we can watch.  Should it redevelop, I would recommend an echocardiogram.    We will have her follow up with Dr. Stephen in 1 year, sooner with any concerns.    Rufina Palacios PA-C        D: 2022   T: 2022   MT: daniella    Name:     NORBERTO IBRAHIM  MRN:      [id number]        Account:      [id number]   :      1950           Service Date: 2022       Document: [id number]

## 2022-10-17 ENCOUNTER — ANCILLARY PROCEDURE (OUTPATIENT)
Dept: MAMMOGRAPHY | Facility: CLINIC | Age: 72
End: 2022-10-17
Attending: INTERNAL MEDICINE
Payer: COMMERCIAL

## 2022-10-17 DIAGNOSIS — Z12.31 VISIT FOR SCREENING MAMMOGRAM: ICD-10-CM

## 2022-10-17 PROCEDURE — 77067 SCR MAMMO BI INCL CAD: CPT | Mod: TC | Performed by: RADIOLOGY

## 2022-11-10 ENCOUNTER — LAB (OUTPATIENT)
Dept: LAB | Facility: CLINIC | Age: 72
End: 2022-11-10
Payer: COMMERCIAL

## 2022-11-10 DIAGNOSIS — R73.03 PREDIABETES: ICD-10-CM

## 2022-11-10 DIAGNOSIS — I48.0 PAROXYSMAL ATRIAL FIBRILLATION (H): ICD-10-CM

## 2022-11-10 DIAGNOSIS — E78.5 HYPERLIPIDEMIA LDL GOAL <130: ICD-10-CM

## 2022-11-10 DIAGNOSIS — I10 BENIGN ESSENTIAL HYPERTENSION: ICD-10-CM

## 2022-11-10 LAB
ALBUMIN SERPL-MCNC: 3.8 G/DL (ref 3.4–5)
ALP SERPL-CCNC: 92 U/L (ref 40–150)
ALT SERPL W P-5'-P-CCNC: 36 U/L (ref 0–50)
ANION GAP SERPL CALCULATED.3IONS-SCNC: 4 MMOL/L (ref 3–14)
AST SERPL W P-5'-P-CCNC: 23 U/L (ref 0–45)
BILIRUB SERPL-MCNC: 0.7 MG/DL (ref 0.2–1.3)
BUN SERPL-MCNC: 24 MG/DL (ref 7–30)
CALCIUM SERPL-MCNC: 9.7 MG/DL (ref 8.5–10.1)
CHLORIDE BLD-SCNC: 104 MMOL/L (ref 94–109)
CHOLEST SERPL-MCNC: 172 MG/DL
CO2 SERPL-SCNC: 31 MMOL/L (ref 20–32)
CREAT SERPL-MCNC: 0.84 MG/DL (ref 0.52–1.04)
FASTING STATUS PATIENT QL REPORTED: YES
GFR SERPL CREATININE-BSD FRML MDRD: 73 ML/MIN/1.73M2
GLUCOSE BLD-MCNC: 106 MG/DL (ref 70–99)
HBA1C MFR BLD: 6.9 % (ref 0–5.6)
HDLC SERPL-MCNC: 66 MG/DL
LDLC SERPL CALC-MCNC: 95 MG/DL
NONHDLC SERPL-MCNC: 106 MG/DL
POTASSIUM BLD-SCNC: 4.3 MMOL/L (ref 3.4–5.3)
PROT SERPL-MCNC: 6.9 G/DL (ref 6.8–8.8)
SODIUM SERPL-SCNC: 139 MMOL/L (ref 133–144)
TRIGL SERPL-MCNC: 57 MG/DL

## 2022-11-10 PROCEDURE — 83036 HEMOGLOBIN GLYCOSYLATED A1C: CPT

## 2022-11-10 PROCEDURE — 36415 COLL VENOUS BLD VENIPUNCTURE: CPT

## 2022-11-10 PROCEDURE — 80061 LIPID PANEL: CPT

## 2022-11-10 PROCEDURE — 80053 COMPREHEN METABOLIC PANEL: CPT

## 2022-11-15 ENCOUNTER — OFFICE VISIT (OUTPATIENT)
Dept: INTERNAL MEDICINE | Facility: CLINIC | Age: 72
End: 2022-11-15
Payer: COMMERCIAL

## 2022-11-15 VITALS
HEART RATE: 66 BPM | WEIGHT: 161.4 LBS | HEIGHT: 65 IN | BODY MASS INDEX: 26.89 KG/M2 | DIASTOLIC BLOOD PRESSURE: 68 MMHG | SYSTOLIC BLOOD PRESSURE: 132 MMHG | OXYGEN SATURATION: 97 % | TEMPERATURE: 98.3 F

## 2022-11-15 DIAGNOSIS — R73.03 PREDIABETES: ICD-10-CM

## 2022-11-15 DIAGNOSIS — L40.9 PSORIASIS: ICD-10-CM

## 2022-11-15 DIAGNOSIS — I25.10 CORONARY ARTERY DISEASE INVOLVING NATIVE CORONARY ARTERY OF NATIVE HEART WITHOUT ANGINA PECTORIS: ICD-10-CM

## 2022-11-15 DIAGNOSIS — M19.90 OSTEOARTHRITIS, UNSPECIFIED OSTEOARTHRITIS TYPE, UNSPECIFIED SITE: ICD-10-CM

## 2022-11-15 DIAGNOSIS — Z87.891 PERSONAL HISTORY OF TOBACCO USE, PRESENTING HAZARDS TO HEALTH: ICD-10-CM

## 2022-11-15 DIAGNOSIS — L40.50 PSORIASIS WITH ARTHROPATHY (H): ICD-10-CM

## 2022-11-15 DIAGNOSIS — I70.0 ATHEROSCLEROSIS OF AORTA (H): ICD-10-CM

## 2022-11-15 DIAGNOSIS — I10 BENIGN ESSENTIAL HYPERTENSION: ICD-10-CM

## 2022-11-15 DIAGNOSIS — J43.9 PULMONARY EMPHYSEMA, UNSPECIFIED EMPHYSEMA TYPE (H): ICD-10-CM

## 2022-11-15 DIAGNOSIS — J44.9 CHRONIC OBSTRUCTIVE PULMONARY DISEASE, UNSPECIFIED COPD TYPE (H): ICD-10-CM

## 2022-11-15 DIAGNOSIS — I48.0 PAROXYSMAL ATRIAL FIBRILLATION (H): Primary | ICD-10-CM

## 2022-11-15 DIAGNOSIS — Z96.642 STATUS POST TOTAL REPLACEMENT OF LEFT HIP: ICD-10-CM

## 2022-11-15 DIAGNOSIS — L21.9 SEBORRHEIC DERMATITIS OF SCALP: ICD-10-CM

## 2022-11-15 DIAGNOSIS — E78.5 HYPERLIPIDEMIA LDL GOAL <130: ICD-10-CM

## 2022-11-15 PROCEDURE — 99214 OFFICE O/P EST MOD 30 MIN: CPT | Performed by: INTERNAL MEDICINE

## 2022-11-15 RX ORDER — TRIAMCINOLONE ACETONIDE 1 MG/ML
LOTION TOPICAL
Qty: 60 ML | Refills: 2 | Status: SHIPPED | OUTPATIENT
Start: 2022-11-15

## 2022-11-15 RX ORDER — ALBUTEROL SULFATE 90 UG/1
2 AEROSOL, METERED RESPIRATORY (INHALATION) EVERY 6 HOURS PRN
Qty: 18 G | Refills: 11 | Status: SHIPPED | OUTPATIENT
Start: 2022-11-15 | End: 2024-05-30

## 2022-11-15 RX ORDER — ALBUTEROL SULFATE 0.83 MG/ML
2.5 SOLUTION RESPIRATORY (INHALATION) EVERY 6 HOURS PRN
Qty: 90 ML | Refills: 3 | Status: SHIPPED | OUTPATIENT
Start: 2022-11-15

## 2022-11-15 RX ORDER — BETAMETHASONE DIPROPIONATE 0.5 MG/G
CREAM TOPICAL
Qty: 45 G | Refills: 2 | Status: SHIPPED | OUTPATIENT
Start: 2022-11-15

## 2022-11-15 NOTE — PATIENT INSTRUCTIONS
" Continue all medications at the same doses.  Contact your usual pharmacy if you need refills.      Your A1C result puts on on the straddle between pre-diabetes and type II diabetes.    No medicatoins needed at thsi time, but you do need to watch your diet and reduce the intake of \"simple carbohydrates\" (e.g. White bread, white rice, pasta, noodles, potatoes, snack foods, regular soda, juices (except fresh squeezed), cakes, cookies, candy, etc.) as best possible.      If the A1C remains above 6.5 despite better diet, then we may need to consider further interventions.       5 GOALS TO PREVENT VASCULAR DISEASE:     1.  Aggressive blood pressure control, under 130/80 ideally.  Using medications if needed.    Your blood pressure is under good control    BP Readings from Last 4 Encounters:   11/15/22 132/68   09/28/22 130/72   08/15/22 124/66   06/28/22 127/72       2.  Aggressive LDL cholesterol (\"bad cholesterol\") lowering as indicated.    Your goal is an LDL under 130 for sure, preferably under 100.  (If you have diabetes or previous vascular disease, the the LDL goals would be under 100 for sure, preferably under 70.)    New guidelines identify four high-risk groups who could benefit from statins:   *people with pre-existing heart disease, such as those who have had a heart attack;   *people ages 40 to 75 who have diabetes of any type  *patients ages 40 to 75 with at least a 7.5% risk of developing cardiovascular disease over the next decade, according to a formula described in the guidelines  *patients with the sort of super-high cholesterol that sometimes runs in families, as evidenced by an LDL of 190 milligrams per deciliter or higher    Your cholesterol levels are well controlled.    Recent Labs   Lab Test 11/10/22  0759 10/08/21  0952 04/19/16  0846 09/18/15  0738   CHOL 172 172   < > 221*   HDL 66 64   < > 53   LDL 95 96   < > 144*   TRIG 57 59   < > 122   CHOLHDLRATIO  --   --   --  4.2    < > = values in " this interval not displayed.       3.  Aggressive diabetic prevention, screening and/or management.      --manage the pre-diabetes.     Lab Results   Component Value Date    A1C 6.9 11/10/2022    A1C 6.4 06/16/2022    A1C 6.0 04/08/2022    A1C 6.0 09/30/2021    A1C 6.2 03/24/2021    A1C 6.2 09/23/2020    A1C 6.2 12/17/2019    A1C 6.0 06/17/2019    A1C 6.1 11/02/2018    A1C 6.4 03/21/2017        4.  No smoking    5.  Consider daily preventative aspirin over age 50 if you have enough cardiac risk factors to place you at higher risk for the presence of vascular disease.    If you have any reason not to take aspirin such easy bruising or bleeding, stomach problems, other anticoagulant medications, or any other side effects, then you should not take Aspirin.     --Based on prior side effects from aspirin or other medications for which aspirin would cause potential problems, you should NOT take daily preventative aspirin.

## 2022-11-15 NOTE — PROGRESS NOTES
Assessment & Plan     (I48.0) Paroxysmal atrial fibrillation (H)  (primary encounter diagnosis)  Comment: This condition is currently controlled on the current medical regimen.  Continue current therapy.   Plan: Basic metabolic panel, Hemoglobin A1c, TSH with        free T4 reflex, CBC with platelets            (J43.9) Pulmonary emphysema, unspecified emphysema type (H)  Comment: This condition is currently controlled on the current medical regimen.  Continue current therapy.   Doing well on current medications.  Continue to monitor the prices, get from a [place] pharmacy if significantly cheaper.  Contact me for written prescriptions if needed.  Plan: albuterol (PROVENTIL) (2.5 MG/3ML) 0.083% neb         solution            (R73.03) Prediabetes  Comment: Historically has been prediabetic for many years, glucose level slightly up with an elevated A1c or last 3 months that she is recovered from the hip replacement surgery.  She admits that she has been eating differently during this time but is started to return to her previous lower carbohydrate diet.  Reviewed the spectrum of prediabetes and type 2 diabetes.  No medications needed at this time, work on diet.  Repeat the labs in 6 months.  If she still has an A1c above 6.5 despite dietary and lifestyle changes that she may need a medication.  Plan: Basic metabolic panel, Hemoglobin A1c, TSH with        free T4 reflex, Albumin Random Urine         Quantitative with Creat Ratio            (L21.9) Seborrheic dermatitis of scalp  Comment: This condition is currently controlled on the current medical regimen.  Continue current therapy.   Plan: triamcinolone (KENALOG) 0.1 % external lotion            (L40.9) Psoriasis  Comment: This condition is currently controlled on the current medical regimen.  Continue current therapy.   Plan: betamethasone dipropionate (DIPROSONE) 0.05 %         external cream            (E78.5) Hyperlipidemia LDL goal <130  Comment: This  condition is currently controlled on the current medical regimen.  Continue current therapy.   Plan:     (M19.90) Osteoarthritis, unspecified osteoarthritis type, unspecified site  Comment: This condition is currently controlled on the current medical regimen.  Continue current therapy.   Plan:     (I10) Benign essential hypertension  Comment: This condition is currently controlled on the current medical regimen.  Continue current therapy.   Plan: TSH with free T4 reflex, Albumin Random Urine         Quantitative with Creat Ratio, CBC with         platelets            (J44.9) Chronic obstructive pulmonary disease, unspecified COPD type (H)  Comment: NOW OFF CIGARETTES   Plan: albuterol (PROAIR HFA/PROVENTIL HFA/VENTOLIN         HFA) 108 (90 Base) MCG/ACT inhaler            (Z96.642) Status post total replacement of left hip  Comment: Doing well in recovery.  Plan:     (Z87.891) Personal history of tobacco use, presenting hazards to health (quit 9/7/15)  Comment: Former smoker, quit 7 years ago.  Numerous lung cancer screenings with low-dose CT scans all negative for cancer.  Plan:     (L40.50) Psoriasis with arthropathy (H)  Comment: This condition is currently controlled on the current medical regimen.  Continue current therapy.   Plan:     (I70.0) Atherosclerosis of aorta (H)  Comment: Discussed secondary risk factor modification and recommended continuing aggressive management of these items.   Plan:     (I25.10) Coronary artery disease involving native coronary artery of native heart without angina pectoris  Comment: Moderate coronary artery calcification seen on numerous chest CTs.  No active cardiac symptoms at this time.  Discussed secondary risk factor modification and recommended continuing aggressive management of these items, including medium intensity statin and managing blood pressure aggressively and no more smoking.  Plan:               MED REC REQUIRED  Post Medication Reconciliation Status:   BMI:  "  Estimated body mass index is 27.28 kg/m  as calculated from the following:    Height as of this encounter: 1.638 m (5' 4.5\").    Weight as of this encounter: 73.2 kg (161 lb 6.4 oz).           Return in about 6 months (around 5/15/2023) for Medicare Annual Wellness Exam, Diabetes, Blood pressure, with pre-visit non-fasting labs.    Kendall Mendez MD  [facility] ADEEL Lynn is a 72 year old, presenting for the following health issues:  Results (FOLLOW UP LABS)      HPI   REVIEW LAB RESULTS    1.    Hypertension:  History of hypertension, on medication.  No reported side effects from medications.    Reviewed last 6 BP readings in chart:  BP Readings from Last 6 Encounters:   11/15/22 132/68   09/28/22 130/72   08/15/22 124/66   06/28/22 127/72   06/16/22 122/69   04/13/22 119/73     No active cardiac complaints or symptoms with exercise.     2.  COPD remains stable.  Has not had any recent breathing troubles beyond usual baseline.  Has not any acute respiratory events.  Remains with intermittent cough, mild shortness of breath with overexertion as per usual.  Using medication as directed with reported side effects.       3.  Left total hip replacement in June 27, 2022 at [facility], doing well in recovery, walking almost back to normal.  Mild blood loss expected.    4.  History of nonocclusive coronary artery calcification seen on CT scans.  No active cardiac symptoms, no chest pain no angina no dyspnea on exertion beyond baseline.    5.  History of atherosclerosis in the aorta, without evidence of aortic aneurysm on previous ultrasound screening.      **I reviewed the information recorded in the patient's EPIC chart (including but not limited to medical history, surgical history, family history, problem list, medication list, and allergy list) and updated the information as indicated based on the patients reported information.         Review of Systems " "  Constitutional, HEENT, cardiovascular, pulmonary, gi and gu systems are negative, except as otherwise noted.      Objective    /68   Pulse 66   Temp 98.3  F (36.8  C) (Oral)   Ht 1.638 m (5' 4.5\")   Wt 73.2 kg (161 lb 6.4 oz)   LMP  (LMP Unknown)   SpO2 97%   Breastfeeding No   BMI 27.28 kg/m    Body mass index is 27.28 kg/m .  Physical Exam   GENERAL alert and no distress  EYES:  Normal sclera,conjunctiva, EOMI  HENT: oral and posterior pharynx without lesions or erythema, facies symmetric  NECK: Neck supple. No LAD, without thyroidmegaly.  RESP: breath sounds quiet but clear, diminished respiratory excursion, prolonged expiratory phase,  no rhonci, no wheezes, no rales   CV: RRR normal S1S2 without murmurs, rubs or gallops.  LYMPH: no cervical lymph adenopathy appreciated  MS: extremities- no gross deformities of the visible extremities noted,   EXT:  no lower extremity edema  PSYCH: Alert and oriented times 3; speech- coherent  SKIN:  No obvious significant skin lesions on visible portions of face                     "

## 2022-12-02 ENCOUNTER — VIRTUAL VISIT (OUTPATIENT)
Dept: INTERNAL MEDICINE | Facility: CLINIC | Age: 72
End: 2022-12-02
Payer: COMMERCIAL

## 2022-12-02 DIAGNOSIS — R05.1 ACUTE COUGH: Primary | ICD-10-CM

## 2022-12-02 DIAGNOSIS — J06.9 UPPER RESPIRATORY TRACT INFECTION, UNSPECIFIED TYPE: ICD-10-CM

## 2022-12-02 PROCEDURE — 99213 OFFICE O/P EST LOW 20 MIN: CPT | Mod: 95 | Performed by: INTERNAL MEDICINE

## 2022-12-02 RX ORDER — CEFDINIR 300 MG/1
300 CAPSULE ORAL 2 TIMES DAILY
Qty: 10 CAPSULE | Refills: 0 | Status: SHIPPED | OUTPATIENT
Start: 2022-12-02 | End: 2022-12-12

## 2022-12-02 ASSESSMENT — ENCOUNTER SYMPTOMS: COUGH: 1

## 2022-12-02 NOTE — PROGRESS NOTES
Leah is a 72 year old who is being evaluated via a billable video visit.      How would you like to obtain your AVS? MyChart  If the video visit is dropped, the invitation should be resent by: Send to e-mail at: [email or web address]  Will anyone else be joining your video visit? No    Assessment & Plan   Acute cough  Upper respiratory tract infection, unspecified type  Unclear etiology, especially given her reports that daughter's testing was all negative. Given report of tonsillar purulence, feel it's prudent to cover for Strep. Will rx 5d cefdinir as below (patient tolerated Keflex in the past). If no improvement in 48 hours or she worsens, recommended an in-person eval with any available provider.  - cefdinir (OMNICEF) 300 MG capsule; Take 1 capsule (300 mg) by mouth 2 times daily    F/u with regular PCP at regular interval or sooner PRN    Prashanth Yuen MD  [facility]   Leah is a 72 year old who presents for a same day acute care virtual video visit with chief concern of:  Cough  This is the first time I have met Leah.    History of Present Illness       Reason for visit:  Cold cough  Symptom onset:  3-7 days ago  Symptoms include:  Cough, runny nose, tonsils inflamed possible tonsilitis, hurts to swallow, decreased appetite  Symptom intensity:  Moderate  Symptom progression:  Staying the same  Had these symptoms before:  Yes  Has tried/received treatment for these symptoms:  No  What makes it worse:  Cold air  What makes it better:  Drinking hot tea tylenol    She eats 2-3 servings of fruits and vegetables daily.She consumes 0 sweetened beverage(s) daily.She exercises with enough effort to increase her heart rate 20 to 29 minutes per day.  She exercises with enough effort to increase her heart rate 3 or less days per week.   She is taking medications regularly.     Daughter recently visited and then got sick. Daughter needed to be seen in Urgent Care due  to low oxygen. Leah is now sick. Biggest symptom is sore throat with swallowing. COVID tests negative. Daughter testing all negative. Reports her pulse ox is 95% which is her baseline.    Review of Systems   Constitutional, HEENT, pulmonary systems are negative, except as otherwise noted.      Objective    Vitals - Patient Reported  Weight (Patient Reported): 72.6 kg (160 lb)  Vitals: No vitals were obtained today due to virtual visit.    Physical Exam   GENERAL: Healthy, alert and no distress  EYES: Eyes grossly normal to inspection.  No discharge or erythema, or obvious scleral/conjunctival abnormalities.  RESP: No audible wheeze, cough, or visible cyanosis.  No visible retractions or increased work of breathing.    SKIN: Visible skin clear. No significant rash, abnormal pigmentation or lesions.  NEURO: Cranial nerves grossly intact.  Mentation and speech appropriate for age.  PSYCH: Mentation appears normal, affect normal/bright, judgement and insight intact, normal speech and appearance well-groomed.    Video-Visit Details  Video Start Time: 12:55 PM    Type of service: Video Visit    Video End Time: 1:02 PM    Originating Location (pt. Location): Home    Distant Location (provider location):  Off-site    Platform used for Video Visit: Jenny

## 2022-12-26 ENCOUNTER — TELEPHONE (OUTPATIENT)
Dept: INTERNAL MEDICINE | Facility: CLINIC | Age: 72
End: 2022-12-26

## 2022-12-26 NOTE — TELEPHONE ENCOUNTER
The white spot is probably bits of food getting caught in one of the tonsil crevices from being enlarged.   Sounds like a viral sore throat most likely.   Symptomatic cares with tyenlol (cant take Ibuprofen/motrin due to eliquis)    Use over the counter chloraseptic spray as needed.   Monitor sympotms.   Do virtual visit if her symptosm worsen.

## 2022-12-26 NOTE — TELEPHONE ENCOUNTER
"Received call from patient. Patient states she was seen on 12/2/22 for possible strep (VV with Dr. Overton). States that the antibiotics prescribed from that visit were effective in clearing up all of her sx. She reports she everything felt great until 12/24/22, pt developed R ear pain when swallowing and her R tonsil looks looks swollen and has a white spot on it (previously treated for L tonsil on 12/2/22). Pt reports pain is primarily present only when swallowing. Pt states this time it is \"not as bad.\"    Denies fever, nausea, vomiting, cough, runny nose.     Pt requesting another round of antibiotics if advised by provider who last saw her virtually. Pt states she has an appointment on 01/02/22 but would like to treat her sx sooner than that. Please advise if pt needs to be evaluated in person sooner than scheduled/any testing to be done?    [facility]: [facility].     Can we leave a detailed message on this number? YES  Phone number patient can be reached at: Home number on file [phone] (home)      Appointments in Next Year    Jan 02, 2023  7:00 AM  (Arrive by 6:50 AM)  Provider Visit with Tarun Drew MD  [facility] ([facility] ) [phone]          Bereket Foy RN    "

## 2022-12-26 NOTE — TELEPHONE ENCOUNTER
"Called pt and relayed Dr. Mendez's message to pt. Pt stated she has been doing warm water/salt gurgles and taking tylenol. Pt stated \"I will follow the recommendations and keep my appt for 1/2/23\".   "

## 2022-12-29 ENCOUNTER — TELEPHONE (OUTPATIENT)
Dept: CARDIOLOGY | Facility: CLINIC | Age: 72
End: 2022-12-29

## 2022-12-29 NOTE — TELEPHONE ENCOUNTER
Received call from Leah.  She would like to get a refill for the Apixaban 5mg tablets twice daily, prescribed by NELLA Chung.   She gave us a phone number.  Writer asked if that is a pharmacy in [place], and she said it is.  Writer explained that we can't prescribe internationally.  She said she recalled discussing with Rufina, but was not aware we could not call in Rx internationally.      Leah has requested we mail her the Rx, and then she can send it to [place].  Rx sent.    Oralia Mendoza RN on 12/29/2022 at 9:11 AM

## 2023-01-02 ENCOUNTER — TELEPHONE (OUTPATIENT)
Dept: CARDIOLOGY | Facility: CLINIC | Age: 73
End: 2023-01-02

## 2023-01-02 NOTE — TELEPHONE ENCOUNTER
[employer] message received:   Please request exception from [employer] for  my medication Diltiazem  mgcapsules (24hr) to be processed at tier 1 rather than tier 2.This request has been granted in the past and continues to control  my A-fib keeping me out of hospital and ER.Thank you.Marva Angela.      Will route to PA team.

## 2023-01-02 NOTE — TELEPHONE ENCOUNTER
Central Prior Authorization Team   Phone: [phone]    PA Initiation    Medication: diltiazem ER COATED BEADS (CARDIZEM CD/CARTIA XT) 180 MG 24 hr capsule  Insurance Company: [employer] - Phone [phone] Fax [phone]  Pharmacy Filling the Rx: [id number] [place] [place] - [address] AT [facility] [address] [facility] [address]  Filling Pharmacy Phone: [phone]  Filling Pharmacy Fax:    Start Date: 1/2/2023

## 2023-01-03 NOTE — TELEPHONE ENCOUNTER
TIER EXCEPTION Approval    Authorization Effective Date: 1/1/2023  Authorization Expiration Date: 12/31/2023  Medication: diltiazem ER COATED BEADS (CARDIZEM CD/CARTIA XT) 180 MG 24 hr capsule    Approved Dose/Quantity:   Reference #:     Insurance Company: [employer] - Phone [phone] Fax [phone]  Expected CoPay:       CoPay Card Available:      Foundation Assistance Needed:    Which Pharmacy is filling the prescription (Not needed for infusion/clinic administered): [id number] - [place], [place] - [address] AT [place] [address] & [facility]  Pharmacy Notified: Yes  Patient Notified: Yes

## 2023-02-06 ENCOUNTER — MYC MEDICAL ADVICE (OUTPATIENT)
Dept: CARDIOLOGY | Facility: CLINIC | Age: 73
End: 2023-02-06
Payer: COMMERCIAL

## 2023-02-06 DIAGNOSIS — I48.0 PAROXYSMAL ATRIAL FIBRILLATION (H): ICD-10-CM

## 2023-02-06 RX ORDER — DILTIAZEM HYDROCHLORIDE 180 MG/1
180 CAPSULE, COATED, EXTENDED RELEASE ORAL DAILY
Qty: 90 CAPSULE | Refills: 3 | Status: SHIPPED | OUTPATIENT
Start: 2023-02-06 | End: 2024-01-04

## 2023-03-23 DIAGNOSIS — E78.5 HYPERLIPIDEMIA LDL GOAL <130: ICD-10-CM

## 2023-03-23 RX ORDER — ATORVASTATIN CALCIUM 20 MG/1
20 TABLET, FILM COATED ORAL DAILY
Qty: 90 TABLET | Refills: 1 | Status: SHIPPED | OUTPATIENT
Start: 2023-03-23 | End: 2023-10-02

## 2023-05-16 ENCOUNTER — LAB (OUTPATIENT)
Dept: LAB | Facility: CLINIC | Age: 73
End: 2023-05-16
Payer: COMMERCIAL

## 2023-05-16 DIAGNOSIS — I10 BENIGN ESSENTIAL HYPERTENSION: ICD-10-CM

## 2023-05-16 DIAGNOSIS — R73.03 PREDIABETES: ICD-10-CM

## 2023-05-16 DIAGNOSIS — I48.0 PAROXYSMAL ATRIAL FIBRILLATION (H): ICD-10-CM

## 2023-05-16 LAB
ANION GAP SERPL CALCULATED.3IONS-SCNC: 12 MMOL/L (ref 7–15)
BUN SERPL-MCNC: 22 MG/DL (ref 8–23)
CALCIUM SERPL-MCNC: 10.1 MG/DL (ref 8.8–10.2)
CHLORIDE SERPL-SCNC: 99 MMOL/L (ref 98–107)
CREAT SERPL-MCNC: 0.84 MG/DL (ref 0.51–0.95)
CREAT UR-MCNC: 72 MG/DL
DEPRECATED HCO3 PLAS-SCNC: 29 MMOL/L (ref 22–29)
ERYTHROCYTE [DISTWIDTH] IN BLOOD BY AUTOMATED COUNT: 13.8 % (ref 10–15)
GFR SERPL CREATININE-BSD FRML MDRD: 73 ML/MIN/1.73M2
GLUCOSE SERPL-MCNC: 111 MG/DL (ref 70–99)
HBA1C MFR BLD: 6.8 % (ref 0–5.6)
HCT VFR BLD AUTO: 43.6 % (ref 35–47)
HGB BLD-MCNC: 14 G/DL (ref 11.7–15.7)
MCH RBC QN AUTO: 29.7 PG (ref 26.5–33)
MCHC RBC AUTO-ENTMCNC: 32.1 G/DL (ref 31.5–36.5)
MCV RBC AUTO: 93 FL (ref 78–100)
MICROALBUMIN UR-MCNC: <12 MG/L
MICROALBUMIN/CREAT UR: NORMAL MG/G{CREAT}
PLATELET # BLD AUTO: 274 10E3/UL (ref 150–450)
POTASSIUM SERPL-SCNC: 4.5 MMOL/L (ref 3.4–5.3)
RBC # BLD AUTO: 4.71 10E6/UL (ref 3.8–5.2)
SODIUM SERPL-SCNC: 140 MMOL/L (ref 136–145)
TSH SERPL DL<=0.005 MIU/L-ACNC: 1 UIU/ML (ref 0.3–4.2)
WBC # BLD AUTO: 5.8 10E3/UL (ref 4–11)

## 2023-05-16 PROCEDURE — 83036 HEMOGLOBIN GLYCOSYLATED A1C: CPT

## 2023-05-16 PROCEDURE — 36415 COLL VENOUS BLD VENIPUNCTURE: CPT

## 2023-05-16 PROCEDURE — 82570 ASSAY OF URINE CREATININE: CPT

## 2023-05-16 PROCEDURE — 85027 COMPLETE CBC AUTOMATED: CPT

## 2023-05-16 PROCEDURE — 82043 UR ALBUMIN QUANTITATIVE: CPT

## 2023-05-16 PROCEDURE — 84443 ASSAY THYROID STIM HORMONE: CPT

## 2023-05-16 PROCEDURE — 80048 BASIC METABOLIC PNL TOTAL CA: CPT

## 2023-05-19 ENCOUNTER — OFFICE VISIT (OUTPATIENT)
Dept: INTERNAL MEDICINE | Facility: CLINIC | Age: 73
End: 2023-05-19
Payer: COMMERCIAL

## 2023-05-19 VITALS
WEIGHT: 165.9 LBS | HEART RATE: 75 BPM | DIASTOLIC BLOOD PRESSURE: 66 MMHG | HEIGHT: 65 IN | SYSTOLIC BLOOD PRESSURE: 102 MMHG | OXYGEN SATURATION: 97 % | BODY MASS INDEX: 27.64 KG/M2 | TEMPERATURE: 98 F

## 2023-05-19 DIAGNOSIS — R91.1 RIGHT UPPER LOBE PULMONARY NODULE: ICD-10-CM

## 2023-05-19 DIAGNOSIS — R73.03 PREDIABETES: ICD-10-CM

## 2023-05-19 DIAGNOSIS — Z87.891 PERSONAL HISTORY OF TOBACCO USE: ICD-10-CM

## 2023-05-19 DIAGNOSIS — Z87.891 HISTORY OF SMOKING GREATER THAN 50 PACK YEARS: ICD-10-CM

## 2023-05-19 DIAGNOSIS — I70.0 ATHEROSCLEROSIS OF AORTA (H): ICD-10-CM

## 2023-05-19 DIAGNOSIS — I10 BENIGN ESSENTIAL HYPERTENSION: Primary | ICD-10-CM

## 2023-05-19 DIAGNOSIS — M85.80 OSTEOPENIA, UNSPECIFIED LOCATION: ICD-10-CM

## 2023-05-19 DIAGNOSIS — L40.50 PSORIASIS WITH ARTHROPATHY (H): ICD-10-CM

## 2023-05-19 DIAGNOSIS — J43.9 PULMONARY EMPHYSEMA, UNSPECIFIED EMPHYSEMA TYPE (H): ICD-10-CM

## 2023-05-19 DIAGNOSIS — I48.0 PAROXYSMAL ATRIAL FIBRILLATION (H): ICD-10-CM

## 2023-05-19 DIAGNOSIS — E78.5 HYPERLIPIDEMIA LDL GOAL <130: ICD-10-CM

## 2023-05-19 PROCEDURE — G0296 VISIT TO DETERM LDCT ELIG: HCPCS | Performed by: INTERNAL MEDICINE

## 2023-05-19 PROCEDURE — 99214 OFFICE O/P EST MOD 30 MIN: CPT | Performed by: INTERNAL MEDICINE

## 2023-05-19 RX ORDER — TIOTROPIUM BROMIDE 18 UG/1
18 CAPSULE ORAL; RESPIRATORY (INHALATION) DAILY
Qty: 90 CAPSULE | Refills: 3 | Status: SHIPPED | OUTPATIENT
Start: 2023-05-19 | End: 2024-03-21

## 2023-05-19 ASSESSMENT — PAIN SCALES - GENERAL: PAINLEVEL: NO PAIN (0)

## 2023-05-19 NOTE — PATIENT INSTRUCTIONS
" Continue all medications at the same doses.  Contact your usual pharmacy if you need refills.      Diabetes/pre-diabetes well controlled with diet alone, no medications indicated at this time.  Continue watching your diet.      Return to see me in 6 months, sooner if needed.  Please get fasting labs done at the [facility] or any other [facility] lab 1-2 days before this appointment (schedule a \"lab appointment\").  If you get the labs done at another clinic, make arrangements with them directly.  The orders will be in place.  Eat nothing for at least 8 hours prior to having these labs drawn.  Use [facility] or Call [phone] to schedule the appointment with me and lab appointment.        I recommend that we consier another lung cancer screening with low dose Chest CT due to your extensive smoking history.       5 GOALS TO PREVENT VASCULAR DISEASE:     1.  Aggressive blood pressure control, under 130/80 ideally.  Using medications if needed.    Your blood pressure is under good control    BP Readings from Last 4 Encounters:   05/19/23 102/66   11/15/22 132/68   09/28/22 130/72   08/15/22 124/66       2.  Aggressive LDL cholesterol (\"bad cholesterol\") lowering as indicated.    Your goal is an LDL under 130 for sure, preferably under 100.  (If you have diabetes or previous vascular disease, the the LDL goals would be under 100 for sure, preferably under 70.)    New guidelines identify four high-risk groups who could benefit from statins:   *people with pre-existing heart disease, such as those who have had a heart attack;   *people ages 40 to 75 who have diabetes of any type  *patients ages 40 to 75 with at least a 7.5% risk of developing cardiovascular disease over the next decade, according to a formula described in the guidelines  *patients with the sort of super-high cholesterol that sometimes runs in families, as evidenced by an LDL of 190 milligrams per deciliter or higher    Your cholesterol " levels are well controlled.    Recent Labs   Lab Test 11/10/22  0759 10/08/21  0952 04/19/16  0846 09/18/15  0738   CHOL 172 172   < > 221*   HDL 66 64   < > 53   LDL 95 96   < > 144*   TRIG 57 59   < > 122   CHOLHDLRATIO  --   --   --  4.2    < > = values in this interval not displayed.       3.  Aggressive diabetic prevention, screening and/or management.      You do not have diabetes as of the most recent blood tests.     4.  No smoking    5.  Consider daily preventative aspirin over age 50 if you have enough cardiac risk factors to place you at higher risk for the presence of vascular disease.    If you have any reason not to take aspirin such easy bruising or bleeding, stomach problems, other anticoagulant medications, or any other side effects, then you should not take Aspirin.     --Based on your current cardiac disease risk profile and/or age over 75, you do NOT need to take daily preventative aspirin.                Lung Cancer Screening   Frequently Asked Questions  If you are at high-risk for lung cancer, getting screened with low-dose computed tomography (LDCT) every year can help save your life. This handout offers answers to some of the most common questions about lung cancer screening. If you have other questions, please call [phone] ([phone]).     What is it?  Lung cancer screening uses special X-ray technology to create an image of your lung tissue. The exam is quick and easy and takes less than 10 seconds. We don t give you any medicine or use any needles. You can eat before and after the exam. You don t need to change your clothes as long as the clothing on your chest doesn t contain metal. But, you do need to be able to hold your breath for at least 6 seconds during the exam.    What is the goal of lung cancer screening?  The goal of lung cancer screening is to save lives. Many times, lung cancer is not found until a person starts having physical symptoms. Lung cancer screening  can help detect lung cancer in the earliest stages when it may be easier to treat.    Who should be screened for lung cancer?  We suggest lung cancer screening for anyone who is at high-risk for lung cancer. You are in the high-risk group if you:     are between the ages of 55 and 79, and   have smoked at least 1 pack of cigarettes a day for 20 or more years, and   still smoke or have quit within the past 15 years.    However, if you have a new cough or shortness of breath, you should talk to your doctor before being screened.    Why does it matter if I have symptoms?  Certain symptoms can be a sign that you have a condition in your lungs that should be checked and treated by your doctor. These symptoms include fever, chest pain, a new or changing cough, shortness of breath that you have never felt before, coughing up blood or unexplained weight loss. Having any of these symptoms can greatly affect the results of lung cancer screening.       Should all smokers get an LDCT lung cancer screening exam?  It depends. Lung cancer screening is for a very specific group of men and women who have a history of heavy smoking over a long period of time (see  Who should be screened for lung cancer  above).  I am in the high-risk group, but have been diagnosed with cancer in the past. Is LDCT lung cancer screening right for me?  In some cases, you should not have LDCT lung screening, such as when your doctor is already following your cancer with CT scan studies. Your doctor will help you decide if LDCT lung screening is right for you.  Do I need to have a screening exam every year?  Yes. If you are in the high-risk group described earlier, you should get an LDCT lung cancer screening exam every year until you are 79, or are no longer willing or able to undergo screening and possible procedures to diagnose and treat lung cancer.  How effective is LDCT at preventing death from lung cancer?  Studies have shown that LDCT lung cancer  screening can lower the risk of death from lung cancer by 20 percent in people who are at high-risk.  What are the risks?  There are some risks and limitations of LDCT lung cancer screening. We want to make sure you understand the risks and benefits, so please let us know if you have any questions. Your doctor may want to talk with you more about these risks.   Radiation exposure: As with any exam that uses radiation, there is a very small increased risk of cancer. The amount of radiation in LDCT is small--about the same amount a person would get from a mammogram. Your doctor orders the exam when he or she feels the potential benefits outweigh the risks.   False negatives: No test is perfect, including LDCT. It is possible that you may have a medical condition, including lung cancer, that is not found during your exam. This is called a false negative result.   False positives and more testing: LDCT very often finds something in the lung that could be cancer, but in fact is not. This is called a false positive result. False positive tests often cause anxiety. To make sure these findings are not cancer, you may need to have more tests. These tests will be done only if you give us permission. Sometimes patients need a treatment that can have side effects, such as a biopsy. For more information on false positives, see  What can I expect from the results?    Findings not related to lung cancer: Your LDCT exam also takes pictures of areas of your body next to your lungs. In a very small number of cases, the CT scan will show an abnormal finding in one of these areas, such as your kidneys, adrenal glands, liver or thyroid. This finding may not be serious, but you may need more tests. Your doctor can help you decide what other tests you may need, if any.  What can I expect from the results?  About 1 out of 4 LDCT exams will find something that may need more tests. Most of the time, these findings are lung nodules. Lung  nodules are very small collections of tissue in the lung. These nodules are very common, and the vast majority--more than 97 percent--are not cancer (benign). Most are normal lymph nodes or small areas of scarring from past infections.  But, if a small lung nodule is found to be cancer, the cancer can be cured more than 90 percent of the time. To know if the nodule is cancer, we may need to get more images before your next yearly screening exam. If the nodule has suspicious features (for example, it is large, has an odd shape or grows over time), we will refer you to a specialist for further testing.  Will my doctor also get the results?  Yes. Your doctor will get a copy of your results.  Is it okay to keep smoking now that there s a cancer screening exam?  No. Tobacco is one of the strongest cancer-causing agents. It causes not only lung cancer, but other cancers and cardiovascular (heart) diseases as well. The damage caused by smoking builds over time. This means that the longer you smoke, the higher your risk of disease. While it is never too late to quit, the sooner you quit, the better.  Where can I find help to quit smoking?  The best way to prevent lung cancer is to stop smoking. If you have already quit smoking, congratulations and keep it up! For help on quitting smoking, please call [employer] at 0-835-QUITNOW (1-285.317.7006) or the American Cancer Society at 1-495.828.8172 to find local resources near you.  One-on-one health coaching:  If you d prefer to work individually with a health care provider on tobacco cessation, we offer:     Medication Therapy Management:  Our specially trained pharmacists work closely with you and your doctor to help you quit smoking.  Call [phone] or [phone] (toll free).

## 2023-05-19 NOTE — PROGRESS NOTES
"  Assessment & Plan     (I10) Benign essential hypertension  (primary encounter diagnosis)  Comment: This condition is currently controlled on the current medical regimen.  Continue current therapy.   Plan: REVIEW OF HEALTH MAINTENANCE PROTOCOL ORDERS,         REVIEW OF HEALTH MAINTENANCE PROTOCOL ORDERS,         PRIMARY CARE FOLLOW-UP SCHEDULING            (J43.9) Pulmonary emphysema, unspecified emphysema type (H)  Comment: This condition is currently controlled on the current medical regimen.  Continue current therapy.   Discussed general issues of COPD, including pathophysiology, ways it will affect the pt., when to seek help, reviewed the typical medications (how they are taken, how they help)   Plan: REVIEW OF HEALTH MAINTENANCE PROTOCOL ORDERS,         tiotropium (SPIRIVA HANDIHALER) 18 MCG inhaled         capsule, REVIEW OF HEALTH MAINTENANCE PROTOCOL         ORDERS, PRIMARY CARE FOLLOW-UP SCHEDULING            (M85.80) Osteopenia, unspecified location  Comment: This condition is currently controlled on the current medical regimen.  Continue current therapy.   Plan: REVIEW OF HEALTH MAINTENANCE PROTOCOL ORDERS,         vitamin D3 (CHOLECALCIFEROL) 1.25 MG (41985 UT)        capsule, REVIEW OF HEALTH MAINTENANCE PROTOCOL         ORDERS, PRIMARY CARE FOLLOW-UP SCHEDULING            (L40.50) Psoriasis with arthropathy (H)  Comment: This condition is currently controlled on the current medical regimen.  Continue current therapy.   Plan: REVIEW OF HEALTH MAINTENANCE PROTOCOL ORDERS,         vitamin D3 (CHOLECALCIFEROL) 1.25 MG (47707 UT)        capsule, REVIEW OF HEALTH MAINTENANCE PROTOCOL         ORDERS, PRIMARY CARE FOLLOW-UP SCHEDULING            (R73.03) Prediabetes  Comment: Reviewed the labs showing mildly elevated glucose levels consistent with prediabetes.     Discussed \"pre-diabetes\", impaired glucose tolerance, and its part in the dysmetabolic syndrome.   No medications needed at this time.   "   Discussed the inevitable progression of impaired glucose tolerance toward worsening diabetes mellitus if nothing is changed in the diet, and the need for agressive interventions now to delay and prevent this inevitable progression.    Recommended lower carb diet.  Recommended regular physical activity.   We will continue to monitor this and hubbard additional recommendations and treatments as indicated based on the labs.       This condition is currently controlled on the current medical regimen.  Continue current therapy.   Plan: REVIEW OF HEALTH MAINTENANCE PROTOCOL ORDERS,         PRIMARY CARE FOLLOW-UP SCHEDULING            (E78.5) Hyperlipidemia LDL goal <130  Comment: This condition is currently controlled on the current medical regimen.  Continue current therapy.   Discussed guidelines recommending a statin cholesterol lowering medication for any patient with either diabetes and/or vascular disease, aiming for a LDL goal under 100 for sure, ideally under 70, using whatever dose of statin tolerated.    Plan: REVIEW OF HEALTH MAINTENANCE PROTOCOL ORDERS,         REVIEW OF HEALTH MAINTENANCE PROTOCOL ORDERS,         PRIMARY CARE FOLLOW-UP SCHEDULING            (I48.0) Paroxysmal atrial fibrillation (H)  Comment: This condition is currently controlled on the current medical regimen.  Continue current therapy.   Plan: REVIEW OF HEALTH MAINTENANCE PROTOCOL ORDERS,         REVIEW OF HEALTH MAINTENANCE PROTOCOL ORDERS,         PRIMARY CARE FOLLOW-UP SCHEDULING            (Z87.891) History of smoking greater than 50 pack years  Comment: recommend ongoing lung cancer screening with low dose Chest CT given her extensive prior smoking history.  Had small lung nodule notices in the past 2 scans.   Plan: REVIEW OF HEALTH MAINTENANCE PROTOCOL ORDERS,         Prof fee: Shared Decision Making for Lung         Cancer Screening, CT Chest Lung Cancer Scrn Low        Dose wo, REVIEW OF HEALTH MAINTENANCE PROTOCOL         ORDERS,  "PRIMARY CARE FOLLOW-UP SCHEDULING            (I70.0) Atherosclerosis of aorta (H)  Comment: Discussed secondary risk factor modification and recommended continuing aggressive management of these items.   Plan: REVIEW OF HEALTH MAINTENANCE PROTOCOL ORDERS,         PRIMARY CARE FOLLOW-UP SCHEDULING            (R91.1) Right upper lobe pulmonary nodule  Comment: recommend ongoing lung cancer screening with low dose Chest CT scan given her extensive history of smoking ( over 50 pack years)  Plan: Prof fee: Shared Decision Making for Lung         Cancer Screening, CT Chest Lung Cancer Scrn Low        Dose wo, REVIEW OF HEALTH MAINTENANCE PROTOCOL         ORDERS, PRIMARY CARE FOLLOW-UP SCHEDULING            (Z87.891) Personal history of tobacco use  Comment: recommend ongoing lung cancer screening with low dose Chest CT scan given her extensive history of smoking ( over 50 pack years)  Plan: Prof fee: Shared Decision Making for Lung         Cancer Screening, CT Chest Lung Cancer Scrn Low        Dose wo, REVIEW OF HEALTH MAINTENANCE PROTOCOL         ORDERS, PRIMARY CARE FOLLOW-UP SCHEDULING                        BMI:   Estimated body mass index is 28.04 kg/m  as calculated from the following:    Height as of this encounter: 1.638 m (5' 4.5\").    Weight as of this encounter: 75.3 kg (165 lb 14.4 oz).             **she tells me that I need to contact her provider services at her insurace to prior authorize another low dose Chest CT for lung cancer screening since she has already had 4 of them (has been every other year for last several years)  [phone]         Kendall Mendez MD  [facility]          Ramone Lynn is a 72 year old, presenting for the following health issues:  Results (Follow up labs)        5/19/2023     2:53 PM   Additional Questions   Roomed by Radha DANIELSON CMA     History of Present Illness       Reason for visit:  Follow up to labs    She eats 2-3 servings " of fruits and vegetables daily.She consumes 0 sweetened beverage(s) daily.She exercises with enough effort to increase her heart rate 20 to 29 minutes per day.  She exercises with enough effort to increase her heart rate 3 or less days per week.   She is taking medications regularly.       The patient has a history of impaired glucose tolerance with regularly elevated blood sugars.    They have not been diagnosed with type II DM or placed on medications for diabetes before.   They deny polyuria, polydipsia.     The patient is not obese with a BMI of Body mass index is 28.04 kg/m ..    Review of current labs show:    Lab Results   Component Value Date    A1C 6.8 05/16/2023    A1C 6.9 11/10/2022    A1C 6.4 06/16/2022    A1C 6.0 04/08/2022    A1C 6.0 09/30/2021    A1C 6.2 03/24/2021    A1C 6.2 09/23/2020    A1C 6.2 12/17/2019    A1C 6.0 06/17/2019    A1C 6.1 11/02/2018    A1C 6.4 03/21/2017     @bgl@      Hypertension:  History of hypertension, on medication.  No reported side effects from medications.    Reviewed last 6 BP readings in chart:  BP Readings from Last 6 Encounters:   05/19/23 102/66   11/15/22 132/68   09/28/22 130/72   08/15/22 124/66   06/28/22 127/72   06/16/22 122/69     No active cardiac complaints or symptoms with exercise.     Has history of atrial fibrillation.    No palpitations, no dizziness, no dyspnea on exertion, no shortness of breath.  No racing heart, no fast heart rates.    Taking medications as ordered, no side effects reported.   Patient is taking anticoagulation according to direction, with no reported side effects.     COPD remains stable.  Has not had any recent breathing troubles beyond usual baseline.  Has not any acute respiratory events.  Remains with intermittent cough, mild shortness of breath with overexertion as per usual.  Using medication as directed with reported side effects.     **I reviewed the information recorded in the patient's EPIC chart (including but not limited  "to medical history, surgical history, family history, problem list, medication list, and allergy list) and updated the information as indicated based on the patients reported information.           Review of Systems   Constitutional, HEENT, cardiovascular, pulmonary, gi and gu systems are negative, except as otherwise noted.      Objective    /66   Pulse 75   Temp 98  F (36.7  C) (Oral)   Ht 1.638 m (5' 4.5\")   Wt 75.3 kg (165 lb 14.4 oz)   LMP  (LMP Unknown)   SpO2 97%   Breastfeeding No   BMI 28.04 kg/m    Body mass index is 28.04 kg/m .  Physical Exam   GENERAL alert and no distress  EYES:  Normal sclera,conjunctiva, EOMI  HENT: oral and posterior pharynx without lesions or erythema, facies symmetric  NECK: Neck supple. No LAD, without thyroidmegaly.  RESP: Clear to ausculation bilaterally without wheezes or crackles. Normal BS in all fields.  CV: RRR normal S1S2 without murmurs, rubs or gallops.  LYMPH: no cervical lymph adenopathy appreciated  MS: extremities- no gross deformities of the visible extremities noted,   EXT:  no lower extremity edema  PSYCH: Alert and oriented times 3; speech- coherent  SKIN:  No obvious significant skin lesions on visible portions of face                     "

## 2023-05-19 NOTE — PROGRESS NOTES
Lung Cancer Screening Shared Decision Making Visit     Marva Angela, a 72 year old female, is eligible for lung cancer screening    History   Smoking Status     Former     Packs/day: 1.00     Years: 50.00     Types: Cigarettes     Start date: 2/7/1964     Quit date: 9/7/2015   Smokeless Tobacco     Never       I have discussed with patient the risks and benefits of screening for lung cancer with low-dose CT.     The risks include:    radiation exposure: one low dose chest CT has as much ionizing radiation as about 15 chest x-rays, or 6 months of background radiation living in [place]      false positives: most findings/nodules are NOT cancer, but some might still require additional diagnostic evaluation, including biopsy    over-diagnosis: some slow growing cancers that might never have been clinically significant will be detected and treated unnecessarily     The benefit of early detection of lung cancer is contingent upon adherence to annual screening or more frequent follow up if indicated.     Furthermore, to benefit from screening, Marva must be willing and able to undergo diagnostic procedures, if indicated. Although no specific guide is available for determining severity of comorbidities, it is reasonable to withhold screening in patients who have greater mortality risk from other diseases.     We did discuss that the best way to prevent lung cancer is to not smoke.    Some patients may value a numeric estimation of lung cancer risk when evaluating if lung cancer screening is right for them, here is one calculator:    ShouldIScreen

## 2023-05-27 ENCOUNTER — HEALTH MAINTENANCE LETTER (OUTPATIENT)
Age: 73
End: 2023-05-27

## 2023-06-01 ENCOUNTER — ANCILLARY PROCEDURE (OUTPATIENT)
Dept: CT IMAGING | Facility: CLINIC | Age: 73
End: 2023-06-01
Attending: INTERNAL MEDICINE
Payer: COMMERCIAL

## 2023-06-01 DIAGNOSIS — Z87.891 PERSONAL HISTORY OF TOBACCO USE: ICD-10-CM

## 2023-06-01 DIAGNOSIS — R91.1 RIGHT UPPER LOBE PULMONARY NODULE: ICD-10-CM

## 2023-06-01 DIAGNOSIS — Z87.891 HISTORY OF SMOKING GREATER THAN 50 PACK YEARS: ICD-10-CM

## 2023-06-01 PROCEDURE — 71271 CT THORAX LUNG CANCER SCR C-: CPT

## 2023-06-05 ENCOUNTER — TELEPHONE (OUTPATIENT)
Dept: INTERNAL MEDICINE | Facility: CLINIC | Age: 73
End: 2023-06-05
Payer: COMMERCIAL

## 2023-06-05 DIAGNOSIS — R91.8 MULTIPLE PULMONARY NODULES: Primary | ICD-10-CM

## 2023-06-05 NOTE — TELEPHONE ENCOUNTER
1. Pt recently had low dose chest CT - states she was instructed that she will need a repeat done in about one month - requesting for Dr. Mendez to place the orders for this     2. Patient is also [phone] to ensure that the second CT will be covered since it will be within 1 month     Routing to PCP to please review and advise on above - thank you!     Callback to patient [phone] - ok to leave detailed VM     Jessica Buckley RN  [facility]

## 2023-06-05 NOTE — TELEPHONE ENCOUNTER
Spoke with patient to relay PCP recommendations. Patient verbalized understanding and did not have any additional questions or concerns at this time.

## 2023-06-05 NOTE — TELEPHONE ENCOUNTER
"Repeat low dose Chest CT ordered to be done in one month to follow up on the new lung nodules. She will be contacted to arrange this study.      Insurance will always cover repeat CT scans whenever indicated, but it will be covered as a \"diagnostic exam\" and not as a routine \"screening\" exam because this repeat scan is being done specifically to follow up on new abnormalities, rather than just being done arbitrarily.      She will be subject to however her insurance handles co-pays.      I cannot order this repeat CT scan as a \"routine screening\" exam even if I wanted to.      Regardless of how it is covered, this is a pretty important follow up exam, especially when ruling out lung cancer is involved.    Close encounter when done.   "

## 2023-07-06 ENCOUNTER — ANCILLARY PROCEDURE (OUTPATIENT)
Dept: CT IMAGING | Facility: CLINIC | Age: 73
End: 2023-07-06
Attending: INTERNAL MEDICINE
Payer: COMMERCIAL

## 2023-07-06 DIAGNOSIS — R91.8 MULTIPLE PULMONARY NODULES: ICD-10-CM

## 2023-07-06 PROCEDURE — 71250 CT THORAX DX C-: CPT

## 2023-10-02 ENCOUNTER — OFFICE VISIT (OUTPATIENT)
Dept: CARDIOLOGY | Facility: CLINIC | Age: 73
End: 2023-10-02
Payer: COMMERCIAL

## 2023-10-02 VITALS
WEIGHT: 169.3 LBS | SYSTOLIC BLOOD PRESSURE: 130 MMHG | HEART RATE: 65 BPM | HEIGHT: 65 IN | BODY MASS INDEX: 28.21 KG/M2 | DIASTOLIC BLOOD PRESSURE: 78 MMHG | OXYGEN SATURATION: 94 %

## 2023-10-02 DIAGNOSIS — I48.0 PAROXYSMAL ATRIAL FIBRILLATION (H): ICD-10-CM

## 2023-10-02 DIAGNOSIS — I10 BENIGN ESSENTIAL HYPERTENSION: ICD-10-CM

## 2023-10-02 DIAGNOSIS — E78.5 HYPERLIPIDEMIA LDL GOAL <130: ICD-10-CM

## 2023-10-02 PROCEDURE — 99214 OFFICE O/P EST MOD 30 MIN: CPT | Performed by: PHYSICIAN ASSISTANT

## 2023-10-02 RX ORDER — ATORVASTATIN CALCIUM 20 MG/1
20 TABLET, FILM COATED ORAL DAILY
Qty: 90 TABLET | Refills: 3 | Status: SHIPPED | OUTPATIENT
Start: 2023-10-02

## 2023-10-02 RX ORDER — SPIRONOLACTONE 25 MG/1
25 TABLET ORAL DAILY
Qty: 90 TABLET | Refills: 3 | Status: SHIPPED | OUTPATIENT
Start: 2023-10-02

## 2023-10-02 NOTE — LETTER
"10/2/2023    Kendall Mendez MD  [address] [facility] [address]    RE: Marva Angela       Dear Colleague,     I had the pleasure of seeing Marva Angela in the [facility].    Cardiology Clinic Progress Note    Service Date: 2023    Primary Cardiologist: Dr. Stephen      Reason for Visit: A-fib hypertension    HPI:   Marva Angela is a delightful 73-year-old woman with past medical history significant for the followin.  Hypertension  2.  COPD with history of tobacco quit in   3.  Dyslipidemia  4.  Paroxysmal atrial fibrillation  5.  Calcifications of the coronary arteries and thoracic aorta on CT of chest.    Barbara comes in today for routine follow-up.  She is overall doing well.  She denies chest pain, shortness of breath, orthopnea, or PND.  She needs refills on her medications but offers up no complaints.  She is not as active as she was previously as she is waiting for her right knee replacement.    Social History:  She is a retired .  Former smoker half a pack a day quit in .  Occasional alcohol use.  3 children.    Physical Exam:  /78   Pulse 65   Ht 1.638 m (5' 4.5\")   Wt 76.8 kg (169 lb 4.8 oz)   LMP  (LMP Unknown)   SpO2 94%   BMI 28.61 kg/m     Well-developed well-nourished woman in no acute distress.  Normocephalic atraumatic.  Heart is regular rate and rhythm without murmur rub or gallop.  Lungs are clear without wheezes rales or rhonchi.  Extremities without peripheral edema.  Varicosities noted on left leg, fairly significant on lower leg.  Skin is warm and dry.    Labs and Imaging reviewed:  11/10/2022 shows an LDL 95, HDL 66, total cholesterol 172.    Assessment and Plan:  1.  Paroxysmal atrial fibrillation clinically in sinus rhythm today with a [id number]-ELYl of 4  She is 73 years old.  She is appropriately on Eliquis 5 mg twice daily and has minimal symptoms and no recurrent episodes at this time.    2. "  Dyslipidemia with known coronary calcifications on her CT with a goal less than 70.  Primary has been checking LDL and last time it was 95.  If it remains 95 I would recommend her primary increase atorvastatin to 40 mg daily.  Given she is on Eliquis she does not need aspirin.    3.  COPD/emphysema well-controlled    I am glad to see this patient doing so well.  We will see her back in 1 year with an echocardiogram before that visit simply to reestablish baseline as her last echocardiogram was in 2018.  She will call sooner with concerns.    Rufina Palacios PA-C  [facility]     This note was written using Dragon voice recognition system, please excuse any misspelled names, or nonsensical words and call with any questions.         Orders this visit:  Orders Placed This Encounter   Procedures    Follow-Up with Cardiology    Echocardiogram Complete     Orders Placed This Encounter   Medications    atorvastatin (LIPITOR) 20 MG tablet     Sig: Take 1 tablet (20 mg) by mouth daily     Dispense:  90 tablet     Refill:  3    spironolactone (ALDACTONE) 25 MG tablet     Sig: Take 1 tablet (25 mg) by mouth daily     Dispense:  90 tablet     Refill:  3    apixaban ANTICOAGULANT (ELIQUIS) 5 MG tablet     Sig: Take 1 tablet (5 mg) by mouth 2 times daily     Dispense:  200 tablet     Refill:  4     Medications Discontinued During This Encounter   Medication Reason    apixaban ANTICOAGULANT (ELIQUIS) 5 MG tablet Reorder (No AVS)    spironolactone (ALDACTONE) 25 MG tablet Reorder (No AVS)    atorvastatin (LIPITOR) 20 MG tablet Reorder (No AVS)     Encounter Diagnoses   Name Primary?    Hyperlipidemia LDL goal <130     Benign essential hypertension     Paroxysmal atrial fibrillation (H)        Current Medications:  Current Outpatient Medications   Medication Sig Dispense Refill    albuterol (PROAIR HFA/PROVENTIL HFA/VENTOLIN HFA) 108 (90 Base) MCG/ACT inhaler Inhale 2 puffs into the lungs every 6 hours as needed for  shortness of breath / dyspnea or wheezing GENERIC VENTOLIN/ALBUTEROL 18 g 11    albuterol (PROVENTIL) (2.5 MG/3ML) 0.083% neb solution Take 1 vial (2.5 mg) by nebulization every 6 hours as needed for shortness of breath / dyspnea or wheezing 90 mL 3    apixaban ANTICOAGULANT (ELIQUIS) 5 MG tablet Take 1 tablet (5 mg) by mouth 2 times daily 200 tablet 4    atorvastatin (LIPITOR) 20 MG tablet Take 1 tablet (20 mg) by mouth daily 90 tablet 3    betamethasone dipropionate (DIPROSONE) 0.05 % external cream Apply 1 FTU sparingly once or twice per day as needed to affected area until the skin is better, then stop; REPEAT AS NEEDED 45 g 2    calcium-vitamin D (OSCAL) 250-125 MG-UNIT per tablet Take 2 tablets by mouth daily      Cyanocobalamin (B-12) 1000 MCG TBCR Place 1,000 mcg under the tongue daily (Patient taking differently: Place 1,000 mcg under the tongue every other day)      diltiazem ER COATED BEADS (CARDIZEM CD/CARTIA XT) 180 MG 24 hr capsule Take 1 capsule (180 mg) by mouth daily with breakfast 90 capsule 3    famotidine (PEPCID) 20 MG tablet Take 20 mg by mouth as needed      folic acid (FOLVITE) 400 MCG tablet Take 400 mcg by mouth daily      order for DME Equipment being ordered: Nebulizer 1 each 0    spironolactone (ALDACTONE) 25 MG tablet Take 1 tablet (25 mg) by mouth daily 90 tablet 3    tiotropium (SPIRIVA HANDIHALER) 18 MCG inhaled capsule Inhale 1 capsule (18 mcg) into the lungs daily GENERIC TIOTROPIUM/SPIRIVA 90 capsule 3    triamcinolone (KENALOG) 0.1 % cream Apply topically 2 times daily as needed for irritation (sores on scalp.)      triamcinolone (KENALOG) 0.1 % external lotion Apply sparingly once or twice per day as needed to affected area until the skin is better, then stop; REPEAT AS NEEDED 60 mL 2    Urea 20 % CREA cream Apply topically as needed      vitamin C-electrolytes (EMERGEN-C) 1000mg vitamin C super orange drink mix Take 1 packet by mouth daily Mix 1 packet in 4-6oz water.       vitamin D3 (CHOLECALCIFEROL) 1.25 MG (07061 UT) capsule Take 1 capsule (50,000 Units) by mouth every 14 days SIG: TAKE INDICATION: 1ST AND 15TH OF EACH MONTH, TO TREAT VITAMIN D DEFICIENCY 24 capsule 1    ASPIRIN NOT PRESCRIBED (INTENTIONAL) Please choose reason not prescribed from choices below.         Allergies:  Allergies   Allergen Reactions    Penicillins Rash     rash    Sulfa Antibiotics Rash     rash       Past Medical, Surgical and Family History:  Past Medical History:   Diagnosis Date    Arthritis     knees and hips    B12 deficiency 10/14/2016    B12 178    Benign essential hypertension     was on amlodipine/ then metoprolol / add lisinopril     Chronic atrial fibrillation (H)     Contact dermatitis and other eczema, due to unspecified cause     COPD (chronic obstructive pulmonary disease) (H) 01/01/2012    Coronary artery disease involving native coronary artery of native heart without angina pectoris 2016    coronary artery calcifications seen on CT scan, mild nonocclusive CAD    Hyperlipidemia LDL goal <130 11/07/2012    Nephritis      as a child (post strep)     Osteoporosis, unspecified     Phlebitis and thrombophlebitis of other deep vessels of lower extremities 1972, 1975    Both with pregnancies    Prediabetes 11/01/2018    A1C 6.1    Sinusitis     Tobacco use disorder      Past Surgical History:   Procedure Laterality Date    ABDOMEN SURGERY  April2015    ARTHROPLASTY HIP Right 04/03/2017    Procedure: ARTHROPLASTY HIP;  Surgeon: Francisco J Alston MD;  Location:  OR    ARTHROPLASTY HIP ANTERIOR Left 6/27/2022    Procedure: LEFT TOTAL HIP ARTHROPLASTY DIRECT ANTERIOR APPROACH;  Surgeon: Francisco J Alston MD;  Location:  OR    ARTHROPLASTY KNEE Left 07/31/2017    Procedure: ARTHROPLASTY KNEE;  LEFT TOTAL KNEE ARTHROPLASTY ;  Surgeon: Francisco J Alston MD;  Location:  OR    BIOPSY OF SKIN LESION      C DEXA, BONE DENSITY, AXIAL SKEL  06/2008    T score lumbar -0.6,  femoral neck -2.4/-2.0(all stable)    COLONOSCOPY  6/21/21    HC ASPIRATION &/OR INJECTION GANGLION CYST, ANY  1994    HERNIORRHAPHY INGUINAL Right 03/24/2015    Procedure: HERNIORRHAPHY INGUINAL;  Surgeon: Sam Erazo MD;  Location: [facility]    HYSTERECTOMY, PAP NO LONGER INDICATED      ORTHOPEDIC SURGERY      bilat meniscus repair    ZZC VAGINAL HYSTERECTOMY  1978    Hysterectomy, Vaginal     Family History   Problem Relation Age of Onset    C.A.D. Father     Diabetes Father     Myocardial Infarction Father     Heart Surgery Father         CABGx4    Hyperlipidemia Father     Breast Cancer Mother 70    Osteoporosis Mother     Thyroid Disease Mother     Hyperlipidemia Mother     Cancer Brother         Bladder    Hyperlipidemia Brother     Diabetes Brother     Colon Cancer Brother     Other Cancer Brother     Neurologic Disorder Paternal Grandfather     Osteoporosis Sister     Arrhythmia Sister     Heart Surgery Sister         cardioversion, ablation    Other - See Comments Sister 55        arhythmogenic right ventricular dysplasia    Thyroid Disease Sister     Osteoporosis Maternal Grandmother     Osteoporosis Paternal Grandmother     Other - See Comments Maternal Grandfather         pneumonia    Family History Negative Son     Thyroid Disease Daughter     Family History Negative Daughter     Anesthesia Reaction Daughter     Arrhythmia Cousin         a-fib    Cancer - colorectal No family hx of     Prostate Cancer No family hx of     Alzheimer Disease No family hx of     Blood Disease No family hx of     Eye Disorder No family hx of         Review of Systems:  Skin:        Eyes:  Positive for    ENT:       Respiratory:  Positive for dyspnea on exertion  Cardiovascular:  Negative;palpitations;chest pain;lightheadedness;dizziness;edema;fatigue  (Afib episodes)  Gastroenterology: not assessed heartburn  Genitourinary:       Musculoskeletal:  not assessed arthritis  Neurologic:       Psychiatric:        Heme/Lymph/Imm:  Positive for allergies  Endocrine:  Negative thyroid disorder;diabetes       CC  No referring provider defined for this encounter.    Thank you for allowing me to participate in the care of your patient.      Sincerely,     ZIGGY Fairchild [facility]

## 2023-10-02 NOTE — PROGRESS NOTES
"  Cardiology Clinic Progress Note    Service Date: 2023    Primary Cardiologist: Dr. Stephen      Reason for Visit: A-fib hypertension    HPI:   Marva Angela is a delightful 73-year-old woman with past medical history significant for the followin.  Hypertension  2.  COPD with history of tobacco quit in   3.  Dyslipidemia  4.  Paroxysmal atrial fibrillation  5.  Calcifications of the coronary arteries and thoracic aorta on CT of chest.    Barbara comes in today for routine follow-up.  She is overall doing well.  She denies chest pain, shortness of breath, orthopnea, or PND.  She needs refills on her medications but offers up no complaints.  She is not as active as she was previously as she is waiting for her right knee replacement.    Social History:  She is a retired .  Former smoker half a pack a day quit in .  Occasional alcohol use.  3 children.    Physical Exam:  /78   Pulse 65   Ht 1.638 m (5' 4.5\")   Wt 76.8 kg (169 lb 4.8 oz)   LMP  (LMP Unknown)   SpO2 94%   BMI 28.61 kg/m     Well-developed well-nourished woman in no acute distress.  Normocephalic atraumatic.  Heart is regular rate and rhythm without murmur rub or gallop.  Lungs are clear without wheezes rales or rhonchi.  Extremities without peripheral edema.  Varicosities noted on left leg, fairly significant on lower leg.  Skin is warm and dry.    Labs and Imaging reviewed:  11/10/2022 shows an LDL 95, HDL 66, total cholesterol 172.    Assessment and Plan:  1.  Paroxysmal atrial fibrillation clinically in sinus rhythm today with a [id number] of 4  She is 73 years old.  She is appropriately on Eliquis 5 mg twice daily and has minimal symptoms and no recurrent episodes at this time.    2.  Dyslipidemia with known coronary calcifications on her CT with a goal less than 70.  Primary has been checking LDL and last time it was 95.  If it remains 95 I would recommend her primary increase atorvastatin to 40 mg " daily.  Given she is on Eliquis she does not need aspirin.    3.  COPD/emphysema well-controlled    I am glad to see this patient doing so well.  We will see her back in 1 year with an echocardiogram before that visit simply to reestablish baseline as her last echocardiogram was in 2018.  She will call sooner with concerns.    Rufina Palacios PA-C  [facility] Cardiology     This note was written using Dragon voice recognition system, please excuse any misspelled names, or nonsensical words and call with any questions.         Orders this visit:  Orders Placed This Encounter   Procedures    Follow-Up with Cardiology    Echocardiogram Complete     Orders Placed This Encounter   Medications    atorvastatin (LIPITOR) 20 MG tablet     Sig: Take 1 tablet (20 mg) by mouth daily     Dispense:  90 tablet     Refill:  3    spironolactone (ALDACTONE) 25 MG tablet     Sig: Take 1 tablet (25 mg) by mouth daily     Dispense:  90 tablet     Refill:  3    apixaban ANTICOAGULANT (ELIQUIS) 5 MG tablet     Sig: Take 1 tablet (5 mg) by mouth 2 times daily     Dispense:  200 tablet     Refill:  4     Medications Discontinued During This Encounter   Medication Reason    apixaban ANTICOAGULANT (ELIQUIS) 5 MG tablet Reorder (No AVS)    spironolactone (ALDACTONE) 25 MG tablet Reorder (No AVS)    atorvastatin (LIPITOR) 20 MG tablet Reorder (No AVS)     Encounter Diagnoses   Name Primary?    Hyperlipidemia LDL goal <130     Benign essential hypertension     Paroxysmal atrial fibrillation (H)        Current Medications:  Current Outpatient Medications   Medication Sig Dispense Refill    albuterol (PROAIR HFA/PROVENTIL HFA/VENTOLIN HFA) 108 (90 Base) MCG/ACT inhaler Inhale 2 puffs into the lungs every 6 hours as needed for shortness of breath / dyspnea or wheezing GENERIC VENTOLIN/ALBUTEROL 18 g 11    albuterol (PROVENTIL) (2.5 MG/3ML) 0.083% neb solution Take 1 vial (2.5 mg) by nebulization every 6 hours as needed for shortness of breath /  dyspnea or wheezing 90 mL 3    apixaban ANTICOAGULANT (ELIQUIS) 5 MG tablet Take 1 tablet (5 mg) by mouth 2 times daily 200 tablet 4    atorvastatin (LIPITOR) 20 MG tablet Take 1 tablet (20 mg) by mouth daily 90 tablet 3    betamethasone dipropionate (DIPROSONE) 0.05 % external cream Apply 1 FTU sparingly once or twice per day as needed to affected area until the skin is better, then stop; REPEAT AS NEEDED 45 g 2    calcium-vitamin D (OSCAL) 250-125 MG-UNIT per tablet Take 2 tablets by mouth daily      Cyanocobalamin (B-12) 1000 MCG TBCR Place 1,000 mcg under the tongue daily (Patient taking differently: Place 1,000 mcg under the tongue every other day)      diltiazem ER COATED BEADS (CARDIZEM CD/CARTIA XT) 180 MG 24 hr capsule Take 1 capsule (180 mg) by mouth daily with breakfast 90 capsule 3    famotidine (PEPCID) 20 MG tablet Take 20 mg by mouth as needed      folic acid (FOLVITE) 400 MCG tablet Take 400 mcg by mouth daily      order for DME Equipment being ordered: Nebulizer 1 each 0    spironolactone (ALDACTONE) 25 MG tablet Take 1 tablet (25 mg) by mouth daily 90 tablet 3    tiotropium (SPIRIVA HANDIHALER) 18 MCG inhaled capsule Inhale 1 capsule (18 mcg) into the lungs daily GENERIC TIOTROPIUM/SPIRIVA 90 capsule 3    triamcinolone (KENALOG) 0.1 % cream Apply topically 2 times daily as needed for irritation (sores on scalp.)      triamcinolone (KENALOG) 0.1 % external lotion Apply sparingly once or twice per day as needed to affected area until the skin is better, then stop; REPEAT AS NEEDED 60 mL 2    Urea 20 % CREA cream Apply topically as needed      vitamin C-electrolytes (EMERGEN-C) 1000mg vitamin C super orange drink mix Take 1 packet by mouth daily Mix 1 packet in 4-6oz water.      vitamin D3 (CHOLECALCIFEROL) 1.25 MG (41147 UT) capsule Take 1 capsule (50,000 Units) by mouth every 14 days SIG: TAKE INDICATION: 1ST AND 15TH OF EACH MONTH, TO TREAT VITAMIN D DEFICIENCY 24 capsule 1    ASPIRIN NOT  PRESCRIBED (INTENTIONAL) Please choose reason not prescribed from choices below.         Allergies:  Allergies   Allergen Reactions    Penicillins Rash     rash    Sulfa Antibiotics Rash     rash       Past Medical, Surgical and Family History:  Past Medical History:   Diagnosis Date    Arthritis     knees and hips    B12 deficiency 10/14/2016    B12 178    Benign essential hypertension     was on amlodipine/ then metoprolol / add lisinopril     Chronic atrial fibrillation (H)     Contact dermatitis and other eczema, due to unspecified cause     COPD (chronic obstructive pulmonary disease) (H) 01/01/2012    Coronary artery disease involving native coronary artery of native heart without angina pectoris 2016    coronary artery calcifications seen on CT scan, mild nonocclusive CAD    Hyperlipidemia LDL goal <130 11/07/2012    Nephritis      as a child (post strep)     Osteoporosis, unspecified     Phlebitis and thrombophlebitis of other deep vessels of lower extremities 1972, 1975    Both with pregnancies    Prediabetes 11/01/2018    A1C 6.1    Sinusitis     Tobacco use disorder      Past Surgical History:   Procedure Laterality Date    ABDOMEN SURGERY  April2015    ARTHROPLASTY HIP Right 04/03/2017    Procedure: ARTHROPLASTY HIP;  Surgeon: Francisco J Alston MD;  Location: [facility] OR    ARTHROPLASTY HIP ANTERIOR Left 6/27/2022    Procedure: LEFT TOTAL HIP ARTHROPLASTY DIRECT ANTERIOR APPROACH;  Surgeon: Francisco J Alston MD;  Location: [facility] OR    ARTHROPLASTY KNEE Left 07/31/2017    Procedure: ARTHROPLASTY KNEE;  LEFT TOTAL KNEE ARTHROPLASTY ;  Surgeon: Francisco J Alston MD;  Location:  OR    BIOPSY OF SKIN LESION      C DEXA, BONE DENSITY, AXIAL SKEL  06/2008    T score lumbar -0.6, femoral neck -2.4/-2.0(all stable)    COLONOSCOPY  6/21/21    HC ASPIRATION &/OR INJECTION GANGLION CYST, ANY  1994    HERNIORRHAPHY INGUINAL Right 03/24/2015    Procedure: HERNIORRHAPHY INGUINAL;  Surgeon: Castro  Sam Terrazas MD;  Location: [facility]    HYSTERECTOMY, PAP NO LONGER INDICATED      ORTHOPEDIC SURGERY      bilat meniscus repair    ZZC VAGINAL HYSTERECTOMY  1978    Hysterectomy, Vaginal     Family History   Problem Relation Age of Onset    C.A.D. Father     Diabetes Father     Myocardial Infarction Father     Heart Surgery Father         CABGx4    Hyperlipidemia Father     Breast Cancer Mother 70    Osteoporosis Mother     Thyroid Disease Mother     Hyperlipidemia Mother     Cancer Brother         Bladder    Hyperlipidemia Brother     Diabetes Brother     Colon Cancer Brother     Other Cancer Brother     Neurologic Disorder Paternal Grandfather     Osteoporosis Sister     Arrhythmia Sister     Heart Surgery Sister         cardioversion, ablation    Other - See Comments Sister 55        arhythmogenic right ventricular dysplasia    Thyroid Disease Sister     Osteoporosis Maternal Grandmother     Osteoporosis Paternal Grandmother     Other - See Comments Maternal Grandfather         pneumonia    Family History Negative Son     Thyroid Disease Daughter     Family History Negative Daughter     Anesthesia Reaction Daughter     Arrhythmia Cousin         a-fib    Cancer - colorectal No family hx of     Prostate Cancer No family hx of     Alzheimer Disease No family hx of     Blood Disease No family hx of     Eye Disorder No family hx of         Review of Systems:  Skin:        Eyes:  Positive for    ENT:       Respiratory:  Positive for dyspnea on exertion  Cardiovascular:  Negative;palpitations;chest pain;lightheadedness;dizziness;edema;fatigue  (Afib episodes)  Gastroenterology: not assessed heartburn  Genitourinary:       Musculoskeletal:  not assessed arthritis  Neurologic:       Psychiatric:       Heme/Lymph/Imm:  Positive for allergies  Endocrine:  Negative thyroid disorder;diabetes       CC  No referring provider defined for this encounter.

## 2023-10-02 NOTE — PATIENT INSTRUCTIONS
Thank you for visiting with me today.    We discussed: I'm glad you're doing well!    Medication changes:  continue current medications.      Follow up: with Dr. Stephen with an echocardiogram next fall.        Please call my nurse, Sarah at [phone] with any questions or concerns.    Scheduling phone number: [phone]  Reminder: Please bring in all current medications, over the counter supplements and vitamin bottles to your next appointment.

## 2023-11-01 ENCOUNTER — ANCILLARY PROCEDURE (OUTPATIENT)
Dept: MAMMOGRAPHY | Facility: CLINIC | Age: 73
End: 2023-11-01
Attending: INTERNAL MEDICINE
Payer: COMMERCIAL

## 2023-11-01 DIAGNOSIS — Z12.31 VISIT FOR SCREENING MAMMOGRAM: ICD-10-CM

## 2023-11-01 PROCEDURE — 77067 SCR MAMMO BI INCL CAD: CPT | Mod: TC | Performed by: RADIOLOGY

## 2023-11-27 ENCOUNTER — LAB (OUTPATIENT)
Dept: LAB | Facility: CLINIC | Age: 73
End: 2023-11-27
Payer: COMMERCIAL

## 2023-11-27 DIAGNOSIS — R73.03 PREDIABETES: ICD-10-CM

## 2023-11-27 DIAGNOSIS — E78.2 MIXED HYPERLIPIDEMIA: Primary | ICD-10-CM

## 2023-11-27 DIAGNOSIS — I25.10 CORONARY ARTERY DISEASE INVOLVING NATIVE CORONARY ARTERY OF NATIVE HEART WITHOUT ANGINA PECTORIS: ICD-10-CM

## 2023-11-27 DIAGNOSIS — I10 BENIGN ESSENTIAL HYPERTENSION: ICD-10-CM

## 2023-11-27 LAB
ALT SERPL W P-5'-P-CCNC: 27 U/L (ref 0–50)
ANION GAP SERPL CALCULATED.3IONS-SCNC: 10 MMOL/L (ref 7–15)
BUN SERPL-MCNC: 17.9 MG/DL (ref 8–23)
CALCIUM SERPL-MCNC: 10 MG/DL (ref 8.8–10.2)
CHLORIDE SERPL-SCNC: 102 MMOL/L (ref 98–107)
CHOLEST SERPL-MCNC: 160 MG/DL
CREAT SERPL-MCNC: 0.79 MG/DL (ref 0.51–0.95)
DEPRECATED HCO3 PLAS-SCNC: 28 MMOL/L (ref 22–29)
EGFRCR SERPLBLD CKD-EPI 2021: 78 ML/MIN/1.73M2
GLUCOSE SERPL-MCNC: 111 MG/DL (ref 70–99)
HBA1C MFR BLD: 6.6 % (ref 0–5.6)
HDLC SERPL-MCNC: 48 MG/DL
LDLC SERPL CALC-MCNC: 99 MG/DL
NONHDLC SERPL-MCNC: 112 MG/DL
POTASSIUM SERPL-SCNC: 4.1 MMOL/L (ref 3.4–5.3)
SODIUM SERPL-SCNC: 140 MMOL/L (ref 135–145)
TRIGL SERPL-MCNC: 66 MG/DL

## 2023-11-27 PROCEDURE — 80061 LIPID PANEL: CPT

## 2023-11-27 PROCEDURE — 83036 HEMOGLOBIN GLYCOSYLATED A1C: CPT

## 2023-11-27 PROCEDURE — 36415 COLL VENOUS BLD VENIPUNCTURE: CPT

## 2023-11-27 PROCEDURE — 80048 BASIC METABOLIC PNL TOTAL CA: CPT

## 2023-11-27 PROCEDURE — 84460 ALANINE AMINO (ALT) (SGPT): CPT

## 2023-11-27 ASSESSMENT — ENCOUNTER SYMPTOMS
WEAKNESS: 0
CONSTIPATION: 0
HEMATURIA: 0
DYSURIA: 0
FREQUENCY: 0
DIARRHEA: 0
HEMATOCHEZIA: 0
JOINT SWELLING: 0
DIZZINESS: 0
SORE THROAT: 0
CHILLS: 0
COUGH: 0
NAUSEA: 0
ARTHRALGIAS: 1
SHORTNESS OF BREATH: 0
PALPITATIONS: 0
FEVER: 0
NERVOUS/ANXIOUS: 0
HEADACHES: 0
BREAST MASS: 0
PARESTHESIAS: 0
MYALGIAS: 0
EYE PAIN: 0
HEARTBURN: 1
ABDOMINAL PAIN: 0

## 2023-11-27 ASSESSMENT — ACTIVITIES OF DAILY LIVING (ADL): CURRENT_FUNCTION: NO ASSISTANCE NEEDED

## 2023-11-30 ENCOUNTER — OFFICE VISIT (OUTPATIENT)
Dept: INTERNAL MEDICINE | Facility: CLINIC | Age: 73
End: 2023-11-30
Attending: INTERNAL MEDICINE
Payer: COMMERCIAL

## 2023-11-30 VITALS
DIASTOLIC BLOOD PRESSURE: 62 MMHG | HEART RATE: 78 BPM | SYSTOLIC BLOOD PRESSURE: 122 MMHG | OXYGEN SATURATION: 97 % | TEMPERATURE: 98.2 F | WEIGHT: 171.7 LBS | HEIGHT: 65 IN | BODY MASS INDEX: 28.61 KG/M2

## 2023-11-30 DIAGNOSIS — J43.9 PULMONARY EMPHYSEMA, UNSPECIFIED EMPHYSEMA TYPE (H): ICD-10-CM

## 2023-11-30 DIAGNOSIS — Z87.891 PERSONAL HISTORY OF TOBACCO USE: ICD-10-CM

## 2023-11-30 DIAGNOSIS — E53.8 B12 DEFICIENCY: ICD-10-CM

## 2023-11-30 DIAGNOSIS — E11.59 TYPE 2 DIABETES MELLITUS WITH OTHER CIRCULATORY COMPLICATION, WITHOUT LONG-TERM CURRENT USE OF INSULIN (H): ICD-10-CM

## 2023-11-30 DIAGNOSIS — R91.1 RIGHT UPPER LOBE PULMONARY NODULE: ICD-10-CM

## 2023-11-30 DIAGNOSIS — I48.0 PAROXYSMAL ATRIAL FIBRILLATION (H): ICD-10-CM

## 2023-11-30 DIAGNOSIS — M17.11 PRIMARY OSTEOARTHRITIS OF RIGHT KNEE: ICD-10-CM

## 2023-11-30 DIAGNOSIS — I10 BENIGN ESSENTIAL HYPERTENSION: ICD-10-CM

## 2023-11-30 DIAGNOSIS — E78.5 HYPERLIPIDEMIA LDL GOAL <130: ICD-10-CM

## 2023-11-30 DIAGNOSIS — M85.80 OSTEOPENIA, UNSPECIFIED LOCATION: ICD-10-CM

## 2023-11-30 DIAGNOSIS — I70.0 ATHEROSCLEROSIS OF AORTA (H): ICD-10-CM

## 2023-11-30 DIAGNOSIS — Z87.891 HISTORY OF SMOKING GREATER THAN 50 PACK YEARS: ICD-10-CM

## 2023-11-30 DIAGNOSIS — Z00.00 ENCOUNTER FOR MEDICARE ANNUAL WELLNESS EXAM: Primary | ICD-10-CM

## 2023-11-30 DIAGNOSIS — L40.50 PSORIASIS WITH ARTHROPATHY (H): ICD-10-CM

## 2023-11-30 PROCEDURE — 99214 OFFICE O/P EST MOD 30 MIN: CPT | Mod: 25 | Performed by: INTERNAL MEDICINE

## 2023-11-30 PROCEDURE — G0439 PPPS, SUBSEQ VISIT: HCPCS | Performed by: INTERNAL MEDICINE

## 2023-11-30 ASSESSMENT — ENCOUNTER SYMPTOMS
ARTHRALGIAS: 1
MYALGIAS: 0
CHILLS: 0
FEVER: 0
HEMATURIA: 0
SHORTNESS OF BREATH: 0
NAUSEA: 0
WEAKNESS: 0
PALPITATIONS: 0
FREQUENCY: 0
CONSTIPATION: 0
COUGH: 0
HEADACHES: 0
SORE THROAT: 0
NERVOUS/ANXIOUS: 0
HEMATOCHEZIA: 0
ABDOMINAL PAIN: 0
EYE PAIN: 0
BREAST MASS: 0
PARESTHESIAS: 0
DYSURIA: 0
DIZZINESS: 0
HEARTBURN: 1
JOINT SWELLING: 0
DIARRHEA: 0

## 2023-11-30 ASSESSMENT — ACTIVITIES OF DAILY LIVING (ADL): CURRENT_FUNCTION: NO ASSISTANCE NEEDED

## 2023-11-30 NOTE — PATIENT INSTRUCTIONS
" Continue all medications at the same doses.  Contact your usual pharmacy if you need refills.      Return to see me in 6 months, sooner if needed.  Please get fasting labs done at the [facility] or any other [facility] Lab lab 1-2 days before this appointment (schedule a \"lab appointment\").  If you get the labs done at another clinic, make arrangements with them directly.  The orders will be in place.  Eat nothing for at least 8 hours prior to having these labs drawn.  Use [email or web address] or Call [phone] to schedule the appointment with me and lab appointment.           5 GOALS IN MANAGING DIABETES (i.e. to give the best chance to prevent diabetic complications and vascular disease):     1.  Aggressive diabetic management.  The target for A1C (3 months average blood sugar) is ideally below 6.5, preferably below 7.5    Your diabetes is under good control.      Lab Results   Component Value Date    A1C 6.6 11/27/2023    A1C 6.8 05/16/2023    A1C 6.9 11/10/2022    A1C 6.4 06/16/2022    A1C 6.0 04/08/2022    A1C 6.0 09/30/2021    A1C 6.2 03/24/2021    A1C 6.2 09/23/2020    A1C 6.2 12/17/2019    A1C 6.0 06/17/2019    A1C 6.1 11/02/2018    A1C 6.4 03/21/2017       2.  Aggressive blood pressure control, under 130/80 ideally.  Using medications if needed.    Your blood pressure is under good control    BP Readings from Last 4 Encounters:   11/30/23 122/62   10/02/23 130/78   05/19/23 102/66   11/15/22 132/68       3.  Aggressive LDL cholesterol (bad cholesterol) lowering as needed.  Your goal is an LDL under 100 for sure, preferably under 70.     *  All patients with diabetes are recommended to be on a \"statin\" cholesterol lowering medication regardless of the cholesterol levels to give the best chance at avoiding vascular disease.      New guidelines identify four high-risk groups who could benefit from statins:   *people with pre-existing heart disease, such as those who have had a heart attack;   *people " "ages 40 to 75 who have diabetes of any type  *patients ages 40 to 75 with at least a 7.5% risk of developing cardiovascular disease over the next decade, according to a formula described in the guidelines  *patients with the sort of super-high cholesterol that sometimes runs in families, as evidenced by an LDL of 190 milligrams per deciliter or higher    *  Your cholesterol levels are well controlled.    Recent Labs   Lab Test 11/27/23  0753 11/10/22  0759 04/19/16  0846 09/18/15  0738   CHOL 160 172   < > 221*   HDL 48* 66   < > 53   LDL 99 95   < > 144*   TRIG 66 57   < > 122   CHOLHDLRATIO  --   --   --  4.2    < > = values in this interval not displayed.       4.  No smoking    5.  Consider daily preventative Aspirin once per day, every day over age 50 unless there is a specific reason that you cannot take aspirin.       *You should take Aspirin 81 mg once per day, unless you have a reason NOT to take aspirin (i.e. side effect, excessive bruising or bleeding, taking another \"blood tinning\" medication, etc.)       DIABETES REMINDERS:   1. Check your blood glucose regularly either with standard glucometer or personal continuous glucose monitor.    2) Your blood sugar goals:  before eating and  two hours after eating.  If using personal continuous glucose monitor, the goal is Time in the Target range ( ) of 70-75% with very few (under 2%) Below target.    3) Always be prepared to treat low blood sugar symptoms should it happen. Keep a sugar-containing beverage or food nearby.  4) When to call your clinic:     Blood sugar over 400     If you have 2 to 3 low blood sugars (under 70) in a row    Low readings the same time of day several days in a row  5) When to call 911: If your low blood glucose symptoms do not get better with treatment, or if you/someone else is unable to give you treatment.  6) Work with a Certified Diabetes Educator to assist you with your diabetes management  7) Contact us if " you have ANY questions about your medications or instructions, or have problems with getting prescriptions filled.         Preventive Health Recommendations  Female Ages 75+    Yearly exam: See your health care provider every year in order to  Review health changes in your health history  Discuss preventive care.    Review and reevaluate any chronic medical conditions  Review and renew any regular prescription medicines if you take any.  Review and aggressively manage any significant risk factors for vascular disease    Routine pelvic exams and PAP smears no longer indiacted over age 75.  You do not need a Pap test if your uterus was removed (hysterectomy) and you have not had cancer.  You should be tested each year for STDs (sexually transmitted diseases) if you're at risk for STDs.   Routine screening mammogram no longer indicated.  Routine colon cancer screening no longer recommended over age 75-80  Have a cholesterol test every 5 years, or more often if advised.  Have a diabetes test (fasting glucose) every three years. If you are at risk for diabetes, you should have this test more often.   If you are at risk for osteoporosis (brittle bone disease), think about having a bone density scan (DEXA).  Consider lung cancer screening with low-dose chest CT if you have any significant smoking history.      Shots:   Get a flu shot each year.   Covid vaccines are now recommended annually.  Get the most updated Covid vaccine when it becomes available, consider getting this at the same time as the annual influenza vaccine.   Get a tetanus shot every 10 years.  Strongly consider the Shingrix shingles vaccine to give you the best chance of avoiding future shingles infection (as many as 1 and 3 adults over age 50 may develop this condition in their lifetime).    If you have Medicare insurance, investigate the cost and coverage for Shingrix shingles vaccines with a pharmacist at a pharmacy.  They can tell you the coverage and  "cost and then give it to you if the price is acceptable.    Medicare sometimes does not cover these Shingrix shingles vaccines, and with Medicare insurance it is usually cheaper to receive this shingles vaccine from a pharmacist in a pharmacy rather than in our clinic.    At this time, you only need the 2 Shingrix vaccines and then you are done.       Nutrition:   Eat at least 5 servings of fruits and vegetables each day.  Eat whole-grain bread, whole-wheat pasta and brown rice instead of white grains and rice.  Talk to your provider about Calcium and Vitamin D.    --Calcium: aim for 1200 mg per day (any brand is fine)   --Vitamin D3 at least 1000 units per day (any brand is fine)       --Good Grains:  Oats, brown rice, Quinoa (these do not raise the blood sugar as much)     --Bad grains:  Anything made from wheat or white rice     (because these raise the blood sugars significantly, and the possible gluten issue from wheat for some people).      --Proteins:  Aim for \"lean proteins\" including chicken, fish, seafood, pork, turkey, and eggs (in moderation); Eat red meat only occasionally        Lifestyle  Exercise at least 150 minutes a week (30 minutes a day, 5 days a week). This will help you control your weight and prevent disease.  Limit alcohol to one drink per day.  No smoking.   Wear sunscreen to prevent skin cancer.   See your dentist every six months for an exam and cleaning.  See your eye doctor every 1 to 2 years.      Patient Education   Personalized Prevention Plan  You are due for the preventive services outlined below.  Your care team is available to assist you in scheduling these services.  If you have already completed any of these items, please share that information with your care team to update in your medical record.  There are no preventive care reminders to display for this patient.  Bladder Training: Care Instructions  Your Care Instructions     Bladder training is used to treat urge " incontinence and stress incontinence. Urge incontinence means that the need to urinate comes on so fast that you can't get to a toilet in time. Stress incontinence means that you leak urine because of pressure on your bladder. For example, it may happen when you laugh, cough, or lift something heavy.  Bladder training can increase how long you can wait before you have to urinate. It can also help your bladder hold more urine. And it can give you better control over the urge to urinate.  It is important to remember that bladder training takes a few weeks to a few months to make a difference. You may not see results right away, but don't give up.  Follow-up care is a key part of your treatment and safety. Be sure to make and go to all appointments, and call your doctor if you are having problems. It's also a good idea to know your test results and keep a list of the medicines you take.  How can you care for yourself at home?  Work with your doctor to come up with a bladder training program that is right for you. You may use one or more of the following methods.  Delayed urination  In the beginning, try to keep from urinating for 5 minutes after you first feel the need to go.  While you wait, take deep, slow breaths to relax. Kegel exercises can also help you delay the need to go to the bathroom.  After some practice, when you can easily wait 5 minutes to urinate, try to wait 10 minutes before you urinate.  Slowly increase the waiting period until you are able to control when you have to urinate.  Scheduled urination  Empty your bladder when you first wake up in the morning.  Schedule times throughout the day when you will urinate.  Start by going to the bathroom every hour, even if you don't need to go.  Slowly increase the time between trips to the bathroom.  When you have found a schedule that works well for you, keep doing it.  If you wake up during the night and have to urinate, do it. Apply your schedule to  "waking hours only.  Kegel exercises  These tighten and strengthen pelvic muscles, which can help you control the flow of urine. (If doing these exercises causes pain, stop doing them and talk with your doctor.) To do Kegel exercises:  Squeeze your muscles as if you were trying not to pass gas. Or squeeze your muscles as if you were stopping the flow of urine. Your belly, legs, and buttocks shouldn't move.  Hold the squeeze for 3 seconds, then relax for 5 to 10 seconds.  Start with 3 seconds, then add 1 second each week until you are able to squeeze for 10 seconds.  Repeat the exercise 10 times a session. Do 3 to 8 sessions a day.  When should you call for help?  Watch closely for changes in your health, and be sure to contact your doctor if:    Your incontinence is getting worse.     You do not get better as expected.   Where can you learn more?  Go to https://www.[employer].net/patiented  Enter V684 in the search box to learn more about \"Bladder Training: Care Instructions.\"  Current as of: February 28, 2023               Content Version: 13.8    4432-5166 [employer].   Care instructions adapted under license by your healthcare professional. If you have questions about a medical condition or this instruction, always ask your healthcare professional. [employer] disclaims any warranty or liability for your use of this information.         "

## 2023-11-30 NOTE — PROGRESS NOTES
"SUBJECTIVE:   Leah is a 73 year old, presenting for the following:  Wellness Visit      Are you in the first 12 months of your Medicare coverage?  No    Healthy Habits:     In general, how would you rate your overall health?  Good    Frequency of exercise:  2-3 days/week    Duration of exercise:  15-30 minutes    Do you usually eat at least 4 servings of fruit and vegetables a day, include whole grains    & fiber and avoid regularly eating high fat or \"junk\" foods?  Yes    Taking medications regularly:  Yes    Medication side effects:  None    Ability to successfully perform activities of daily living:  No assistance needed    Home Safety:  Lack of grab bars in the bathroom    Hearing Impairment:  No hearing concerns    In the past 6 months, have you been bothered by leaking of urine? Yes    In general, how would you rate your overall mental or emotional health?  Good          Have you ever done Advance Care Planning? (For example, a Health Directive, POLST, or a discussion with a medical provider or your loved ones about your wishes): Yes, advance care planning is on file.      Fall risk  Fallen 2 or more times in the past year?: No  Any fall with injury in the past year?: No    Cognitive Screening   1) Repeat 3 items (Leader, Season, Table)    2) Clock draw: NORMAL  3) 3 item recall: Recalls 3 objects  Results: 3 items recalled: COGNITIVE IMPAIRMENT LESS LIKELY    Mini-CogTM Copyright [employer]. Licensed by the author for use in [facility]; reprinted with permission ([email or web address]). All rights reserved.      Do you have sleep apnea, excessive snoring or daytime drowsiness? : no    Reviewed and updated as needed this visit by clinical staff    Allergies  Meds  Problems             Reviewed and updated as needed this visit by Provider    Allergies  Meds  Problems            Social History     Tobacco Use    Smoking status: Former     Packs/day: 1.00     Years: 50.00     Additional pack years: 0.00 "     Total pack years: 50.00     Types: Cigarettes     Start date: 1964     Quit date: 2015     Years since quittin.2    Smokeless tobacco: Never   Substance Use Topics    Alcohol use: Yes     Comment: Rarely have any alcohol             2023     2:47 PM   Alcohol Use   Prescreen: >3 drinks/day or >7 drinks/week? Not Applicable     Do you have a current opioid prescription? No  Do you use any other controlled substances or medications that are not prescribed by a provider? None      1.)  Has history of atrial fibrillation.    No palpitations, no dizziness, no dyspnea on exertion, no shortness of breath.  No racing heart, no fast heart rates.    Taking medications as ordered, no side effects reported.   Patient is taking anticoagulation according to direction, with no reported side effects.       2.  Hypertension:  History of hypertension, on medication.  No reported side effects from medications.    Reviewed last 6 BP readings in chart:  BP Readings from Last 6 Encounters:   23 122/62   10/02/23 130/78   23 102/66   11/15/22 132/68   22 130/72   08/15/22 124/66     No active cardiac complaints or symptoms with exercise.       3.  COPD remains stable.  Has not had any recent breathing troubles beyond usual baseline.  Has not any acute respiratory events.  Remains with intermittent cough, mild shortness of breath with overexertion as per usual.  Using medication as directed with reported side effects.     4.  Former heavy cigarette smoker, is currently smoke-free.  Lung cancer screening with low-dose chest CT shows no concerns for low active lung cancer at this time.  Reviewed most recent scans.    5.  Reports end-stage arthritis in the right knee.  She is contemplating a knee replacement in the spring 2024.  Underwent a similar procedure in the left knee in  with good result    6.  History of type 2 diabetes/prediabetes.  Currently not requiring any medications.  She has been on  a good job with her low-carb diet.  Reviewed her labs.    Lab Results   Component Value Date    A1C 6.6 11/27/2023    A1C 6.8 05/16/2023    A1C 6.9 11/10/2022    A1C 6.4 06/16/2022    A1C 6.0 04/08/2022    A1C 6.0 09/30/2021    A1C 6.2 03/24/2021    A1C 6.2 09/23/2020    A1C 6.2 12/17/2019    A1C 6.0 06/17/2019    A1C 6.1 11/02/2018    A1C 6.4 03/21/2017          Current providers sharing in care for this patient include:   Patient Care Team:  Kendall Mendez MD as PCP - General (Internal Medicine)  Kendall Mendez MD as Assigned PCP  Rufina Palacios PA-C as Physician Assistant (Cardiovascular Disease)  Rufina Palacios PA-C as Assigned Heart and Vascular Provider    The following health maintenance items are reviewed in Epic and correct as of today:  Health Maintenance   Topic Date Due    TSH W/FREE T4 REFLEX  05/16/2024    CBC  05/16/2024    ANNUAL REVIEW OF HM ORDERS  05/19/2024    A1C  05/27/2024    BMP  05/27/2024    LUNG CANCER SCREENING  07/06/2024    ALT  11/27/2024    LIPID  11/27/2024    MEDICARE ANNUAL WELLNESS VISIT  11/30/2024    FALL RISK ASSESSMENT  11/30/2024    DTAP/TDAP/TD IMMUNIZATION (3 - Td or Tdap) 09/03/2025    MAMMO SCREENING  11/01/2025    ADVANCE CARE PLANNING  11/30/2028    COLORECTAL CANCER SCREENING  06/21/2031    DEXA  10/21/2036    SPIROMETRY  Completed    HEPATITIS C SCREENING  Completed    COPD ACTION PLAN  Completed    PHQ-2 (once per calendar year)  Completed    INFLUENZA VACCINE  Completed    Pneumococcal Vaccine: 65+ Years  Completed    ZOSTER IMMUNIZATION  Completed    RSV VACCINE (Pregnancy & 60+)  Completed    COVID-19 Vaccine  Completed    IPV IMMUNIZATION  Aged Out    HPV IMMUNIZATION  Aged Out    MENINGITIS IMMUNIZATION  Aged Out    RSV MONOCLONAL ANTIBODY  Aged Out             Pertinent mammograms are reviewed under the imaging tab.    Review of Systems   Constitutional:  Negative for chills and fever.   HENT:  Negative for congestion, ear  "pain, hearing loss and sore throat.    Eyes:  Negative for pain and visual disturbance.   Respiratory:  Negative for cough and shortness of breath.    Cardiovascular:  Negative for chest pain, palpitations and peripheral edema.   Gastrointestinal:  Positive for heartburn. Negative for abdominal pain, constipation, diarrhea, hematochezia and nausea.   Breasts:  Negative for tenderness, breast mass and discharge.   Genitourinary:  Negative for dysuria, frequency, genital sores, hematuria, pelvic pain, urgency, vaginal bleeding and vaginal discharge.   Musculoskeletal:  Positive for arthralgias. Negative for joint swelling and myalgias.   Skin:  Negative for rash.   Neurological:  Negative for dizziness, weakness, headaches and paresthesias.   Psychiatric/Behavioral:  Negative for mood changes. The patient is not nervous/anxious.      Constitutional, HEENT, cardiovascular, pulmonary, gi and gu systems are negative, except as otherwise noted.    OBJECTIVE:   /62   Pulse 78   Temp 98.2  F (36.8  C)   Ht 1.638 m (5' 4.5\")   Wt 77.9 kg (171 lb 11.2 oz)   LMP  (LMP Unknown)   SpO2 97%   BMI 29.02 kg/m   Estimated body mass index is 29.02 kg/m  as calculated from the following:    Height as of this encounter: 1.638 m (5' 4.5\").    Weight as of this encounter: 77.9 kg (171 lb 11.2 oz).  Physical Exam    GENERAL alert and no distress  EYES:  Normal sclera,conjunctiva, EOMI  HENT: oral and posterior pharynx without lesions or erythema, facies symmetric  NECK: Neck supple. No LAD, without thyroidmegaly.  RESP: breath sounds quiet but clear, diminished respiratory excursion, prolonged expiratory phase,  no rhonci, no wheezes, no rales   CV: RRR normal S1S2 without murmurs, rubs or gallops.  LYMPH: no cervical lymph adenopathy appreciated  MS: extremities- no gross deformities of the visible extremities noted,   EXT:  no lower extremity edema  PSYCH: Alert and oriented times 3; speech- coherent  SKIN:  No obvious " significant skin lesions on visible portions of face     Diagnostic Test Results:  Labs reviewed in Epic    ASSESSMENT / PLAN:     (Z00.00) Encounter for Medicare annual wellness exam  (primary encounter diagnosis)  Comment:   Discussed cardiac disease risk factors and cardiac disease risk factor modification, including diabetes screening, blood pressure screening (and management if indicated), and cholesterol screening.   Reviewed immunzation guidelines, including pneumococcal vaccines, annual influenza, and shingles vaccines.   Discussed routine cancer screenings, including skin cancer, colon cancer screening for everyone until age 80, prostate cancer screening in men until age 75, mammogram and PAP/pelvic for women until age 75.   Recommended regular dentist visits to care for remaining teeth.   Recommended regular screening for vision and glaucoma.   Recommended safe driving and accident avoidance.   Plan:     (J43.9) Pulmonary emphysema, unspecified emphysema type (H)  Comment: This condition is currently controlled on the current medical regimen.  Continue current therapy.   Discussed general issues of COPD, including pathophysiology, ways it will affect the pt., when to seek help, reviewed the typical medications (how they are taken, how they help)   Plan:     (L40.50) Psoriasis with arthropathy (H)  Comment: This condition is currently controlled on the current medical regimen.  Continue current therapy.   Plan:       (E11.59) Type 2 diabetes mellitus with other circulatory complication, without long-term current use of insulin (H)  Comment: This condition is currently controlled with just diet alone.  No medication indicated at this time.  Reviewed the concepts of a low carbohydrate diet.  Plan: Lipid panel reflex to direct LDL Fasting,         Comprehensive metabolic panel, Hemoglobin A1c,         CBC with platelets, Albumin Random Urine         Quantitative with Creat Ratio            (I48.0) Paroxysmal  atrial fibrillation (H)  Comment: This condition is currently controlled on the current medical regimen.  Continue current therapy.   No side effects from her medications including the anticoagulant.  She gets anticoagulant from a [facility] for cost savings.  Plan: Lipid panel reflex to direct LDL Fasting,         Comprehensive metabolic panel, Hemoglobin A1c,         CBC with platelets, Albumin Random Urine         Quantitative with Creat Ratio            (I10) Benign essential hypertension  Comment: This condition is currently controlled on the current medical regimen.  Continue current therapy.   Plan: Lipid panel reflex to direct LDL Fasting,         Comprehensive metabolic panel, Hemoglobin A1c,         CBC with platelets, Albumin Random Urine         Quantitative with Creat Ratio            (I70.0) Atherosclerosis of aorta (H24)  Comment: This condition is currently controlled on the current medical regimen.  Continue current therapy.   Discussed secondary risk factor modification and recommended continuing aggressive management of these items.   Plan:     (E78.5) Hyperlipidemia LDL goal <130  Comment: This condition is currently controlled on the current medical regimen.  Continue current therapy.   Discussed guidelines recommending a statin cholesterol lowering medication for any patient with either diabetes and/or vascular disease, aiming for a LDL goal under 100 for sure, ideally under 70, using whatever dose of statin tolerated.    Plan: Lipid panel reflex to direct LDL Fasting,         Comprehensive metabolic panel, Hemoglobin A1c,         CBC with platelets, Albumin Random Urine         Quantitative with Creat Ratio            (R91.1) Right upper lobe pulmonary nodule  Comment:   Plan:     (Z87.891) Personal history of tobacco use  Comment:   Plan:     (M85.80) Osteopenia, unspecified location  Comment:   Plan:     (Z87.891) History of smoking greater than 50 pack years  Comment:   Plan:  "    (E53.8) B12 deficiency  Comment:   Plan: Cyanocobalamin (B-12) 1000 MCG TBCR            (M17.11) Primary osteoarthritis of right knee  Comment: Looking at knee joint replacement potentially in the spring 2024.  Return to see me for preop exam before this surgery.  Advised her to take an iron supplement regularly for 6 weeks prior to her knee replacement to replenish her iron stores and allow her bone marrow to accommodate for the expected blood loss from a joint replacement surgery.  Plan:        Patient has been advised of split billing requirements and indicates understanding: Yes      COUNSELING:  Reviewed preventive health counseling, as reflected in patient instructions       Regular exercise       Healthy diet/nutrition       Vision screening       Hearing screening       Dental care       Bladder control       Fall risk prevention       Immunizations  up to date including most current COVID booster, current influenza vaccine, new RSV vaccine.         Colon cancer screening      BMI:   Estimated body mass index is 29.02 kg/m  as calculated from the following:    Height as of this encounter: 1.638 m (5' 4.5\").    Weight as of this encounter: 77.9 kg (171 lb 11.2 oz).         She reports that she quit smoking about 8 years ago. Her smoking use included cigarettes. She started smoking about 59 years ago. She has a 50.00 pack-year smoking history. She has never used smokeless tobacco.      Appropriate preventive services were discussed with this patient, including applicable screening as appropriate for fall prevention, nutrition, physical activity, Tobacco-use cessation, weight loss and cognition.  Checklist reviewing preventive services available has been given to the patient.    Reviewed patients plan of care and provided an AVS. The  madeleine Durham meets the Care Plan requirement. This Care Plan has been established and reviewed with the .          Kendall Mendez MD  [facility] " [facility]    Identified Health Risks:

## 2023-11-30 NOTE — PROGRESS NOTES
"Information on urinary incontinence and treatment options given to patient.      Answers submitted by the patient for this visit:  Annual Preventive Visit (Submitted on 11/27/2023)  Chief Complaint: Annual Exam:  In general, how would you rate your overall physical health?: good  Frequency of exercise:: 2-3 days/week  Do you usually eat at least 4 servings of fruit and vegetables a day, include whole grains & fiber, and avoid regularly eating high fat or \"junk\" foods? : Yes  Taking medications regularly:: Yes  Medication side effects:: None  Activities of Daily Living: no assistance needed  Home safety: lack of grab bars in the bathroom  Hearing Impairment:: no hearing concerns  In the past 6 months, have you been bothered by leaking of urine?: Yes  abdominal pain: No  Blood in stool: No  Blood in urine: No  chest pain: No  chills: No  congestion: No  constipation: No  cough: No  diarrhea: No  dizziness: No  ear pain: No  eye pain: No  nervous/anxious: No  fever: No  frequency: No  genital sores: No  headaches: No  hearing loss: No  heartburn: Yes  arthralgias: Yes  joint swelling: No  peripheral edema: No  mood changes: No  myalgias: No  nausea: No  dysuria: No  palpitations: No  Skin sensation changes: No  sore throat: No  urgency: No  rash: No  shortness of breath: No  visual disturbance: No  weakness: No  pelvic pain: No  vaginal bleeding: No  vaginal discharge: No  tenderness: No  breast mass: No  breast discharge: No  In general, how would you rate your overall mental or emotional health?: good  Exercise outside of work (Submitted on 11/27/2023)  Chief Complaint: Annual Exam:  Duration of exercise:: 15-30 minutes    "

## 2023-12-31 ENCOUNTER — MYC REFILL (OUTPATIENT)
Dept: CARDIOLOGY | Facility: CLINIC | Age: 73
End: 2023-12-31
Payer: COMMERCIAL

## 2023-12-31 DIAGNOSIS — I48.0 PAROXYSMAL ATRIAL FIBRILLATION (H): ICD-10-CM

## 2023-12-31 RX ORDER — DILTIAZEM HYDROCHLORIDE 180 MG/1
180 CAPSULE, COATED, EXTENDED RELEASE ORAL DAILY
Qty: 90 CAPSULE | Refills: 3 | Status: CANCELLED | OUTPATIENT
Start: 2023-12-31

## 2024-01-02 ENCOUNTER — TELEPHONE (OUTPATIENT)
Dept: CARDIOLOGY | Facility: CLINIC | Age: 74
End: 2024-01-02
Payer: COMMERCIAL

## 2024-01-02 DIAGNOSIS — I48.0 PAROXYSMAL ATRIAL FIBRILLATION (H): ICD-10-CM

## 2024-01-02 NOTE — TELEPHONE ENCOUNTER
Central Prior Authorization Team   Phone: [phone]    PA Initiation    Medication: Tier Exception Diltiazem 180mg CD   Insurance Company: [employer] Non-Specialty PA's - Phone [phone] Fax [phone]  Pharmacy Filling the Rx: [facility] - [facility] [address] [facility]  Filling Pharmacy Phone: [phone]  Filling Pharmacy Fax:    Start Date: 1/2/2024      Filled out form, faxed to [facility] for review of tier exception

## 2024-01-02 NOTE — TELEPHONE ENCOUNTER
Pt requesting renewal of tier exception for diltiazem  mg capsules (24hr) to be processed at tier 1 rather than tier 2. Per pt they request a tier exception through insurance annually and it has been granted. Routing to PA team. Jordyn Jones RN on 1/2/2024 at 3:29 PM

## 2024-01-03 NOTE — TELEPHONE ENCOUNTER
Tier Exception Approval    Authorization Effective Date: 1/1/2024  Authorization Expiration Date: 12/31/2024  Medication: Tier Exception Diltiazem 180mg CD   Approved Dose/Quantity:   Reference #:     Insurance Company: [employer] Non-Specialty PA's - Phone [phone] Fax [phone]  Expected CoPay:       CoPay Card Available:      Foundation Assistance Needed:    Which Pharmacy is filling the prescription (Not needed for infusion/clinic administered): [facility] - [facility] [place] - [address] [place] [address]

## 2024-01-03 NOTE — TELEPHONE ENCOUNTER
Tier exception approved but per message below the refills were denied.      Routing back to clinic regarding this, patient called about the refill denial from the clinic

## 2024-01-03 NOTE — TELEPHONE ENCOUNTER
[facility] Call Center    Phone Message    May a detailed message be left on voicemail: yes     Reason for Call: Other: Pt would like a call back to discuss why medication was denied.     Action Taken: Other: Cardiology    Travel Screening: Not Applicable    Thank you!  [facility]

## 2024-01-04 RX ORDER — DILTIAZEM HYDROCHLORIDE 180 MG/1
180 CAPSULE, COATED, EXTENDED RELEASE ORAL DAILY
Qty: 90 CAPSULE | Refills: 2 | Status: SHIPPED | OUTPATIENT
Start: 2024-01-04 | End: 2024-09-30

## 2024-01-04 NOTE — TELEPHONE ENCOUNTER
Refill of diltiazem ER coated beads 180 mg capsule not denied by clinic. When the refill encounter gets routed out to another dept or pool such as to the PA Dept the refills seem to fall out of the pend status. Refill sent. Jordyn Jones RN on 1/4/2024 at 10:08 AM

## 2024-02-12 ENCOUNTER — MYC MEDICAL ADVICE (OUTPATIENT)
Dept: INTERNAL MEDICINE | Facility: CLINIC | Age: 74
End: 2024-02-12
Payer: COMMERCIAL

## 2024-02-12 DIAGNOSIS — R91.1 LEFT LOWER LOBE PULMONARY NODULE: ICD-10-CM

## 2024-02-12 DIAGNOSIS — R91.8 PULMONARY NODULES: Primary | ICD-10-CM

## 2024-02-12 DIAGNOSIS — Z87.891 PERSONAL HISTORY OF SMOKING: ICD-10-CM

## 2024-02-12 NOTE — TELEPHONE ENCOUNTER
"Dr. Mendez,    Please see pt MyChart message and advise.   Per chart review, CT was ordered for follow-up on new lung nodules.   Per notes from 7/6/23 CT, \"RADIOLOGIST RECOMMENDATION(S): Low-dose CT chest in 6 months.\"    Thank you,  Ana Cristina Caldwell, RN    "

## 2024-02-29 ENCOUNTER — ANCILLARY PROCEDURE (OUTPATIENT)
Dept: CT IMAGING | Facility: CLINIC | Age: 74
End: 2024-02-29
Attending: INTERNAL MEDICINE
Payer: COMMERCIAL

## 2024-02-29 DIAGNOSIS — R91.1 LEFT LOWER LOBE PULMONARY NODULE: ICD-10-CM

## 2024-02-29 DIAGNOSIS — Z87.891 PERSONAL HISTORY OF SMOKING: ICD-10-CM

## 2024-02-29 PROCEDURE — 71250 CT THORAX DX C-: CPT

## 2024-03-19 ENCOUNTER — MEDICAL CORRESPONDENCE (OUTPATIENT)
Dept: HEALTH INFORMATION MANAGEMENT | Facility: CLINIC | Age: 74
End: 2024-03-19

## 2024-03-21 DIAGNOSIS — J43.9 PULMONARY EMPHYSEMA, UNSPECIFIED EMPHYSEMA TYPE (H): ICD-10-CM

## 2024-03-21 RX ORDER — TIOTROPIUM BROMIDE 18 UG/1
18 CAPSULE ORAL; RESPIRATORY (INHALATION) DAILY
Qty: 90 CAPSULE | Refills: 1 | Status: SHIPPED | OUTPATIENT
Start: 2024-03-21 | End: 2024-03-26

## 2024-03-26 DIAGNOSIS — J43.9 PULMONARY EMPHYSEMA, UNSPECIFIED EMPHYSEMA TYPE (H): Primary | ICD-10-CM

## 2024-03-26 RX ORDER — TIOTROPIUM BROMIDE 18 UG/1
18 CAPSULE ORAL; RESPIRATORY (INHALATION) DAILY
Qty: 90 CAPSULE | Refills: 3 | Status: SHIPPED | OUTPATIENT
Start: 2024-03-26

## 2024-03-26 RX ORDER — TIOTROPIUM BROMIDE 18 UG/1
18 CAPSULE ORAL; RESPIRATORY (INHALATION) DAILY
Qty: 90 CAPSULE | Refills: 3 | Status: SHIPPED | OUTPATIENT
Start: 2024-03-26 | End: 2024-03-26

## 2024-03-26 NOTE — TELEPHONE ENCOUNTER
Pt called the clinic stating Rx for tiotropium bromide monohydrate was incorrectly sent to [facility] and who she has to deal with shipping it back due to high price. She is requesting another local printed Rx left at  so she can pick it up on Thursday. She is planning to use another online/[facility] pharmacy for this.

## 2024-05-28 ENCOUNTER — LAB (OUTPATIENT)
Dept: LAB | Facility: CLINIC | Age: 74
End: 2024-05-28
Payer: COMMERCIAL

## 2024-05-28 DIAGNOSIS — E78.5 HYPERLIPIDEMIA LDL GOAL <130: ICD-10-CM

## 2024-05-28 DIAGNOSIS — E11.59 TYPE 2 DIABETES MELLITUS WITH OTHER CIRCULATORY COMPLICATION, WITHOUT LONG-TERM CURRENT USE OF INSULIN (H): ICD-10-CM

## 2024-05-28 DIAGNOSIS — I48.0 PAROXYSMAL ATRIAL FIBRILLATION (H): ICD-10-CM

## 2024-05-28 DIAGNOSIS — I10 BENIGN ESSENTIAL HYPERTENSION: ICD-10-CM

## 2024-05-28 LAB
ALBUMIN SERPL BCG-MCNC: 4.4 G/DL (ref 3.5–5.2)
ALP SERPL-CCNC: 95 U/L (ref 40–150)
ALT SERPL W P-5'-P-CCNC: 21 U/L (ref 0–50)
ANION GAP SERPL CALCULATED.3IONS-SCNC: 9 MMOL/L (ref 7–15)
AST SERPL W P-5'-P-CCNC: 28 U/L (ref 0–45)
BILIRUB SERPL-MCNC: 0.6 MG/DL
BUN SERPL-MCNC: 18.7 MG/DL (ref 8–23)
CALCIUM SERPL-MCNC: 9.8 MG/DL (ref 8.8–10.2)
CHLORIDE SERPL-SCNC: 103 MMOL/L (ref 98–107)
CHOLEST SERPL-MCNC: 154 MG/DL
CREAT SERPL-MCNC: 0.8 MG/DL (ref 0.51–0.95)
CREAT UR-MCNC: 119 MG/DL
DEPRECATED HCO3 PLAS-SCNC: 28 MMOL/L (ref 22–29)
EGFRCR SERPLBLD CKD-EPI 2021: 76 ML/MIN/1.73M2
ERYTHROCYTE [DISTWIDTH] IN BLOOD BY AUTOMATED COUNT: 12.8 % (ref 10–15)
FASTING STATUS PATIENT QL REPORTED: YES
FASTING STATUS PATIENT QL REPORTED: YES
GLUCOSE SERPL-MCNC: 109 MG/DL (ref 70–99)
HBA1C MFR BLD: 6.6 % (ref 0–5.6)
HCT VFR BLD AUTO: 43.9 % (ref 35–47)
HDLC SERPL-MCNC: 50 MG/DL
HGB BLD-MCNC: 14.5 G/DL (ref 11.7–15.7)
LDLC SERPL CALC-MCNC: 92 MG/DL
MCH RBC QN AUTO: 30.1 PG (ref 26.5–33)
MCHC RBC AUTO-ENTMCNC: 33 G/DL (ref 31.5–36.5)
MCV RBC AUTO: 91 FL (ref 78–100)
MICROALBUMIN UR-MCNC: <12 MG/L
MICROALBUMIN/CREAT UR: NORMAL MG/G{CREAT}
NONHDLC SERPL-MCNC: 104 MG/DL
PLATELET # BLD AUTO: 266 10E3/UL (ref 150–450)
POTASSIUM SERPL-SCNC: 4.2 MMOL/L (ref 3.4–5.3)
PROT SERPL-MCNC: 6.7 G/DL (ref 6.4–8.3)
RBC # BLD AUTO: 4.81 10E6/UL (ref 3.8–5.2)
SODIUM SERPL-SCNC: 140 MMOL/L (ref 135–145)
TRIGL SERPL-MCNC: 61 MG/DL
WBC # BLD AUTO: 6.5 10E3/UL (ref 4–11)

## 2024-05-28 PROCEDURE — 82043 UR ALBUMIN QUANTITATIVE: CPT

## 2024-05-28 PROCEDURE — 82570 ASSAY OF URINE CREATININE: CPT

## 2024-05-28 PROCEDURE — 80053 COMPREHEN METABOLIC PANEL: CPT

## 2024-05-28 PROCEDURE — 36415 COLL VENOUS BLD VENIPUNCTURE: CPT

## 2024-05-28 PROCEDURE — 80061 LIPID PANEL: CPT

## 2024-05-28 PROCEDURE — 83036 HEMOGLOBIN GLYCOSYLATED A1C: CPT

## 2024-05-28 PROCEDURE — 85027 COMPLETE CBC AUTOMATED: CPT

## 2024-05-28 PROCEDURE — 84443 ASSAY THYROID STIM HORMONE: CPT | Performed by: INTERNAL MEDICINE

## 2024-05-30 ENCOUNTER — OFFICE VISIT (OUTPATIENT)
Dept: INTERNAL MEDICINE | Facility: CLINIC | Age: 74
End: 2024-05-30
Payer: COMMERCIAL

## 2024-05-30 VITALS
BODY MASS INDEX: 29.16 KG/M2 | WEIGHT: 175 LBS | HEIGHT: 65 IN | RESPIRATION RATE: 15 BRPM | OXYGEN SATURATION: 96 % | SYSTOLIC BLOOD PRESSURE: 124 MMHG | HEART RATE: 57 BPM | TEMPERATURE: 97.9 F | DIASTOLIC BLOOD PRESSURE: 71 MMHG

## 2024-05-30 DIAGNOSIS — L40.50 PSORIASIS WITH ARTHROPATHY (H): ICD-10-CM

## 2024-05-30 DIAGNOSIS — J43.9 PULMONARY EMPHYSEMA, UNSPECIFIED EMPHYSEMA TYPE (H): ICD-10-CM

## 2024-05-30 DIAGNOSIS — I10 BENIGN ESSENTIAL HYPERTENSION: ICD-10-CM

## 2024-05-30 DIAGNOSIS — Z13.29 SCREENING FOR THYROID DISORDER: ICD-10-CM

## 2024-05-30 DIAGNOSIS — Z01.818 PREOP GENERAL PHYSICAL EXAM: Primary | ICD-10-CM

## 2024-05-30 DIAGNOSIS — I48.0 PAROXYSMAL ATRIAL FIBRILLATION (H): ICD-10-CM

## 2024-05-30 DIAGNOSIS — M85.80 OSTEOPENIA, UNSPECIFIED LOCATION: ICD-10-CM

## 2024-05-30 DIAGNOSIS — I70.0 ATHEROSCLEROSIS OF AORTA (H): ICD-10-CM

## 2024-05-30 DIAGNOSIS — J44.9 CHRONIC OBSTRUCTIVE PULMONARY DISEASE, UNSPECIFIED COPD TYPE (H): ICD-10-CM

## 2024-05-30 DIAGNOSIS — M17.11 PRIMARY OSTEOARTHRITIS OF RIGHT KNEE: ICD-10-CM

## 2024-05-30 DIAGNOSIS — E78.5 HYPERLIPIDEMIA LDL GOAL <130: ICD-10-CM

## 2024-05-30 DIAGNOSIS — E55.9 VITAMIN D DEFICIENCY: ICD-10-CM

## 2024-05-30 DIAGNOSIS — E11.59 TYPE 2 DIABETES MELLITUS WITH OTHER CIRCULATORY COMPLICATION, WITHOUT LONG-TERM CURRENT USE OF INSULIN (H): ICD-10-CM

## 2024-05-30 LAB — TSH SERPL DL<=0.005 MIU/L-ACNC: 1.44 UIU/ML (ref 0.3–4.2)

## 2024-05-30 PROCEDURE — G2211 COMPLEX E/M VISIT ADD ON: HCPCS | Performed by: INTERNAL MEDICINE

## 2024-05-30 PROCEDURE — 93000 ELECTROCARDIOGRAM COMPLETE: CPT | Performed by: INTERNAL MEDICINE

## 2024-05-30 PROCEDURE — 99214 OFFICE O/P EST MOD 30 MIN: CPT | Performed by: INTERNAL MEDICINE

## 2024-05-30 RX ORDER — ALBUTEROL SULFATE 90 UG/1
2 AEROSOL, METERED RESPIRATORY (INHALATION) EVERY 4 HOURS PRN
Qty: 18 G | Refills: 11 | Status: SHIPPED | OUTPATIENT
Start: 2024-05-30

## 2024-05-30 NOTE — PROGRESS NOTES
Preoperative Evaluation  [address] [address]  Phone: [phone]  Primary Provider: Kendall Mendez MD  Pre-op Performing Provider: Kendall Mendez MD    May 30, 2024         ADDENDUM (6/18/24):      There have been no changes in the patient's medical history since the original pre-op examination date of 5/30/2024.      The patient is still cleared for surgery as planned on 7/1/24 without further evaluation or repeat clinic visit.      Continue the same pre-operative instructions.       Contact us with any questions or concerns.          (6/18/24)  Kendall Mendez M.D.  [facility]              Surgical Information:  Surgery/Procedure: right total knee arthroplasty   Surgery Location: [facility]  Surgeon: Dr. Alston   Surgery Date: 7/1/2024  Time of Surgery: TBD  Where patient plans to recover: At home with family  Fax number for surgical facility: Note does not need to be faxed, will be available electronically in Epic.     Type of Anesthesia Anticipated: Choice            Fax number for surgical facility: Note does not need to be faxed, will be available electronically in Epic.    Assessment & Plan     The proposed surgical procedure is considered INTERMEDIATE risk.    1. Preop general physical exam    2. Primary osteoarthritis of right knee    3. Type 2 diabetes mellitus with other circulatory complication, without long-term current use of insulin (H)    4. Psoriasis with arthropathy (H)    5. Osteopenia, unspecified location    6. Screening for thyroid disorder    7. Vitamin D deficiency    8. Pulmonary emphysema, unspecified emphysema type (H)    9. Atherosclerosis of aorta (H24)    10. Paroxysmal atrial fibrillation (H)    11. Hyperlipidemia LDL goal <130    12. Benign essential hypertension    13. Chronic obstructive pulmonary disease, unspecified COPD type (H)                  Risks and  Recommendations:  The patient has the following additional risks and recommendations for perioperative complications:     Cardiac:   -- History of paroxysmal atrial fibrillation.  No recent episodes of palpitations or tachycardia.    -- Current condition is rate is well controlled on diltiazem, taking oral anticoagulants with out side effect.    Pulmonary:   --History of COPD, no recent exacerbations, currently well controlled on current inhaled medicines.  --Observe for postoperative bronchospasm, treat with albuterol nebulizer if needed.      - No other identified additional risk factors other than previously addressed     Medication Instructions:   - apixaban (Eliquis), edoxaban (Savaysa), rivaroxaban (Xarelto): Bleeding risk is moderate or high for this procedure AND CrCl  (>=) 50 mL/min. HOLD 2 days before surgery.    - Diuretics (spironolactone): HOLD on the day of surgery.   - tiotropium, LABA, inhaled corticosteroid, long-acting anticholinergics: Continue without modification.   - rescue Inhaler: Continue PRN. Bring to hospital on the day of surgery.       Recommendation  Approval given to proceed with proposed procedure, without further diagnostic evaluation.        Ramone Lynn is a 73 year old, presenting for the following:  Diabetes and Results        HPI related to upcoming procedure: end stage degenerative joint disease right knee       Health Care Directive  Patient has a Health Care Directive on file         Preop Questions 5/30/2024    1. Have you ever had a heart attack or stroke? No   2. Have you ever had surgery on your heart or blood vessels, such as a stent placement, a coronary artery bypass, or surgery on an artery in your head, neck, heart, or legs? No   3. Do you have chest pain with activity? No   4. Do you have a history of  heart failure? No   5. Do you currently have a cold, bronchitis or symptoms of other infection? No   6. Do you have a cough, shortness of breath, or wheezing?  No   7. Do you or anyone in your family have previous history of blood clots? YES -personal history of phlebitis/possible DVT in the 1970s.  On chronic anticoagulation already for A. fib   8. Do you or does anyone in your family have a serious bleeding problem such as prolonged bleeding following surgeries or cuts? No   9. Have you ever had problems with anemia or been told to take iron pills? No   10. Have you had any abnormal blood loss such as black, tarry or bloody stools, or abnormal vaginal bleeding? No   11. Have you ever had a blood transfusion? No   12. Are you willing to have a blood transfusion if it is medically needed before, during, or after your surgery? Yes   13. Have you or any of your relatives ever had problems with anesthesia? YES - daughter had ill defined anesthesia reaction, no personal anesthesia reactions for the patient   14. Do you have sleep apnea, excessive snoring or daytime drowsiness? No   15. Do you have any artifical heart valves or other implanted medical devices like a pacemaker, defibrillator, or continuous glucose monitor? No   16. Do you have artificial joints? YES - left hip replacement June 2022; right hip replacement April 2017 & left knee replacement July 2017   17. Are you allergic to latex? No   18. Is there any chance that you may be pregnant? No             Preoperative Review of    reviewed - no record of controlled substances prescribed.        *  No recent infectious illnesses.    *  No recent cardiac or pulmonary issues or symptoms.    *  No problems performing vigorous physical activity, no changes in exercise tolerance.    *  No personal or family history of anesthesia complications.    *  No personal or family history of bleeding or clotting disorders.       Diabetes:  In regards specifically to the patient's diabetes, they reports no unusual symptoms.  Medication compliance: not applicable, diet controlled diabetes  Diabetic diet compliance:  good    Patient reports no significant episodes of hypo- or hyperglycemia    Diabetic complications: none     Most  recent labs:    Lab Results   Component Value Date    A1C 6.6 05/28/2024    A1C 6.6 11/27/2023    A1C 6.8 05/16/2023    A1C 6.9 11/10/2022    A1C 6.4 06/16/2022    A1C 6.0 04/08/2022    A1C 6.0 09/30/2021    A1C 6.2 03/24/2021    A1C 6.2 09/23/2020    A1C 6.2 12/17/2019    A1C 6.0 06/17/2019    A1C 6.1 11/02/2018    A1C 6.4 03/21/2017        Has history of atrial fibrillation.    No palpitations, no dizziness, no dyspnea on exertion, no shortness of breath.  No racing heart, no fast heart rates.    Taking medications as ordered, no side effects reported.   Patient is taking anticoagulation according to direction, with no reported side effects.     COPD remains stable.  Has not had any recent breathing troubles beyond usual baseline.  Has not any acute respiratory events.  Remains with intermittent cough, mild shortness of breath with overexertion as per usual.  Using medication as directed with reported side effects.         Patient Active Problem List    Diagnosis Date Noted    Mixed hyperlipidemia 11/07/2012     Priority: High    Osteopenia 11/01/2005     Priority: High     Problem list name updated by automated process. Provider to review      Personal history of tobacco use, presenting hazards to health (quit 9/7/15) 11/01/2005     Priority: High    Right upper lobe pulmonary nodule 05/19/2023     Priority: Medium     2 mm RUL pulmonary nodule seen on screening Chest CT      Status post hip replacement, left 06/27/2022     Priority: Medium     Left total hip arthroplasty @ [facility]         S/P total hip arthroplasty 06/27/2022     Priority: Medium     Left total hip arthroplasty 6/27/2022 at [facility]      Atherosclerosis of aorta (H24) 12/20/2019     Priority: Medium    Deformity of toe of right foot 11/19/2018     Priority: Medium    Paroxysmal atrial fibrillation (H)  06/19/2018     Priority: Medium    Aftercare following joint replacement [Z47.1] 08/04/2017     Priority: Medium    Knee joint replacement status 07/31/2017     Priority: Medium    Psoriasis 07/17/2017     Priority: Medium    Type 2 diabetes mellitus with other circulatory complication, without long-term current use of insulin (H) 07/17/2017     Priority: Medium     11/10/22 A1C 6.9  5/16/23 A1C 6.8  11/27/23 A1C 6.6        Personal history of DVT (deep vein thrombosis) 07/17/2017     Priority: Medium     AFTER CHILDBIRTH × 2, ON COUMADIN × 6 WEEKS EACH TIME      Status post right hip replacement 04/03/2017     Priority: Medium     Right total hip arthroplasty @ [facility]       Tinea pedis of left foot 03/21/2017     Priority: Medium    Impaired fasting glucose 03/21/2017     Priority: Medium    Hypercalcemia 03/21/2017     Priority: Medium    Vitamin B12 deficiency (non anemic) 12/05/2016     Priority: Medium     SEVERE      Scurvy 12/05/2016     Priority: Medium     SEVERE      Vitamin D deficiency 12/05/2016     Priority: Medium    Hematuria 12/05/2016     Priority: Medium    Primary osteoarthritis, unspecified site 12/05/2016     Priority: Medium    Arthralgia of both knees 12/05/2016     Priority: Medium    B12 deficiency 10/17/2016     Priority: Medium     B12 178      Hip pain, right 10/14/2016     Priority: Medium    Family history of thyroid disease 10/14/2016     Priority: Medium    Fatigue, unspecified type 10/14/2016     Priority: Medium    Hair loss 10/14/2016     Priority: Medium    Pain in joint, multiple sites 10/14/2016     Priority: Medium    Seborrheic keratosis 10/14/2016     Priority: Medium    Psoriasis with arthropathy (H) 10/14/2016     Priority: Medium    Urine abnormality 10/14/2016     Priority: Medium     BROWNISH STAIN ON UNDERWEAR      Coronary artery disease involving native coronary artery of native heart without angina pectoris 01/01/2016     Priority: Medium      coronary artery calcifications seen on CT scan, mild nonocclusive CAD      Benign essential hypertension      Priority: Medium    COPD (chronic obstructive pulmonary disease) (H) 01/01/2012     Priority: Medium    Eczema 08/31/2009     Priority: Medium    Advance Care Planning 03/12/2012     Priority: Low     Advance Care Planning 3/31/2016: Receipt of ACP document:  Received: Health Care Directive which was witnessed or notarized on 8-3-08.  Document previously scanned on 3-29-16.  Validation form completed and sent to be scanned.  Code Status needs to be updated to reflect choices in most recent ACP document.  Confirmed/documented designated decision maker(s).  Added by Rosa Maria Gutierrez RN, System Director ACP-Honoring Choices   Advance Care Planning 3/12/12 Discussed advance care planning with patient; information given to patient to review. Mary Doherty        Past Medical History:   Diagnosis Date    Arthritis     knees and hips    B12 deficiency 10/14/2016    B12 178    Benign essential hypertension     was on amlodipine/ then metoprolol / add lisinopril     Chronic atrial fibrillation (H)     Contact dermatitis and other eczema, due to unspecified cause     COPD (chronic obstructive pulmonary disease) (H) 01/01/2012    Coronary artery disease involving native coronary artery of native heart without angina pectoris 2016    coronary artery calcifications seen on CT scan, mild nonocclusive CAD    Hyperlipidemia LDL goal <130 11/07/2012    Nephritis      as a child (post strep)     Osteoporosis, unspecified     Phlebitis and thrombophlebitis of other deep vessels of lower extremities 1972, 1975    Both with pregnancies    Prediabetes 11/01/2018    A1C 6.1    Sinusitis     Tobacco use disorder      Past Surgical History:   Procedure Laterality Date    ABDOMEN SURGERY  April2015    ARTHROPLASTY HIP Right 04/03/2017    Procedure: ARTHROPLASTY HIP;  Surgeon: Francisoc J Alston MD;  Location: [facility] OR    ARTHROPLASTY  HIP ANTERIOR Left 6/27/2022    Procedure: LEFT TOTAL HIP ARTHROPLASTY DIRECT ANTERIOR APPROACH;  Surgeon: Francisco J Alston MD;  Location: [facility] OR    ARTHROPLASTY KNEE Left 07/31/2017    Procedure: ARTHROPLASTY KNEE;  LEFT TOTAL KNEE ARTHROPLASTY ;  Surgeon: Francisco J Alston MD;  Location: [facility] OR    BIOPSY OF SKIN LESION      C DEXA, BONE DENSITY, AXIAL SKEL  06/2008    T score lumbar -0.6, femoral neck -2.4/-2.0(all stable)    COLONOSCOPY  6/21/21    HC ASPIRATION &/OR INJECTION GANGLION CYST, ANY  1994    HERNIORRHAPHY INGUINAL Right 03/24/2015    Procedure: HERNIORRHAPHY INGUINAL;  Surgeon: Sam Erazo MD;  Location: [facility]    HYSTERECTOMY, PAP NO LONGER INDICATED      ORTHOPEDIC SURGERY      bilat meniscus repair    ZZC VAGINAL HYSTERECTOMY  1978    Hysterectomy, Vaginal     Current Outpatient Medications   Medication Sig Dispense Refill    albuterol (PROAIR HFA/PROVENTIL HFA/VENTOLIN HFA) 108 (90 Base) MCG/ACT inhaler Inhale 2 puffs into the lungs every 4 hours as needed for shortness of breath or wheezing GENERIC VENTOLIN/ALBUTEROL 18 g 11    albuterol (PROVENTIL) (2.5 MG/3ML) 0.083% neb solution Take 1 vial (2.5 mg) by nebulization every 6 hours as needed for shortness of breath / dyspnea or wheezing 90 mL 3    apixaban ANTICOAGULANT (ELIQUIS) 5 MG tablet Take 1 tablet (5 mg) by mouth 2 times daily 200 tablet 4    ASPIRIN NOT PRESCRIBED (INTENTIONAL) Please choose reason not prescribed from choices below.      atorvastatin (LIPITOR) 20 MG tablet Take 1 tablet (20 mg) by mouth daily 90 tablet 3    betamethasone dipropionate (DIPROSONE) 0.05 % external cream Apply 1 FTU sparingly once or twice per day as needed to affected area until the skin is better, then stop; REPEAT AS NEEDED 45 g 2    calcium-vitamin D (OSCAL) 250-125 MG-UNIT per tablet Take 2 tablets by mouth daily      Cyanocobalamin (B-12) 1000 MCG TBCR Place 1,000 mcg under the tongue every other day      diltiazem ER COATED  BEADS (CARDIZEM CD/CARTIA XT) 180 MG 24 hr capsule Take 1 capsule (180 mg) by mouth daily with breakfast 90 capsule 2    famotidine (PEPCID) 20 MG tablet Take 20 mg by mouth as needed      folic acid (FOLVITE) 400 MCG tablet Take 400 mcg by mouth daily      order for DME Equipment being ordered: Nebulizer 1 each 0    spironolactone (ALDACTONE) 25 MG tablet Take 1 tablet (25 mg) by mouth daily 90 tablet 3    tiotropium (SPIRIVA HANDIHALER) 18 MCG inhaled capsule Inhale 1 capsule (18 mcg) into the lungs daily GENERIC TIOTROPIUM/SPIRIVA 90 capsule 3    triamcinolone (KENALOG) 0.1 % cream Apply topically 2 times daily as needed for irritation (sores on scalp.)      triamcinolone (KENALOG) 0.1 % external lotion Apply sparingly once or twice per day as needed to affected area until the skin is better, then stop; REPEAT AS NEEDED 60 mL 2    Urea 20 % CREA cream Apply topically as needed      vitamin C-electrolytes (EMERGEN-C) 1000mg vitamin C super orange drink mix Take 1 packet by mouth daily Mix 1 packet in 4-6oz water.      vitamin D3 (CHOLECALCIFEROL) 1.25 MG (88508 UT) capsule Take 1 capsule (50,000 Units) by mouth every 14 days SIG: TAKE INDICATION:  AND 15 OF EACH MONTH, TO TREAT VITAMIN D DEFICIENCY 24 capsule 1       Allergies   Allergen Reactions    Penicillins Rash     rash    Sulfa Antibiotics Rash     rash        Social History     Tobacco Use    Smoking status: Former     Current packs/day: 0.00     Average packs/day: 1 pack/day for 51.6 years (51.6 ttl pk-yrs)     Types: Cigarettes     Start date: 1964     Quit date: 2015     Years since quittin.7    Smokeless tobacco: Never   Substance Use Topics    Alcohol use: Yes     Comment: Rarely have any alcohol     Family History   Problem Relation Age of Onset    C.A.D. Father     Diabetes Father     Myocardial Infarction Father     Heart Surgery Father         CABGx4    Hyperlipidemia Father     Breast Cancer Mother 70    Osteoporosis Mother      "Thyroid Disease Mother     Hyperlipidemia Mother     Cancer Brother         Bladder    Hyperlipidemia Brother     Diabetes Brother     Colon Cancer Brother     Other Cancer Brother     Neurologic Disorder Paternal Grandfather     Osteoporosis Sister     Arrhythmia Sister     Heart Surgery Sister         cardioversion, ablation    Other - See Comments Sister 55        arhythmogenic right ventricular dysplasia    Thyroid Disease Sister     Osteoporosis Maternal Grandmother     Osteoporosis Paternal Grandmother     Other - See Comments Maternal Grandfather         pneumonia    Family History Negative Son     Thyroid Disease Daughter     Family History Negative Daughter     Anesthesia Reaction Daughter     Arrhythmia Cousin         a-fib    Cancer - colorectal No family hx of     Prostate Cancer No family hx of     Alzheimer Disease No family hx of     Blood Disease No family hx of     Eye Disorder No family hx of      History   Drug Use No             Review of Systems  Constitutional, neuro, ENT, endocrine, pulmonary, cardiac, gastrointestinal, genitourinary, musculoskeletal, integument and psychiatric systems are negative, except as otherwise noted.    Objective    /71   Pulse 57   Temp 97.9  F (36.6  C) (Temporal)   Resp 15   Ht 1.638 m (5' 4.5\")   Wt 79.4 kg (175 lb)   LMP  (LMP Unknown)   SpO2 96%   BMI 29.57 kg/m     Estimated body mass index is 29.57 kg/m  as calculated from the following:    Height as of this encounter: 1.638 m (5' 4.5\").    Weight as of this encounter: 79.4 kg (175 lb).  Physical Exam  GENERAL alert and no distress  EYES:  Normal sclera,conjunctiva, EOMI  HENT: oral and posterior pharynx without lesions or erythema, facies symmetric  NECK: Neck supple. No LAD, without thyroidmegaly.  RESP: Clear to ausculation bilaterally without wheezes or crackles. Normal BS in all fields.  CV: RRR normal S1S2 without murmurs, rubs or gallops.  LYMPH: no cervical lymph adenopathy " appreciated  MS: extremities- no gross deformities of the visible extremities noted,   EXT:  no lower extremity edema  PSYCH: Alert and oriented times 3; speech- coherent  SKIN:  No obvious significant skin lesions on visible portions of face     Recent Labs   Lab Test 05/28/24  0753 11/27/23  0753   HGB 14.5  --      --     140   POTASSIUM 4.2 4.1   CR 0.80 0.79   A1C 6.6* 6.6*        Diagnostics  No labs were ordered during this visit.     EKG (5/30/2024): sinus bradycardia, normal axis, normal intervals, no acute ST/T changes c/w ischemia, no LVH by voltage criteria, Poor R-wave progression c/w pulmonary disease,  Low voltage with rightward P-axis and rotation -c/w pulmonary disease; unchanged from previous tracings      Revised Cardiac Risk Index (RCRI)  The patient has the following serious cardiovascular risks for perioperative complications:   - No serious cardiac risks = 0 points     RCRI Interpretation: 0 points: Class I (very low risk - 0.4% complication rate)       The longitudinal plan of care for the diagnosis(es)/condition(s) as documented were addressed during this visit. Due to the added complexity in care, I will continue to support Leah in the subsequent management and with ongoing continuity of care.      Signed Electronically by:       Kendall Mendez MD  Copy of this evaluation report is provided to requesting physician.               Answers submitted by the patient for this visit:  General Questionnaire (Submitted on 5/30/2024)  Chief Complaint: Chronic problems general questions HPI Form  What is the reason for your visit today? : follow up labs  How many servings of fruits and vegetables do you eat daily?: 2-3  On average, how many sweetened beverages do you drink each day (Examples: soda, juice, sweet tea, etc.  Do NOT count diet or artificially sweetened beverages)?: 0  How many minutes a day do you exercise enough to make your heart beat faster?: 20 to 29  How many  days a week do you exercise enough to make your heart beat faster?: 4  How many days per week do you miss taking your medication?: 0

## 2024-05-30 NOTE — PATIENT INSTRUCTIONS
Continue all medications at the same doses.  Contact your usual pharmacy if you need refills.      Plan to get the annual influenza vaccine each fall for sure by the end of October for the best protection throughout the winter flu season.   Get this from any pharmacy or flu shot clinic.       Plan to get an updated Covid booster each fall at least by the end of October for the best protection throughout the winter season.   Get this from any pharmacy or Covid vaccine clinic.        Return to see me in approximately 6 months, schedule a follow up visit sooner if needed for anything else.  Please get nonfasting labs done in the [facility] or at any other [facility] lab 1-2 days before this appointment.  If you get the labs done at another clinic, make arrangements with that clinic directly.  The orders will be in place.  Use [employer] or Call [phone] to schedule the appointment with me and lab appointment.       PRE-OPERATIVE INSTRUCTIONS:     Contact your surgeon if there is any change in your health. This includes signs of new infection, such as cold or flu (such as a sore throat, runny nose, cough, rash or fever).  Elective surgeries may need to be postponed if there is significant illness present.    Pre-procedure Covid testing is no longer required unless specifically requested by the surgeon or surgical facility.      Stop aspirin of any kind for 7 days before procedure.   (even low dose daily aspirin).    No NSAIDs (Motrin, Advil, ibuprofen, Aleve, etc) within 5-7 days of surgery.    Stop any Fish Oil or multi vitamin (and specifically anything containing vitamin E) supplements for 7 days prior to surgery because these can affect platelet function.      Tylenol (acetaminophen) is OK to take if needed, this medication has no effect on platelet function.  Follow instructions on the bottle       STOP ELIQUIS (Apixaban) 2 DAYS ( 4 DOSES) PRIOR TO SURGERY      Follow any preparation instructions as  "given by the surgeons.  Prepare your body as instructed by the surgery clinic.  If instructed, Take a shower or bath the night before surgery. Use any special soaps or cleaning instructions according to instructions from your surgeon.  If you do not have soap from your surgeon, use your regular soap. Do not shave or scrub the surgery site.  Wear clean pajamas and have clean sheets on your bed     No solid food after midnight    ON THE MORNING OF SURGERY:     --Do NOT take Spironolactone      --use your usual inhalers     --Ok to take any other oral medications    Resume all medications at the same doses after surgery (no \"make up\" doses needed), unless instructed otherwise by the medical staff.     Follow all specific postoperative instructions (including any specific medications) as given by the surgeons    Attend all follow up appointments with the surgeons (and/or therapists if applicable) as instructed.     Contact the surgeon's office for any specific questions about after-surgery cares and follow up instructions.      You do not need to necessarily return to see anyone in the primary care clinic after your surgery unless specifically instructed to do so.      If your planned surgery gets re-scheduled to a date that falls outside the 30 day window from the date of this pre-op exam, you do NOT need to return to see us for another pre-op exam.  Depending on the rescheduled date, we can probably still clear you for the surgery with this exam performed today, but I will have to write and addendum from the pre-op exam from today dated within the 30 days of the surgery date.   Please contact me with any changes in the date, time, and place of surgery.        IF YOU REQUIRE NARCOTIC PAIN MEDICATION AFTER YOUR SURGERY:    --Take the narcotic pain medication exactly as prescribed, and use the absolute lowest dose needed for pain control.   The goal of pain medication is not complete pain relief, aim to just make it " "manageable.    --Beware of drowsiness, nausea, vomiting, when taking this medication.  Do not drive, or operate dangerous equipment after taking this.    --The main side effects from narcotic pain medication can also include intestinal side effects including nausea, vomiting, constipation, or diarrhea.    --If your pain is not able to be controlled with the pain medication supplied to you, contact the surgeons because uncontrolled pain can be a sign of possible surgical complications.    --In case of constipation from pain medications, take over the counter Miralax powder or stool softner Senokot.  If you have a history of constipation with narcotic pain medication, consider taking Miralax powder on any day that you take a pain tablet.                      5 GOALS IN MANAGING DIABETES (i.e. to give the best chance to prevent diabetic complications and vascular disease):     1.  Aggressive diabetic management.  The target for A1C (3 months average blood sugar) is ideally below 6.5, preferably below 7.5    Your diabetes is under good control.      Lab Results   Component Value Date    A1C 6.6 05/28/2024    A1C 6.6 11/27/2023    A1C 6.8 05/16/2023    A1C 6.9 11/10/2022    A1C 6.4 06/16/2022    A1C 6.0 04/08/2022    A1C 6.0 09/30/2021    A1C 6.2 03/24/2021    A1C 6.2 09/23/2020    A1C 6.2 12/17/2019    A1C 6.0 06/17/2019    A1C 6.1 11/02/2018    A1C 6.4 03/21/2017       2.  Aggressive blood pressure control, under 130/80 ideally.  Using medications if needed.    Your blood pressure is under good control    BP Readings from Last 4 Encounters:   05/30/24 124/71   11/30/23 122/62   10/02/23 130/78   05/19/23 102/66       3.  Aggressive LDL cholesterol (bad cholesterol) lowering as needed.  Your goal is an LDL under 100 for sure, preferably under 70.     *  All patients with diabetes are recommended to be on a \"statin\" cholesterol lowering medication regardless of the cholesterol levels to give the best chance at avoiding " "vascular disease.      New guidelines identify four high-risk groups who could benefit from statins:   *people with pre-existing heart disease, such as those who have had a heart attack;   *people ages 40 to 75 who have diabetes of any type  *patients ages 40 to 75 with at least a 7.5% risk of developing cardiovascular disease over the next decade, according to a formula described in the guidelines  *patients with the sort of super-high cholesterol that sometimes runs in families, as evidenced by an LDL of 190 milligrams per deciliter or higher    *  Your cholesterol levels are well controlled.    Recent Labs   Lab Test 05/28/24  0753 11/27/23  0753   CHOL 154 160   HDL 50 48*   LDL 92 99   TRIG 61 66       4.  No smoking    5.  Consider daily preventative Aspirin once per day, every day over age 50 unless there is a specific reason that you cannot take aspirin.       *You should take Aspirin 81 mg once per day, unless you have a reason NOT to take aspirin (i.e. side effect, excessive bruising or bleeding, taking another \"blood tinning\" medication, etc.)       DIABETES REMINDERS:   1. Check your blood glucose regularly either with standard glucometer or personal continuous glucose monitor.    2) Your blood sugar goals:  before eating and  two hours after eating.  If using personal continuous glucose monitor, the goal is Time in the Target range ( ) of 70-75% with very few (under 2%) Below target.    3) Always be prepared to treat low blood sugar symptoms should it happen. Keep a sugar-containing beverage or food nearby.  4) When to call your clinic:     Blood sugar over 400     If you have 2 to 3 low blood sugars (under 70) in a row    Low readings the same time of day several days in a row  5) When to call 911: If your low blood glucose symptoms do not get better with treatment, or if you/someone else is unable to give you treatment.  6) Work with a Certified Diabetes Educator to assist you with " your diabetes management  7) Contact us if you have ANY questions about your medications or instructions, or have problems with getting prescriptions filled.

## 2024-06-28 RX ORDER — SENNOSIDES 8.6 MG
650-1300 CAPSULE ORAL EVERY 8 HOURS PRN
Status: ON HOLD | COMMUNITY
End: 2024-07-02

## 2024-06-28 RX ORDER — ACETAMINOPHEN 500 MG
500-1000 TABLET ORAL EVERY 6 HOURS PRN
Status: ON HOLD | COMMUNITY
End: 2024-07-02

## 2024-06-28 RX ORDER — ERYTHROMYCIN 250 MG/1
1000 TABLET, COATED ORAL PRN
COMMUNITY

## 2024-06-28 NOTE — PROGRESS NOTES
PTA medications updated by Medication Scribe prior to surgery via phone call with patient (last doses completed by Nurse)     Medication history sources: Patient, Surescripts, and H&P  In the past week, patient estimated taking medication this percent of the time: Greater than 90%      Significant changes made to the medication list:  None      Additional medication history information:   Patient was advised to bring: Albuterol inhaler, Diprosone cream, Spiriva Handihaler, Triamcinolone cream and lotion, Urea cream     Medication reconciliation completed by provider prior to medication history? No    Time spent in this activity: 40 minutes    The information provided in this note is only as accurate as the sources available at the time of update(s)      Prior to Admission medications    Medication Sig Last Dose Taking? Auth Provider Long Term End Date   acetaminophen (8 HOUR ARTHRITIS PAIN) 650 MG CR tablet Take 650-1,300 mg by mouth every 8 hours as needed for mild pain or fever ([id number])  at PRN Yes Reported, Patient     acetaminophen (TYLENOL) 500 MG tablet Take 500-1,000 mg by mouth every 6 hours as needed for mild pain ([id number])  at PRN Yes Reported, Patient     albuterol (PROAIR HFA/PROVENTIL HFA/VENTOLIN HFA) 108 (90 Base) MCG/ACT inhaler Inhale 2 puffs into the lungs every 4 hours as needed for shortness of breath or wheezing GENERIC VENTOLIN/ALBUTEROL  at PRN Yes Kendall Mendez MD Yes    albuterol (PROVENTIL) (2.5 MG/3ML) 0.083% neb solution Take 1 vial (2.5 mg) by nebulization every 6 hours as needed for shortness of breath / dyspnea or wheezing  at PRN Yes Kendall Mendez MD Yes    apixaban ANTICOAGULANT (ELIQUIS) 5 MG tablet Take 1 tablet (5 mg) by mouth 2 times daily 6/28/2024 at pm Yes Suzanne, Rufina Gr PA-C     atorvastatin (LIPITOR) 20 MG tablet Take 1 tablet (20 mg) by mouth daily  at pm Yes Suzanne, Rufina Gr PA-C Yes    betamethasone dipropionate  (DIPROSONE) 0.05 % external cream Apply 1 FTU sparingly once or twice per day as needed to affected area until the skin is better, then stop; REPEAT AS NEEDED  at PRN Yes Kendall Mendez MD     calcium-vitamin D (OSCAL) 250-125 MG-UNIT per tablet Take 2 tablets by mouth daily 6/19/2024 at am Yes Reported, Patient     Cyanocobalamin (B-12) 1000 MCG TBCR Place 1,000 mcg under the tongue every other day 6/19/2024 at am Yes Kendall Mendez MD     diltiazem ER COATED BEADS (CARDIZEM CD/CARTIA XT) 180 MG 24 hr capsule Take 1 capsule (180 mg) by mouth daily with breakfast  at am Yes More, Rufina Gr PA-C Yes    erythromycin (E-MYCIN) 250 MG tablet Take 1,000 mg by mouth as needed ([id number]) Before dental appointment  at PRN Yes Reported, Patient     famotidine (PEPCID) 20 MG tablet Take 20 mg by mouth as needed  at PRN Yes Reported, Patient     folic acid (FOLVITE) 400 MCG tablet Take 400 mcg by mouth daily 6/19/2024 at am Yes Reported, Patient     Psyllium (NATURAL FIBER LAXATIVE PO) Take 2 capsules by mouth daily  Yes Reported, Patient     spironolactone (ALDACTONE) 25 MG tablet Take 1 tablet (25 mg) by mouth daily  at am Yes More, Rufina Gr PA-C Yes    tiotropium (SPIRIVA HANDIHALER) 18 MCG inhaled capsule Inhale 1 capsule (18 mcg) into the lungs daily GENERIC TIOTROPIUM/SPIRIVA  at am Yes Kendall Mendez MD Yes    triamcinolone (KENALOG) 0.1 % cream Apply topically 2 times daily as needed for irritation (sores on scalp.)  at PRN Yes Reported, Patient     triamcinolone (KENALOG) 0.1 % external lotion Apply sparingly once or twice per day as needed to affected area until the skin is better, then stop; REPEAT AS NEEDED  at PRN Yes Kendall Mendez MD     Urea 20 % CREA cream Apply topically as needed  at PRN Yes Reported, Patient     vitamin C-electrolytes (EMERGEN-C) 1000mg vitamin C super orange drink mix Take 1 packet by mouth daily Mix 1 packet in 4-6oz water.   at am Yes Reported, Patient     vitamin D3 (CHOLECALCIFEROL) 1.25 MG (73317 UT) capsule Take 1 capsule (50,000 Units) by mouth every 14 days SIG: TAKE INDICATION: 1ST AND 15TH OF EACH MONTH, TO TREAT VITAMIN D DEFICIENCY 6/12/2024 at am Yes Kendall Mendez MD     ASPIRIN NOT PRESCRIBED (INTENTIONAL) Please choose reason not prescribed from choices below.   Kendall Mendez MD Yes    order for DME Equipment being ordered: Nebulizer   Kendall Mendez MD         Medication history completed by: Jose A Arias

## 2024-07-01 ENCOUNTER — APPOINTMENT (OUTPATIENT)
Dept: GENERAL RADIOLOGY | Facility: CLINIC | Age: 74
End: 2024-07-01
Attending: ORTHOPAEDIC SURGERY
Payer: COMMERCIAL

## 2024-07-01 ENCOUNTER — ANESTHESIA EVENT (OUTPATIENT)
Dept: SURGERY | Facility: CLINIC | Age: 74
End: 2024-07-01
Payer: COMMERCIAL

## 2024-07-01 ENCOUNTER — HOSPITAL ENCOUNTER (OUTPATIENT)
Facility: CLINIC | Age: 74
Discharge: HOME | End: 2024-07-02
Attending: ORTHOPAEDIC SURGERY | Admitting: ORTHOPAEDIC SURGERY
Payer: COMMERCIAL

## 2024-07-01 ENCOUNTER — ANESTHESIA (OUTPATIENT)
Dept: SURGERY | Facility: CLINIC | Age: 74
End: 2024-07-01
Payer: COMMERCIAL

## 2024-07-01 ENCOUNTER — APPOINTMENT (OUTPATIENT)
Dept: PHYSICAL THERAPY | Facility: CLINIC | Age: 74
End: 2024-07-01
Attending: ORTHOPAEDIC SURGERY
Payer: COMMERCIAL

## 2024-07-01 DIAGNOSIS — Z96.651 STATUS POST RIGHT KNEE REPLACEMENT: Primary | ICD-10-CM

## 2024-07-01 PROBLEM — Z96.659 KNEE JOINT REPLACEMENT STATUS: Status: ACTIVE | Noted: 2017-07-31

## 2024-07-01 LAB
CREAT SERPL-MCNC: 0.73 MG/DL (ref 0.51–0.95)
CREAT SERPL-MCNC: 0.81 MG/DL (ref 0.51–0.95)
EGFRCR SERPLBLD CKD-EPI 2021: 75 ML/MIN/1.73M2
EGFRCR SERPLBLD CKD-EPI 2021: 85 ML/MIN/1.73M2
GLUCOSE BLDC GLUCOMTR-MCNC: 191 MG/DL (ref 70–99)
GLUCOSE SERPL-MCNC: 130 MG/DL (ref 70–99)
POTASSIUM SERPL-SCNC: 4.1 MMOL/L (ref 3.4–5.3)

## 2024-07-01 PROCEDURE — 250N000009 HC RX 250: Performed by: ANESTHESIOLOGY

## 2024-07-01 PROCEDURE — 82947 ASSAY GLUCOSE BLOOD QUANT: CPT | Performed by: ORTHOPAEDIC SURGERY

## 2024-07-01 PROCEDURE — 250N000009 HC RX 250: Performed by: ORTHOPAEDIC SURGERY

## 2024-07-01 PROCEDURE — 258N000003 HC RX IP 258 OP 636: Performed by: ORTHOPAEDIC SURGERY

## 2024-07-01 PROCEDURE — 97161 PT EVAL LOW COMPLEX 20 MIN: CPT | Mod: GP

## 2024-07-01 PROCEDURE — 258N000003 HC RX IP 258 OP 636: Performed by: ANESTHESIOLOGY

## 2024-07-01 PROCEDURE — 250N000011 HC RX IP 250 OP 636: Performed by: ANESTHESIOLOGY

## 2024-07-01 PROCEDURE — 97116 GAIT TRAINING THERAPY: CPT | Mod: GP

## 2024-07-01 PROCEDURE — 250N000013 HC RX MED GY IP 250 OP 250 PS 637: Performed by: ORTHOPAEDIC SURGERY

## 2024-07-01 PROCEDURE — 99100 ANES PT EXTEME AGE<1 YR&>70: CPT | Performed by: NURSE ANESTHETIST, CERTIFIED REGISTERED

## 2024-07-01 PROCEDURE — 250N000009 HC RX 250: Performed by: NURSE ANESTHETIST, CERTIFIED REGISTERED

## 2024-07-01 PROCEDURE — 710N000009 HC RECOVERY PHASE 1, LEVEL 1, PER MIN: Performed by: ORTHOPAEDIC SURGERY

## 2024-07-01 PROCEDURE — C1776 JOINT DEVICE (IMPLANTABLE): HCPCS | Performed by: ORTHOPAEDIC SURGERY

## 2024-07-01 PROCEDURE — 360N000077 HC SURGERY LEVEL 4, PER MIN: Performed by: ORTHOPAEDIC SURGERY

## 2024-07-01 PROCEDURE — 97530 THERAPEUTIC ACTIVITIES: CPT | Mod: GP

## 2024-07-01 PROCEDURE — 36415 COLL VENOUS BLD VENIPUNCTURE: CPT | Performed by: ANESTHESIOLOGY

## 2024-07-01 PROCEDURE — 250N000011 HC RX IP 250 OP 636: Performed by: ORTHOPAEDIC SURGERY

## 2024-07-01 PROCEDURE — 82565 ASSAY OF CREATININE: CPT | Performed by: ANESTHESIOLOGY

## 2024-07-01 PROCEDURE — 27447 TOTAL KNEE ARTHROPLASTY: CPT | Performed by: ANESTHESIOLOGY

## 2024-07-01 PROCEDURE — 250N000025 HC SEVOFLURANE, PER MIN: Performed by: ORTHOPAEDIC SURGERY

## 2024-07-01 PROCEDURE — 370N000017 HC ANESTHESIA TECHNICAL FEE, PER MIN: Performed by: ORTHOPAEDIC SURGERY

## 2024-07-01 PROCEDURE — 999N000065 XR KNEE PORT RIGHT 1/2 VIEWS: Mod: RT

## 2024-07-01 PROCEDURE — 84132 ASSAY OF SERUM POTASSIUM: CPT | Performed by: ANESTHESIOLOGY

## 2024-07-01 PROCEDURE — 82565 ASSAY OF CREATININE: CPT | Performed by: ORTHOPAEDIC SURGERY

## 2024-07-01 PROCEDURE — 82962 GLUCOSE BLOOD TEST: CPT

## 2024-07-01 PROCEDURE — 36415 COLL VENOUS BLD VENIPUNCTURE: CPT | Performed by: ORTHOPAEDIC SURGERY

## 2024-07-01 PROCEDURE — 258N000001 HC RX 258: Performed by: ORTHOPAEDIC SURGERY

## 2024-07-01 PROCEDURE — C1713 ANCHOR/SCREW BN/BN,TIS/BN: HCPCS | Performed by: ORTHOPAEDIC SURGERY

## 2024-07-01 PROCEDURE — 27447 TOTAL KNEE ARTHROPLASTY: CPT | Performed by: NURSE ANESTHETIST, CERTIFIED REGISTERED

## 2024-07-01 PROCEDURE — 999N000141 HC STATISTIC PRE-PROCEDURE NURSING ASSESSMENT: Performed by: ORTHOPAEDIC SURGERY

## 2024-07-01 PROCEDURE — 272N000001 HC OR GENERAL SUPPLY STERILE: Performed by: ORTHOPAEDIC SURGERY

## 2024-07-01 DEVICE — IMPLANTABLE DEVICE
Type: IMPLANTABLE DEVICE | Site: KNEE | Status: FUNCTIONAL
Brand: VANGUARD® KNEE SYSTEM

## 2024-07-01 DEVICE — IMP COMP FEMORAL VANGARD BIOM RT 67.5MM 183010: Type: IMPLANTABLE DEVICE | Site: KNEE | Status: FUNCTIONAL

## 2024-07-01 DEVICE — IMP COMP TIBIAL KNEE ASCENT 71MM 141223: Type: IMPLANTABLE DEVICE | Site: KNEE | Status: FUNCTIONAL

## 2024-07-01 DEVICE — BONE CEMENT RADIOPAQUE SIMPLEX HV FULL DOSE 6194-1-001: Type: IMPLANTABLE DEVICE | Site: KNEE | Status: FUNCTIONAL

## 2024-07-01 DEVICE — IMP COMP PATELLA BIOM 3 PEG 34MM STD 184766: Type: IMPLANTABLE DEVICE | Site: KNEE | Status: FUNCTIONAL

## 2024-07-01 RX ORDER — AMOXICILLIN 250 MG
1 CAPSULE ORAL 2 TIMES DAILY
Status: DISCONTINUED | OUTPATIENT
Start: 2024-07-01 | End: 2024-07-02 | Stop reason: HOSPADM

## 2024-07-01 RX ORDER — ONDANSETRON 4 MG/1
4 TABLET, ORALLY DISINTEGRATING ORAL EVERY 30 MIN PRN
Status: DISCONTINUED | OUTPATIENT
Start: 2024-07-01 | End: 2024-07-01 | Stop reason: HOSPADM

## 2024-07-01 RX ORDER — AMOXICILLIN 250 MG
1-2 CAPSULE ORAL 2 TIMES DAILY
Qty: 30 TABLET | Refills: 0 | Status: SHIPPED | OUTPATIENT
Start: 2024-07-01 | End: 2024-08-20

## 2024-07-01 RX ORDER — BETAMETHASONE DIPROPIONATE 0.5 MG/G
CREAM TOPICAL 2 TIMES DAILY
Status: DISCONTINUED | OUTPATIENT
Start: 2024-07-01 | End: 2024-07-01

## 2024-07-01 RX ORDER — ENOXAPARIN SODIUM 100 MG/ML
40 INJECTION SUBCUTANEOUS EVERY 24 HOURS
Status: DISCONTINUED | OUTPATIENT
Start: 2024-07-02 | End: 2024-07-02 | Stop reason: HOSPADM

## 2024-07-01 RX ORDER — BISACODYL 10 MG
10 SUPPOSITORY, RECTAL RECTAL DAILY PRN
Status: DISCONTINUED | OUTPATIENT
Start: 2024-07-04 | End: 2024-07-02 | Stop reason: HOSPADM

## 2024-07-01 RX ORDER — TRANEXAMIC ACID 10 MG/ML
1 INJECTION, SOLUTION INTRAVENOUS CONTINUOUS
Status: DISCONTINUED | OUTPATIENT
Start: 2024-07-01 | End: 2024-07-01 | Stop reason: HOSPADM

## 2024-07-01 RX ORDER — NALOXONE HYDROCHLORIDE 0.4 MG/ML
0.4 INJECTION, SOLUTION INTRAMUSCULAR; INTRAVENOUS; SUBCUTANEOUS
Status: DISCONTINUED | OUTPATIENT
Start: 2024-07-01 | End: 2024-07-02 | Stop reason: HOSPADM

## 2024-07-01 RX ORDER — ALBUTEROL SULFATE 0.83 MG/ML
2.5 SOLUTION RESPIRATORY (INHALATION) EVERY 6 HOURS PRN
Status: DISCONTINUED | OUTPATIENT
Start: 2024-07-01 | End: 2024-07-02 | Stop reason: HOSPADM

## 2024-07-01 RX ORDER — DILTIAZEM HYDROCHLORIDE 180 MG/1
180 CAPSULE, COATED, EXTENDED RELEASE ORAL DAILY
Status: DISCONTINUED | OUTPATIENT
Start: 2024-07-02 | End: 2024-07-02 | Stop reason: HOSPADM

## 2024-07-01 RX ORDER — ONDANSETRON 2 MG/ML
4 INJECTION INTRAMUSCULAR; INTRAVENOUS EVERY 6 HOURS PRN
Status: DISCONTINUED | OUTPATIENT
Start: 2024-07-01 | End: 2024-07-02 | Stop reason: HOSPADM

## 2024-07-01 RX ORDER — NALOXONE HYDROCHLORIDE 0.4 MG/ML
0.2 INJECTION, SOLUTION INTRAMUSCULAR; INTRAVENOUS; SUBCUTANEOUS
Status: DISCONTINUED | OUTPATIENT
Start: 2024-07-01 | End: 2024-07-02 | Stop reason: HOSPADM

## 2024-07-01 RX ORDER — ONDANSETRON 2 MG/ML
4 INJECTION INTRAMUSCULAR; INTRAVENOUS EVERY 30 MIN PRN
Status: DISCONTINUED | OUTPATIENT
Start: 2024-07-01 | End: 2024-07-01 | Stop reason: HOSPADM

## 2024-07-01 RX ORDER — OXYCODONE HYDROCHLORIDE 5 MG/1
5 TABLET ORAL EVERY 4 HOURS PRN
Status: DISCONTINUED | OUTPATIENT
Start: 2024-07-01 | End: 2024-07-02 | Stop reason: HOSPADM

## 2024-07-01 RX ORDER — TRANEXAMIC ACID 650 MG/1
1950 TABLET ORAL ONCE
Status: COMPLETED | OUTPATIENT
Start: 2024-07-01 | End: 2024-07-01

## 2024-07-01 RX ORDER — ACETAMINOPHEN 325 MG/1
650 TABLET ORAL EVERY 4 HOURS PRN
Qty: 100 TABLET | Refills: 0 | Status: SHIPPED | OUTPATIENT
Start: 2024-07-01

## 2024-07-01 RX ORDER — POLYETHYLENE GLYCOL 3350 17 G/17G
17 POWDER, FOR SOLUTION ORAL DAILY
Status: DISCONTINUED | OUTPATIENT
Start: 2024-07-02 | End: 2024-07-02 | Stop reason: HOSPADM

## 2024-07-01 RX ORDER — PROCHLORPERAZINE MALEATE 5 MG
5 TABLET ORAL EVERY 6 HOURS PRN
Status: DISCONTINUED | OUTPATIENT
Start: 2024-07-01 | End: 2024-07-02 | Stop reason: HOSPADM

## 2024-07-01 RX ORDER — ONDANSETRON 4 MG/1
4 TABLET, ORALLY DISINTEGRATING ORAL EVERY 6 HOURS PRN
Status: DISCONTINUED | OUTPATIENT
Start: 2024-07-01 | End: 2024-07-02 | Stop reason: HOSPADM

## 2024-07-01 RX ORDER — HYDROMORPHONE HCL IN WATER/PF 6 MG/30 ML
0.2 PATIENT CONTROLLED ANALGESIA SYRINGE INTRAVENOUS
Status: DISCONTINUED | OUTPATIENT
Start: 2024-07-01 | End: 2024-07-02 | Stop reason: HOSPADM

## 2024-07-01 RX ORDER — FAMOTIDINE 20 MG/1
20 TABLET, FILM COATED ORAL DAILY PRN
Status: DISCONTINUED | OUTPATIENT
Start: 2024-07-01 | End: 2024-07-02 | Stop reason: HOSPADM

## 2024-07-01 RX ORDER — CEFAZOLIN SODIUM 1 G/3ML
1 INJECTION, POWDER, FOR SOLUTION INTRAMUSCULAR; INTRAVENOUS EVERY 8 HOURS
Status: COMPLETED | OUTPATIENT
Start: 2024-07-01 | End: 2024-07-02

## 2024-07-01 RX ORDER — LIDOCAINE 40 MG/G
CREAM TOPICAL
Status: DISCONTINUED | OUTPATIENT
Start: 2024-07-01 | End: 2024-07-01 | Stop reason: HOSPADM

## 2024-07-01 RX ORDER — DEXMEDETOMIDINE HYDROCHLORIDE 4 UG/ML
INJECTION, SOLUTION INTRAVENOUS PRN
Status: DISCONTINUED | OUTPATIENT
Start: 2024-07-01 | End: 2024-07-01

## 2024-07-01 RX ORDER — CEFAZOLIN SODIUM/WATER 2 G/20 ML
2 SYRINGE (ML) INTRAVENOUS SEE ADMIN INSTRUCTIONS
Status: DISCONTINUED | OUTPATIENT
Start: 2024-07-01 | End: 2024-07-01 | Stop reason: HOSPADM

## 2024-07-01 RX ORDER — DEXAMETHASONE SODIUM PHOSPHATE 4 MG/ML
INJECTION, SOLUTION INTRA-ARTICULAR; INTRALESIONAL; INTRAMUSCULAR; INTRAVENOUS; SOFT TISSUE PRN
Status: DISCONTINUED | OUTPATIENT
Start: 2024-07-01 | End: 2024-07-01

## 2024-07-01 RX ORDER — HYDROMORPHONE HCL IN WATER/PF 6 MG/30 ML
0.2 PATIENT CONTROLLED ANALGESIA SYRINGE INTRAVENOUS EVERY 5 MIN PRN
Status: DISCONTINUED | OUTPATIENT
Start: 2024-07-01 | End: 2024-07-01 | Stop reason: HOSPADM

## 2024-07-01 RX ORDER — ATORVASTATIN CALCIUM 20 MG/1
20 TABLET, FILM COATED ORAL DAILY
Status: DISCONTINUED | OUTPATIENT
Start: 2024-07-02 | End: 2024-07-02 | Stop reason: HOSPADM

## 2024-07-01 RX ORDER — LIDOCAINE HYDROCHLORIDE 20 MG/ML
INJECTION, SOLUTION INFILTRATION; PERINEURAL PRN
Status: DISCONTINUED | OUTPATIENT
Start: 2024-07-01 | End: 2024-07-01

## 2024-07-01 RX ORDER — ALBUTEROL SULFATE 90 UG/1
2 AEROSOL, METERED RESPIRATORY (INHALATION) EVERY 4 HOURS PRN
Status: DISCONTINUED | OUTPATIENT
Start: 2024-07-01 | End: 2024-07-02 | Stop reason: HOSPADM

## 2024-07-01 RX ORDER — PROPOFOL 10 MG/ML
INJECTION, EMULSION INTRAVENOUS PRN
Status: DISCONTINUED | OUTPATIENT
Start: 2024-07-01 | End: 2024-07-01

## 2024-07-01 RX ORDER — LABETALOL HYDROCHLORIDE 5 MG/ML
10 INJECTION, SOLUTION INTRAVENOUS
Status: DISCONTINUED | OUTPATIENT
Start: 2024-07-01 | End: 2024-07-01 | Stop reason: HOSPADM

## 2024-07-01 RX ORDER — DEXAMETHASONE SODIUM PHOSPHATE 4 MG/ML
4 INJECTION, SOLUTION INTRA-ARTICULAR; INTRALESIONAL; INTRAMUSCULAR; INTRAVENOUS; SOFT TISSUE
Status: DISCONTINUED | OUTPATIENT
Start: 2024-07-01 | End: 2024-07-01 | Stop reason: HOSPADM

## 2024-07-01 RX ORDER — MAGNESIUM HYDROXIDE 1200 MG/15ML
LIQUID ORAL PRN
Status: DISCONTINUED | OUTPATIENT
Start: 2024-07-01 | End: 2024-07-01 | Stop reason: HOSPADM

## 2024-07-01 RX ORDER — ACETAMINOPHEN 325 MG/1
650 TABLET ORAL EVERY 4 HOURS PRN
Status: DISCONTINUED | OUTPATIENT
Start: 2024-07-04 | End: 2024-07-02 | Stop reason: HOSPADM

## 2024-07-01 RX ORDER — NALOXONE HYDROCHLORIDE 0.4 MG/ML
0.1 INJECTION, SOLUTION INTRAMUSCULAR; INTRAVENOUS; SUBCUTANEOUS
Status: DISCONTINUED | OUTPATIENT
Start: 2024-07-01 | End: 2024-07-01 | Stop reason: HOSPADM

## 2024-07-01 RX ORDER — SODIUM CHLORIDE, SODIUM LACTATE, POTASSIUM CHLORIDE, CALCIUM CHLORIDE 600; 310; 30; 20 MG/100ML; MG/100ML; MG/100ML; MG/100ML
INJECTION, SOLUTION INTRAVENOUS CONTINUOUS
Status: DISCONTINUED | OUTPATIENT
Start: 2024-07-01 | End: 2024-07-02 | Stop reason: HOSPADM

## 2024-07-01 RX ORDER — HYDROMORPHONE HCL IN WATER/PF 6 MG/30 ML
0.4 PATIENT CONTROLLED ANALGESIA SYRINGE INTRAVENOUS EVERY 5 MIN PRN
Status: DISCONTINUED | OUTPATIENT
Start: 2024-07-01 | End: 2024-07-01 | Stop reason: HOSPADM

## 2024-07-01 RX ORDER — SODIUM CHLORIDE, SODIUM LACTATE, POTASSIUM CHLORIDE, CALCIUM CHLORIDE 600; 310; 30; 20 MG/100ML; MG/100ML; MG/100ML; MG/100ML
INJECTION, SOLUTION INTRAVENOUS CONTINUOUS
Status: DISCONTINUED | OUTPATIENT
Start: 2024-07-01 | End: 2024-07-01 | Stop reason: HOSPADM

## 2024-07-01 RX ORDER — CEFAZOLIN SODIUM/WATER 2 G/20 ML
2 SYRINGE (ML) INTRAVENOUS
Status: COMPLETED | OUTPATIENT
Start: 2024-07-01 | End: 2024-07-01

## 2024-07-01 RX ORDER — ENOXAPARIN SODIUM 100 MG/ML
40 INJECTION SUBCUTANEOUS EVERY 24 HOURS
Qty: 11.2 ML | Refills: 0 | Status: SHIPPED | OUTPATIENT
Start: 2024-07-01 | End: 2024-08-20

## 2024-07-01 RX ORDER — LIDOCAINE 40 MG/G
CREAM TOPICAL
Status: DISCONTINUED | OUTPATIENT
Start: 2024-07-01 | End: 2024-07-02 | Stop reason: HOSPADM

## 2024-07-01 RX ORDER — OXYCODONE HYDROCHLORIDE 5 MG/1
5-10 TABLET ORAL EVERY 4 HOURS PRN
Qty: 20 TABLET | Refills: 0 | Status: CANCELLED | OUTPATIENT
Start: 2024-07-01

## 2024-07-01 RX ORDER — TRIAMCINOLONE ACETONIDE 1 MG/G
CREAM TOPICAL 2 TIMES DAILY PRN
Status: DISCONTINUED | OUTPATIENT
Start: 2024-07-01 | End: 2024-07-01

## 2024-07-01 RX ORDER — FENTANYL CITRATE 0.05 MG/ML
25 INJECTION, SOLUTION INTRAMUSCULAR; INTRAVENOUS EVERY 5 MIN PRN
Status: DISCONTINUED | OUTPATIENT
Start: 2024-07-01 | End: 2024-07-01 | Stop reason: HOSPADM

## 2024-07-01 RX ORDER — FENTANYL CITRATE 50 UG/ML
INJECTION, SOLUTION INTRAMUSCULAR; INTRAVENOUS PRN
Status: DISCONTINUED | OUTPATIENT
Start: 2024-07-01 | End: 2024-07-01

## 2024-07-01 RX ORDER — ACETAMINOPHEN 325 MG/1
975 TABLET ORAL EVERY 8 HOURS
Status: DISCONTINUED | OUTPATIENT
Start: 2024-07-01 | End: 2024-07-02 | Stop reason: HOSPADM

## 2024-07-01 RX ORDER — HYDROMORPHONE HCL IN WATER/PF 6 MG/30 ML
0.4 PATIENT CONTROLLED ANALGESIA SYRINGE INTRAVENOUS
Status: DISCONTINUED | OUTPATIENT
Start: 2024-07-01 | End: 2024-07-02 | Stop reason: HOSPADM

## 2024-07-01 RX ORDER — OXYCODONE HYDROCHLORIDE 5 MG/1
10 TABLET ORAL EVERY 4 HOURS PRN
Status: DISCONTINUED | OUTPATIENT
Start: 2024-07-01 | End: 2024-07-02 | Stop reason: HOSPADM

## 2024-07-01 RX ORDER — ONDANSETRON 2 MG/ML
INJECTION INTRAMUSCULAR; INTRAVENOUS PRN
Status: DISCONTINUED | OUTPATIENT
Start: 2024-07-01 | End: 2024-07-01

## 2024-07-01 RX ORDER — FENTANYL CITRATE 0.05 MG/ML
50 INJECTION, SOLUTION INTRAMUSCULAR; INTRAVENOUS EVERY 5 MIN PRN
Status: DISCONTINUED | OUTPATIENT
Start: 2024-07-01 | End: 2024-07-01 | Stop reason: HOSPADM

## 2024-07-01 RX ORDER — SPIRONOLACTONE 25 MG/1
25 TABLET ORAL DAILY
Status: DISCONTINUED | OUTPATIENT
Start: 2024-07-02 | End: 2024-07-02 | Stop reason: HOSPADM

## 2024-07-01 RX ORDER — PROPOFOL 10 MG/ML
INJECTION, EMULSION INTRAVENOUS CONTINUOUS PRN
Status: DISCONTINUED | OUTPATIENT
Start: 2024-07-01 | End: 2024-07-01

## 2024-07-01 RX ORDER — SODIUM CHLORIDE, SODIUM LACTATE, POTASSIUM CHLORIDE, CALCIUM CHLORIDE 600; 310; 30; 20 MG/100ML; MG/100ML; MG/100ML; MG/100ML
INJECTION, SOLUTION INTRAVENOUS CONTINUOUS
Status: DISCONTINUED | OUTPATIENT
Start: 2024-07-01 | End: 2024-07-01

## 2024-07-01 RX ADMIN — SODIUM CHLORIDE, POTASSIUM CHLORIDE, SODIUM LACTATE AND CALCIUM CHLORIDE: 600; 310; 30; 20 INJECTION, SOLUTION INTRAVENOUS at 07:31

## 2024-07-01 RX ADMIN — DEXMEDETOMIDINE HYDROCHLORIDE 12 MCG: 200 INJECTION INTRAVENOUS at 09:25

## 2024-07-01 RX ADMIN — TRANEXAMIC ACID 1 G: 10 INJECTION, SOLUTION INTRAVENOUS at 08:47

## 2024-07-01 RX ADMIN — CEFAZOLIN 1 G: 1 INJECTION, POWDER, FOR SOLUTION INTRAMUSCULAR; INTRAVENOUS at 16:17

## 2024-07-01 RX ADMIN — SUCCINYLCHOLINE CHLORIDE 80 MG: 20 INJECTION, SOLUTION INTRAMUSCULAR; INTRAVENOUS; PARENTERAL at 07:38

## 2024-07-01 RX ADMIN — DEXAMETHASONE SODIUM PHOSPHATE 10 MG: 4 INJECTION, SOLUTION INTRA-ARTICULAR; INTRALESIONAL; INTRAMUSCULAR; INTRAVENOUS; SOFT TISSUE at 07:38

## 2024-07-01 RX ADMIN — OXYCODONE HYDROCHLORIDE 5 MG: 5 TABLET ORAL at 14:23

## 2024-07-01 RX ADMIN — PROPOFOL 20 MCG/KG/MIN: 10 INJECTION, EMULSION INTRAVENOUS at 07:41

## 2024-07-01 RX ADMIN — FENTANYL CITRATE 50 MCG: 50 INJECTION INTRAMUSCULAR; INTRAVENOUS at 07:38

## 2024-07-01 RX ADMIN — FENTANYL CITRATE 50 MCG: 50 INJECTION INTRAMUSCULAR; INTRAVENOUS at 08:00

## 2024-07-01 RX ADMIN — ACETAMINOPHEN 975 MG: 325 TABLET, FILM COATED ORAL at 14:23

## 2024-07-01 RX ADMIN — TRANEXAMIC ACID 1 G: 10 INJECTION, SOLUTION INTRAVENOUS at 07:41

## 2024-07-01 RX ADMIN — PROPOFOL 50 MG: 10 INJECTION, EMULSION INTRAVENOUS at 08:00

## 2024-07-01 RX ADMIN — ROPIVACAINE HYDROCHLORIDE 15 ML: 5 INJECTION, SOLUTION EPIDURAL; INFILTRATION; PERINEURAL at 06:57

## 2024-07-01 RX ADMIN — ONDANSETRON 4 MG: 2 INJECTION INTRAMUSCULAR; INTRAVENOUS at 08:51

## 2024-07-01 RX ADMIN — Medication 2 G: at 07:32

## 2024-07-01 RX ADMIN — HYDROMORPHONE HYDROCHLORIDE 0.5 MG: 1 INJECTION, SOLUTION INTRAMUSCULAR; INTRAVENOUS; SUBCUTANEOUS at 08:08

## 2024-07-01 RX ADMIN — SENNOSIDES AND DOCUSATE SODIUM 1 TABLET: 50; 8.6 TABLET ORAL at 20:26

## 2024-07-01 RX ADMIN — PROPOFOL 150 MG: 10 INJECTION, EMULSION INTRAVENOUS at 07:38

## 2024-07-01 RX ADMIN — LIDOCAINE HYDROCHLORIDE 80 MG: 20 INJECTION, SOLUTION INFILTRATION; PERINEURAL at 07:38

## 2024-07-01 RX ADMIN — FENTANYL CITRATE 50 MCG: 50 INJECTION, SOLUTION INTRAMUSCULAR; INTRAVENOUS at 09:53

## 2024-07-01 RX ADMIN — FENTANYL CITRATE 50 MCG: 50 INJECTION, SOLUTION INTRAMUSCULAR; INTRAVENOUS at 09:34

## 2024-07-01 RX ADMIN — OXYCODONE HYDROCHLORIDE 5 MG: 5 TABLET ORAL at 21:44

## 2024-07-01 RX ADMIN — ACETAMINOPHEN 975 MG: 325 TABLET, FILM COATED ORAL at 20:26

## 2024-07-01 RX ADMIN — HYDROMORPHONE HYDROCHLORIDE 0.4 MG: 0.2 INJECTION, SOLUTION INTRAMUSCULAR; INTRAVENOUS; SUBCUTANEOUS at 10:09

## 2024-07-01 ASSESSMENT — COPD QUESTIONNAIRES: COPD: 1

## 2024-07-01 ASSESSMENT — ACTIVITIES OF DAILY LIVING (ADL)
ADLS_ACUITY_SCORE: 39
ADLS_ACUITY_SCORE: 38
ADLS_ACUITY_SCORE: 39
ADLS_ACUITY_SCORE: 38
ADLS_ACUITY_SCORE: 38
ADLS_ACUITY_SCORE: 39
ADLS_ACUITY_SCORE: 38

## 2024-07-01 ASSESSMENT — ENCOUNTER SYMPTOMS: DYSRHYTHMIAS: 1

## 2024-07-01 ASSESSMENT — LIFESTYLE VARIABLES: TOBACCO_USE: 1

## 2024-07-01 NOTE — ANESTHESIA CARE TRANSFER NOTE
Patient: Marva Angela    Procedure: Procedure(s):  RIGHT TOTAL KNEE ARTHROPLASTY       Diagnosis: Osteoarthritis of right knee [M17.11]  Diagnosis Additional Information: No value filed.    Anesthesia Type:   General     Note:    Oropharynx: oropharynx clear of all foreign objects and spontaneously breathing  Level of Consciousness: drowsy  Oxygen Supplementation: face mask  Level of Supplemental Oxygen (L/min / FiO2): 6    Dentition: dentition unchanged  Vital Signs Stable: post-procedure vital signs reviewed and stable  Report to RN Given: handoff report given  Patient transferred to: PACU  Comments: Neuromuscular blockade not used after succinylcholine for intubation, spontaneous return of TOF 4/4 with sustained tetany, spontaneous respirations, adequate tidal volumes, followed commands to voice, oropharynx suctioned with soft flexible catheter, extubated atraumatically, extubated with suction, airway patent after extubation.  Oxygen via facemask at 6 liters per minute to PACU. Oxygen tubing connected to wall O2 in PACU, SpO2, NiBP, and EKG monitors and alarms on and functioning, Nehemiah Hugger warmer connected to patient gown, report on patient's clinical status given to PACU RN, RN questions answered.         Handoff Report: Identifed the Patient, Identified the Reponsible Provider, Reviewed the pertinent medical history, Discussed the surgical course, Reviewed Intra-OP anesthesia mangement and issues during anesthesia, Set expectations for post-procedure period and Allowed opportunity for questions and acknowledgement of understanding      Vitals:  Vitals Value Taken Time   BP     Temp     Pulse     Resp     SpO2 93 % 07/01/24 0925   Vitals shown include unfiled device data.    Electronically Signed By: ANN Elliott CRNA  July 1, 2024  9:25 AM

## 2024-07-01 NOTE — BRIEF OP NOTE
[facility]    Brief Operative Note    Pre-operative diagnosis: Osteoarthritis of right knee [M17.11]  Post-operative diagnosis osteoarthritis right knee    Procedure: RIGHT TOTAL KNEE ARTHROPLASTY, Right - Knee    Surgeon: Surgeons and Role:     * Francisco J Alston MD - Primary     * Keaton Mac - Assisting  Anesthesia: Choice   Estimated Blood Loss: 25 ml    Drains: None  Specimens: * No specimens in log *  Findings:   None.  Complications: None.  Implants:   Implant Name Type Inv. Item Serial No.  Lot No. LRB No. Used Action   BONE CEMENT RADIOPAQUE SIMPLEX HV FULL DOSE 6194-1-001 - [id number] Cement, Bone BONE CEMENT RADIOPAQUE SIMPLEX HV FULL DOSE 6194-1-001  [employer] [id number] Right 2 Implanted   IMP COMP PATELLA BIOM 3 PEG 34MM STD 051471 - [id number] Total Joint Component/Insert IMP COMP PATELLA BIOM 3 PEG 34MM STD 064960  [employer] [id number] Right 1 Implanted   IMP COMP TIBIAL KNEE ASCENT 71MM 288904 - [id number] Total Joint Component/Insert IMP COMP TIBIAL KNEE ASCENT 71MM 359724  [employer] [id number] Right 1 Implanted   IMP COMP FEMORAL VANGARD BIOM RT 67.5MM [id number] - [id number] Total Joint Component/Insert IMP COMP FEMORAL VANGARD BIOM RT 67.5MM 579469  [employer] [id number] Right 1 Implanted   IMP INSERT BASEPLATE TIBIAL BIOM 10X71/75 199719 - [id number] Total Joint Component/Insert IMP INSERT BASEPLATE TIBIAL BIOM 10X71/75 [id number]  [employer] [id number] Right 1 Implanted

## 2024-07-01 NOTE — PROGRESS NOTES
Arrived from Recovery room.  A&O, able to make needs known.   Oriented to room/unit.  Mild surgical pain, ice pack applied.   Ortho Stoplight Tool discussed w/ pt.

## 2024-07-01 NOTE — PLAN OF CARE
07/01/24 8356   Appointment Info   Signing Clinician's Name / Credentials (PT) Virginie Layton DPT   Living Environment   People in Home alone   Living Environment Comments pt lives in house, reporting 1 MARIAJOSE then needs on main level. Would like to be able to navigate stairs prior to DC   Self-Care   Usual Activity Tolerance good   Current Activity Tolerance moderate   Equipment Currently Used at Home none   Activity/Exercise/Self-Care Comment Hx of hip and L knee replacement. Has equipment needs met at home   General Information   Onset of Illness/Injury or Date of Surgery 07/01/24   Referring Physician Francisco J Alston MD   Pertinent History of Current Problem (include personal factors and/or comorbidities that impact the POC) TKA   Existing Precautions/Restrictions fall   Weight-Bearing Status - LLE weight-bearing as tolerated   Weight-Bearing Status - RLE weight-bearing as tolerated   Cognition   Affect/Mental Status (Cognition) WNL   Pain Assessment   Patient Currently in Pain   (4/10)   Posture    Posture Forward head position   Range of Motion (ROM)   ROM Comment Decreased flexion tolerance RLE however w/i functional limits   Strength (Manual Muscle Testing)   Strength Comments unable to perform SLR RLE   Bed Mobility   Comment, (Bed Mobility) CGA, use of UE support to mobilize RLE towards EOB   Transfers   Comment, (Transfers) STS FWW CGA   Gait/Stairs (Locomotion)   Comment, (Gait/Stairs) CGA, FWW, 5' eval, step to pattern, kyphotic posture w/ downward gaze   Balance   Balance Comments good dynamic balance wiht use of FWW   Sensory Examination   Sensory Perception Comments intact to light touch   Clinical Impression   Criteria for Skilled Therapeutic Intervention Yes, treatment indicated   PT Diagnosis (PT) impaired IND with mobility from baseline   Influenced by the following impairments pain, gross functional weakness, impaired ROM post op, impaired dynamic balance   Functional limitations  due to impairments see above   Clinical Presentation (PT Evaluation Complexity) stable   Clinical Presentation Rationale clinical judgement   Clinical Decision Making (Complexity) low complexity   Planned Therapy Interventions (PT) balance training;bed mobility training;cryotherapy;gait training;home exercise program;patient/family education;stair training;strengthening;stretching;ROM (range of motion);transfer training   Risk & Benefits of therapy have been explained evaluation/treatment results reviewed;care plan/treatment goals reviewed;risks/benefits reviewed;patient   PT Total Evaluation Time   PT Eval, Low Complexity Minutes (66081) 10   Physical Therapy Goals   PT Frequency 2x/day   PT Predicted Duration/Target Date for Goal Attainment 07/03/24   PT Goals Bed Mobility;Transfers;Gait;Stairs   PT: Bed Mobility Independent;Supine to/from sit   PT: Transfers Modified independent;Sit to/from stand;Bed to/from chair;Assistive device   PT: Gait Modified independent;Rolling walker;100 feet   PT: Stairs 1 stair;Supervision/stand-by assist;Assistive device   Interventions   Interventions Quick Adds Gait Training;Therapeutic Activity;Therapeutic Procedure   Therapeutic Procedure/Exercise   Ther. Procedure: strength, endurance, ROM, flexibillity Minutes (48701) 6   Treatment Detail/Skilled Intervention Decreased time for exercises during session as pt needing to use BR extended period of time wanting to brush teeth. Pt engaged in supine AP, QS, AAROM heel slides, unable to perform SLR.   Therapeutic Activity   Therapeutic Activities: dynamic activities to improve functional performance Minutes (40234) 15   Treatment Detail/Skilled Intervention eval complete tx indicated. pt urgently needing to use BR. BR setup. PT managed IV. Supine > sitting EOB CGA. pt uses UE support to mobilize RLE towards EOB. STS x 3 during session varying surface hts with use of FWW progressed to SBA. Cues for use of grab bar in BR and proper  stand > sit technique. Pt washed hands at sink BUE support needed to maintain balance. Post ambulation pt wanting to brush her teeth. Stood at sink several minutes, no buckling or LOB. Return to EOB cues positioning, pt abandoned walker attempting to move table. Edu importance keeping BUE support walker for transfers and safety w/ positioning. Sit > supine use of GB to elevate LE into bed. Pt left supine needs inr each   Gait Training   Gait Training Minutes (96902) 10   Treatment Detail/Skilled Intervention gait training in hallway with FWW. Step to pattern downward gaze, impaired posture. Cues upright posture, fwd gaze, equal step length. Good improvement no buckling no LOB   Distance in Feet 140'   PT Discharge Planning   PT Plan Bring TKA handout, focus on exercises, gait, stairs   PT Rationale for DC Rec defer DC recs to ortho team   PT Brief overview of current status pt mobilizing CGA with FWW. has FWW at home   Total Session Time   Timed Code Treatment Minutes 31   Total Session Time (sum of timed and untimed services) 41     [facility]  OUTPATIENT PHYSICAL THERAPY EVALUATION  PLAN OF TREATMENT FOR OUTPATIENT REHABILITATION  (COMPLETE FOR INITIAL CLAIMS ONLY)  Patient's Last Name, First Name, M.I.  YOB: 1950  Julio CésarMarva  ZELDA                        Provider's Name  [facility] Medical Record No.  [id number]                             Onset Date:  07/01/24   Start of Care Date:      Type:     _X_PT   ___OT   ___SLP Medical Diagnosis:                 PT Diagnosis:  impaired IND with mobility from baseline Visits from SOC:  1     See note for plan of treatment, functional goals and certification details    I CERTIFY THE NEED FOR THESE SERVICES FURNISHED UNDER        THIS PLAN OF TREATMENT AND WHILE UNDER MY CARE     (Physician co-signature of this document indicates review and certification of the therapy plan).

## 2024-07-01 NOTE — ANESTHESIA PROCEDURE NOTES
Airway       Patient location during procedure: OR       Procedure Start/Stop Times: 7/1/2024 7:39 AM  Staff -        Anesthesiologist:  Franklin Salomon MD       CRNA: Radha Wilks APRN CRNA       Other Anesthesia Staff: Ana Barahona       Performed By: JOSE LUSI, with CRNAs and anesthesiologist       Procedure performed by resident/fellow/CRNA in presence of a teaching physician.    Consent for Airway        Urgency: elective  Indications and Patient Condition       Indications for airway management: betty-procedural       Induction type:intravenous       Mask difficulty assessment: 0 - not attempted    Final Airway Details       Final airway type: endotracheal airway       Successful airway: ETT - single  Endotracheal Airway Details        ETT size (mm): 7.0       Cuffed: yes       Successful intubation technique: video laryngoscopy       VL Blade Size: Glidescope 3       Grade View of Cords: 1       Adjucts: stylet       Position: Right       Measured from: gums/teeth       Secured at (cm): 19       Bite block used: None    Post intubation assessment        Placement verified by: capnometry, equal breath sounds and chest rise        Number of attempts at approach: 1       Number of other approaches attempted: 0       Secured with: tape       Ease of procedure: easy       Dentition: Intact and Unchanged    Medication(s) Administered   Medication Administration Time: 7/1/2024 7:39 AM

## 2024-07-01 NOTE — ANESTHESIA POSTPROCEDURE EVALUATION
Patient: Marva Angela    Procedure: Procedure(s):  RIGHT TOTAL KNEE ARTHROPLASTY       Anesthesia Type:  General    Note:  Disposition: Admission   Postop Pain Control: Uneventful            Sign Out: Well controlled pain   PONV: No   Neuro/Psych: Uneventful            Sign Out: Acceptable/Baseline neuro status   Airway/Respiratory: Uneventful            Sign Out: Acceptable/Baseline resp. status   CV/Hemodynamics: Uneventful            Sign Out: Acceptable CV status; No obvious hypovolemia; No obvious fluid overload   Other NRE: NONE   DID A NON-ROUTINE EVENT OCCUR? No           Last vitals:  Vitals Value Taken Time   /67 07/01/24 1115   Temp 36.1  C (97  F) 07/01/24 0930   Pulse 73 07/01/24 1118   Resp 19 07/01/24 1118   SpO2 96 % 07/01/24 1118   Vitals shown include unfiled device data.    Electronically Signed By: PIPER ZARAGOZA MD  July 1, 2024  1:47 PM

## 2024-07-01 NOTE — ANESTHESIA PREPROCEDURE EVALUATION
Anesthesia Pre-Procedure Evaluation    Patient: Marva Angela   MRN: [id number] : 1950        Procedure : Procedure(s):  RIGHT TOTAL KNEE ARTHROPLASTY          Past Medical History:   Diagnosis Date    Arthritis     knees and hips    B12 deficiency 10/14/2016    B12 178    Benign essential hypertension     was on amlodipine/ then metoprolol / add lisinopril     Chronic atrial fibrillation (H)     Contact dermatitis and other eczema, due to unspecified cause     COPD (chronic obstructive pulmonary disease) (H) 2012    Coronary artery disease involving native coronary artery of native heart without angina pectoris     coronary artery calcifications seen on CT scan, mild nonocclusive CAD    Hyperlipidemia LDL goal <130 2012    Nephritis      as a child (post strep)     Osteoporosis, unspecified     Phlebitis and thrombophlebitis of other deep vessels of lower extremities ,     Both with pregnancies    Prediabetes 2018    A1C 6.1    Sinusitis     Tobacco use disorder       Past Surgical History:   Procedure Laterality Date    ABDOMEN SURGERY  2015    ARTHROPLASTY HIP Right 2017    Procedure: ARTHROPLASTY HIP;  Surgeon: Francisco J Alston MD;  Location:  OR    ARTHROPLASTY HIP ANTERIOR Left 2022    Procedure: LEFT TOTAL HIP ARTHROPLASTY DIRECT ANTERIOR APPROACH;  Surgeon: Francisco J Alston MD;  Location:  OR    ARTHROPLASTY KNEE Left 2017    Procedure: ARTHROPLASTY KNEE;  LEFT TOTAL KNEE ARTHROPLASTY ;  Surgeon: Francisco J Alston MD;  Location:  OR    BIOPSY OF SKIN LESION      C DEXA, BONE DENSITY, AXIAL SKEL  2008    T score lumbar -0.6, femoral neck -2.4/-2.0(all stable)    COLONOSCOPY  21    HC ASPIRATION &/OR INJECTION GANGLION CYST, ANY      HERNIORRHAPHY INGUINAL Right 2015    Procedure: HERNIORRHAPHY INGUINAL;  Surgeon: Sam Erazo MD;  Location:  [facility]    HYSTERECTOMY, PAP NO LONGER  INDICATED      ORTHOPEDIC SURGERY      bilat meniscus repair    ZZC VAGINAL HYSTERECTOMY      Hysterectomy, Vaginal      Allergies   Allergen Reactions    Penicillins Rash     rash    Sulfa Antibiotics Rash     rash      Social History     Tobacco Use    Smoking status: Former     Current packs/day: 0.00     Average packs/day: 1 pack/day for 51.6 years (51.6 ttl pk-yrs)     Types: Cigarettes     Start date: 1964     Quit date: 2015     Years since quittin.8    Smokeless tobacco: Never   Substance Use Topics    Alcohol use: Yes     Comment: Rarely have any alcohol      Wt Readings from Last 1 Encounters:   24 78.3 kg (172 lb 11.2 oz)        Anesthesia Evaluation            ROS/MED HX  ENT/Pulmonary:     (+)                tobacco use, Past use,         COPD,           (-) sleep apnea   Neurologic:       Cardiovascular:     (+) Dyslipidemia hypertension- -  CAD -  - -                        dysrhythmias, a-fib,             METS/Exercise Tolerance:     Hematologic:       Musculoskeletal:       GI/Hepatic:    (-) GERD   Renal/Genitourinary:       Endo:       Psychiatric/Substance Use:       Infectious Disease:       Malignancy:       Other:            Physical Exam    Airway        Mallampati: II   TM distance: > 3 FB   Neck ROM: full   Mouth opening: > 3 cm    Respiratory Devices and Support         Dental       (+) Minor Abnormalities - some fillings, tiny chips      Cardiovascular   cardiovascular exam normal          Pulmonary   pulmonary exam normal                OUTSIDE LABS:  CBC:   Lab Results   Component Value Date    WBC 6.5 2024    WBC 5.8 2023    HGB 14.5 2024    HGB 14.0 2023    HCT 43.9 2024    HCT 43.6 2023     2024     2023     BMP:   Lab Results   Component Value Date     2024     2023    POTASSIUM 4.2 2024    POTASSIUM 4.1 2023    CHLORIDE 103 2024    CHLORIDE 102 2023     "CO2 28 05/28/2024    CO2 28 11/27/2023    BUN 18.7 05/28/2024    BUN 17.9 11/27/2023    CR 0.80 05/28/2024    CR 0.79 11/27/2023     (H) 05/28/2024     (H) 11/27/2023     COAGS:   Lab Results   Component Value Date    INR 2.1 (A) 08/31/2017     POC:   Lab Results   Component Value Date     (H) 08/02/2017     HEPATIC:   Lab Results   Component Value Date    ALBUMIN 4.4 05/28/2024    PROTTOTAL 6.7 05/28/2024    ALT 21 05/28/2024    AST 28 05/28/2024    ALKPHOS 95 05/28/2024    BILITOTAL 0.6 05/28/2024     OTHER:   Lab Results   Component Value Date    A1C 6.6 (H) 05/28/2024    SANGEETA 9.8 05/28/2024    PHOS 3.1 03/21/2017    MAG 2.1 03/21/2017    TSH 1.44 05/28/2024    T4 1.23 01/13/2017    T3 129 10/14/2016       Anesthesia Plan    ASA Status:  3    NPO Status:  NPO Appropriate    Anesthesia Type: General.     - Airway: ETT   Induction: RSI, Intravenous.   Maintenance: Balanced.        Consents    Anesthesia Plan(s) and associated risks, benefits, and realistic alternatives discussed. Questions answered and patient/representative(s) expressed understanding.     - Discussed:     - Discussed with:  Patient            Postoperative Care    Pain management: IV analgesics, Peripheral nerve block (Single Shot).   PONV prophylaxis: Ondansetron (or other 5HT-3)     Comments:               PIPER ZARAGOZA MD    I have reviewed the pertinent notes and labs in the chart from the past 30 days and (re)examined the patient.  Any updates or changes from those notes are reflected in this note.            # Drug Induced Coagulation Defect: home medication list includes an anticoagulant medication   # DMII: A1C = 6.6 % (Ref range: 0.0 - 5.6 %) within past 6 months  # Overweight: Estimated body mass index is 29.64 kg/m  as calculated from the following:    Height as of this encounter: 1.626 m (5' 4\").    Weight as of this encounter: 78.3 kg (172 lb 11.2 oz).      "

## 2024-07-01 NOTE — OP NOTE
Marva NDIAYE Julio César    7/1/2024    PRE-OP DX:  End stage arthritis right knee    POST-OP DX:  Same    PROCEDURE:  right total knee replacement    SURGEON: Gamaliel    ASSISTANT:  Keaton Mac    ANESTHESIA: General, adductor canal block      DESCRIPTION OF PROCEDURE:  The patient received a right adductor canal block in the pre-op area. IV Ancef was started. On arrival to the room General anesthesia was induced. A tourniquet was placed on the right thigh. The limb was prepped and draped in customary fashion. The limb was exsanguinated by elevation, and the tourniquet was inflated to 350 mm Hg pressure.    A midline longitudianal incision was made, and carried down to the extensor mechanism. Full thickness flaps were raised only to the extent necessary. The was entered via a medial parapatellar arthrotomy. The lateral patella femoral ligaments were divided, and the knee was positioned hyperflexed with the patella everted.    There was exposed bone over the medial femoral condyle, medial tibial plateau, and trochlea. There was bone loss at medial tibial plateau. The ACL was Present. The medial meniscus was torn.    Subperiosteal dissection was performed over the proximal medial tibial metaphysis. Meniscectomies were performed. The ACL was divided. The fat pad was excised.    Attention is turned first to the femur. A drill hole was placed above the femoral notch, and the intramedullary guide was placed. This was used to position the distal femoral cutting block, which was pinned in place After medial and lateral condylar drill holes were made, the intramedullary guide was removed. The distal femur was cut. Based on intra-operative measurements, we elected to use the size 67.5 mm Biomet Vangard femoral component. The jig was used to make anterior, posterior, and chamfer cuts. The trial component fit well.    Attention was then turned to the tibia. The extramedullary guide  Was used to position the proximal tibia  cutting guide. As this was a varus knee, the cut was somewhat thicker on the lateral side. Care was taken to preserve the PCL as the proximal tibia was resected. The Biomet maxim size 71 mm trial fit best.  The jigs were used to create the intramedullary hole. The best combination of motion and stability was obtained with the 10 mm tibial insert.     Finally, attention was turned to the patella. The articular surface was resected, and three drill holes were made. The biomet arcom size 34 mm trial patella fit best. Based on patella tracking, lateral retinacular release was not indicated.    While cement was mixed on the back table, all trial components were removed. The bony interstices were cleansed with saline solution and pulsatile lavage. The bones were dried with suctioning and sponging.    Cementing was begun. First the biomet maxim size 71 mm tibial component was cemented. Next, the Biomet Vangard size 67.5  mm right femoral component was cemented. Excess cement was removed, a trial insert was placed, and the knee was placed in full extension. Lastly, the Biomet arcom size 34 mm patella was cemented and held with a patella clamp. Again excess cement was removed, and the knee was maintained in full extension until the cement was thoroughly hardened.    The trial tibial insert was removed. Final inspection for extraneous cement was performed, and final antibiotic irrigation was performed. The capsule was injected with 30 ml of 0.5% marcaine with epinephrine. The real 10 mm tibial insert was placed and held with the locking clip. Femoral tibial motion, stability and rollback were excellent. Patella femoral tracking was quite satisfactory.    The knee was bathed in dilute betadyne for three minutes.    We elected to close. Two medium hemovac drains were brought out via stab incisions. The capsule was closed with #1 vicryl interrupted figure eight sutures, two layers above the patella, and one layer from the  patella down. The subcutaneous tissues were closed with 2-0 vicryl, and the skin was re-approximated with 3-0 monocryl and dermabond. A bulky sterile dressing, ace wrap, and knee immobilizer were applied. The patient was taken to the PACU in satisfactory condition. Final counts were correct.    All CMS PQRI guidelines will be followed. Ancef will be discontinued in less than 24 hours. Anticoagulation with lovenox, transitioning to Eliquis will commence.

## 2024-07-01 NOTE — ANESTHESIA PROCEDURE NOTES
Adductor canal Procedure Note    Pre-Procedure   Staff -        Anesthesiologist:  Franklin Salomon MD       Performed By: Anesthesiologist       Location: pre-op       Pre-Anesthestic Checklist: patient identified, IV checked, site marked, risks and benefits discussed, informed consent, monitors and equipment checked, at physician/surgeon's request and post-op pain management  Timeout:       Correct Patient: Yes        Correct Procedure: Yes        Correct Site: Yes        Correct Position: Yes        Correct Laterality: Yes        Site Marked: Yes  Procedure Documentation  Procedure: Adductor canal       Laterality: right       Patient Position: supine       Patient Prep/Sterile Barriers: sterile gloves, mask       Skin prep: Chloraprep       Local skin infiltrated with mL of 1% lidocaine.        Needle Type: insulated and short bevel (Arrow)       Needle Gauge: 21.        Needle Length (millimeters): 90        Ultrasound guided       1. Ultrasound was used to identify targeted nerve, plexus, vascular marker, or fascial plane and place a needle adjacent to it in real-time.       2. Ultrasound was used to visualize the spread of anesthetic in close proximity to the above referenced structure.       3. A permanent image is entered into the patient's record.       4. The visualized anatomic structures appeared normal.       5. There were no apparent abnormal pathologic findings.    Assessment/Narrative         The placement was negative for: blood aspirated, painful injection and site bleeding       Paresthesias: No.       Bolus given via needle..        Secured via.        Insertion/Infusion Method: Single Shot       Complications: none    Medication(s) Administered   Ropivacaine 0.5% w/ 1:400K Epi (Injection) - Injection   15 mL - 7/1/2024 6:57:00 AM   Comments:  Peripheral nerve block performed at request of the primary medical team.      Postoperative pain block requested by surgeon for severe postoperative pain.  " Patient brought to Preop for procedure.      Pt tolerated well.    No complications.      The surgeon has given a verbal order transferring care of this patient to me for the performance of a regional analgesia block for post-op pain control. It is requested of me because I am uniquely trained and qualified to perform this block and the surgeon is neither trained nor qualified to perform this procedure.    PIPER ZARAGOZA MD   July 1, 2024 7:28 AM         FOR [facility] ([facility]) ONLY:   Pain Team Contact information: please page the Pain Team Via [employer]. Search \"Pain\". During daytime hours, please page the attending first. At night please page the resident first.      "

## 2024-07-02 ENCOUNTER — APPOINTMENT (OUTPATIENT)
Dept: PHYSICAL THERAPY | Facility: CLINIC | Age: 74
End: 2024-07-02
Attending: ORTHOPAEDIC SURGERY
Payer: COMMERCIAL

## 2024-07-02 VITALS
SYSTOLIC BLOOD PRESSURE: 156 MMHG | RESPIRATION RATE: 16 BRPM | HEART RATE: 84 BPM | HEIGHT: 64 IN | DIASTOLIC BLOOD PRESSURE: 72 MMHG | WEIGHT: 172.7 LBS | BODY MASS INDEX: 29.49 KG/M2 | OXYGEN SATURATION: 95 % | TEMPERATURE: 98.1 F

## 2024-07-02 LAB
FASTING STATUS PATIENT QL REPORTED: YES
GLUCOSE SERPL-MCNC: 160 MG/DL (ref 70–99)
HGB BLD-MCNC: 12.6 G/DL (ref 11.7–15.7)
PLATELET # BLD AUTO: 230 10E3/UL (ref 150–450)

## 2024-07-02 PROCEDURE — 258N000003 HC RX IP 258 OP 636: Performed by: ORTHOPAEDIC SURGERY

## 2024-07-02 PROCEDURE — 250N000013 HC RX MED GY IP 250 OP 250 PS 637: Performed by: ORTHOPAEDIC SURGERY

## 2024-07-02 PROCEDURE — 97110 THERAPEUTIC EXERCISES: CPT | Mod: GP | Performed by: PHYSICAL THERAPY ASSISTANT

## 2024-07-02 PROCEDURE — 250N000011 HC RX IP 250 OP 636: Performed by: ORTHOPAEDIC SURGERY

## 2024-07-02 PROCEDURE — 85049 AUTOMATED PLATELET COUNT: CPT

## 2024-07-02 PROCEDURE — 82947 ASSAY GLUCOSE BLOOD QUANT: CPT | Performed by: ORTHOPAEDIC SURGERY

## 2024-07-02 PROCEDURE — 36415 COLL VENOUS BLD VENIPUNCTURE: CPT | Performed by: ORTHOPAEDIC SURGERY

## 2024-07-02 PROCEDURE — 97530 THERAPEUTIC ACTIVITIES: CPT | Mod: GP | Performed by: PHYSICAL THERAPY ASSISTANT

## 2024-07-02 PROCEDURE — 85018 HEMOGLOBIN: CPT | Performed by: ORTHOPAEDIC SURGERY

## 2024-07-02 PROCEDURE — 999N000111 HC STATISTIC OT IP EVAL DEFER

## 2024-07-02 PROCEDURE — 96372 THER/PROPH/DIAG INJ SC/IM: CPT | Performed by: ORTHOPAEDIC SURGERY

## 2024-07-02 PROCEDURE — 97116 GAIT TRAINING THERAPY: CPT | Mod: GP | Performed by: PHYSICAL THERAPY ASSISTANT

## 2024-07-02 RX ORDER — OXYCODONE HYDROCHLORIDE 5 MG/1
5 TABLET ORAL EVERY 4 HOURS PRN
Qty: 30 TABLET | Refills: 0 | Status: SHIPPED | OUTPATIENT
Start: 2024-07-02 | End: 2024-08-20

## 2024-07-02 RX ORDER — AMOXICILLIN 250 MG
1 CAPSULE ORAL 2 TIMES DAILY
Qty: 30 TABLET | Refills: 0 | Status: SHIPPED | OUTPATIENT
Start: 2024-07-02 | End: 2024-07-02

## 2024-07-02 RX ADMIN — ENOXAPARIN SODIUM 40 MG: 40 INJECTION SUBCUTANEOUS at 09:11

## 2024-07-02 RX ADMIN — SPIRONOLACTONE 25 MG: 25 TABLET ORAL at 09:11

## 2024-07-02 RX ADMIN — ATORVASTATIN CALCIUM 20 MG: 20 TABLET, FILM COATED ORAL at 09:11

## 2024-07-02 RX ADMIN — OXYCODONE HYDROCHLORIDE 5 MG: 5 TABLET ORAL at 05:33

## 2024-07-02 RX ADMIN — ACETAMINOPHEN 975 MG: 325 TABLET, FILM COATED ORAL at 05:32

## 2024-07-02 RX ADMIN — CEFAZOLIN 1 G: 1 INJECTION, POWDER, FOR SOLUTION INTRAMUSCULAR; INTRAVENOUS at 00:08

## 2024-07-02 RX ADMIN — OXYCODONE HYDROCHLORIDE 5 MG: 5 TABLET ORAL at 11:08

## 2024-07-02 RX ADMIN — SODIUM CHLORIDE, POTASSIUM CHLORIDE, SODIUM LACTATE AND CALCIUM CHLORIDE: 600; 310; 30; 20 INJECTION, SOLUTION INTRAVENOUS at 05:35

## 2024-07-02 RX ADMIN — DILTIAZEM HYDROCHLORIDE 180 MG: 180 CAPSULE, EXTENDED RELEASE ORAL at 09:11

## 2024-07-02 ASSESSMENT — ACTIVITIES OF DAILY LIVING (ADL)
ADLS_ACUITY_SCORE: 39
ADLS_ACUITY_SCORE: 26
ADLS_ACUITY_SCORE: 24
ADLS_ACUITY_SCORE: 39
ADLS_ACUITY_SCORE: 38
ADLS_ACUITY_SCORE: 39
ADLS_ACUITY_SCORE: 38
ADLS_ACUITY_SCORE: 26

## 2024-07-02 NOTE — PLAN OF CARE
Goal Outcome Evaluation:        Date/Time: 2300-7:30    Trauma/Ortho    Diagnosis: RIGHT TOTAL KNEE ARTHROPLASTY    POD#: 1  Mental Status: A&Ox4  Activity/dangle: Assist of 1 with GBW  Diet: Regular  Pain: Oxycodone & Tylenol given x1  Mcclain/Voiding: BR  Tele/Restraints/Iso: NA  02/LDA:RA  D/C Date: TBD    Other: PIV- SL and dressing CDI, leg elevated on blue wedge. Ambulated to BR.

## 2024-07-02 NOTE — PROGRESS NOTES
Trauma/Ortho/Medical (Choose one):  Ortho    Diagnosis:R Total Knee Arthroplasty  POD#:  DOS  Mental Status:  A&O x4  Activity/dangle: A1, walker, gait belt  Diet:  Regular  Pain: managed w/ prn Oxycodone and scheduled Tylenol  Mcclain/Voiding: bathroom  Tele/Restraints/Iso: none  02/LDA: 1L O2 via nasal canula, IV fluid infusing  D/C Date: tomorrow  Other Info:

## 2024-07-02 NOTE — DISCHARGE SUMMARY
Discharge Summary    Marva Angela MRN# [id number]   YOB: 1950 Age: 73 year old     Date of Admission:  7/1/2024  Date of Discharge:  7/2/2024  Admitting Physician:  Francisco J Alston MD  Discharge Physician:  Francisco J Alston MD     Primary Provider: Kendall Mendez          Admission Diagnoses:   Osteoarthritis right knee          Discharge Diagnosis:   Same           Surgical Procedure:   right knee replacement           Secondary Diagnosis:     Patient Active Problem List   Diagnosis    Osteopenia    Personal history of tobacco use, presenting hazards to health (quit 9/7/15)    Eczema    Mixed hyperlipidemia    Benign essential hypertension    Hip pain, right    Family history of thyroid disease    Fatigue, unspecified type    Hair loss    Pain in joint, multiple sites    Seborrheic keratosis    Psoriasis with arthropathy (H)    Urine abnormality    Vitamin B12 deficiency (non anemic)    Scurvy    Vitamin D deficiency    Hematuria    Primary osteoarthritis, unspecified site    Arthralgia of both knees    Tinea pedis of left foot    Impaired fasting glucose    Hypercalcemia    Status post hip replacement, left    Psoriasis    Type 2 diabetes mellitus with other circulatory complication, without long-term current use of insulin (H)    Personal history of DVT (deep vein thrombosis)    Knee joint replacement status    Aftercare following joint replacement [Z47.1]    COPD (chronic obstructive pulmonary disease) (H)    Paroxysmal atrial fibrillation (H)    Coronary artery disease involving native coronary artery of native heart without angina pectoris    B12 deficiency    Deformity of toe of right foot    Atherosclerosis of aorta (H24)    S/P total hip arthroplasty    Status post right hip replacement    Right upper lobe pulmonary nodule              Discharge Disposition:     Discharged to home           Medications Prior to Admission:     Medications Prior to  Admission   Medication Sig Dispense Refill Last Dose    acetaminophen (8 HOUR ARTHRITIS PAIN) 650 MG CR tablet Take 650-1,300 mg by mouth every 8 hours as needed for mild pain or fever ([id number])    at PRN    acetaminophen (TYLENOL) 500 MG tablet Take 500-1,000 mg by mouth every 6 hours as needed for mild pain ([id number])   6/30/2024 at PRN    albuterol (PROAIR HFA/PROVENTIL HFA/VENTOLIN HFA) 108 (90 Base) MCG/ACT inhaler Inhale 2 puffs into the lungs every 4 hours as needed for shortness of breath or wheezing GENERIC VENTOLIN/ALBUTEROL 18 g 11 More than a month at PRN    albuterol (PROVENTIL) (2.5 MG/3ML) 0.083% neb solution Take 1 vial (2.5 mg) by nebulization every 6 hours as needed for shortness of breath / dyspnea or wheezing 90 mL 3  at PRN    apixaban ANTICOAGULANT (ELIQUIS) 5 MG tablet Take 1 tablet (5 mg) by mouth 2 times daily 200 tablet 4 6/28/2024 at pm    atorvastatin (LIPITOR) 20 MG tablet Take 1 tablet (20 mg) by mouth daily 90 tablet 3 6/30/2024 at pm    betamethasone dipropionate (DIPROSONE) 0.05 % external cream Apply 1 FTU sparingly once or twice per day as needed to affected area until the skin is better, then stop; REPEAT AS NEEDED 45 g 2  at PRN    calcium-vitamin D (OSCAL) 250-125 MG-UNIT per tablet Take 2 tablets by mouth daily   6/19/2024 at am    Cyanocobalamin (B-12) 1000 MCG TBCR Place 1,000 mcg under the tongue every other day   6/19/2024 at am    diltiazem ER COATED BEADS (CARDIZEM CD/CARTIA XT) 180 MG 24 hr capsule Take 1 capsule (180 mg) by mouth daily with breakfast 90 capsule 2 7/1/2024 at am    erythromycin (E-MYCIN) 250 MG tablet Take 1,000 mg by mouth as needed ([id number]) Before dental appointment    at PRN    famotidine (PEPCID) 20 MG tablet Take 20 mg by mouth as needed   Past Week at PRN    folic acid (FOLVITE) 400 MCG tablet Take 400 mcg by mouth daily   6/19/2024 at am    Psyllium (NATURAL FIBER LAXATIVE PO) Take 2 capsules by mouth daily        spironolactone (ALDACTONE) 25 MG tablet Take 1 tablet (25 mg) by mouth daily 90 tablet 3 6/30/2024 at am    tiotropium (SPIRIVA HANDIHALER) 18 MCG inhaled capsule Inhale 1 capsule (18 mcg) into the lungs daily GENERIC TIOTROPIUM/SPIRIVA 90 capsule 3 Past Week at am    triamcinolone (KENALOG) 0.1 % cream Apply topically 2 times daily as needed for irritation (sores on scalp.)    at PRN    triamcinolone (KENALOG) 0.1 % external lotion Apply sparingly once or twice per day as needed to affected area until the skin is better, then stop; REPEAT AS NEEDED 60 mL 2  at PRN    Urea 20 % CREA cream Apply topically as needed    at PRN    vitamin C-electrolytes (EMERGEN-C) 1000mg vitamin C super orange drink mix Take 1 packet by mouth daily Mix 1 packet in 4-6oz water.    at am    vitamin D3 (CHOLECALCIFEROL) 1.25 MG (30473 UT) capsule Take 1 capsule (50,000 Units) by mouth every 14 days SIG: TAKE INDICATION: 1ST AND 15TH OF EACH MONTH, TO TREAT VITAMIN D DEFICIENCY 24 capsule 1 6/12/2024 at am    ASPIRIN NOT PRESCRIBED (INTENTIONAL) Please choose reason not prescribed from choices below.       order for DME Equipment being ordered: Nebulizer 1 each 0              Discharge Medications:     Current Discharge Medication List        START taking these medications    Details   !! acetaminophen (TYLENOL) 325 MG tablet Take 2 tablets (650 mg) by mouth every 4 hours as needed for other (mild pain)  Qty: 100 tablet, Refills: 0    Associated Diagnoses: Status post right knee replacement      enoxaparin ANTICOAGULANT (LOVENOX) 40 MG/0.4ML syringe Inject 0.4 mLs (40 mg) Subcutaneous every 24 hours  Qty: 11.2 mL, Refills: 0    Associated Diagnoses: Status post right knee replacement      senna-docusate (SENOKOT-S/PERICOLACE) 8.6-50 MG tablet Take 1-2 tablets by mouth 2 times daily Take while on oral narcotics to prevent or treat constipation.  Qty: 30 tablet, Refills: 0    Comments: While taking narcotics  Associated Diagnoses: Status  post right knee replacement       !! - Potential duplicate medications found. Please discuss with provider.        CONTINUE these medications which have NOT CHANGED    Details   acetaminophen (8 HOUR ARTHRITIS PAIN) 650 MG CR tablet Take 650-1,300 mg by mouth every 8 hours as needed for mild pain or fever ([id number])      !! acetaminophen (TYLENOL) 500 MG tablet Take 500-1,000 mg by mouth every 6 hours as needed for mild pain ([id number])      albuterol (PROAIR HFA/PROVENTIL HFA/VENTOLIN HFA) 108 (90 Base) MCG/ACT inhaler Inhale 2 puffs into the lungs every 4 hours as needed for shortness of breath or wheezing GENERIC VENTOLIN/ALBUTEROL  Qty: 18 g, Refills: 11    Comments: PROFILE FOR FUTURE REFILLS UNTIL PATIENT CALLS FOR REFILLS; Pharmacy may dispense brand covered by insurance (Proair, or proventil or ventolin or generic albuterol inhaler)  Associated Diagnoses: Chronic obstructive pulmonary disease, unspecified COPD type (H)      albuterol (PROVENTIL) (2.5 MG/3ML) 0.083% neb solution Take 1 vial (2.5 mg) by nebulization every 6 hours as needed for shortness of breath / dyspnea or wheezing  Qty: 90 mL, Refills: 3    Comments: PROFILE FOR FUTURE REFILLS UNTIL PATIENT CALLS FOR REFILLS  Associated Diagnoses: Pulmonary emphysema, unspecified emphysema type (H)      apixaban ANTICOAGULANT (ELIQUIS) 5 MG tablet Take 1 tablet (5 mg) by mouth 2 times daily  Qty: 200 tablet, Refills: 4    Associated Diagnoses: Paroxysmal atrial fibrillation (H)      atorvastatin (LIPITOR) 20 MG tablet Take 1 tablet (20 mg) by mouth daily  Qty: 90 tablet, Refills: 3    Associated Diagnoses: Hyperlipidemia LDL goal <130      betamethasone dipropionate (DIPROSONE) 0.05 % external cream Apply 1 FTU sparingly once or twice per day as needed to affected area until the skin is better, then stop; REPEAT AS NEEDED  Qty: 45 g, Refills: 2    Comments: PROFILE FOR FUTURE REFILLS UNTIL PATIENT CALLS FOR REFILLS  Associated Diagnoses:  Psoriasis      calcium-vitamin D (OSCAL) 250-125 MG-UNIT per tablet Take 2 tablets by mouth daily      Cyanocobalamin (B-12) 1000 MCG TBCR Place 1,000 mcg under the tongue every other day    Associated Diagnoses: B12 deficiency      diltiazem ER COATED BEADS (CARDIZEM CD/CARTIA XT) 180 MG 24 hr capsule Take 1 capsule (180 mg) by mouth daily with breakfast  Qty: 90 capsule, Refills: 2    Associated Diagnoses: Paroxysmal atrial fibrillation (H)      erythromycin (E-MYCIN) 250 MG tablet Take 1,000 mg by mouth as needed ([id number]) Before dental appointment      famotidine (PEPCID) 20 MG tablet Take 20 mg by mouth as needed      folic acid (FOLVITE) 400 MCG tablet Take 400 mcg by mouth daily      Psyllium (NATURAL FIBER LAXATIVE PO) Take 2 capsules by mouth daily      spironolactone (ALDACTONE) 25 MG tablet Take 1 tablet (25 mg) by mouth daily  Qty: 90 tablet, Refills: 3    Associated Diagnoses: Benign essential hypertension      tiotropium (SPIRIVA HANDIHALER) 18 MCG inhaled capsule Inhale 1 capsule (18 mcg) into the lungs daily GENERIC TIOTROPIUM/SPIRIVA  Qty: 90 capsule, Refills: 3    Associated Diagnoses: Pulmonary emphysema, unspecified emphysema type (H)      triamcinolone (KENALOG) 0.1 % cream Apply topically 2 times daily as needed for irritation (sores on scalp.)      triamcinolone (KENALOG) 0.1 % external lotion Apply sparingly once or twice per day as needed to affected area until the skin is better, then stop; REPEAT AS NEEDED  Qty: 60 mL, Refills: 2    Associated Diagnoses: Seborrheic dermatitis of scalp      Urea 20 % CREA cream Apply topically as needed      vitamin C-electrolytes (EMERGEN-C) 1000mg vitamin C super orange drink mix Take 1 packet by mouth daily Mix 1 packet in 4-6oz water.      vitamin D3 (CHOLECALCIFEROL) 1.25 MG (19124 UT) capsule Take 1 capsule (50,000 Units) by mouth every 14 days SIG: TAKE INDICATION: 1ST AND 15TH OF EACH MONTH, TO TREAT VITAMIN D DEFICIENCY  Qty: 24 capsule,  Refills: 1    Associated Diagnoses: Osteopenia, unspecified location; Psoriasis with arthropathy (H)      ASPIRIN NOT PRESCRIBED (INTENTIONAL) Please choose reason not prescribed from choices below.    Associated Diagnoses: Paroxysmal atrial fibrillation (H)      order for DME Equipment being ordered: Nebulizer  Qty: 1 each, Refills: 0    Associated Diagnoses: Pulmonary emphysema, unspecified emphysema type (H)       !! - Potential duplicate medications found. Please discuss with provider.                Consultations:     No consultations were requested during this admission           Hospital Course:     The patient was admitted after the surgical procedure. The patient underwent an uneventfulright knee replacement. Postoperatively, anticoagulation  with Lovenox was started. No transfusion was required. The patient will be discharged to home. Home medications have been reconciled. oxycodone 5 mg. was prescribed for pain. Lovenox, transitioning to Eliquis  will be prescribed.             Pending Results:     hgb           Discharge Instructions and Follow-Up:          Discharge activity: use a walker   Discharge follow-up: Follow up with me in 2 weeks   Outpatient therapy: [facility]    Home Care agency: None    Supplies and equipment: None        Wound care: leave dressing in place   Other instructions: None

## 2024-07-02 NOTE — PLAN OF CARE
OT: Order received, chart reviewed and discussed with care team. Per PT, patient with no OT needs at this time as patient with prior surgeries and has all necessary equipment at home to safely complete ADL's.  Defer discharge recommendations to PT and care team. Will complete OT orders.

## 2024-07-02 NOTE — PROGRESS NOTES
Patient vital signs are at baseline: Yes  Patient able to ambulate as they were prior to admission or with assist devices provided by therapies during their stay:  Yes  Patient MUST void prior to discharge:  Yes  Patient able to tolerate oral intake:  Yes  Pain has adequate pain control using Oral analgesics:  Yes  Does patient have an identified :  Yes  Has goal D/C date and time been discussed with patient:  Yes    Dx:Right Total Knee Arthroplasty  POD#1    A&Ox4.   CMS Intact.   VSS on RA.   Up with Ax1 GB and walker.   Voiding in BR.   No PIV Removed for Discharge.  Regular Diet.  Pain controlled with PRN Oxycodone, Tylenol, Ice.     Reviewed discharge instructions and medications with patient:YES  Questions answered:YES  Patient discharged to:Home w/ Sister  All belongs discharged with patient:YES

## 2024-07-02 NOTE — PROGRESS NOTES
Marva Angela  2024  POD #1    Doing well.  Pain well-controlled.  Tolerating physical therapy and rehabilitation well.  Temperatures:  Current - Temp: 98  F (36.7  C); Max - Temp  Av.9  F (36.6  C)  Min: 97  F (36.1  C)  Max: 98.2  F (36.8  C)  Pulse range: Pulse  Av.1  Min: 64  Max: 82  Blood pressure range: Systolic (24hrs), Av , Min:123 , Max:157   ; Diastolic (24hrs), Av, Min:57, Max:125    CMS: intact  Labs:hgb pending    PLAN:  Discharge plan: home today

## 2024-07-02 NOTE — PROGRESS NOTES
Reason for Admission: Right Total Knee Arthroplasty     Cognitive/Mentation: A/Ox 4  Neuros/CMS: Intact   VS: stable.   Tele: NA.  /GI: Continent. Last BM 6/30.   Pulmonary: LS clear, 1L NC removed this evening per Pt request.  Pain:  managed w/ prn Oxycodone and scheduled Tylenol.     Drains/Lines: LR running 100 mL/hr.  Skin: DYLAN incision, ace wrap CDI  Activity: Assist x 1 with GB/W.  Diet: Regular with thin liquids. Takes pills whole with water.     Therapies recs: Home  Discharge: tomorrow 7/2    Aggression Stoplight Tool: green    End of shift summary: Ice packs replaced, pain well managed with scheduled and PRN medications.

## 2024-07-03 NOTE — PLAN OF CARE
PT-  Pt discharged home today with assist of family as needed and OP PT.  PT goals partially met.

## 2024-07-08 ENCOUNTER — MYC MEDICAL ADVICE (OUTPATIENT)
Dept: INTERNAL MEDICINE | Facility: CLINIC | Age: 74
End: 2024-07-08
Payer: COMMERCIAL

## 2024-07-08 DIAGNOSIS — R60.0 BILATERAL LOWER EXTREMITY EDEMA: ICD-10-CM

## 2024-07-08 RX ORDER — HYDROCHLOROTHIAZIDE 25 MG/1
25 TABLET ORAL DAILY PRN
Qty: 30 TABLET | Refills: 1 | Status: SHIPPED | OUTPATIENT
Start: 2024-07-08 | End: 2024-07-10

## 2024-07-10 RX ORDER — HYDROCHLOROTHIAZIDE 25 MG/1
TABLET ORAL
Qty: 30 TABLET | Refills: 2 | Status: SHIPPED | OUTPATIENT
Start: 2024-07-10

## 2024-08-20 ENCOUNTER — OFFICE VISIT (OUTPATIENT)
Dept: INTERNAL MEDICINE | Facility: CLINIC | Age: 74
End: 2024-08-20
Payer: COMMERCIAL

## 2024-08-20 VITALS
HEIGHT: 64 IN | OXYGEN SATURATION: 96 % | BODY MASS INDEX: 28.36 KG/M2 | DIASTOLIC BLOOD PRESSURE: 68 MMHG | WEIGHT: 166.1 LBS | TEMPERATURE: 97.6 F | HEART RATE: 54 BPM | SYSTOLIC BLOOD PRESSURE: 136 MMHG | RESPIRATION RATE: 14 BRPM

## 2024-08-20 DIAGNOSIS — T84.82XA ARTHROFIBROSIS OF TOTAL KNEE REPLACEMENT, INITIAL ENCOUNTER (H): ICD-10-CM

## 2024-08-20 DIAGNOSIS — E11.59 TYPE 2 DIABETES MELLITUS WITH OTHER CIRCULATORY COMPLICATION, WITHOUT LONG-TERM CURRENT USE OF INSULIN (H): ICD-10-CM

## 2024-08-20 DIAGNOSIS — Z01.818 PREOP GENERAL PHYSICAL EXAM: Primary | ICD-10-CM

## 2024-08-20 DIAGNOSIS — Z96.653 STATUS POST BILATERAL KNEE REPLACEMENTS: ICD-10-CM

## 2024-08-20 DIAGNOSIS — I48.0 PAROXYSMAL ATRIAL FIBRILLATION (H): ICD-10-CM

## 2024-08-20 LAB
ANION GAP SERPL CALCULATED.3IONS-SCNC: 11 MMOL/L (ref 7–15)
BUN SERPL-MCNC: 23.8 MG/DL (ref 8–23)
CALCIUM SERPL-MCNC: 10.2 MG/DL (ref 8.8–10.4)
CHLORIDE SERPL-SCNC: 102 MMOL/L (ref 98–107)
CREAT SERPL-MCNC: 0.77 MG/DL (ref 0.51–0.95)
EGFRCR SERPLBLD CKD-EPI 2021: 80 ML/MIN/1.73M2
GLUCOSE SERPL-MCNC: 107 MG/DL (ref 70–99)
HCO3 SERPL-SCNC: 28 MMOL/L (ref 22–29)
POTASSIUM SERPL-SCNC: 4.9 MMOL/L (ref 3.4–5.3)
SODIUM SERPL-SCNC: 141 MMOL/L (ref 135–145)

## 2024-08-20 PROCEDURE — 80048 BASIC METABOLIC PNL TOTAL CA: CPT | Performed by: INTERNAL MEDICINE

## 2024-08-20 PROCEDURE — 36415 COLL VENOUS BLD VENIPUNCTURE: CPT | Performed by: INTERNAL MEDICINE

## 2024-08-20 PROCEDURE — 99214 OFFICE O/P EST MOD 30 MIN: CPT | Performed by: INTERNAL MEDICINE

## 2024-08-20 NOTE — PROGRESS NOTES
Preoperative Evaluation  [address] [address]  Phone: [phone]  Primary Provider: Kendall Mendez MD  Pre-op Performing Provider: Claude Foreman MD  Aug 20, 2024             8/20/2024   Surgical Information   What procedure is being done? knee manipulation   Facility or Hospital where procedure/surgery will be performed: [facility]   Who is doing the procedure / surgery? Dr Kapoor   Date of surgery / procedure: Aug23   Time of surgery / procedure: 1PM   Where do you plan to recover after surgery? at home with family        Fax number for surgical facility: [phone]    Assessment & Plan     The proposed surgical procedure is considered LOW risk.    Preop general physical exam  Arthrofibrosis of total knee replacement, initial encounter (H24)  Paroxysmal atrial fibrillation (H)  Type 2 diabetes mellitus with other circulatory complication, without long-term current use of insulin (H)  Status post bilateral knee replacements     - No identified additional risk factors other than previously addressed    Antiplatelet or Anticoagulation Medication Instructions   - Bleeding risk is low for this procedure (e.g. dental, skin, cataract).    Additional Medication Instructions  Take all scheduled medications on the day of surgery EXCEPT for modifications listed below:   - Diuretics: DO NOT TAKE on the day of surgery.    Recommendation  Approval given to proceed with proposed procedure, without further diagnostic evaluation.    Ramone Lynn is a 74 year old, presenting for the following:  Pre-Op Exam        HPI related to upcoming procedure: PT developed arthrofibrosis of R TKA since replacement         8/20/2024   Pre-Op Questionnaire   Have you ever had a heart attack or stroke? No   Have you ever had surgery on your heart or blood vessels, such as a stent placement, a coronary artery bypass, or surgery on an  artery in your head, neck, heart, or legs? No   Do you have chest pain with activity? No   Do you have a history of heart failure? No   Do you currently have a cold, bronchitis or symptoms of other infection? No   Do you have a cough, shortness of breath, or wheezing? No   Do you or anyone in your family have previous history of blood clots? (!) YES     Do you or does anyone in your family have a serious bleeding problem such as prolonged bleeding following surgeries or cuts? No   Have you ever had problems with anemia or been told to take iron pills? No   Have you had any abnormal blood loss such as black, tarry or bloody stools, or abnormal vaginal bleeding? No   Have you ever had a blood transfusion? No   Are you willing to have a blood transfusion if it is medically needed before, during, or after your surgery? Yes   Have you or any of your relatives ever had problems with anesthesia? (!) YES     Do you have sleep apnea, excessive snoring or daytime drowsiness? No   Do you have any artifical heart valves or other implanted medical devices like a pacemaker, defibrillator, or continuous glucose monitor? No   Do you have artificial joints? (!) YES   Are you allergic to latex? No        Health Care Directive  Patient has a Health Care Directive on file      Preoperative Review of    reviewed - controlled substances prescribed by other outside provider(s).          Patient Active Problem List    Diagnosis Date Noted    Mixed hyperlipidemia 11/07/2012     Priority: High    Osteopenia 11/01/2005     Priority: High     Problem list name updated by automated process. Provider to review      Personal history of tobacco use, presenting hazards to health (quit 9/7/15) 11/01/2005     Priority: High    Right upper lobe pulmonary nodule 05/19/2023     Priority: Medium     2 mm RUL pulmonary nodule seen on screening Chest CT      Status post hip replacement, left 06/27/2022     Priority: Medium     Left total hip  arthroplasty @ [facility]         S/P total hip arthroplasty 06/27/2022     Priority: Medium     Left total hip arthroplasty 6/27/2022 at [facility]      Atherosclerosis of aorta (H24) 12/20/2019     Priority: Medium    Deformity of toe of right foot 11/19/2018     Priority: Medium    Paroxysmal atrial fibrillation (H) 06/19/2018     Priority: Medium    Aftercare following joint replacement [Z47.1] 08/04/2017     Priority: Medium    Knee joint replacement status 07/31/2017     Priority: Medium    Psoriasis 07/17/2017     Priority: Medium    Type 2 diabetes mellitus with other circulatory complication, without long-term current use of insulin (H) 07/17/2017     Priority: Medium     11/10/22 A1C 6.9  5/16/23 A1C 6.8  11/27/23 A1C 6.6        Personal history of DVT (deep vein thrombosis) 07/17/2017     Priority: Medium     AFTER CHILDBIRTH × 2, ON COUMADIN × 6 WEEKS EACH TIME      Status post right hip replacement 04/03/2017     Priority: Medium     Right total hip arthroplasty @ [facility]       Tinea pedis of left foot 03/21/2017     Priority: Medium    Impaired fasting glucose 03/21/2017     Priority: Medium    Hypercalcemia 03/21/2017     Priority: Medium    Vitamin B12 deficiency (non anemic) 12/05/2016     Priority: Medium     SEVERE      Scurvy 12/05/2016     Priority: Medium     SEVERE      Vitamin D deficiency 12/05/2016     Priority: Medium    Hematuria 12/05/2016     Priority: Medium    Primary osteoarthritis, unspecified site 12/05/2016     Priority: Medium    Arthralgia of both knees 12/05/2016     Priority: Medium    B12 deficiency 10/17/2016     Priority: Medium     B12 178      Hip pain, right 10/14/2016     Priority: Medium    Family history of thyroid disease 10/14/2016     Priority: Medium    Fatigue, unspecified type 10/14/2016     Priority: Medium    Hair loss 10/14/2016     Priority: Medium    Pain in joint, multiple sites 10/14/2016     Priority: Medium     Seborrheic keratosis 10/14/2016     Priority: Medium    Psoriasis with arthropathy (H) 10/14/2016     Priority: Medium    Urine abnormality 10/14/2016     Priority: Medium     BROWNISH STAIN ON UNDERWEAR      Coronary artery disease involving native coronary artery of native heart without angina pectoris 01/01/2016     Priority: Medium     coronary artery calcifications seen on CT scan, mild nonocclusive CAD      Benign essential hypertension      Priority: Medium    COPD (chronic obstructive pulmonary disease) (H) 01/01/2012     Priority: Medium    Eczema 08/31/2009     Priority: Medium      Past Medical History:   Diagnosis Date    Arthritis     knees and hips    B12 deficiency 10/14/2016    B12 178    Benign essential hypertension     was on amlodipine/ then metoprolol / add lisinopril     Chronic atrial fibrillation (H)     Contact dermatitis and other eczema, due to unspecified cause     COPD (chronic obstructive pulmonary disease) (H) 01/01/2012    Coronary artery disease involving native coronary artery of native heart without angina pectoris 2016    coronary artery calcifications seen on CT scan, mild nonocclusive CAD    Hyperlipidemia LDL goal <130 11/07/2012    Nephritis      as a child (post strep)     Osteoporosis, unspecified     Phlebitis and thrombophlebitis of other deep vessels of lower extremities 1972, 1975    Both with pregnancies    Prediabetes 11/01/2018    A1C 6.1    Sinusitis     Tobacco use disorder      Past Surgical History:   Procedure Laterality Date    ABDOMEN SURGERY  April2015    ARTHROPLASTY HIP Right 04/03/2017    Procedure: ARTHROPLASTY HIP;  Surgeon: Francisco J Alston MD;  Location: [facility] OR    ARTHROPLASTY HIP ANTERIOR Left 6/27/2022    Procedure: LEFT TOTAL HIP ARTHROPLASTY DIRECT ANTERIOR APPROACH;  Surgeon: Francisco J Alston MD;  Location: [facility] OR    ARTHROPLASTY KNEE Left 07/31/2017    Procedure: ARTHROPLASTY KNEE;  LEFT TOTAL KNEE ARTHROPLASTY ;  Surgeon:  Francisco J Alston MD;  Location: [facility] OR    ARTHROPLASTY KNEE Right 7/1/2024    Procedure: RIGHT TOTAL KNEE ARTHROPLASTY;  Surgeon: Francisco J Alston MD;  Location: [facility] OR    BIOPSY OF SKIN LESION      C DEXA, BONE DENSITY, AXIAL SKEL  06/2008    T score lumbar -0.6, femoral neck -2.4/-2.0(all stable)    COLONOSCOPY  6/21/21    HC ASPIRATION &/OR INJECTION GANGLION CYST, ANY  1994    HERNIORRHAPHY INGUINAL Right 03/24/2015    Procedure: HERNIORRHAPHY INGUINAL;  Surgeon: Sam Erazo MD;  Location: [facility]    HYSTERECTOMY, PAP NO LONGER INDICATED      ORTHOPEDIC SURGERY      bilat meniscus repair    ZZC VAGINAL HYSTERECTOMY  1978    Hysterectomy, Vaginal     Current Outpatient Medications   Medication Sig Dispense Refill    acetaminophen (TYLENOL) 325 MG tablet Take 2 tablets (650 mg) by mouth every 4 hours as needed for other (mild pain) 100 tablet 0    albuterol (PROAIR HFA/PROVENTIL HFA/VENTOLIN HFA) 108 (90 Base) MCG/ACT inhaler Inhale 2 puffs into the lungs every 4 hours as needed for shortness of breath or wheezing GENERIC VENTOLIN/ALBUTEROL 18 g 11    albuterol (PROVENTIL) (2.5 MG/3ML) 0.083% neb solution Take 1 vial (2.5 mg) by nebulization every 6 hours as needed for shortness of breath / dyspnea or wheezing 90 mL 3    atorvastatin (LIPITOR) 20 MG tablet Take 1 tablet (20 mg) by mouth daily 90 tablet 3    betamethasone dipropionate (DIPROSONE) 0.05 % external cream Apply 1 FTU sparingly once or twice per day as needed to affected area until the skin is better, then stop; REPEAT AS NEEDED 45 g 2    spironolactone (ALDACTONE) 25 MG tablet Take 1 tablet (25 mg) by mouth daily 90 tablet 3    tiotropium (SPIRIVA HANDIHALER) 18 MCG inhaled capsule Inhale 1 capsule (18 mcg) into the lungs daily GENERIC TIOTROPIUM/SPIRIVA 90 capsule 3    Urea 20 % CREA cream Apply topically as needed      vitamin D3 (CHOLECALCIFEROL) 1.25 MG (11559 UT) capsule Take 1 capsule (50,000 Units) by mouth every 14  days SIG: TAKE INDICATION: 1ST AND 15TH OF EACH MONTH, TO TREAT VITAMIN D DEFICIENCY 24 capsule 1    apixaban ANTICOAGULANT (ELIQUIS) 5 MG tablet Take 1 tablet (5 mg) by mouth 2 times daily (Patient not taking: Reported on 8/20/2024) 200 tablet 4    calcium-vitamin D (OSCAL) 250-125 MG-UNIT per tablet Take 2 tablets by mouth daily (Patient not taking: Reported on 8/20/2024)      diltiazem ER COATED BEADS (CARDIZEM CD/CARTIA XT) 180 MG 24 hr capsule Take 1 capsule (180 mg) by mouth daily with breakfast 90 capsule 2    erythromycin (E-MYCIN) 250 MG tablet Take 1,000 mg by mouth as needed ([id number]) Before dental appointment (Patient not taking: Reported on 8/20/2024)      famotidine (PEPCID) 20 MG tablet Take 20 mg by mouth as needed (Patient not taking: Reported on 8/20/2024)      folic acid (FOLVITE) 400 MCG tablet Take 400 mcg by mouth daily (Patient not taking: Reported on 8/20/2024)      hydrochlorothiazide (HYDRODIURIL) 25 MG tablet TAKE 1 TABLET(25 MG) BY MOUTH DAILY AS NEEDED FOR LOWER LEG SWELLING (Patient not taking: Reported on 8/20/2024) 30 tablet 2    order for DME Equipment being ordered: Nebulizer 1 each 0    Psyllium (NATURAL FIBER LAXATIVE PO) Take 2 capsules by mouth daily (Patient not taking: Reported on 8/20/2024)      triamcinolone (KENALOG) 0.1 % cream Apply topically 2 times daily as needed for irritation (sores on scalp.) (Patient not taking: Reported on 8/20/2024)      triamcinolone (KENALOG) 0.1 % external lotion Apply sparingly once or twice per day as needed to affected area until the skin is better, then stop; REPEAT AS NEEDED (Patient not taking: Reported on 8/20/2024) 60 mL 2    vitamin C-electrolytes (EMERGEN-C) 1000mg vitamin C super orange drink mix Take 1 packet by mouth daily Mix 1 packet in 4-6oz water. (Patient not taking: Reported on 8/20/2024)         Allergies   Allergen Reactions    Penicillins Rash     rash    Sulfa Antibiotics Rash     rash        Social History  "    Tobacco Use    Smoking status: Former     Current packs/day: 0.00     Average packs/day: 1 pack/day for 51.6 years (51.6 ttl pk-yrs)     Types: Cigarettes     Start date: 1964     Quit date: 2015     Years since quittin.9    Smokeless tobacco: Never   Substance Use Topics    Alcohol use: Yes     Comment: Rarely have any alcohol       History   Drug Use No               Objective    /68   Pulse 54   Temp 97.6  F (36.4  C) (Temporal)   Resp 14   Ht 1.626 m (5' 4\")   Wt 75.3 kg (166 lb 1.6 oz)   LMP  (LMP Unknown)   SpO2 96%   BMI 28.51 kg/m     Estimated body mass index is 28.51 kg/m  as calculated from the following:    Height as of this encounter: 1.626 m (5' 4\").    Weight as of this encounter: 75.3 kg (166 lb 1.6 oz).  Physical Exam  GENERAL: alert and no distress  HENT: normal cephalic/atraumatic, oropharynx clear, and oral mucous membranes moist  NECK: no adenopathy, no asymmetry, masses, or scars  RESP: lungs clear to auscultation - no rales, rhonchi or wheezes  CV: irregularly irregular rhythm, normal S1 S2, no S3 or S4, and no peripheral edema  ABDOMEN: soft, nontender, no hepatosplenomegaly, no masses and bowel sounds normal  Ext: bilat knee incision scars  Recent Labs   Lab Test 24  0741 24  1307 24  0602 24  0753 23  0753   HGB 12.6  --   --  14.5  --      --   --  266  --    NA  --   --   --  140 140   POTASSIUM  --   --  4.1 4.2 4.1   CR  --  0.73 0.81 0.80 0.79   A1C  --   --   --  6.6* 6.6*        Diagnostics  Recent Results (from the past 24 hour(s))   Basic metabolic panel  (Ca, Cl, CO2, Creat, Gluc, K, Na, BUN)    Collection Time: 24 10:25 AM   Result Value Ref Range    Sodium 141 135 - 145 mmol/L    Potassium 4.9 3.4 - 5.3 mmol/L    Chloride 102 98 - 107 mmol/L    Carbon Dioxide (CO2) 28 22 - 29 mmol/L    Anion Gap 11 7 - 15 mmol/L    Urea Nitrogen 23.8 (H) 8.0 - 23.0 mg/dL    Creatinine 0.77 0.51 - 0.95 mg/dL    GFR Estimate " 80 >60 mL/min/1.73m2    Calcium 10.2 8.8 - 10.4 mg/dL    Glucose 107 (H) 70 - 99 mg/dL      No EKG this visit, completed in the last 90 days.    Revised Cardiac Risk Index (RCRI)  The patient has the following serious cardiovascular risks for perioperative complications:   - Coronary Artery Disease (MI, positive stress test, angina, Qs on EKG) = 1 point     RCRI Interpretation: 1 point: Class II (low risk - 0.9% complication rate)         Signed Electronically by: Claude Foreman MD  A copy of this evaluation report is provided to the requesting physician.

## 2024-08-20 NOTE — PATIENT INSTRUCTIONS
How to Take Your Medication Before Surgery  Preoperative Medication Instructions   Antiplatelet or Anticoagulation Medication Instructions   - Bleeding risk is low for this procedure     Additional Medication Instructions  Take all scheduled medications on the day of surgery EXCEPT for modifications listed below:   - Diuretics: DO NOT TAKE on the day of surgery.       Patient Education   Preparing for Your Surgery  Getting started  A nurse will call you to review your health history and instructions. They will give you an arrival time based on your scheduled surgery time. Please be ready to share:  Your doctor's clinic name and phone number  Your medical, surgical, and anesthesia history  A list of allergies and sensitivities  A list of medicines, including herbal treatments and over-the-counter drugs  Whether the patient has a legal guardian (ask how to send us the papers in advance)  Please tell us if you're pregnant--or if there's any chance you might be pregnant. Some surgeries may injure a fetus (unborn baby), so they require a pregnancy test. Surgeries that are safe for a fetus don't always need a test, and you can choose whether to have one.   If you have a child who's having surgery, please ask for a copy of Preparing for Your Child's Surgery.    Preparing for surgery  Within 10 to 30 days of surgery: Have a pre-op exam (sometimes called an H&P, or History and Physical). This can be done at a clinic or pre-operative center.  If you're having a , you may not need this exam. Talk to your care team.  At your pre-op exam, talk to your care team about all medicines you take. If you need to stop any medicines before surgery, ask when to start taking them again.  We do this for your safety. Many medicines can make you bleed too much during surgery. Some change how well surgery (anesthesia) drugs work.  Call your insurance company to let them know you're having surgery. (If you don't have insurance, call  [phone].)  Call your clinic if there's any change in your health. This includes signs of a cold or flu (sore throat, runny nose, cough, rash, fever). It also includes a scrape or scratch near the surgery site.  If you have questions on the day of surgery, call your hospital or surgery center.  Eating and drinking guidelines  For your safety: Unless your surgeon tells you otherwise, follow the guidelines below.  Eat and drink as usual until 8 hours before you arrive for surgery. After that, no food or milk.  Drink clear liquids until 2 hours before you arrive. These are liquids you can see through, like water, Gatorade, and Propel Water. They also include plain black coffee and tea (no cream or milk), candy, and breath mints. You can spit out gum when you arrive.  If you drink alcohol: Stop drinking it the night before surgery.  If your care team tells you to take medicine on the morning of surgery, it's okay to take it with a sip of water.  Preventing infection  Shower or bathe the night before and morning of your surgery. Follow the instructions your clinic gave you. (If no instructions, use regular soap.)  Don't shave or clip hair near your surgery site. We'll remove the hair if needed.  Don't smoke or vape the morning of surgery. You may chew nicotine gum up to 2 hours before surgery. A nicotine patch is okay.  Note: Some surgeries require you to completely quit smoking and nicotine. Check with your surgeon.  Your care team will make every effort to keep you safe from infection. We will:  Clean our hands often with soap and water (or an alcohol-based hand rub).  Clean the skin at your surgery site with a special soap that kills germs.  Give you a special gown to keep you warm. (Cold raises the risk of infection.)  Wear special hair covers, masks, gowns and gloves during surgery.  Give antibiotic medicine, if prescribed. Not all surgeries need antibiotics.  What to bring on the day of surgery  Photo ID and  insurance card  Copy of your health care directive, if you have one  Glasses and hearing aids (bring cases)  You can't wear contacts during surgery  Inhaler and eye drops, if you use them (tell us about these when you arrive)  CPAP machine or breathing device, if you use them  A few personal items, if spending the night  If you have . . .  A pacemaker, ICD (cardiac defibrillator) or other implant: Bring the ID card.  An implanted stimulator: Bring the remote control.  A legal guardian: Bring a copy of the certified (court-stamped) guardianship papers.  Please remove any jewelry, including body piercings. Leave jewelry and other valuables at home.  If you're going home the day of surgery  You must have a responsible adult drive you home. They should stay with you overnight as well.  If you don't have someone to stay with you, and you aren't safe to go home alone, we may keep you overnight. Insurance often won't pay for this.  After surgery  If it's hard to control your pain or you need more pain medicine, please call your surgeon's office.  Questions?   If you have any questions for your care team, list them here: _________________________________________________________________________________________________________________________________________________________________________ ____________________________________ ____________________________________ ____________________________________  For informational purposes only. Not to replace the advice of your health care provider. Copyright   2003, 2019 [facility]. All rights reserved. Clinically reviewed by Toya Gentile MD. [employer] [id number] - REV 12/22.

## 2024-09-30 DIAGNOSIS — I48.0 PAROXYSMAL ATRIAL FIBRILLATION (H): ICD-10-CM

## 2024-09-30 RX ORDER — DILTIAZEM HYDROCHLORIDE 180 MG/1
180 CAPSULE, COATED, EXTENDED RELEASE ORAL DAILY
Qty: 90 CAPSULE | Refills: 0 | Status: SHIPPED | OUTPATIENT
Start: 2024-09-30

## 2024-10-11 ENCOUNTER — HOSPITAL ENCOUNTER (OUTPATIENT)
Dept: CARDIOLOGY | Facility: CLINIC | Age: 74
Discharge: HOME | End: 2024-10-11
Attending: PHYSICIAN ASSISTANT | Admitting: PHYSICIAN ASSISTANT
Payer: COMMERCIAL

## 2024-10-11 DIAGNOSIS — I48.0 PAROXYSMAL ATRIAL FIBRILLATION (H): ICD-10-CM

## 2024-10-11 LAB — LVEF ECHO: NORMAL

## 2024-10-11 PROCEDURE — 93306 TTE W/DOPPLER COMPLETE: CPT

## 2024-10-11 PROCEDURE — 93306 TTE W/DOPPLER COMPLETE: CPT | Mod: 26 | Performed by: INTERNAL MEDICINE

## 2024-10-15 ENCOUNTER — OFFICE VISIT (OUTPATIENT)
Dept: CARDIOLOGY | Facility: CLINIC | Age: 74
End: 2024-10-15
Payer: COMMERCIAL

## 2024-10-15 VITALS
BODY MASS INDEX: 29.6 KG/M2 | SYSTOLIC BLOOD PRESSURE: 133 MMHG | HEART RATE: 67 BPM | HEIGHT: 64 IN | DIASTOLIC BLOOD PRESSURE: 68 MMHG | WEIGHT: 173.4 LBS

## 2024-10-15 DIAGNOSIS — I48.0 PAROXYSMAL ATRIAL FIBRILLATION (H): Primary | ICD-10-CM

## 2024-10-15 DIAGNOSIS — E78.5 HYPERLIPIDEMIA LDL GOAL <130: ICD-10-CM

## 2024-10-15 DIAGNOSIS — I10 BENIGN ESSENTIAL HYPERTENSION: ICD-10-CM

## 2024-10-15 PROCEDURE — 99214 OFFICE O/P EST MOD 30 MIN: CPT | Performed by: INTERNAL MEDICINE

## 2024-10-15 PROCEDURE — G2211 COMPLEX E/M VISIT ADD ON: HCPCS | Performed by: INTERNAL MEDICINE

## 2024-10-15 RX ORDER — ROSUVASTATIN CALCIUM 20 MG/1
20 TABLET, COATED ORAL DAILY
Qty: 90 TABLET | Refills: 3 | Status: SHIPPED | OUTPATIENT
Start: 2024-10-15

## 2024-10-15 RX ORDER — SPIRONOLACTONE 25 MG/1
25 TABLET ORAL DAILY
Qty: 90 TABLET | Refills: 3 | Status: SHIPPED | OUTPATIENT
Start: 2024-10-15

## 2024-10-15 RX ORDER — ATORVASTATIN CALCIUM 20 MG/1
20 TABLET, FILM COATED ORAL DAILY
Qty: 90 TABLET | Refills: 3 | Status: CANCELLED | OUTPATIENT
Start: 2024-10-15

## 2024-10-15 NOTE — PROGRESS NOTES
Cardiology Progress Note          Assessment and Plan:       Paroxysmal atrial fibrillation, minimally symptomatic  Stable exercise tolerance.  Continue anticoagulation.      Hyperlipidemia trial of changing her atorvastatin 20 to rosuvastatin 20  To see if it helps her myalgias and hopefully improves her LDL control.    Follow-up in 1 year with Rufina Palacios or myself.  Patient prefers [place], could also see Safia or Olga there.    30 minutes was spent with the patient, precharting and reviewing tests as well as post visit charting all done today..    This note was transcribed using electronic voice recognition software and there may be typographical errors present.     The longitudinal plan of care for the diagnosis(es)/condition(s) as documented were addressed during this visit. Due to the added complexity in care, I will continue to support Leah in the subsequent management and with ongoing continuity of care.                    Interval History:     The patient is a very pleasant 74 year old whom I have seen previously and share with Rufina Palacios.  She has paroxysmal atrial fibrillation that is minimally symptomatic.  She is on Eliquis for anticoagulation.  She feels some palpitations if she exerts herself to a higher degree.  Overall she thinks her level of activity and exercise tolerance has improved since her knee surgery.    She has lots of arthritis and would like to take an Advil once per day 3 times per week.  She has not had any GI upset or bleeding issues.      She has hyperlipidemia with suboptimally controlled LDL on atorvastatin 20 mg daily.                     Review of Systems:     Review of Systems:  Skin:        Eyes:       ENT:       Respiratory:  Negative    Cardiovascular:  Negative    Gastroenterology:      Genitourinary:       Musculoskeletal:       Neurologic:       Psychiatric:       Heme/Lymph/Imm:  Positive for allergies  Endocrine:  Negative                Physical Exam:     Vitals: BP  "133/68   Pulse 67   Ht 1.626 m (5' 4\")   Wt 78.7 kg (173 lb 6.4 oz)   LMP  (LMP Unknown)   BMI 29.76 kg/m    Constitutional:  cooperative, alert and oriented, well developed, well nourished, in no acute distress overweight      Skin:  warm and dry to the touch, no apparent skin lesions or masses noted        Head:  normocephalic, no masses or lesions        Eyes:  pupils equal and round, conjunctivae and lids unremarkable, sclera white, no xanthalasma, EOMS intact, no nystagmus        Chest:  normal symmetry        Cardiac: normal S1 and S2;regular rhythm occasional premature beats S4   systolic murmur;LUSB;grade 1          Extremities and Back:  no deformities, clubbing, cyanosis, erythema observed;no edema        Neurological:  no gross motor deficits;affect appropriate                 Medications:     Current Outpatient Medications   Medication Sig Dispense Refill    acetaminophen (TYLENOL) 325 MG tablet Take 2 tablets (650 mg) by mouth every 4 hours as needed for other (mild pain) 100 tablet 0    albuterol (PROAIR HFA/PROVENTIL HFA/VENTOLIN HFA) 108 (90 Base) MCG/ACT inhaler Inhale 2 puffs into the lungs every 4 hours as needed for shortness of breath or wheezing GENERIC VENTOLIN/ALBUTEROL 18 g 11    albuterol (PROVENTIL) (2.5 MG/3ML) 0.083% neb solution Take 1 vial (2.5 mg) by nebulization every 6 hours as needed for shortness of breath / dyspnea or wheezing 90 mL 3    apixaban ANTICOAGULANT (ELIQUIS) 5 MG tablet Take 1 tablet (5 mg) by mouth 2 times daily 200 tablet 4    atorvastatin (LIPITOR) 20 MG tablet Take 1 tablet (20 mg) by mouth daily 90 tablet 3    betamethasone dipropionate (DIPROSONE) 0.05 % external cream Apply 1 FTU sparingly once or twice per day as needed to affected area until the skin is better, then stop; REPEAT AS NEEDED 45 g 2    calcium-vitamin D (OSCAL) 250-125 MG-UNIT per tablet Take 2 tablets by mouth daily.      diltiazem ER COATED BEADS (CARDIZEM CD/CARTIA XT) 180 MG 24 hr " capsule Take 1 capsule (180 mg) by mouth daily. with breakfast 90 capsule 0    erythromycin (E-MYCIN) 250 MG tablet Take 1,000 mg by mouth as needed ([id number]). Before dental appointment      famotidine (PEPCID) 20 MG tablet Take 20 mg by mouth as needed.      folic acid (FOLVITE) 400 MCG tablet Take 400 mcg by mouth daily.      hydrochlorothiazide (HYDRODIURIL) 25 MG tablet TAKE 1 TABLET(25 MG) BY MOUTH DAILY AS NEEDED FOR LOWER LEG SWELLING 30 tablet 2    Psyllium (NATURAL FIBER LAXATIVE PO) Take 2 capsules by mouth daily.      rosuvastatin (CRESTOR) 20 MG tablet Take 1 tablet (20 mg) by mouth daily. 90 tablet 3    spironolactone (ALDACTONE) 25 MG tablet Take 1 tablet (25 mg) by mouth daily. 90 tablet 3    tiotropium (SPIRIVA HANDIHALER) 18 MCG inhaled capsule Inhale 1 capsule (18 mcg) into the lungs daily GENERIC TIOTROPIUM/SPIRIVA 90 capsule 3    triamcinolone (KENALOG) 0.1 % cream Apply topically 2 times daily as needed for irritation (sores on scalp.).      triamcinolone (KENALOG) 0.1 % external lotion Apply sparingly once or twice per day as needed to affected area until the skin is better, then stop; REPEAT AS NEEDED 60 mL 2    Urea 20 % CREA cream Apply topically as needed      vitamin C-electrolytes (EMERGEN-C) 1000mg vitamin C super orange drink mix Take 1 packet by mouth daily. Mix 1 packet in 4-6oz water.      vitamin D3 (CHOLECALCIFEROL) 1.25 MG (24059 UT) capsule Take 1 capsule (50,000 Units) by mouth every 14 days SIG: TAKE INDICATION: 1ST AND 15TH OF EACH MONTH, TO TREAT VITAMIN D DEFICIENCY 24 capsule 1    order for DME Equipment being ordered: Nebulizer 1 each 0                Data:   All laboratory data reviewed  No results found for this or any previous visit (from the past 24 hour(s)).    All laboratory data reviewed  Lab Results   Component Value Date    CHOL 154 05/28/2024    CHOL 147 09/23/2020     Lab Results   Component Value Date    HDL 50 05/28/2024    HDL 56 09/23/2020     Lab  Results   Component Value Date    LDL 92 05/28/2024    LDL 81 09/23/2020     Lab Results   Component Value Date    TRIG 61 05/28/2024    TRIG 48 09/23/2020     Lab Results   Component Value Date    CHOLHDLRATIO 4.2 09/18/2015     TSH   Date Value Ref Range Status   05/28/2024 1.44 0.30 - 4.20 uIU/mL Final   10/08/2021 1.03 0.40 - 4.00 mU/L Final   06/17/2019 1.20 0.40 - 4.00 mU/L Final     Last Basic Metabolic Panel:  Lab Results   Component Value Date     08/20/2024     03/24/2021      Lab Results   Component Value Date    POTASSIUM 4.9 08/20/2024    POTASSIUM 4.3 11/10/2022    POTASSIUM 3.9 03/24/2021     Lab Results   Component Value Date    CHLORIDE 102 08/20/2024    CHLORIDE 104 11/10/2022    CHLORIDE 102 03/24/2021     Lab Results   Component Value Date    SANGEETA 10.2 08/20/2024    SANGEETA 10.6 03/24/2021     Lab Results   Component Value Date    CO2 28 08/20/2024    CO2 31 11/10/2022    CO2 32 03/24/2021     Lab Results   Component Value Date    BUN 23.8 08/20/2024    BUN 24 11/10/2022    BUN 20 03/24/2021     Lab Results   Component Value Date    CR 0.77 08/20/2024    CR 0.80 03/24/2021     Lab Results   Component Value Date     08/20/2024     07/01/2024     11/10/2022     03/24/2021     Lab Results   Component Value Date    WBC 6.5 05/28/2024    WBC 9.1 03/24/2021     Lab Results   Component Value Date    RBC 4.81 05/28/2024    RBC 4.75 03/24/2021     Lab Results   Component Value Date    HGB 12.6 07/02/2024    HGB 14.1 03/24/2021     Lab Results   Component Value Date    HCT 43.9 05/28/2024    HCT 44.7 03/24/2021     Lab Results   Component Value Date    MCV 91 05/28/2024    MCV 94 03/24/2021     Lab Results   Component Value Date    MCH 30.1 05/28/2024    MCH 29.7 03/24/2021     Lab Results   Component Value Date    MCHC 33.0 05/28/2024    MCHC 31.5 03/24/2021     Lab Results   Component Value Date    RDW 12.8 05/28/2024    RDW 13.2 03/24/2021     Lab Results   Component  Value Date     07/02/2024     03/24/2021

## 2024-10-15 NOTE — LETTER
10/15/2024    Kendall Mendez MD  [address] [facility] [address]    RE: Marva Angela       Dear Colleague,     I had the pleasure of seeing Marva Angela in the [facility].  Cardiology Progress Note          Assessment and Plan:       Paroxysmal atrial fibrillation, minimally symptomatic  Stable exercise tolerance.  Continue anticoagulation.      Hyperlipidemia trial of changing her atorvastatin 20 to rosuvastatin 20  To see if it helps her myalgias and hopefully improves her LDL control.    Follow-up in 1 year with Rufina Palacios or myself.  Patient prefers [place], could also see Safia or Olga there.    30 minutes was spent with the patient, precharting and reviewing tests as well as post visit charting all done today..    This note was transcribed using electronic voice recognition software and there may be typographical errors present.     The longitudinal plan of care for the diagnosis(es)/condition(s) as documented were addressed during this visit. Due to the added complexity in care, I will continue to support Leah in the subsequent management and with ongoing continuity of care.                    Interval History:     The patient is a very pleasant 74 year old whom I have seen previously and share with Rufina Palacios.  She has paroxysmal atrial fibrillation that is minimally symptomatic.  She is on Eliquis for anticoagulation.  She feels some palpitations if she exerts herself to a higher degree.  Overall she thinks her level of activity and exercise tolerance has improved since her knee surgery.    She has lots of arthritis and would like to take an Advil once per day 3 times per week.  She has not had any GI upset or bleeding issues.      She has hyperlipidemia with suboptimally controlled LDL on atorvastatin 20 mg daily.                     Review of Systems:     Review of Systems:  Skin:        Eyes:       ENT:       Respiratory:  Negative    Cardiovascular:   "Negative    Gastroenterology:      Genitourinary:       Musculoskeletal:       Neurologic:       Psychiatric:       Heme/Lymph/Imm:  Positive for allergies  Endocrine:  Negative                Physical Exam:     Vitals: /68   Pulse 67   Ht 1.626 m (5' 4\")   Wt 78.7 kg (173 lb 6.4 oz)   LMP  (LMP Unknown)   BMI 29.76 kg/m    Constitutional:  cooperative, alert and oriented, well developed, well nourished, in no acute distress overweight      Skin:  warm and dry to the touch, no apparent skin lesions or masses noted        Head:  normocephalic, no masses or lesions        Eyes:  pupils equal and round, conjunctivae and lids unremarkable, sclera white, no xanthalasma, EOMS intact, no nystagmus        Chest:  normal symmetry        Cardiac: normal S1 and S2;regular rhythm occasional premature beats S4   systolic murmur;LUSB;grade 1          Extremities and Back:  no deformities, clubbing, cyanosis, erythema observed;no edema        Neurological:  no gross motor deficits;affect appropriate                 Medications:     Current Outpatient Medications   Medication Sig Dispense Refill     acetaminophen (TYLENOL) 325 MG tablet Take 2 tablets (650 mg) by mouth every 4 hours as needed for other (mild pain) 100 tablet 0     albuterol (PROAIR HFA/PROVENTIL HFA/VENTOLIN HFA) 108 (90 Base) MCG/ACT inhaler Inhale 2 puffs into the lungs every 4 hours as needed for shortness of breath or wheezing GENERIC VENTOLIN/ALBUTEROL 18 g 11     albuterol (PROVENTIL) (2.5 MG/3ML) 0.083% neb solution Take 1 vial (2.5 mg) by nebulization every 6 hours as needed for shortness of breath / dyspnea or wheezing 90 mL 3     apixaban ANTICOAGULANT (ELIQUIS) 5 MG tablet Take 1 tablet (5 mg) by mouth 2 times daily 200 tablet 4     atorvastatin (LIPITOR) 20 MG tablet Take 1 tablet (20 mg) by mouth daily 90 tablet 3     betamethasone dipropionate (DIPROSONE) 0.05 % external cream Apply 1 FTU sparingly once or twice per day as needed to " affected area until the skin is better, then stop; REPEAT AS NEEDED 45 g 2     calcium-vitamin D (OSCAL) 250-125 MG-UNIT per tablet Take 2 tablets by mouth daily.       diltiazem ER COATED BEADS (CARDIZEM CD/CARTIA XT) 180 MG 24 hr capsule Take 1 capsule (180 mg) by mouth daily. with breakfast 90 capsule 0     erythromycin (E-MYCIN) 250 MG tablet Take 1,000 mg by mouth as needed ([id number]). Before dental appointment       famotidine (PEPCID) 20 MG tablet Take 20 mg by mouth as needed.       folic acid (FOLVITE) 400 MCG tablet Take 400 mcg by mouth daily.       hydrochlorothiazide (HYDRODIURIL) 25 MG tablet TAKE 1 TABLET(25 MG) BY MOUTH DAILY AS NEEDED FOR LOWER LEG SWELLING 30 tablet 2     Psyllium (NATURAL FIBER LAXATIVE PO) Take 2 capsules by mouth daily.       rosuvastatin (CRESTOR) 20 MG tablet Take 1 tablet (20 mg) by mouth daily. 90 tablet 3     spironolactone (ALDACTONE) 25 MG tablet Take 1 tablet (25 mg) by mouth daily. 90 tablet 3     tiotropium (SPIRIVA HANDIHALER) 18 MCG inhaled capsule Inhale 1 capsule (18 mcg) into the lungs daily GENERIC TIOTROPIUM/SPIRIVA 90 capsule 3     triamcinolone (KENALOG) 0.1 % cream Apply topically 2 times daily as needed for irritation (sores on scalp.).       triamcinolone (KENALOG) 0.1 % external lotion Apply sparingly once or twice per day as needed to affected area until the skin is better, then stop; REPEAT AS NEEDED 60 mL 2     Urea 20 % CREA cream Apply topically as needed       vitamin C-electrolytes (EMERGEN-C) 1000mg vitamin C super orange drink mix Take 1 packet by mouth daily. Mix 1 packet in 4-6oz water.       vitamin D3 (CHOLECALCIFEROL) 1.25 MG (96968 UT) capsule Take 1 capsule (50,000 Units) by mouth every 14 days SIG: TAKE INDICATION: 1ST AND 15TH OF EACH MONTH, TO TREAT VITAMIN D DEFICIENCY 24 capsule 1     order for DME Equipment being ordered: Nebulizer 1 each 0                Data:   All laboratory data reviewed  No results found for this or any  previous visit (from the past 24 hour(s)).    All laboratory data reviewed  Lab Results   Component Value Date    CHOL 154 05/28/2024    CHOL 147 09/23/2020     Lab Results   Component Value Date    HDL 50 05/28/2024    HDL 56 09/23/2020     Lab Results   Component Value Date    LDL 92 05/28/2024    LDL 81 09/23/2020     Lab Results   Component Value Date    TRIG 61 05/28/2024    TRIG 48 09/23/2020     Lab Results   Component Value Date    CHOLHDLRATIO 4.2 09/18/2015     TSH   Date Value Ref Range Status   05/28/2024 1.44 0.30 - 4.20 uIU/mL Final   10/08/2021 1.03 0.40 - 4.00 mU/L Final   06/17/2019 1.20 0.40 - 4.00 mU/L Final     Last Basic Metabolic Panel:  Lab Results   Component Value Date     08/20/2024     03/24/2021      Lab Results   Component Value Date    POTASSIUM 4.9 08/20/2024    POTASSIUM 4.3 11/10/2022    POTASSIUM 3.9 03/24/2021     Lab Results   Component Value Date    CHLORIDE 102 08/20/2024    CHLORIDE 104 11/10/2022    CHLORIDE 102 03/24/2021     Lab Results   Component Value Date    SANGEETA 10.2 08/20/2024    SANGEETA 10.6 03/24/2021     Lab Results   Component Value Date    CO2 28 08/20/2024    CO2 31 11/10/2022    CO2 32 03/24/2021     Lab Results   Component Value Date    BUN 23.8 08/20/2024    BUN 24 11/10/2022    BUN 20 03/24/2021     Lab Results   Component Value Date    CR 0.77 08/20/2024    CR 0.80 03/24/2021     Lab Results   Component Value Date     08/20/2024     07/01/2024     11/10/2022     03/24/2021     Lab Results   Component Value Date    WBC 6.5 05/28/2024    WBC 9.1 03/24/2021     Lab Results   Component Value Date    RBC 4.81 05/28/2024    RBC 4.75 03/24/2021     Lab Results   Component Value Date    HGB 12.6 07/02/2024    HGB 14.1 03/24/2021     Lab Results   Component Value Date    HCT 43.9 05/28/2024    HCT 44.7 03/24/2021     Lab Results   Component Value Date    MCV 91 05/28/2024    MCV 94 03/24/2021     Lab Results   Component Value Date     MCH 30.1 05/28/2024    MCH 29.7 03/24/2021     Lab Results   Component Value Date    MCHC 33.0 05/28/2024    MCHC 31.5 03/24/2021     Lab Results   Component Value Date    RDW 12.8 05/28/2024    RDW 13.2 03/24/2021     Lab Results   Component Value Date     07/02/2024     03/24/2021               Thank you for allowing me to participate in the care of your patient.      Sincerely,     Robert Stephen MD     [facility]  cc:   Rufina Palacios PA-C  [address]  [place] [address]

## 2024-10-31 ENCOUNTER — PATIENT OUTREACH (OUTPATIENT)
Dept: CARE COORDINATION | Facility: CLINIC | Age: 74
End: 2024-10-31
Payer: COMMERCIAL

## 2024-11-14 ENCOUNTER — PATIENT OUTREACH (OUTPATIENT)
Dept: CARE COORDINATION | Facility: CLINIC | Age: 74
End: 2024-11-14
Payer: COMMERCIAL

## 2024-12-09 ENCOUNTER — LAB (OUTPATIENT)
Dept: LAB | Facility: CLINIC | Age: 74
End: 2024-12-09
Payer: COMMERCIAL

## 2024-12-09 DIAGNOSIS — E11.59 TYPE 2 DIABETES MELLITUS WITH OTHER CIRCULATORY COMPLICATION, WITHOUT LONG-TERM CURRENT USE OF INSULIN (H): ICD-10-CM

## 2024-12-09 DIAGNOSIS — I70.0 ATHEROSCLEROSIS OF AORTA (H): ICD-10-CM

## 2024-12-09 DIAGNOSIS — I48.0 PAROXYSMAL ATRIAL FIBRILLATION (H): ICD-10-CM

## 2024-12-09 DIAGNOSIS — E78.5 HYPERLIPIDEMIA LDL GOAL <130: ICD-10-CM

## 2024-12-09 DIAGNOSIS — I10 BENIGN ESSENTIAL HYPERTENSION: ICD-10-CM

## 2024-12-09 LAB
ANION GAP SERPL CALCULATED.3IONS-SCNC: 8 MMOL/L (ref 7–15)
BUN SERPL-MCNC: 18.9 MG/DL (ref 8–23)
CALCIUM SERPL-MCNC: 9.9 MG/DL (ref 8.8–10.4)
CHLORIDE SERPL-SCNC: 102 MMOL/L (ref 98–107)
CREAT SERPL-MCNC: 0.82 MG/DL (ref 0.51–0.95)
EGFRCR SERPLBLD CKD-EPI 2021: 74 ML/MIN/1.73M2
ERYTHROCYTE [DISTWIDTH] IN BLOOD BY AUTOMATED COUNT: 12.5 % (ref 10–15)
EST. AVERAGE GLUCOSE BLD GHB EST-MCNC: 137 MG/DL
GLUCOSE SERPL-MCNC: 130 MG/DL (ref 70–99)
HBA1C MFR BLD: 6.4 % (ref 0–5.6)
HCO3 SERPL-SCNC: 30 MMOL/L (ref 22–29)
HCT VFR BLD AUTO: 44.1 % (ref 35–47)
HGB BLD-MCNC: 14.5 G/DL (ref 11.7–15.7)
MCH RBC QN AUTO: 29.7 PG (ref 26.5–33)
MCHC RBC AUTO-ENTMCNC: 32.9 G/DL (ref 31.5–36.5)
MCV RBC AUTO: 90 FL (ref 78–100)
PLATELET # BLD AUTO: 246 10E3/UL (ref 150–450)
POTASSIUM SERPL-SCNC: 4.1 MMOL/L (ref 3.4–5.3)
RBC # BLD AUTO: 4.89 10E6/UL (ref 3.8–5.2)
SODIUM SERPL-SCNC: 140 MMOL/L (ref 135–145)
WBC # BLD AUTO: 6.3 10E3/UL (ref 4–11)

## 2024-12-09 PROCEDURE — 83036 HEMOGLOBIN GLYCOSYLATED A1C: CPT

## 2024-12-09 PROCEDURE — 36415 COLL VENOUS BLD VENIPUNCTURE: CPT

## 2024-12-09 PROCEDURE — 80048 BASIC METABOLIC PNL TOTAL CA: CPT

## 2024-12-09 PROCEDURE — 85027 COMPLETE CBC AUTOMATED: CPT

## 2024-12-11 ENCOUNTER — OFFICE VISIT (OUTPATIENT)
Dept: INTERNAL MEDICINE | Facility: CLINIC | Age: 74
End: 2024-12-11
Payer: COMMERCIAL

## 2024-12-11 VITALS
BODY MASS INDEX: 29.21 KG/M2 | OXYGEN SATURATION: 96 % | DIASTOLIC BLOOD PRESSURE: 62 MMHG | HEIGHT: 64 IN | RESPIRATION RATE: 11 BRPM | SYSTOLIC BLOOD PRESSURE: 122 MMHG | TEMPERATURE: 97.8 F | HEART RATE: 60 BPM | WEIGHT: 171.1 LBS

## 2024-12-11 DIAGNOSIS — I48.0 PAROXYSMAL ATRIAL FIBRILLATION (H): ICD-10-CM

## 2024-12-11 DIAGNOSIS — E11.59 TYPE 2 DIABETES MELLITUS WITH OTHER CIRCULATORY COMPLICATION, WITHOUT LONG-TERM CURRENT USE OF INSULIN (H): Primary | ICD-10-CM

## 2024-12-11 DIAGNOSIS — E78.5 HYPERLIPIDEMIA LDL GOAL <130: ICD-10-CM

## 2024-12-11 DIAGNOSIS — J43.9 PULMONARY EMPHYSEMA, UNSPECIFIED EMPHYSEMA TYPE (H): ICD-10-CM

## 2024-12-11 DIAGNOSIS — R60.0 BILATERAL LOWER EXTREMITY EDEMA: ICD-10-CM

## 2024-12-11 DIAGNOSIS — I10 BENIGN ESSENTIAL HYPERTENSION: ICD-10-CM

## 2024-12-11 PROCEDURE — 99214 OFFICE O/P EST MOD 30 MIN: CPT | Performed by: INTERNAL MEDICINE

## 2024-12-11 PROCEDURE — G2211 COMPLEX E/M VISIT ADD ON: HCPCS | Performed by: INTERNAL MEDICINE

## 2024-12-11 RX ORDER — TIOTROPIUM BROMIDE 18 UG/1
18 CAPSULE ORAL; RESPIRATORY (INHALATION) DAILY
Qty: 90 CAPSULE | Refills: 3 | Status: SHIPPED | OUTPATIENT
Start: 2024-12-11

## 2024-12-11 RX ORDER — TRAMADOL HYDROCHLORIDE 50 MG/1
50 TABLET ORAL EVERY 6 HOURS PRN
COMMUNITY
Start: 2024-08-23 | End: 2024-12-11

## 2024-12-11 ASSESSMENT — PAIN SCALES - GENERAL: PAINLEVEL_OUTOF10: NO PAIN (0)

## 2024-12-11 NOTE — PROGRESS NOTES
Assessment & Plan     (E11.59) Type 2 diabetes mellitus with other circulatory complication, without long-term current use of insulin (H)  (primary encounter diagnosis)  Comment: This condition is currently controlled on the current medical regimen.  Continue current therapy.   Discussed importance of aggressive management of diabetes, including aggressive low cabr diet (one of the most powerful ways to control type II DM), performing regular glucose monitoring, (either with standard glucometer, or preferably personal continuous glucose monitor), medication compliance, regular laboratory monitoring at least every 6 months (or possibly more often if diabetes not at goal), and attending regular follow up appointments as ordered.    Aggressive diabetes management of diabetes will greatly reduce the future risk of diabetes related complications such as CAD, CVA, PVD, and retinopathy/neuropathy/nephropathy.  Based on level of diabetes control: testing frequency ONE TIME PER DAY   Plan: REVIEW OF HEALTH MAINTENANCE PROTOCOL ORDERS,         Lipid panel reflex to direct LDL Fasting,         Hemoglobin A1c, Basic metabolic panel, CBC with        platelets, Albumin Random Urine Quantitative         with Creat Ratio, AST, ALT            (I48.0) Paroxysmal atrial fibrillation (H)  Comment: This condition is currently controlled on the current medical regimen.  Continue current therapy.   Plan: apixaban ANTICOAGULANT (ELIQUIS) 5 MG tablet,         reason aspirin not prescribed, intentional,,         REVIEW OF HEALTH MAINTENANCE PROTOCOL ORDERS,         Lipid panel reflex to direct LDL Fasting,         Hemoglobin A1c, Basic metabolic panel, CBC with        platelets, Albumin Random Urine Quantitative         with Creat Ratio            (E78.5) Hyperlipidemia LDL goal <130  Comment: This condition is currently controlled on the current medical regimen.  Continue current therapy. Discussed guidelines recommending a statin  "cholesterol lowering medication for any patient with either diabetes and/or vascular disease, aiming for a LDL goal under 100 for sure, ideally under 70, using whatever dose of statin tolerated.    Plan: REVIEW OF HEALTH MAINTENANCE PROTOCOL ORDERS,         Lipid panel reflex to direct LDL Fasting,         Hemoglobin A1c, Basic metabolic panel, CBC with        platelets, Albumin Random Urine Quantitative         with Creat Ratio, AST, ALT            (R60.0) Bilateral lower extremity edema  Comment: This condition is currently controlled on the current medical regimen.  Continue current therapy.   Plan: REVIEW OF HEALTH MAINTENANCE PROTOCOL ORDERS            (I10) Benign essential hypertension  Comment: This condition is currently controlled on the current medical regimen.  Continue current therapy.   Plan: REVIEW OF HEALTH MAINTENANCE PROTOCOL ORDERS,         Lipid panel reflex to direct LDL Fasting,         Hemoglobin A1c, Basic metabolic panel, CBC with        platelets, Albumin Random Urine Quantitative         with Creat Ratio            (J43.9) Pulmonary emphysema, unspecified emphysema type (H)  Comment: This condition is currently controlled on the current medical regimen.  Continue current therapy.   Plan: tiotropium (SPIRIVA HANDIHALER) 18 MCG inhaled         capsule, REVIEW OF HEALTH MAINTENANCE PROTOCOL         ORDERS                     BMI  Estimated body mass index is 29.37 kg/m  as calculated from the following:    Height as of this encounter: 1.626 m (5' 4\").    Weight as of this encounter: 77.6 kg (171 lb 1.6 oz).             Ramone Lynn is a 74 year old, presenting for the following health issues:  Hypertension, Lipids, and Diabetes      12/11/2024    10:15 AM   Additional Questions   Roomed by Radha DANIELSON CMA     History of Present Illness       Diabetes:   She presents for follow up of diabetes.    She is not checking blood glucose.      When her blood glucose is low, the patient is " asymptomatic for confusion, blurred vision, lethargy and reports not feeling dizzy, shaky, or weak.                 Reason for visit:  Follow up to labs    She eats 2-3 servings of fruits and vegetables daily.She consumes 0 sweetened beverage(s) daily.She exercises with enough effort to increase her heart rate 20 to 29 minutes per day.  She exercises with enough effort to increase her heart rate 4 days per week.   She is taking medications regularly.     Hyperlipidemia Follow-Up    Are you regularly taking any medication or supplement to lower your cholesterol?   Yes- changed to Rosuvastatin by cardiology  Are you having muscle aches or other side effects that you think could be caused by your cholesterol lowering medication?  No    Hypertension Follow-up    Do you check your blood pressure regularly outside of the clinic? Yes rarely  Are you following a low salt diet? Yes  Are your blood pressures ever more than 140 on the top number (systolic) OR more   than 90 on the bottom number (diastolic), for example 140/90? No    Has history of atrial fibrillation.    No recent reports of significant palpitations, dizziness, presyncope, dyspnea on exertion, or difficulties with exertion.  .   Taking medications as ordered, no side effects reported.   Patient is taking anticoagulation according to direction, with no reported side effects.    COPD remains stable.  Has not had any recent breathing troubles beyond usual baseline.  Has not any acute respiratory events.  Remains with intermittent cough, mild shortness of breath with overexertion as per usual.  Using medication as directed with reported side effects.         **I reviewed the information recorded in the patient's EPIC chart (including but not limited to medical history, surgical history, family history, problem list, medication list, and allergy list) and updated the information as indicated based on the patients reported information.       Review of  "Systems  Constitutional, HEENT, cardiovascular, pulmonary, gi and gu systems are negative, except as otherwise noted.      Objective    /62   Pulse 60   Temp 97.8  F (36.6  C) (Oral)   Resp 11   Ht 1.626 m (5' 4\")   Wt 77.6 kg (171 lb 1.6 oz)   LMP  (LMP Unknown)   SpO2 96%   Breastfeeding No   BMI 29.37 kg/m    Body mass index is 29.37 kg/m .  Physical Exam   GENERAL alert and no distress  EYES:  Normal sclera,conjunctiva, EOMI  HENT: oral and posterior pharynx without lesions or erythema, facies symmetric  NECK: Neck supple. No LAD, without thyroidmegaly.  RESP: Clear to ausculation bilaterally without wheezes or crackles. Normal BS in all fields.  CV: Irregular rhythm ( consistent with known atrial fibrillation), regular rate; normal S1S2 without murmurs, rubs or gallops   LYMPH: no cervical lymph adenopathy appreciated  MS: extremities- no gross deformities of the visible extremities noted,   EXT:  no lower extremity edema  PSYCH: Alert and oriented times 3; speech- coherent  SKIN:  No obvious significant skin lesions on visible portions of face           The longitudinal plan of care for the diagnosis(es)/condition(s) as documented were addressed during this visit. Due to the added complexity in care, I will continue to support Leah in the subsequent management and with ongoing continuity of care.      Signed Electronically by: Kendall Mendez MD    "

## 2024-12-11 NOTE — PATIENT INSTRUCTIONS
"     Continue all medications at the same doses.  Contact your usual pharmacy if you need refills.      Return to see me in 6 months, schedule a follow up visit sooner if needed for anything else.  Use [employer] or Call [phone] to schedule the appointment with me.       5 GOALS IN MANAGING DIABETES (i.e. to give the best chance to prevent diabetic complications and vascular disease):     1.  Aggressive diabetic management.  The target for A1C (3 months average blood sugar) is ideally below 6.5, preferably below 7.5    Your diabetes is under good control.      Lab Results   Component Value Date    A1C 6.4 12/09/2024    A1C 6.6 05/28/2024    A1C 6.6 11/27/2023    A1C 6.8 05/16/2023    A1C 6.9 11/10/2022    A1C 6.4 06/16/2022    A1C 6.0 04/08/2022    A1C 6.0 09/30/2021    A1C 6.2 03/24/2021    A1C 6.2 09/23/2020    A1C 6.2 12/17/2019    A1C 6.0 06/17/2019    A1C 6.1 11/02/2018    A1C 6.4 03/21/2017       2.  Aggressive blood pressure control, under 130/80 ideally.  Using medications if needed.    Your blood pressure is under good control    BP Readings from Last 4 Encounters:   12/11/24 122/62   10/15/24 133/68   08/20/24 136/68   07/02/24 (!) 156/72       3.  Aggressive LDL cholesterol (bad cholesterol) lowering as needed.  Your goal is an LDL under 100 for sure, preferably under 70.     *  All patients with diabetes are recommended to be on a \"statin\" cholesterol lowering medication regardless of the cholesterol levels to give the best chance at avoiding vascular disease.      New guidelines identify four high-risk groups who could benefit from statins:   *people with pre-existing heart disease, such as those who have had a heart attack;   *people ages 40 to 75 who have diabetes of any type  *patients ages 40 to 75 with at least a 7.5% risk of developing cardiovascular disease over the next decade, according to a formula described in the guidelines  *patients with the sort of super-high cholesterol that sometimes " "runs in families, as evidenced by an LDL of 190 milligrams per deciliter or higher    *  Your cholesterol levels are well controlled.    Recent Labs   Lab Test 05/28/24  0753 11/27/23  0753   CHOL 154 160   HDL 50 48*   LDL 92 99   TRIG 61 66       --LDL (\"bad\" cholesterol): desired under 100 in diabetes.  Always make better food choices ( lower fats, etc.), statins are recommended for almost all diabetics, regardless of LDL levels.  --HDL (\"good\") cholesterol: (normal above 40 for men, above 50 for women).  Best way to improve the HDL level is through regular physical exercise. There are no medications to raise HDL levels.  --Triglycerides (desirable under 150): triglycerides are more about metabolic issues, best way to improve this is through better diet choices, emphasizing reducing the intake of \"simple carbohydrates\" (e.g. White bread, white rice, pasta, noodles, potatoes, snack foods, regular soda, juices (except fresh squeezed), cakes, cookies, candy, etc.) as best possible.  Medications may be considered for triglyceride levels routinely above 400.    4.  No smoking    5.  Consider daily preventative Aspirin once per day, every day over age 50 unless there is a specific reason that you cannot take aspirin.       *You should not take preventative aspirin due to the Apixaban      DIABETES REMINDERS:   1. Check your blood glucose regularly either with standard glucometer or personal continuous glucose monitor.    2) Your blood sugar goals:  before eating and  two hours after eating.  If using personal continuous glucose monitor, the goal is Time in the Target range ( ) of 70-75% with very few (under 2%) Below target.    3) Always be prepared to treat low blood sugar symptoms should it happen. Keep a sugar-containing beverage or food nearby.  4) When to call your clinic:     Blood sugar over 400     If you have 2 to 3 low blood sugars (under 70) in a row    Low readings the same time of day " several days in a row  5) When to call 911: If your low blood glucose symptoms do not get better with treatment, or if you/someone else is unable to give you treatment.  6) Work with a Certified Diabetes Educator to assist you with your diabetes management  7) Contact us if you have ANY questions about your medications or instructions, or have problems with getting prescriptions filled.

## 2024-12-12 NOTE — TELEPHONE ENCOUNTER
*  please call the patient.     *  The patient still qualifies for lung cancer screening.     *  Future low dose Chest CT ordered for lung cancer screening.  (may need to be done one year after the last exam, I.e. after 11/16/19)      Lung Cancer Screening Shared Decision Making Visit     Marva Angela is eligible for lung cancer screening on the basis of the information provided in my signed lung cancer screening order.     I have discussed with patient the risks and benefits of screening for lung cancer with low-dose CT.     The risks include:  radiation exposure: one low dose chest CT has as much ionizing radiation as about 15 chest x-rays or 6 months of background radiation living in [place]    false positives: 96% of positive findings/nodules are NOT cancer, but some might still require additional diagnostic evaluation, including biopsy  over-diagnosis: some slow growing cancers that might never have been clinically significant will be detected and treated unnecessarily       The benefit of early detection of lung cancer is contingent upon adherence to annual screening or more frequent follow up if indicated.     Furthermore, reaping the benefits of screening requires Marva Angela to be willing and physically able to undergo diagnostic procedures, if indicated. Although no specific guide is available for determining severity of comorbidities, it is reasonable to withhold screening in patients who have greater mortality risk from other diseases.         Patient is currently a non-smoker, having quit in 2015, after 40 pack year history    ShouldIScreen  
Informed patient that she still qualifies for lung cancer screening and gave her [facility]'s phone number to schedule appt.    YVETTE Jones   
Reason for Call:  Patient Request    Detailed comments: Patient needs new orders for a CAT Scan to have her annual lung cancer test    Phone Number Patient can be reached at: Home number on file [phone] (home)    Best Time: anytime    Can we leave a detailed message on this number? YES    Call taken on 10/23/2019 at 1:42 PM by Campos Herrera      
No

## 2024-12-15 ENCOUNTER — MYC MEDICAL ADVICE (OUTPATIENT)
Dept: CARDIOLOGY | Facility: CLINIC | Age: 74
End: 2024-12-15
Payer: COMMERCIAL

## 2024-12-15 DIAGNOSIS — I48.0 PAROXYSMAL ATRIAL FIBRILLATION (H): ICD-10-CM

## 2024-12-16 RX ORDER — DILTIAZEM HYDROCHLORIDE 180 MG/1
180 CAPSULE, COATED, EXTENDED RELEASE ORAL DAILY
Qty: 90 CAPSULE | Refills: 3 | Status: SHIPPED | OUTPATIENT
Start: 2024-12-16

## 2025-01-02 ENCOUNTER — TRANSFERRED RECORDS (OUTPATIENT)
Dept: MULTI SPECIALTY CLINIC | Facility: CLINIC | Age: 75
End: 2025-01-02

## 2025-01-02 LAB — RETINOPATHY: NORMAL

## 2025-01-10 ENCOUNTER — MYC REFILL (OUTPATIENT)
Dept: INTERNAL MEDICINE | Facility: CLINIC | Age: 75
End: 2025-01-10
Payer: COMMERCIAL

## 2025-01-10 DIAGNOSIS — L40.9 PSORIASIS: ICD-10-CM

## 2025-01-10 NOTE — TELEPHONE ENCOUNTER
Clinic RN: Please investigate patient's chart or contact patient if the information cannot be found because patient should have run out of this medication on 6/15/23. Confirm patient is taking this medication as prescribed. Document findings and route refill encounter to provider for approval or denial.    Willie De Souza, RN, BSN, MSN  RN Lead

## 2025-01-10 NOTE — TELEPHONE ENCOUNTER
"Uses for psoriasis of \"hands, feet or wherever psoriasis breaks out. PCP typically provides large tube so she is just running out now. She would like to proceed with refill request. Request routed to PCP for consideration.     Can we leave a detailed message on this number? YES  Phone number patient can be reached at: Cell number on file:    Telephone Information:   Home [phone]        Jeri Boudreaux RN  [facility] Triage    "

## 2025-01-12 RX ORDER — BETAMETHASONE DIPROPIONATE 0.5 MG/G
CREAM TOPICAL
Qty: 45 G | Refills: 2 | Status: SHIPPED | OUTPATIENT
Start: 2025-01-12

## 2025-01-25 ENCOUNTER — HEALTH MAINTENANCE LETTER (OUTPATIENT)
Age: 75
End: 2025-01-25

## 2025-03-10 ENCOUNTER — ANCILLARY PROCEDURE (OUTPATIENT)
Dept: MAMMOGRAPHY | Facility: CLINIC | Age: 75
End: 2025-03-10
Attending: INTERNAL MEDICINE
Payer: COMMERCIAL

## 2025-03-10 DIAGNOSIS — Z12.31 VISIT FOR SCREENING MAMMOGRAM: ICD-10-CM

## 2025-03-10 PROCEDURE — 77063 BREAST TOMOSYNTHESIS BI: CPT | Mod: TC | Performed by: RADIOLOGY

## 2025-03-10 PROCEDURE — 77067 SCR MAMMO BI INCL CAD: CPT | Mod: TC | Performed by: RADIOLOGY

## 2025-06-14 ENCOUNTER — HEALTH MAINTENANCE LETTER (OUTPATIENT)
Age: 75
End: 2025-06-14

## 2025-06-18 ENCOUNTER — LAB (OUTPATIENT)
Dept: LAB | Facility: CLINIC | Age: 75
End: 2025-06-18
Payer: COMMERCIAL

## 2025-06-18 DIAGNOSIS — E78.5 HYPERLIPIDEMIA LDL GOAL <130: ICD-10-CM

## 2025-06-18 DIAGNOSIS — I48.0 PAROXYSMAL ATRIAL FIBRILLATION (H): ICD-10-CM

## 2025-06-18 DIAGNOSIS — E11.59 TYPE 2 DIABETES MELLITUS WITH OTHER CIRCULATORY COMPLICATION, WITHOUT LONG-TERM CURRENT USE OF INSULIN (H): ICD-10-CM

## 2025-06-18 DIAGNOSIS — I10 BENIGN ESSENTIAL HYPERTENSION: ICD-10-CM

## 2025-06-18 LAB
ALT SERPL W P-5'-P-CCNC: 24 U/L (ref 0–50)
ANION GAP SERPL CALCULATED.3IONS-SCNC: 13 MMOL/L (ref 7–15)
AST SERPL W P-5'-P-CCNC: 28 U/L (ref 0–45)
BUN SERPL-MCNC: 21.8 MG/DL (ref 8–23)
CALCIUM SERPL-MCNC: 10 MG/DL (ref 8.8–10.4)
CHLORIDE SERPL-SCNC: 101 MMOL/L (ref 98–107)
CHOLEST SERPL-MCNC: 169 MG/DL
CREAT SERPL-MCNC: 0.79 MG/DL (ref 0.51–0.95)
EGFRCR SERPLBLD CKD-EPI 2021: 77 ML/MIN/1.73M2
ERYTHROCYTE [DISTWIDTH] IN BLOOD BY AUTOMATED COUNT: 11.9 % (ref 10–15)
EST. AVERAGE GLUCOSE BLD GHB EST-MCNC: 146 MG/DL
FASTING STATUS PATIENT QL REPORTED: YES
FASTING STATUS PATIENT QL REPORTED: YES
GLUCOSE SERPL-MCNC: 113 MG/DL (ref 70–99)
HBA1C MFR BLD: 6.7 % (ref 0–5.6)
HCO3 SERPL-SCNC: 27 MMOL/L (ref 22–29)
HCT VFR BLD AUTO: 43.4 % (ref 35–47)
HDLC SERPL-MCNC: 50 MG/DL
HGB BLD-MCNC: 14.7 G/DL (ref 11.7–15.7)
LDLC SERPL CALC-MCNC: 102 MG/DL
MCH RBC QN AUTO: 30.6 PG (ref 26.5–33)
MCHC RBC AUTO-ENTMCNC: 33.9 G/DL (ref 31.5–36.5)
MCV RBC AUTO: 90 FL (ref 78–100)
NONHDLC SERPL-MCNC: 119 MG/DL
PLATELET # BLD AUTO: 245 10E3/UL (ref 150–450)
POTASSIUM SERPL-SCNC: 4.2 MMOL/L (ref 3.4–5.3)
RBC # BLD AUTO: 4.8 10E6/UL (ref 3.8–5.2)
SODIUM SERPL-SCNC: 141 MMOL/L (ref 135–145)
TRIGL SERPL-MCNC: 84 MG/DL
TSH SERPL DL<=0.005 MIU/L-ACNC: 0.85 UIU/ML (ref 0.3–4.2)
WBC # BLD AUTO: 7.5 10E3/UL (ref 4–11)

## 2025-06-18 PROCEDURE — 80048 BASIC METABOLIC PNL TOTAL CA: CPT

## 2025-06-18 PROCEDURE — 84460 ALANINE AMINO (ALT) (SGPT): CPT

## 2025-06-18 PROCEDURE — 83036 HEMOGLOBIN GLYCOSYLATED A1C: CPT

## 2025-06-18 PROCEDURE — 80061 LIPID PANEL: CPT

## 2025-06-18 PROCEDURE — 84443 ASSAY THYROID STIM HORMONE: CPT

## 2025-06-18 PROCEDURE — 36415 COLL VENOUS BLD VENIPUNCTURE: CPT

## 2025-06-18 PROCEDURE — 85027 COMPLETE CBC AUTOMATED: CPT

## 2025-06-18 PROCEDURE — 84450 TRANSFERASE (AST) (SGOT): CPT

## 2025-06-19 ENCOUNTER — OFFICE VISIT (OUTPATIENT)
Dept: INTERNAL MEDICINE | Facility: CLINIC | Age: 75
End: 2025-06-19
Payer: COMMERCIAL

## 2025-06-19 VITALS
HEART RATE: 71 BPM | BODY MASS INDEX: 29.82 KG/M2 | SYSTOLIC BLOOD PRESSURE: 118 MMHG | OXYGEN SATURATION: 97 % | WEIGHT: 174.7 LBS | HEIGHT: 64 IN | DIASTOLIC BLOOD PRESSURE: 70 MMHG | TEMPERATURE: 98.2 F

## 2025-06-19 DIAGNOSIS — M85.80 OSTEOPENIA, UNSPECIFIED LOCATION: ICD-10-CM

## 2025-06-19 DIAGNOSIS — I48.0 PAROXYSMAL ATRIAL FIBRILLATION (H): ICD-10-CM

## 2025-06-19 DIAGNOSIS — E11.59 TYPE 2 DIABETES MELLITUS WITH OTHER CIRCULATORY COMPLICATION, WITHOUT LONG-TERM CURRENT USE OF INSULIN (H): Primary | ICD-10-CM

## 2025-06-19 DIAGNOSIS — E55.9 VITAMIN D DEFICIENCY: ICD-10-CM

## 2025-06-19 DIAGNOSIS — I10 BENIGN ESSENTIAL HYPERTENSION: ICD-10-CM

## 2025-06-19 DIAGNOSIS — E78.5 HYPERLIPIDEMIA LDL GOAL <130: ICD-10-CM

## 2025-06-19 DIAGNOSIS — E53.8 VITAMIN B12 DEFICIENCY (NON ANEMIC): ICD-10-CM

## 2025-06-19 DIAGNOSIS — J43.9 PULMONARY EMPHYSEMA, UNSPECIFIED EMPHYSEMA TYPE (H): ICD-10-CM

## 2025-06-19 DIAGNOSIS — I25.10 CORONARY ARTERY DISEASE INVOLVING NATIVE CORONARY ARTERY OF NATIVE HEART WITHOUT ANGINA PECTORIS: ICD-10-CM

## 2025-06-19 DIAGNOSIS — J44.9 CHRONIC OBSTRUCTIVE PULMONARY DISEASE, UNSPECIFIED COPD TYPE (H): ICD-10-CM

## 2025-06-19 DIAGNOSIS — L40.50 PSORIASIS WITH ARTHROPATHY (H): ICD-10-CM

## 2025-06-19 DIAGNOSIS — Z87.891 HISTORY OF TOBACCO USE, PRESENTING HAZARDS TO HEALTH: ICD-10-CM

## 2025-06-19 PROBLEM — I78.9 CAPILLARY DISEASE: Status: ACTIVE | Noted: 2022-05-16

## 2025-06-19 PROBLEM — L40.0 PSORIASIS VULGARIS: Status: ACTIVE | Noted: 2025-04-30

## 2025-06-19 PROBLEM — L98.9 DISORDER OF SKIN: Status: ACTIVE | Noted: 2025-04-30

## 2025-06-19 PROBLEM — L82.0 INFLAMED SEBORRHEIC KERATOSIS: Status: ACTIVE | Noted: 2020-09-02

## 2025-06-19 PROBLEM — L84 CORNS AND CALLOSITIES: Status: ACTIVE | Noted: 2020-02-24

## 2025-06-19 PROBLEM — M19.90 ARTHRITIS: Status: ACTIVE | Noted: 2025-04-30

## 2025-06-19 PROBLEM — R23.3 SPONTANEOUS ECCHYMOSIS: Status: ACTIVE | Noted: 2021-05-20

## 2025-06-19 PROBLEM — L72.9 FOLLICULAR CYST OF SKIN AND SUBCUTANEOUS TISSUE: Status: ACTIVE | Noted: 2021-03-24

## 2025-06-19 PROBLEM — B07.9 VERRUCA: Status: ACTIVE | Noted: 2022-05-16

## 2025-06-19 PROBLEM — L56.5 DISSEMINATED SUPERFICIAL ACTINIC POROKERATOSIS: Status: ACTIVE | Noted: 2025-04-30

## 2025-06-19 PROBLEM — D48.5 NEOPLASM OF UNCERTAIN BEHAVIOR OF SKIN: Status: ACTIVE | Noted: 2022-08-12

## 2025-06-19 LAB
CREAT UR-MCNC: 109 MG/DL
MICROALBUMIN UR-MCNC: <12 MG/L
MICROALBUMIN/CREAT UR: NORMAL MG/G{CREAT}

## 2025-06-19 PROCEDURE — 99214 OFFICE O/P EST MOD 30 MIN: CPT | Performed by: INTERNAL MEDICINE

## 2025-06-19 PROCEDURE — 3078F DIAST BP <80 MM HG: CPT | Performed by: INTERNAL MEDICINE

## 2025-06-19 PROCEDURE — 3074F SYST BP LT 130 MM HG: CPT | Performed by: INTERNAL MEDICINE

## 2025-06-19 PROCEDURE — 1126F AMNT PAIN NOTED NONE PRSNT: CPT | Performed by: INTERNAL MEDICINE

## 2025-06-19 PROCEDURE — G2211 COMPLEX E/M VISIT ADD ON: HCPCS | Performed by: INTERNAL MEDICINE

## 2025-06-19 RX ORDER — ALBUTEROL SULFATE 0.83 MG/ML
2.5 SOLUTION RESPIRATORY (INHALATION) EVERY 6 HOURS PRN
Qty: 90 ML | Refills: 1 | Status: SHIPPED | OUTPATIENT
Start: 2025-06-19

## 2025-06-19 RX ORDER — ALBUTEROL SULFATE 90 UG/1
2 INHALANT RESPIRATORY (INHALATION) EVERY 4 HOURS PRN
Qty: 18 G | Refills: 11 | Status: SHIPPED | OUTPATIENT
Start: 2025-06-19

## 2025-06-19 ASSESSMENT — PAIN SCALES - GENERAL: PAINLEVEL_OUTOF10: NO PAIN (0)

## 2025-06-19 NOTE — PATIENT INSTRUCTIONS
"     Continue all medications at the same doses.  Contact your usual pharmacy if you need refills.      Get a tetanus vaccine booster from a pharmacist in your usual pharmacy (tetanus vaccines are recommended every 10 years).  With Medicare insurance, the tetanus vaccine is cheaper to get in the pharmacy than in the clinic.      Plan to get the annual influenza vaccine each fall for sure by the end of October for the best protection throughout the winter flu season.   Get this from any pharmacy or via a \"nurse only\" vaccine appointment.        Plan to get an updated Covid booster each fall at least by the end of October for the best protection throughout the winter season.   Get this from any pharmacy or via a \"nurse only\" vaccine appointment.       Return to see me in approximately 6 months, schedule a follow up visit sooner if needed for anything else.  Please get nonfasting labs done in the [facility] or at any other [facility] lab 1-2 days before this appointment.  If you get the labs done at another clinic, make arrangements with that clinic directly.  The orders will be in place.  Use [employer] or Call [phone] to schedule the appointment with me and lab appointment.           5 GOALS IN MANAGING DIABETES (i.e. to give the best chance to prevent diabetic complications and vascular disease):     1.  Aggressive diabetic management.  The target for A1C (3 months average blood sugar) is ideally below 6.5, preferably below 7.5    Your diabetes is under good control.      Lab Results   Component Value Date    A1C 6.7 06/18/2025    A1C 6.4 12/09/2024    A1C 6.6 05/28/2024    A1C 6.6 11/27/2023    A1C 6.8 05/16/2023    A1C 6.9 11/10/2022    A1C 6.4 06/16/2022    A1C 6.0 04/08/2022    A1C 6.0 09/30/2021    A1C 6.2 03/24/2021    A1C 6.2 09/23/2020    A1C 6.2 12/17/2019    A1C 6.0 06/17/2019    A1C 6.1 11/02/2018    A1C 6.4 03/21/2017       2.  Aggressive blood pressure control, under 130/80 ideally.  Using " "medications if needed.    Your blood pressure is under good control    BP Readings from Last 4 Encounters:   06/19/25 118/70   12/11/24 122/62   10/15/24 133/68   08/20/24 136/68       3.  Aggressive LDL cholesterol (bad cholesterol) lowering as needed.  Your goal is an LDL under 100 for sure, preferably under 70.     *  All patients with diabetes are recommended to be on a \"statin\" cholesterol lowering medication regardless of the cholesterol levels to give the best chance at avoiding vascular disease.      New guidelines identify four high-risk groups who could benefit from statins:   *people with pre-existing heart disease, such as those who have had a heart attack;   *people ages 40 to 75 who have diabetes of any type  *patients ages 40 to 75 with at least a 7.5% risk of developing cardiovascular disease over the next decade, according to a formula described in the guidelines  *patients with the sort of super-high cholesterol that sometimes runs in families, as evidenced by an LDL of 190 milligrams per deciliter or higher    *  Your cholesterol levels are well controlled.    Recent Labs   Lab Test 06/18/25  0956 05/28/24  0753   CHOL 169 154   HDL 50 50   * 92   TRIG 84 61       --LDL (\"bad\" cholesterol): desired under 100 in diabetes.  Always make better food choices ( lower fats, etc.), statins are recommended for almost all diabetics, regardless of LDL levels.  --HDL (\"good\") cholesterol: (normal above 40 for men, above 50 for women).  Best way to improve the HDL level is through regular physical exercise. There are no medications to raise HDL levels.  --Triglycerides (desirable under 150): triglycerides are more about metabolic issues, best way to improve this is through better diet choices, emphasizing reducing the intake of \"simple carbohydrates\" (e.g. White bread, white rice, pasta, noodles, potatoes, snack foods, regular soda, juices (except fresh squeezed), cakes, cookies, candy, etc.) as best " possible.  Medications may be considered for triglyceride levels routinely above 400.    4.  No smoking    5.  Consider daily preventative Aspirin once per day, every day over age 50 unless there is a specific reason that you cannot take aspirin.       *You cannot take daily aspirin due to Apixaban.        DIABETES REMINDERS:   1. Check your blood glucose regularly either with standard glucometer or personal continuous glucose monitor.    2) Your blood sugar goals:  before eating and  two hours after eating.  If using personal continuous glucose monitor, the goal is Time in the Target range ( ) of 70-75% with very few (under 2%) Below target.    3) Always be prepared to treat low blood sugar symptoms should it happen. Keep a sugar-containing beverage or food nearby.  4) When to call your clinic:     Blood sugar over 400     If you have 2 to 3 low blood sugars (under 70) in a row    Low readings the same time of day several days in a row  5) When to call 911: If your low blood glucose symptoms do not get better with treatment, or if you/someone else is unable to give you treatment.  6) Work with a Certified Diabetes Educator to assist you with your diabetes management  7) Contact us if you have ANY questions about your medications or instructions, or have problems with getting prescriptions filled.

## 2025-06-19 NOTE — PROGRESS NOTES
Assessment & Plan     (E11.59) Type 2 diabetes mellitus with other circulatory complication, without long-term current use of insulin (H)  (primary encounter diagnosis)  Comment: This condition is currently controlled on the current medical regimen.  Continue current therapy.   Discussed importance of aggressive management of diabetes, including aggressive low cabr diet (one of the most powerful ways to control type II DM), performing regular glucose monitoring, (either with standard glucometer, or preferably personal continuous glucose monitor), medication compliance, regular laboratory monitoring at least every 6 months (or possibly more often if diabetes not at goal), and attending regular follow up appointments as ordered.    Aggressive diabetes management of diabetes will greatly reduce the future risk of diabetes related complications such as CAD, CVA, PVD, and retinopathy/neuropathy/nephropathy.  Based on level of diabetes control: testing frequency ONE TIME PER DAY   Plan:     (I48.0) Paroxysmal atrial fibrillation (H)  Comment: This condition is currently controlled on the current medical regimen.  Continue current therapy.   Plan:     (L40.50) Psoriasis with arthropathy (H)  Comment: This condition is currently controlled on the current medical regimen.  Continue current therapy.   Plan: vitamin D3 (CHOLECALCIFEROL) 1.25 MG (36202 UT)        capsule            (J44.9) Chronic obstructive pulmonary disease, unspecified COPD type (H)  Comment: NOW OFF CIGARETTES   Plan: albuterol (PROAIR HFA/PROVENTIL HFA/VENTOLIN         HFA) 108 (90 Base) MCG/ACT inhaler            (Z87.891) History of tobacco use, presenting hazards to health  Comment:   Plan: CT Chest Lung Cancer Screen Low Dose Without,         REVIEW OF HEALTH MAINTENANCE PROTOCOL ORDERS            (M85.80) Osteopenia, unspecified location  Comment: This condition is currently controlled on the current medical regimen.  Continue current therapy.  "  Plan: vitamin D3 (CHOLECALCIFEROL) 1.25 MG (20426 UT)        capsule            (I25.10) Coronary artery disease involving native coronary artery of native heart without angina pectoris  Comment: This condition is currently controlled on the current medical regimen.  Continue current therapy.   The patient does not report any signs or symptoms of angina or active cardiac ischemia.   They do not report any relative changes in their ability to perform physical activities over the past year.    We discussed aggressive secondary risk factor modification, including aggressive BP control (under 130/ideally), aggressive LDL lowering with statin (goal under 100 for sure/under 70 ideally), no smoking, diabetes prevention/management, no smoking, and use of either ASA or similar anti platelet agent if tolerated.     Plan:     (I10) Benign essential hypertension  Comment: This condition is currently controlled on the current medical regimen.  Continue current therapy.   Plan:     (E55.9) Vitamin D deficiency  Comment:   Plan:     (E53.8) Vitamin B12 deficiency (non anemic)  Comment:   Plan:     (E78.5) Hyperlipidemia LDL goal <130  Comment: This condition is currently controlled on the current medical regimen.  Continue current therapy.   Discussed guidelines recommending a statin cholesterol lowering medication for any patient with either diabetes and/or vascular disease, aiming for a LDL goal under 100 for sure, ideally under 70, using whatever dose of statin tolerated.    Plan:     (J43.9) Pulmonary emphysema, unspecified emphysema type (H)  Comment:   Plan:              BMI  Estimated body mass index is 29.99 kg/m  as calculated from the following:    Height as of this encounter: 1.626 m (5' 4\").    Weight as of this encounter: 79.2 kg (174 lb 11.2 oz).             Ramone Lynn is a 74 year old, presenting for the following health issues:  COPD, Diabetes, Hypertension, Atrial Fib, and Results (Follow up " labs)      Via the Health Maintenance questionnaire, the patient has reported the following services have been completed , this information has been sent to the abstraction team.  History of Present Illness       Reason for visit:  Ainsley consumes 0 sweetened beverage(s) daily.She exercises with enough effort to increase her heart rate 10 to 19 minutes per day.  She exercises with enough effort to increase her heart rate 3 or less days per week. She is missing 7 dose(s) of medications per week.      Diabetes Follow-up    How often are you checking your blood sugar? Not at all  What concerns do you have today about your diabetes? None   Do you have any of these symptoms? (Select all that apply)  No numbness or tingling in feet.  No redness, sores or blisters on feet.  No complaints of excessive thirst.  No reports of blurry vision.  No significant changes to weight.      BP Readings from Last 2 Encounters:   06/19/25 118/70   12/11/24 122/62     Hemoglobin A1C (%)   Date Value   06/18/2025 6.7 (H)   12/09/2024 6.4 (H)   03/24/2021 6.2 (H)   09/23/2020 6.2 (H)     LDL Cholesterol Calculated (mg/dL)   Date Value   06/18/2025 102 (H)   05/28/2024 92   09/23/2020 81   06/17/2019 87             Hypertension Follow-up    Do you check your blood pressure regularly outside of the clinic? Yes once in a while  Are you following a low salt diet? Yes  Are your blood pressures ever more than 140 on the top number (systolic) OR more   than 90 on the bottom number (diastolic), for example 140/90? No    BP Readings from Last 2 Encounters:   06/19/25 118/70   12/11/24 122/62     Atrial Fibrillation Follow-up    Symptoms: no recent chest pain, significant palpitations, dizziness/lightheadedness, dyspnea, or increased peripheral edema.  Stroke prevention: DOAC (Eliquis, Xarelto, Pradaxa)        7/2/2024     8:17 AM 8/20/2024     9:24 AM 10/15/2024     2:10 PM 12/11/2024    10:26 AM 6/19/2025     8:51 AM   Date   Pulse 84 54 67 60 71  "    Current [id number] Score: 8 points - A score of 5 or greater represents a 7.2 - 12.2% annual risk of major embolic event, without anti-coagulation or an LAAO device.       COPD Follow-Up  Overall, how are your COPD symptoms since your last clinic visit?  No change  How much fatigue or shortness of breath do you have when you are walking?  None  How much shortness of breath do you have when you are resting?  None  How often do you cough? Never  Have you noticed any change in your sputum/phlegm?  No  Have you experienced a recent fever? No  Please describe how far you can walk without stopping to rest:  1-2 miles  How many flights of stairs are you able to walk up without stopping?  2  Have you had any Emergency Room Visits, Urgent Care Visits, or Hospital Admissions because of your COPD since your last office visit?  No    History   Smoking Status    Former    Types: Cigarettes   Smokeless Tobacco    Never     No results found for: \"FEV1\", \"NCE5UHE\"    **I reviewed the information recorded in the patient's EPIC chart (including but not limited to medical history, surgical history, family history, problem list, medication list, and allergy list) and updated the information as indicated based on the patients reported information.         Review of Systems  Constitutional, HEENT, cardiovascular, pulmonary, gi and gu systems are negative, except as otherwise noted.      Objective    /70   Pulse 71   Temp 98.2  F (36.8  C) (Temporal)   Ht 1.626 m (5' 4\")   Wt 79.2 kg (174 lb 11.2 oz)   LMP  (LMP Unknown)   SpO2 97%   Breastfeeding No   BMI 29.99 kg/m    Body mass index is 29.99 kg/m .  Physical Exam   Physical Exam  Vitals and nursing note reviewed.   Constitutional:       Comments: Moves normally, alert, no apparent distress  HENT:      Head: Normocephalic and atraumatic.      Nose: Nose normal.   Eyes:      Extraocular Movements: Extraocular movements intact.   Cardiovascular:      Rate and Rhythm: " Normal rate and regular rhythm.      Heart sounds: Normal heart sounds.   Pulmonary:      Effort: Pulmonary effort is normal.      Breath sounds: Normal breath sounds.   Abdominal:      General: There is no distension.      Palpations: There is no mass.      Tenderness: No abdominal tenderness, no distention.     Bowel sounds: normal  Musculoskeletal:         General: Normal range of motion, moves into the room normal, moves in and out of chair normally.    Skin:     General: Skin is warm and dry.   Neurological:      General: No focal deficit present.      Mental Status: Alert, responds to questions, Speech coherent  Psychiatric:         Mood and Affect: Mood normal.            The longitudinal plan of care for the diagnosis(es)/condition(s) as documented were addressed during this visit. Due to the added complexity in care, I will continue to support Leah in the subsequent management and with ongoing continuity of care.      Signed Electronically by: Kendall Mendez MD

## 2025-07-09 ENCOUNTER — ANCILLARY PROCEDURE (OUTPATIENT)
Dept: CT IMAGING | Facility: CLINIC | Age: 75
End: 2025-07-09
Attending: INTERNAL MEDICINE
Payer: COMMERCIAL

## 2025-07-09 DIAGNOSIS — Z87.891 HISTORY OF TOBACCO USE, PRESENTING HAZARDS TO HEALTH: ICD-10-CM

## 2025-07-09 PROCEDURE — 71271 CT THORAX LUNG CANCER SCR C-: CPT

## (undated) DEVICE — GLOVE PROTEXIS BLUE W/NEU-THERA 6.5  2D73EB65

## (undated) DEVICE — SU VICRYL+ 2-0 27IN CP-1 UND VCP266H

## (undated) DEVICE — CAST PADDING 6" UNSTERILE 9046

## (undated) DEVICE — IMPLANTABLE DEVICE
Type: IMPLANTABLE DEVICE | Site: HIP | Status: NON-FUNCTIONAL
Brand: G7® LONGEVITY®

## (undated) DEVICE — SOLUTION WOUND CLEANSING 3/4OZ 10% PVP EA-L3011FB-50

## (undated) DEVICE — SU VICRYL 2-0 CP-1 27" UND J266H

## (undated) DEVICE — DRSG KERLIX FLUFFS X5

## (undated) DEVICE — SUCTION TIP YANKAUER STR K87

## (undated) DEVICE — BLADE SAW SAGITTAL STRK 18X90X1.27MM HD SYS 6 6118-127-090

## (undated) DEVICE — NDL 18GA 1.5" 305196

## (undated) DEVICE — BONE CEMENT BIOPREP FEMORAL PREP PLUG INSERTER 0206-710-000

## (undated) DEVICE — GLOVE BIOGEL PI MICRO INDICATOR UNDERGLOVE SZ 8.5 48985

## (undated) DEVICE — ESU GROUND PAD UNIVERSAL W/O CORD

## (undated) DEVICE — SU VICRYL 1 CT 36" J959H

## (undated) DEVICE — LINEN TOWEL PACK X5 5464

## (undated) DEVICE — DRAPE SHEET REV FOLD 3/4 9349

## (undated) DEVICE — GLOVE PROTEXIS POWDER FREE 7.5 ORTHOPEDIC 2D73ET75

## (undated) DEVICE — Device

## (undated) DEVICE — DRAIN ROUND W/RESERV KIT JACKSON PRATT 10FR 400ML SU130-402D

## (undated) DEVICE — BONE CEMENT MIXEVAC III HI VAC KIT  0206-015-000

## (undated) DEVICE — SOL NACL 0.9% IRRIG 1000ML BOTTLE 2F7124

## (undated) DEVICE — GLOVE PROTEXIS POWDER FREE 8.5 ORTHOPEDIC 2D73ET85

## (undated) DEVICE — MANIFOLD NEPTUNE 4 PORT 700-20

## (undated) DEVICE — DRSG XEROFORM 5X9" 8884431605

## (undated) DEVICE — SUCTION IRR SYSTEM W/O TIP INTERPULSE HANDPIECE 0210-100-000

## (undated) DEVICE — HOOD SURG T7PLUS PEEL AWAY FACE SHIELD STRL LF 0416-801-100

## (undated) DEVICE — PACK TOTAL HIP W/U DRAPE SOP15HUFSC

## (undated) DEVICE — GLOVE PROTEXIS BLUE W/NEU-THERA 7.0  2D73EB70

## (undated) DEVICE — GLOVE PROTEXIS POWDER FREE 6.5 ORTHOPEDIC 2D73ET65

## (undated) DEVICE — GLOVE BIOGEL PI SZ 8.5 40885

## (undated) DEVICE — SOL NACL 0.9% IRRIG 1000ML BOTTLE 07138-09

## (undated) DEVICE — SOL NACL 0.9% INJ 250ML BAG 2B1322Q

## (undated) DEVICE — SYR 50ML LL W/O NDL 309653

## (undated) DEVICE — SU MONOCRYL 3-0 PS-2 27" Y427H

## (undated) DEVICE — GLOVE PROTEXIS W/NEU-THERA 7.5  2D73TE75

## (undated) DEVICE — GLOVE PROTEXIS W/NEU-THERA 7.0  2D73TE70

## (undated) DEVICE — SOL WATER IRRIG 1000ML BOTTLE 2F7114

## (undated) DEVICE — NDL SPINAL 18GA 3.5" 405184

## (undated) DEVICE — DRAPE C-ARM 60X42" 1013

## (undated) DEVICE — GLOVE PROTEXIS W/NEU-THERA 8.5  2D73TE85

## (undated) DEVICE — SOL NACL 0.9% INJ 1000ML BAG 2B1324X

## (undated) DEVICE — BONE CLEANING TIP INTERPULSE  0210-010-000

## (undated) DEVICE — DECANTER BAG 2002S

## (undated) DEVICE — KIT PATIENT CARE HANA TABLE PROFX SUPINE 6855

## (undated) DEVICE — CAST PADDING 6" STERILE 9046S

## (undated) DEVICE — IMM PILLOW ABDUCT HIP MED 31143061

## (undated) DEVICE — DRSG GAUZE 4X4" 3033

## (undated) DEVICE — PACK TOTAL KNEE SOP15TKFSD

## (undated) DEVICE — WRAP EZY KNEE 1213PP

## (undated) DEVICE — IMPLANTABLE DEVICE
Type: IMPLANTABLE DEVICE | Site: HIP | Status: NON-FUNCTIONAL
Brand: G7® ACETABULAR SYSTEM

## (undated) DEVICE — DRSG XEROFORM 1X8"

## (undated) DEVICE — DRSG XEROFORM 3X4" 8884432000

## (undated) DEVICE — SU VICRYL 1 CP-1 27" J468H

## (undated) DEVICE — SPONGE LAP 18X18" X8435

## (undated) DEVICE — SU DERMABOND ADVANCED .7ML DNX12

## (undated) DEVICE — BLADE SAW SAGITTAL STRK 18X90X1.37MM HD SYS 6 6118-137-090

## (undated) DEVICE — DRILL BIT BIOM QUICK CONNECTING RING LOCK 3.2X20MM 31-323220

## (undated) DEVICE — PREP CHLORAPREP 26ML TINTED ORANGE  260815

## (undated) DEVICE — DRSG ABDOMINAL 07 1/2X8" 7197D

## (undated) DEVICE — SU VICRYL 0 CP-1 27" J467H

## (undated) DEVICE — PREP SKIN SCRUB TRAY 4461A

## (undated) DEVICE — SU ETHIBOND 2 V-37 4X30" MX69G

## (undated) DEVICE — DRAPE IOBAN ISOLATION VERTICAL 320X21CM 6617

## (undated) DEVICE — CAST PADDING 4" COTTON WEBRIL UNSTERILE 9084

## (undated) DEVICE — ESU BIPOLAR SEALER AQUAMANTYS 6MM 23-112-1

## (undated) DEVICE — DRAPE IOBAN INCISE 23X17" 6650EZ

## (undated) DEVICE — IMM KNEE 20" 0814-2660

## (undated) DEVICE — SYR 30ML LL W/O NDL

## (undated) DEVICE — BLADE SAW SAGITTAL STRK 25X90X1.37MM 4H SYS 6 6125-137-090

## (undated) DEVICE — SYR 10ML FINGER CONTROL W/O NDL 309695

## (undated) DEVICE — DRAPE CONVERTORS U-DRAPE 60X72" 8476

## (undated) RX ORDER — GLYCOPYRROLATE 0.2 MG/ML
INJECTION, SOLUTION INTRAMUSCULAR; INTRAVENOUS
Status: DISPENSED
Start: 2022-06-27

## (undated) RX ORDER — FENTANYL CITRATE 50 UG/ML
INJECTION, SOLUTION INTRAMUSCULAR; INTRAVENOUS
Status: DISPENSED
Start: 2017-07-31

## (undated) RX ORDER — DEXAMETHASONE SODIUM PHOSPHATE 4 MG/ML
INJECTION, SOLUTION INTRA-ARTICULAR; INTRALESIONAL; INTRAMUSCULAR; INTRAVENOUS; SOFT TISSUE
Status: DISPENSED
Start: 2024-07-01

## (undated) RX ORDER — FENTANYL CITRATE 50 UG/ML
INJECTION, SOLUTION INTRAMUSCULAR; INTRAVENOUS
Status: DISPENSED
Start: 2022-06-27

## (undated) RX ORDER — TRANEXAMIC ACID 650 MG/1
TABLET ORAL
Status: DISPENSED
Start: 2022-06-27

## (undated) RX ORDER — GLYCOPYRROLATE 0.2 MG/ML
INJECTION, SOLUTION INTRAMUSCULAR; INTRAVENOUS
Status: DISPENSED
Start: 2017-07-31

## (undated) RX ORDER — FENTANYL CITRATE 50 UG/ML
INJECTION, SOLUTION INTRAMUSCULAR; INTRAVENOUS
Status: DISPENSED
Start: 2024-07-01

## (undated) RX ORDER — HYDROMORPHONE HCL IN WATER/PF 6 MG/30 ML
PATIENT CONTROLLED ANALGESIA SYRINGE INTRAVENOUS
Status: DISPENSED
Start: 2022-06-27

## (undated) RX ORDER — REGADENOSON 0.08 MG/ML
INJECTION, SOLUTION INTRAVENOUS
Status: DISPENSED
Start: 2017-02-27

## (undated) RX ORDER — FENTANYL CITRATE 50 UG/ML
INJECTION, SOLUTION INTRAMUSCULAR; INTRAVENOUS
Status: DISPENSED
Start: 2017-04-03

## (undated) RX ORDER — FENTANYL CITRATE 0.05 MG/ML
INJECTION, SOLUTION INTRAMUSCULAR; INTRAVENOUS
Status: DISPENSED
Start: 2024-07-01

## (undated) RX ORDER — LIDOCAINE HYDROCHLORIDE 20 MG/ML
INJECTION, SOLUTION EPIDURAL; INFILTRATION; INTRACAUDAL; PERINEURAL
Status: DISPENSED
Start: 2017-07-31

## (undated) RX ORDER — GLYCOPYRROLATE 0.2 MG/ML
INJECTION, SOLUTION INTRAMUSCULAR; INTRAVENOUS
Status: DISPENSED
Start: 2017-04-03

## (undated) RX ORDER — ACETAMINOPHEN 500 MG
TABLET ORAL
Status: DISPENSED
Start: 2017-07-31

## (undated) RX ORDER — TRANEXAMIC ACID 650 MG/1
TABLET ORAL
Status: DISPENSED
Start: 2024-07-01

## (undated) RX ORDER — PROPOFOL 10 MG/ML
INJECTION, EMULSION INTRAVENOUS
Status: DISPENSED
Start: 2024-07-01

## (undated) RX ORDER — ONDANSETRON 2 MG/ML
INJECTION INTRAMUSCULAR; INTRAVENOUS
Status: DISPENSED
Start: 2022-06-27

## (undated) RX ORDER — DEXAMETHASONE SODIUM PHOSPHATE 4 MG/ML
INJECTION, SOLUTION INTRA-ARTICULAR; INTRALESIONAL; INTRAMUSCULAR; INTRAVENOUS; SOFT TISSUE
Status: DISPENSED
Start: 2017-04-03

## (undated) RX ORDER — FENTANYL CITRATE 0.05 MG/ML
INJECTION, SOLUTION INTRAMUSCULAR; INTRAVENOUS
Status: DISPENSED
Start: 2022-06-27

## (undated) RX ORDER — PROPOFOL 10 MG/ML
INJECTION, EMULSION INTRAVENOUS
Status: DISPENSED
Start: 2022-06-27

## (undated) RX ORDER — LIDOCAINE HYDROCHLORIDE 20 MG/ML
INJECTION, SOLUTION EPIDURAL; INFILTRATION; INTRACAUDAL; PERINEURAL
Status: DISPENSED
Start: 2017-04-03

## (undated) RX ORDER — HYDROMORPHONE HYDROCHLORIDE 1 MG/ML
INJECTION, SOLUTION INTRAMUSCULAR; INTRAVENOUS; SUBCUTANEOUS
Status: DISPENSED
Start: 2017-07-31

## (undated) RX ORDER — OXYCODONE HCL 10 MG/1
TABLET, FILM COATED, EXTENDED RELEASE ORAL
Status: DISPENSED
Start: 2017-07-31

## (undated) RX ORDER — CLINDAMYCIN PHOSPHATE 900 MG/50ML
INJECTION, SOLUTION INTRAVENOUS
Status: DISPENSED
Start: 2017-04-03

## (undated) RX ORDER — ONDANSETRON 2 MG/ML
INJECTION INTRAMUSCULAR; INTRAVENOUS
Status: DISPENSED
Start: 2024-07-01

## (undated) RX ORDER — CLINDAMYCIN PHOSPHATE 900 MG/50ML
INJECTION, SOLUTION INTRAVENOUS
Status: DISPENSED
Start: 2017-07-31

## (undated) RX ORDER — HYDROXYZINE HYDROCHLORIDE 50 MG/ML
INJECTION, SOLUTION INTRAMUSCULAR
Status: DISPENSED
Start: 2022-06-27

## (undated) RX ORDER — NEOSTIGMINE METHYLSULFATE 1 MG/ML
VIAL (ML) INJECTION
Status: DISPENSED
Start: 2022-06-27

## (undated) RX ORDER — ONDANSETRON 2 MG/ML
INJECTION INTRAMUSCULAR; INTRAVENOUS
Status: DISPENSED
Start: 2017-07-31

## (undated) RX ORDER — HYDROMORPHONE HYDROCHLORIDE 1 MG/ML
INJECTION, SOLUTION INTRAMUSCULAR; INTRAVENOUS; SUBCUTANEOUS
Status: DISPENSED
Start: 2024-07-01

## (undated) RX ORDER — PROPOFOL 10 MG/ML
INJECTION, EMULSION INTRAVENOUS
Status: DISPENSED
Start: 2017-04-03

## (undated) RX ORDER — OXYCODONE HCL 10 MG/1
TABLET, FILM COATED, EXTENDED RELEASE ORAL
Status: DISPENSED
Start: 2017-04-03

## (undated) RX ORDER — ACETAMINOPHEN 500 MG
TABLET ORAL
Status: DISPENSED
Start: 2017-04-03

## (undated) RX ORDER — HYDROMORPHONE HYDROCHLORIDE 1 MG/ML
INJECTION, SOLUTION INTRAMUSCULAR; INTRAVENOUS; SUBCUTANEOUS
Status: DISPENSED
Start: 2017-04-03

## (undated) RX ORDER — HYDROMORPHONE HCL IN WATER/PF 6 MG/30 ML
PATIENT CONTROLLED ANALGESIA SYRINGE INTRAVENOUS
Status: DISPENSED
Start: 2024-07-01

## (undated) RX ORDER — LIDOCAINE HYDROCHLORIDE 20 MG/ML
INJECTION, SOLUTION EPIDURAL; INFILTRATION; INTRACAUDAL; PERINEURAL
Status: DISPENSED
Start: 2022-06-27

## (undated) RX ORDER — CEFAZOLIN SODIUM/WATER 2 G/20 ML
SYRINGE (ML) INTRAVENOUS
Status: DISPENSED
Start: 2022-06-27

## (undated) RX ORDER — PROPOFOL 10 MG/ML
INJECTION, EMULSION INTRAVENOUS
Status: DISPENSED
Start: 2017-07-31

## (undated) RX ORDER — DEXAMETHASONE SODIUM PHOSPHATE 4 MG/ML
INJECTION, SOLUTION INTRA-ARTICULAR; INTRALESIONAL; INTRAMUSCULAR; INTRAVENOUS; SOFT TISSUE
Status: DISPENSED
Start: 2022-06-27

## (undated) RX ORDER — HYDROMORPHONE HYDROCHLORIDE 1 MG/ML
INJECTION, SOLUTION INTRAMUSCULAR; INTRAVENOUS; SUBCUTANEOUS
Status: DISPENSED
Start: 2022-06-27

## (undated) RX ORDER — DEXAMETHASONE SODIUM PHOSPHATE 4 MG/ML
INJECTION, SOLUTION INTRA-ARTICULAR; INTRALESIONAL; INTRAMUSCULAR; INTRAVENOUS; SOFT TISSUE
Status: DISPENSED
Start: 2017-07-31

## (undated) RX ORDER — ONDANSETRON 2 MG/ML
INJECTION INTRAMUSCULAR; INTRAVENOUS
Status: DISPENSED
Start: 2017-04-03

## (undated) RX ORDER — ACETAMINOPHEN 325 MG/1
TABLET ORAL
Status: DISPENSED
Start: 2022-06-27